# Patient Record
Sex: MALE | Race: BLACK OR AFRICAN AMERICAN | NOT HISPANIC OR LATINO | Employment: OTHER | URBAN - METROPOLITAN AREA
[De-identification: names, ages, dates, MRNs, and addresses within clinical notes are randomized per-mention and may not be internally consistent; named-entity substitution may affect disease eponyms.]

---

## 2018-12-09 PROBLEM — G12.21 ALS (AMYOTROPHIC LATERAL SCLEROSIS): Status: ACTIVE | Noted: 2018-12-09

## 2018-12-09 PROBLEM — I10 ESSENTIAL HYPERTENSION: Status: ACTIVE | Noted: 2018-12-09

## 2020-10-13 PROBLEM — R60.9 EDEMA: Status: ACTIVE | Noted: 2018-12-06

## 2020-10-13 PROBLEM — G82.50 QUADRIPARESIS: Status: ACTIVE | Noted: 2018-02-06

## 2020-10-13 PROBLEM — Z93.1 S/P PERCUTANEOUS ENDOSCOPIC GASTROSTOMY (PEG) TUBE PLACEMENT: Status: ACTIVE | Noted: 2020-10-13

## 2021-01-22 ENCOUNTER — LAB VISIT (OUTPATIENT)
Dept: LAB | Facility: OTHER | Age: 66
End: 2021-01-22
Payer: MEDICARE

## 2021-01-22 DIAGNOSIS — Z20.822 ENCOUNTER FOR LABORATORY TESTING FOR COVID-19 VIRUS: ICD-10-CM

## 2021-01-22 PROCEDURE — U0003 INFECTIOUS AGENT DETECTION BY NUCLEIC ACID (DNA OR RNA); SEVERE ACUTE RESPIRATORY SYNDROME CORONAVIRUS 2 (SARS-COV-2) (CORONAVIRUS DISEASE [COVID-19]), AMPLIFIED PROBE TECHNIQUE, MAKING USE OF HIGH THROUGHPUT TECHNOLOGIES AS DESCRIBED BY CMS-2020-01-R: HCPCS

## 2021-01-23 LAB — SARS-COV-2 RNA RESP QL NAA+PROBE: NOT DETECTED

## 2021-01-29 ENCOUNTER — LAB VISIT (OUTPATIENT)
Dept: LAB | Facility: OTHER | Age: 66
End: 2021-01-29
Payer: MEDICARE

## 2021-01-29 DIAGNOSIS — Z20.822 ENCOUNTER FOR LABORATORY TESTING FOR COVID-19 VIRUS: ICD-10-CM

## 2021-01-29 PROCEDURE — U0003 INFECTIOUS AGENT DETECTION BY NUCLEIC ACID (DNA OR RNA); SEVERE ACUTE RESPIRATORY SYNDROME CORONAVIRUS 2 (SARS-COV-2) (CORONAVIRUS DISEASE [COVID-19]), AMPLIFIED PROBE TECHNIQUE, MAKING USE OF HIGH THROUGHPUT TECHNOLOGIES AS DESCRIBED BY CMS-2020-01-R: HCPCS

## 2021-01-31 LAB — SARS-COV-2 RNA RESP QL NAA+PROBE: NOT DETECTED

## 2021-02-05 ENCOUNTER — LAB VISIT (OUTPATIENT)
Dept: LAB | Facility: OTHER | Age: 66
End: 2021-02-05
Payer: MEDICARE

## 2021-02-05 DIAGNOSIS — Z20.822 ENCOUNTER FOR LABORATORY TESTING FOR COVID-19 VIRUS: ICD-10-CM

## 2021-02-05 PROCEDURE — U0003 INFECTIOUS AGENT DETECTION BY NUCLEIC ACID (DNA OR RNA); SEVERE ACUTE RESPIRATORY SYNDROME CORONAVIRUS 2 (SARS-COV-2) (CORONAVIRUS DISEASE [COVID-19]), AMPLIFIED PROBE TECHNIQUE, MAKING USE OF HIGH THROUGHPUT TECHNOLOGIES AS DESCRIBED BY CMS-2020-01-R: HCPCS

## 2021-02-06 LAB — SARS-COV-2 RNA RESP QL NAA+PROBE: NOT DETECTED

## 2021-02-12 ENCOUNTER — LAB VISIT (OUTPATIENT)
Dept: LAB | Facility: OTHER | Age: 66
End: 2021-02-12
Payer: MEDICARE

## 2021-02-12 DIAGNOSIS — Z20.822 ENCOUNTER FOR LABORATORY TESTING FOR COVID-19 VIRUS: ICD-10-CM

## 2021-02-12 PROCEDURE — U0003 INFECTIOUS AGENT DETECTION BY NUCLEIC ACID (DNA OR RNA); SEVERE ACUTE RESPIRATORY SYNDROME CORONAVIRUS 2 (SARS-COV-2) (CORONAVIRUS DISEASE [COVID-19]), AMPLIFIED PROBE TECHNIQUE, MAKING USE OF HIGH THROUGHPUT TECHNOLOGIES AS DESCRIBED BY CMS-2020-01-R: HCPCS

## 2021-02-13 LAB — SARS-COV-2 RNA RESP QL NAA+PROBE: NOT DETECTED

## 2021-02-19 ENCOUNTER — LAB VISIT (OUTPATIENT)
Dept: LAB | Facility: OTHER | Age: 66
End: 2021-02-19
Payer: MEDICARE

## 2021-02-19 DIAGNOSIS — Z20.822 ENCOUNTER FOR LABORATORY TESTING FOR COVID-19 VIRUS: ICD-10-CM

## 2021-02-19 PROCEDURE — U0003 INFECTIOUS AGENT DETECTION BY NUCLEIC ACID (DNA OR RNA); SEVERE ACUTE RESPIRATORY SYNDROME CORONAVIRUS 2 (SARS-COV-2) (CORONAVIRUS DISEASE [COVID-19]), AMPLIFIED PROBE TECHNIQUE, MAKING USE OF HIGH THROUGHPUT TECHNOLOGIES AS DESCRIBED BY CMS-2020-01-R: HCPCS

## 2021-02-22 LAB — SARS-COV-2 RNA RESP QL NAA+PROBE: NOT DETECTED

## 2021-02-26 ENCOUNTER — LAB VISIT (OUTPATIENT)
Dept: LAB | Facility: OTHER | Age: 66
End: 2021-02-26
Payer: MEDICARE

## 2021-02-26 DIAGNOSIS — Z20.822 ENCOUNTER FOR LABORATORY TESTING FOR COVID-19 VIRUS: ICD-10-CM

## 2021-02-26 PROCEDURE — U0003 INFECTIOUS AGENT DETECTION BY NUCLEIC ACID (DNA OR RNA); SEVERE ACUTE RESPIRATORY SYNDROME CORONAVIRUS 2 (SARS-COV-2) (CORONAVIRUS DISEASE [COVID-19]), AMPLIFIED PROBE TECHNIQUE, MAKING USE OF HIGH THROUGHPUT TECHNOLOGIES AS DESCRIBED BY CMS-2020-01-R: HCPCS

## 2021-02-27 LAB — SARS-COV-2 RNA RESP QL NAA+PROBE: NOT DETECTED

## 2021-03-05 ENCOUNTER — LAB VISIT (OUTPATIENT)
Dept: LAB | Facility: OTHER | Age: 66
End: 2021-03-05
Payer: MEDICARE

## 2021-03-05 DIAGNOSIS — Z20.822 ENCOUNTER FOR LABORATORY TESTING FOR COVID-19 VIRUS: ICD-10-CM

## 2021-03-05 PROCEDURE — U0003 INFECTIOUS AGENT DETECTION BY NUCLEIC ACID (DNA OR RNA); SEVERE ACUTE RESPIRATORY SYNDROME CORONAVIRUS 2 (SARS-COV-2) (CORONAVIRUS DISEASE [COVID-19]), AMPLIFIED PROBE TECHNIQUE, MAKING USE OF HIGH THROUGHPUT TECHNOLOGIES AS DESCRIBED BY CMS-2020-01-R: HCPCS | Performed by: NURSE PRACTITIONER

## 2021-03-06 LAB — SARS-COV-2 RNA RESP QL NAA+PROBE: NOT DETECTED

## 2021-03-12 ENCOUNTER — LAB VISIT (OUTPATIENT)
Dept: LAB | Facility: OTHER | Age: 66
End: 2021-03-12
Payer: MEDICARE

## 2021-03-12 DIAGNOSIS — Z20.822 ENCOUNTER FOR LABORATORY TESTING FOR COVID-19 VIRUS: ICD-10-CM

## 2021-03-12 PROCEDURE — U0003 INFECTIOUS AGENT DETECTION BY NUCLEIC ACID (DNA OR RNA); SEVERE ACUTE RESPIRATORY SYNDROME CORONAVIRUS 2 (SARS-COV-2) (CORONAVIRUS DISEASE [COVID-19]), AMPLIFIED PROBE TECHNIQUE, MAKING USE OF HIGH THROUGHPUT TECHNOLOGIES AS DESCRIBED BY CMS-2020-01-R: HCPCS | Performed by: NURSE PRACTITIONER

## 2021-03-13 LAB — SARS-COV-2 RNA RESP QL NAA+PROBE: NOT DETECTED

## 2021-03-19 ENCOUNTER — LAB VISIT (OUTPATIENT)
Dept: LAB | Facility: OTHER | Age: 66
End: 2021-03-19
Payer: MEDICARE

## 2021-03-19 DIAGNOSIS — Z20.822 ENCOUNTER FOR LABORATORY TESTING FOR COVID-19 VIRUS: ICD-10-CM

## 2021-03-19 PROCEDURE — U0003 INFECTIOUS AGENT DETECTION BY NUCLEIC ACID (DNA OR RNA); SEVERE ACUTE RESPIRATORY SYNDROME CORONAVIRUS 2 (SARS-COV-2) (CORONAVIRUS DISEASE [COVID-19]), AMPLIFIED PROBE TECHNIQUE, MAKING USE OF HIGH THROUGHPUT TECHNOLOGIES AS DESCRIBED BY CMS-2020-01-R: HCPCS | Performed by: NURSE PRACTITIONER

## 2021-03-21 LAB — SARS-COV-2 RNA RESP QL NAA+PROBE: NOT DETECTED

## 2021-03-26 ENCOUNTER — LAB VISIT (OUTPATIENT)
Dept: LAB | Facility: OTHER | Age: 66
End: 2021-03-26
Payer: MEDICARE

## 2021-03-26 DIAGNOSIS — Z20.822 ENCOUNTER FOR LABORATORY TESTING FOR COVID-19 VIRUS: ICD-10-CM

## 2021-03-26 PROCEDURE — U0003 INFECTIOUS AGENT DETECTION BY NUCLEIC ACID (DNA OR RNA); SEVERE ACUTE RESPIRATORY SYNDROME CORONAVIRUS 2 (SARS-COV-2) (CORONAVIRUS DISEASE [COVID-19]), AMPLIFIED PROBE TECHNIQUE, MAKING USE OF HIGH THROUGHPUT TECHNOLOGIES AS DESCRIBED BY CMS-2020-01-R: HCPCS | Performed by: NURSE PRACTITIONER

## 2021-03-27 LAB — SARS-COV-2 RNA RESP QL NAA+PROBE: NOT DETECTED

## 2021-03-30 ENCOUNTER — LAB VISIT (OUTPATIENT)
Dept: LAB | Facility: OTHER | Age: 66
End: 2021-03-30
Payer: MEDICARE

## 2021-03-30 DIAGNOSIS — Z20.822 ENCOUNTER FOR LABORATORY TESTING FOR COVID-19 VIRUS: ICD-10-CM

## 2021-03-30 PROCEDURE — U0003 INFECTIOUS AGENT DETECTION BY NUCLEIC ACID (DNA OR RNA); SEVERE ACUTE RESPIRATORY SYNDROME CORONAVIRUS 2 (SARS-COV-2) (CORONAVIRUS DISEASE [COVID-19]), AMPLIFIED PROBE TECHNIQUE, MAKING USE OF HIGH THROUGHPUT TECHNOLOGIES AS DESCRIBED BY CMS-2020-01-R: HCPCS | Performed by: NURSE PRACTITIONER

## 2021-03-31 LAB — SARS-COV-2 RNA RESP QL NAA+PROBE: NOT DETECTED

## 2021-04-09 ENCOUNTER — LAB VISIT (OUTPATIENT)
Dept: LAB | Facility: OTHER | Age: 66
End: 2021-04-09
Payer: MEDICARE

## 2021-04-09 DIAGNOSIS — Z20.822 ENCOUNTER FOR LABORATORY TESTING FOR COVID-19 VIRUS: ICD-10-CM

## 2021-04-09 PROCEDURE — U0003 INFECTIOUS AGENT DETECTION BY NUCLEIC ACID (DNA OR RNA); SEVERE ACUTE RESPIRATORY SYNDROME CORONAVIRUS 2 (SARS-COV-2) (CORONAVIRUS DISEASE [COVID-19]), AMPLIFIED PROBE TECHNIQUE, MAKING USE OF HIGH THROUGHPUT TECHNOLOGIES AS DESCRIBED BY CMS-2020-01-R: HCPCS | Performed by: NURSE PRACTITIONER

## 2021-04-10 LAB — SARS-COV-2 RNA RESP QL NAA+PROBE: NOT DETECTED

## 2021-04-16 ENCOUNTER — LAB VISIT (OUTPATIENT)
Dept: LAB | Facility: OTHER | Age: 66
End: 2021-04-16
Payer: MEDICARE

## 2021-04-16 DIAGNOSIS — Z20.822 ENCOUNTER FOR LABORATORY TESTING FOR COVID-19 VIRUS: ICD-10-CM

## 2021-04-16 PROCEDURE — U0003 INFECTIOUS AGENT DETECTION BY NUCLEIC ACID (DNA OR RNA); SEVERE ACUTE RESPIRATORY SYNDROME CORONAVIRUS 2 (SARS-COV-2) (CORONAVIRUS DISEASE [COVID-19]), AMPLIFIED PROBE TECHNIQUE, MAKING USE OF HIGH THROUGHPUT TECHNOLOGIES AS DESCRIBED BY CMS-2020-01-R: HCPCS | Performed by: NURSE PRACTITIONER

## 2021-04-17 LAB — SARS-COV-2 RNA RESP QL NAA+PROBE: NOT DETECTED

## 2021-04-23 ENCOUNTER — LAB VISIT (OUTPATIENT)
Dept: LAB | Facility: OTHER | Age: 66
End: 2021-04-23
Payer: MEDICARE

## 2021-04-23 DIAGNOSIS — Z20.822 ENCOUNTER FOR LABORATORY TESTING FOR COVID-19 VIRUS: ICD-10-CM

## 2021-04-23 PROCEDURE — U0003 INFECTIOUS AGENT DETECTION BY NUCLEIC ACID (DNA OR RNA); SEVERE ACUTE RESPIRATORY SYNDROME CORONAVIRUS 2 (SARS-COV-2) (CORONAVIRUS DISEASE [COVID-19]), AMPLIFIED PROBE TECHNIQUE, MAKING USE OF HIGH THROUGHPUT TECHNOLOGIES AS DESCRIBED BY CMS-2020-01-R: HCPCS | Performed by: NURSE PRACTITIONER

## 2021-04-24 LAB — SARS-COV-2 RNA RESP QL NAA+PROBE: NOT DETECTED

## 2021-05-21 ENCOUNTER — LAB VISIT (OUTPATIENT)
Dept: LAB | Facility: OTHER | Age: 66
End: 2021-05-21
Payer: MEDICARE

## 2021-05-21 DIAGNOSIS — Z20.822 ENCOUNTER FOR LABORATORY TESTING FOR COVID-19 VIRUS: ICD-10-CM

## 2021-05-21 PROCEDURE — U0003 INFECTIOUS AGENT DETECTION BY NUCLEIC ACID (DNA OR RNA); SEVERE ACUTE RESPIRATORY SYNDROME CORONAVIRUS 2 (SARS-COV-2) (CORONAVIRUS DISEASE [COVID-19]), AMPLIFIED PROBE TECHNIQUE, MAKING USE OF HIGH THROUGHPUT TECHNOLOGIES AS DESCRIBED BY CMS-2020-01-R: HCPCS | Performed by: NURSE PRACTITIONER

## 2021-05-22 LAB — SARS-COV-2 RNA RESP QL NAA+PROBE: NOT DETECTED

## 2021-05-28 ENCOUNTER — LAB VISIT (OUTPATIENT)
Dept: LAB | Facility: OTHER | Age: 66
End: 2021-05-28
Payer: MEDICARE

## 2021-05-28 DIAGNOSIS — Z20.822 ENCOUNTER FOR LABORATORY TESTING FOR COVID-19 VIRUS: ICD-10-CM

## 2021-05-28 PROCEDURE — U0003 INFECTIOUS AGENT DETECTION BY NUCLEIC ACID (DNA OR RNA); SEVERE ACUTE RESPIRATORY SYNDROME CORONAVIRUS 2 (SARS-COV-2) (CORONAVIRUS DISEASE [COVID-19]), AMPLIFIED PROBE TECHNIQUE, MAKING USE OF HIGH THROUGHPUT TECHNOLOGIES AS DESCRIBED BY CMS-2020-01-R: HCPCS | Performed by: NURSE PRACTITIONER

## 2021-05-29 LAB — SARS-COV-2 RNA RESP QL NAA+PROBE: NOT DETECTED

## 2021-06-04 ENCOUNTER — LAB VISIT (OUTPATIENT)
Dept: LAB | Facility: OTHER | Age: 66
End: 2021-06-04
Payer: MEDICARE

## 2021-06-04 DIAGNOSIS — Z20.822 ENCOUNTER FOR LABORATORY TESTING FOR COVID-19 VIRUS: ICD-10-CM

## 2021-06-04 PROCEDURE — U0003 INFECTIOUS AGENT DETECTION BY NUCLEIC ACID (DNA OR RNA); SEVERE ACUTE RESPIRATORY SYNDROME CORONAVIRUS 2 (SARS-COV-2) (CORONAVIRUS DISEASE [COVID-19]), AMPLIFIED PROBE TECHNIQUE, MAKING USE OF HIGH THROUGHPUT TECHNOLOGIES AS DESCRIBED BY CMS-2020-01-R: HCPCS | Performed by: NURSE PRACTITIONER

## 2021-06-05 LAB — SARS-COV-2 RNA RESP QL NAA+PROBE: NOT DETECTED

## 2021-06-18 ENCOUNTER — LAB VISIT (OUTPATIENT)
Dept: LAB | Facility: OTHER | Age: 66
End: 2021-06-18
Payer: MEDICARE

## 2021-06-18 DIAGNOSIS — Z20.822 ENCOUNTER FOR LABORATORY TESTING FOR COVID-19 VIRUS: ICD-10-CM

## 2021-06-18 PROCEDURE — U0003 INFECTIOUS AGENT DETECTION BY NUCLEIC ACID (DNA OR RNA); SEVERE ACUTE RESPIRATORY SYNDROME CORONAVIRUS 2 (SARS-COV-2) (CORONAVIRUS DISEASE [COVID-19]), AMPLIFIED PROBE TECHNIQUE, MAKING USE OF HIGH THROUGHPUT TECHNOLOGIES AS DESCRIBED BY CMS-2020-01-R: HCPCS | Performed by: NURSE PRACTITIONER

## 2021-06-20 LAB — SARS-COV-2 RNA RESP QL NAA+PROBE: NOT DETECTED

## 2021-07-16 ENCOUNTER — LAB VISIT (OUTPATIENT)
Dept: LAB | Facility: OTHER | Age: 66
End: 2021-07-16
Payer: MEDICARE

## 2021-07-16 DIAGNOSIS — Z20.822 ENCOUNTER FOR LABORATORY TESTING FOR COVID-19 VIRUS: ICD-10-CM

## 2021-07-16 PROCEDURE — U0003 INFECTIOUS AGENT DETECTION BY NUCLEIC ACID (DNA OR RNA); SEVERE ACUTE RESPIRATORY SYNDROME CORONAVIRUS 2 (SARS-COV-2) (CORONAVIRUS DISEASE [COVID-19]), AMPLIFIED PROBE TECHNIQUE, MAKING USE OF HIGH THROUGHPUT TECHNOLOGIES AS DESCRIBED BY CMS-2020-01-R: HCPCS | Performed by: NURSE PRACTITIONER

## 2021-07-17 LAB
SARS-COV-2 RNA RESP QL NAA+PROBE: NOT DETECTED
SARS-COV-2- CYCLE NUMBER: -1

## 2021-07-23 ENCOUNTER — LAB VISIT (OUTPATIENT)
Dept: LAB | Facility: OTHER | Age: 66
End: 2021-07-23
Payer: MEDICARE

## 2021-07-23 DIAGNOSIS — Z20.822 ENCOUNTER FOR LABORATORY TESTING FOR COVID-19 VIRUS: ICD-10-CM

## 2021-07-23 PROCEDURE — U0003 INFECTIOUS AGENT DETECTION BY NUCLEIC ACID (DNA OR RNA); SEVERE ACUTE RESPIRATORY SYNDROME CORONAVIRUS 2 (SARS-COV-2) (CORONAVIRUS DISEASE [COVID-19]), AMPLIFIED PROBE TECHNIQUE, MAKING USE OF HIGH THROUGHPUT TECHNOLOGIES AS DESCRIBED BY CMS-2020-01-R: HCPCS | Performed by: NURSE PRACTITIONER

## 2021-07-26 LAB
SARS-COV-2 RNA RESP QL NAA+PROBE: NOT DETECTED
SARS-COV-2- CYCLE NUMBER: -1

## 2021-07-30 ENCOUNTER — LAB VISIT (OUTPATIENT)
Dept: LAB | Facility: OTHER | Age: 66
End: 2021-07-30
Payer: MEDICARE

## 2021-07-30 DIAGNOSIS — Z20.822 ENCOUNTER FOR LABORATORY TESTING FOR COVID-19 VIRUS: ICD-10-CM

## 2021-07-30 PROCEDURE — U0003 INFECTIOUS AGENT DETECTION BY NUCLEIC ACID (DNA OR RNA); SEVERE ACUTE RESPIRATORY SYNDROME CORONAVIRUS 2 (SARS-COV-2) (CORONAVIRUS DISEASE [COVID-19]), AMPLIFIED PROBE TECHNIQUE, MAKING USE OF HIGH THROUGHPUT TECHNOLOGIES AS DESCRIBED BY CMS-2020-01-R: HCPCS | Performed by: NURSE PRACTITIONER

## 2021-07-31 LAB
SARS-COV-2 RNA RESP QL NAA+PROBE: NOT DETECTED
SARS-COV-2- CYCLE NUMBER: -1

## 2021-08-06 ENCOUNTER — LAB VISIT (OUTPATIENT)
Dept: LAB | Facility: OTHER | Age: 66
End: 2021-08-06
Payer: MEDICARE

## 2021-08-06 DIAGNOSIS — Z20.822 ENCOUNTER FOR LABORATORY TESTING FOR COVID-19 VIRUS: ICD-10-CM

## 2021-08-06 PROCEDURE — U0003 INFECTIOUS AGENT DETECTION BY NUCLEIC ACID (DNA OR RNA); SEVERE ACUTE RESPIRATORY SYNDROME CORONAVIRUS 2 (SARS-COV-2) (CORONAVIRUS DISEASE [COVID-19]), AMPLIFIED PROBE TECHNIQUE, MAKING USE OF HIGH THROUGHPUT TECHNOLOGIES AS DESCRIBED BY CMS-2020-01-R: HCPCS | Performed by: NURSE PRACTITIONER

## 2021-08-07 LAB
SARS-COV-2 RNA RESP QL NAA+PROBE: NOT DETECTED
SARS-COV-2- CYCLE NUMBER: -1

## 2021-08-13 ENCOUNTER — LAB VISIT (OUTPATIENT)
Dept: LAB | Facility: OTHER | Age: 66
End: 2021-08-13
Payer: MEDICARE

## 2021-08-13 DIAGNOSIS — Z20.822 ENCOUNTER FOR LABORATORY TESTING FOR COVID-19 VIRUS: ICD-10-CM

## 2021-08-13 PROCEDURE — U0003 INFECTIOUS AGENT DETECTION BY NUCLEIC ACID (DNA OR RNA); SEVERE ACUTE RESPIRATORY SYNDROME CORONAVIRUS 2 (SARS-COV-2) (CORONAVIRUS DISEASE [COVID-19]), AMPLIFIED PROBE TECHNIQUE, MAKING USE OF HIGH THROUGHPUT TECHNOLOGIES AS DESCRIBED BY CMS-2020-01-R: HCPCS | Mod: ST72 | Performed by: NURSE PRACTITIONER

## 2021-08-17 LAB
SARS-COV-2 RNA RESP QL NAA+PROBE: NORMAL
TEST PERFORMANCE INFO SPEC: NORMAL

## 2021-08-20 ENCOUNTER — LAB VISIT (OUTPATIENT)
Dept: LAB | Facility: OTHER | Age: 66
End: 2021-08-20
Payer: MEDICARE

## 2021-08-20 DIAGNOSIS — Z20.822 ENCOUNTER FOR LABORATORY TESTING FOR COVID-19 VIRUS: ICD-10-CM

## 2021-08-20 PROCEDURE — U0003 INFECTIOUS AGENT DETECTION BY NUCLEIC ACID (DNA OR RNA); SEVERE ACUTE RESPIRATORY SYNDROME CORONAVIRUS 2 (SARS-COV-2) (CORONAVIRUS DISEASE [COVID-19]), AMPLIFIED PROBE TECHNIQUE, MAKING USE OF HIGH THROUGHPUT TECHNOLOGIES AS DESCRIBED BY CMS-2020-01-R: HCPCS | Performed by: NURSE PRACTITIONER

## 2021-08-22 LAB
SARS-COV-2 RNA RESP QL NAA+PROBE: NOT DETECTED
SARS-COV-2- CYCLE NUMBER: -1

## 2021-08-27 ENCOUNTER — LAB VISIT (OUTPATIENT)
Dept: LAB | Facility: OTHER | Age: 66
End: 2021-08-27
Payer: MEDICARE

## 2021-08-27 DIAGNOSIS — Z20.822 ENCOUNTER FOR LABORATORY TESTING FOR COVID-19 VIRUS: ICD-10-CM

## 2021-08-27 PROCEDURE — U0003 INFECTIOUS AGENT DETECTION BY NUCLEIC ACID (DNA OR RNA); SEVERE ACUTE RESPIRATORY SYNDROME CORONAVIRUS 2 (SARS-COV-2) (CORONAVIRUS DISEASE [COVID-19]), AMPLIFIED PROBE TECHNIQUE, MAKING USE OF HIGH THROUGHPUT TECHNOLOGIES AS DESCRIBED BY CMS-2020-01-R: HCPCS | Performed by: NURSE PRACTITIONER

## 2021-08-30 LAB
SARS-COV-2 RNA RESP QL NAA+PROBE: NOT DETECTED
SARS-COV-2- CYCLE NUMBER: NORMAL

## 2021-09-10 ENCOUNTER — LAB VISIT (OUTPATIENT)
Dept: LAB | Facility: OTHER | Age: 66
End: 2021-09-10
Payer: MEDICARE

## 2021-09-10 DIAGNOSIS — Z20.822 ENCOUNTER FOR LABORATORY TESTING FOR COVID-19 VIRUS: ICD-10-CM

## 2021-09-10 PROCEDURE — U0003 INFECTIOUS AGENT DETECTION BY NUCLEIC ACID (DNA OR RNA); SEVERE ACUTE RESPIRATORY SYNDROME CORONAVIRUS 2 (SARS-COV-2) (CORONAVIRUS DISEASE [COVID-19]), AMPLIFIED PROBE TECHNIQUE, MAKING USE OF HIGH THROUGHPUT TECHNOLOGIES AS DESCRIBED BY CMS-2020-01-R: HCPCS | Performed by: NURSE PRACTITIONER

## 2021-09-11 LAB
SARS-COV-2 RNA RESP QL NAA+PROBE: NOT DETECTED
SARS-COV-2- CYCLE NUMBER: NORMAL

## 2021-09-17 ENCOUNTER — LAB VISIT (OUTPATIENT)
Dept: LAB | Facility: OTHER | Age: 66
End: 2021-09-17
Payer: MEDICARE

## 2021-09-17 DIAGNOSIS — Z20.822 ENCOUNTER FOR LABORATORY TESTING FOR COVID-19 VIRUS: ICD-10-CM

## 2021-09-17 PROCEDURE — U0003 INFECTIOUS AGENT DETECTION BY NUCLEIC ACID (DNA OR RNA); SEVERE ACUTE RESPIRATORY SYNDROME CORONAVIRUS 2 (SARS-COV-2) (CORONAVIRUS DISEASE [COVID-19]), AMPLIFIED PROBE TECHNIQUE, MAKING USE OF HIGH THROUGHPUT TECHNOLOGIES AS DESCRIBED BY CMS-2020-01-R: HCPCS | Performed by: NURSE PRACTITIONER

## 2021-09-19 LAB
SARS-COV-2 RNA RESP QL NAA+PROBE: NOT DETECTED
SARS-COV-2- CYCLE NUMBER: NORMAL

## 2022-01-07 ENCOUNTER — LAB VISIT (OUTPATIENT)
Dept: LAB | Facility: OTHER | Age: 67
End: 2022-01-07
Payer: MEDICARE

## 2022-01-07 DIAGNOSIS — Z20.822 ENCOUNTER FOR LABORATORY TESTING FOR COVID-19 VIRUS: ICD-10-CM

## 2022-01-07 PROCEDURE — U0003 INFECTIOUS AGENT DETECTION BY NUCLEIC ACID (DNA OR RNA); SEVERE ACUTE RESPIRATORY SYNDROME CORONAVIRUS 2 (SARS-COV-2) (CORONAVIRUS DISEASE [COVID-19]), AMPLIFIED PROBE TECHNIQUE, MAKING USE OF HIGH THROUGHPUT TECHNOLOGIES AS DESCRIBED BY CMS-2020-01-R: HCPCS | Mod: ST72 | Performed by: NURSE PRACTITIONER

## 2022-01-11 LAB
SARS-COV-2 RNA RESP QL NAA+PROBE: NORMAL
TEST PERFORMANCE INFO SPEC: NORMAL

## 2022-01-14 ENCOUNTER — LAB VISIT (OUTPATIENT)
Dept: LAB | Facility: OTHER | Age: 67
End: 2022-01-14
Payer: MEDICARE

## 2022-01-14 DIAGNOSIS — Z20.822 ENCOUNTER FOR LABORATORY TESTING FOR COVID-19 VIRUS: ICD-10-CM

## 2022-01-14 PROCEDURE — U0003 INFECTIOUS AGENT DETECTION BY NUCLEIC ACID (DNA OR RNA); SEVERE ACUTE RESPIRATORY SYNDROME CORONAVIRUS 2 (SARS-COV-2) (CORONAVIRUS DISEASE [COVID-19]), AMPLIFIED PROBE TECHNIQUE, MAKING USE OF HIGH THROUGHPUT TECHNOLOGIES AS DESCRIBED BY CMS-2020-01-R: HCPCS | Performed by: NURSE PRACTITIONER

## 2022-01-15 LAB
SARS-COV-2 RNA RESP QL NAA+PROBE: NOT DETECTED
SARS-COV-2- CYCLE NUMBER: NORMAL

## 2022-02-21 ENCOUNTER — HOSPITAL ENCOUNTER (INPATIENT)
Facility: HOSPITAL | Age: 67
LOS: 2 days | Discharge: NURSING, REHAB OR LONG-TERM CARE | DRG: 393 | End: 2022-02-23
Attending: EMERGENCY MEDICINE | Admitting: INTERNAL MEDICINE
Payer: MEDICARE

## 2022-02-21 DIAGNOSIS — D64.9 SEVERE ANEMIA: Primary | ICD-10-CM

## 2022-02-21 DIAGNOSIS — G12.21 ALS (AMYOTROPHIC LATERAL SCLEROSIS): ICD-10-CM

## 2022-02-21 DIAGNOSIS — Z99.11 VENTILATOR DEPENDENCE: ICD-10-CM

## 2022-02-21 DIAGNOSIS — R94.31 QT PROLONGATION: ICD-10-CM

## 2022-02-21 DIAGNOSIS — T85.528A GASTROJEJUNOSTOMY TUBE DISLODGEMENT: ICD-10-CM

## 2022-02-21 DIAGNOSIS — R10.9 ABDOMINAL PAIN, UNSPECIFIED ABDOMINAL LOCATION: ICD-10-CM

## 2022-02-21 DIAGNOSIS — E86.0 DEHYDRATION: ICD-10-CM

## 2022-02-21 PROBLEM — M79.676 TOE PAIN: Status: ACTIVE | Noted: 2022-02-21

## 2022-02-21 PROBLEM — E87.5 HYPERKALEMIA: Status: ACTIVE | Noted: 2022-02-21

## 2022-02-21 PROBLEM — J18.1 RIGHT LOWER LOBE CONSOLIDATION: Status: ACTIVE | Noted: 2022-02-21

## 2022-02-21 PROBLEM — D62 ACUTE BLOOD LOSS ANEMIA: Status: ACTIVE | Noted: 2022-02-21

## 2022-02-21 LAB
ABO + RH BLD: NORMAL
ALBUMIN SERPL BCP-MCNC: 2.3 G/DL (ref 3.5–5.2)
ALP SERPL-CCNC: 54 U/L (ref 55–135)
ALT SERPL W/O P-5'-P-CCNC: 25 U/L (ref 10–44)
ANION GAP SERPL CALC-SCNC: 11 MMOL/L (ref 8–16)
ANION GAP SERPL CALC-SCNC: 11 MMOL/L (ref 8–16)
AST SERPL-CCNC: 39 U/L (ref 10–40)
BASOPHILS # BLD AUTO: 0.04 K/UL (ref 0–0.2)
BASOPHILS # BLD AUTO: 0.06 K/UL (ref 0–0.2)
BASOPHILS NFR BLD: 0.3 % (ref 0–1.9)
BASOPHILS NFR BLD: 0.4 % (ref 0–1.9)
BILIRUB SERPL-MCNC: 0.3 MG/DL (ref 0.1–1)
BLD GP AB SCN CELLS X3 SERPL QL: NORMAL
BLD PROD TYP BPU: NORMAL
BLOOD UNIT EXPIRATION DATE: NORMAL
BLOOD UNIT TYPE CODE: 5100
BLOOD UNIT TYPE: NORMAL
BUN SERPL-MCNC: 32 MG/DL (ref 8–23)
BUN SERPL-MCNC: 41 MG/DL (ref 8–23)
BUN SERPL-MCNC: 51 MG/DL (ref 6–30)
CALCIUM SERPL-MCNC: 9.4 MG/DL (ref 8.7–10.5)
CALCIUM SERPL-MCNC: 9.9 MG/DL (ref 8.7–10.5)
CHLORIDE SERPL-SCNC: 102 MMOL/L (ref 95–110)
CHLORIDE SERPL-SCNC: 104 MMOL/L (ref 95–110)
CHLORIDE SERPL-SCNC: 104 MMOL/L (ref 95–110)
CO2 SERPL-SCNC: 20 MMOL/L (ref 23–29)
CO2 SERPL-SCNC: 21 MMOL/L (ref 23–29)
CODING SYSTEM: NORMAL
CREAT SERPL-MCNC: 0.5 MG/DL (ref 0.5–1.4)
CREAT SERPL-MCNC: 0.6 MG/DL (ref 0.5–1.4)
CREAT SERPL-MCNC: 0.7 MG/DL (ref 0.5–1.4)
CTP QC/QA: YES
DIFFERENTIAL METHOD: ABNORMAL
DIFFERENTIAL METHOD: ABNORMAL
DISPENSE STATUS: NORMAL
EOSINOPHIL # BLD AUTO: 0.1 K/UL (ref 0–0.5)
EOSINOPHIL # BLD AUTO: 0.1 K/UL (ref 0–0.5)
EOSINOPHIL NFR BLD: 0.9 % (ref 0–8)
EOSINOPHIL NFR BLD: 1 % (ref 0–8)
ERYTHROCYTE [DISTWIDTH] IN BLOOD BY AUTOMATED COUNT: 18.2 % (ref 11.5–14.5)
ERYTHROCYTE [DISTWIDTH] IN BLOOD BY AUTOMATED COUNT: 18.2 % (ref 11.5–14.5)
EST. GFR  (AFRICAN AMERICAN): >60 ML/MIN/1.73 M^2
EST. GFR  (AFRICAN AMERICAN): >60 ML/MIN/1.73 M^2
EST. GFR  (NON AFRICAN AMERICAN): >60 ML/MIN/1.73 M^2
EST. GFR  (NON AFRICAN AMERICAN): >60 ML/MIN/1.73 M^2
FERRITIN SERPL-MCNC: 43 NG/ML (ref 20–300)
GLUCOSE SERPL-MCNC: 101 MG/DL (ref 70–110)
GLUCOSE SERPL-MCNC: 104 MG/DL (ref 70–110)
GLUCOSE SERPL-MCNC: 83 MG/DL (ref 70–110)
HCT VFR BLD AUTO: 24 % (ref 40–54)
HCT VFR BLD AUTO: 26.9 % (ref 40–54)
HCT VFR BLD CALC: 21 %PCV (ref 36–54)
HGB BLD-MCNC: 6.9 G/DL (ref 14–18)
HGB BLD-MCNC: 8.2 G/DL (ref 14–18)
IMM GRANULOCYTES # BLD AUTO: 0.06 K/UL (ref 0–0.04)
IMM GRANULOCYTES # BLD AUTO: 0.07 K/UL (ref 0–0.04)
IMM GRANULOCYTES NFR BLD AUTO: 0.5 % (ref 0–0.5)
IMM GRANULOCYTES NFR BLD AUTO: 0.5 % (ref 0–0.5)
IRON SERPL-MCNC: 34 UG/DL (ref 45–160)
LYMPHOCYTES # BLD AUTO: 1.5 K/UL (ref 1–4.8)
LYMPHOCYTES # BLD AUTO: 1.6 K/UL (ref 1–4.8)
LYMPHOCYTES NFR BLD: 11.6 % (ref 18–48)
LYMPHOCYTES NFR BLD: 11.9 % (ref 18–48)
MCH RBC QN AUTO: 25.1 PG (ref 27–31)
MCH RBC QN AUTO: 25.9 PG (ref 27–31)
MCHC RBC AUTO-ENTMCNC: 28.8 G/DL (ref 32–36)
MCHC RBC AUTO-ENTMCNC: 30.5 G/DL (ref 32–36)
MCV RBC AUTO: 85 FL (ref 82–98)
MCV RBC AUTO: 87 FL (ref 82–98)
MONOCYTES # BLD AUTO: 0.8 K/UL (ref 0.3–1)
MONOCYTES # BLD AUTO: 1 K/UL (ref 0.3–1)
MONOCYTES NFR BLD: 5.9 % (ref 4–15)
MONOCYTES NFR BLD: 7.5 % (ref 4–15)
NEUTROPHILS # BLD AUTO: 10.2 K/UL (ref 1.8–7.7)
NEUTROPHILS # BLD AUTO: 10.8 K/UL (ref 1.8–7.7)
NEUTROPHILS NFR BLD: 78.9 % (ref 38–73)
NEUTROPHILS NFR BLD: 80.6 % (ref 38–73)
NRBC BLD-RTO: 0 /100 WBC
NRBC BLD-RTO: 0 /100 WBC
PLATELET # BLD AUTO: 336 K/UL (ref 150–450)
PLATELET # BLD AUTO: 339 K/UL (ref 150–450)
PMV BLD AUTO: 10.1 FL (ref 9.2–12.9)
PMV BLD AUTO: 10.5 FL (ref 9.2–12.9)
POC IONIZED CALCIUM: 1.17 MMOL/L (ref 1.06–1.42)
POC TCO2 (MEASURED): 26 MMOL/L (ref 23–29)
POTASSIUM BLD-SCNC: 5.1 MMOL/L (ref 3.5–5.1)
POTASSIUM SERPL-SCNC: 3.7 MMOL/L (ref 3.5–5.1)
POTASSIUM SERPL-SCNC: 5.4 MMOL/L (ref 3.5–5.1)
PROT SERPL-MCNC: 8.4 G/DL (ref 6–8.4)
RBC # BLD AUTO: 2.75 M/UL (ref 4.6–6.2)
RBC # BLD AUTO: 3.16 M/UL (ref 4.6–6.2)
SAMPLE: ABNORMAL
SARS-COV-2 RDRP RESP QL NAA+PROBE: NEGATIVE
SATURATED IRON: 11 % (ref 20–50)
SODIUM BLD-SCNC: 136 MMOL/L (ref 136–145)
SODIUM SERPL-SCNC: 134 MMOL/L (ref 136–145)
SODIUM SERPL-SCNC: 135 MMOL/L (ref 136–145)
TOTAL IRON BINDING CAPACITY: 309 UG/DL (ref 250–450)
TRANS ERYTHROCYTES VOL PATIENT: NORMAL ML
TRANSFERRIN SERPL-MCNC: 209 MG/DL (ref 200–375)
WBC # BLD AUTO: 12.92 K/UL (ref 3.9–12.7)
WBC # BLD AUTO: 13.4 K/UL (ref 3.9–12.7)

## 2022-02-21 PROCEDURE — 99291 CRITICAL CARE FIRST HOUR: CPT | Mod: 25,CS,, | Performed by: EMERGENCY MEDICINE

## 2022-02-21 PROCEDURE — 99223 PR INITIAL HOSPITAL CARE,LEVL III: ICD-10-PCS | Mod: AI,GC,, | Performed by: INTERNAL MEDICINE

## 2022-02-21 PROCEDURE — 25500020 PHARM REV CODE 255: Performed by: EMERGENCY MEDICINE

## 2022-02-21 PROCEDURE — 99223 1ST HOSP IP/OBS HIGH 75: CPT | Mod: AI,GC,, | Performed by: INTERNAL MEDICINE

## 2022-02-21 PROCEDURE — 94761 N-INVAS EAR/PLS OXIMETRY MLT: CPT

## 2022-02-21 PROCEDURE — 25000003 PHARM REV CODE 250: Performed by: STUDENT IN AN ORGANIZED HEALTH CARE EDUCATION/TRAINING PROGRAM

## 2022-02-21 PROCEDURE — 94002 VENT MGMT INPAT INIT DAY: CPT

## 2022-02-21 PROCEDURE — 27000221 HC OXYGEN, UP TO 24 HOURS

## 2022-02-21 PROCEDURE — 25500020 PHARM REV CODE 255: Performed by: STUDENT IN AN ORGANIZED HEALTH CARE EDUCATION/TRAINING PROGRAM

## 2022-02-21 PROCEDURE — 36415 COLL VENOUS BLD VENIPUNCTURE: CPT

## 2022-02-21 PROCEDURE — U0002 COVID-19 LAB TEST NON-CDC: HCPCS | Performed by: EMERGENCY MEDICINE

## 2022-02-21 PROCEDURE — 85025 COMPLETE CBC W/AUTO DIFF WBC: CPT | Mod: 91

## 2022-02-21 PROCEDURE — 27200966 HC CLOSED SUCTION SYSTEM

## 2022-02-21 PROCEDURE — 80048 BASIC METABOLIC PNL TOTAL CA: CPT

## 2022-02-21 PROCEDURE — 80053 COMPREHEN METABOLIC PANEL: CPT | Performed by: EMERGENCY MEDICINE

## 2022-02-21 PROCEDURE — 94003 VENT MGMT INPAT SUBQ DAY: CPT

## 2022-02-21 PROCEDURE — 63600175 PHARM REV CODE 636 W HCPCS: Performed by: EMERGENCY MEDICINE

## 2022-02-21 PROCEDURE — 80047 BASIC METABLC PNL IONIZED CA: CPT

## 2022-02-21 PROCEDURE — P9021 RED BLOOD CELLS UNIT: HCPCS | Performed by: EMERGENCY MEDICINE

## 2022-02-21 PROCEDURE — 43762 RPLC GTUBE NO REVJ TRC: CPT

## 2022-02-21 PROCEDURE — 20000000 HC ICU ROOM

## 2022-02-21 PROCEDURE — 63600175 PHARM REV CODE 636 W HCPCS: Performed by: STUDENT IN AN ORGANIZED HEALTH CARE EDUCATION/TRAINING PROGRAM

## 2022-02-21 PROCEDURE — 63600175 PHARM REV CODE 636 W HCPCS: Performed by: INTERNAL MEDICINE

## 2022-02-21 PROCEDURE — 99900035 HC TECH TIME PER 15 MIN (STAT)

## 2022-02-21 PROCEDURE — 99900026 HC AIRWAY MAINTENANCE (STAT)

## 2022-02-21 PROCEDURE — 82728 ASSAY OF FERRITIN: CPT | Performed by: EMERGENCY MEDICINE

## 2022-02-21 PROCEDURE — 86901 BLOOD TYPING SEROLOGIC RH(D): CPT | Performed by: EMERGENCY MEDICINE

## 2022-02-21 PROCEDURE — 36430 TRANSFUSION BLD/BLD COMPNT: CPT

## 2022-02-21 PROCEDURE — 96374 THER/PROPH/DIAG INJ IV PUSH: CPT | Mod: 59

## 2022-02-21 PROCEDURE — 27201112

## 2022-02-21 PROCEDURE — 86920 COMPATIBILITY TEST SPIN: CPT | Performed by: EMERGENCY MEDICINE

## 2022-02-21 PROCEDURE — 84466 ASSAY OF TRANSFERRIN: CPT | Performed by: EMERGENCY MEDICINE

## 2022-02-21 PROCEDURE — 43762 RPLC GTUBE NO REVJ TRC: CPT | Mod: ,,, | Performed by: EMERGENCY MEDICINE

## 2022-02-21 PROCEDURE — 43762 PR REPLACE, GASTRO TUBE, PERCUTANEOUS, W/O REV OF GASTRO TRACT: ICD-10-PCS | Mod: ,,, | Performed by: EMERGENCY MEDICINE

## 2022-02-21 PROCEDURE — 99291 PR CRITICAL CARE, E/M 30-74 MINUTES: ICD-10-PCS | Mod: 25,CS,, | Performed by: EMERGENCY MEDICINE

## 2022-02-21 PROCEDURE — 85025 COMPLETE CBC W/AUTO DIFF WBC: CPT | Performed by: EMERGENCY MEDICINE

## 2022-02-21 PROCEDURE — 99291 CRITICAL CARE FIRST HOUR: CPT | Mod: 25

## 2022-02-21 RX ORDER — HYDROCODONE BITARTRATE AND ACETAMINOPHEN 500; 5 MG/1; MG/1
TABLET ORAL
Status: DISCONTINUED | OUTPATIENT
Start: 2022-02-21 | End: 2022-02-23 | Stop reason: HOSPADM

## 2022-02-21 RX ORDER — MORPHINE SULFATE 4 MG/ML
4 INJECTION, SOLUTION INTRAMUSCULAR; INTRAVENOUS EVERY 6 HOURS PRN
Status: DISCONTINUED | OUTPATIENT
Start: 2022-02-21 | End: 2022-02-21

## 2022-02-21 RX ORDER — SODIUM CHLORIDE 0.9 % (FLUSH) 0.9 %
10 SYRINGE (ML) INJECTION
Status: DISCONTINUED | OUTPATIENT
Start: 2022-02-21 | End: 2022-02-23 | Stop reason: HOSPADM

## 2022-02-21 RX ORDER — MORPHINE SULFATE 4 MG/ML
4 INJECTION, SOLUTION INTRAMUSCULAR; INTRAVENOUS
Status: COMPLETED | OUTPATIENT
Start: 2022-02-21 | End: 2022-02-21

## 2022-02-21 RX ORDER — GLYCOPYRROLATE 1 MG/1
1 TABLET ORAL 2 TIMES DAILY
Status: DISCONTINUED | OUTPATIENT
Start: 2022-02-21 | End: 2022-02-23 | Stop reason: HOSPADM

## 2022-02-21 RX ORDER — MORPHINE SULFATE 2 MG/ML
4 INJECTION, SOLUTION INTRAMUSCULAR; INTRAVENOUS EVERY 6 HOURS PRN
Status: DISCONTINUED | OUTPATIENT
Start: 2022-02-21 | End: 2022-02-23 | Stop reason: HOSPADM

## 2022-02-21 RX ADMIN — IOHEXOL 100 ML: 350 INJECTION, SOLUTION INTRAVENOUS at 05:02

## 2022-02-21 RX ADMIN — SODIUM CHLORIDE, SODIUM LACTATE, POTASSIUM CHLORIDE, AND CALCIUM CHLORIDE 1000 ML: .6; .31; .03; .02 INJECTION, SOLUTION INTRAVENOUS at 06:02

## 2022-02-21 RX ADMIN — AMPICILLIN SODIUM AND SULBACTAM SODIUM 3 G: 2; 1 INJECTION, POWDER, FOR SOLUTION INTRAMUSCULAR; INTRAVENOUS at 09:02

## 2022-02-21 RX ADMIN — GLYCOPYRROLATE 1 MG: 1 TABLET ORAL at 10:02

## 2022-02-21 RX ADMIN — MORPHINE SULFATE 4 MG: 2 INJECTION, SOLUTION INTRAMUSCULAR; INTRAVENOUS at 08:02

## 2022-02-21 RX ADMIN — MORPHINE SULFATE 4 MG: 4 INJECTION INTRAVENOUS at 05:02

## 2022-02-21 RX ADMIN — IOHEXOL 30 ML: 350 INJECTION, SOLUTION INTRAVENOUS at 09:02

## 2022-02-21 NOTE — ED NOTES
Wei Dominique, an 66 y.o. male presents to the ED from [facility] for peg tube dislodgment. Patient is scheduled to have peg replaced soon. PEG tube is protruding. Patient is ventilated via trach, able to answer yes/no questions with blinks - this is baseline.      Review of patient's allergies indicates:  No Known Allergies  Chief Complaint   Patient presents with    Peg tube issue     Pt arrives from [facility] for peg tube protrudement. Pt scheduled to have peg tube replaced soon.      Past Medical History:   Diagnosis Date    ALS (amyotrophic lateral sclerosis)     ALS (amyotrophic lateral sclerosis)     Erectile dysfunction     Gait disturbance     Hyperlipidemia     Hypertension     Iron deficiency anemia     Motor neuron disease     Tremor

## 2022-02-21 NOTE — ED NOTES
Blood arrived from blood bank at 0803, no patient label was on blood bag to verify and double check pt's information. Blood bank called by this RN to report missing patient label. Blood tubed back to blood at 0803, attn to Lyric Espinoza, Charge RN made aware.

## 2022-02-21 NOTE — ED PROVIDER NOTES
ED Resident HAND-OFF NOTE:  6:50 AM 2/21/2022  Wei Dominique is a 66 y.o. male who presented to the ED on 2/21/2022 and has been managed by Dr. Cast, who reports patient C/O PEG tube displacement. I assumed care of patient from off-going ED physician team at 6:50 AM pending CT and MICU admission.    On my evaluation, Wei Dominique appears well, hemodynamically stable and in NAD. Thus far, Wei Dominique has received:  Medications   0.9%  NaCl infusion (for blood administration) (has no administration in time range)   lactated ringers bolus 1,000 mL (1,000 mLs Intravenous New Bag 2/21/22 0639)   sodium chloride 0.9% flush 10 mL (has no administration in time range)   morphine injection 4 mg (4 mg Intravenous Given 2/21/22 0528)   iohexoL (OMNIPAQUE 350) injection 100 mL (100 mLs Intravenous Given 2/21/22 0517)       On my exam, I appreciate:  /74   Pulse 96   Temp 98.4 °F (36.9 °C) (Axillary)   Resp 18   SpO2 100%       Disposition: I anticipate patient will be admitted to MICU.   I have discussed and counseled Wei Dominique regarding exam, results, diagnosis, treatment, and plan.  ______________________  Brendon Solares MD   Emergency Medicine Resident  6:50 AM 2/21/2022      UPDATE:   Patient has been admitted to the MICU.  X-ray confirms proper placement of G-tube.  He remained hemodynamically stable throughout his ER stay.      :  Severe anemia (Primary)  Dehydration  Abdominal pain, unspecified abdominal location  Gastrojejunostomy tube dislodgement  Ventilator dependence  ALS (amyotrophic lateral sclerosis)       Brendon Solares MD  Resident  02/21/22 0407

## 2022-02-21 NOTE — SUBJECTIVE & OBJECTIVE
Past Medical History:   Diagnosis Date    ALS (amyotrophic lateral sclerosis)     ALS (amyotrophic lateral sclerosis)     Erectile dysfunction     Gait disturbance     Hyperlipidemia     Hypertension     Iron deficiency anemia     Motor neuron disease     Tremor        Past Surgical History:   Procedure Laterality Date    PORTACATH PLACEMENT         Review of patient's allergies indicates:  No Known Allergies    Family History    None       Tobacco Use    Smoking status: Not on file    Smokeless tobacco: Not on file   Substance and Sexual Activity    Alcohol use: Not on file    Drug use: Not on file    Sexual activity: Not on file      Review of Systems   Unable to perform ROS: Intubated   Objective:     Vital Signs (Most Recent):  Temp: 98.4 °F (36.9 °C) (02/21/22 0306)  Pulse: 96 (02/21/22 0604)  Resp: 18 (02/21/22 0604)  BP: 122/74 (02/21/22 0604)  SpO2: 100 % (02/21/22 0604) Vital Signs (24h Range):  Temp:  [98.4 °F (36.9 °C)] 98.4 °F (36.9 °C)  Pulse:  [84-96] 96  Resp:  [18-22] 18  SpO2:  [100 %] 100 %  BP: (103-131)/(62-74) 122/74      There is no height or weight on file to calculate BMI.    No intake or output data in the 24 hours ending 02/21/22 0650    Physical Exam  Constitutional:       General: He is not in acute distress.     Appearance: He is not ill-appearing.   HENT:      Head: Normocephalic and atraumatic.      Nose: Nose normal.      Mouth/Throat:      Mouth: Mucous membranes are moist.   Eyes:      Extraocular Movements: Extraocular movements intact.   Neck:      Comments: Trach in place  Cardiovascular:      Rate and Rhythm: Normal rate and regular rhythm.      Pulses: Normal pulses.      Heart sounds: Normal heart sounds. No murmur heard.  Pulmonary:      Effort: Pulmonary effort is normal. No respiratory distress.      Breath sounds: Normal breath sounds. No wheezing.   Abdominal:      General: Abdomen is flat. Bowel sounds are normal. There is distension.      Tenderness: There is abdominal  tenderness (Mild diffuse tenderness).      Comments: Hutchinson tube noted in stoma. Stoma without bleeding, erythema or fluctuance.   Musculoskeletal:         General: No swelling. Normal range of motion.      Cervical back: Normal range of motion.   Skin:     General: Skin is warm.      Coloration: Skin is not jaundiced or pale.      Comments: Right great toe oncycholysis with minimal bleeding noted on the media aspect of the nail bed.    Neurological:      Mental Status: He is alert and oriented to person, place, and time. Mental status is at baseline.   Psychiatric:      Comments: Calm and cooperative       Vents:  Vent Mode: A/C (02/21/22 0320)  Ventilator Initiated: Yes (02/21/22 0320)  Set Rate: 18 BPM (02/21/22 0320)  Vt Set: 500 mL (02/21/22 0320)  PEEP/CPAP: 5 cmH20 (02/21/22 0320)  Oxygen Concentration (%): 30 (02/21/22 0320)  Peak Airway Pressure: 26 cmH2O (02/21/22 0320)  Total Ve: 10.1 mL (02/21/22 0320)  Lines/Drains/Airways       Central Venous Catheter Line  Duration                  Port A Cath Single Lumen left subclavian -- days              Peripheral Intravenous Line  Duration                  Peripheral IV - Single Lumen 02/21/22 0401 20 G Right Antecubital <1 day                  Significant Labs:    CBC/Anemia Profile:  Recent Labs   Lab 02/21/22 0401 02/21/22 0408   WBC 12.92*  --    HGB 6.9*  --    HCT 24.0* 21*     --    MCV 87  --    RDW 18.2*  --         Chemistries:  Recent Labs   Lab 02/21/22 0401   *   K 5.4*      CO2 21*   BUN 41*   CREATININE 0.7   CALCIUM 9.4   ALBUMIN 2.3*   PROT 8.4   BILITOT 0.3   ALKPHOS 54*   ALT 25   AST 39       BMP:   Recent Labs   Lab 02/21/22 0401      *   K 5.4*      CO2 21*   BUN 41*   CREATININE 0.7   CALCIUM 9.4     CMP:   Recent Labs   Lab 02/21/22 0401   *   K 5.4*      CO2 21*      BUN 41*   CREATININE 0.7   CALCIUM 9.4   PROT 8.4   ALBUMIN 2.3*   BILITOT 0.3   ALKPHOS 54*   AST 39   ALT 25    ANIONGAP 11   EGFRNONAA >60.0     Lactic Acid: No results for input(s): LACTATE in the last 48 hours.    Significant Imaging: I have reviewed all pertinent imaging results/findings within the past 24 hours.

## 2022-02-21 NOTE — ASSESSMENT & PLAN NOTE
History of anemia  Baseline 8, presents with 6.9  Pt denies hematemesis, hemachezia, dark stools, blood in stool, hematuria,   S/p blood transfusion in the ED   - Trend CBC  - Low suspicion for bleed  - CTM

## 2022-02-21 NOTE — RESPIRATORY THERAPY
Received patient from ED with ambu bag.  Patient placed on 980 vent at documented settings.  Supplies visible at bedside. WCTM.

## 2022-02-21 NOTE — SUBJECTIVE & OBJECTIVE
No current facility-administered medications on file prior to encounter.     Current Outpatient Medications on File Prior to Encounter   Medication Sig    ALPRAZolam (XANAX) 0.5 MG tablet Take 1 tablet (0.5 mg total) by mouth 2 (two) times daily as needed for Anxiety.    baclofen (LIORESAL) 10 MG tablet TAKE 1 TABLET THREE TIMES PER DAY    carvedilol (COREG) 6.25 MG tablet Take 1 tablet by mouth 2 (two) times daily.    cyanocobalamin 1,000 mcg/mL injection 1 ML EVERY FOUR WEEKS INJECTION 90 DAYS    cyanocobalamin, vitamin B-12, 1,000 mcg/mL Kit Inject 1 mL into the muscle every 28 days. Administered by Home health    edaravone (RADICAVA) 30 mg/100 mL PgBk infusion Inject 60 mg into the vein.    folic acid (FOLVITE) 1 MG tablet Take 1 tablet by mouth once daily.    folic acid (FOLVITE) 1 MG tablet 2 TABLETS ONCE A DAY ORALLY 90 DAYS    glycopyrrolate (ROBINUL) 1 mg Tab START BY TAKING 1 TABLET DAILY. IF TOLERATING WELL, YOU CAN INCREASE IT UP TO 2 OR EVEN 3 TIMES DAILY.    losartan (COZAAR) 100 MG tablet     losartan-hydrochlorothiazide 100-12.5 mg (HYZAAR) 100-12.5 mg Tab Take 1 tablet by mouth once daily.    riluzole 50 mg Tab tablet TAKE 1 TABLET ON AN EMPTY STOMACH EVERY 12 HRS    tiZANidine (ZANAFLEX) 2 MG tablet Take 1 tablet by mouth 3 (three) times daily as needed.       Review of patient's allergies indicates:  No Known Allergies    Past Medical History:   Diagnosis Date    ALS (amyotrophic lateral sclerosis)     ALS (amyotrophic lateral sclerosis)     Erectile dysfunction     Gait disturbance     Hyperlipidemia     Hypertension     Iron deficiency anemia     Motor neuron disease     Tremor      Past Surgical History:   Procedure Laterality Date    PORTACATH PLACEMENT       Family History    None       Tobacco Use    Smoking status: Not on file    Smokeless tobacco: Not on file   Substance and Sexual Activity    Alcohol use: Not on file    Drug use: Not on file    Sexual activity: Not on file     Review of  Systems   Unable to perform ROS: Patient nonverbal   Objective:     Vital Signs (Most Recent):  Temp: 98.5 °F (36.9 °C) (02/21/22 0807)  Pulse: 96 (02/21/22 0807)  Resp: (!) 22 (02/21/22 0807)  BP: (!) 88/69 (02/21/22 0807)  SpO2: 100 % (02/21/22 0807)   Vital Signs (24h Range):  Temp:  [98.4 °F (36.9 °C)-98.5 °F (36.9 °C)] 98.5 °F (36.9 °C)  Pulse:  [84-96] 96  Resp:  [18-22] 22  SpO2:  [100 %] 100 %  BP: ()/(62-74) 88/69        Body mass index is 27.06 kg/m².    Physical Exam  Vitals reviewed.   Constitutional:       General: He is not in acute distress.     Appearance: He is well-developed.   HENT:      Head: Normocephalic and atraumatic.   Eyes:      General:         Right eye: No discharge.         Left eye: No discharge.      Conjunctiva/sclera: Conjunctivae normal.   Neck:      Comments: Trach  Cardiovascular:      Rate and Rhythm: Normal rate and regular rhythm.   Pulmonary:      Effort: Pulmonary effort is normal. No respiratory distress.   Abdominal:      General: There is no distension.      Palpations: Abdomen is soft.      Comments: PEG in LUQ, gastric contents easily aspirated   Musculoskeletal:         General: No deformity.      Cervical back: Normal range of motion and neck supple.   Skin:     General: Skin is warm and dry.   Neurological:      Mental Status: He is alert. Mental status is at baseline.       Significant Labs:  I have reviewed all pertinent lab results within the past 24 hours.  CBC:   Recent Labs   Lab 02/21/22  0401 02/21/22  0408   WBC 12.92*  --    RBC 2.75*  --    HGB 6.9*  --    HCT 24.0* 21*     --    MCV 87  --    MCH 25.1*  --    MCHC 28.8*  --      CMP:   Recent Labs   Lab 02/21/22  0401      CALCIUM 9.4   ALBUMIN 2.3*   PROT 8.4   *   K 5.4*   CO2 21*      BUN 41*   CREATININE 0.7   ALKPHOS 54*   ALT 25   AST 39   BILITOT 0.3       Significant Diagnostics:  I have reviewed all pertinent imaging results/findings within the past 24  hours.    CT:   1. Dislodged gastrostomy tube with tip within the abdominal wall.  2. Posterior skin defect with irregularity of the coccyx/sacrum which could relate to possible osteomyelitis and sacral decubitus ulcer.  Correlation with physical exam and further evaluation as warranted.  3. Mild rectal wall thickening associated with pre sacral fat stranding, could be related to colitis or reactive due to findings discussed above.  4. Right lower lobe consolidation.  5. Innumerable hepatic and renal hypodensities many of which are too small to definitively characterize the larger of which are suggestive of cysts suggestive of polycystic kidney/liver disease.    XR: There is contrast seen within the stomach with no evidence of obstruction or leak.  No complication seen.

## 2022-02-21 NOTE — ASSESSMENT & PLAN NOTE
Hx of ALS, from [facility]   Trach and PEG, presents for PEG dislodgement  Trach on VC+ 30% at NH  - Baclofen for muscle spasms  - Continue Riluzole

## 2022-02-21 NOTE — HPI
Wei Dominique is a 66 y.o. male with PMHx of ALS, trach/peg with vent dependence, HTN, PE (on Eliquis) who presents to ED after PEG tube dislodged. Sent from nursing home after staff noticed that his PEG was protruding. This was placed in Feb 2020. CT A/P in ED demonstrates bumper of PEG in the subcut tissue. The PEG was already replaced in the ED with a arvizu catheter. This demonstrates return of gastric contents.

## 2022-02-21 NOTE — ASSESSMENT & PLAN NOTE
65 yo male with PMHx of ALS, trach/peg with vent dependence, HTN, PE (on Eliquis) who presented with PEG dislodgement, replaced in ED    - Tube study demonstrates contrast within stomach. Okay to use PEG.   - Remainder of care per MICU  - We will sign off. Please call with questions or concerns.

## 2022-02-21 NOTE — H&P
"[facility] - Emergency Dept  Critical Care Medicine  History & Physical    Patient Name: Wei Dominique  MRN: [id number]  Admission Date: 2/21/2022  Hospital Length of Stay: 0 days  Code Status: Full Code  Attending Physician: Scott Craven*   Primary Care Provider: Mack Suero MD   Principal Problem: Acute blood loss anemia    Subjective:     HPI:  Patient is a 66 y.o. male with significant past medical history of ALS, s/p trach & peg, vent dependent, HTN, PE (on eliquis) presented to hospital from [facility] complaining of dislodged peg tube. Nursing home staff was concerned that the patient's peg tube was "protruding," although reportedly still functional. Per chart, patient was supposed to be having his peg replaced soon, though not clear where this was to occur.  Patient is able to communicate by blinking of his eyes. He communcated left great toe pain that started a few days ago. He denies any chest pain, dyspnea, bloody stools, dark or bloody urine. Of note, his G tube was placed laparoscopically by Gen surgery in 2020. He was admitted in March 2021 for PEG tube dislodgement.     Upon arrival to the ED, he was hemodynamically stable. His v      Hospital/ICU Course:  No notes on file     Past Medical History:   Diagnosis Date    ALS (amyotrophic lateral sclerosis)     ALS (amyotrophic lateral sclerosis)     Erectile dysfunction     Gait disturbance     Hyperlipidemia     Hypertension     Iron deficiency anemia     Motor neuron disease     Tremor        Past Surgical History:   Procedure Laterality Date    PORTACATH PLACEMENT         Review of patient's allergies indicates:  No Known Allergies    Family History    None       Tobacco Use    Smoking status: Not on file    Smokeless tobacco: Not on file   Substance and Sexual Activity    Alcohol use: Not on file    Drug use: Not on file    Sexual activity: Not on file      Review of Systems   Unable to perform ROS: " Intubated   Objective:     Vital Signs (Most Recent):  Temp: 98.4 °F (36.9 °C) (02/21/22 0306)  Pulse: 96 (02/21/22 0604)  Resp: 18 (02/21/22 0604)  BP: 122/74 (02/21/22 0604)  SpO2: 100 % (02/21/22 0604) Vital Signs (24h Range):  Temp:  [98.4 °F (36.9 °C)] 98.4 °F (36.9 °C)  Pulse:  [84-96] 96  Resp:  [18-22] 18  SpO2:  [100 %] 100 %  BP: (103-131)/(62-74) 122/74      There is no height or weight on file to calculate BMI.    No intake or output data in the 24 hours ending 02/21/22 0650    Physical Exam  Constitutional:       General: He is not in acute distress.     Appearance: He is not ill-appearing.   HENT:      Head: Normocephalic and atraumatic.      Nose: Nose normal.      Mouth/Throat:      Mouth: Mucous membranes are moist.   Eyes:      Extraocular Movements: Extraocular movements intact.   Neck:      Comments: Trach in place  Cardiovascular:      Rate and Rhythm: Normal rate and regular rhythm.      Pulses: Normal pulses.      Heart sounds: Normal heart sounds. No murmur heard.  Pulmonary:      Effort: Pulmonary effort is normal. No respiratory distress.      Breath sounds: Normal breath sounds. No wheezing.   Abdominal:      General: Abdomen is flat. Bowel sounds are normal. There is distension.      Tenderness: There is abdominal tenderness (Mild diffuse tenderness).      Comments: Hutchinson tube noted in stoma. Stoma without bleeding, erythema or fluctuance.   Musculoskeletal:         General: No swelling. Normal range of motion.      Cervical back: Normal range of motion.   Skin:     General: Skin is warm.      Coloration: Skin is not jaundiced or pale.      Comments: Right great toe oncycholysis with minimal bleeding noted on the media aspect of the nail bed.    Neurological:      Mental Status: He is alert and oriented to person, place, and time. Mental status is at baseline.   Psychiatric:      Comments: Calm and cooperative       Vents:  Vent Mode: A/C (02/21/22 0320)  Ventilator Initiated: Yes  (02/21/22 0320)  Set Rate: 18 BPM (02/21/22 0320)  Vt Set: 500 mL (02/21/22 0320)  PEEP/CPAP: 5 cmH20 (02/21/22 0320)  Oxygen Concentration (%): 30 (02/21/22 0320)  Peak Airway Pressure: 26 cmH2O (02/21/22 0320)  Total Ve: 10.1 mL (02/21/22 0320)  Lines/Drains/Airways       Central Venous Catheter Line  Duration                  Port A Cath Single Lumen left subclavian -- days              Peripheral Intravenous Line  Duration                  Peripheral IV - Single Lumen 02/21/22 0401 20 G Right Antecubital <1 day                  Significant Labs:    CBC/Anemia Profile:  Recent Labs   Lab 02/21/22 0401 02/21/22 0408   WBC 12.92*  --    HGB 6.9*  --    HCT 24.0* 21*     --    MCV 87  --    RDW 18.2*  --         Chemistries:  Recent Labs   Lab 02/21/22 0401   *   K 5.4*      CO2 21*   BUN 41*   CREATININE 0.7   CALCIUM 9.4   ALBUMIN 2.3*   PROT 8.4   BILITOT 0.3   ALKPHOS 54*   ALT 25   AST 39       BMP:   Recent Labs   Lab 02/21/22 0401      *   K 5.4*      CO2 21*   BUN 41*   CREATININE 0.7   CALCIUM 9.4     CMP:   Recent Labs   Lab 02/21/22 0401   *   K 5.4*      CO2 21*      BUN 41*   CREATININE 0.7   CALCIUM 9.4   PROT 8.4   ALBUMIN 2.3*   BILITOT 0.3   ALKPHOS 54*   AST 39   ALT 25   ANIONGAP 11   EGFRNONAA >60.0     Lactic Acid: No results for input(s): LACTATE in the last 48 hours.    Significant Imaging: I have reviewed all pertinent imaging results/findings within the past 24 hours.    Assessment/Plan:     Neuro  Quadriparesis  Immerse bed  Turn patient q2h  Monitor for wounds    ALS (amyotrophic lateral sclerosis)  Hx of ALS, from [facility]   Trach and PEG, presents for PEG dislodgement  Trach on VC+ 30% at NH  - Baclofen for muscle spasms  - Continue Riluzole    Cardiac/Vascular  Essential hypertension  On losartan, Coreg at home  Holding at this time  Resume as appropriate    Renal/  Hyperkalemia  K 5.4 on BMP, iSTAT 5.1  Not shifted  -  Need to recheck  - Held losartan in setting of hyperkalemia    Oncology  * Acute blood loss anemia  History of anemia  Baseline 8, presents with 6.9  Pt denies hematemesis, hemachezia, dark stools, blood in stool, hematuria,   S/p blood transfusion in the ED   - Trend CBC  - Low suspicion for bleed  - CTM       GI  S/P percutaneous endoscopic gastrostomy (PEG) tube placement  Admitted for PEG dislodgment  Originally placed at OSH in 2020  CT Abd revealed dislodgement, replaced in ED  - Discuss with gen surgery for further assessment  - Xray pending for placement  - Do not use PEG until cleared by general surgery (likely after tube check)    Orthopedic  Toe pain  Presents with left great toe pain / nail lifting from bed  Some bleeding presents, erythema  - Pain control prn  - Wound care consult    Other  Right lower lobe consolidation  Patient with trach  CT abd with RLL consolidations  WBC 12  No fever  - Unasyn added; de-escalate as appropriate to Augmentin on PEG cleared for use  - F/u CXR  - Consider respiratory cultures; likely colonized but would obtain if worsens        Critical Care Daily Checklist:    A: Awake: RASS Goal/Actual Goal:    Actual:     B: Spontaneous Breathing Trial Performed?     C: SAT & SBT Coordinated?  n/a                      D: Delirium: CAM-ICU     E: Early Mobility Performed? No   F: Feeding Goal:    Status:     Current Diet Order   Procedures    Diet NPO      AS: Analgesia/Sedation n/a   T: Thromboembolic Prophylaxis SCD   H: HOB > 300 Yes   U: Stress Ulcer Prophylaxis (if needed) eating   G: Glucose Control Not diabetic   B: Bowel Function     I: Indwelling Catheter (Lines & Hutchinson) Necessity PEG   D: De-escalation of Antimicrobials/Pharmacotherapies n/a    Plan for the day/ETD PEG, surgery recs    Code Status:  Family/Goals of Care: Full Code  ongoing         Critical secondary to Patient has a condition that poses threat to life and bodily function: acute respiratory failure      Critical care was time spent personally by me on the following activities: development of treatment plan with patient or surrogate and bedside caregivers, discussions with consultants, evaluation of patient's response to treatment, examination of patient, ordering and performing treatments and interventions, ordering and review of laboratory studies, ordering and review of radiographic studies, pulse oximetry, re-evaluation of patient's condition. This critical care time did not overlap with that of any other provider or involve time for any procedures.     Alicia Childress MD  Critical Care Medicine  [facility] - Emergency Dept

## 2022-02-21 NOTE — ED NOTES
I-STAT Chem-8+ Results:   Value Reference Range   Sodium 136 136-145 mmol/L   Potassium  5.1 3.5-5.1 mmol/L   Chloride 104  mmol/L   Ionized Calcium 1.17 1.06-1.42 mmol/L   CO2 (measured) 26 23-29 mmol/L   Glucose 104  mg/dL   BUN 51 6-30 mg/dL   Creatinine 0.5 0.5-1.4 mg/dL   Hematocrit 21 36-54%

## 2022-02-21 NOTE — HPI
"Patient is a 66 y.o. male with significant past medical history of ALS, s/p trach & peg, vent dependent, HTN, PE (on eliquis) presented to hospital from [facility] complaining of dislodged peg tube. Nursing home staff was concerned that the patient's peg tube was "protruding," although reportedly still functional. Per chart, patient was supposed to be having his peg replaced soon, though not clear where this was to occur.  Patient is able to communicate by blinking of his eyes. He communcated left great toe pain that started a few days ago. He denies any chest pain, dyspnea, bloody stools, dark or bloody urine. Of note, his G tube was placed laparoscopically by Gen surgery in 2020. He was admitted in March 2021 for PEG tube dislodgement. Vital signs stable in ED without concern for sepsis: /74   Pulse 96   Temp 98.4 °F (36.9 °C) (Axillary)   Resp 18   SpO2 100%     "

## 2022-02-21 NOTE — ASSESSMENT & PLAN NOTE
Patient with trach  CT abd with RLL consolidations  WBC 12  No fever  - Unasyn added; de-escalate as appropriate to Augmentin on PEG cleared for use  - F/u CXR  - Consider respiratory cultures; likely colonized but would obtain if worsens

## 2022-02-21 NOTE — ASSESSMENT & PLAN NOTE
K 5.4 on BMP, iSTAT 5.1  Not shifted  - Need to recheck  - Held losartan in setting of hyperkalemia

## 2022-02-21 NOTE — ASSESSMENT & PLAN NOTE
Presents with left great toe pain / nail lifting from bed  Some bleeding presents, erythema  - Pain control prn  - Wound care consult

## 2022-02-21 NOTE — ASSESSMENT & PLAN NOTE
Admitted for PEG dislodgment  Originally placed at OSH in 2020  CT Abd revealed dislodgement, replaced in ED  - Discuss with gen surgery for further assessment  - Xray pending for placement  - Do not use PEG until cleared by general surgery (likely after tube check)

## 2022-02-21 NOTE — CONSULTS
[facility] - Emergency Dept  General Surgery  Consult Note    Patient Name: Wei Dominique  MRN: [id number]  Code Status: Full Code  Admission Date: 2/21/2022  Hospital Length of Stay: 0 days  Attending Physician: Scott Craven*  Primary Care Provider: Mack Suero MD    Patient information was obtained from past medical records and ER records.     Inpatient consult to General Surgery  Consult performed by: Del Elizalde MD  Consult ordered by: Alannah Padilla MD        Subjective:     Principal Problem: Acute blood loss anemia    History of Present Illness: Wei Dominique is a 66 y.o. male with PMHx of ALS, trach/peg with vent dependence, HTN, PE (on Eliquis) who presents to ED after PEG tube dislodged. Sent from nursing home after staff noticed that his PEG was protruding. This was placed in Feb 2020. CT A/P in ED demonstrates bumper of PEG in the subcut tissue. The PEG was already replaced in the ED with a arvizu catheter. This demonstrates return of gastric contents.       No current facility-administered medications on file prior to encounter.     Current Outpatient Medications on File Prior to Encounter   Medication Sig    ALPRAZolam (XANAX) 0.5 MG tablet Take 1 tablet (0.5 mg total) by mouth 2 (two) times daily as needed for Anxiety.    baclofen (LIORESAL) 10 MG tablet TAKE 1 TABLET THREE TIMES PER DAY    carvedilol (COREG) 6.25 MG tablet Take 1 tablet by mouth 2 (two) times daily.    cyanocobalamin 1,000 mcg/mL injection 1 ML EVERY FOUR WEEKS INJECTION 90 DAYS    cyanocobalamin, vitamin B-12, 1,000 mcg/mL Kit Inject 1 mL into the muscle every 28 days. Administered by Home health    edaravone (RADICAVA) 30 mg/100 mL PgBk infusion Inject 60 mg into the vein.    folic acid (FOLVITE) 1 MG tablet Take 1 tablet by mouth once daily.    folic acid (FOLVITE) 1 MG tablet 2 TABLETS ONCE A DAY ORALLY 90 DAYS    glycopyrrolate (ROBINUL) 1 mg Tab START BY TAKING 1 TABLET DAILY. IF TOLERATING WELL,  YOU CAN INCREASE IT UP TO 2 OR EVEN 3 TIMES DAILY.    losartan (COZAAR) 100 MG tablet     losartan-hydrochlorothiazide 100-12.5 mg (HYZAAR) 100-12.5 mg Tab Take 1 tablet by mouth once daily.    riluzole 50 mg Tab tablet TAKE 1 TABLET ON AN EMPTY STOMACH EVERY 12 HRS    tiZANidine (ZANAFLEX) 2 MG tablet Take 1 tablet by mouth 3 (three) times daily as needed.       Review of patient's allergies indicates:  No Known Allergies    Past Medical History:   Diagnosis Date    ALS (amyotrophic lateral sclerosis)     ALS (amyotrophic lateral sclerosis)     Erectile dysfunction     Gait disturbance     Hyperlipidemia     Hypertension     Iron deficiency anemia     Motor neuron disease     Tremor      Past Surgical History:   Procedure Laterality Date    PORTACATH PLACEMENT       Family History    None       Tobacco Use    Smoking status: Not on file    Smokeless tobacco: Not on file   Substance and Sexual Activity    Alcohol use: Not on file    Drug use: Not on file    Sexual activity: Not on file     Review of Systems   Unable to perform ROS: Patient nonverbal   Objective:     Vital Signs (Most Recent):  Temp: 98.5 °F (36.9 °C) (02/21/22 0807)  Pulse: 96 (02/21/22 0807)  Resp: (!) 22 (02/21/22 0807)  BP: (!) 88/69 (02/21/22 0807)  SpO2: 100 % (02/21/22 0807)   Vital Signs (24h Range):  Temp:  [98.4 °F (36.9 °C)-98.5 °F (36.9 °C)] 98.5 °F (36.9 °C)  Pulse:  [84-96] 96  Resp:  [18-22] 22  SpO2:  [100 %] 100 %  BP: ()/(62-74) 88/69        Body mass index is 27.06 kg/m².    Physical Exam  Vitals reviewed.   Constitutional:       General: He is not in acute distress.     Appearance: He is well-developed.   HENT:      Head: Normocephalic and atraumatic.   Eyes:      General:         Right eye: No discharge.         Left eye: No discharge.      Conjunctiva/sclera: Conjunctivae normal.   Neck:      Comments: Trach  Cardiovascular:      Rate and Rhythm: Normal rate and regular rhythm.   Pulmonary:      Effort:  Pulmonary effort is normal. No respiratory distress.   Abdominal:      General: There is no distension.      Palpations: Abdomen is soft.      Comments: PEG in LUQ, gastric contents easily aspirated   Musculoskeletal:         General: No deformity.      Cervical back: Normal range of motion and neck supple.   Skin:     General: Skin is warm and dry.   Neurological:      Mental Status: He is alert. Mental status is at baseline.       Significant Labs:  I have reviewed all pertinent lab results within the past 24 hours.  CBC:   Recent Labs   Lab 02/21/22  0401 02/21/22  0408   WBC 12.92*  --    RBC 2.75*  --    HGB 6.9*  --    HCT 24.0* 21*     --    MCV 87  --    MCH 25.1*  --    MCHC 28.8*  --      CMP:   Recent Labs   Lab 02/21/22 0401      CALCIUM 9.4   ALBUMIN 2.3*   PROT 8.4   *   K 5.4*   CO2 21*      BUN 41*   CREATININE 0.7   ALKPHOS 54*   ALT 25   AST 39   BILITOT 0.3       Significant Diagnostics:  I have reviewed all pertinent imaging results/findings within the past 24 hours.    CT:   1. Dislodged gastrostomy tube with tip within the abdominal wall.  2. Posterior skin defect with irregularity of the coccyx/sacrum which could relate to possible osteomyelitis and sacral decubitus ulcer.  Correlation with physical exam and further evaluation as warranted.  3. Mild rectal wall thickening associated with pre sacral fat stranding, could be related to colitis or reactive due to findings discussed above.  4. Right lower lobe consolidation.  5. Innumerable hepatic and renal hypodensities many of which are too small to definitively characterize the larger of which are suggestive of cysts suggestive of polycystic kidney/liver disease.    XR: There is contrast seen within the stomach with no evidence of obstruction or leak.  No complication seen.        Assessment/Plan:     S/P percutaneous endoscopic gastrostomy (PEG) tube placement  67 yo male with PMHx of ALS, trach/peg with vent  dependence, HTN, PE (on Eliquis) who presented with PEG dislodgement, replaced in ED    - Tube study demonstrates contrast within stomach. Okay to use PEG.   - Remainder of care per MICU  - We will sign off. Please call with questions or concerns.       VTE Risk Mitigation (From admission, onward)         Ordered     IP VTE LOW RISK PATIENT  Once         02/21/22 0644     Place sequential compression device  Until discontinued         02/21/22 0644                  Del Elizalde MD  General Surgery  [facility]

## 2022-02-21 NOTE — PLAN OF CARE
[facility] - Cardiac Intensive Care  Initial Discharge Assessment       Primary Care Provider: Mack Suero MD    Admission Diagnosis: Dehydration [E86.0]  ALS (amyotrophic lateral sclerosis) [G12.21]  Ventilator dependence [Z99.11]  Gastrojejunostomy tube dislodgement [T85.528A]  Severe anemia [D64.9]  Abdominal pain, unspecified abdominal location [R10.9]    Admission Date: 2/21/2022  Expected Discharge Date:     Discharge Barriers Identified: (P) None    Payor: MEDICARE / Plan: MEDICARE PART A & B / Product Type: Government /     Extended Emergency Contact Information  Primary Emergency Contact: Eze Dominique  Mobile Phone: [phone]  Relation: Spouse  Preferred language: English    Discharge Plan A: (P) Return to nursing home ([facility])  Discharge Plan B: (P) Return to Nursing Home      [id number] - [place], [place] - [address]  [address] [place] [address]  Phone: [phone] Fax: [phone]      Initial Assessment (most recent)       Adult Discharge Assessment - 02/21/22 1612          Discharge Assessment    Assessment Type Discharge Planning Assessment (P)      Confirmed/corrected address, phone number and insurance Yes (P)      Confirmed Demographics Correct on Facesheet (P)      Source of Information family (P)    Spouse    If unable to respond/provide information was family/caregiver contacted? Yes (P)      Contact Name/Number Spouse; Eze Dominique; [phone] (P)      Does patient/caregiver understand observation status Yes (P)      Communicated VENESSA with patient/caregiver Yes (P)      Reason For Admission Dehydration (P)      Lives With facility resident (P)      Facility Arrived From: [facility] (P)      Do you expect to return to your current living situation? Yes (P)      Do you have help at home or someone to help you manage your care at home? Yes (P)    Facility Nurse    Prior to hospitilization cognitive status: Alert/Oriented (P)      Current cognitive  status: Alert/Oriented (P)      Walking or Climbing Stairs Difficulty ambulation difficulty, requires equipment (P)      Dressing/Bathing Difficulty bathing difficulty, requires equipment (P)      Do you have any problems with: Needs other help (P)      Home Accessibility wheelchair accessible (P)      Home Layout Able to live on 1st floor (P)      Readmission within 30 days? No (P)      Patient currently being followed by outpatient case management? No (P)      Do you currently have service(s) that help you manage your care at home? No (P)      Do you take prescription medications? Yes (P)      Do you have prescription coverage? Yes (P)      Do you have any problems affording any of your prescribed medications? No (P)      Is the patient taking medications as prescribed? yes (P)      Who is going to help you get home at discharge? Facility nurse (P)      How do you get to doctors appointments? -- (P)    Medical transport back to facility    Are you on dialysis? No (P)      Do you take coumadin? No (P)      Discharge Plan A Return to nursing home (P)    [facility]    Discharge Plan B Return to Nursing Home (P)      DME Needed Upon Discharge  none (P)      Discharge Plan discussed with: Spouse/sig other (P)      Discharge Barriers Identified None (P)                       Rhona Rooney LMSW  Case Management Social Worker   [facility] [address]

## 2022-02-21 NOTE — ED PROVIDER NOTES
"History:  Wei Dominique is a 66 y.o. male who presents to the ED with Peg tube issue (Pt arrives from [facility] for peg tube protrudement. Pt scheduled to have peg tube replaced soon. )    Described as 66-year-old male with a history of ALS, trach dependent, G-tube dependent presenting to the emergency department with concern for G-tube "protrudement".  Per his nursing facility, he was episode the G-tube changed and they are concerned that the skin around his G-tube was starting to protrude".  It reportedly has still been working.    History limited as patient nonverbal at baseline.     Review of Systems: Unable to obtain as patient nonverbal.     Medications:   Previous Medications    ALPRAZOLAM (XANAX) 0.5 MG TABLET    Take 1 tablet (0.5 mg total) by mouth 2 (two) times daily as needed for Anxiety.    BACLOFEN (LIORESAL) 10 MG TABLET    TAKE 1 TABLET THREE TIMES PER DAY    CARVEDILOL (COREG) 6.25 MG TABLET    Take 1 tablet by mouth 2 (two) times daily.    CYANOCOBALAMIN 1,000 MCG/ML INJECTION    1 ML EVERY FOUR WEEKS INJECTION 90 DAYS    CYANOCOBALAMIN, VITAMIN B-12, 1,000 MCG/ML KIT    Inject 1 mL into the muscle every 28 days. Administered by Home health    EDARAVONE (RADICAVA) 30 MG/100 ML PGBK INFUSION    Inject 60 mg into the vein.    FOLIC ACID (FOLVITE) 1 MG TABLET    Take 1 tablet by mouth once daily.    FOLIC ACID (FOLVITE) 1 MG TABLET    2 TABLETS ONCE A DAY ORALLY 90 DAYS    GLYCOPYRROLATE (ROBINUL) 1 MG TAB    START BY TAKING 1 TABLET DAILY. IF TOLERATING WELL, YOU CAN INCREASE IT UP TO 2 OR EVEN 3 TIMES DAILY.    LOSARTAN (COZAAR) 100 MG TABLET        LOSARTAN-HYDROCHLOROTHIAZIDE 100-12.5 MG (HYZAAR) 100-12.5 MG TAB    Take 1 tablet by mouth once daily.    RILUZOLE 50 MG TAB TABLET    TAKE 1 TABLET ON AN EMPTY STOMACH EVERY 12 HRS    TIZANIDINE (ZANAFLEX) 2 MG TABLET    Take 1 tablet by mouth 3 (three) times daily as needed.       PMH:   Past Medical History:   Diagnosis Date    ALS (amyotrophic " lateral sclerosis)     ALS (amyotrophic lateral sclerosis)     Erectile dysfunction     Gait disturbance     Hyperlipidemia     Hypertension     Iron deficiency anemia     Motor neuron disease     Tremor      PSH:   Past Surgical History:   Procedure Laterality Date    PORTACATH PLACEMENT       Allergies: He has No Known Allergies.  Social History: Marital Status: . He  has no history on file for tobacco use.. He  has no history on file for alcohol use..       Exam:  VITAL SIGNS:   Vitals:    02/21/22 0402 02/21/22 0518 02/21/22 0528 02/21/22 0604   BP: 103/71 131/62  122/74   Pulse: 91 96  96   Resp: 18 18 20 18   Temp:       TempSrc:       SpO2: 100% 100%  100%     Const: Awake and alert, chronically ill appearing  Head: Atraumatic  Eyes: Normal Conjunctiva  ENT: Normal External Ears, Nose and Mouth.  Neck: Full range of motion. No meningismus. Trach/vent in place  Resp: Normal respiratory effort, No distress  Cardio: Equal and intact distal pulses  Abd: Soft, mildly distended, diffuse TTP in upper abdomen, GT in place though balloon seen just beneath the skin  Skin: Granulomatous tissue around GT  Ext: + edema in all extremities  Neur: Awake and alert, blinks yes/no appropriately to questions, does not move extremities, nonverbal    Data:  Results for orders placed or performed during the hospital encounter of 02/21/22   CBC auto differential   Result Value Ref Range    WBC 12.92 (H) 3.90 - 12.70 K/uL    RBC 2.75 (L) 4.60 - 6.20 M/uL    Hemoglobin 6.9 (L) 14.0 - 18.0 g/dL    Hematocrit 24.0 (L) 40.0 - 54.0 %    MCV 87 82 - 98 fL    MCH 25.1 (L) 27.0 - 31.0 pg    MCHC 28.8 (L) 32.0 - 36.0 g/dL    RDW 18.2 (H) 11.5 - 14.5 %    Platelets 336 150 - 450 K/uL    MPV 10.1 9.2 - 12.9 fL    Immature Granulocytes 0.5 0.0 - 0.5 %    Gran # (ANC) 10.2 (H) 1.8 - 7.7 K/uL    Immature Grans (Abs) 0.06 (H) 0.00 - 0.04 K/uL    Lymph # 1.5 1.0 - 4.8 K/uL    Mono # 1.0 0.3 - 1.0 K/uL    Eos # 0.1 0.0 - 0.5 K/uL     Baso # 0.04 0.00 - 0.20 K/uL    nRBC 0 0 /100 WBC    Gran % 78.9 (H) 38.0 - 73.0 %    Lymph % 11.9 (L) 18.0 - 48.0 %    Mono % 7.5 4.0 - 15.0 %    Eosinophil % 0.9 0.0 - 8.0 %    Basophil % 0.3 0.0 - 1.9 %    Differential Method Automated    Comprehensive metabolic panel   Result Value Ref Range    Sodium 134 (L) 136 - 145 mmol/L    Potassium 5.4 (H) 3.5 - 5.1 mmol/L    Chloride 102 95 - 110 mmol/L    CO2 21 (L) 23 - 29 mmol/L    Glucose 101 70 - 110 mg/dL    BUN 41 (H) 8 - 23 mg/dL    Creatinine 0.7 0.5 - 1.4 mg/dL    Calcium 9.4 8.7 - 10.5 mg/dL    Total Protein 8.4 6.0 - 8.4 g/dL    Albumin 2.3 (L) 3.5 - 5.2 g/dL    Total Bilirubin 0.3 0.1 - 1.0 mg/dL    Alkaline Phosphatase 54 (L) 55 - 135 U/L    AST 39 10 - 40 U/L    ALT 25 10 - 44 U/L    Anion Gap 11 8 - 16 mmol/L    eGFR if African American >60.0 >60 mL/min/1.73 m^2    eGFR if non African American >60.0 >60 mL/min/1.73 m^2   ISTAT PROCEDURE   Result Value Ref Range    POC Glucose 104 70 - 110 mg/dL    POC BUN 51 (H) 6 - 30 mg/dL    POC Creatinine 0.5 0.5 - 1.4 mg/dL    POC Sodium 136 136 - 145 mmol/L    POC Potassium 5.1 3.5 - 5.1 mmol/L    POC Chloride 104 95 - 110 mmol/L    POC TCO2 (MEASURED) 26 23 - 29 mmol/L    POC Ionized Calcium 1.17 1.06 - 1.42 mmol/L    POC Hematocrit 21 (L) 36 - 54 %PCV    Sample BARBARA      Imaging Results          CT Abdomen Pelvis With Contrast (In process)  Result time 02/21/22 06:11:39              Labs & Imaging studies were reviewed independently by me.     Feeding Tube    Date/Time: 2/21/2022 6:25 AM  Location procedure was performed: [facility] EMERGENCY DEPARTMENT  Performed by: Usha Cast MD  Authorized by: Scott Craven MD   Indications: tube dislodged    Sedation:  Patient sedated: no    Local anesthesia used: no    Anesthesia:  Local anesthesia used: no  Tube type: gastrostomy  Patient position: supine  Procedure type: replacement  Tube size: 20 Fr.  Endoscope used: no  Bulb inflation volume: 20 (ml)  Bulb  inflation fluid: normal saline  Tube placement difficulty: none  Complications: No  Estimated blood loss (mL): 0  Patient tolerance: patient tolerated the procedure well with no immediate complications         Medical Decision Makin-year-old presenting to the emergency department with concerns for a dislodged G-tube.  He was tender in his diffuse upper abdomen on examination with mild distension and CT imaging was obtained.  Pending, though on my evaluation the G-tube balloon is just beneath his skin and dislodged.  Therefore, his G-tube was replaced.  Tube check x-ray is pending.  Basic laboratory studies show severe anemia, hemoglobin 6.9.  Consent was obtained from wife via telephone and blood transfusion was ordered.  Wife does report that he has required blood transfusions in the past.  No history of active bleeding from his nursing facility.  BUN is slightly elevated likely due to dehydration, likely because of his G-tube dislodgement.  Potassium on CMP is hemolyzed at 5.4, i-STAT 5.1, doubt severe hyperkalemia.  [facility] was consulted for admission due to trach vent dependence and need for blood transfusion in the setting of severe anemia.    Critical Care Time: 32 minutes  Treatments/Evaluations: Close monitoring and treatment of unstable vital signs, cardiorespiratory, and neurologic status, while maintaining tight balance of fluid, respiratory, and cardiac interventions. This time includes discussing the case with the patient and the patients family. This time does not include all procedures stated elsewhere in this record. This time also includes reviewing old records, labs and radiological studies. This time includes examining and re-examining the patient. Additionally, this time also includes arranging care with admitting and consulting physicians.     Clinical Impression:  1. Severe anemia    2. Dehydration    3. Abdominal pain, unspecified abdominal location    4. Gastrojejunostomy tube  dislodgement    5. Ventilator dependence    6. ALS (amyotrophic lateral sclerosis)             Usha Cast MD  02/21/22 0699

## 2022-02-22 LAB
ALBUMIN SERPL BCP-MCNC: 2.5 G/DL (ref 3.5–5.2)
ALP SERPL-CCNC: 59 U/L (ref 55–135)
ALT SERPL W/O P-5'-P-CCNC: 17 U/L (ref 10–44)
ANION GAP SERPL CALC-SCNC: 12 MMOL/L (ref 8–16)
AST SERPL-CCNC: 19 U/L (ref 10–40)
BACTERIA #/AREA URNS AUTO: ABNORMAL /HPF
BASOPHILS # BLD AUTO: 0.06 K/UL (ref 0–0.2)
BASOPHILS NFR BLD: 0.5 % (ref 0–1.9)
BILIRUB SERPL-MCNC: 0.8 MG/DL (ref 0.1–1)
BILIRUB UR QL STRIP: NEGATIVE
BUN SERPL-MCNC: 31 MG/DL (ref 8–23)
CALCIUM SERPL-MCNC: 9.8 MG/DL (ref 8.7–10.5)
CHLORIDE SERPL-SCNC: 107 MMOL/L (ref 95–110)
CLARITY UR REFRACT.AUTO: ABNORMAL
CO2 SERPL-SCNC: 16 MMOL/L (ref 23–29)
COLOR UR AUTO: YELLOW
CREAT SERPL-MCNC: 0.6 MG/DL (ref 0.5–1.4)
DIFFERENTIAL METHOD: ABNORMAL
EOSINOPHIL # BLD AUTO: 0.2 K/UL (ref 0–0.5)
EOSINOPHIL NFR BLD: 1.4 % (ref 0–8)
ERYTHROCYTE [DISTWIDTH] IN BLOOD BY AUTOMATED COUNT: 18.5 % (ref 11.5–14.5)
EST. GFR  (AFRICAN AMERICAN): >60 ML/MIN/1.73 M^2
EST. GFR  (NON AFRICAN AMERICAN): >60 ML/MIN/1.73 M^2
GLUCOSE SERPL-MCNC: 63 MG/DL (ref 70–110)
GLUCOSE UR QL STRIP: NEGATIVE
HCT VFR BLD AUTO: 27.6 % (ref 40–54)
HGB BLD-MCNC: 8.3 G/DL (ref 14–18)
HGB UR QL STRIP: NEGATIVE
HYALINE CASTS UR QL AUTO: 0 /LPF
IMM GRANULOCYTES # BLD AUTO: 0.06 K/UL (ref 0–0.04)
IMM GRANULOCYTES NFR BLD AUTO: 0.5 % (ref 0–0.5)
KETONES UR QL STRIP: ABNORMAL
LEUKOCYTE ESTERASE UR QL STRIP: ABNORMAL
LYMPHOCYTES # BLD AUTO: 1.7 K/UL (ref 1–4.8)
LYMPHOCYTES NFR BLD: 13.2 % (ref 18–48)
MAGNESIUM SERPL-MCNC: 2.1 MG/DL (ref 1.6–2.6)
MCH RBC QN AUTO: 25.6 PG (ref 27–31)
MCHC RBC AUTO-ENTMCNC: 30.1 G/DL (ref 32–36)
MCV RBC AUTO: 85 FL (ref 82–98)
MICROSCOPIC COMMENT: ABNORMAL
MONOCYTES # BLD AUTO: 0.8 K/UL (ref 0.3–1)
MONOCYTES NFR BLD: 6 % (ref 4–15)
NEUTROPHILS # BLD AUTO: 10 K/UL (ref 1.8–7.7)
NEUTROPHILS NFR BLD: 78.4 % (ref 38–73)
NITRITE UR QL STRIP: NEGATIVE
NON-SQ EPI CELLS #/AREA URNS AUTO: 2 /HPF
NRBC BLD-RTO: 0 /100 WBC
PH UR STRIP: 7 [PH] (ref 5–8)
PHOSPHATE SERPL-MCNC: 3.2 MG/DL (ref 2.7–4.5)
PLATELET # BLD AUTO: 367 K/UL (ref 150–450)
PMV BLD AUTO: 10.3 FL (ref 9.2–12.9)
POCT GLUCOSE: 108 MG/DL (ref 70–110)
POCT GLUCOSE: 68 MG/DL (ref 70–110)
POCT GLUCOSE: 83 MG/DL (ref 70–110)
POCT GLUCOSE: 93 MG/DL (ref 70–110)
POTASSIUM SERPL-SCNC: 3.7 MMOL/L (ref 3.5–5.1)
PROT SERPL-MCNC: 8.7 G/DL (ref 6–8.4)
PROT UR QL STRIP: ABNORMAL
RBC # BLD AUTO: 3.24 M/UL (ref 4.6–6.2)
RBC #/AREA URNS AUTO: 4 /HPF (ref 0–4)
SARS-COV-2 RNA RESP QL NAA+PROBE: NOT DETECTED
SODIUM SERPL-SCNC: 135 MMOL/L (ref 136–145)
SP GR UR STRIP: 1.03 (ref 1–1.03)
SQUAMOUS #/AREA URNS AUTO: 1 /HPF
URN SPEC COLLECT METH UR: ABNORMAL
WBC # BLD AUTO: 12.77 K/UL (ref 3.9–12.7)
WBC #/AREA URNS AUTO: >100 /HPF (ref 0–5)
WBC CLUMPS UR QL AUTO: ABNORMAL

## 2022-02-22 PROCEDURE — 20000000 HC ICU ROOM

## 2022-02-22 PROCEDURE — 63600175 PHARM REV CODE 636 W HCPCS: Performed by: STUDENT IN AN ORGANIZED HEALTH CARE EDUCATION/TRAINING PROGRAM

## 2022-02-22 PROCEDURE — 83735 ASSAY OF MAGNESIUM: CPT | Performed by: STUDENT IN AN ORGANIZED HEALTH CARE EDUCATION/TRAINING PROGRAM

## 2022-02-22 PROCEDURE — 94761 N-INVAS EAR/PLS OXIMETRY MLT: CPT

## 2022-02-22 PROCEDURE — 81001 URINALYSIS AUTO W/SCOPE: CPT | Performed by: INTERNAL MEDICINE

## 2022-02-22 PROCEDURE — 94003 VENT MGMT INPAT SUBQ DAY: CPT

## 2022-02-22 PROCEDURE — 25000003 PHARM REV CODE 250: Performed by: STUDENT IN AN ORGANIZED HEALTH CARE EDUCATION/TRAINING PROGRAM

## 2022-02-22 PROCEDURE — 80053 COMPREHEN METABOLIC PANEL: CPT | Performed by: STUDENT IN AN ORGANIZED HEALTH CARE EDUCATION/TRAINING PROGRAM

## 2022-02-22 PROCEDURE — U0003 INFECTIOUS AGENT DETECTION BY NUCLEIC ACID (DNA OR RNA); SEVERE ACUTE RESPIRATORY SYNDROME CORONAVIRUS 2 (SARS-COV-2) (CORONAVIRUS DISEASE [COVID-19]), AMPLIFIED PROBE TECHNIQUE, MAKING USE OF HIGH THROUGHPUT TECHNOLOGIES AS DESCRIBED BY CMS-2020-01-R: HCPCS | Performed by: STUDENT IN AN ORGANIZED HEALTH CARE EDUCATION/TRAINING PROGRAM

## 2022-02-22 PROCEDURE — 87086 URINE CULTURE/COLONY COUNT: CPT | Performed by: INTERNAL MEDICINE

## 2022-02-22 PROCEDURE — 82803 BLOOD GASES ANY COMBINATION: CPT

## 2022-02-22 PROCEDURE — 99233 SBSQ HOSP IP/OBS HIGH 50: CPT | Mod: GC,,, | Performed by: INTERNAL MEDICINE

## 2022-02-22 PROCEDURE — 25000003 PHARM REV CODE 250: Performed by: INTERNAL MEDICINE

## 2022-02-22 PROCEDURE — 99233 PR SUBSEQUENT HOSPITAL CARE,LEVL III: ICD-10-PCS | Mod: GC,,, | Performed by: INTERNAL MEDICINE

## 2022-02-22 PROCEDURE — 99900035 HC TECH TIME PER 15 MIN (STAT)

## 2022-02-22 PROCEDURE — 36600 WITHDRAWAL OF ARTERIAL BLOOD: CPT

## 2022-02-22 PROCEDURE — 27200966 HC CLOSED SUCTION SYSTEM

## 2022-02-22 PROCEDURE — 87088 URINE BACTERIA CULTURE: CPT | Performed by: INTERNAL MEDICINE

## 2022-02-22 PROCEDURE — 85025 COMPLETE CBC W/AUTO DIFF WBC: CPT | Performed by: STUDENT IN AN ORGANIZED HEALTH CARE EDUCATION/TRAINING PROGRAM

## 2022-02-22 PROCEDURE — 87077 CULTURE AEROBIC IDENTIFY: CPT | Performed by: INTERNAL MEDICINE

## 2022-02-22 PROCEDURE — 27000221 HC OXYGEN, UP TO 24 HOURS

## 2022-02-22 PROCEDURE — 99900026 HC AIRWAY MAINTENANCE (STAT)

## 2022-02-22 PROCEDURE — U0005 INFEC AGEN DETEC AMPLI PROBE: HCPCS | Performed by: STUDENT IN AN ORGANIZED HEALTH CARE EDUCATION/TRAINING PROGRAM

## 2022-02-22 PROCEDURE — 63600175 PHARM REV CODE 636 W HCPCS: Performed by: INTERNAL MEDICINE

## 2022-02-22 PROCEDURE — 84100 ASSAY OF PHOSPHORUS: CPT | Performed by: STUDENT IN AN ORGANIZED HEALTH CARE EDUCATION/TRAINING PROGRAM

## 2022-02-22 PROCEDURE — C9113 INJ PANTOPRAZOLE SODIUM, VIA: HCPCS | Performed by: STUDENT IN AN ORGANIZED HEALTH CARE EDUCATION/TRAINING PROGRAM

## 2022-02-22 PROCEDURE — 87186 SC STD MICRODIL/AGAR DIL: CPT | Performed by: INTERNAL MEDICINE

## 2022-02-22 RX ORDER — CITALOPRAM 10 MG/1
10 TABLET ORAL DAILY
Status: DISCONTINUED | OUTPATIENT
Start: 2022-02-22 | End: 2022-02-23 | Stop reason: HOSPADM

## 2022-02-22 RX ORDER — BUSPIRONE HYDROCHLORIDE 5 MG/1
5 TABLET ORAL 2 TIMES DAILY
Status: DISCONTINUED | OUTPATIENT
Start: 2022-02-22 | End: 2022-02-23 | Stop reason: HOSPADM

## 2022-02-22 RX ORDER — BACLOFEN 10 MG/1
10 TABLET ORAL 3 TIMES DAILY
Status: DISCONTINUED | OUTPATIENT
Start: 2022-02-22 | End: 2022-02-23 | Stop reason: HOSPADM

## 2022-02-22 RX ORDER — PANTOPRAZOLE SODIUM 40 MG/10ML
40 INJECTION, POWDER, LYOPHILIZED, FOR SOLUTION INTRAVENOUS DAILY
Status: DISCONTINUED | OUTPATIENT
Start: 2022-02-22 | End: 2022-02-23 | Stop reason: HOSPADM

## 2022-02-22 RX ORDER — LEVOFLOXACIN 750 MG/1
750 TABLET ORAL DAILY
Status: DISCONTINUED | OUTPATIENT
Start: 2022-02-23 | End: 2022-02-23

## 2022-02-22 RX ORDER — GLUCAGON 1 MG
1 KIT INJECTION
Status: DISCONTINUED | OUTPATIENT
Start: 2022-02-22 | End: 2022-02-23 | Stop reason: HOSPADM

## 2022-02-22 RX ORDER — DEXTROSE, SODIUM CHLORIDE, SODIUM LACTATE, POTASSIUM CHLORIDE, AND CALCIUM CHLORIDE 5; .6; .31; .03; .02 G/100ML; G/100ML; G/100ML; G/100ML; G/100ML
INJECTION, SOLUTION INTRAVENOUS CONTINUOUS
Status: ACTIVE | OUTPATIENT
Start: 2022-02-22 | End: 2022-02-23

## 2022-02-22 RX ORDER — IBUPROFEN 200 MG
24 TABLET ORAL
Status: DISCONTINUED | OUTPATIENT
Start: 2022-02-22 | End: 2022-02-23 | Stop reason: HOSPADM

## 2022-02-22 RX ORDER — IBUPROFEN 200 MG
16 TABLET ORAL
Status: DISCONTINUED | OUTPATIENT
Start: 2022-02-22 | End: 2022-02-23 | Stop reason: HOSPADM

## 2022-02-22 RX ADMIN — PANTOPRAZOLE SODIUM 40 MG: 40 INJECTION, POWDER, FOR SOLUTION INTRAVENOUS at 11:02

## 2022-02-22 RX ADMIN — MORPHINE SULFATE 4 MG: 2 INJECTION, SOLUTION INTRAMUSCULAR; INTRAVENOUS at 03:02

## 2022-02-22 RX ADMIN — GLYCOPYRROLATE 1 MG: 1 TABLET ORAL at 09:02

## 2022-02-22 RX ADMIN — DEXTROSE 125 ML: 10 SOLUTION INTRAVENOUS at 05:02

## 2022-02-22 RX ADMIN — CITALOPRAM HYDROBROMIDE 10 MG: 10 TABLET ORAL at 11:02

## 2022-02-22 RX ADMIN — APIXABAN 5 MG: 5 TABLET, FILM COATED ORAL at 11:02

## 2022-02-22 RX ADMIN — MORPHINE SULFATE 4 MG: 2 INJECTION, SOLUTION INTRAMUSCULAR; INTRAVENOUS at 11:02

## 2022-02-22 RX ADMIN — BACLOFEN 10 MG: 10 TABLET ORAL at 02:02

## 2022-02-22 RX ADMIN — MORPHINE SULFATE 4 MG: 2 INJECTION, SOLUTION INTRAMUSCULAR; INTRAVENOUS at 06:02

## 2022-02-22 RX ADMIN — BACLOFEN 10 MG: 10 TABLET ORAL at 08:02

## 2022-02-22 RX ADMIN — GLYCOPYRROLATE 1 MG: 1 TABLET ORAL at 08:02

## 2022-02-22 RX ADMIN — BUSPIRONE HYDROCHLORIDE 5 MG: 5 TABLET ORAL at 11:02

## 2022-02-22 RX ADMIN — BUSPIRONE HYDROCHLORIDE 5 MG: 5 TABLET ORAL at 08:02

## 2022-02-22 RX ADMIN — APIXABAN 5 MG: 5 TABLET, FILM COATED ORAL at 08:02

## 2022-02-22 RX ADMIN — DEXTROSE, SODIUM CHLORIDE, SODIUM LACTATE, POTASSIUM CHLORIDE, AND CALCIUM CHLORIDE: 5; .6; .31; .03; .02 INJECTION, SOLUTION INTRAVENOUS at 06:02

## 2022-02-22 NOTE — PLAN OF CARE
TATE spoke with Lilliam, admission coordinator for [facility]/[phone], to notify that pt is schedule to return tomorrow.  TATE has placed an order for CoVid screening for [facility].      Alie Fox, TATE  [facility]  [phone]

## 2022-02-22 NOTE — SUBJECTIVE & OBJECTIVE
Interval History/Significant Events: No acute events, hemoglobin stable. Will get in touch with nursing home for return.     Review of Systems   Unable to perform ROS: Intubated   Objective:     Vital Signs (Most Recent):  Temp: 98 °F (36.7 °C) (02/22/22 1400)  Pulse: 84 (02/22/22 1400)  Resp: 18 (02/22/22 1400)  BP: 91/66 (02/22/22 1400)  SpO2: 100 % (02/22/22 1400) Vital Signs (24h Range):  Temp:  [97.5 °F (36.4 °C)-98.8 °F (37.1 °C)] 98 °F (36.7 °C)  Pulse:  [] 84  Resp:  [18-29] 18  SpO2:  [96 %-100 %] 100 %  BP: ()/(55-75) 91/66   Weight: 77.4 kg (170 lb 10.2 oz)  Body mass index is 25.95 kg/m².      Intake/Output Summary (Last 24 hours) at 2/22/2022 1515  Last data filed at 2/22/2022 1200  Gross per 24 hour   Intake 132.14 ml   Output 950 ml   Net -817.86 ml       Physical Exam  Constitutional:       General: He is not in acute distress.     Appearance: He is not ill-appearing.   HENT:      Head: Normocephalic and atraumatic.      Nose: Nose normal.      Mouth/Throat:      Mouth: Mucous membranes are moist.   Eyes:      Extraocular Movements: Extraocular movements intact.   Neck:      Comments: Trach in place  Cardiovascular:      Rate and Rhythm: Normal rate and regular rhythm.      Pulses: Normal pulses.      Heart sounds: Normal heart sounds. No murmur heard.  Pulmonary:      Effort: Pulmonary effort is normal. No respiratory distress.      Breath sounds: Normal breath sounds. No wheezing.   Abdominal:      General: Abdomen is flat. Bowel sounds are normal.      Comments: Hutchinson tube noted in stoma. Stoma without bleeding, erythema or fluctuance.   Musculoskeletal:         General: No swelling. Normal range of motion.      Cervical back: Normal range of motion.   Skin:     General: Skin is warm.      Coloration: Skin is not jaundiced or pale.      Comments: Right great toe oncycholysis with minimal bleeding noted on the media aspect of the nail bed.    Neurological:      Mental Status: He is  alert and oriented to person, place, and time. Mental status is at baseline.   Psychiatric:      Comments: Calm and cooperative       Vents:  Vent Mode: A/C (02/22/22 1156)  Ventilator Initiated: Yes (02/21/22 1145)  Set Rate: 18 BPM (02/22/22 1156)  Vt Set: 500 mL (02/22/22 1156)  PEEP/CPAP: 5 cmH20 (02/22/22 1156)  Oxygen Concentration (%): 30 (02/22/22 1400)  Peak Airway Pressure: 14 cmH2O (02/22/22 1156)  Plateau Pressure: 0 cmH20 (02/22/22 1156)  Total Ve: 13.5 mL (02/22/22 1156)  Negative Inspiratory Force (cm H2O): 0 (02/22/22 1156)  F/VT Ratio<105 (RSBI): (!) 53.19 (02/22/22 0846)  Lines/Drains/Airways       Central Venous Catheter Line  Duration                  Port A Cath Single Lumen left subclavian -- days              Drain  Duration                  Gastrostomy/Enterostomy LUQ feeding -- days         Urethral Catheter 02/22/22 1030 Straight-tip 16 Fr. <1 day              Airway  Duration                  Surgical Airway 02/21/22 1145 Cuffed 1 day              Peripheral Intravenous Line  Duration                  Peripheral IV - Single Lumen 02/20/22 1330 20 G Anterior;Right Forearm 2 days         Peripheral IV - Single Lumen 02/21/22 0401 20 G Right Antecubital 1 day                  Significant Labs:    CBC/Anemia Profile:  Recent Labs   Lab 02/21/22  0401 02/21/22  0408 02/21/22  1608 02/22/22  0413   WBC 12.92*  --  13.40* 12.77*   HGB 6.9*  --  8.2* 8.3*   HCT 24.0* 21* 26.9* 27.6*     --  339 367   MCV 87  --  85 85   RDW 18.2*  --  18.2* 18.5*   IRON 34*  --   --   --    FERRITIN 43  --   --   --         Chemistries:  Recent Labs   Lab 02/21/22  0401 02/21/22  1611 02/22/22  0413   * 135* 135*   K 5.4* 3.7 3.7    104 107   CO2 21* 20* 16*   BUN 41* 32* 31*   CREATININE 0.7 0.6 0.6   CALCIUM 9.4 9.9 9.8   ALBUMIN 2.3*  --  2.5*   PROT 8.4  --  8.7*   BILITOT 0.3  --  0.8   ALKPHOS 54*  --  59   ALT 25  --  17   AST 39  --  19   MG  --   --  2.1   PHOS  --   --  3.2       All  pertinent labs within the past 24 hours have been reviewed.    Significant Imaging:  I have reviewed all pertinent imaging results/findings within the past 24 hours.

## 2022-02-22 NOTE — ASSESSMENT & PLAN NOTE
History of anemia  Baseline 8, presents with 6.9  Pt denies hematemesis, hemachezia, dark stools, blood in stool, hematuria,   S/p 1 unit pRBC transfusion in the ED   - CBC stable since

## 2022-02-22 NOTE — PROGRESS NOTES
"[facility]  Critical Care Medicine  Progress Note    Patient Name: Wei Dominique  MRN: [id number]  Admission Date: 2/21/2022  Hospital Length of Stay: 1 days  Code Status: Full Code  Attending Provider: Scott Craven*  Primary Care Provider: Mack Suero MD   Principal Problem: Acute blood loss anemia    Subjective:     HPI:  Patient is a 66 y.o. male with significant past medical history of ALS, s/p trach & peg, vent dependent, HTN, PE (on eliquis) presented to hospital from [facility] complaining of dislodged peg tube. Nursing home staff was concerned that the patient's peg tube was "protruding," although reportedly still functional. Per chart, patient was supposed to be having his peg replaced soon, though not clear where this was to occur.  Patient is able to communicate by blinking of his eyes. He communcated left great toe pain that started a few days ago. He denies any chest pain, dyspnea, bloody stools, dark or bloody urine. Of note, his G tube was placed laparoscopically by Gen surgery in 2020. He was admitted in March 2021 for PEG tube dislodgement.     Upon arrival to the ED, he was hemodynamically stable. His v      Hospital/ICU Course:  2/22: H/H stable s/p 1 unit RBC yesterday. Blood pressures soft. Hutchinson from nursing home removed and replaced. Of note patient's records from nursing home state that patient was diagnosed with UTI cx's growing pseudomonas and started on levaquin at OSH. UA and reflex pending. Will contact nursing home.       Interval History/Significant Events: No acute events, hemoglobin stable. Will get in touch with nursing home for return.     Review of Systems   Unable to perform ROS: Intubated   Objective:     Vital Signs (Most Recent):  Temp: 98 °F (36.7 °C) (02/22/22 1400)  Pulse: 84 (02/22/22 1400)  Resp: 18 (02/22/22 1400)  BP: 91/66 (02/22/22 1400)  SpO2: 100 % (02/22/22 1400) Vital Signs (24h Range):  Temp:  [97.5 °F (36.4 " °C)-98.8 °F (37.1 °C)] 98 °F (36.7 °C)  Pulse:  [] 84  Resp:  [18-29] 18  SpO2:  [96 %-100 %] 100 %  BP: ()/(55-75) 91/66   Weight: 77.4 kg (170 lb 10.2 oz)  Body mass index is 25.95 kg/m².      Intake/Output Summary (Last 24 hours) at 2/22/2022 1515  Last data filed at 2/22/2022 1200  Gross per 24 hour   Intake 132.14 ml   Output 950 ml   Net -817.86 ml       Physical Exam  Constitutional:       General: He is not in acute distress.     Appearance: He is not ill-appearing.   HENT:      Head: Normocephalic and atraumatic.      Nose: Nose normal.      Mouth/Throat:      Mouth: Mucous membranes are moist.   Eyes:      Extraocular Movements: Extraocular movements intact.   Neck:      Comments: Trach in place  Cardiovascular:      Rate and Rhythm: Normal rate and regular rhythm.      Pulses: Normal pulses.      Heart sounds: Normal heart sounds. No murmur heard.  Pulmonary:      Effort: Pulmonary effort is normal. No respiratory distress.      Breath sounds: Normal breath sounds. No wheezing.   Abdominal:      General: Abdomen is flat. Bowel sounds are normal.      Comments: Hutchinson tube noted in stoma. Stoma without bleeding, erythema or fluctuance.   Musculoskeletal:         General: No swelling. Normal range of motion.      Cervical back: Normal range of motion.   Skin:     General: Skin is warm.      Coloration: Skin is not jaundiced or pale.      Comments: Right great toe oncycholysis with minimal bleeding noted on the media aspect of the nail bed.    Neurological:      Mental Status: He is alert and oriented to person, place, and time. Mental status is at baseline.   Psychiatric:      Comments: Calm and cooperative       Vents:  Vent Mode: A/C (02/22/22 1156)  Ventilator Initiated: Yes (02/21/22 1145)  Set Rate: 18 BPM (02/22/22 1156)  Vt Set: 500 mL (02/22/22 1156)  PEEP/CPAP: 5 cmH20 (02/22/22 1156)  Oxygen Concentration (%): 30 (02/22/22 1400)  Peak Airway Pressure: 14 cmH2O (02/22/22 1156)  Plateau  Pressure: 0 cmH20 (02/22/22 1156)  Total Ve: 13.5 mL (02/22/22 1156)  Negative Inspiratory Force (cm H2O): 0 (02/22/22 1156)  F/VT Ratio<105 (RSBI): (!) 53.19 (02/22/22 0846)  Lines/Drains/Airways       Central Venous Catheter Line  Duration                  Port A Cath Single Lumen left subclavian -- days              Drain  Duration                  Gastrostomy/Enterostomy LUQ feeding -- days         Urethral Catheter 02/22/22 1030 Straight-tip 16 Fr. <1 day              Airway  Duration                  Surgical Airway 02/21/22 1145 Cuffed 1 day              Peripheral Intravenous Line  Duration                  Peripheral IV - Single Lumen 02/20/22 1330 20 G Anterior;Right Forearm 2 days         Peripheral IV - Single Lumen 02/21/22 0401 20 G Right Antecubital 1 day                  Significant Labs:    CBC/Anemia Profile:  Recent Labs   Lab 02/21/22  0401 02/21/22  0408 02/21/22  1608 02/22/22  0413   WBC 12.92*  --  13.40* 12.77*   HGB 6.9*  --  8.2* 8.3*   HCT 24.0* 21* 26.9* 27.6*     --  339 367   MCV 87  --  85 85   RDW 18.2*  --  18.2* 18.5*   IRON 34*  --   --   --    FERRITIN 43  --   --   --         Chemistries:  Recent Labs   Lab 02/21/22  0401 02/21/22  1611 02/22/22  0413   * 135* 135*   K 5.4* 3.7 3.7    104 107   CO2 21* 20* 16*   BUN 41* 32* 31*   CREATININE 0.7 0.6 0.6   CALCIUM 9.4 9.9 9.8   ALBUMIN 2.3*  --  2.5*   PROT 8.4  --  8.7*   BILITOT 0.3  --  0.8   ALKPHOS 54*  --  59   ALT 25  --  17   AST 39  --  19   MG  --   --  2.1   PHOS  --   --  3.2       All pertinent labs within the past 24 hours have been reviewed.    Significant Imaging:  I have reviewed all pertinent imaging results/findings within the past 24 hours.      ABG  No results for input(s): PH, PO2, PCO2, HCO3, BE in the last 168 hours.  Assessment/Plan:     Neuro  Quadriparesis  Immerse bed  Turn patient q2h  Monitor for wounds    ALS (amyotrophic lateral sclerosis)  Hx of ALS, from [facility]   Samina and  PEG, presents for PEG dislodgement  Trach on VC+ 30% at NH  - Baclofen for muscle spasms  - Continue Riluzole    Cardiac/Vascular  Essential hypertension  On losartan, Coreg at home  Holding at this time  Resume as appropriate    Renal/  Hyperkalemia  K 5.4 on BMP, iSTAT 5.1  Not shifted  - Need to recheck  - Held losartan in setting of hyperkalemia    Oncology  * Acute blood loss anemia  History of anemia  Baseline 8, presents with 6.9  Pt denies hematemesis, hemachezia, dark stools, blood in stool, hematuria,   S/p 1 unit pRBC transfusion in the ED   - CBC stable since      GI  S/P percutaneous endoscopic gastrostomy (PEG) tube placement  Admitted for PEG dislodgment  Originally placed at OSH in 2020  CT Abd revealed dislodgement, replaced in ED  - Per surgery, ok to use now      Orthopedic  Toe pain  Presents with left great toe pain / nail lifting from bed  Some bleeding presents, erythema  - Pain control prn  - Wound care consult    Other  Right lower lobe consolidation  Patient with trach  CT abd with RLL consolidations  WBC 12  No fever  - Unasyn added; de-escalate as appropriate to Augmentin on PEG cleared for use  - F/u CXR  - Consider respiratory cultures; likely colonized but would obtain if worsens      Critical secondary to Patient has a condition that poses threat to life and bodily function: Anemia      Critical care was time spent personally by me on the following activities: development of treatment plan with patient or surrogate and bedside caregivers, discussions with consultants, evaluation of patient's response to treatment, examination of patient, ordering and performing treatments and interventions, ordering and review of laboratory studies, ordering and review of radiographic studies, pulse oximetry, re-evaluation of patient's condition. This critical care time did not overlap with that of any other provider or involve time for any procedures.     Monae Cox MD  Critical Care  Medicine  [facility]

## 2022-02-22 NOTE — CONSULTS
Patient has been admitted to the MICU, see H&P for full details.    Karishma Bloom MD  Pulmonary and Critical Care Fellow  02/21/2022  9:27 PM

## 2022-02-22 NOTE — HOSPITAL COURSE
Admitted to MICU services. Hgb 6.9 on admission without obvious signs of bleeding. H/H improved and remained stable after 1 unit RBC on admission.  Hutchinson from nursing home removed and replaced; UA with Providencia stuartii low growth, resistant to Levaquin. Of note patient's records from nursing home state that patient was diagnosed with UTI cx's growing pseudomonas and started on levaquin at OSH. Patient remained hemodynamically stable and labs reassuring. General surgery saw patient and felt there was appropriate granulation tissue at the PEG tube site. They recommended wound care for AgNO3 application to the granulation tissue with expectation this would help with the drainage and recommended seating the external bumper against the skin. General surgery did not appreciate evidence of abscess and determined PEG tube to be functional. SW consulted, state patient has bed for return to his nursing home. Will discharge on course of levaquin as patient has grown pseudomonas in his urine at nursing facility and was currently being treated. As patient appeared to be HDS without evidence of sepsis or worsening UTI, did not treat Providencia. Patient discharged to NH on 2/23/22; wife notified.

## 2022-02-22 NOTE — ASSESSMENT & PLAN NOTE
Admitted for PEG dislodgment  Originally placed at OSH in 2020  CT Abd revealed dislodgement, replaced in ED  - Per surgery, ok to use now

## 2022-02-22 NOTE — PLAN OF CARE
CICU DAILY GOALS       A: Awake    RASS: Goal - RASS Goal: 0-->alert and calm  Actual - RASS (Brooks Agitation-Sedation Scale): 0-->alert and calm   Restraint necessity:    B: Breathe   SBT: Not attempted   C: Coordinate A & B, analgesics/sedatives   Pain: managed    SAT: Pass  D: Delirium   CAM-ICU: Overall CAM-ICU: Negative  E: Early(intubated/ Progressive (non-intubated) Mobility   MOVE Screen:  pt is vent dependent and a QUAD   Activity: Activity Management: Patient unable to perform activities  FAS: Feeding/Nutrition   Diet order:  ,    T: Thrombus   DVT prophylaxis: VTE Required Core Measure: Pharmacological prophylaxis initiated/maintained  H: HOB Elevation   Head of Bed (HOB) Positioning: HOB at 30-45 degrees  U: Ulcer Prophylaxis   GI: yes  G: Glucose control   managed    S: Skin   Bathing/Skin Care: back care, bath, complete, incontinence care, linen changed  Device Skin Pressure Protection: absorbent pad utilized/changed, adhesive use limited  Pressure Reduction Devices: pressure-redistributing mattress utilized, foam padding utilized  Pressure Reduction Techniques: frequent weight shift encouraged, heels elevated off bed, weight shift assistance provided  Skin Protection: adhesive use limited, incontinence pads utilized, tubing/devices free from skin contact  B: Bowel Function   no issues   I: Indwelling Catheters   Hutchinsno necessity:     CVC necessity: Yes  D: De-escalation Antibiotics   Yes    Family/Goals of care/Code Status   Code Status: Full Code    24H Vital Sign Range  Temp:  [97.5 °F (36.4 °C)-98.8 °F (37.1 °C)]   Pulse:  []   Resp:  [18-29]   BP: ()/(55-75)   SpO2:  [96 %-100 %]      Shift Events   No acute events throughout shift    VS and assessment per flow sheet, patient progressing towards goals as tolerated, plan of care reviewed with Mr. Dominique and his wife over the phone, all concerns addressed, will continue to monitor.    Kristin Karimi   Problem: Adult Inpatient Plan of  Care  Goal: Plan of Care Review  Outcome: Ongoing, Progressing  Goal: Patient-Specific Goal (Individualized)  Outcome: Ongoing, Progressing  Goal: Absence of Hospital-Acquired Illness or Injury  Outcome: Ongoing, Progressing  Goal: Optimal Comfort and Wellbeing  Outcome: Ongoing, Progressing  Goal: Readiness for Transition of Care  Outcome: Ongoing, Progressing     Problem: Infection  Goal: Absence of Infection Signs and Symptoms  Outcome: Ongoing, Progressing     Problem: Skin Injury Risk Increased  Goal: Skin Health and Integrity  Outcome: Ongoing, Progressing     Problem: Device-Related Complication Risk (Mechanical Ventilation, Invasive)  Goal: Optimal Device Function  Outcome: Ongoing, Progressing     Problem: Impaired Wound Healing  Goal: Optimal Wound Healing  Outcome: Ongoing, Progressing     Problem: Fall Injury Risk  Goal: Absence of Fall and Fall-Related Injury  Outcome: Ongoing, Progressing

## 2022-02-22 NOTE — NURSING
Pt with a arvizu catheter when admitted to ED yesterday. Rec'vd new orders to replace the arvizu and send urine for flex c/s after reinsertion.

## 2022-02-23 VITALS
BODY MASS INDEX: 25.86 KG/M2 | RESPIRATION RATE: 25 BRPM | TEMPERATURE: 98 F | HEIGHT: 68 IN | WEIGHT: 170.63 LBS | OXYGEN SATURATION: 100 % | HEART RATE: 97 BPM | DIASTOLIC BLOOD PRESSURE: 75 MMHG | SYSTOLIC BLOOD PRESSURE: 101 MMHG

## 2022-02-23 LAB
ALBUMIN SERPL BCP-MCNC: 2.4 G/DL (ref 3.5–5.2)
ALP SERPL-CCNC: 51 U/L (ref 55–135)
ALT SERPL W/O P-5'-P-CCNC: 17 U/L (ref 10–44)
ANION GAP SERPL CALC-SCNC: 15 MMOL/L (ref 8–16)
AST SERPL-CCNC: 16 U/L (ref 10–40)
BASOPHILS # BLD AUTO: 0.06 K/UL (ref 0–0.2)
BASOPHILS NFR BLD: 0.8 % (ref 0–1.9)
BILIRUB SERPL-MCNC: 0.5 MG/DL (ref 0.1–1)
BUN SERPL-MCNC: 24 MG/DL (ref 8–23)
CALCIUM SERPL-MCNC: 9.6 MG/DL (ref 8.7–10.5)
CHLORIDE SERPL-SCNC: 108 MMOL/L (ref 95–110)
CO2 SERPL-SCNC: 17 MMOL/L (ref 23–29)
CREAT SERPL-MCNC: 0.6 MG/DL (ref 0.5–1.4)
DIFFERENTIAL METHOD: ABNORMAL
EOSINOPHIL # BLD AUTO: 0.3 K/UL (ref 0–0.5)
EOSINOPHIL NFR BLD: 3.6 % (ref 0–8)
ERYTHROCYTE [DISTWIDTH] IN BLOOD BY AUTOMATED COUNT: 18.8 % (ref 11.5–14.5)
EST. GFR  (AFRICAN AMERICAN): >60 ML/MIN/1.73 M^2
EST. GFR  (NON AFRICAN AMERICAN): >60 ML/MIN/1.73 M^2
GLUCOSE SERPL-MCNC: 79 MG/DL (ref 70–110)
HCT VFR BLD AUTO: 29.2 % (ref 40–54)
HGB BLD-MCNC: 8.4 G/DL (ref 14–18)
IMM GRANULOCYTES # BLD AUTO: 0.03 K/UL (ref 0–0.04)
IMM GRANULOCYTES NFR BLD AUTO: 0.4 % (ref 0–0.5)
LYMPHOCYTES # BLD AUTO: 1.5 K/UL (ref 1–4.8)
LYMPHOCYTES NFR BLD: 18.5 % (ref 18–48)
MAGNESIUM SERPL-MCNC: 2 MG/DL (ref 1.6–2.6)
MCH RBC QN AUTO: 25.7 PG (ref 27–31)
MCHC RBC AUTO-ENTMCNC: 28.8 G/DL (ref 32–36)
MCV RBC AUTO: 89 FL (ref 82–98)
MONOCYTES # BLD AUTO: 0.6 K/UL (ref 0.3–1)
MONOCYTES NFR BLD: 7 % (ref 4–15)
NEUTROPHILS # BLD AUTO: 5.5 K/UL (ref 1.8–7.7)
NEUTROPHILS NFR BLD: 69.7 % (ref 38–73)
NRBC BLD-RTO: 0 /100 WBC
PHOSPHATE SERPL-MCNC: 3.4 MG/DL (ref 2.7–4.5)
PLATELET # BLD AUTO: 359 K/UL (ref 150–450)
PMV BLD AUTO: 9.9 FL (ref 9.2–12.9)
POTASSIUM SERPL-SCNC: 3.5 MMOL/L (ref 3.5–5.1)
PROT SERPL-MCNC: 8.5 G/DL (ref 6–8.4)
RBC # BLD AUTO: 3.27 M/UL (ref 4.6–6.2)
SODIUM SERPL-SCNC: 140 MMOL/L (ref 136–145)
WBC # BLD AUTO: 7.84 K/UL (ref 3.9–12.7)

## 2022-02-23 PROCEDURE — 85025 COMPLETE CBC W/AUTO DIFF WBC: CPT | Performed by: STUDENT IN AN ORGANIZED HEALTH CARE EDUCATION/TRAINING PROGRAM

## 2022-02-23 PROCEDURE — 99900026 HC AIRWAY MAINTENANCE (STAT)

## 2022-02-23 PROCEDURE — 94003 VENT MGMT INPAT SUBQ DAY: CPT

## 2022-02-23 PROCEDURE — 80053 COMPREHEN METABOLIC PANEL: CPT | Performed by: STUDENT IN AN ORGANIZED HEALTH CARE EDUCATION/TRAINING PROGRAM

## 2022-02-23 PROCEDURE — C9113 INJ PANTOPRAZOLE SODIUM, VIA: HCPCS | Performed by: STUDENT IN AN ORGANIZED HEALTH CARE EDUCATION/TRAINING PROGRAM

## 2022-02-23 PROCEDURE — 94761 N-INVAS EAR/PLS OXIMETRY MLT: CPT

## 2022-02-23 PROCEDURE — 63600175 PHARM REV CODE 636 W HCPCS: Performed by: INTERNAL MEDICINE

## 2022-02-23 PROCEDURE — 83735 ASSAY OF MAGNESIUM: CPT | Performed by: STUDENT IN AN ORGANIZED HEALTH CARE EDUCATION/TRAINING PROGRAM

## 2022-02-23 PROCEDURE — 99900035 HC TECH TIME PER 15 MIN (STAT)

## 2022-02-23 PROCEDURE — 25000003 PHARM REV CODE 250: Performed by: STUDENT IN AN ORGANIZED HEALTH CARE EDUCATION/TRAINING PROGRAM

## 2022-02-23 PROCEDURE — 27000221 HC OXYGEN, UP TO 24 HOURS

## 2022-02-23 PROCEDURE — 99233 SBSQ HOSP IP/OBS HIGH 50: CPT | Mod: GC,,, | Performed by: INTERNAL MEDICINE

## 2022-02-23 PROCEDURE — 63600175 PHARM REV CODE 636 W HCPCS: Performed by: STUDENT IN AN ORGANIZED HEALTH CARE EDUCATION/TRAINING PROGRAM

## 2022-02-23 PROCEDURE — 99233 PR SUBSEQUENT HOSPITAL CARE,LEVL III: ICD-10-PCS | Mod: GC,,, | Performed by: INTERNAL MEDICINE

## 2022-02-23 PROCEDURE — 84100 ASSAY OF PHOSPHORUS: CPT | Performed by: STUDENT IN AN ORGANIZED HEALTH CARE EDUCATION/TRAINING PROGRAM

## 2022-02-23 RX ORDER — LEVOFLOXACIN 750 MG/1
750 TABLET ORAL DAILY
Qty: 7 TABLET | Refills: 0 | Status: SHIPPED | OUTPATIENT
Start: 2022-02-23 | End: 2022-03-02

## 2022-02-23 RX ORDER — BUSPIRONE HYDROCHLORIDE 5 MG/1
5 TABLET ORAL 2 TIMES DAILY
Qty: 60 TABLET | Refills: 11 | Status: SHIPPED | OUTPATIENT
Start: 2022-02-23 | End: 2023-02-23

## 2022-02-23 RX ORDER — CITALOPRAM 10 MG/1
10 TABLET ORAL DAILY
Qty: 30 TABLET | Refills: 11 | Status: SHIPPED | OUTPATIENT
Start: 2022-02-23 | End: 2022-05-17

## 2022-02-23 RX ORDER — LEVOFLOXACIN 750 MG/1
750 TABLET ORAL DAILY
Status: DISCONTINUED | OUTPATIENT
Start: 2022-02-24 | End: 2022-02-23 | Stop reason: HOSPADM

## 2022-02-23 RX ADMIN — BUSPIRONE HYDROCHLORIDE 5 MG: 5 TABLET ORAL at 09:02

## 2022-02-23 RX ADMIN — PANTOPRAZOLE SODIUM 40 MG: 40 INJECTION, POWDER, FOR SOLUTION INTRAVENOUS at 09:02

## 2022-02-23 RX ADMIN — APIXABAN 5 MG: 5 TABLET, FILM COATED ORAL at 09:02

## 2022-02-23 RX ADMIN — BACLOFEN 10 MG: 10 TABLET ORAL at 09:02

## 2022-02-23 RX ADMIN — CITALOPRAM HYDROBROMIDE 10 MG: 10 TABLET ORAL at 09:02

## 2022-02-23 RX ADMIN — MORPHINE SULFATE 4 MG: 2 INJECTION, SOLUTION INTRAMUSCULAR; INTRAVENOUS at 05:02

## 2022-02-23 RX ADMIN — BACLOFEN 10 MG: 10 TABLET ORAL at 03:02

## 2022-02-23 RX ADMIN — LEVOFLOXACIN 750 MG: 750 TABLET, FILM COATED ORAL at 09:02

## 2022-02-23 RX ADMIN — GLYCOPYRROLATE 1 MG: 1 TABLET ORAL at 09:02

## 2022-02-23 NOTE — PLAN OF CARE
NURSING HOME ORDERS    02/23/2022  [facility]  [facility]  [address]  [facility] [address]  Dept: [phone]  Loc: [phone]     Admit to Nursing Home:      Diagnoses:  Active Hospital Problems    Diagnosis  POA    *Acute blood loss anemia [D62]  Unknown    Hyperkalemia [E87.5]  Unknown    Toe pain [M79.676]  Unknown    Right lower lobe consolidation [J18.1]  Unknown    S/P percutaneous endoscopic gastrostomy (PEG) tube placement [Z93.1]  Not Applicable    ALS (amyotrophic lateral sclerosis) [G12.21]  Yes    Essential hypertension [I10]  Yes    Quadriparesis [G82.50]  Yes      Resolved Hospital Problems   No resolved problems to display.       Code Status: FULL    Patient is homebound due to:  Acute blood loss anemia    Allergies:Review of patient's allergies indicates:  No Known Allergies    Vitals:  Routine    Diet: tube feeds via g tube    Activities:   Activity as tolerated    Labs:  Per Nursing Home Protocol    Nursing Precautions:  Aspiration  and Pressure ulcer prevention    Consults:   PT to evaluate and treat- 3 times a week, OT to evaluate and treat- 3 times a week, Wound Care and Nutrition to evaluate and recommend diet     Miscellaneous Care: PEG Care:  Clean site every 24 hours  Hutchinson Care: Empty Hutchinson bag every shift.  Change Hutchinson every month  Routine Skin for Bedridden Patients:  Apply moisture barrier cream to all  Wound Care: yes:  Pressure Ulcer(s) Stage II :   Location: sacral  Apply Miconzazole:  Barrier ointment                       Frequency:  Twice Daily                             If incontinent of stool or urine, apply thin layer Barrier cream                   twice daily and PRN to wound         Pressure relief measure:  for pressure redistribution           Foot Ulcer:  Location: Right toe    Dry or minimal exudate wound: Apply wound gel daily (supplied by [facility]), cover with telfa  dressing                     Diabetes Care:  Fingerstick blood sugar a.m. and p.m.      Medications: Discontinue all previous medication orders, if any. See new list below.     Medication List      START taking these medications    apixaban 5 mg Tab  Commonly known as: ELIQUIS  1 tablet (5 mg total) by Per G Tube route 2 (two) times daily.     busPIRone 5 MG Tab  Commonly known as: BUSPAR  1 tablet (5 mg total) by Per G Tube route 2 (two) times daily.     citalopram 10 MG tablet  Commonly known as: CeleXA  1 tablet (10 mg total) by Per G Tube route once daily.     levoFLOXacin 750 MG tablet  Commonly known as: LEVAQUIN  1 tablet (750 mg total) by Per G Tube route once daily. for 7 days        CONTINUE taking these medications    ALPRAZolam 0.5 MG tablet  Commonly known as: XANAX  Take 1 tablet (0.5 mg total) by mouth 2 (two) times daily as needed for Anxiety.     baclofen 10 MG tablet  Commonly known as: LIORESAL  TAKE 1 TABLET THREE TIMES PER DAY     carvediloL 6.25 MG tablet  Commonly known as: COREG  Take 1 tablet by mouth 2 (two) times daily.     COZAAR 100 MG tablet  Generic drug: losartan     * cyanocobalamin (vitamin B-12) 1,000 mcg/mL Kit  Inject 1 mL into the muscle every 28 days. Administered by Home health     * cyanocobalamin 1,000 mcg/mL injection  1 ML EVERY FOUR WEEKS INJECTION 90 DAYS     * folic acid 1 MG tablet  Commonly known as: FOLVITE  Take 1 tablet by mouth once daily.     * folic acid 1 MG tablet  Commonly known as: FOLVITE  2 TABLETS ONCE A DAY ORALLY 90 DAYS     glycopyrrolate 1 mg Tab  Commonly known as: ROBINUL  START BY TAKING 1 TABLET DAILY. IF TOLERATING WELL, YOU CAN INCREASE IT UP TO 2 OR EVEN 3 TIMES DAILY.     losartan-hydrochlorothiazide 100-12.5 mg 100-12.5 mg Tab  Commonly known as: HYZAAR  Take 1 tablet by mouth once daily.     riluzole 50 mg Tab tablet  TAKE 1 TABLET ON AN EMPTY STOMACH EVERY 12 HRS     tiZANidine 2 MG tablet  Commonly known as: ZANAFLEX  Take 1 tablet by  mouth 3 (three) times daily as needed.         * This list has 4 medication(s) that are the same as other medications prescribed for you. Read the directions carefully, and ask your doctor or other care provider to review them with you.            STOP taking these medications    RADICAVA 30 mg/100 mL Soln infusion  Generic drug: edaravone              Immunizations Administered as of 2/23/2022     No immunizations on file.          This patient has had both covid vaccinations    Some patients may experience side effects after vaccination.  These may include fever, headache, muscle or joint aches.  Most symptoms resolve with 24-48 hours and do not require urgent medical evaluation unless they persist for more than 72 hours or symptoms are concerning for an unrelated medical condition.          _________________________________  Monae Cox MD  02/23/2022

## 2022-02-23 NOTE — PLAN OF CARE
[facility] has received n/h orders for pt to return back to [facility].  [facility] has faxed orders, face sheet, CoVid Extended Care Screening test, and H&P via [employer] and manually faxed information.  [facility] has left a sg for Lilliam to notify that pt is ready to return.  [facility] also notified Lilliam on yesterday (Tuesday, 2/22/2022) that pt would be returning today.      Alie Fox, AMANDA  [facility]  [phone]

## 2022-02-23 NOTE — PLAN OF CARE
CMICU DAILY GOALS       A: Awake    RASS: Goal - RASS Goal: 0-->alert and calm  Actual - RASS (Brooks Agitation-Sedation Scale): 0-->alert and calm   Restraint necessity:    B: Breathe   SBT: Not attempted   C: Coordinate A & B, analgesics/sedatives   Pain: managed    SAT: Not attempted  D: Delirium   CAM-ICU: Overall CAM-ICU: Negative  E: Early(intubated/ Progressive (non-intubated) Mobility   MOVE Screen: Fail   Activity: Activity Management: Patient unable to perform activities  FAS: Feeding/Nutrition   Diet order:  ,    T: Thrombus   DVT prophylaxis: VTE Required Core Measure: Pharmacological prophylaxis initiated/maintained  H: HOB Elevation   Head of Bed (HOB) Positioning: HOB at 30 degrees  U: Ulcer Prophylaxis   GI: yes  G: Glucose control   managed    S: Skin   Bathing/Skin Care: back care, bath, complete, incontinence care, linen changed  Device Skin Pressure Protection: absorbent pad utilized/changed, adhesive use limited, pressure points protected  Pressure Reduction Devices: specialty bed utilized  Pressure Reduction Techniques: weight shift assistance provided  Skin Protection: adhesive use limited, incontinence pads utilized, tubing/devices free from skin contact  B: Bowel Function   no issues   I: Indwelling Catheters   Hutchinson necessity:     CVC necessity: No  D: De-escalation Antibiotics   Yes    Family/Goals of care/Code Status   Code Status: Full Code    24H Vital Sign Range  Temp:  [97.3 °F (36.3 °C)-98.5 °F (36.9 °C)]   Pulse:  []   Resp:  [18-29]   BP: ()/(57-72)   SpO2:  [97 %-100 %]      Shift Events   No acute events throughout shift    VS and assessment per flow sheet, patient progressing towards goals as tolerated, plan of care reviewed with family, all concerns addressed, will continue to monitor.    Theodora [facility]

## 2022-02-23 NOTE — SUBJECTIVE & OBJECTIVE
Interval History/Significant Events: NAEON    Review of Systems   Unable to perform ROS: Intubated   Objective:     Vital Signs (Most Recent):  Temp: 97.7 °F (36.5 °C) (02/23/22 1115)  Pulse: (!) 111 (02/23/22 1450)  Resp: (!) 32 (02/23/22 1450)  BP: 100/66 (02/23/22 1200)  SpO2: 100 % (02/23/22 1450)   Vital Signs (24h Range):  Temp:  [97.3 °F (36.3 °C)-98.5 °F (36.9 °C)] 97.7 °F (36.5 °C)  Pulse:  [] 111  Resp:  [18-32] 32  SpO2:  [97 %-100 %] 100 %  BP: ()/(57-76) 100/66   Weight: 77.4 kg (170 lb 10.2 oz)  Body mass index is 25.95 kg/m².      Intake/Output Summary (Last 24 hours) at 2/23/2022 1518  Last data filed at 2/23/2022 1200  Gross per 24 hour   Intake 1217.43 ml   Output 920 ml   Net 297.43 ml       Physical Exam  Constitutional:       General: He is not in acute distress.     Appearance: He is not ill-appearing.   HENT:      Head: Normocephalic and atraumatic.      Nose: Nose normal.      Mouth/Throat:      Mouth: Mucous membranes are moist.   Eyes:      Extraocular Movements: Extraocular movements intact.   Neck:      Comments: Trach in place  Cardiovascular:      Rate and Rhythm: Normal rate and regular rhythm.      Pulses: Normal pulses.      Heart sounds: Normal heart sounds. No murmur heard.  Pulmonary:      Effort: Pulmonary effort is normal. No respiratory distress.      Breath sounds: Normal breath sounds. No wheezing.   Abdominal:      General: Abdomen is flat. Bowel sounds are normal.      Comments: Tube noted in stoma. Stoma without bleeding, erythema or fluctuance. Mild irritation noted, mild tenderness    Musculoskeletal:         General: No swelling. Normal range of motion.      Cervical back: Normal range of motion.   Skin:     General: Skin is warm.      Coloration: Skin is not jaundiced or pale.      Comments: Right great toe oncycholysis with minimal bleeding noted on the media aspect of the nail bed.    Neurological:      Mental Status: He is alert and oriented to person,  place, and time. Mental status is at baseline.   Psychiatric:      Comments: Calm and cooperative       Vents:  Vent Mode: A/C (02/23/22 1450)  Ventilator Initiated: Yes (02/21/22 1145)  Set Rate: 18 BPM (02/23/22 1450)  Vt Set: 500 mL (02/23/22 1450)  PEEP/CPAP: 5 cmH20 (02/23/22 1450)  Oxygen Concentration (%): 30 (02/23/22 1450)  Peak Airway Pressure: 26 cmH2O (02/23/22 1450)  Plateau Pressure: 20 cmH20 (02/23/22 1450)  Total Ve: 14.1 mL (02/23/22 1450)  Negative Inspiratory Force (cm H2O): 0 (02/23/22 1450)  F/VT Ratio<105 (RSBI): (!) 60.95 (02/23/22 1450)  Lines/Drains/Airways       Central Venous Catheter Line  Duration                  Port A Cath Single Lumen left subclavian -- days              Drain  Duration                  Gastrostomy/Enterostomy LUQ feeding -- days         Urethral Catheter 02/22/22 1030 Straight-tip 16 Fr. 1 day              Airway  Duration                  Surgical Airway 02/21/22 1145 Cuffed 2 days              Peripheral Intravenous Line  Duration                  Peripheral IV - Single Lumen 02/20/22 1330 20 G Anterior;Right Forearm 3 days         Peripheral IV - Single Lumen 02/21/22 0401 20 G Right Antecubital 2 days                  Significant Labs:    CBC/Anemia Profile:  Recent Labs   Lab 02/21/22  1608 02/22/22  0413 02/23/22  0513   WBC 13.40* 12.77* 7.84   HGB 8.2* 8.3* 8.4*   HCT 26.9* 27.6* 29.2*    367 359   MCV 85 85 89   RDW 18.2* 18.5* 18.8*        Chemistries:  Recent Labs   Lab 02/21/22  1611 02/22/22  0413 02/23/22  0513   * 135* 140   K 3.7 3.7 3.5    107 108   CO2 20* 16* 17*   BUN 32* 31* 24*   CREATININE 0.6 0.6 0.6   CALCIUM 9.9 9.8 9.6   ALBUMIN  --  2.5* 2.4*   PROT  --  8.7* 8.5*   BILITOT  --  0.8 0.5   ALKPHOS  --  59 51*   ALT  --  17 17   AST  --  19 16   MG  --  2.1 2.0   PHOS  --  3.2 3.4       All pertinent labs within the past 24 hours have been reviewed.    Significant Imaging:  I have reviewed all pertinent imaging  results/findings within the past 24 hours.

## 2022-02-23 NOTE — DISCHARGE SUMMARY
Pt discharged to nursing home. PIV removed, no belonging here to send, paperwork sent with EMS. Wife notified of pt leaving. Pt left via stretcher strapped in with 2 members of [facility] staff transporting him.     Ladonna Pratt RN

## 2022-02-23 NOTE — CARE UPDATE
Called to bedside by primary team for drainage around G tube   Tube examined, there is drainage and granulation tissue at the site.   Tube is functioning, no evidence of abscess on exam      - Recommend local wound care, can consult wound care for AgNO3 application to the granulation tissue, this should help with the drainage and seating the external bumper against the skin  - Will sign off    Romaine Miguel MD   Pager: [phone]  General Surgery PGY-V  [facility]

## 2022-02-23 NOTE — PROGRESS NOTES
[facility] - Cardiac Intensive Care  Wound Care    Patient Name:  Wei Dominique   MRN:  [id number]  Date: 2/23/2022  Diagnosis: Acute blood loss anemia    History:     Past Medical History:   Diagnosis Date    ALS (amyotrophic lateral sclerosis)     ALS (amyotrophic lateral sclerosis)     Erectile dysfunction     Gait disturbance     Hyperlipidemia     Hypertension     Iron deficiency anemia     Motor neuron disease     Tremor        Social History     Socioeconomic History    Marital status:        Precautions:     Allergies as of 02/21/2022    (No Known Allergies)       [facility] Assessment Details/Treatment   Wound care consulted for left toenail by MD.    The left toenail is intact, no redness, no drainage  The right toenail cuticle has dried tan drainage removed with cleansing, red/dry partial thickness skin loss, no drainage when gently pressed.     Plan  Right toenail.-  paint with betadine daily, may cover with mepore dressing.     Nursing to continue care ,pressure prevention meaures  Wound care to follow prn as needed  Recommendations made to primary team  per secure chat for above plan . Orders placed.      02/23/22 1030        Wound 02/21/22 Ulceration Right anterior Toe, first   Date First Assessed: 02/21/22   Pre-existing: Yes  Primary Wound Type: Ulceration  Side: Right  Orientation: anterior  Location: Toe, first   Wound Image    Wound WDL ex   Dressing Appearance Open to air   Drainage Amount None   Appearance Red;Dry   Tissue loss description Partial thickness   Periwound Area Intact;Swelling;Dry   Wound Edges Undefined   Care Cleansed with:;Sterile normal saline   Dressing Applied;Island/border       02/23/2022

## 2022-02-23 NOTE — PROGRESS NOTES
"[facility] - Cardiac Intensive Care  Critical Care Medicine  Progress Note    Patient Name: Wei Dominique  MRN: [id number]  Admission Date: 2/21/2022  Hospital Length of Stay: 2 days  Code Status: Full Code  Attending Provider: Scott Craven*  Primary Care Provider: Mack Suero MD   Principal Problem: Acute blood loss anemia    Subjective:     HPI:  Patient is a 66 y.o. male with significant past medical history of ALS, s/p trach & peg, vent dependent, HTN, PE (on eliquis) presented to hospital from [facility] complaining of dislodged peg tube. Nursing home staff was concerned that the patient's peg tube was "protruding," although reportedly still functional. Per chart, patient was supposed to be having his peg replaced soon, though not clear where this was to occur.  Patient is able to communicate by blinking of his eyes. He communcated left great toe pain that started a few days ago. He denies any chest pain, dyspnea, bloody stools, dark or bloody urine. Of note, his G tube was placed laparoscopically by Gen surgery in 2020. He was admitted in March 2021 for PEG tube dislodgement.         Hospital/ICU Course:  2/22: H/H stable s/p 1 unit RBC yesterday. Blood pressures soft. Arvizu from nursing home removed and replaced. Of note patient's records from nursing home state that patient was diagnosed with UTI cx's growing pseudomonas and started on levaquin at OSH. UA and reflex pending. Will replace arvizu today.     2/23: Patient remains hemodynamically stable and labs reassuring. H/H stable s/p 1 unit on 2/21. SW consulted, state patient has bed for return to his nursing home. Touched base with surgery for final re-assessment of PEG site prior to transfer as they had signed off on 2/21. Will discharge on course of levaquin as patient has grown pseudomonas in his urine at nursing facility and was currently being treated.        Interval History/Significant Events: LARRY    Review of Systems "   Unable to perform ROS: Intubated   Objective:     Vital Signs (Most Recent):  Temp: 97.7 °F (36.5 °C) (02/23/22 1115)  Pulse: (!) 111 (02/23/22 1450)  Resp: (!) 32 (02/23/22 1450)  BP: 100/66 (02/23/22 1200)  SpO2: 100 % (02/23/22 1450)   Vital Signs (24h Range):  Temp:  [97.3 °F (36.3 °C)-98.5 °F (36.9 °C)] 97.7 °F (36.5 °C)  Pulse:  [] 111  Resp:  [18-32] 32  SpO2:  [97 %-100 %] 100 %  BP: ()/(57-76) 100/66   Weight: 77.4 kg (170 lb 10.2 oz)  Body mass index is 25.95 kg/m².      Intake/Output Summary (Last 24 hours) at 2/23/2022 1518  Last data filed at 2/23/2022 1200  Gross per 24 hour   Intake 1217.43 ml   Output 920 ml   Net 297.43 ml       Physical Exam  Constitutional:       General: He is not in acute distress.     Appearance: He is not ill-appearing.   HENT:      Head: Normocephalic and atraumatic.      Nose: Nose normal.      Mouth/Throat:      Mouth: Mucous membranes are moist.   Eyes:      Extraocular Movements: Extraocular movements intact.   Neck:      Comments: Trach in place  Cardiovascular:      Rate and Rhythm: Normal rate and regular rhythm.      Pulses: Normal pulses.      Heart sounds: Normal heart sounds. No murmur heard.  Pulmonary:      Effort: Pulmonary effort is normal. No respiratory distress.      Breath sounds: Normal breath sounds. No wheezing.   Abdominal:      General: Abdomen is flat. Bowel sounds are normal.      Comments: Tube noted in stoma. Stoma without bleeding, erythema or fluctuance. Mild irritation noted, mild tenderness    Musculoskeletal:         General: No swelling. Normal range of motion.      Cervical back: Normal range of motion.   Skin:     General: Skin is warm.      Coloration: Skin is not jaundiced or pale.      Comments: Right great toe oncycholysis with minimal bleeding noted on the media aspect of the nail bed.    Neurological:      Mental Status: He is alert and oriented to person, place, and time. Mental status is at baseline.   Psychiatric:       Comments: Calm and cooperative       Vents:  Vent Mode: A/C (02/23/22 1450)  Ventilator Initiated: Yes (02/21/22 1145)  Set Rate: 18 BPM (02/23/22 1450)  Vt Set: 500 mL (02/23/22 1450)  PEEP/CPAP: 5 cmH20 (02/23/22 1450)  Oxygen Concentration (%): 30 (02/23/22 1450)  Peak Airway Pressure: 26 cmH2O (02/23/22 1450)  Plateau Pressure: 20 cmH20 (02/23/22 1450)  Total Ve: 14.1 mL (02/23/22 1450)  Negative Inspiratory Force (cm H2O): 0 (02/23/22 1450)  F/VT Ratio<105 (RSBI): (!) 60.95 (02/23/22 1450)  Lines/Drains/Airways       Central Venous Catheter Line  Duration                  Port A Cath Single Lumen left subclavian -- days              Drain  Duration                  Gastrostomy/Enterostomy LUQ feeding -- days         Urethral Catheter 02/22/22 1030 Straight-tip 16 Fr. 1 day              Airway  Duration                  Surgical Airway 02/21/22 1145 Cuffed 2 days              Peripheral Intravenous Line  Duration                  Peripheral IV - Single Lumen 02/20/22 1330 20 G Anterior;Right Forearm 3 days         Peripheral IV - Single Lumen 02/21/22 0401 20 G Right Antecubital 2 days                  Significant Labs:    CBC/Anemia Profile:  Recent Labs   Lab 02/21/22  1608 02/22/22  0413 02/23/22  0513   WBC 13.40* 12.77* 7.84   HGB 8.2* 8.3* 8.4*   HCT 26.9* 27.6* 29.2*    367 359   MCV 85 85 89   RDW 18.2* 18.5* 18.8*        Chemistries:  Recent Labs   Lab 02/21/22  1611 02/22/22  0413 02/23/22  0513   * 135* 140   K 3.7 3.7 3.5    107 108   CO2 20* 16* 17*   BUN 32* 31* 24*   CREATININE 0.6 0.6 0.6   CALCIUM 9.9 9.8 9.6   ALBUMIN  --  2.5* 2.4*   PROT  --  8.7* 8.5*   BILITOT  --  0.8 0.5   ALKPHOS  --  59 51*   ALT  --  17 17   AST  --  19 16   MG  --  2.1 2.0   PHOS  --  3.2 3.4       All pertinent labs within the past 24 hours have been reviewed.    Significant Imaging:  I have reviewed all pertinent imaging results/findings within the past 24 hours.      ABG  No results for  input(s): PH, PO2, PCO2, HCO3, BE in the last 168 hours.  Assessment/Plan:     Neuro  Quadriparesis  Immerse bed  Turn patient q2h  Monitor for wounds    ALS (amyotrophic lateral sclerosis)  Hx of ALS, from [facility]   Trach and PEG, presents for PEG dislodgement  Trach on VC+ 30% at NH  - Baclofen for muscle spasms  - Continue Riluzole    Cardiac/Vascular  Essential hypertension  On losartan, Coreg at home  Holding at this time  Resume as appropriate    Renal/  Hyperkalemia  K 5.4 on BMP, iSTAT 5.1  Not shifted  - Need to recheck  - Held losartan in setting of hyperkalemia    Oncology  * Acute blood loss anemia  History of anemia  Baseline 8, presents with 6.9  Pt denies hematemesis, hemachezia, dark stools, blood in stool, hematuria,   S/p 1 unit pRBC transfusion in the ED   - CBC stable since      GI  S/P percutaneous endoscopic gastrostomy (PEG) tube placement  Admitted for PEG dislodgment  Originally placed at OSH in 2020  CT Abd revealed dislodgement, replaced in ED  - Per surgery, ok to use now      Orthopedic  Toe pain  Presents with left great toe pain / nail lifting from bed  Some bleeding presents, erythema  - Pain control prn  - Wound care consult    Other  Right lower lobe consolidation  Patient with trach  CT abd with RLL consolidations  No fever, no change in respiratory status  - Likely colonized but would obtain if worsens      Critical secondary to Patient has a condition that poses threat to life and bodily function: Trach with vent dependence      Critical care was time spent personally by me on the following activities: development of treatment plan with patient or surrogate and bedside caregivers, discussions with consultants, evaluation of patient's response to treatment, examination of patient, ordering and performing treatments and interventions, ordering and review of laboratory studies, ordering and review of radiographic studies, pulse oximetry, re-evaluation of patient's condition.  This critical care time did not overlap with that of any other provider or involve time for any procedures.     Monae Cox MD  Critical Care Medicine  [facility] - [facility]

## 2022-02-23 NOTE — PLAN OF CARE
CMICU DAILY GOALS       A: Awake    RASS: Goal - RASS Goal: 0-->alert and calm  Actual - RASS (Brooks Agitation-Sedation Scale): 0-->alert and calm   Restraint necessity:    B: Breathe   SBT: Not intubated   C: Coordinate A & B, analgesics/sedatives   Pain: managed    SAT: Not intubated  D: Delirium   CAM-ICU: Overall CAM-ICU: Negative  E: Early(intubated/ Progressive (non-intubated) Mobility   MOVE Screen: Fail   Activity: Activity Management: Patient unable to perform activities  FAS: Feeding/Nutrition   Diet order:  ,    T: Thrombus   DVT prophylaxis: VTE Required Core Measure: Pharmacological prophylaxis initiated/maintained  H: HOB Elevation   Head of Bed (HOB) Positioning: HOB at 30 degrees  U: Ulcer Prophylaxis   GI: yes  G: Glucose control   managed    S: Skin   Bathing/Skin Care: back care, bath, complete, incontinence care, linen changed  Device Skin Pressure Protection: absorbent pad utilized/changed, adhesive use limited, pressure points protected  Pressure Reduction Devices: specialty bed utilized  Pressure Reduction Techniques: weight shift assistance provided  Skin Protection: adhesive use limited, incontinence pads utilized, tubing/devices free from skin contact  B: Bowel Function   no issues   I: Indwelling Catheters   Hutchinson necessity:     CVC necessity: No  D: De-escalation Antibiotics   No    Family/Goals of care/Code Status   Code Status: Full Code    24H Vital Sign Range  Temp:  [97.3 °F (36.3 °C)-98.5 °F (36.9 °C)]   Pulse:  []   Resp:  [18-29]   BP: ()/(57-72)   SpO2:  [97 %-100 %]      Shift Events   Patient had tonic clonic seizure activity for 2 min administered ativan prn as ordered. Pt place on EEG monitoring overnight. Pt responsive to painful stimuli, does not follow commands.     VS and assessment per flow sheet, patient progressing towards goals as tolerated, plan of care reviewed with family, all concerns addressed, will continue to monitor.    Theodora [facility]

## 2022-02-23 NOTE — ASSESSMENT & PLAN NOTE
Patient with trach  CT abd with RLL consolidations  No fever, no change in respiratory status  - Likely colonized but would obtain if worsens

## 2022-02-23 NOTE — PLAN OF CARE
Transportation has been arranged with [facility] [id number] with [facility] for stretcher transport as pt is dx with ALS.  Requested time of transport is 3:00pm.  Bedside nurse to call report to [phone]; pt will go to [facility] room 26A.  Eze Trip, spouse/cp# [phone], is agreeable to n/h return.      Alie Fox, TATE  [facility]  [phone]

## 2022-02-24 LAB — BACTERIA UR CULT: ABNORMAL

## 2022-02-24 NOTE — PLAN OF CARE
[facility] - Card[facility] Intensive [facility]  Discharge Final Note    Primary Care Provider: Mack Suero MD    Expected Discharge Date: 2/23/2022    Final Discharge Note (most recent)     Final Note - 02/24/22 1041        Final Note    Assessment Type Final Discharge Note     Anticipated Discharge Disposition [facility] Nursing [facility]     What phone number can be called within the next 1-3 days to see how you are doing after discharge? [phone]     Hospital Resources/Appts/Education Provided Appointments scheduled and added to AVS        Post-Acute Status    Post-Acute Authorization Placement     Post-Acute Placement Status Set-up Complete/Auth obtained     Discharge Delays None known at this time             Patient discharged back to [facility] for [facility] care.   Spouse has been notified of discharge.  Information given to Nursing home via [employer] system.  Per medical staff for f/u and symptoms to notify provider.        Alie Fox LMSW  [facility]  [phone]              Important Message from Medicare  Important Message from Medicare regarding Discharge Appeal Rights: Other (comments) (Received verbal from wife, Jud Dominique 02/23/22 at 12:45 pm cbo)     Date IMM was signed: 02/23/22  Time IMM was signed: [id number]

## 2022-03-14 NOTE — SUBJECTIVE & OBJECTIVE
Past Medical History:   Diagnosis Date    ALS (amyotrophic lateral sclerosis)     ALS (amyotrophic lateral sclerosis)     Erectile dysfunction     Gait disturbance     Hyperlipidemia     Hypertension     Iron deficiency anemia     Motor neuron disease     Tremor        Past Surgical History:   Procedure Laterality Date    PORTACATH PLACEMENT         Review of patient's allergies indicates:  No Known Allergies    Family History    None       Tobacco Use    Smoking status: Not on file    Smokeless tobacco: Not on file   Substance and Sexual Activity    Alcohol use: Not on file    Drug use: Not on file    Sexual activity: Not on file      Review of Systems  Objective:     Vital Signs (Most Recent):  Temp: 97.7 °F (36.5 °C) (02/23/22 1115)  Pulse: 97 (02/23/22 1700)  Resp: (!) 25 (02/23/22 1700)  BP: 101/75 (02/23/22 1700)  SpO2: 100 % (02/23/22 1700)   Vital Signs (24h Range):      Weight: 77.4 kg (170 lb 10.2 oz)  Body mass index is 25.95 kg/m².    No intake or output data in the 24 hours ending 03/14/22 1020    Physical Exam    Vents:  Vent Mode: A/C (02/23/22 1450)  Ventilator Initiated: Yes (02/21/22 1145)  Set Rate: 18 BPM (02/23/22 1450)  Vt Set: 500 mL (02/23/22 1450)  PEEP/CPAP: 5 cmH20 (02/23/22 1450)  Oxygen Concentration (%): 30 (02/23/22 1700)  Peak Airway Pressure: 26 cmH2O (02/23/22 1450)  Plateau Pressure: 20 cmH20 (02/23/22 1450)  Total Ve: 14.1 mL (02/23/22 1450)  Negative Inspiratory Force (cm H2O): 0 (02/23/22 1450)  F/VT Ratio<105 (RSBI): (!) 60.95 (02/23/22 1450)  Lines/Drains/Airways       Central Venous Catheter Line  Duration                  Port A Cath Single Lumen left subclavian -- days              Drain  Duration                  Gastrostomy/Enterostomy LUQ feeding -- days         Urethral Catheter 02/22/22 1030 Straight-tip 16 Fr. 19 days              Airway  Duration                  Surgical Airway 02/21/22 1145 Cuffed 20 days              Peripheral Intravenous Line  Duration                   Peripheral IV - Single Lumen 02/20/22 1330 20 G Anterior;Right Forearm 21 days         Peripheral IV - Single Lumen 02/21/22 0401 20 G Right Antecubital 21 days                  Significant Labs:    CBC/Anemia Profile:  No results for input(s): WBC, HGB, HCT, PLT, MCV, RDW, IRON, FERRITIN, RETIC, FOLATE, [id number], OCCULTBLOOD in the last 48 hours.     Chemistries:  No results for input(s): NA, K, CL, CO2, BUN, CREATININE, CALCIUM, ALBUMIN, PROT, BILITOT, ALKPHOS, ALT, AST, GLUCOSE, MG, PHOS in the last 48 hours.    All pertinent labs within the past 24 hours have been reviewed.    Significant Imaging:

## 2022-03-14 NOTE — ASSESSMENT & PLAN NOTE
Admitted for PEG dislodgment  Originally placed at OSH in 2020  CT Abd revealed dislodgement, replaced in ED  - Per surgery, PEG functional, ok to use now; no abscess appreciated  - wound care rec with AgNO3

## 2022-03-14 NOTE — ASSESSMENT & PLAN NOTE
Hx of ALS, from [facility]   Trach and PEG, presents for PEG dislodgement  Trach on VC+ 30% at NH  - Baclofen for muscle spasms  - Continue Riluzole

## 2022-03-14 NOTE — DISCHARGE SUMMARY
"[facility]  Critical Care Medicine  Discharge Summary      Patient Name: Wei Dominique  MRN: [id number]  Admission Date: 2/21/2022  Hospital Length of Stay: 2 days  Discharge Date and Time: 2/23/2022  5:31 PM  Attending Physician: No att. providers found   Discharging Provider: Alicia Childress MD  Primary Care Provider: Mack Suero MD  Reason for Admission: dislodged PEG tube    HPI:   Patient is a 66 y.o. male with significant past medical history of ALS, s/p trach & peg, vent dependent, HTN, PE (on eliquis) presented to hospital from [facility] complaining of dislodged peg tube. Nursing home staff was concerned that the patient's peg tube was "protruding," although reportedly still functional. Per chart, patient was supposed to be having his peg replaced soon, though not clear where this was to occur.  Patient is able to communicate by blinking of his eyes. He communcated left great toe pain that started a few days ago. He denies any chest pain, dyspnea, bloody stools, dark or bloody urine. Of note, his G tube was placed laparoscopically by Gen surgery in 2020. He was admitted in March 2021 for PEG tube dislodgement. Vital signs stable in ED without concern for sepsis: /74   Pulse 96   Temp 98.4 °F (36.9 °C) (Axillary)   Resp 18   SpO2 100%         * No surgery found *    Indwelling Lines/Drains at Time of Discharge:   Lines/Drains/Airways     Central Venous Catheter Line  Duration                Port A Cath Single Lumen left subclavian -- days          Drain  Duration                Gastrostomy/Enterostomy LUQ feeding -- days         Urethral Catheter 02/22/22 1030 Straight-tip 16 Fr. 19 days          Airway  Duration                Surgical Airway 02/21/22 1145 Cuffed 20 days              Hospital Course:   Admitted to MICU services. Hgb 6.9 on admission without obvious signs of bleeding. H/H improved and remained stable after 1 unit RBC on admission.  Evangelina " from nursing home removed and replaced; UA with Providencia stuartii low growth, resistant to Levaquin. Of note patient's records from nursing home state that patient was diagnosed with UTI cx's growing pseudomonas and started on levaquin at OSH. Patient remained hemodynamically stable and labs reassuring. General surgery saw patient and felt there was appropriate granulation tissue at the PEG tube site. They recommended wound care for AgNO3 application to the granulation tissue with expectation this would help with the drainage and recommended seating the external bumper against the skin. General surgery did not appreciate evidence of abscess and determined PEG tube to be functional. SW consulted, state patient has bed for return to his nursing home. Will discharge on course of levaquin as patient has grown pseudomonas in his urine at nursing facility and was currently being treated. As patient appeared to be HDS without evidence of sepsis or worsening UTI, did not treat Providencia. Patient discharged to NH on 2/23/22; wife notified.       Consults (From admission, onward)        Status Ordering Provider     Inpatient consult to General Surgery  Once        Provider:  (Not yet assigned)    Completed ANNA MARIE GUIDRY     Inpatient consult to Critical Care Medicine  Once        Provider:  (Not yet assigned)    Completed DENISHA KEITA        Significant Labs:  All pertinent labs within the past 24 hours have been reviewed.    Significant Imaging:  I have reviewed all pertinent imaging results/findings within the past 24 hours.    Pending Diagnostic Studies:     Procedure Component Value Units Date/Time    EKG 12-lead [[id number]]     Order Status: Sent Lab Status: No result         Final Active Diagnoses:    Diagnosis Date Noted POA    PRINCIPAL PROBLEM:  Acute blood loss anemia [D62] 02/21/2022 Unknown    Hyperkalemia [E87.5] 02/21/2022 Unknown    Toe pain [M79.676] 02/21/2022 Unknown    Right lower lobe  consolidation [J18.1] 02/21/2022 Yes    S/P percutaneous endoscopic gastrostomy (PEG) tube placement [Z93.1] 10/13/2020 Not Applicable    ALS (amyotrophic lateral sclerosis) [G12.21] 12/09/2018 Yes    Essential hypertension [I10] 12/09/2018 Yes    Quadriparesis [G82.50] 02/06/2018 Yes      Problems Resolved During this Admission:     Neuro  Quadriparesis  Immerse bed  Turn patient q2h  Monitor for wounds    ALS (amyotrophic lateral sclerosis)  Hx of ALS, from [facility]   Trach and PEG, presents for PEG dislodgement  Trach on VC+ 30% at NH  - Baclofen for muscle spasms  - Continue Riluzole    Cardiac/Vascular  Essential hypertension  On losartan, Coreg at home  Holding at this time  Resume as appropriate    Renal/  Hyperkalemia  K 5.4 on BMP, iSTAT 5.1  Not shifted  - Need to recheck  - Held losartan in setting of hyperkalemia    Oncology  * Acute blood loss anemia  History of anemia  Baseline 8, presents with 6.9  Pt denies hematemesis, hemachezia, dark stools, blood in stool, hematuria,   S/p 1 unit pRBC transfusion in the ED   - CBC stable since      GI  S/P percutaneous endoscopic gastrostomy (PEG) tube placement  Admitted for PEG dislodgment  Originally placed at OSH in 2020  CT Abd revealed dislodgement, replaced in ED  - Per surgery, PEG functional, ok to use now; no abscess appreciated  - wound care rec with AgNO3      Orthopedic  Toe pain  Presents with left great toe pain / nail lifting from bed  Some bleeding presents, erythema  - Pain control prn  - Wound care consult    Other  Right lower lobe consolidation  Patient with trach  CT abd with RLL consolidations  No fever, no change in respiratory status  - Likely colonized but would obtain if worsens      Discharged Condition: fair    Disposition: Long Term Care      Patient Instructions:   No discharge procedures on file.  Medications:  Reconciled Home Medications:      Medication List      START taking these medications    apixaban 5 mg  Tab  Commonly known as: ELIQUIS  1 tablet (5 mg total) by Per G Tube route 2 (two) times daily.     busPIRone 5 MG Tab  Commonly known as: BUSPAR  1 tablet (5 mg total) by Per G Tube route 2 (two) times daily.     citalopram 10 MG tablet  Commonly known as: CeleXA  1 tablet (10 mg total) by Per G Tube route once daily.        CONTINUE taking these medications    ALPRAZolam 0.5 MG tablet  Commonly known as: XANAX  Take 1 tablet (0.5 mg total) by mouth 2 (two) times daily as needed for Anxiety.     baclofen 10 MG tablet  Commonly known as: LIORESAL  TAKE 1 TABLET THREE TIMES PER DAY     carvediloL 6.25 MG tablet  Commonly known as: COREG  Take 1 tablet by mouth 2 (two) times daily.     COZAAR 100 MG tablet  Generic drug: losartan     * cyanocobalamin (vitamin B-12) 1,000 mcg/mL Kit  Inject 1 mL into the muscle every 28 days. Administered by Home health     * cyanocobalamin 1,000 mcg/mL injection  1 ML EVERY FOUR WEEKS INJECTION 90 DAYS     * folic acid 1 MG tablet  Commonly known as: FOLVITE  Take 1 tablet by mouth once daily.     * folic acid 1 MG tablet  Commonly known as: FOLVITE  2 TABLETS ONCE A DAY ORALLY 90 DAYS     glycopyrrolate 1 mg Tab  Commonly known as: ROBINUL  START BY TAKING 1 TABLET DAILY. IF TOLERATING WELL, YOU CAN INCREASE IT UP TO 2 OR EVEN 3 TIMES DAILY.     losartan-hydrochlorothiazide 100-12.5 mg 100-12.5 mg Tab  Commonly known as: HYZAAR  Take 1 tablet by mouth once daily.     riluzole 50 mg Tab tablet  TAKE 1 TABLET ON AN EMPTY STOMACH EVERY 12 HRS     tiZANidine 2 MG tablet  Commonly known as: ZANAFLEX  Take 1 tablet by mouth 3 (three) times daily as needed.         * This list has 4 medication(s) that are the same as other medications prescribed for you. Read the directions carefully, and ask your doctor or other care provider to review them with you.            STOP taking these medications    RADICAVA 30 mg/100 mL Soln infusion  Generic drug: edaravone        ASK your doctor about these  medications    levoFLOXacin 750 MG tablet  Commonly known as: LEVAQUIN  1 tablet (750 mg total) by Per G Tube route once daily. for 7 days  Ask about: Should I take this medication?             Alicia Childress MD  Critical Care Medicine  [facility]

## 2022-05-17 ENCOUNTER — HOSPITAL ENCOUNTER (EMERGENCY)
Facility: HOSPITAL | Age: 67
Discharge: HOME | End: 2022-05-18
Attending: EMERGENCY MEDICINE
Payer: MEDICARE

## 2022-05-17 VITALS
DIASTOLIC BLOOD PRESSURE: 88 MMHG | BODY MASS INDEX: 26.06 KG/M2 | HEIGHT: 68 IN | SYSTOLIC BLOOD PRESSURE: 122 MMHG | OXYGEN SATURATION: 97 % | WEIGHT: 171.94 LBS | RESPIRATION RATE: 20 BRPM | TEMPERATURE: 98 F | HEART RATE: 117 BPM

## 2022-05-17 DIAGNOSIS — R60.9 EDEMA, UNSPECIFIED TYPE: Primary | ICD-10-CM

## 2022-05-17 DIAGNOSIS — N30.80 EDEMA BULLOSUM VESICAE: ICD-10-CM

## 2022-05-17 LAB
ALBUMIN SERPL BCP-MCNC: 2.9 G/DL (ref 3.5–5.2)
ALP SERPL-CCNC: 73 U/L (ref 55–135)
ALT SERPL W/O P-5'-P-CCNC: 17 U/L (ref 10–44)
AMORPH CRY URNS QL MICRO: ABNORMAL
ANION GAP SERPL CALC-SCNC: 11 MMOL/L (ref 8–16)
AST SERPL-CCNC: 23 U/L (ref 10–40)
BACTERIA #/AREA URNS HPF: ABNORMAL /HPF
BASOPHILS # BLD AUTO: 0.08 K/UL (ref 0–0.2)
BASOPHILS NFR BLD: 0.7 % (ref 0–1.9)
BILIRUB SERPL-MCNC: 0.7 MG/DL (ref 0.1–1)
BILIRUB UR QL STRIP: NEGATIVE
BNP SERPL-MCNC: 23 PG/ML (ref 0–99)
BUN SERPL-MCNC: 23 MG/DL (ref 8–23)
CALCIUM SERPL-MCNC: 9.8 MG/DL (ref 8.7–10.5)
CHLORIDE SERPL-SCNC: 103 MMOL/L (ref 95–110)
CLARITY UR: CLEAR
CO2 SERPL-SCNC: 21 MMOL/L (ref 23–29)
COLOR UR: YELLOW
CREAT SERPL-MCNC: 0.6 MG/DL (ref 0.5–1.4)
DIFFERENTIAL METHOD: ABNORMAL
EOSINOPHIL # BLD AUTO: 0.3 K/UL (ref 0–0.5)
EOSINOPHIL NFR BLD: 2.8 % (ref 0–8)
ERYTHROCYTE [DISTWIDTH] IN BLOOD BY AUTOMATED COUNT: 18.1 % (ref 11.5–14.5)
EST. GFR  (AFRICAN AMERICAN): >60 ML/MIN/1.73 M^2
EST. GFR  (NON AFRICAN AMERICAN): >60 ML/MIN/1.73 M^2
GLUCOSE SERPL-MCNC: 94 MG/DL (ref 70–110)
GLUCOSE UR QL STRIP: NEGATIVE
HCT VFR BLD AUTO: 27.9 % (ref 40–54)
HGB BLD-MCNC: 8.6 G/DL (ref 14–18)
HGB UR QL STRIP: ABNORMAL
HYALINE CASTS #/AREA URNS LPF: 0 /LPF
IMM GRANULOCYTES # BLD AUTO: 0.06 K/UL (ref 0–0.04)
IMM GRANULOCYTES NFR BLD AUTO: 0.5 % (ref 0–0.5)
KETONES UR QL STRIP: NEGATIVE
LEUKOCYTE ESTERASE UR QL STRIP: ABNORMAL
LYMPHOCYTES # BLD AUTO: 1.5 K/UL (ref 1–4.8)
LYMPHOCYTES NFR BLD: 12.5 % (ref 18–48)
MCH RBC QN AUTO: 25.7 PG (ref 27–31)
MCHC RBC AUTO-ENTMCNC: 30.8 G/DL (ref 32–36)
MCV RBC AUTO: 83 FL (ref 82–98)
MICROSCOPIC COMMENT: ABNORMAL
MONOCYTES # BLD AUTO: 0.8 K/UL (ref 0.3–1)
MONOCYTES NFR BLD: 7.1 % (ref 4–15)
NEUTROPHILS # BLD AUTO: 9 K/UL (ref 1.8–7.7)
NEUTROPHILS NFR BLD: 76.4 % (ref 38–73)
NITRITE UR QL STRIP: POSITIVE
NRBC BLD-RTO: 0 /100 WBC
PH UR STRIP: 7 [PH] (ref 5–8)
PLATELET # BLD AUTO: 409 K/UL (ref 150–450)
PMV BLD AUTO: 10.7 FL (ref 9.2–12.9)
POTASSIUM SERPL-SCNC: 4.1 MMOL/L (ref 3.5–5.1)
PROT SERPL-MCNC: 8 G/DL (ref 6–8.4)
PROT UR QL STRIP: ABNORMAL
RBC # BLD AUTO: 3.35 M/UL (ref 4.6–6.2)
RBC #/AREA URNS HPF: 3 /HPF (ref 0–4)
SARS-COV-2 RDRP RESP QL NAA+PROBE: NEGATIVE
SODIUM SERPL-SCNC: 135 MMOL/L (ref 136–145)
SP GR UR STRIP: 1.01 (ref 1–1.03)
SQUAMOUS #/AREA URNS HPF: 2 /HPF
TROPONIN I SERPL DL<=0.01 NG/ML-MCNC: 0.01 NG/ML (ref 0–0.03)
URN SPEC COLLECT METH UR: ABNORMAL
UROBILINOGEN UR STRIP-ACNC: 1 EU/DL
WBC # BLD AUTO: 11.73 K/UL (ref 3.9–12.7)
WBC #/AREA URNS HPF: 15 /HPF (ref 0–5)

## 2022-05-17 PROCEDURE — 36415 COLL VENOUS BLD VENIPUNCTURE: CPT | Performed by: EMERGENCY MEDICINE

## 2022-05-17 PROCEDURE — 93005 ELECTROCARDIOGRAM TRACING: CPT

## 2022-05-17 PROCEDURE — 99900026 HC AIRWAY MAINTENANCE (STAT)

## 2022-05-17 PROCEDURE — 93010 EKG 12-LEAD: ICD-10-PCS | Mod: ,,, | Performed by: SPECIALIST

## 2022-05-17 PROCEDURE — 83880 ASSAY OF NATRIURETIC PEPTIDE: CPT | Performed by: EMERGENCY MEDICINE

## 2022-05-17 PROCEDURE — 27000221 HC OXYGEN, UP TO 24 HOURS

## 2022-05-17 PROCEDURE — 93010 ELECTROCARDIOGRAM REPORT: CPT | Mod: ,,, | Performed by: SPECIALIST

## 2022-05-17 PROCEDURE — 87077 CULTURE AEROBIC IDENTIFY: CPT | Performed by: EMERGENCY MEDICINE

## 2022-05-17 PROCEDURE — 99900035 HC TECH TIME PER 15 MIN (STAT)

## 2022-05-17 PROCEDURE — 99285 EMERGENCY DEPT VISIT HI MDM: CPT | Mod: 25

## 2022-05-17 PROCEDURE — 80053 COMPREHEN METABOLIC PANEL: CPT | Performed by: EMERGENCY MEDICINE

## 2022-05-17 PROCEDURE — 87186 SC STD MICRODIL/AGAR DIL: CPT | Performed by: EMERGENCY MEDICINE

## 2022-05-17 PROCEDURE — 87086 URINE CULTURE/COLONY COUNT: CPT | Performed by: EMERGENCY MEDICINE

## 2022-05-17 PROCEDURE — 84484 ASSAY OF TROPONIN QUANT: CPT | Performed by: EMERGENCY MEDICINE

## 2022-05-17 PROCEDURE — 85025 COMPLETE CBC W/AUTO DIFF WBC: CPT | Performed by: EMERGENCY MEDICINE

## 2022-05-17 PROCEDURE — 87088 URINE BACTERIA CULTURE: CPT | Performed by: EMERGENCY MEDICINE

## 2022-05-17 PROCEDURE — 96374 THER/PROPH/DIAG INJ IV PUSH: CPT

## 2022-05-17 PROCEDURE — 81000 URINALYSIS NONAUTO W/SCOPE: CPT | Performed by: EMERGENCY MEDICINE

## 2022-05-17 PROCEDURE — 94760 N-INVAS EAR/PLS OXIMETRY 1: CPT

## 2022-05-17 PROCEDURE — U0002 COVID-19 LAB TEST NON-CDC: HCPCS | Performed by: EMERGENCY MEDICINE

## 2022-05-17 PROCEDURE — 63600175 PHARM REV CODE 636 W HCPCS: Performed by: EMERGENCY MEDICINE

## 2022-05-17 RX ORDER — TRAZODONE HYDROCHLORIDE 50 MG/1
50 TABLET ORAL NIGHTLY
COMMUNITY

## 2022-05-17 RX ORDER — IBUPROFEN/PSEUDOEPHEDRINE HCL 200MG-30MG
3 TABLET ORAL NIGHTLY
Status: ON HOLD | COMMUNITY
End: 2022-08-30 | Stop reason: HOSPADM

## 2022-05-17 RX ORDER — PHENAZOPYRIDINE HYDROCHLORIDE 200 MG/1
200 TABLET, FILM COATED ORAL 3 TIMES DAILY
Status: ON HOLD | COMMUNITY
End: 2022-06-14 | Stop reason: HOSPADM

## 2022-05-17 RX ORDER — CHLORHEXIDINE GLUCONATE ORAL RINSE 1.2 MG/ML
15 SOLUTION DENTAL 2 TIMES DAILY
Status: ON HOLD | COMMUNITY
Start: 2022-04-11 | End: 2022-08-30 | Stop reason: HOSPADM

## 2022-05-17 RX ORDER — LANSOPRAZOLE 30 MG/1
30 CAPSULE, DELAYED RELEASE ORAL DAILY
COMMUNITY

## 2022-05-17 RX ORDER — POLYETHYLENE GLYCOL 3350 17 G/17G
17 POWDER, FOR SOLUTION ORAL DAILY
Status: ON HOLD | COMMUNITY
End: 2022-05-28 | Stop reason: SDUPTHER

## 2022-05-17 RX ORDER — FUROSEMIDE 10 MG/ML
40 INJECTION INTRAMUSCULAR; INTRAVENOUS
Status: COMPLETED | OUTPATIENT
Start: 2022-05-17 | End: 2022-05-17

## 2022-05-17 RX ORDER — ONDANSETRON 4 MG/1
4 TABLET, FILM COATED ORAL EVERY 6 HOURS PRN
COMMUNITY

## 2022-05-17 RX ORDER — IPRATROPIUM BROMIDE AND ALBUTEROL SULFATE 2.5; .5 MG/3ML; MG/3ML
3 SOLUTION RESPIRATORY (INHALATION) EVERY 4 HOURS
COMMUNITY

## 2022-05-17 RX ORDER — FUROSEMIDE 40 MG/1
40 TABLET ORAL DAILY
Qty: 30 TABLET | Refills: 0 | Status: ON HOLD | OUTPATIENT
Start: 2022-05-17 | End: 2022-08-30 | Stop reason: HOSPADM

## 2022-05-17 RX ADMIN — FUROSEMIDE 40 MG: 10 INJECTION, SOLUTION INTRAMUSCULAR; INTRAVENOUS at 09:05

## 2022-05-18 NOTE — CARE UPDATE
05/17/22 1902   Patient Assessment/Suction   Level of Consciousness (AVPU) alert   Respiratory Effort Unlabored   Expansion/Accessory Muscles/Retractions no retractions   All Lung Fields Breath Sounds coarse   Cough Frequency with stimulation   Suction Method oral;tracheal   Suction Pressure (mmHg) 120 mmHg   $ Suction Charges Inline Suction Procedure Stat Charge   Secretions Amount large   Secretions Color white;grey   Secretions Characteristics thick   Sent to the lab? No   PRE-TX-O2   O2 Device (Oxygen Therapy) ventilator   $ Is the patient on Low Flow Oxygen? Yes   Oxygen Concentration (%) 28   SpO2 99 %   Pulse Oximetry Type Continuous   $ Pulse Oximetry - Single Charge Pulse Oximetry - Single   Probe Placed On (Pulse Ox) finger   Oximetry Probe Site Assessed   Pulse 108   Resp 20   Skin Integrity   $ Wound Care Tech Time 15 min   Area Observed Neck under tracheostomy;Neck   Skin Appearance   (soiled, dirty ties and trach flange.)   Barrier used?   (drain gauze)   Airway Safety   Ambu bag with the patient? Yes, Adult Ambu   Is mask with the patient? Yes, Adult Mask   Suction set is at the bedside? Yes   Extra trach at bedside? No   Extra trach sizes at bedside? 8   Vent Select   Charged w/in last 24h NO   Preset Conventional Ventilator Settings   Vent Type NKV-550   Ventilation Type VC   Vent Mode A/CMV-VC   Humidity HME   Set Rate 20 BPM   Vt Set 500 mL   PEEP/CPAP 6.3 cmH20   I-Trigger Type  Flow   Trigger Sensitivity Flow/I-Trigger 1.5 L/min   Patient Ventilator Parameters   Resp Rate Total 18 br/min   All Fields Breath Sounds Anterior:;Lateral:;coarse   Peak Airway Pressure 31 cmH2O   Mean Airway Pressure 10.6 cmH20   Plateau Pressure 19.6 cmH20   Exhaled Vt 495 mL   Total Ve 4.46 mL   I:E Ratio Measured 1:1.2   Total PEEP 4.6 cmH20   Tube Type Trach   Inspired Tidal Volume (VTI) 519 mL   Conventional Ventilator Alarms   Alarms On Y   Ve High Alarm 15 L/min   Ve Low Alarm 1.5 L/min   Resp Rate High Alarm  45 br/min   Resp Rate Low Alarm 21   Apnea Rate 15   Apnea Volume (mL) 300 mL   T Apnea 30 sec(s)   Ready to Wean/Extubation Screen   FIO2<=50 (chart decimal) 0.28   MV<16L (chart vol.) 4.46   PEEP <=8 (chart #) 6.3   Ready to Wean Parameters   F/VT Ratio<105 (RSBI) (!) 40.4   New set up. Settings given via ems.  Vent set per transfer info.

## 2022-05-18 NOTE — PHARMACY MED REC
"Admission Medication History     The home medication history was taken by Sheela Cross CPhT.    Medication history obtained from [place]     You may go to "Admission" then "Reconcile Home Medications" tabs to review and/or act upon these items.      The home medication list has been updated by the Pharmacy department.    Please read ALL comments highlighted in yellow.    Please address this information as you see fit.     Feel free to contact us if you have any questions or require assistance.      The medications listed below were removed from the home medication list.  Please reorder if appropriate:  Patient reports no longer taking the following medication(s):   Alprazolam 0.5mg   Eliquis 5mg   Carvedilol 6.25mg   Citalopram 10mg   Folic Acid   Losartan 100mg   Tizanidine 2mg       Sheela Cross CPhT.  [phone]          .          "

## 2022-05-18 NOTE — ED PROVIDER NOTES
"Encounter Date: 5/17/2022       History     Chief Complaint   Patient presents with    Leg Swelling     HPI   66 y.o.   BI triage note reads "leg swelling," but triage RN noted upper extremity swelling, pt denies sob   has ALS, nonverbal but alert and communicates with blinks  Unknown duration  unk change in medicines    Review of patient's allergies indicates:  No Known Allergies  Past Medical History:   Diagnosis Date    ALS (amyotrophic lateral sclerosis)     ALS (amyotrophic lateral sclerosis)     Erectile dysfunction     Gait disturbance     Hyperlipidemia     Hypertension     Iron deficiency anemia     Motor neuron disease     Tremor      Past Surgical History:   Procedure Laterality Date    PORTACATH PLACEMENT       No family history on file.     Review of Systems  All systems were reviewed/examined and were negative except as noted in the HPI.    Attempted but limited due to pt's ability to only answer yes/no with blinks    Physical Exam     Initial Vitals [05/17/22 1857]   BP Pulse Resp Temp SpO2   122/88 88 20 98 °F (36.7 °C) 99 %      MAP       --         Physical Exam    General: the patient is awake, alert, and in no apparent distress.  Head: normocephalic and atraumatic, sclera are clear  Neck: supple without meningismus  Chest: clear to auscultation bilaterally, no respiratory distress  Heart: regular rate and rhythm  ABD soft, nontender, nondistended, no peritoneal signs  Back nt in the midline  Extremities: warm and well perfused     No calf tenderness, no LE edema    +upper extremity edema symmetric, pitting  Skin: warm and dry  Psych conversant  Neuro: awake, alert, moving all extremities      ED Course   Procedures  Labs Reviewed   CBC W/ AUTO DIFFERENTIAL - Abnormal; Notable for the following components:       Result Value    RBC 3.35 (*)     Hemoglobin 8.6 (*)     Hematocrit 27.9 (*)     MCH 25.7 (*)     MCHC 30.8 (*)     RDW 18.1 (*)     Gran # (ANC) 9.0 (*)     Immature Grans (Abs) " 0.06 (*)     Gran % 76.4 (*)     Lymph % 12.5 (*)     All other components within normal limits   COMPREHENSIVE METABOLIC PANEL - Abnormal; Notable for the following components:    Sodium 135 (*)     CO2 21 (*)     Albumin 2.9 (*)     All other components within normal limits   URINALYSIS, REFLEX TO URINE CULTURE - Abnormal; Notable for the following components:    Protein, UA 1+ (*)     Occult Blood UA Trace (*)     Nitrite, UA Positive (*)     Leukocytes, UA 2+ (*)     All other components within normal limits    Narrative:     Specimen Source->Urine   URINALYSIS MICROSCOPIC - Abnormal; Notable for the following components:    WBC, UA 15 (*)     Bacteria Few (*)     All other components within normal limits    Narrative:     Specimen Source->Urine   CULTURE, URINE   TROPONIN I   B-TYPE NATRIURETIC PEPTIDE   SARS-COV-2 RNA AMPLIFICATION, QUAL          Imaging Results          X-Ray Chest AP Portable (Final result)  Result time 05/17/22 19:37:11    Final result by Conor Floyd DO (05/17/22 19:37:11)                 Impression:      Small right pleural effusion with probable right basilar atelectasis, similar appearance to prior.      Electronically signed by: Conor Floyd  Date:    05/17/2022  Time:    19:37             Narrative:    EXAMINATION:  XR CHEST AP PORTABLE    CLINICAL HISTORY:  EDEMA;    TECHNIQUE:  Single frontal view of the chest was performed.    COMPARISON:  02/21/2022.    FINDINGS:  There is a tracheostomy tube unchanged in position.  There is a left chest port.    The lungs are hypoexpanded.  There is a small right pleural effusion with adjacent probable atelectasis, similar to prior.    The cardiac silhouette is unremarkable.  There are atherosclerotic calcifications of the aortic arch.    The visualized osseous structures demonstrate degenerative changes.                                 Medications   furosemide injection 40 mg (40 mg Intravenous Given 5/17/22 2115)          Medical Decision  "Making:    This is an emergent evaluation of a patient presenting to the ED.  Nursing notes were reviewed.  I personally reviewed, read, and interpreted the ECG and any monitoring strips.  ECG: normal sinus rhythm, no critical findings with intervals, normal rate, and no ischemia.  Compared with prior if available.  Read and interpreted by me independently.      I reviewed radiology images personally along with interpretations.  Imaging reviewed by me, personally and independently, and prelims if available.  No acute/emergent findings noted on radiologic studies ordered.    I personally reviewed and interpreted the laboratory results.  I decided to obtain and review old medical records, which showed: h/o ALS    Evaluation for Emergency Medical Condition  The patient received a medical screening exam and within a reasonable degree of clinical confidence an emergency medical condition has not been identified.  The patient is instructed on proper follow up and return precautions to the ED.    ami Julian MD, BEBETO                   Clinical Impression:   Final diagnoses:  [N30.80] Edema bullosum vesicae  [R60.9] Edema, unspecified type (Primary)       "bullosum" is error; edema unspecified is correct     ED Disposition Condition    Discharge Stable        ED Prescriptions     Medication Sig Dispense Start Date End Date Auth. Provider    furosemide (LASIX) 40 MG tablet 1 tablet (40 mg total) by Per G Tube route once daily. 30 tablet 5/17/2022 6/16/2022 Mack Julian MD        Follow-up Information     Follow up With Specialties Details Why Contact Info    Mack Suero MD Family Medicine Schedule an appointment as soon as possible for a visit   [address] [address]  [phone]          Discharged to nursing home in stable condition, return to ED warnings given, follow up and patient care instructions given.      Caleb Julian MD, BEBETO, [facility]  Department of Emergency Medicine     "   Mack Julian MD  05/18/22 0407

## 2022-05-21 LAB — BACTERIA UR CULT: ABNORMAL

## 2022-05-22 ENCOUNTER — HOSPITAL ENCOUNTER (INPATIENT)
Facility: HOSPITAL | Age: 67
LOS: 6 days | Discharge: HOME | DRG: 207 | End: 2022-05-28
Attending: EMERGENCY MEDICINE | Admitting: HOSPITALIST
Payer: MEDICARE

## 2022-05-22 DIAGNOSIS — R14.0 ABDOMINAL DISTENTION: ICD-10-CM

## 2022-05-22 DIAGNOSIS — Z43.0 TRACHEOSTOMY CARE: ICD-10-CM

## 2022-05-22 DIAGNOSIS — D64.9 ANEMIA: ICD-10-CM

## 2022-05-22 DIAGNOSIS — K56.41 FECAL IMPACTION IN RECTUM: Primary | ICD-10-CM

## 2022-05-22 PROBLEM — Z99.11 DEPENDENT ON VENTILATOR: Status: ACTIVE | Noted: 2022-03-21

## 2022-05-22 LAB
ABO + RH BLD: NORMAL
ANION GAP SERPL CALC-SCNC: 8 MMOL/L (ref 8–16)
ANISOCYTOSIS BLD QL SMEAR: ABNORMAL
BASOPHILS # BLD AUTO: 0 K/UL (ref 0–0.2)
BASOPHILS # BLD AUTO: 0.02 K/UL (ref 0–0.2)
BASOPHILS # BLD AUTO: 0.08 K/UL (ref 0–0.2)
BASOPHILS NFR BLD: 0 % (ref 0–1.9)
BASOPHILS NFR BLD: 0.2 % (ref 0–1.9)
BASOPHILS NFR BLD: 0.5 % (ref 0–1.9)
BLD GP AB SCN CELLS X3 SERPL QL: NORMAL
BLD PROD TYP BPU: NORMAL
BLD PROD TYP BPU: NORMAL
BLOOD UNIT EXPIRATION DATE: NORMAL
BLOOD UNIT EXPIRATION DATE: NORMAL
BLOOD UNIT TYPE CODE: 9500
BLOOD UNIT TYPE CODE: 9500
BLOOD UNIT TYPE: NORMAL
BLOOD UNIT TYPE: NORMAL
BUN SERPL-MCNC: 25 MG/DL (ref 8–23)
CA-I BLDV-SCNC: 1.12 MMOL/L (ref 1.06–1.42)
CALCIUM SERPL-MCNC: 8.3 MG/DL (ref 8.7–10.5)
CHLORIDE SERPL-SCNC: 101 MMOL/L (ref 95–110)
CO2 SERPL-SCNC: 24 MMOL/L (ref 23–29)
CODING SYSTEM: NORMAL
CODING SYSTEM: NORMAL
CREAT SERPL-MCNC: 0.5 MG/DL (ref 0.5–1.4)
DIFFERENTIAL METHOD: ABNORMAL
DISPENSE STATUS: NORMAL
DISPENSE STATUS: NORMAL
EOSINOPHIL # BLD AUTO: 0 K/UL (ref 0–0.5)
EOSINOPHIL # BLD AUTO: 0 K/UL (ref 0–0.5)
EOSINOPHIL # BLD AUTO: 0.1 K/UL (ref 0–0.5)
EOSINOPHIL NFR BLD: 0.3 % (ref 0–8)
EOSINOPHIL NFR BLD: 0.5 % (ref 0–8)
EOSINOPHIL NFR BLD: 0.9 % (ref 0–8)
ERYTHROCYTE [DISTWIDTH] IN BLOOD BY AUTOMATED COUNT: 16.2 % (ref 11.5–14.5)
ERYTHROCYTE [DISTWIDTH] IN BLOOD BY AUTOMATED COUNT: 17.5 % (ref 11.5–14.5)
ERYTHROCYTE [DISTWIDTH] IN BLOOD BY AUTOMATED COUNT: 17.8 % (ref 11.5–14.5)
EST. GFR  (AFRICAN AMERICAN): >60 ML/MIN/1.73 M^2
EST. GFR  (NON AFRICAN AMERICAN): >60 ML/MIN/1.73 M^2
GLUCOSE SERPL-MCNC: 146 MG/DL (ref 70–110)
HCT VFR BLD AUTO: 10.9 % (ref 40–54)
HCT VFR BLD AUTO: 21.2 % (ref 40–54)
HCT VFR BLD AUTO: 29.6 % (ref 40–54)
HGB BLD-MCNC: 3.3 G/DL (ref 14–18)
HGB BLD-MCNC: 6.3 G/DL (ref 14–18)
HGB BLD-MCNC: 9.2 G/DL (ref 14–18)
IMM GRANULOCYTES # BLD AUTO: 0.03 K/UL (ref 0–0.04)
IMM GRANULOCYTES # BLD AUTO: 0.04 K/UL (ref 0–0.04)
IMM GRANULOCYTES # BLD AUTO: 0.13 K/UL (ref 0–0.04)
IMM GRANULOCYTES NFR BLD AUTO: 0.3 % (ref 0–0.5)
IMM GRANULOCYTES NFR BLD AUTO: 0.6 % (ref 0–0.5)
IMM GRANULOCYTES NFR BLD AUTO: 0.8 % (ref 0–0.5)
INR PPP: 1.9 (ref 0.8–1.2)
LACTATE SERPL-SCNC: 0.6 MMOL/L (ref 0.5–2.2)
LYMPHOCYTES # BLD AUTO: 0.5 K/UL (ref 1–4.8)
LYMPHOCYTES # BLD AUTO: 0.9 K/UL (ref 1–4.8)
LYMPHOCYTES # BLD AUTO: 1.4 K/UL (ref 1–4.8)
LYMPHOCYTES NFR BLD: 10.8 % (ref 18–48)
LYMPHOCYTES NFR BLD: 7.6 % (ref 18–48)
LYMPHOCYTES NFR BLD: 8.5 % (ref 18–48)
MCH RBC QN AUTO: 24.8 PG (ref 27–31)
MCH RBC QN AUTO: 25.6 PG (ref 27–31)
MCH RBC QN AUTO: 25.7 PG (ref 27–31)
MCHC RBC AUTO-ENTMCNC: 29.7 G/DL (ref 32–36)
MCHC RBC AUTO-ENTMCNC: 30.3 G/DL (ref 32–36)
MCHC RBC AUTO-ENTMCNC: 31.1 G/DL (ref 32–36)
MCV RBC AUTO: 82 FL (ref 82–98)
MCV RBC AUTO: 83 FL (ref 82–98)
MCV RBC AUTO: 86 FL (ref 82–98)
MONOCYTES # BLD AUTO: 0.4 K/UL (ref 0.3–1)
MONOCYTES # BLD AUTO: 0.5 K/UL (ref 0.3–1)
MONOCYTES # BLD AUTO: 1.3 K/UL (ref 0.3–1)
MONOCYTES NFR BLD: 5.6 % (ref 4–15)
MONOCYTES NFR BLD: 6.1 % (ref 4–15)
MONOCYTES NFR BLD: 8.3 % (ref 4–15)
NEUTROPHILS # BLD AUTO: 12.9 K/UL (ref 1.8–7.7)
NEUTROPHILS # BLD AUTO: 5.7 K/UL (ref 1.8–7.7)
NEUTROPHILS # BLD AUTO: 7.1 K/UL (ref 1.8–7.7)
NEUTROPHILS NFR BLD: 81 % (ref 38–73)
NEUTROPHILS NFR BLD: 82.1 % (ref 38–73)
NEUTROPHILS NFR BLD: 85.9 % (ref 38–73)
NRBC BLD-RTO: 0 /100 WBC
NUM UNITS TRANS PACKED RBC: NORMAL
NUM UNITS TRANS PACKED RBC: NORMAL
PLATELET # BLD AUTO: 185 K/UL (ref 150–450)
PLATELET # BLD AUTO: 273 K/UL (ref 150–450)
PLATELET # BLD AUTO: 386 K/UL (ref 150–450)
PLATELET BLD QL SMEAR: ABNORMAL
PLATELET BLD QL SMEAR: ABNORMAL
PMV BLD AUTO: 10.4 FL (ref 9.2–12.9)
PMV BLD AUTO: 11.2 FL (ref 9.2–12.9)
PMV BLD AUTO: 9.7 FL (ref 9.2–12.9)
POLYCHROMASIA BLD QL SMEAR: ABNORMAL
POTASSIUM SERPL-SCNC: 4.3 MMOL/L (ref 3.5–5.1)
PROTHROMBIN TIME: 19.1 SEC (ref 9–12.5)
RBC # BLD AUTO: 1.33 M/UL (ref 4.6–6.2)
RBC # BLD AUTO: 2.46 M/UL (ref 4.6–6.2)
RBC # BLD AUTO: 3.58 M/UL (ref 4.6–6.2)
SARS-COV-2 RDRP RESP QL NAA+PROBE: NEGATIVE
SODIUM SERPL-SCNC: 133 MMOL/L (ref 136–145)
WBC # BLD AUTO: 15.95 K/UL (ref 3.9–12.7)
WBC # BLD AUTO: 6.61 K/UL (ref 3.9–12.7)
WBC # BLD AUTO: 8.63 K/UL (ref 3.9–12.7)

## 2022-05-22 PROCEDURE — 86803 HEPATITIS C AB TEST: CPT | Performed by: EMERGENCY MEDICINE

## 2022-05-22 PROCEDURE — 36415 COLL VENOUS BLD VENIPUNCTURE: CPT | Performed by: EMERGENCY MEDICINE

## 2022-05-22 PROCEDURE — 82330 ASSAY OF CALCIUM: CPT | Performed by: NURSE PRACTITIONER

## 2022-05-22 PROCEDURE — 85025 COMPLETE CBC W/AUTO DIFF WBC: CPT | Mod: 91 | Performed by: NURSE PRACTITIONER

## 2022-05-22 PROCEDURE — 87186 SC STD MICRODIL/AGAR DIL: CPT | Performed by: EMERGENCY MEDICINE

## 2022-05-22 PROCEDURE — 36430 TRANSFUSION BLD/BLD COMPNT: CPT

## 2022-05-22 PROCEDURE — 87389 HIV-1 AG W/HIV-1&-2 AB AG IA: CPT | Performed by: EMERGENCY MEDICINE

## 2022-05-22 PROCEDURE — 94002 VENT MGMT INPAT INIT DAY: CPT

## 2022-05-22 PROCEDURE — 12000002 HC ACUTE/MED SURGE SEMI-PRIVATE ROOM

## 2022-05-22 PROCEDURE — 94761 N-INVAS EAR/PLS OXIMETRY MLT: CPT

## 2022-05-22 PROCEDURE — 86900 BLOOD TYPING SEROLOGIC ABO: CPT | Performed by: EMERGENCY MEDICINE

## 2022-05-22 PROCEDURE — 99900035 HC TECH TIME PER 15 MIN (STAT)

## 2022-05-22 PROCEDURE — 99292 CRITICAL CARE ADDL 30 MIN: CPT | Mod: 25

## 2022-05-22 PROCEDURE — 86920 COMPATIBILITY TEST SPIN: CPT | Performed by: EMERGENCY MEDICINE

## 2022-05-22 PROCEDURE — 87077 CULTURE AEROBIC IDENTIFY: CPT | Mod: 59 | Performed by: EMERGENCY MEDICINE

## 2022-05-22 PROCEDURE — 99291 CRITICAL CARE FIRST HOUR: CPT | Mod: 25

## 2022-05-22 PROCEDURE — 80048 BASIC METABOLIC PNL TOTAL CA: CPT | Performed by: EMERGENCY MEDICINE

## 2022-05-22 PROCEDURE — 27000221 HC OXYGEN, UP TO 24 HOURS

## 2022-05-22 PROCEDURE — P9016 RBC LEUKOCYTES REDUCED: HCPCS | Performed by: EMERGENCY MEDICINE

## 2022-05-22 PROCEDURE — 85025 COMPLETE CBC W/AUTO DIFF WBC: CPT | Mod: 91 | Performed by: EMERGENCY MEDICINE

## 2022-05-22 PROCEDURE — 87070 CULTURE OTHR SPECIMN AEROBIC: CPT | Performed by: EMERGENCY MEDICINE

## 2022-05-22 PROCEDURE — 83605 ASSAY OF LACTIC ACID: CPT | Performed by: NURSE PRACTITIONER

## 2022-05-22 PROCEDURE — 85610 PROTHROMBIN TIME: CPT | Performed by: EMERGENCY MEDICINE

## 2022-05-22 PROCEDURE — U0002 COVID-19 LAB TEST NON-CDC: HCPCS | Performed by: EMERGENCY MEDICINE

## 2022-05-22 PROCEDURE — 99900026 HC AIRWAY MAINTENANCE (STAT)

## 2022-05-22 PROCEDURE — 87205 SMEAR GRAM STAIN: CPT | Performed by: EMERGENCY MEDICINE

## 2022-05-22 RX ORDER — ALPRAZOLAM 0.5 MG/1
0.5 TABLET ORAL 3 TIMES DAILY PRN
Status: ON HOLD | COMMUNITY
End: 2022-06-14 | Stop reason: SDUPTHER

## 2022-05-22 RX ORDER — PANTOPRAZOLE SODIUM 40 MG/10ML
40 INJECTION, POWDER, LYOPHILIZED, FOR SOLUTION INTRAVENOUS DAILY
Status: DISCONTINUED | OUTPATIENT
Start: 2022-05-23 | End: 2022-05-28 | Stop reason: HOSPADM

## 2022-05-22 RX ORDER — HYDROCODONE BITARTRATE AND ACETAMINOPHEN 500; 5 MG/1; MG/1
TABLET ORAL
Status: DISCONTINUED | OUTPATIENT
Start: 2022-05-22 | End: 2022-05-28 | Stop reason: HOSPADM

## 2022-05-22 RX ORDER — POLYETHYLENE GLYCOL 3350 17 G/17G
17 POWDER, FOR SOLUTION ORAL DAILY
Status: DISCONTINUED | OUTPATIENT
Start: 2022-05-23 | End: 2022-05-26

## 2022-05-22 RX ORDER — GLYCOPYRROLATE 1 MG/1
1 TABLET ORAL 3 TIMES DAILY
Status: DISCONTINUED | OUTPATIENT
Start: 2022-05-23 | End: 2022-05-23

## 2022-05-22 RX ORDER — TALC
9 POWDER (GRAM) TOPICAL NIGHTLY
Status: DISCONTINUED | OUTPATIENT
Start: 2022-05-22 | End: 2022-05-28 | Stop reason: HOSPADM

## 2022-05-22 RX ORDER — ONDANSETRON 2 MG/ML
8 INJECTION INTRAMUSCULAR; INTRAVENOUS EVERY 6 HOURS PRN
Status: DISCONTINUED | OUTPATIENT
Start: 2022-05-22 | End: 2022-05-28 | Stop reason: HOSPADM

## 2022-05-22 RX ORDER — FUROSEMIDE 40 MG/1
40 TABLET ORAL DAILY
Status: DISCONTINUED | OUTPATIENT
Start: 2022-05-23 | End: 2022-05-23

## 2022-05-22 RX ORDER — ALPRAZOLAM 0.25 MG/1
0.5 TABLET ORAL 3 TIMES DAILY PRN
Status: DISCONTINUED | OUTPATIENT
Start: 2022-05-22 | End: 2022-05-28 | Stop reason: HOSPADM

## 2022-05-22 RX ORDER — METOPROLOL TARTRATE 50 MG/1
50 TABLET ORAL DAILY
Status: DISCONTINUED | OUTPATIENT
Start: 2022-05-23 | End: 2022-05-28 | Stop reason: HOSPADM

## 2022-05-22 RX ORDER — PANTOPRAZOLE SODIUM 40 MG/1
40 FOR SUSPENSION ORAL 2 TIMES DAILY
COMMUNITY

## 2022-05-22 RX ORDER — SODIUM CHLORIDE 0.9 % (FLUSH) 0.9 %
10 SYRINGE (ML) INJECTION
Status: DISCONTINUED | OUTPATIENT
Start: 2022-05-22 | End: 2022-05-28 | Stop reason: HOSPADM

## 2022-05-22 RX ORDER — METOPROLOL TARTRATE 50 MG/1
50 TABLET ORAL DAILY
Status: ON HOLD | COMMUNITY
Start: 2022-01-19 | End: 2022-09-12 | Stop reason: HOSPADM

## 2022-05-22 RX ORDER — CHLORHEXIDINE GLUCONATE ORAL RINSE 1.2 MG/ML
15 SOLUTION DENTAL 2 TIMES DAILY
Status: DISCONTINUED | OUTPATIENT
Start: 2022-05-22 | End: 2022-05-28 | Stop reason: HOSPADM

## 2022-05-22 RX ORDER — BACLOFEN 10 MG/1
10 TABLET ORAL 3 TIMES DAILY
Status: DISCONTINUED | OUTPATIENT
Start: 2022-05-23 | End: 2022-05-23

## 2022-05-22 RX ORDER — BUSPIRONE HYDROCHLORIDE 5 MG/1
5 TABLET ORAL 2 TIMES DAILY
Status: DISCONTINUED | OUTPATIENT
Start: 2022-05-22 | End: 2022-05-28 | Stop reason: HOSPADM

## 2022-05-22 NOTE — ED PROVIDER NOTES
Encounter Date: 5/22/2022       History     Chief Complaint   Patient presents with    Tracheostomy Tube Change     From [place] for trach tube change, trach obstruction; currently on blood thinners for DVT to right arm      66-year-old male history of ALS cardiac arrest presents from [facility] for up possible trach obstruction.  I spoke with EMS and also with [facility] who states that respiratory tech at [facility] has had trouble passing suction catheter.  They have changed his cannula several times.  He has had some bleeding from the trach but only after they manipulated the cannula and suctioned multiple times.  At this time there is no bleeding.  History from the patient is not obtainable.  [facility] reports their pulmonologist requested the patient be sent here for possible tracheostomy change.    The history is provided by the nursing home and the EMS personnel. The history is limited by the condition of the patient.     Review of patient's allergies indicates:  No Known Allergies  Past Medical History:   Diagnosis Date    ALS (amyotrophic lateral sclerosis)     ALS (amyotrophic lateral sclerosis)     Erectile dysfunction     Gait disturbance     Hyperlipidemia     Hypertension     Iron deficiency anemia     Motor neuron disease     Tremor      Past Surgical History:   Procedure Laterality Date    PORTACATH PLACEMENT       No family history on file.     Review of Systems   Reason unable to perform ROS: ALS.       Physical Exam     Initial Vitals [05/22/22 1537]   BP Pulse Resp Temp SpO2   -- -- -- -- 98 %      MAP       --         Physical Exam    Nursing note and vitals reviewed.  Constitutional: He appears well-developed and well-nourished. He is not diaphoretic.  Non-toxic appearance. He has a sickly appearance. He appears ill. No distress.   Chronically ill-appearing   HENT:   Head: Normocephalic and atraumatic.   Eyes: EOM are normal.   Neck: Neck supple. A tracheostomy is present - with a  tracheostomy tube.   Normal range of motion.  Cardiovascular: Normal rate, regular rhythm and normal heart sounds. Exam reveals no gallop and no friction rub.    No murmur heard.  Pulmonary/Chest: Breath sounds normal. No respiratory distress. He has no wheezes. He has no rhonchi. He has no rales.   Abdominal: He exhibits distension.   PEG   Musculoskeletal:         General: Normal range of motion.      Cervical back: Normal range of motion and neck supple. No rigidity. Normal range of motion.     Neurological: He is alert. He displays atrophy.   Quadriplegia  Muscle atrophy   Skin: Skin is warm and dry. No rash noted.   Anasarca   Psychiatric: He has a normal mood and affect. His behavior is normal. Judgment and thought content normal.         ED Course   Critical Care    Date/Time: 5/22/2022 5:34 PM  Performed by: Harman Olivas MD  Authorized by: Harman Olivas MD   Direct patient critical care time: 120 minutes  Additional history critical care time: 8 minutes  Ordering / reviewing critical care time: 10 minutes  Documentation critical care time: 10 minutes  Consulting other physicians critical care time: 6 minutes  Total critical care time (exclusive of procedural time) : 154 minutes  Critical care was necessary to treat or prevent imminent or life-threatening deterioration of the following conditions: hgb 3.3 initially.  Critical care was time spent personally by me on the following activities: discussions with consultants, evaluation of patient's response to treatment, examination of patient, ordering and performing treatments and interventions, obtaining history from patient or surrogate, ordering and review of laboratory studies, ordering and review of radiographic studies, pulse oximetry, review of old charts and re-evaluation of patient's condition.        Labs Reviewed   PROTIME-INR - Abnormal; Notable for the following components:       Result Value    Prothrombin Time 19.1 (*)     INR 1.9 (*)      All other components within normal limits    Narrative:     Release to patient->Immediate   CBC W/ AUTO DIFFERENTIAL - Abnormal; Notable for the following components:    RBC 1.33 (*)     Hemoglobin 3.3 (*)     Hematocrit 10.9 (*)     MCH 24.8 (*)     MCHC 30.3 (*)     RDW 17.5 (*)     Immature Granulocytes 0.6 (*)     Lymph # 0.5 (*)     Gran % 85.9 (*)     Lymph % 7.6 (*)     All other components within normal limits    Narrative:     Release to patient->Immediate  H/H critical result(s) called and verbal readback obtained from   Radha Vega by ROLDAN 05/22/2022 15:36   BASIC METABOLIC PANEL - Abnormal; Notable for the following components:    Sodium 133 (*)     Glucose 146 (*)     BUN 25 (*)     Calcium 8.3 (*)     All other components within normal limits   CBC W/ AUTO DIFFERENTIAL - Abnormal; Notable for the following components:    RBC 2.46 (*)     Hemoglobin 6.3 (*)     Hematocrit 21.2 (*)     MCH 25.6 (*)     MCHC 29.7 (*)     RDW 17.8 (*)     Lymph # 0.9 (*)     Gran % 82.1 (*)     Lymph % 10.8 (*)     All other components within normal limits    Narrative:     2 units of blood running at time of draw.  by ROLDAN 05/22/2022 17:19   CULTURE, RESPIRATORY   SARS-COV-2 RNA AMPLIFICATION, QUAL   HIV 1 / 2 ANTIBODY   HEPATITIS C ANTIBODY   TYPE & SCREEN   PREPARE RBC SOFT          Imaging Results          X-Ray Neck Soft Tissue (Final result)  Result time 05/22/22 15:35:44    Final result by Meliton Tompkins MD (05/22/22 15:35:44)                 Impression:      Tracheostomy tube in place.      Electronically signed by: Meliton Tompkins  Date:    05/22/2022  Time:    15:35             Narrative:    EXAMINATION:  XR NECK SOFT TISSUE    CLINICAL HISTORY:  Encounter for attention to tracheostomy    TECHNIQUE:  AP and lateral soft tissue views the neck were performed.    COMPARISON:  05/17/2022.    FINDINGS:  Limited AP and lateral soft tissue views of the neck demonstrate a tracheostomy tube in place.  This appears  appropriately positioned when compared to previous examination.    Left subclavian Port-A-Cath in place.                               X-Ray Chest AP Portable (Final result)  Result time 05/22/22 15:34:21    Final result by Meliton Tompkins MD (05/22/22 15:34:21)                 Impression:      1. Pulmonary hypoinflation with suspected atelectasis at the lung bases.  2. Satisfactory indwelling life-support devices.  As deemed clinically necessary, further evaluation may be obtained with dedicated PA and lateral views of the chest.      Electronically signed by: Meliton Tompkins  Date:    05/22/2022  Time:    15:34             Narrative:    EXAMINATION:  XR CHEST AP PORTABLE    CLINICAL HISTORY:  Encounter for attention to tracheostomy    TECHNIQUE:  Single frontal view of the chest was performed.    COMPARISON:  05/17/2022.    FINDINGS:  There is pulmonary hypoinflation with crowding of the bronchopulmonary vessels.  There is atelectasis at the lung bases.  No significant pleural effusion.    Heart size normal.  Calcified aortic plaque.  Trachea midline.    Bony thorax intact.  Tracheostomy tube in place.  Left subclavian Port-A-Cath.                                 Medications   0.9%  NaCl infusion (for blood administration) (has no administration in time range)                 ED Course as of 05/22/22 1735   Sun May 22, 2022   1537 Hemoglobin(!!): 3.3 [EF]   1537 INR(!): 1.9 [EF]   1542 X-Ray Chest AP Portable [EF]   1543 X-Ray Neck Soft Tissue [EF]   1626 Case discussed with Hospital Medicine for admission.  Hemoglobin will be rechecked. [EF]   1720 Hemoglobin(!): 6.3 [EF]      ED Course User Index  [EF] Harman Olivas MD             Clinical Impression:   Final diagnoses:  [Z43.0] Tracheostomy care  [Z43.0] Tracheostomy care          ED Disposition Condition    Admit                  66-year-old male with ALS, trach and vent dependent presents with tracheostomy problems from [facility].  I did speak at  length with staff there.  They report patient has had significant secretions from the trach and they have had difficulty suctioning this because the catheter will not pass.  In the ER we have had no difficulty suctioning the patient and does not seem to be any obstruction to the tracheostomy.  [facility] staff reports seeing some bleeding from the trach but only after significant manipulation and removing cannula and replacing cannula several times and multiple attempts at suctioning.  I do not suspect tracheoinnominate fistula.  Patient is on Coumadin.  Initial hemoglobin 3.3.  Patient was given emergency release of the neck.  We have accessed his left chest Port-A-Cath.  According to the documents he is a full code.  Repeat hemoglobin 6.3.  Possibly some delusional component to the initial hemoglobin but hemoglobin of 6 is still below his baseline.  [facility] staff does not report any bloody stools.  He has not had any rectal bleeding here.  He will be admitted to Hospital Medicine for further care.     Harman Olivas MD  05/22/22 0513     No

## 2022-05-22 NOTE — CARE UPDATE
05/22/22 1537   Patient Assessment/Suction   Level of Consciousness (AVPU) alert   Suction Method tracheal   $ Suction Charges Inline Suction Procedure Stat Charge   Secretions Amount small   Secretions Color tan;red-streaked   Secretions Characteristics thick   Sputum Collection sample obtained per suctioning   $ Swab or suction? Suction   Sent to the lab? Yes   Is this patient in SNF or Inpatient Rehab? Yes   PRE-TX-O2   O2 Device (Oxygen Therapy) ventilator   $ Is the patient on Low Flow Oxygen? Yes   Oxygen Concentration (%) 40   SpO2 98 %   Pulse Oximetry Type Continuous   $ Pulse Oximetry - Multiple Charge Pulse Oximetry - Multiple   Skin Integrity   $ Wound Care Tech Time 15 min   Area Observed Neck under tracheostomy   Skin Appearance other (see comments)  (unable to determine @ the time of admission to ER due to the condition of trach site)   Barrier used? Other (see comments)  (drain sponge)        Surgical Airway 02/21/22 1145 Cuffed   Placement Date/Time: 02/21/22 1145   Present Prior to Hospital Arrival?: Yes  Type: Tracheostomy  Airway Device Size: 8.0  Style: Cuffed   Status Secured   Site Assessment Crusty;Drainage;Oozing secretions   Site Care Cleansed;Dried;Dressing applied   Inner Cannula Care Changed/new   Ties Assessment Clean;Dry;Intact   Airway Safety   Ambu bag with the patient? Yes, Adult Ambu   Is mask with the patient? Yes, Adult Mask   Vent Select   Conventional Vent Y   Ventilator Initiated Yes   $ Ventilator Initial 1   Charged w/in last 24h YES   Preset Conventional Ventilator Settings   Vent Type    Ventilation Type VC   Vent Mode A/C   Humidity HME   Set Rate 16 BPM   Vt Set 500 mL   PEEP/CPAP 5 cmH20   Patient Ventilator Parameters   Resp Rate Total 21 br/min   Conventional Ventilator Alarms   Alarms On Y   Ready to Wean/Extubation Screen   FIO2<=50 (chart decimal) 0.4   PEEP <=8 (chart #) 5     Pt arrived to the ER with EMS. Pt placed on vent @ this time. Pts trach was  protruding from his neck. Trach care was done immediately upon arrival due to the condition of the trach site. The drain sponge was adhered to pts chest with large amount of old crusty secretions. Pt has copious amount of thick yellow secretions from his trach trailing down to his sternum where his gown was also adhered to his skin. His ears were encrusted  with old secretions. Under the flange was barely visible due to a large amount thick black substance that appeared moldy. RT had to bathe pt from face to his chest to remove old dry secretions. Oral care was performed @ this time as well

## 2022-05-23 PROBLEM — M62.838 MUSCLE SPASM: Status: ACTIVE | Noted: 2022-05-23

## 2022-05-23 PROBLEM — J96.12 CHRONIC RESPIRATORY FAILURE WITH HYPOXIA AND HYPERCAPNIA: Status: ACTIVE | Noted: 2022-05-23

## 2022-05-23 PROBLEM — Z93.0 TRACHEOSTOMY IN PLACE: Status: ACTIVE | Noted: 2022-05-23

## 2022-05-23 PROBLEM — J98.11 ATELECTASIS: Status: ACTIVE | Noted: 2022-05-23

## 2022-05-23 PROBLEM — J96.11 CHRONIC RESPIRATORY FAILURE WITH HYPOXIA AND HYPERCAPNIA: Status: ACTIVE | Noted: 2022-05-23

## 2022-05-23 PROBLEM — Z86.711 HISTORY OF PULMONARY EMBOLISM: Status: ACTIVE | Noted: 2022-05-23

## 2022-05-23 PROBLEM — T17.900A ASPIRATION SYNDROME: Status: ACTIVE | Noted: 2022-05-23

## 2022-05-23 LAB
ALBUMIN SERPL BCP-MCNC: 2.7 G/DL (ref 3.5–5.2)
ALLENS TEST: ABNORMAL
ALP SERPL-CCNC: 63 U/L (ref 55–135)
ALT SERPL W/O P-5'-P-CCNC: 20 U/L (ref 10–44)
ANION GAP SERPL CALC-SCNC: 12 MMOL/L (ref 8–16)
AST SERPL-CCNC: 18 U/L (ref 10–40)
BACTERIA #/AREA URNS HPF: ABNORMAL /HPF
BASOPHILS # BLD AUTO: 0.05 K/UL (ref 0–0.2)
BASOPHILS # BLD AUTO: 0.07 K/UL (ref 0–0.2)
BASOPHILS # BLD AUTO: 0.08 K/UL (ref 0–0.2)
BASOPHILS NFR BLD: 0.4 % (ref 0–1.9)
BASOPHILS NFR BLD: 0.5 % (ref 0–1.9)
BASOPHILS NFR BLD: 0.6 % (ref 0–1.9)
BILIRUB SERPL-MCNC: 1.1 MG/DL (ref 0.1–1)
BILIRUB UR QL STRIP: NEGATIVE
BUN SERPL-MCNC: 23 MG/DL (ref 8–23)
CALCIUM SERPL-MCNC: 9.2 MG/DL (ref 8.7–10.5)
CHLORIDE SERPL-SCNC: 104 MMOL/L (ref 95–110)
CLARITY UR: CLEAR
CO2 SERPL-SCNC: 22 MMOL/L (ref 23–29)
COLOR UR: YELLOW
CREAT SERPL-MCNC: 0.6 MG/DL (ref 0.5–1.4)
DELSYS: ABNORMAL
DIFFERENTIAL METHOD: ABNORMAL
EOSINOPHIL # BLD AUTO: 0.1 K/UL (ref 0–0.5)
EOSINOPHIL # BLD AUTO: 0.2 K/UL (ref 0–0.5)
EOSINOPHIL # BLD AUTO: 0.3 K/UL (ref 0–0.5)
EOSINOPHIL NFR BLD: 0.9 % (ref 0–8)
EOSINOPHIL NFR BLD: 1.2 % (ref 0–8)
EOSINOPHIL NFR BLD: 1.6 % (ref 0–8)
ERYTHROCYTE [DISTWIDTH] IN BLOOD BY AUTOMATED COUNT: 16.5 % (ref 11.5–14.5)
ERYTHROCYTE [DISTWIDTH] IN BLOOD BY AUTOMATED COUNT: 16.5 % (ref 11.5–14.5)
ERYTHROCYTE [DISTWIDTH] IN BLOOD BY AUTOMATED COUNT: 16.9 % (ref 11.5–14.5)
ERYTHROCYTE [SEDIMENTATION RATE] IN BLOOD BY WESTERGREN METHOD: 16 MM/H
EST. GFR  (AFRICAN AMERICAN): >60 ML/MIN/1.73 M^2
EST. GFR  (NON AFRICAN AMERICAN): >60 ML/MIN/1.73 M^2
ETCO2: 30
FIO2: 40
GLUCOSE SERPL-MCNC: 91 MG/DL (ref 70–110)
GLUCOSE UR QL STRIP: NEGATIVE
HCO3 UR-SCNC: 23.2 MMOL/L (ref 24–28)
HCT VFR BLD AUTO: 30.2 % (ref 40–54)
HCT VFR BLD AUTO: 30.4 % (ref 40–54)
HCT VFR BLD AUTO: 30.9 % (ref 40–54)
HCV AB SERPL QL IA: NEGATIVE
HGB BLD-MCNC: 9.3 G/DL (ref 14–18)
HGB BLD-MCNC: 9.8 G/DL (ref 14–18)
HGB BLD-MCNC: 9.8 G/DL (ref 14–18)
HGB UR QL STRIP: ABNORMAL
HIV 1+2 AB+HIV1 P24 AG SERPL QL IA: NEGATIVE
IMM GRANULOCYTES # BLD AUTO: 0.1 K/UL (ref 0–0.04)
IMM GRANULOCYTES # BLD AUTO: 0.11 K/UL (ref 0–0.04)
IMM GRANULOCYTES # BLD AUTO: 0.13 K/UL (ref 0–0.04)
IMM GRANULOCYTES NFR BLD AUTO: 0.7 % (ref 0–0.5)
IMM GRANULOCYTES NFR BLD AUTO: 0.8 % (ref 0–0.5)
IMM GRANULOCYTES NFR BLD AUTO: 0.9 % (ref 0–0.5)
KETONES UR QL STRIP: NEGATIVE
LEUKOCYTE ESTERASE UR QL STRIP: NEGATIVE
LYMPHOCYTES # BLD AUTO: 1.1 K/UL (ref 1–4.8)
LYMPHOCYTES # BLD AUTO: 1.3 K/UL (ref 1–4.8)
LYMPHOCYTES # BLD AUTO: 1.4 K/UL (ref 1–4.8)
LYMPHOCYTES NFR BLD: 10.9 % (ref 18–48)
LYMPHOCYTES NFR BLD: 7.5 % (ref 18–48)
LYMPHOCYTES NFR BLD: 9.3 % (ref 18–48)
MCH RBC QN AUTO: 25.6 PG (ref 27–31)
MCH RBC QN AUTO: 25.8 PG (ref 27–31)
MCH RBC QN AUTO: 26.2 PG (ref 27–31)
MCHC RBC AUTO-ENTMCNC: 30.8 G/DL (ref 32–36)
MCHC RBC AUTO-ENTMCNC: 31.7 G/DL (ref 32–36)
MCHC RBC AUTO-ENTMCNC: 32.2 G/DL (ref 32–36)
MCV RBC AUTO: 81 FL (ref 82–98)
MCV RBC AUTO: 81 FL (ref 82–98)
MCV RBC AUTO: 83 FL (ref 82–98)
MICROSCOPIC COMMENT: ABNORMAL
MIN VOL: 10.5
MODE: ABNORMAL
MONOCYTES # BLD AUTO: 1 K/UL (ref 0.3–1)
MONOCYTES # BLD AUTO: 1.1 K/UL (ref 0.3–1)
MONOCYTES # BLD AUTO: 1.2 K/UL (ref 0.3–1)
MONOCYTES NFR BLD: 7.5 % (ref 4–15)
MONOCYTES NFR BLD: 7.6 % (ref 4–15)
MONOCYTES NFR BLD: 7.9 % (ref 4–15)
NEUTROPHILS # BLD AUTO: 10.4 K/UL (ref 1.8–7.7)
NEUTROPHILS # BLD AUTO: 11.1 K/UL (ref 1.8–7.7)
NEUTROPHILS # BLD AUTO: 12.6 K/UL (ref 1.8–7.7)
NEUTROPHILS NFR BLD: 79.1 % (ref 38–73)
NEUTROPHILS NFR BLD: 80.4 % (ref 38–73)
NEUTROPHILS NFR BLD: 82.2 % (ref 38–73)
NITRITE UR QL STRIP: POSITIVE
NRBC BLD-RTO: 0 /100 WBC
PCO2 BLDA: 32.3 MMHG (ref 35–45)
PEEP: 5
PH SMN: 7.46 [PH] (ref 7.35–7.45)
PH UR STRIP: 7 [PH] (ref 5–8)
PIP: 23
PLATELET # BLD AUTO: 354 K/UL (ref 150–450)
PLATELET # BLD AUTO: 382 K/UL (ref 150–450)
PLATELET # BLD AUTO: 391 K/UL (ref 150–450)
PLATELET BLD QL SMEAR: ABNORMAL
PMV BLD AUTO: 10 FL (ref 9.2–12.9)
PMV BLD AUTO: 10.1 FL (ref 9.2–12.9)
PMV BLD AUTO: 10.2 FL (ref 9.2–12.9)
PO2 BLDA: 124 MMHG (ref 80–100)
POC BE: -1 MMOL/L
POC SATURATED O2: 99 % (ref 95–100)
POC TCO2: 24 MMOL/L (ref 23–27)
POCT GLUCOSE: 84 MG/DL (ref 70–110)
POTASSIUM SERPL-SCNC: 3.1 MMOL/L (ref 3.5–5.1)
PROCALCITONIN SERPL IA-MCNC: 0.96 NG/ML
PROT SERPL-MCNC: 7.4 G/DL (ref 6–8.4)
PROT UR QL STRIP: NEGATIVE
RBC # BLD AUTO: 3.63 M/UL (ref 4.6–6.2)
RBC # BLD AUTO: 3.74 M/UL (ref 4.6–6.2)
RBC # BLD AUTO: 3.8 M/UL (ref 4.6–6.2)
RBC #/AREA URNS HPF: 4 /HPF (ref 0–4)
SAMPLE: ABNORMAL
SITE: ABNORMAL
SODIUM SERPL-SCNC: 138 MMOL/L (ref 136–145)
SP GR UR STRIP: 1.01 (ref 1–1.03)
SP02: 100
URN SPEC COLLECT METH UR: ABNORMAL
UROBILINOGEN UR STRIP-ACNC: NEGATIVE EU/DL
VT: 500
WBC # BLD AUTO: 13.1 K/UL (ref 3.9–12.7)
WBC # BLD AUTO: 13.82 K/UL (ref 3.9–12.7)
WBC # BLD AUTO: 15.28 K/UL (ref 3.9–12.7)
WBC #/AREA URNS HPF: 1 /HPF (ref 0–5)

## 2022-05-23 PROCEDURE — 84145 PROCALCITONIN (PCT): CPT | Performed by: INTERNAL MEDICINE

## 2022-05-23 PROCEDURE — 81000 URINALYSIS NONAUTO W/SCOPE: CPT | Performed by: HOSPITALIST

## 2022-05-23 PROCEDURE — 25000003 PHARM REV CODE 250: Performed by: NURSE PRACTITIONER

## 2022-05-23 PROCEDURE — 80053 COMPREHEN METABOLIC PANEL: CPT | Performed by: NURSE PRACTITIONER

## 2022-05-23 PROCEDURE — 82803 BLOOD GASES ANY COMBINATION: CPT

## 2022-05-23 PROCEDURE — 25000003 PHARM REV CODE 250: Performed by: INTERNAL MEDICINE

## 2022-05-23 PROCEDURE — 27000221 HC OXYGEN, UP TO 24 HOURS

## 2022-05-23 PROCEDURE — 63600175 PHARM REV CODE 636 W HCPCS: Performed by: INTERNAL MEDICINE

## 2022-05-23 PROCEDURE — 63600175 PHARM REV CODE 636 W HCPCS: Performed by: NURSE PRACTITIONER

## 2022-05-23 PROCEDURE — 27201112

## 2022-05-23 PROCEDURE — 99223 1ST HOSP IP/OBS HIGH 75: CPT | Mod: ,,, | Performed by: INTERNAL MEDICINE

## 2022-05-23 PROCEDURE — C9113 INJ PANTOPRAZOLE SODIUM, VIA: HCPCS | Performed by: NURSE PRACTITIONER

## 2022-05-23 PROCEDURE — 31502 CHANGE OF WINDPIPE AIRWAY: CPT

## 2022-05-23 PROCEDURE — 20000000 HC ICU ROOM

## 2022-05-23 PROCEDURE — 99900035 HC TECH TIME PER 15 MIN (STAT)

## 2022-05-23 PROCEDURE — 27000207 HC ISOLATION

## 2022-05-23 PROCEDURE — 36415 COLL VENOUS BLD VENIPUNCTURE: CPT | Performed by: NURSE PRACTITIONER

## 2022-05-23 PROCEDURE — 99900026 HC AIRWAY MAINTENANCE (STAT)

## 2022-05-23 PROCEDURE — 85025 COMPLETE CBC W/AUTO DIFF WBC: CPT | Mod: 91 | Performed by: NURSE PRACTITIONER

## 2022-05-23 PROCEDURE — 36600 WITHDRAWAL OF ARTERIAL BLOOD: CPT

## 2022-05-23 PROCEDURE — 63600175 PHARM REV CODE 636 W HCPCS: Performed by: HOSPITALIST

## 2022-05-23 PROCEDURE — 99223 PR INITIAL HOSPITAL CARE,LEVL III: ICD-10-PCS | Mod: ,,, | Performed by: INTERNAL MEDICINE

## 2022-05-23 PROCEDURE — 87205 SMEAR GRAM STAIN: CPT | Performed by: NURSE PRACTITIONER

## 2022-05-23 PROCEDURE — 94003 VENT MGMT INPAT SUBQ DAY: CPT

## 2022-05-23 PROCEDURE — 63700000 PHARM REV CODE 250 ALT 637 W/O HCPCS: Performed by: NURSE PRACTITIONER

## 2022-05-23 PROCEDURE — 87077 CULTURE AEROBIC IDENTIFY: CPT | Mod: 59 | Performed by: NURSE PRACTITIONER

## 2022-05-23 PROCEDURE — 94761 N-INVAS EAR/PLS OXIMETRY MLT: CPT

## 2022-05-23 PROCEDURE — 87070 CULTURE OTHR SPECIMN AEROBIC: CPT | Performed by: NURSE PRACTITIONER

## 2022-05-23 PROCEDURE — 25000003 PHARM REV CODE 250: Performed by: HOSPITALIST

## 2022-05-23 PROCEDURE — 87186 SC STD MICRODIL/AGAR DIL: CPT | Performed by: NURSE PRACTITIONER

## 2022-05-23 RX ORDER — ENOXAPARIN SODIUM 100 MG/ML
1 INJECTION SUBCUTANEOUS
Status: DISCONTINUED | OUTPATIENT
Start: 2022-05-23 | End: 2022-05-28 | Stop reason: HOSPADM

## 2022-05-23 RX ORDER — GLYCOPYRROLATE 1 MG/1
2 TABLET ORAL 3 TIMES DAILY
Status: DISCONTINUED | OUTPATIENT
Start: 2022-05-23 | End: 2022-05-28 | Stop reason: HOSPADM

## 2022-05-23 RX ORDER — POTASSIUM CHLORIDE 7.45 MG/ML
80 INJECTION INTRAVENOUS
Status: DISCONTINUED | OUTPATIENT
Start: 2022-05-23 | End: 2022-05-28 | Stop reason: HOSPADM

## 2022-05-23 RX ORDER — RILUZOLE 50 MG/1
50 TABLET, FILM COATED ORAL EVERY 12 HOURS
Status: DISCONTINUED | OUTPATIENT
Start: 2022-05-23 | End: 2022-05-28 | Stop reason: HOSPADM

## 2022-05-23 RX ORDER — FUROSEMIDE 10 MG/ML
40 INJECTION INTRAMUSCULAR; INTRAVENOUS 2 TIMES DAILY
Status: DISCONTINUED | OUTPATIENT
Start: 2022-05-23 | End: 2022-05-28 | Stop reason: HOSPADM

## 2022-05-23 RX ORDER — CALCIUM GLUCONATE 20 MG/ML
3 INJECTION, SOLUTION INTRAVENOUS
Status: DISCONTINUED | OUTPATIENT
Start: 2022-05-23 | End: 2022-05-28 | Stop reason: HOSPADM

## 2022-05-23 RX ORDER — BACLOFEN 10 MG/1
10 TABLET ORAL EVERY 8 HOURS
Status: DISCONTINUED | OUTPATIENT
Start: 2022-05-23 | End: 2022-05-23

## 2022-05-23 RX ORDER — BACLOFEN 10 MG/1
20 TABLET ORAL EVERY 8 HOURS
Status: DISCONTINUED | OUTPATIENT
Start: 2022-05-23 | End: 2022-05-28 | Stop reason: HOSPADM

## 2022-05-23 RX ORDER — POTASSIUM CHLORIDE 7.45 MG/ML
60 INJECTION INTRAVENOUS
Status: DISCONTINUED | OUTPATIENT
Start: 2022-05-23 | End: 2022-05-28 | Stop reason: HOSPADM

## 2022-05-23 RX ORDER — CALCIUM GLUCONATE 20 MG/ML
1 INJECTION, SOLUTION INTRAVENOUS
Status: DISCONTINUED | OUTPATIENT
Start: 2022-05-23 | End: 2022-05-28 | Stop reason: HOSPADM

## 2022-05-23 RX ORDER — MAGNESIUM SULFATE HEPTAHYDRATE 40 MG/ML
2 INJECTION, SOLUTION INTRAVENOUS
Status: DISCONTINUED | OUTPATIENT
Start: 2022-05-23 | End: 2022-05-28 | Stop reason: HOSPADM

## 2022-05-23 RX ORDER — CALCIUM GLUCONATE 20 MG/ML
2 INJECTION, SOLUTION INTRAVENOUS
Status: DISCONTINUED | OUTPATIENT
Start: 2022-05-23 | End: 2022-05-28 | Stop reason: HOSPADM

## 2022-05-23 RX ORDER — MUPIROCIN 20 MG/G
OINTMENT TOPICAL 2 TIMES DAILY
Status: COMPLETED | OUTPATIENT
Start: 2022-05-23 | End: 2022-05-27

## 2022-05-23 RX ORDER — POTASSIUM CHLORIDE 7.45 MG/ML
40 INJECTION INTRAVENOUS
Status: DISCONTINUED | OUTPATIENT
Start: 2022-05-23 | End: 2022-05-28 | Stop reason: HOSPADM

## 2022-05-23 RX ORDER — GLYCERIN 1 G/1
1 SUPPOSITORY RECTAL DAILY PRN
Status: DISCONTINUED | OUTPATIENT
Start: 2022-05-23 | End: 2022-05-26

## 2022-05-23 RX ORDER — MAGNESIUM SULFATE HEPTAHYDRATE 40 MG/ML
4 INJECTION, SOLUTION INTRAVENOUS
Status: DISCONTINUED | OUTPATIENT
Start: 2022-05-23 | End: 2022-05-28 | Stop reason: HOSPADM

## 2022-05-23 RX ORDER — SCOLOPAMINE TRANSDERMAL SYSTEM 1 MG/1
1 PATCH, EXTENDED RELEASE TRANSDERMAL
Status: DISCONTINUED | OUTPATIENT
Start: 2022-05-23 | End: 2022-05-28 | Stop reason: HOSPADM

## 2022-05-23 RX ADMIN — ALPRAZOLAM 0.5 MG: 0.25 TABLET ORAL at 05:05

## 2022-05-23 RX ADMIN — RILUZOLE 50 MG: 50 TABLET ORAL at 01:05

## 2022-05-23 RX ADMIN — BUSPIRONE HYDROCHLORIDE 5 MG: 5 TABLET ORAL at 03:05

## 2022-05-23 RX ADMIN — BUSPIRONE HYDROCHLORIDE 5 MG: 5 TABLET ORAL at 08:05

## 2022-05-23 RX ADMIN — GLYCOPYRROLATE 2 MG: 1 TABLET ORAL at 08:05

## 2022-05-23 RX ADMIN — CHLORHEXIDINE GLUCONATE 0.12% ORAL RINSE 15 ML: 1.2 LIQUID ORAL at 08:05

## 2022-05-23 RX ADMIN — POLYETHYLENE GLYCOL (3350) 17 G: 17 POWDER, FOR SOLUTION ORAL at 08:05

## 2022-05-23 RX ADMIN — POTASSIUM CHLORIDE 60 MEQ: 7.46 INJECTION, SOLUTION INTRAVENOUS at 02:05

## 2022-05-23 RX ADMIN — Medication 9 MG: at 08:05

## 2022-05-23 RX ADMIN — BACLOFEN 20 MG: 10 TABLET ORAL at 01:05

## 2022-05-23 RX ADMIN — Medication: at 06:05

## 2022-05-23 RX ADMIN — RILUZOLE 50 MG: 50 TABLET ORAL at 08:05

## 2022-05-23 RX ADMIN — GLYCOPYRROLATE 2 MG: 1 TABLET ORAL at 02:05

## 2022-05-23 RX ADMIN — MUPIROCIN: 20 OINTMENT TOPICAL at 08:05

## 2022-05-23 RX ADMIN — CHLORHEXIDINE GLUCONATE 0.12% ORAL RINSE 15 ML: 1.2 LIQUID ORAL at 03:05

## 2022-05-23 RX ADMIN — BACLOFEN 10 MG: 10 TABLET ORAL at 08:05

## 2022-05-23 RX ADMIN — SCOPALAMINE 1 PATCH: 1 PATCH, EXTENDED RELEASE TRANSDERMAL at 07:05

## 2022-05-23 RX ADMIN — PANTOPRAZOLE SODIUM 40 MG: 40 INJECTION, POWDER, LYOPHILIZED, FOR SOLUTION INTRAVENOUS at 08:05

## 2022-05-23 RX ADMIN — ENOXAPARIN SODIUM 80 MG: 80 INJECTION SUBCUTANEOUS at 08:05

## 2022-05-23 RX ADMIN — METOPROLOL TARTRATE 50 MG: 50 TABLET, FILM COATED ORAL at 08:05

## 2022-05-23 RX ADMIN — FUROSEMIDE 40 MG: 10 INJECTION, SOLUTION INTRAVENOUS at 03:05

## 2022-05-23 RX ADMIN — FUROSEMIDE 40 MG: 10 INJECTION, SOLUTION INTRAVENOUS at 08:05

## 2022-05-23 RX ADMIN — GLYCERIN 1 SUPPOSITORY: 2 SUPPOSITORY RECTAL at 01:05

## 2022-05-23 RX ADMIN — GLYCOPYRROLATE 1 MG: 1 TABLET ORAL at 08:05

## 2022-05-23 RX ADMIN — Medication: at 09:05

## 2022-05-23 RX ADMIN — BACLOFEN 20 MG: 10 TABLET ORAL at 09:05

## 2022-05-23 RX ADMIN — MUPIROCIN: 20 OINTMENT TOPICAL at 12:05

## 2022-05-23 NOTE — PLAN OF CARE
CICU DAILY GOALS       A: Awake    RASS: Goal -  0  Actual -  0   Restraint necessity:    B: Breath   SBT: n/a  C: Coordinate A & B, analgesics/sedatives   Pain: no analgesics ordered  D: Delirium   CAM-ICU:    E: Early(intubated/ Progressive (non-intubated) Mobility   MOVE Screen:    Activity: Patient unable to perform activities  FAS: Feeding/Nutrition   NPO    T: Thrombus   DVT prophylaxis: not ordered  H: HOB Elevation    Head of Bed (HOB) Positioning: HOB elevated  U: Ulcer Prophylaxis   GI: Yes; Protonix 40mg IV  G: Glucose control     Managed  S: Skin   CHG Bath performed; electrodes changed, foot care, incontinence care, Mepilex to sacral and heels    B: Bowel Function   incontinence  I: Indwelling Catheters   [place] necessity:  Yes   CVC necessity: Yes   IPAD offered: No  D: De-escalation Antibx   Not ordered  Plan for the day   Wound Care Consult  Family/Goals of care/Code Status   Code Status: Full Code     No acute events throughout day, VS and assessment per flow sheet, patient progressing towards goals as tolerated, plan of care reviewed with Wei Dominique and family, all concerns addressed, will continue to monitor.

## 2022-05-23 NOTE — CARE UPDATE
05/23/22 0728   Patient Assessment/Suction   Level of Consciousness (AVPU) alert   Respiratory Effort Unlabored   Expansion/Accessory Muscles/Retractions no use of accessory muscles   Rhythm/Pattern, Respiratory assisted mechanically   Suction Method oral;tracheal   $ Suction Charges Inline Suction Procedure Stat Charge   Secretions Amount moderate   Secretions Color pale;yellow   Secretions Characteristics thick   PRE-TX-O2   O2 Device (Oxygen Therapy) ventilator   Pulse Oximetry Type Continuous   $ Pulse Oximetry - Multiple Charge Pulse Oximetry - Multiple   ETCO2   $ ETCO2 Usage Currently wearing   ETCO2 Device Type Bedside Monitor        Surgical Airway 02/21/22 1145 Cuffed   Placement Date/Time: 02/21/22 1145   Present Prior to Hospital Arrival?: Yes  Type: Tracheostomy  Airway Device Size: 8.0  Style: Cuffed   Cuff Pressure 28 cm H2O   Cuff Inflation? Inflated   Speaking Valve Usage Not wearing   Status Secured   Ties Assessment Clean;Dry;Secure   Airway Safety   Ambu bag with the patient? Yes, Adult Ambu   Is mask with the patient? Yes, Adult Mask   Vent Select   Conventional Vent Y   Ventilator Initiated Yes   $ Ventilator Subsequent 1   Charged w/in last 24h YES   Preset Conventional Ventilator Settings   Vent Type    Humidity HME   Conventional Ventilator Alarms   Alarms On Y   IHI Ventilator Associated Pneumonia Bundle (Required)   Oral Care Mouth suctioned

## 2022-05-23 NOTE — CONSULTS
"  [facility]  Adult Nutrition  Consult Note    SUMMARY   Intervention: enteral nutrition therapy     Recommendations  Recommendation/Intervention: 1.) Consider Isosource 1.5 @ 30 mls/hr advancing 10 mls q4 hrs or as tolerated to goal rate 50 mls/hr continuous providing 1800 kcals/day, 82 g protein/day,  211 g CHO/day, and 917 mls water/day. FWF: 150 mls q4 hrs or per MD.     Goals: 1.) enteral nutrition will advance within 48 hrs  Nutrition Goal Status: new  Communication of RD Recs: other (comment) (second sign)     1. Tracheostomy care    2. Anemia      Past Medical History:   Diagnosis Date    ALS (amyotrophic lateral sclerosis)     ALS (amyotrophic lateral sclerosis)     Erectile dysfunction     Gait disturbance     Hyperlipidemia     Hypertension     Iron deficiency anemia     Motor neuron disease     Tremor        Assessment and Plan  Nutrition Problem  Inadequate energy intake    Related to (etiology):   Chronic illness    Signs and Symptoms (as evidenced by):   NPO, s/p trach/peg    Interventions/Recommendations (treatment strategy):  initiate nutrition support     Nutrition Diagnosis Status:   New           Malnutrition Assessment  Malnutrition Type: chronic illness  Cesar score = 9  Edematous      Reason for Assessment  Reason For Assessment: consult  General Information Comments: admits from [place] with anemia, bed bound s/p trach/peg, nonverbal. RD consulted for TF reccs.    Nutrition Risk Screen  Nutrition Risk Screen: dysphagia or difficulty swallowing    Nutrition/Diet History    Food Allergies: NKFA  Factors Affecting Nutritional Intake: on mechanical ventilation, NPO    Anthropometrics    Temp: 98.6 °F (37 °C)  Height Method: Estimated  Height: 5' 8"  Height (inches): 68 in  Weight Method: Bed Scale  Weight: 76.4 kg (168 lb 6.9 oz)  Weight (lb): 168.43 lb  Ideal Body Weight (IBW), Male: 154 lb  % Ideal Body Weight, Male (lb): 109.37 %  BMI (Calculated): 25.6  BMI " Grade: 25 - 29.9 - overweight  Usual Body Weight (UBW), k.27 kg (2022)  % Usual Body Weight: 99.08  % Weight Change From Usual Weight: -1.13 %       Lab/Procedures/Meds  Pertinent Labs Reviewed: reviewed  BMP  Lab Results   Component Value Date     2022    K 3.1 (L) 2022     2022    CO2 22 (L) 2022    BUN 23 2022    CREATININE 0.6 2022    CALCIUM 9.2 2022    ANIONGAP 12 2022    ESTGFRAFRICA >60 2022    EGFRNONAA >60 2022     Lab Results   Component Value Date    ALBUMIN 2.7 (L) 2022     Lab Results   Component Value Date    CALCIUM 9.2 2022    PHOS 3.4 2022     No results for input(s): POCTGLUCOSE in the last 24 hours.    Pertinent Medications Reviewed: reviewed  Scheduled Meds:   baclofen  20 mg Per G Tube Q8H    busPIRone  5 mg Per G Tube BID    chlorhexidine  15 mL Mouth/Throat BID    furosemide (LASIX) injection  40 mg Intravenous BID    glycopyrrolate  2 mg Per G Tube TID    melatonin  9 mg Per G Tube Nightly    metoprolol tartrate  50 mg Oral Daily    mupirocin   Nasal BID    pantoprazole  40 mg Intravenous Daily    polyethylene glycol  17 g Per G Tube Daily    riluzole  50 mg Oral Q12H    scopolamine  1 patch Transdermal Q3 Days         Estimated/Assessed Needs  Weight Used For Calorie Calculations: 76.4 kg (168 lb 6.9 oz)  Energy Calorie Requirements (kcal): 1712  Energy Need Method: [facility]  Protein Requirements: 91g  Weight Used For Protein Calculations: 76.4 kg (168 lb 6.9 oz)  Estimated Fluid Requirement Method: RDA Method  RDA Method (mL): 1712      Nutrition Prescription Ordered  Current Diet Order: NPO    Evaluation of Received Nutrient/Fluid Intake  Tolerance:  (NPO)  % Intake of Estimated Energy Needs: 0 - 25 %  % Meal Intake: NPO    Nutrition Risk  Level of Risk/Frequency of Follow-up:  (x2 weekly)       Monitor and Evaluation  Food and Nutrient Intake: energy intake, enteral nutrition  intake  Food and Nutrient Adminstration: diet order, enteral and parenteral nutrition administration  Anthropometric Measurements: weight, weight change, body mass index  Biochemical Data, Medical Tests and Procedures: electrolyte and renal panel, glucose/endocrine profile, lipid profile  Nutrition-Focused Physical Findings: overall appearance       Nutrition Follow-Up  RD Follow-up?: Yes

## 2022-05-23 NOTE — HPI
Wei Dominique is a 66 year old male with past medical history of ALS, s/p trach & peg, vent dependent, HTN, PE (on eliquis) presented to hospital from [facility] for an evaluation of his tracheostomy. HPI limited due to patient's non verbal status, and information obtained from ED and nursing home notes. Per ED notes, the respiratory tech at [facility] was having difficulty passing a suction catheter through the trach, leading to concerns for a blockage. The cannula was replaced several times, and he had minimal bleeding from the site. He did not have a large amount of bleeding at the time, and there was no bleeding upon arrival to the ED. He was sent for possible tracheostomy change.    Upon arrival to the ED, the patient was suctioned with very little difficulty and no evidence of obstruction was noted. Baseline labs were obtained, where the patient was found to be severely anemic, with a hemoglobin of 3.3 and a hematocrit of 10.9. His baseline H/H on review of the medical record appears to be around 8. He was given emergency release blood in the ED, where his blood pressure maintained and his H/H improved with subsequent lab draws. There was no obvious source of bleeding, as his urine did not show hematuria, there was no active bleeding from the trach site and there were no reports of hematemesis, hematochezia or dark stools. His abdomen is significantly distended with hypoactive bowel sounds.     A CT of the chest, abdomen and pelvis was obtained by [facility] medicine, which showed:   1. Ground-glass opacities in the lingula and aerated portion of the right lower lobe.  An infectious process cannot be entirely excluded. Complete right and moderate left bibasilar atelectasis.  2. 2 mm right upper lobe pulmonary nodule.  For a solid nodule <6 mm, Fleischner Society 2017 guidelines recommend no routine follow up for a low risk patient, or follow-up with non-contrast chest CT at 12 months in a high  risk patient.  3. Innumerable bilateral renal cysts.  Findings may suggest history of adult polycystic kidney disease. Hepatic cysts.  4. Cholelithiasis.  5. Gaseous distension of the colon.  Patulous rectum distended with fecal material.  Presacral infiltration or edema.  Findings may suggest fecal impaction and or stercoral colitis.  6. Edematous appearance of the arms.    Hospital Medicine consulted for admission and further management.

## 2022-05-23 NOTE — ASSESSMENT & PLAN NOTE
Noted  Trach and Peg dependent  Assist with ADLs, turn Q2H and monitor skin  Continue baclofen as ordered  Riluzole ordered, not on formulary, deferred to pharmacy

## 2022-05-23 NOTE — ASSESSMENT & PLAN NOTE
Significant distention noted, CT of abdomen shows: In the pelvis, there is gaseous distension of the colon.  The sigmoid colon is redundant.  The patulous rectum is distended with hyperdense fecal material.  There is presacral edema or infiltration.  There are no pelvic masses or adenopathy.  There is no free pelvic fluid.  GI consulted

## 2022-05-23 NOTE — EICU
Eicu brief admission review note.  Pt was examined on video, notes, images, labs  reviewed.   Pt is a 66 year old male with h/o ALS, s/p trach & peg, vent dependent, HTN, PE on eliquis presented to hospital from [facility] for an evaluation of  Tracheostomy, obstruction noted in the NH, cannular was changed and attempt to suction multiple times with bleeding reported around the trach afterworlds.  In ER found to have Hgb 3.3, nl plt, INR 1.9 episode of hypotesnion improved in IVF,  with no evidence of active bleeding. CT chest /abdmomen pelvis done- b/l atelectasis, fecal impaction, possible colitis  Anemia- transfused with 3 Units with improvement of H&H, ?bledding from trach?  Reported anasarca- on lasix, increase to 40 mg iv bid,, stict Ins/outs   S/p tracheostomy unable to find what type of trach he has, #8 cuffed,- pulm to evaluate, per notes does not appear to be any issues, will d/w RT  B/l low lobe atelectasis, mild leukocytosis- afebrile,  holding of ab, check procal, follow up cxs, consider bronch  Chronic respiratory failure-  Vent dependent, on home vent settings, check abg, copious secreations per RN- add scopolamine patch  Chronic arvizu was changed- check UA  H/o PE holding eliquis- consider LE Dopplers  Constipation intensify bowel regimen  DVT proph- SCDs for now  D/w RN

## 2022-05-23 NOTE — ASSESSMENT & PLAN NOTE
Appears to be chronic when reviewing previous radiology studies  Follow respiratory culture, concern he colonizes  Afebrile, will hold abx until cultures result

## 2022-05-23 NOTE — H&P
[facility] Medicine  History & Physical    Patient Name: Wei Dominique  MRN: [id number]  Patient Class: IP- Inpatient  Admission Date: 5/22/2022  Attending Physician: Aquiles Melendez MD  Primary Care Provider: Mack Suero MD         Patient information was obtained from past medical records and ER records.     Subjective:     Principal Problem:Anemia    Chief Complaint:   Chief Complaint   Patient presents with    Tracheostomy Tube Change     From [place] for trach tube change, trach obstruction; currently on blood thinners for DVT to right arm         HPI: Wei Dominique is a 66 year old male with past medical history of ALS, s/p trach & peg, vent dependent, HTN, PE (on eliquis) presented to hospital from [facility] for an evaluation of his tracheostomy. HPI limited due to patient's non verbal status, and information obtained from ED and nursing home notes. Per ED notes, the respiratory tech at [place] was having difficulty passing a suction catheter through the trach, leading to concerns for a blockage. The cannula was replaced several times, and he had minimal bleeding from the site. He did not have a large amount of bleeding at the time, and there was no bleeding upon arrival to the ED. He was sent for possible tracheostomy change.    Upon arrival to the ED, the patient was suctioned with very little difficulty and no evidence of obstruction was noted. Baseline labs were obtained, where the patient was found to be severely anemic, with a hemoglobin of 3.3 and a hematocrit of 10.9. His baseline H/H on review of the medical record appears to be around 8. He was given emergency release blood in the ED, where his blood pressure maintained and his H/H improved with subsequent lab draws. There was no obvious source of bleeding, as his urine did not show hematuria, there was no active bleeding from the trach site and there were no reports of hematemesis,  hematochezia or dark stools. His abdomen is significantly distended with hypoactive bowel sounds.     A CT of the chest, abdomen and pelvis was obtained by hospital medicine, which showed:   1. Ground-glass opacities in the lingula and aerated portion of the right lower lobe.  An infectious process cannot be entirely excluded. Complete right and moderate left bibasilar atelectasis.  2. 2 mm right upper lobe pulmonary nodule.  For a solid nodule <6 mm, Fleischner Society 2017 guidelines recommend no routine follow up for a low risk patient, or follow-up with non-contrast chest CT at 12 months in a high risk patient.  3. Innumerable bilateral renal cysts.  Findings may suggest history of adult polycystic kidney disease. Hepatic cysts.  4. Cholelithiasis.  5. Gaseous distension of the colon.  Patulous rectum distended with fecal material.  Presacral infiltration or edema.  Findings may suggest fecal impaction and or stercoral colitis.  6. Edematous appearance of the arms.    Hospital Medicine consulted for admission and further management.        Past Medical History:   Diagnosis Date    ALS (amyotrophic lateral sclerosis)     ALS (amyotrophic lateral sclerosis)     Erectile dysfunction     Gait disturbance     Hyperlipidemia     Hypertension     Iron deficiency anemia     Motor neuron disease     Tremor        Past Surgical History:   Procedure Laterality Date    PORTACATH PLACEMENT         Review of patient's allergies indicates:  No Known Allergies    No current facility-administered medications on file prior to encounter.     Current Outpatient Medications on File Prior to Encounter   Medication Sig    albuterol-ipratropium (DUO-NEB) 2.5 mg-0.5 mg/3 mL nebulizer solution Take 3 mLs by nebulization every 4 (four) hours. Rescue    ALPRAZolam (XANAX) 0.5 MG tablet Take 0.5 mg by mouth 3 (three) times daily as needed.    baclofen (LIORESAL) 10 MG tablet TAKE 1 TABLET THREE TIMES PER DAY (Patient taking  differently: 10 mg by Per G Tube route 3 (three) times daily.)    busPIRone (BUSPAR) 5 MG Tab 1 tablet (5 mg total) by Per G Tube route 2 (two) times daily.    chlorhexidine (PERIDEX) 0.12 % solution Use as directed 15 mLs in the mouth or throat 2 (two) times daily.    furosemide (LASIX) 40 MG tablet 1 tablet (40 mg total) by Per G Tube route once daily.    glycopyrrolate (ROBINUL) 1 mg Tab START BY TAKING 1 TABLET DAILY. IF TOLERATING WELL, YOU CAN INCREASE IT UP TO 2 OR EVEN 3 TIMES DAILY. (Patient taking differently: 1 mg by Per G Tube route 3 (three) times daily.)    lansoprazole (PREVACID) 30 MG capsule Take 30 mg by mouth once daily.    melatonin 3 mg TbDL 3 mg by Per G Tube route every evening.    metoprolol tartrate (LOPRESSOR) 50 MG tablet Take 50 mg by mouth once daily.    ondansetron (ZOFRAN) 4 MG tablet 4 mg by Per G Tube route every 6 (six) hours as needed for Nausea.    pantoprazole (PROTONIX) 40 mg suspension 40 mg by FEEDING TUBE route 2 (two) times a day.    phenazopyridine (PYRIDIUM) 200 MG tablet 200 mg by Per G Tube route 3 (three) times daily.    polyethylene glycol (GLYCOLAX) 17 gram PwPk 17 g by Per G Tube route once daily.    riluzole 50 mg Tab tablet TAKE 1 TABLET ON AN EMPTY STOMACH EVERY 12 HRS    traZODone (DESYREL) 50 MG tablet 50 mg by Per G Tube route every evening.     Family History    None       Tobacco Use    Smoking status: Not on file    Smokeless tobacco: Not on file   Substance and Sexual Activity    Alcohol use: Not on file    Drug use: Not on file    Sexual activity: Not on file     Review of Systems   Unable to perform ROS: Patient nonverbal   Objective:     Vital Signs (Most Recent):  Temp: 98 °F (36.7 °C) (05/22/22 1701)  Pulse: 104 (05/22/22 2357)  Resp: 20 (05/23/22 0000)  BP: 112/71 (05/22/22 2357)  SpO2: 100 % (05/22/22 2357) Vital Signs (24h Range):  Temp:  [97.8 °F (36.6 °C)-98.1 °F (36.7 °C)] 98 °F (36.7 °C)  Pulse:  [] 104  Resp:  [16-20]  20  SpO2:  [97 %-100 %] 100 %  BP: ()/() 112/71        There is no height or weight on file to calculate BMI.    Physical Exam  Vitals and nursing note reviewed.   Constitutional:       General: He is awake. He is not in acute distress.     Appearance: He is normal weight. He is ill-appearing (chronically ill appearing, sickly).   HENT:      Mouth/Throat:      Lips: Pink.      Mouth: Mucous membranes are moist.      Comments: Drooling from mouth  Neck:      Trachea: Tracheostomy present.      Comments: Trach site appears intact  Cardiovascular:      Rate and Rhythm: Tachycardia present.      Heart sounds: Normal heart sounds, S1 normal and S2 normal.      Comments: +anasarca  Pulmonary:      Effort: Pulmonary effort is normal.      Breath sounds: Transmitted upper airway sounds present. Examination of the right-lower field reveals decreased breath sounds. Examination of the left-lower field reveals decreased breath sounds. Decreased breath sounds present.   Chest:       Abdominal:      General: Abdomen is protuberant. Bowel sounds are decreased. There is distension (marked distention).      Tenderness: There is no abdominal tenderness.   Genitourinary:     Comments: Orange concentrated urine in arvizu bag  Musculoskeletal:      Cervical back: Full passive range of motion without pain and neck supple.      Right lower leg: Edema present.      Left lower leg: Edema present.   Skin:     General: Skin is warm and dry.      Capillary Refill: Capillary refill takes 2 to 3 seconds.   Neurological:      Mental Status: He is alert. Mental status is at baseline.      Comments: Quadraparesis  Non verbal but does communicate well via eye blinks, one blink yes, two blinks no             Significant Labs: All pertinent labs within the past 24 hours have been reviewed.  CBC:   Recent Labs   Lab 05/22/22  1659 05/22/22  1949 05/22/22  2345   WBC 8.63 15.95* 13.10*   HGB 6.3* 9.2* 9.8*   HCT 21.2* 29.6* 30.9*     386 354     CMP:   Recent Labs   Lab 05/22/22  1506   *   K 4.3      CO2 24   *   BUN 25*   CREATININE 0.5   CALCIUM 8.3*   ANIONGAP 8   EGFRNONAA >60     Coagulation:   Recent Labs   Lab 05/22/22  1506   INR 1.9*     Lactic Acid:   Recent Labs   Lab 05/22/22  1949   LACTATE 0.6         Significant Imaging: I have reviewed all pertinent imaging results/findings within the past 24 hours.  Imaging Results               CT Chest Abdomen Pelvis Without Contrast (XPD) (Final result)  Result time 05/22/22 21:26:59      Final result by Elen Thompson MD (05/22/22 21:26:59)                   Impression:      Ground-glass opacities in the lingula and aerated portion of the right lower lobe.  An infectious process cannot be entirely excluded. Complete right and moderate left bibasilar atelectasis.    2 mm right upper lobe pulmonary nodule.  For a solid nodule <6 mm, Fleischner Society 2017 guidelines recommend no routine follow up for a low risk patient, or follow-up with non-contrast chest CT at 12 months in a high risk patient.    Innumerable bilateral renal cysts.  Findings may suggest history of adult polycystic kidney disease. Hepatic cysts.    Cholelithiasis.    Gaseous distension of the colon.  Patulous rectum distended with fecal material.  Presacral infiltration or edema.  Findings may suggest fecal impaction and or stercoral colitis.    Edematous appearance of the arms.    This report was flagged in Epic as abnormal.      Electronically signed by: Elen Thompson  Date:    05/22/2022  Time:    21:26               Narrative:    EXAMINATION:  CT CHEST ABDOMEN PELVIS WITHOUT    CLINICAL HISTORY:  Severe anemia.    TECHNIQUE:  5 mm unenhanced axial images from the lung bases through the greater trochanters were performed.  Coronal and sagittal reformatted images were provided.    COMPARISON:  02/21/2022    FINDINGS:  In the chest, examination is limited by respiratory motion.  Endotracheal tube  and left chest port are present.  There are tiny emphysematous blebs at the lung apices right greater than left.  A 2 mm right upper lobe pulmonary nodule is present (series 4 axial image 110).  Calcified granulomas are seen in the lingula and left lower lobe. Ground-glass opacities are seen in the aerated portion of the ribs air lobe and lingula there is mucous seen in the right lower lobe bronchus.  There is complete atelectasis of the right lower lobe with air bronchograms.  There is moderate atelectasis of the left lower lobe with air bronchograms.  There is linear atelectasis or scarring in the right lower lobe.  The thyroid gland is heterogeneous.  The right thyroid gland is larger than the left.  There is moderate asymmetric elevation the right hemidiaphragm.    In the abdomen and within the limits of a noncontrast examination, there is a PEG tube.  The stomach is decompressed.  Innumerable bilateral renal cysts are present.  There is no hydronephrosis or obstructive uropathy.  There are multiple low-attenuation lesions in both lobes of the liver.  Some are too small to characterize.  Others have imaging characteristics that of simple cysts.  The spleen is mildly enlarged.  Pancreas and adrenal glands are unremarkable.  The gallbladder contains a tiny calcified gallstones. There is no gross abdominal adenopathy or ascites.  There is some flattening of the IVC.  Recommend assessment of hydration status.  There is a small fat containing umbilical hernia.  Extensive infiltration is seen in the visualized portions of the subcutaneous fat of the arms.    In the pelvis, there is gaseous distension of the colon.  The sigmoid colon is redundant.  The patulous rectum is distended with hyperdense fecal material.  There is presacral edema or infiltration.  There are no pelvic masses or adenopathy.  There is no free pelvic fluid.                                       X-Ray Neck Soft Tissue (Final result)  Result time  05/22/22 15:35:44      Final result by Meliton Tompkins MD (05/22/22 15:35:44)                   Impression:      Tracheostomy tube in place.      Electronically signed by: Meliton Tompkins  Date:    05/22/2022  Time:    15:35               Narrative:    EXAMINATION:  XR NECK SOFT TISSUE    CLINICAL HISTORY:  Encounter for attention to tracheostomy    TECHNIQUE:  AP and lateral soft tissue views the neck were performed.    COMPARISON:  05/17/2022.    FINDINGS:  Limited AP and lateral soft tissue views of the neck demonstrate a tracheostomy tube in place.  This appears appropriately positioned when compared to previous examination.    Left subclavian Port-A-Cath in place.                                       X-Ray Chest AP Portable (Final result)  Result time 05/22/22 15:34:21      Final result by Meliton Tompkins MD (05/22/22 15:34:21)                   Impression:      1. Pulmonary hypoinflation with suspected atelectasis at the lung bases.  2. Satisfactory indwelling life-support devices.  As deemed clinically necessary, further evaluation may be obtained with dedicated PA and lateral views of the chest.      Electronically signed by: Meliton Tompkins  Date:    05/22/2022  Time:    15:34               Narrative:    EXAMINATION:  XR CHEST AP PORTABLE    CLINICAL HISTORY:  Encounter for attention to tracheostomy    TECHNIQUE:  Single frontal view of the chest was performed.    COMPARISON:  05/17/2022.    FINDINGS:  There is pulmonary hypoinflation with crowding of the bronchopulmonary vessels.  There is atelectasis at the lung bases.  No significant pleural effusion.    Heart size normal.  Calcified aortic plaque.  Trachea midline.    Bony thorax intact.  Tracheostomy tube in place.  Left subclavian Port-A-Cath.                                        Assessment/Plan:     * Anemia  Patient's anemia is currently uncontrolled. Has recieved 2 units of PRBCs on 5/22/22. Etiology likely d/t unknown source  Current CBC  reviewed-   Lab Results   Component Value Date    HGB 9.8 (L) 05/22/2022    HCT 30.9 (L) 05/22/2022     Monitor serial CBC and transfuse if patient becomes hemodynamically unstable, symptomatic or H/H drops below 7/21.     Baseline around 8, Hemoglobin on admit 3.3. no obvious bleeding sites    Hold Eliquis for now and resume if and when appropriate      Abdominal distention  Significant distention noted, CT of abdomen shows: In the pelvis, there is gaseous distension of the colon.  The sigmoid colon is redundant.  The patulous rectum is distended with hyperdense fecal material.  There is presacral edema or infiltration.  There are no pelvic masses or adenopathy.  There is no free pelvic fluid.  GI consulted      Dependent on ventilator  Noted, pulmonary consult      Right lower lobe consolidation  Appears to be chronic when reviewing previous radiology studies  Follow respiratory culture, concern he colonizes  Afebrile, will hold abx until cultures result      S/P percutaneous endoscopic gastrostomy (PEG) tube placement  Noted, trach care      Edema    Anasarca noted all over body  Continue lasix    Quadriparesis  Noted  Assist with ADLs, turn Q2H and monitor skin  PT/OT consulted      ALS (amyotrophic lateral sclerosis)    Noted  Trach and Peg dependent  Assist with ADLs, turn Q2H and monitor skin  Continue baclofen as ordered  Riluzole ordered, not on formulary, deferred to pharmacy    Essential hypertension  Chronic, controlled.  Latest blood pressure and vitals reviewed-   Temp:  [97.8 °F (36.6 °C)-98.1 °F (36.7 °C)]   Pulse:  []   Resp:  [16-20]   BP: ()/()   SpO2:  [97 %-100 %] .   Home meds for hypertension were reviewed and noted below.   Hypertension Medications             furosemide (LASIX) 40 MG tablet 1 tablet (40 mg total) by Per G Tube route once daily.    metoprolol tartrate (LOPRESSOR) 50 MG tablet Take 50 mg by mouth once daily.          While in the hospital, will manage blood  pressure as follows; Continue home antihypertensive regimen    Will utilize p.r.n. blood pressure medication only if patient's blood pressure greater than  180/110 and he develops symptoms such as worsening chest pain or shortness of breath.          VTE Risk Mitigation (From admission, onward)         Ordered     Place sequential compression device  Until discontinued         05/22/22 2222     Reason for No Pharmacological VTE Prophylaxis  Once        Question:  Reasons:  Answer:  Risk of Bleeding    05/22/22 2222     IP VTE LOW RISK PATIENT  Once         05/22/22 2222               Critical care time spent on the evaluation and treatment of severe organ dysfunction, review of pertinent labs and imaging studies, discussions with consulting providers and discussions with patient/family 60 minutes        TIM Howard  Department of Hospital Medicine   [facility] - Emergency Dept

## 2022-05-23 NOTE — ASSESSMENT & PLAN NOTE
Chronic, controlled.  Latest blood pressure and vitals reviewed-   Temp:  [97.8 °F (36.6 °C)-98.1 °F (36.7 °C)]   Pulse:  []   Resp:  [16-20]   BP: ()/()   SpO2:  [97 %-100 %] .   Home meds for hypertension were reviewed and noted below.   Hypertension Medications             furosemide (LASIX) 40 MG tablet 1 tablet (40 mg total) by Per G Tube route once daily.    metoprolol tartrate (LOPRESSOR) 50 MG tablet Take 50 mg by mouth once daily.          While in the hospital, will manage blood pressure as follows; Continue home antihypertensive regimen    Will utilize p.r.n. blood pressure medication only if patient's blood pressure greater than  180/110 and he develops symptoms such as worsening chest pain or shortness of breath.       96

## 2022-05-23 NOTE — PLAN OF CARE
Problem: Adult Inpatient Plan of Care  Goal: Plan of Care Review  Outcome: Ongoing, Progressing     Problem: Fall Injury Risk  Goal: Absence of Fall and Fall-Related Injury  Outcome: Ongoing, Progressing     Problem: Communication Impairment (Mechanical Ventilation, Invasive)  Goal: Effective Communication  Outcome: Ongoing, Progressing     Problem: Device-Related Complication Risk (Mechanical Ventilation, Invasive)  Goal: Optimal Device Function  Outcome: Ongoing, Progressing

## 2022-05-23 NOTE — PLAN OF CARE
[facility]  Initial Discharge Assessment       Primary Care Provider: Mack Suero MD    Admission Diagnosis: Anemia [D64.9]  Tracheostomy care [Z43.0]    Admission Date: 5/22/2022  Expected Discharge Date:     Discharge Barriers Identified: None    Payor: MEDICARE / Plan: MEDICARE PART A & B / Product Type: Government /     Extended Emergency Contact Information  Primary Emergency Contact: TripEze  Mobile Phone: [phone]  Relation: Spouse  Preferred language: English  Secondary Emergency Contact: Teetee Hutchins  Mobile Phone: [phone]  Relation: Sister    Discharge Plan A: Return to nursing home  Discharge Plan B: Return to Nursing Home      [id number] - [place], [place] - [address]  [address] [place] [address]  Phone: [phone] Fax: [phone]    SW spoke to patient's spouse Eze Hutchins [phone] to completed discharge planning assessment.  Per Eze, patient is a resident at [facility] and will return upon discharge.    Initial Assessment (most recent)     Adult Discharge Assessment - 05/23/22 1258        Discharge Assessment    Assessment Type Discharge Planning Assessment     Confirmed/corrected address, phone number and insurance Yes     Confirmed Demographics Correct on Facesheet     Source of Information family     Communicated VENESSA with patient/caregiver Date not available/Unable to determine     Lives With facility resident     Facility Arrived From: [facility]     Do you have help at home or someone to help you manage your care at home? Yes     Who are your caregiver(s) and their phone number(s)? staff     How do you get to doctors appointments? agency;health plan transportation     Discharge Plan A Return to nursing home     Discharge Plan B Return to Nursing Home     DME Needed Upon Discharge  none     Discharge Plan discussed with: Spouse/sig other     Discharge Barriers Identified None

## 2022-05-23 NOTE — ASSESSMENT & PLAN NOTE
Patient's anemia is currently uncontrolled. Has recieved 2 units of PRBCs on 5/22/22. Etiology likely d/t unknown source  Current CBC reviewed-   Lab Results   Component Value Date    HGB 9.8 (L) 05/22/2022    HCT 30.9 (L) 05/22/2022     Monitor serial CBC and transfuse if patient becomes hemodynamically unstable, symptomatic or H/H drops below 7/21.     Baseline around 8, Hemoglobin on admit 3.3. no obvious bleeding sites

## 2022-05-23 NOTE — CONSULTS
05/23/2022      Admit Date: 5/22/2022  Wei Dominique  New Patient Consult    Chief Complaint   Patient presents with    Tracheostomy Tube Change     From [place] for trach tube change, trach obstruction; currently on blood thinners for DVT to right arm        History of Present Illness:   Discussed with wife.  Pt had pe past and has been off and on anticoagg-- wife not aware of sign bleeding issues. She notes some excess secretions . No issues known to wife with trach.  Pt indicates has not had trach changed for over a month.   Pt admits to excess secretions and muscle spasm pains.         Pt had been in [place] recent past, ended up at [facility] for chr vent support. Pt presented in April after cardiac arrest and cpr-- was in septic shock, blood + esbl e coli.     Pt could open eyes but was non verBAL when left [facility] fo r ltac 4/20/20222.  Pt was in [place] Oct 2020-- had been living at home with no trach,  with sitters at that time.  H/o saddle pe in March 2020.  Ended with trach November 2020.      Pt was off anticoagg in April [facility] admit ???  Pt was on eliquis 2/21/2022 admit and was to continue at [place].      From hpi TUSHAR Cao:  HPI: Wei Dominique is a 66 year old male with past medical history of ALS, s/p trach & peg, vent dependent, HTN, PE (on eliquis) presented to hospital from [facility] for an evaluation of his tracheostomy. HPI limited due to patient's non verbal status, and information obtained from ED and nursing home notes. Per ED notes, the respiratory tech at [facility] was having difficulty passing a suction catheter through the trach, leading to concerns for a blockage. The cannula was replaced several times, and he had minimal bleeding from the site. He did not have a large amount of bleeding at the time, and there was no bleeding upon arrival to the ED. He was sent for possible tracheostomy change.     Upon arrival to the ED, the patient was suctioned with very little  difficulty and no evidence of obstruction was noted. Baseline labs were obtained, where the patient was found to be severely anemic, with a hemoglobin of 3.3 and a hematocrit of 10.9. His baseline H/H on review of the medical record appears to be around 8. He was given emergency release blood in the ED, where his blood pressure maintained and his H/H improved with subsequent lab draws. There was no obvious source of bleeding, as his urine did not show hematuria, there was no active bleeding from the trach site and there were no reports of hematemesis, hematochezia or dark stools. His abdomen is significantly distended with hypoactive bowel sounds.      A CT of the chest, abdomen and pelvis was obtained by [facility] medicine, which showed:   1. Ground-glass opacities in the lingula and aerated portion of the right lower lobe.  An infectious process cannot be entirely excluded. Complete right and moderate left bibasilar atelectasis.  2. 2 mm right upper lobe pulmonary nodule.  For a solid nodule <6 mm, Fleischner Society 2017 guidelines recommend no routine follow up for a low risk patient, or follow-up with non-contrast chest CT at 12 months in a high risk patient.  3. Innumerable bilateral renal cysts.  Findings may suggest history of adult polycystic kidney disease. Hepatic cysts.  4. Cholelithiasis.  5. Gaseous distension of the colon.  Patulous rectum distended with fecal material.  Presacral infiltration or edema.  Findings may suggest fecal impaction and or stercoral colitis.  6. Edematous appearance of the arms.     Hospital Medicine consulted for admission and further management.        PFSH:  Past Medical History:   Diagnosis Date    ALS (amyotrophic lateral sclerosis)     ALS (amyotrophic lateral sclerosis)     Erectile dysfunction     Gait disturbance     Hyperlipidemia     Hypertension     Iron deficiency anemia     Motor neuron disease     Tremor      Past Surgical History:   Procedure Laterality  "Date    PORTACATH PLACEMENT          No family history on file.  Review of patient's allergies indicates:  No Known Allergies    Performance Status:Performance Status:The patient's activity level is bedbound.    Review of Systems:  due to neurologic status/impairments a Review of Systems could not be obtained       Exam:Comprehensive exam done. BP (!) 149/66   Pulse 88   Temp 98.6 °F (37 °C) (Oral)   Resp 17   Ht 5' 8" (1.727 m)   Wt 76.4 kg (168 lb 6.9 oz)   SpO2 100%   BMI 25.61 kg/m²   Exam included Vitals as listed, and patient's appearance and affect and alertness and mood, oral exam for yeast and hygiene and pharynx lesions and Mallapatti (M) score, neck with inspection for jvd and masses and thyroid abnormalities and lymph nodes (supraclavicular and infraclavicular nodes also examined and noted if abn), chest exam included symmetry and effort and fremitus and percussion and auscultation, cardiac exam included rhythm and gallops and murmur and rubs and jvd and edema, abdominal exam for mass and hepatosplenomegaly and tenderness and hernias and bowel sounds, Musculoskeletal exam with muscle tone and posture and mobility/gait and  strenght, and skin for rashes and cyanosis and pallor and turgor, extremity for clubbing.  Findings were normal except as listed below:  Trach,  Blinks eyes once for yes and twice for no  Upper and lower ext brawny and upper >>lower swollen, chest is symmetric, no distress, normal percussion, normal fremitus and good normal breath sounds  No edema nor clubbing,. Rest good.    Radiographs reviewed: view by direct vision    Results for orders placed during the hospital encounter of 02/21/22    X-Ray Chest 1 View    Narrative  EXAMINATION:  XR CHEST 1 VIEW    CLINICAL HISTORY:  confirm trach position;    TECHNIQUE:  Single frontal view of the chest was performed.    COMPARISON:  None    FINDINGS:  The tip of a left chest wall port overlies the superior vena cava.  There is a " tracheostomy.  The lungs are somewhat expiratory, which may be exaggerating the pulmonary vascularity.  There is a small right pleural effusion.  The heart size appears normal.  There is mild calcification of the aortic knob consistent with atherosclerotic stigmata.    Impression  Lines and tubes as above.  Small right pleural effusion.      Electronically signed by: Olive Juarez  Date:    02/21/2022  Time:    09:08  ]    Labs     Recent Labs   Lab 05/23/22 0317   WBC 13.82*   HGB 9.3*   HCT 30.2*        Recent Labs   Lab 05/22/22  1506 05/22/22  1949 05/22/22  2345 05/23/22 0317   *  --   --  138   K 4.3  --   --  3.1*     --   --  104   CO2 24  --   --  22*   BUN 25*  --   --  23   CREATININE 0.5  --   --  0.6   *  --   --  91   CALCIUM 8.3*  --   --  9.2   CAION  --   --  1.12  --    AST  --   --   --  18   ALT  --   --   --  20   ALKPHOS  --   --   --  63   BILITOT  --   --   --  1.1*   PROT  --   --   --  7.4   ALBUMIN  --   --   --  2.7*   PROCAL  --   --   --  0.96*   INR 1.9*  --   --   --    LACTATE  --  0.6  --   --      Recent Labs   Lab 05/23/22  0415   PH 7.465*   PCO2 32.3*   PO2 124*   HCO3 23.2*     Microbiology Results (last 7 days)     Procedure Component Value Units Date/Time    Culture, Respiratory with Gram Stain [[id number]] Collected: 05/22/22 1519    Order Status: Completed Specimen: Respiratory from Tracheal Aspirate Updated: 05/23/22 0252     Gram Stain (Respiratory) <10 epithelial cells per low power field.     Gram Stain (Respiratory) Many WBC's     Gram Stain (Respiratory) Rare Gram positive cocci     Gram Stain (Respiratory) Rare Gram negative rods    Culture, Respiratory with Gram Stain [[id number]] Collected: 05/23/22 0111    Order Status: Sent Specimen: Respiratory from Sputum Updated: 05/23/22 0136          Impression:  Active Hospital Problems    Diagnosis  POA    *Anemia [D64.9]  Yes    History of pulmonary embolism [Z86.711]  Yes    Tracheostomy in  place [Z93.0]  Not Applicable    Chronic respiratory failure with hypoxia and hypercapnia [J96.11, J96.12]  Yes    Atelectasis [J98.11]  Yes    Muscle spasm [M62.838]  Yes    Aspiration syndrome [T17.900A]  Yes    Abdominal distention [R14.0]  Yes    Dependent on ventilator [Z99.11]  Not Applicable    Right lower lobe consolidation [J18.1]  Yes    S/P percutaneous endoscopic gastrostomy (PEG) tube placement [Z93.1]  Not Applicable    Essential hypertension [I10]  Yes    ALS (amyotrophic lateral sclerosis) [G12.21]  Yes    Edema [R60.9]  Yes    Quadriparesis [G82.50]  Yes      Resolved Hospital Problems   No resolved problems to display.               Plan:   5/23 p to 131 - 117,   40% ox,   No fever, vss,     Ct chest 5/22 with chr looking rll collpase, trach position seems good  procal 0.96,       No anticoagg h/o saddle pe with als.          Will order cough assist as pt feels secretion issues.  Will change trach, check arms/legs, and resume anticoagg.     Monitor and consider return to [place].      Will titrate up baclofen and increase robinul.        bp was low at admit,     Monitor.    Discussed with wife.      Trach changed - scant bleeding.  Would start lovenox therapeutic at 10 pm.  inr 1.9 may not be therapeutic.  Wife not aware of prior saddle pe nor he being off anticoagg nor sign bleed.  Work to get back to [place].

## 2022-05-23 NOTE — PT/OT/SLP PROGRESS
Physical Therapy      Patient Name:  Wei Dominique   MRN:  [id number]    Patient not seen today secondary to patient not appropriate for skilled therapy. He has a history of ALS with quadriplegia and is trach dependent. Patient alert and able to answer yes/no questions by blinking. He confirmed he resides at [facility]. He does not get up to a WC at [facility] and answers no when asked if staff performs ROM. Per RN she has been performing PROM with patient. PT orders discontinued.

## 2022-05-23 NOTE — CONSULTS
CC: abdominal distention    HPI 66 y.o. male with past medical history of ALS, s/p trach & peg, vent dependent, HTN, PE (on eliquis) presented to hospital from [facility] for an evaluation of his tracheostomy. HPI limited due to patient's non verbal status, and information obtained from ED and nursing home notes. Per ED notes, the respiratory tech at [place] was having difficulty passing a suction catheter through the trach, leading to concerns for a blockage. The cannula was replaced several times, and he had minimal bleeding from the site. He did not have a large amount of bleeding at the time, and there was no bleeding upon arrival to the ED. He was sent for possible tracheostomy change.     Upon arrival to the ED, the patient was suctioned with very little difficulty and no evidence of obstruction was noted. Baseline labs were obtained, where the patient was found to be severely anemic, with a hemoglobin of 3.3 and a hematocrit of 10.9. His baseline H/H on review of the medical record appears to be around 8. He was given emergency release blood in the ED, where his blood pressure maintained and his H/H improved with subsequent lab draws. There was no obvious source of bleeding, as his urine did not show hematuria, there was no active bleeding from the trach site and there were no reports of hematemesis, hematochezia or dark stools. His abdomen is significantly distended with hypoactive bowel sounds.     GI was consulted for evaluation of abnormal imaging, fecal impaction. Received 2 units for low H/H and today's returned 9.8/30.4.    Medical records reviewed. Additional history supplemented by nursing.     Past Medical History:   Diagnosis Date    ALS (amyotrophic lateral sclerosis)     ALS (amyotrophic lateral sclerosis)     Erectile dysfunction     Gait disturbance     Hyperlipidemia     Hypertension     Iron deficiency anemia     Motor neuron disease     Tremor      Past  "Surgical History:   Procedure Laterality Date    PORTACATH PLACEMENT       Social History  No smoking, alcohol, drug use    FH  No family history of colon cancer    Review of Systems  Unable to obtain due to trach    Physical Examination  BP (!) 142/77   Pulse 100   Temp 99.1 °F (37.3 °C) (Oral)   Resp 19   Ht 5' 8" (1.727 m)   Wt 76.4 kg (168 lb 6.9 oz)   SpO2 100%   BMI 25.61 kg/m²   General appearance: alert, no distress, lying in bed  HENT: trach in place, Normocephalic, atraumatic, no nasal discharge   Eyes: conjunctivae/corneas clear, PERRL, EOM's intact  Lungs: vented breath sounds symmetric chest wall expansion bilaterally  Heart: regular rate and rhythm without rub; no displacement of the PMI   Abdomen: distention noted, mild tenderness to palpation; bowel sounds present; no organomegaly  Extremities: extremities without clubbing, cyanosis, or edema  Integument: Skin color, texture, turgor normal; no rashes; hair distrubution normal  Neurologic: non-verbal, blinks eyes to answer questions    Labs:  Lab Results   Component Value Date    WBC 15.28 (H) 05/23/2022    HGB 9.8 (L) 05/23/2022    HCT 30.4 (L) 05/23/2022    MCV 81 (L) 05/23/2022     05/23/2022     CMP  Sodium   Date Value Ref Range Status   05/23/2022 138 136 - 145 mmol/L Final     Potassium   Date Value Ref Range Status   05/23/2022 3.1 (L) 3.5 - 5.1 mmol/L Final     Chloride   Date Value Ref Range Status   05/23/2022 104 95 - 110 mmol/L Final     CO2   Date Value Ref Range Status   05/23/2022 22 (L) 23 - 29 mmol/L Final     Glucose   Date Value Ref Range Status   05/23/2022 91 70 - 110 mg/dL Final     BUN   Date Value Ref Range Status   05/23/2022 23 8 - 23 mg/dL Final     Creatinine   Date Value Ref Range Status   05/23/2022 0.6 0.5 - 1.4 mg/dL Final     Calcium   Date Value Ref Range Status   05/23/2022 9.2 8.7 - 10.5 mg/dL Final     Total Protein   Date Value Ref Range Status   05/23/2022 7.4 6.0 - 8.4 g/dL Final     Albumin "   Date Value Ref Range Status   05/23/2022 2.7 (L) 3.5 - 5.2 g/dL Final     Total Bilirubin   Date Value Ref Range Status   05/23/2022 1.1 (H) 0.1 - 1.0 mg/dL Final     Comment:     For infants and newborns, interpretation of results should be based  on gestational age, weight and in agreement with clinical  observations.    Premature Infant recommended reference ranges:  Up to 24 hours.............<8.0 mg/dL  Up to 48 hours............<12.0 mg/dL  3-5 days..................<15.0 mg/dL  6-29 days.................<15.0 mg/dL       Alkaline Phosphatase   Date Value Ref Range Status   05/23/2022 63 55 - 135 U/L Final     AST   Date Value Ref Range Status   05/23/2022 18 10 - 40 U/L Final     ALT   Date Value Ref Range Status   05/23/2022 20 10 - 44 U/L Final     Anion Gap   Date Value Ref Range Status   05/23/2022 12 8 - 16 mmol/L Final     eGFR if    Date Value Ref Range Status   05/23/2022 >60 >60 mL/min/1.73 m^2 Final     eGFR if non    Date Value Ref Range Status   05/23/2022 >60 >60 mL/min/1.73 m^2 Final     Comment:     Calculation used to obtain the estimated glomerular filtration  rate (eGFR) is the CKD-EPI equation.        Imaging:  CT Chest/Abd/Pelvis:    Ground-glass opacities in the lingula and aerated portion of the right lower lobe.  An infectious process cannot be entirely excluded. Complete right and moderate left bibasilar atelectasis.     2 mm right upper lobe pulmonary nodule.  For a solid nodule <6 mm, Fleischner Society 2017 guidelines recommend no routine follow up for a low risk patient, or follow-up with non-contrast chest CT at 12 months in a high risk patient.     Innumerable bilateral renal cysts.  Findings may suggest history of adult polycystic kidney disease. Hepatic cysts.     Cholelithiasis.     Gaseous distension of the colon.  Patulous rectum distended with fecal material.  Presacral infiltration or edema.  Findings may suggest fecal impaction and or  stercoral colitis.     Edematous appearance of the arms.     Independently reviewed    Assessment:   1. Abdominal pain  2. Abnormal imaging of GI tract  3. Fecal impaction    Plan:   -Brown bomb enemas alternating with fleets enemas every 4 hours  -Recommend disimpaction and once completed then start Golytely  -Miralax 17 g per PEG daily  -Avoid tube feeds for now until impaction resolved  -This is most likely stercoral colitis in the setting of fecal impaction      Prasanth Grimaldo MD  [facility]  [address] [facility] [place] [address]  Office: [phone]  Fax: [phone]

## 2022-05-23 NOTE — PLAN OF CARE
Intervention: enteral nutrition therapy      Recommendations  Recommendation/Intervention: 1.) Consider Isosource 1.5 @ 30 mls/hr advancing 10 mls q4 hrs or as tolerated to goal rate 50 mls/hr continuous providing 1800 kcals/day, 82 g protein/day,  211 g CHO/day, and 917 mls water/day. FWF: 150 mls q4 hrs or per MD.      Goals: 1.) enteral nutrition will advance within 48 hrs  Nutrition Goal Status: new  Communication of RD Recs: other (comment) (second sign)

## 2022-05-23 NOTE — PT/OT/SLP PROGRESS
Occupational Therapy      Patient Name:  Wei Dominique   MRN:  [id number]    Patient not seen today secondary to patient is unable to participate in skilled therapy. Nurse notified.     5/23/2022

## 2022-05-23 NOTE — SUBJECTIVE & OBJECTIVE
Past Medical History:   Diagnosis Date    ALS (amyotrophic lateral sclerosis)     ALS (amyotrophic lateral sclerosis)     Erectile dysfunction     Gait disturbance     Hyperlipidemia     Hypertension     Iron deficiency anemia     Motor neuron disease     Tremor        Past Surgical History:   Procedure Laterality Date    PORTACATH PLACEMENT         Review of patient's allergies indicates:  No Known Allergies    No current facility-administered medications on file prior to encounter.     Current Outpatient Medications on File Prior to Encounter   Medication Sig    albuterol-ipratropium (DUO-NEB) 2.5 mg-0.5 mg/3 mL nebulizer solution Take 3 mLs by nebulization every 4 (four) hours. Rescue    ALPRAZolam (XANAX) 0.5 MG tablet Take 0.5 mg by mouth 3 (three) times daily as needed.    baclofen (LIORESAL) 10 MG tablet TAKE 1 TABLET THREE TIMES PER DAY (Patient taking differently: 10 mg by Per G Tube route 3 (three) times daily.)    busPIRone (BUSPAR) 5 MG Tab 1 tablet (5 mg total) by Per G Tube route 2 (two) times daily.    chlorhexidine (PERIDEX) 0.12 % solution Use as directed 15 mLs in the mouth or throat 2 (two) times daily.    furosemide (LASIX) 40 MG tablet 1 tablet (40 mg total) by Per G Tube route once daily.    glycopyrrolate (ROBINUL) 1 mg Tab START BY TAKING 1 TABLET DAILY. IF TOLERATING WELL, YOU CAN INCREASE IT UP TO 2 OR EVEN 3 TIMES DAILY. (Patient taking differently: 1 mg by Per G Tube route 3 (three) times daily.)    lansoprazole (PREVACID) 30 MG capsule Take 30 mg by mouth once daily.    melatonin 3 mg TbDL 3 mg by Per G Tube route every evening.    metoprolol tartrate (LOPRESSOR) 50 MG tablet Take 50 mg by mouth once daily.    ondansetron (ZOFRAN) 4 MG tablet 4 mg by Per G Tube route every 6 (six) hours as needed for Nausea.    pantoprazole (PROTONIX) 40 mg suspension 40 mg by FEEDING TUBE route 2 (two) times a day.    phenazopyridine (PYRIDIUM) 200 MG tablet 200 mg by Per G Tube  route 3 (three) times daily.    polyethylene glycol (GLYCOLAX) 17 gram PwPk 17 g by Per G Tube route once daily.    riluzole 50 mg Tab tablet TAKE 1 TABLET ON AN EMPTY STOMACH EVERY 12 HRS    traZODone (DESYREL) 50 MG tablet 50 mg by Per G Tube route every evening.     Family History    None       Tobacco Use    Smoking status: Not on file    Smokeless tobacco: Not on file   Substance and Sexual Activity    Alcohol use: Not on file    Drug use: Not on file    Sexual activity: Not on file     Review of Systems   Unable to perform ROS: Patient nonverbal   Objective:     Vital Signs (Most Recent):  Temp: 98 °F (36.7 °C) (05/22/22 1701)  Pulse: 104 (05/22/22 2357)  Resp: 20 (05/23/22 0000)  BP: 112/71 (05/22/22 2357)  SpO2: 100 % (05/22/22 2357) Vital Signs (24h Range):  Temp:  [97.8 °F (36.6 °C)-98.1 °F (36.7 °C)] 98 °F (36.7 °C)  Pulse:  [] 104  Resp:  [16-20] 20  SpO2:  [97 %-100 %] 100 %  BP: ()/() 112/71        There is no height or weight on file to calculate BMI.    Physical Exam  Vitals and nursing note reviewed.   Constitutional:       General: He is awake. He is not in acute distress.     Appearance: He is normal weight. He is ill-appearing (chronically ill appearing, sickly).   HENT:      Mouth/Throat:      Lips: Pink.      Mouth: Mucous membranes are moist.      Comments: Drooling from mouth  Neck:      Trachea: Tracheostomy present.      Comments: Trach site appears intact  Cardiovascular:      Rate and Rhythm: Tachycardia present.      Heart sounds: Normal heart sounds, S1 normal and S2 normal.      Comments: +anasarca  Pulmonary:      Effort: Pulmonary effort is normal.      Breath sounds: Transmitted upper airway sounds present. Examination of the right-lower field reveals decreased breath sounds. Examination of the left-lower field reveals decreased breath sounds. Decreased breath sounds present.   Chest:       Abdominal:      General: Abdomen is protuberant. Bowel sounds are  decreased. There is distension (marked distention).      Tenderness: There is no abdominal tenderness.   Genitourinary:     Comments: Orange concentrated urine in arvizu bag  Musculoskeletal:      Cervical back: Full passive range of motion without pain and neck supple.      Right lower leg: Edema present.      Left lower leg: Edema present.   Skin:     General: Skin is warm and dry.      Capillary Refill: Capillary refill takes 2 to 3 seconds.   Neurological:      Mental Status: He is alert. Mental status is at baseline.      Comments: Quadraparesis  Non verbal but does communicate well via eye blinks, one blink yes, two blinks no             Significant Labs: All pertinent labs within the past 24 hours have been reviewed.  CBC:   Recent Labs   Lab 05/22/22  1659 05/22/22  1949 05/22/22  2345   WBC 8.63 15.95* 13.10*   HGB 6.3* 9.2* 9.8*   HCT 21.2* 29.6* 30.9*    386 354     CMP:   Recent Labs   Lab 05/22/22  1506   *   K 4.3      CO2 24   *   BUN 25*   CREATININE 0.5   CALCIUM 8.3*   ANIONGAP 8   EGFRNONAA >60     Coagulation:   Recent Labs   Lab 05/22/22  1506   INR 1.9*     Lactic Acid:   Recent Labs   Lab 05/22/22 1949   LACTATE 0.6         Significant Imaging: I have reviewed all pertinent imaging results/findings within the past 24 hours.  Imaging Results               CT Chest Abdomen Pelvis Without Contrast (XPD) (Final result)  Result time 05/22/22 21:26:59      Final result by Elen Thompson MD (05/22/22 21:26:59)                   Impression:      Ground-glass opacities in the lingula and aerated portion of the right lower lobe.  An infectious process cannot be entirely excluded. Complete right and moderate left bibasilar atelectasis.    2 mm right upper lobe pulmonary nodule.  For a solid nodule <6 mm, Fleischner Society 2017 guidelines recommend no routine follow up for a low risk patient, or follow-up with non-contrast chest CT at 12 months in a high risk  patient.    Innumerable bilateral renal cysts.  Findings may suggest history of adult polycystic kidney disease. Hepatic cysts.    Cholelithiasis.    Gaseous distension of the colon.  Patulous rectum distended with fecal material.  Presacral infiltration or edema.  Findings may suggest fecal impaction and or stercoral colitis.    Edematous appearance of the arms.    This report was flagged in Epic as abnormal.      Electronically signed by: Elen Thompson  Date:    05/22/2022  Time:    21:26               Narrative:    EXAMINATION:  CT CHEST ABDOMEN PELVIS WITHOUT    CLINICAL HISTORY:  Severe anemia.    TECHNIQUE:  5 mm unenhanced axial images from the lung bases through the greater trochanters were performed.  Coronal and sagittal reformatted images were provided.    COMPARISON:  02/21/2022    FINDINGS:  In the chest, examination is limited by respiratory motion.  Endotracheal tube and left chest port are present.  There are tiny emphysematous blebs at the lung apices right greater than left.  A 2 mm right upper lobe pulmonary nodule is present (series 4 axial image 110).  Calcified granulomas are seen in the lingula and left lower lobe. Ground-glass opacities are seen in the aerated portion of the ribs air lobe and lingula there is mucous seen in the right lower lobe bronchus.  There is complete atelectasis of the right lower lobe with air bronchograms.  There is moderate atelectasis of the left lower lobe with air bronchograms.  There is linear atelectasis or scarring in the right lower lobe.  The thyroid gland is heterogeneous.  The right thyroid gland is larger than the left.  There is moderate asymmetric elevation the right hemidiaphragm.    In the abdomen and within the limits of a noncontrast examination, there is a PEG tube.  The stomach is decompressed.  Innumerable bilateral renal cysts are present.  There is no hydronephrosis or obstructive uropathy.  There are multiple low-attenuation lesions in both  lobes of the liver.  Some are too small to characterize.  Others have imaging characteristics that of simple cysts.  The spleen is mildly enlarged.  Pancreas and adrenal glands are unremarkable.  The gallbladder contains a tiny calcified gallstones. There is no gross abdominal adenopathy or ascites.  There is some flattening of the IVC.  Recommend assessment of hydration status.  There is a small fat containing umbilical hernia.  Extensive infiltration is seen in the visualized portions of the subcutaneous fat of the arms.    In the pelvis, there is gaseous distension of the colon.  The sigmoid colon is redundant.  The patulous rectum is distended with hyperdense fecal material.  There is presacral edema or infiltration.  There are no pelvic masses or adenopathy.  There is no free pelvic fluid.                                       X-Ray Neck Soft Tissue (Final result)  Result time 05/22/22 15:35:44      Final result by Meliton Tompkins MD (05/22/22 15:35:44)                   Impression:      Tracheostomy tube in place.      Electronically signed by: Meliton Tompkins  Date:    05/22/2022  Time:    15:35               Narrative:    EXAMINATION:  XR NECK SOFT TISSUE    CLINICAL HISTORY:  Encounter for attention to tracheostomy    TECHNIQUE:  AP and lateral soft tissue views the neck were performed.    COMPARISON:  05/17/2022.    FINDINGS:  Limited AP and lateral soft tissue views of the neck demonstrate a tracheostomy tube in place.  This appears appropriately positioned when compared to previous examination.    Left subclavian Port-A-Cath in place.                                       X-Ray Chest AP Portable (Final result)  Result time 05/22/22 15:34:21      Final result by Meliton Tompkins MD (05/22/22 15:34:21)                   Impression:      1. Pulmonary hypoinflation with suspected atelectasis at the lung bases.  2. Satisfactory indwelling life-support devices.  As deemed clinically necessary, further  evaluation may be obtained with dedicated PA and lateral views of the chest.      Electronically signed by: Meliton Tompkins  Date:    05/22/2022  Time:    15:34               Narrative:    EXAMINATION:  XR CHEST AP PORTABLE    CLINICAL HISTORY:  Encounter for attention to tracheostomy    TECHNIQUE:  Single frontal view of the chest was performed.    COMPARISON:  05/17/2022.    FINDINGS:  There is pulmonary hypoinflation with crowding of the bronchopulmonary vessels.  There is atelectasis at the lung bases.  No significant pleural effusion.    Heart size normal.  Calcified aortic plaque.  Trachea midline.    Bony thorax intact.  Tracheostomy tube in place.  Left subclavian Port-A-Cath.

## 2022-05-23 NOTE — NURSING
Shift Summary:   0015: Patient received from ER. Patient noted to be awake and alert; patient blinks 1: yes; 2 times: no. No apparent signs of distress or discomfort noted.    No overt signs of bleeding noted.     Copious amounts of clear, thick oral secretions; new orders received per eICU for scopolamine patch.    Skin noted to be dry, scaly, and flaky    No blood return noted from left chestport; site remains saline locked.     Bulb of arvizu catheter noted to be inflated in penis shaft; catheter removed and replaced. No Hematuria noted.     Several wounds noted; pictures taken and imported to chart.     0845: Patient's spouse notified of admission; will call for an update at 10a.

## 2022-05-24 LAB
ALBUMIN SERPL BCP-MCNC: 2.7 G/DL (ref 3.5–5.2)
ALP SERPL-CCNC: 63 U/L (ref 55–135)
ALT SERPL W/O P-5'-P-CCNC: 20 U/L (ref 10–44)
ANION GAP SERPL CALC-SCNC: 11 MMOL/L (ref 8–16)
AST SERPL-CCNC: 18 U/L (ref 10–40)
BASOPHILS # BLD AUTO: 0.09 K/UL (ref 0–0.2)
BASOPHILS # BLD AUTO: 0.11 K/UL (ref 0–0.2)
BASOPHILS # BLD AUTO: 0.11 K/UL (ref 0–0.2)
BASOPHILS NFR BLD: 0.7 % (ref 0–1.9)
BASOPHILS NFR BLD: 0.8 % (ref 0–1.9)
BASOPHILS NFR BLD: 0.9 % (ref 0–1.9)
BILIRUB SERPL-MCNC: 0.6 MG/DL (ref 0.1–1)
BUN SERPL-MCNC: 27 MG/DL (ref 8–23)
CALCIUM SERPL-MCNC: 9.6 MG/DL (ref 8.7–10.5)
CHLORIDE SERPL-SCNC: 105 MMOL/L (ref 95–110)
CO2 SERPL-SCNC: 20 MMOL/L (ref 23–29)
CREAT SERPL-MCNC: 0.6 MG/DL (ref 0.5–1.4)
DIFFERENTIAL METHOD: ABNORMAL
EOSINOPHIL # BLD AUTO: 0.4 K/UL (ref 0–0.5)
EOSINOPHIL # BLD AUTO: 0.4 K/UL (ref 0–0.5)
EOSINOPHIL # BLD AUTO: 0.5 K/UL (ref 0–0.5)
EOSINOPHIL NFR BLD: 3 % (ref 0–8)
EOSINOPHIL NFR BLD: 3.4 % (ref 0–8)
EOSINOPHIL NFR BLD: 3.9 % (ref 0–8)
ERYTHROCYTE [DISTWIDTH] IN BLOOD BY AUTOMATED COUNT: 17.4 % (ref 11.5–14.5)
ERYTHROCYTE [DISTWIDTH] IN BLOOD BY AUTOMATED COUNT: 17.5 % (ref 11.5–14.5)
ERYTHROCYTE [DISTWIDTH] IN BLOOD BY AUTOMATED COUNT: 17.6 % (ref 11.5–14.5)
EST. GFR  (AFRICAN AMERICAN): >60 ML/MIN/1.73 M^2
EST. GFR  (NON AFRICAN AMERICAN): >60 ML/MIN/1.73 M^2
GLUCOSE SERPL-MCNC: 88 MG/DL (ref 70–110)
HCT VFR BLD AUTO: 31.9 % (ref 40–54)
HCT VFR BLD AUTO: 32.4 % (ref 40–54)
HCT VFR BLD AUTO: 33.5 % (ref 40–54)
HGB BLD-MCNC: 10.2 G/DL (ref 14–18)
HGB BLD-MCNC: 10.3 G/DL (ref 14–18)
HGB BLD-MCNC: 9.8 G/DL (ref 14–18)
IMM GRANULOCYTES # BLD AUTO: 0.16 K/UL (ref 0–0.04)
IMM GRANULOCYTES NFR BLD AUTO: 1.2 % (ref 0–0.5)
LYMPHOCYTES # BLD AUTO: 1.2 K/UL (ref 1–4.8)
LYMPHOCYTES # BLD AUTO: 1.2 K/UL (ref 1–4.8)
LYMPHOCYTES # BLD AUTO: 1.4 K/UL (ref 1–4.8)
LYMPHOCYTES NFR BLD: 10.9 % (ref 18–48)
LYMPHOCYTES NFR BLD: 8.6 % (ref 18–48)
LYMPHOCYTES NFR BLD: 9.4 % (ref 18–48)
MCH RBC QN AUTO: 25.5 PG (ref 27–31)
MCH RBC QN AUTO: 25.8 PG (ref 27–31)
MCH RBC QN AUTO: 26.4 PG (ref 27–31)
MCHC RBC AUTO-ENTMCNC: 30.2 G/DL (ref 32–36)
MCHC RBC AUTO-ENTMCNC: 30.7 G/DL (ref 32–36)
MCHC RBC AUTO-ENTMCNC: 32 G/DL (ref 32–36)
MCV RBC AUTO: 83 FL (ref 82–98)
MCV RBC AUTO: 84 FL (ref 82–98)
MCV RBC AUTO: 84 FL (ref 82–98)
MONOCYTES # BLD AUTO: 1.1 K/UL (ref 0.3–1)
MONOCYTES # BLD AUTO: 1.1 K/UL (ref 0.3–1)
MONOCYTES # BLD AUTO: 1.2 K/UL (ref 0.3–1)
MONOCYTES NFR BLD: 8.1 % (ref 4–15)
MONOCYTES NFR BLD: 8.3 % (ref 4–15)
MONOCYTES NFR BLD: 9 % (ref 4–15)
NEUTROPHILS # BLD AUTO: 10.8 K/UL (ref 1.8–7.7)
NEUTROPHILS # BLD AUTO: 9.6 K/UL (ref 1.8–7.7)
NEUTROPHILS # BLD AUTO: 9.9 K/UL (ref 1.8–7.7)
NEUTROPHILS NFR BLD: 74.6 % (ref 38–73)
NEUTROPHILS NFR BLD: 76.4 % (ref 38–73)
NEUTROPHILS NFR BLD: 78.4 % (ref 38–73)
NRBC BLD-RTO: 0 /100 WBC
PLATELET # BLD AUTO: 269 K/UL (ref 150–450)
PLATELET # BLD AUTO: 391 K/UL (ref 150–450)
PLATELET # BLD AUTO: 392 K/UL (ref 150–450)
PLATELET BLD QL SMEAR: ABNORMAL
PLATELET BLD QL SMEAR: ABNORMAL
PMV BLD AUTO: 10 FL (ref 9.2–12.9)
PMV BLD AUTO: 10.9 FL (ref 9.2–12.9)
PMV BLD AUTO: 9.4 FL (ref 9.2–12.9)
POTASSIUM SERPL-SCNC: 4 MMOL/L (ref 3.5–5.1)
PROT SERPL-MCNC: 7.7 G/DL (ref 6–8.4)
RBC # BLD AUTO: 3.85 M/UL (ref 4.6–6.2)
RBC # BLD AUTO: 3.86 M/UL (ref 4.6–6.2)
RBC # BLD AUTO: 4 M/UL (ref 4.6–6.2)
SODIUM SERPL-SCNC: 136 MMOL/L (ref 136–145)
WBC # BLD AUTO: 12.92 K/UL (ref 3.9–12.7)
WBC # BLD AUTO: 12.97 K/UL (ref 3.9–12.7)
WBC # BLD AUTO: 13.77 K/UL (ref 3.9–12.7)

## 2022-05-24 PROCEDURE — 99900022

## 2022-05-24 PROCEDURE — 25000003 PHARM REV CODE 250: Performed by: INTERNAL MEDICINE

## 2022-05-24 PROCEDURE — 99900026 HC AIRWAY MAINTENANCE (STAT)

## 2022-05-24 PROCEDURE — 25000003 PHARM REV CODE 250: Performed by: HOSPITALIST

## 2022-05-24 PROCEDURE — 27000207 HC ISOLATION

## 2022-05-24 PROCEDURE — 63700000 PHARM REV CODE 250 ALT 637 W/O HCPCS: Performed by: NURSE PRACTITIONER

## 2022-05-24 PROCEDURE — 63600175 PHARM REV CODE 636 W HCPCS: Performed by: INTERNAL MEDICINE

## 2022-05-24 PROCEDURE — 85025 COMPLETE CBC W/AUTO DIFF WBC: CPT | Performed by: NURSE PRACTITIONER

## 2022-05-24 PROCEDURE — 94761 N-INVAS EAR/PLS OXIMETRY MLT: CPT

## 2022-05-24 PROCEDURE — 99232 SBSQ HOSP IP/OBS MODERATE 35: CPT | Mod: ,,, | Performed by: INTERNAL MEDICINE

## 2022-05-24 PROCEDURE — 20000000 HC ICU ROOM

## 2022-05-24 PROCEDURE — C9113 INJ PANTOPRAZOLE SODIUM, VIA: HCPCS | Performed by: NURSE PRACTITIONER

## 2022-05-24 PROCEDURE — 25000003 PHARM REV CODE 250: Performed by: NURSE PRACTITIONER

## 2022-05-24 PROCEDURE — 99900035 HC TECH TIME PER 15 MIN (STAT)

## 2022-05-24 PROCEDURE — 27000221 HC OXYGEN, UP TO 24 HOURS

## 2022-05-24 PROCEDURE — 99233 PR SUBSEQUENT HOSPITAL CARE,LEVL III: ICD-10-PCS | Mod: ,,, | Performed by: INTERNAL MEDICINE

## 2022-05-24 PROCEDURE — 36415 COLL VENOUS BLD VENIPUNCTURE: CPT | Performed by: NURSE PRACTITIONER

## 2022-05-24 PROCEDURE — 63600175 PHARM REV CODE 636 W HCPCS: Performed by: NURSE PRACTITIONER

## 2022-05-24 PROCEDURE — 94003 VENT MGMT INPAT SUBQ DAY: CPT

## 2022-05-24 PROCEDURE — 80053 COMPREHEN METABOLIC PANEL: CPT | Performed by: NURSE PRACTITIONER

## 2022-05-24 PROCEDURE — 99233 SBSQ HOSP IP/OBS HIGH 50: CPT | Mod: ,,, | Performed by: INTERNAL MEDICINE

## 2022-05-24 PROCEDURE — 63600175 PHARM REV CODE 636 W HCPCS

## 2022-05-24 PROCEDURE — 36620 INSERTION CATHETER ARTERY: CPT

## 2022-05-24 PROCEDURE — 25000003 PHARM REV CODE 250: Performed by: SURGERY

## 2022-05-24 PROCEDURE — 99232 PR SUBSEQUENT HOSPITAL CARE,LEVL II: ICD-10-PCS | Mod: ,,, | Performed by: INTERNAL MEDICINE

## 2022-05-24 PROCEDURE — 36600 WITHDRAWAL OF ARTERIAL BLOOD: CPT

## 2022-05-24 RX ORDER — SODIUM CHLORIDE 9 MG/ML
INJECTION, SOLUTION INTRAVENOUS CONTINUOUS
Status: DISCONTINUED | OUTPATIENT
Start: 2022-05-24 | End: 2022-05-28 | Stop reason: HOSPADM

## 2022-05-24 RX ORDER — HEPARIN 100 UNIT/ML
3 SYRINGE INTRAVENOUS ONCE
Status: COMPLETED | OUTPATIENT
Start: 2022-05-24 | End: 2022-05-24

## 2022-05-24 RX ORDER — MORPHINE SULFATE 2 MG/ML
2 INJECTION, SOLUTION INTRAMUSCULAR; INTRAVENOUS
Status: DISCONTINUED | OUTPATIENT
Start: 2022-05-24 | End: 2022-05-28 | Stop reason: HOSPADM

## 2022-05-24 RX ORDER — KETOROLAC TROMETHAMINE 30 MG/ML
15 INJECTION, SOLUTION INTRAMUSCULAR; INTRAVENOUS ONCE
Status: COMPLETED | OUTPATIENT
Start: 2022-05-24 | End: 2022-05-24

## 2022-05-24 RX ADMIN — CHLORHEXIDINE GLUCONATE 0.12% ORAL RINSE 15 ML: 1.2 LIQUID ORAL at 08:05

## 2022-05-24 RX ADMIN — GLYCOPYRROLATE 2 MG: 1 TABLET ORAL at 03:05

## 2022-05-24 RX ADMIN — SODIUM CHLORIDE 500 ML: 0.9 INJECTION, SOLUTION INTRAVENOUS at 10:05

## 2022-05-24 RX ADMIN — BUSPIRONE HYDROCHLORIDE 5 MG: 5 TABLET ORAL at 08:05

## 2022-05-24 RX ADMIN — PANTOPRAZOLE SODIUM 40 MG: 40 INJECTION, POWDER, LYOPHILIZED, FOR SOLUTION INTRAVENOUS at 08:05

## 2022-05-24 RX ADMIN — RILUZOLE 50 MG: 50 TABLET ORAL at 08:05

## 2022-05-24 RX ADMIN — Medication 9 MG: at 09:05

## 2022-05-24 RX ADMIN — ENOXAPARIN SODIUM 80 MG: 80 INJECTION SUBCUTANEOUS at 09:05

## 2022-05-24 RX ADMIN — BACLOFEN 20 MG: 10 TABLET ORAL at 03:05

## 2022-05-24 RX ADMIN — GLYCOPYRROLATE 2 MG: 1 TABLET ORAL at 08:05

## 2022-05-24 RX ADMIN — METOPROLOL TARTRATE 50 MG: 50 TABLET, FILM COATED ORAL at 08:05

## 2022-05-24 RX ADMIN — BUSPIRONE HYDROCHLORIDE 5 MG: 5 TABLET ORAL at 09:05

## 2022-05-24 RX ADMIN — BACLOFEN 20 MG: 10 TABLET ORAL at 07:05

## 2022-05-24 RX ADMIN — RILUZOLE 50 MG: 50 TABLET ORAL at 09:05

## 2022-05-24 RX ADMIN — CHLORHEXIDINE GLUCONATE 0.12% ORAL RINSE 15 ML: 1.2 LIQUID ORAL at 09:05

## 2022-05-24 RX ADMIN — MUPIROCIN: 20 OINTMENT TOPICAL at 09:05

## 2022-05-24 RX ADMIN — Medication 1 ENEMA: at 03:05

## 2022-05-24 RX ADMIN — GLYCOPYRROLATE 2 MG: 1 TABLET ORAL at 09:05

## 2022-05-24 RX ADMIN — SODIUM CHLORIDE: 9 INJECTION, SOLUTION INTRAVENOUS at 07:05

## 2022-05-24 RX ADMIN — ENOXAPARIN SODIUM 80 MG: 80 INJECTION SUBCUTANEOUS at 08:05

## 2022-05-24 RX ADMIN — Medication: at 06:05

## 2022-05-24 RX ADMIN — FUROSEMIDE 40 MG: 10 INJECTION, SOLUTION INTRAVENOUS at 08:05

## 2022-05-24 RX ADMIN — MUPIROCIN: 20 OINTMENT TOPICAL at 08:05

## 2022-05-24 RX ADMIN — Medication 1 ENEMA: at 09:05

## 2022-05-24 RX ADMIN — HEPARIN SODIUM (PORCINE) LOCK FLUSH IV SOLN 100 UNIT/ML 300 UNITS: 100 SOLUTION at 06:05

## 2022-05-24 RX ADMIN — FUROSEMIDE 40 MG: 10 INJECTION, SOLUTION INTRAVENOUS at 09:05

## 2022-05-24 RX ADMIN — Medication: at 10:05

## 2022-05-24 RX ADMIN — POLYETHYLENE GLYCOL (3350) 17 G: 17 POWDER, FOR SOLUTION ORAL at 08:05

## 2022-05-24 RX ADMIN — KETOROLAC TROMETHAMINE 15 MG: 30 INJECTION, SOLUTION INTRAMUSCULAR at 09:05

## 2022-05-24 RX ADMIN — ALTEPLASE 2 MG: 2.2 INJECTION, POWDER, LYOPHILIZED, FOR SOLUTION INTRAVENOUS at 02:05

## 2022-05-24 RX ADMIN — BACLOFEN 20 MG: 10 TABLET ORAL at 09:05

## 2022-05-24 NOTE — PROGRESS NOTES
[facility] Hospital Medicine  Progress Note    Patient Name: Wei Dominique  MRN: [id number]  Patient Class: IP- Inpatient   Admission Date: 5/22/2022  Length of Stay: 2 days  Attending Physician: Lauren Rodney MD  Primary Care Provider: Mack Suero MD        Subjective:     Principal Problem:Anemia        HPI:  Wei Dominique is a 66 year old male with past medical history of ALS, s/p trach & peg, vent dependent, HTN, PE (on eliquis) presented to hospital from [facility] for an evaluation of his tracheostomy. HPI limited due to patient's non verbal status, and information obtained from ED and nursing home notes. Per ED notes, the respiratory tech at [place] was having difficulty passing a suction catheter through the trach, leading to concerns for a blockage. The cannula was replaced several times, and he had minimal bleeding from the site. He did not have a large amount of bleeding at the time, and there was no bleeding upon arrival to the ED. He was sent for possible tracheostomy change.    Upon arrival to the ED, the patient was suctioned with very little difficulty and no evidence of obstruction was noted. Baseline labs were obtained, where the patient was found to be severely anemic, with a hemoglobin of 3.3 and a hematocrit of 10.9. His baseline H/H on review of the medical record appears to be around 8. He was given emergency release blood in the ED, where his blood pressure maintained and his H/H improved with subsequent lab draws. There was no obvious source of bleeding, as his urine did not show hematuria, there was no active bleeding from the trach site and there were no reports of hematemesis, hematochezia or dark stools. His abdomen is significantly distended with hypoactive bowel sounds.     A CT of the chest, abdomen and pelvis was obtained by [facility] medicine, which showed:   1. Ground-glass opacities in the lingula and aerated portion of the right  lower lobe.  An infectious process cannot be entirely excluded. Complete right and moderate left bibasilar atelectasis.  2. 2 mm right upper lobe pulmonary nodule.  For a solid nodule <6 mm, Fleischner Society 2017 guidelines recommend no routine follow up for a low risk patient, or follow-up with non-contrast chest CT at 12 months in a high risk patient.  3. Innumerable bilateral renal cysts.  Findings may suggest history of adult polycystic kidney disease. Hepatic cysts.  4. Cholelithiasis.  5. Gaseous distension of the colon.  Patulous rectum distended with fecal material.  Presacral infiltration or edema.  Findings may suggest fecal impaction and or stercoral colitis.  6. Edematous appearance of the arms.    Hospital Medicine consulted for admission and further management.        Overview/Hospital Course:  No notes on file    Interval History: Patient seen and examined. Cleared for dishcarge from pulm perspective per Dr. Ashley. Currently getting enemas q 4 per GI to help with impaction. Thought to have stercoral colitis.     Review of Systems   Unable to perform ROS: Patient nonverbal   Objective:     Vital Signs (Most Recent):  Temp: 98.7 °F (37.1 °C) (05/24/22 0830)  Pulse: 78 (05/24/22 0930)  Resp: 16 (05/24/22 0930)  BP: 123/73 (05/24/22 0900)  SpO2: 99 % (05/24/22 0930) Vital Signs (24h Range):  Temp:  [98.2 °F (36.8 °C)-99.1 °F (37.3 °C)] 98.7 °F (37.1 °C)  Pulse:  [] 78  Resp:  [16-28] 16  SpO2:  [98 %-100 %] 99 %  BP: ()/(49-92) 123/73     Weight: 73.3 kg (161 lb 9.6 oz)  Body mass index is 24.57 kg/m².    Intake/Output Summary (Last 24 hours) at 5/24/2022 1128  Last data filed at 5/24/2022 0800  Gross per 24 hour   Intake 1498.07 ml   Output 1630 ml   Net -131.93 ml      Physical Exam  Vitals and nursing note reviewed.   Constitutional:       General: He is awake. He is not in acute distress.     Appearance: He is normal weight. He is ill-appearing (chronically ill appearing, sickly).   HENT:       Mouth/Throat:      Lips: Pink.      Mouth: Mucous membranes are moist.      Comments: Drooling from mouth  Neck:      Trachea: Tracheostomy present.      Comments: Trach site appears intact  Cardiovascular:      Rate and Rhythm: Normal rate.      Heart sounds: Normal heart sounds, S1 normal and S2 normal.      Comments: +anasarca  Pulmonary:      Effort: Pulmonary effort is normal.      Breath sounds: Transmitted upper airway sounds present. Examination of the right-lower field reveals decreased breath sounds. Examination of the left-lower field reveals decreased breath sounds. Decreased breath sounds present.   Chest:       Abdominal:      General: Abdomen is protuberant. Bowel sounds are decreased. There is distension.      Tenderness: There is no abdominal tenderness.   Genitourinary:     Comments: [place]  Musculoskeletal:      Cervical back: Full passive range of motion without pain and neck supple.      Right lower leg: Edema present.      Left lower leg: Edema present.   Skin:     General: Skin is warm and dry.      Capillary Refill: Capillary refill takes 2 to 3 seconds.   Neurological:      Mental Status: He is alert. Mental status is at baseline.      Comments: Quadraparesis  Non verbal but does communicate well via eye blinks, one blink yes, two blinks no         Significant Labs: All pertinent labs within the past 24 hours have been reviewed.    Significant Imaging: I have reviewed all pertinent imaging results/findings within the past 24 hours.        Assessment/Plan:      * Anemia  Patient's anemia is currently uncontrolled. Has recieved 2 units of PRBCs on 5/22/22. Etiology likely d/t unknown source  Current CBC reviewed-   Lab Results   Component Value Date    HGB 10.2 (L) 05/24/2022    HCT 31.9 (L) 05/24/2022     Monitor serial CBC and transfuse if patient becomes hemodynamically unstable, symptomatic or H/H drops below 7/21.     Baseline around 8, Hemoglobin on admit 3.3. no obvious bleeding  sites          Aspiration syndrome        Abdominal distention  Significant distention noted, CT of abdomen shows: In the pelvis, there is gaseous distension of the colon.  The sigmoid colon is redundant.  The patulous rectum is distended with hyperdense fecal material.  There is presacral edema or infiltration.  There are no pelvic masses or adenopathy.  There is no free pelvic fluid.  GI consulted - thought to have stercoral colitis from fecal impaction  Enemas ordered      Dependent on ventilator  Noted, pulmonary consult      Right lower lobe consolidation  Appears to be chronic when reviewing previous radiology studies  Follow respiratory culture, concern he colonizes  Afebrile, will hold abx until cultures result      S/P percutaneous endoscopic gastrostomy (PEG) tube placement  Noted, trach care      Edema    Anasarca noted all over body  Continue lasix    Quadriparesis  Noted  Assist with ADLs, turn Q2H and monitor skin  PT/OT consulted      ALS (amyotrophic lateral sclerosis)    Noted  Trach and Peg dependent  Assist with ADLs, turn Q2H and monitor skin  Continue baclofen as ordered  Riluzole ordered    Essential hypertension  Chronic, controlled.  Latest blood pressure and vitals reviewed-   Temp:  [97.8 °F (36.6 °C)-98.1 °F (36.7 °C)]   Pulse:  []   Resp:  [16-20]   BP: ()/()   SpO2:  [97 %-100 %] .   Home meds for hypertension were reviewed and noted below.   Hypertension Medications             furosemide (LASIX) 40 MG tablet 1 tablet (40 mg total) by Per G Tube route once daily.    metoprolol tartrate (LOPRESSOR) 50 MG tablet Take 50 mg by mouth once daily.          While in the hospital, will manage blood pressure as follows; Continue home antihypertensive regimen    Will utilize p.r.n. blood pressure medication only if patient's blood pressure greater than  180/110 and he develops symptoms such as worsening chest pain or shortness of breath.          VTE Risk Mitigation (From  admission, onward)         Ordered     enoxaparin injection 80 mg  Every 12 hours (non-standard times)         05/23/22 1700     Place sequential compression device  Until discontinued         05/22/22 2222     Reason for No Pharmacological VTE Prophylaxis  Once        Question:  Reasons:  Answer:  Risk of Bleeding    05/22/22 2222     IP VTE LOW RISK PATIENT  Once         05/22/22 2222                Discharge Planning   VENESSA: 5/28/2022     Code Status: Full Code   Is the patient medically ready for discharge?:     Reason for patient still in hospital (select all that apply): Patient trending condition and Treatment  Discharge Plan A: Return to nursing home            Critical care time spent on the evaluation and treatment of severe organ dysfunction, review of pertinent labs and imaging studies, discussions with consulting providers and discussions with patient/family: 45 minutes.      Lauren Rodney MD  Department of Hospital Medicine   [facility]

## 2022-05-24 NOTE — CARE UPDATE
05/24/22 0711   Patient Assessment/Suction   Level of Consciousness (AVPU) alert   Respiratory Effort Normal   Expansion/Accessory Muscles/Retractions expansion symmetric   All Lung Fields Breath Sounds Anterior:   SIM Breath Sounds rhonchi   RUL Breath Sounds rhonchi   Rhythm/Pattern, Respiratory assisted mechanically   Cough Frequency with stimulation   Cough Type assisted   Suction Method tracheal   Suction Pressure (mmHg) 100 mmHg   $ Suction Charges Inline Suction Procedure Stat Charge   Secretions Amount moderate   Secretions Color cloudy;white   Secretions Characteristics thick   PRE-TX-O2   O2 Device (Oxygen Therapy) ventilator   $ Is the patient on Low Flow Oxygen? Yes   Oxygen Concentration (%) 40   SpO2 100 %   Pulse Oximetry Type Continuous   $ Pulse Oximetry - Multiple Charge Pulse Oximetry - Multiple   Pulse 107   Resp 18   Positioning HOB elevated 30 degrees   Cough Assist   $ Cough Assist Administration Done   Daily Review of Necessity (Cough Assist) completed   Route (Cough Assist) trach tube   Control (Cough Assist) automatic   Inhale Time (Sec) 2   Exhale Time (Sec) 2   Pause (Sec) 2   Inhale Positive Pressure (cm H2O) 25   Exhale Pressure (cm H2O) -25   Cough Cycles 4   Patient Position (Cough Assist) HOB elevated   Post Treatment Assessment (Cough Assist) increased aeration   Signs of Intolerance (Cough Assist) none   Skin Integrity   $ Wound Care Tech Time 15 min   Area Observed Neck under tracheostomy   Skin Appearance redness blanchable        Surgical Airway 02/21/22 1145 Cuffed   Placement Date/Time: 02/21/22 1145   Present Prior to Hospital Arrival?: Yes  Type: Tracheostomy  Airway Device Size: 8.0  Style: Cuffed   Cuff Pressure 30 cm H2O   Cuff Inflation? Inflated   Speaking Valve Usage Not wearing   Status Secured   Site Assessment Oozing secretions   Site Care Protective barrier to skin   Ties Assessment Intact;Secure   General Safety Checklist   Safety Promotion/Fall Prevention side  rails raised   Airway Safety   Ambu bag with the patient? Yes, Adult Ambu   Is mask with the patient? Yes, Adult Mask   ETT at Bedside? Yes   ETT Size 8   Vent Select   Conventional Vent Y   $ Ventilator Subsequent 1   Charged w/in last 24h YES   Preset Conventional Ventilator Settings   Vent Type    Ventilation Type VC   Vent Mode A/C   Humidity HME   Set Rate 16 BPM   Vt Set 500 mL   PEEP/CPAP 5 cmH20   Pressure Support 0 cmH20   Waveform RAMP   Peak Flow 50 L/min   Set Inspiratory Pressure 0 cmH20   Insp Time 0 Sec(s)   Plateau Set/Insp. Hold (sec) 0   Insp Rise Time  0 %   I-Trigger Type  Flow   Trigger Sensitivity Flow/I-Trigger 3 L/min   P High 0 cm H2O   P Low 0 cm H2O   T High 0 sec   T Low 0 sec   Patient Ventilator Parameters   Resp Rate Total 17 br/min   Peak Airway Pressure 19 cmH2O   Mean Airway Pressure 10 cmH20   Plateau Pressure 0 cmH20   Exhaled Vt 513 mL   Total Ve 8.4 mL   Spont Ve 0 L   I:E Ratio Measured 1:1.90   Conventional Ventilator Alarms   Alarms On Y   Ve High Alarm 20 L/min   Resp Rate High Alarm 45 br/min   Press High Alarm 45 cmH2O   Apnea Rate 20   Apnea Volume (mL) 500 mL   Apnea Oxygen Concentration  100   Apnea Flow Rate (L/min) 60   T Apnea 20 sec(s)   IHI Ventilator Associated Pneumonia Bundle (Required)   Oral Care Suction toothette toothbrush;Suction toothette   Ready to Wean/Extubation Screen   FIO2<=50 (chart decimal) 0.4   MV<16L (chart vol.) 8.4   PEEP <=8 (chart #) 5   Ready to Wean Parameters   F/VT Ratio<105 (RSBI) (!) 35.09   POx, TC BID, Cough assist BID.  All alarms set and functioning.

## 2022-05-24 NOTE — PROGRESS NOTES
05/24/2022      Admit Date: 5/22/2022  Wei Dominique  New Patient Consult    Chief Complaint   Patient presents with    Tracheostomy Tube Change     From [facility] for trach tube change, trach obstruction; currently on blood thinners for DVT to right arm        History of Present Illness:   Discussed with wife.  Pt had pe past and has been off and on anticoagg-- wife not aware of sign bleeding issues. She notes some excess secretions . No issues known to wife with trach.  Pt indicates has not had trach changed for over a month.   Pt admits to excess secretions and muscle spasm pains.         Pt had been in [place] recent past, ended up at [facility] for chr vent support. Pt presented in April after cardiac arrest and cpr-- was in septic shock, blood + esbl e coli.     Pt could open eyes but was non verBAL when [place] [facility] [place].  Pt was in [place] Oct 2020-- had been living at home with no trach,  with sitters at that time.  H/o saddle pe in March 2020.  Ended with trach November 2020.      Pt was off [place] [facility] [place]  Pt was on eliquis 2/21/2022 admit and was to continue at [facility].      From hpi TUSHAR Cao:  HPI: Wei Dominique is a 66 year old male with past medical history of ALS, s/p trach & peg, vent dependent, HTN, PE (on eliquis) presented to hospital from [facility] for an evaluation of his tracheostomy. HPI limited due to patient's non verbal status, and information obtained from ED and nursing home notes. Per ED notes, the respiratory tech at [facility] was having difficulty passing a suction catheter through the trach, leading to concerns for a blockage. The cannula was replaced several times, and he had minimal bleeding from the site. He did not have a large amount of bleeding at the time, and there was no bleeding upon arrival to the ED. He was sent for possible tracheostomy change.     Upon arrival to the ED, the patient was suctioned with very little  difficulty and no evidence of obstruction was noted. Baseline labs were obtained, where the patient was found to be severely anemic, with a hemoglobin of 3.3 and a hematocrit of 10.9. His baseline H/H on review of the medical record appears to be around 8. He was given emergency release blood in the ED, where his blood pressure maintained and his H/H improved with subsequent lab draws. There was no obvious source of bleeding, as his urine did not show hematuria, there was no active bleeding from the trach site and there were no reports of hematemesis, hematochezia or dark stools. His abdomen is significantly distended with hypoactive bowel sounds.      A CT of the chest, abdomen and pelvis was obtained by hospital medicine, which showed:   1. Ground-glass opacities in the lingula and aerated portion of the right lower lobe.  An infectious process cannot be entirely excluded. Complete right and moderate left bibasilar atelectasis.  2. 2 mm right upper lobe pulmonary nodule.  For a solid nodule <6 mm, Fleischner Society 2017 guidelines recommend no routine follow up for a low risk patient, or follow-up with non-contrast chest CT at 12 months in a high risk patient.  3. Innumerable bilateral renal cysts.  Findings may suggest history of adult polycystic kidney disease. Hepatic cysts.  4. Cholelithiasis.  5. Gaseous distension of the colon.  Patulous rectum distended with fecal material.  Presacral infiltration or edema.  Findings may suggest fecal impaction and or stercoral colitis.  6. Edematous appearance of the arms.     Hospital Medicine consulted for admission and further management.      Progress Note  PULMONARY    Admit Date: 5/22/2022 05/24/2022      SUBJECTIVE:     5/24 no c/o, less spasm, oral secretions.         PFSH and Allergies reviewed.    OBJECTIVE:     Vitals (Most recent):  Vitals:    05/24/22 0730   BP:    Pulse: 109   Resp: (!) 22   Temp:        Vitals (24 hour range):  Temp:  [98.2 °F (36.8  °C)-99.1 °F (37.3 °C)]   Pulse:  []   Resp:  [16-28]   BP: ()/(49-92)   SpO2:  [98 %-100 %]       Intake/Output Summary (Last 24 hours) at 5/24/2022 0752  Last data filed at 5/24/2022 0400  Gross per 24 hour   Intake 1088.07 ml   Output 725 ml   Net 363.07 ml          Physical Exam:  The patient's neuro status (alertness,orientation,cognitive function,motor skills,), pharyngeal exam (oral lesions, hygiene, abn dentition,), Neck (jvd,mass,thyroid,nodes in neck and above/below clavicle),RESPIRATORY(symmetry,effort,fremitus,percussion,auscultation),  Cor(rhythm,heart tones including gallops,perfusion,edema)ABD(distention,hepatic&splenomegaly,tenderness,masses), Skin(rash,cyanosis),Psyc(affect,judgement,).  Exam negative except for these pertinent findings:    Alert, interacts with eye blinks - 1 yea, 2 no, chest is symmetric, no distress, normal percussion, normal fremitus and good normal breath sounds      Radiographs reviewed: view by direct vision    Results for orders placed during the hospital encounter of 02/21/22    X-Ray Chest 1 View    Narrative  EXAMINATION:  XR CHEST 1 VIEW    CLINICAL HISTORY:  confirm trach position;    TECHNIQUE:  Single frontal view of the chest was performed.    COMPARISON:  None    FINDINGS:  The tip of a left chest wall port overlies the superior vena cava.  There is a tracheostomy.  The lungs are somewhat expiratory, which may be exaggerating the pulmonary vascularity.  There is a small right pleural effusion.  The heart size appears normal.  There is mild calcification of the aortic knob consistent with atherosclerotic stigmata.    Impression  Lines and tubes as above.  Small right pleural effusion.      Electronically signed by: Olive Juarez  Date:    02/21/2022  Time:    09:08  ]    Labs     Recent Labs   Lab 05/24/22  0608   WBC 12.92*   HGB 9.8*   HCT 32.4*        Recent Labs   Lab 05/24/22  0608      K 4.0      CO2 20*   BUN 27*   CREATININE 0.6    GLU 88   CALCIUM 9.6   AST 18   ALT 20   ALKPHOS 63   BILITOT 0.6   PROT 7.7   ALBUMIN 2.7*   No results for input(s): PH, PCO2, PO2, HCO3 in the last 24 hours.  Microbiology Results (last 7 days)     Procedure Component Value Units Date/Time    Culture, Respiratory with Gram Stain [[id number]] Collected: 05/23/22 0111    Order Status: Completed Specimen: Respiratory from Sputum Updated: 05/23/22 2032     Gram Stain (Respiratory) <10 epithelial cells per low power field.     Gram Stain (Respiratory) Rare WBC's     Gram Stain (Respiratory) Few Gram positive cocci     Gram Stain (Respiratory) Few Gram positive rods      Gram Stain (Respiratory) Rare Gram negative rods    Culture, Respiratory with Gram Stain [[id number]] Collected: 05/22/22 1519    Order Status: Completed Specimen: Respiratory from Tracheal Aspirate Updated: 05/23/22 0252     Gram Stain (Respiratory) <10 epithelial cells per low power field.     Gram Stain (Respiratory) Many WBC's     Gram Stain (Respiratory) Rare Gram positive cocci     Gram Stain (Respiratory) Rare Gram negative rods          Impression:  Active Hospital Problems    Diagnosis  POA    *Anemia [D64.9]  Yes    History of pulmonary embolism [Z86.711]  Yes    Tracheostomy in place [Z93.0]  Not Applicable    Chronic respiratory failure with hypoxia and hypercapnia [J96.11, J96.12]  Yes    Atelectasis [J98.11]  Yes    Muscle spasm [M62.838]  Yes    Aspiration syndrome [T17.900A]  Yes    Fecal impaction in rectum [K56.41]  Yes    Abdominal distention [R14.0]  Yes    Dependent on ventilator [Z99.11]  Not Applicable    Right lower lobe consolidation [J18.1]  Yes    S/P percutaneous endoscopic gastrostomy (PEG) tube placement [Z93.1]  Not Applicable    Essential hypertension [I10]  Yes    ALS (amyotrophic lateral sclerosis) [G12.21]  Yes    Edema [R60.9]  Yes    Quadriparesis [G82.50]  Yes      Resolved Hospital Problems   No resolved problems to display.            .    Plan:    5/23 p to 131 - 117,   40% ox,   No fever, vss,     Ct chest 5/22 with chr looking rll collpase, trach position seems good  procal 0.96,       No anticoagg h/o saddle pe with als.          Will order cough assist as pt feels secretion issues.  Will change trach, check arms/legs, and resume anticoagg.     Monitor and consider return to [place].      Will titrate up baclofen and increase robinul.        bp was low at admit,     Monitor.    Discussed with wife.      Trach changed - scant bleeding.  Would start lovenox therapeutic at 10 pm.  inr 1.9 may not be therapeutic.  Wife not aware of prior saddle pe nor he being off anticoagg nor sign bleed.  Work to get back to [place].        5/24 no fever, bp low ppt art line - blood draws difficult also.    Sl tachy. No dvt.      Would do best with cough assist.    With h/o saddle embolism-- would continue anticoagg.  Might consider low dose eliquis???    Would keep robinul and baclofen doses up as pt relates less copious oral secretions and muscle spasm pain is better.     Recommend return to [place].  Changes seen on ct are not specific and require no treatment.

## 2022-05-24 NOTE — ASSESSMENT & PLAN NOTE
Significant distention noted, CT of abdomen shows: In the pelvis, there is gaseous distension of the colon.  The sigmoid colon is redundant.  The patulous rectum is distended with hyperdense fecal material.  There is presacral edema or infiltration.  There are no pelvic masses or adenopathy.  There is no free pelvic fluid.  GI consulted - thought to have stercoral colitis from fecal impaction  Enemas ordered

## 2022-05-24 NOTE — ASSESSMENT & PLAN NOTE
Patient's anemia is currently uncontrolled. Has recieved 2 units of PRBCs on 5/22/22. Etiology likely d/t unknown source  Current CBC reviewed-   Lab Results   Component Value Date    HGB 10.2 (L) 05/24/2022    HCT 31.9 (L) 05/24/2022     Monitor serial CBC and transfuse if patient becomes hemodynamically unstable, symptomatic or H/H drops below 7/21.     Baseline around 8, Hemoglobin on admit 3.3. no obvious bleeding sites

## 2022-05-24 NOTE — PLAN OF CARE
05/23/22 1959   Patient Assessment/Suction   Level of Consciousness (AVPU) alert   Respiratory Effort Normal;Unlabored   Expansion/Accessory Muscles/Retractions no retractions;no use of accessory muscles   All Lung Fields Breath Sounds coarse;Anterior:;Lateral:   Rhythm/Pattern, Respiratory assisted mechanically   Cough Frequency unable to cough   Cough Type assisted   $ Suction Charges Inline Suction Procedure Stat Charge   Secretions Amount moderate   Secretions Color red   Secretions Characteristics thick   PRE-TX-O2   O2 Device (Oxygen Therapy) ventilator   $ Is the patient on Low Flow Oxygen? Yes   Oxygen Concentration (%) 100   SpO2 100 %   Pulse Oximetry Type Continuous   $ Pulse Oximetry - Multiple Charge Pulse Oximetry - Multiple   Pulse 107   Resp (!) 28   ETCO2   $ ETCO2 Usage Currently wearing   ETCO2 (mmHg) 26 mmHg   ETCO2 Device Type Portable Bedside Monitor   Cough Assist   $ Cough Assist Administration Done   Daily Review of Necessity (Cough Assist) completed   Route (Cough Assist) trach tube   Control (Cough Assist) automatic   Inhale Time (Sec) 2   Exhale Time (Sec) 2   Pause (Sec) 2   Inhale Positive Pressure (cm H2O) 25   Exhale Pressure (cm H2O) 25   Cough Cycles 3   Patient Position (Cough Assist) HOB elevated   Post Treatment Assessment (Cough Assist) breath sounds unchanged   Signs of Intolerance (Cough Assist) none   Skin Integrity   $ Wound Care Tech Time 15 min   Area Observed Neck under tracheostomy   Skin Appearance redness blanchable   Barrier used? Other (see comments)  (split gauze)        Surgical Airway 02/21/22 1145 Cuffed   Placement Date/Time: 02/21/22 1145   Present Prior to Hospital Arrival?: Yes  Type: Tracheostomy  Airway Device Size: 8.0  Style: Cuffed   Cuff Pressure 30 cm H2O   Cuff Inflation? Inflated   Speaking Valve Usage Not wearing   Status Secured   Site Assessment Oozing secretions   Ties Assessment Clean;Dry;Intact   Airway Safety   Ambu bag with the patient?  Yes, Adult Ambu   Is mask with the patient? Yes, Adult Mask   Vent Select   Conventional Vent Y   Charged w/in last 24h YES   Preset Conventional Ventilator Settings   Vent Type    Ventilation Type VC   Vent Mode A/C   Humidity HME   Set Rate 16 BPM   Vt Set 500 mL   PEEP/CPAP 5 cmH20   Pressure Support 0 cmH20   Waveform RAMP   Peak Flow 50 L/min   Set Inspiratory Pressure 0 cmH20   Insp Time 0 Sec(s)   Plateau Set/Insp. Hold (sec) 0   Insp Rise Time  0 %   Trigger Sensitivity Flow/I-Trigger 3 L/min   P High 0 cm H2O   P Low 0 cm H2O   T High 0 sec   T Low 0 sec   Patient Ventilator Parameters   Resp Rate Total 26 br/min   Peak Airway Pressure 25 cmH2O   Mean Airway Pressure 14 cmH20   Plateau Pressure 0 cmH20   Exhaled Vt 548 mL   Total Ve 13.3 mL   Spont Ve 0 L   I:E Ratio Measured 1.10:1   Conventional Ventilator Alarms   Alarms On Y   Ve High Alarm 20 L/min   Resp Rate High Alarm 45 br/min   Press High Alarm 45 cmH2O   Apnea Rate 20   Apnea Volume (mL) 500 mL   Apnea Oxygen Concentration  100   Apnea Flow Rate (L/min) 60   T Apnea 20 sec(s)   Ready to Wean/Extubation Screen   FIO2<=50 (chart decimal) (!) 1   MV<16L (chart vol.) 13.3   PEEP <=8 (chart #) 5   Ready to Wean Parameters   F/VT Ratio<105 (RSBI) (!) 51.09

## 2022-05-24 NOTE — PROGRESS NOTES
"  CC: abdominal distention    HPI 66 y.o. male with past medical history of ALS, s/p trach & peg, vent dependent, HTN, PE (on eliquis) presented to hospital from [facility] for an evaluation of his tracheostomy. Upon arrival to the ED, the patient was suctioned with very little difficulty and no evidence of obstruction was noted. Baseline labs were obtained, where the patient was found to be severely anemic, with a hemoglobin of 3.3 and a hematocrit of 10.9. His baseline H/H on review of the medical record appears to be around 8. No obvious source of bleeding and were no reports of hematemesis, hematochezia or dark stools. His abdomen is significantly distended with hypoactive bowel sounds.     GI was consulted for evaluation of abnormal imaging, fecal impaction. Received 2 units for low H/H and today's returned 9.8/30.4.    Interval hx:  Written for brown bomb enema x 2. Reported to have liquid brown stool but otherwise no solid stools. Continuing with enemas [id number].    Past Medical History:   Diagnosis Date    ALS (amyotrophic lateral sclerosis)     ALS (amyotrophic lateral sclerosis)     Erectile dysfunction     Gait disturbance     Hyperlipidemia     Hypertension     Iron deficiency anemia     Motor neuron disease     Tremor      Review of Systems  Unable to obtain due to trach    Physical Examination  /75   Pulse 98   Temp 98.4 °F (36.9 °C) (Axillary)   Resp 17   Ht 5' 8" (1.727 m)   Wt 73.3 kg (161 lb 9.6 oz)   SpO2 99%   BMI 24.57 kg/m²   General appearance: alert, no distress, lying in bed  HENT: trach in place, Normocephalic, atraumatic, no nasal discharge   Eyes: conjunctivae/corneas clear, PERRL, EOM's intact  Lungs: vented breath sounds, symmetric chest wall expansion bilaterally  Heart: regular rate and rhythm without rub; no displacement of the PMI   Abdomen: PEG in place, distention noted, no tenderness to palpation; bowel sounds present; no " organomegaly  Extremities: extremities without clubbing, cyanosis, or edema  Integument: Skin color, texture, turgor normal; no rashes; hair distrubution normal  Neurologic: non-verbal, blinks eyes to answer questions    Labs:  Lab Results   Component Value Date    WBC 12.97 (H) 05/24/2022    HGB 10.2 (L) 05/24/2022    HCT 31.9 (L) 05/24/2022    MCV 83 05/24/2022     05/24/2022     CMP  Sodium   Date Value Ref Range Status   05/24/2022 136 136 - 145 mmol/L Final     Potassium   Date Value Ref Range Status   05/24/2022 4.0 3.5 - 5.1 mmol/L Final     Chloride   Date Value Ref Range Status   05/24/2022 105 95 - 110 mmol/L Final     CO2   Date Value Ref Range Status   05/24/2022 20 (L) 23 - 29 mmol/L Final     Glucose   Date Value Ref Range Status   05/24/2022 88 70 - 110 mg/dL Final     BUN   Date Value Ref Range Status   05/24/2022 27 (H) 8 - 23 mg/dL Final     Creatinine   Date Value Ref Range Status   05/24/2022 0.6 0.5 - 1.4 mg/dL Final     Calcium   Date Value Ref Range Status   05/24/2022 9.6 8.7 - 10.5 mg/dL Final     Total Protein   Date Value Ref Range Status   05/24/2022 7.7 6.0 - 8.4 g/dL Final     Albumin   Date Value Ref Range Status   05/24/2022 2.7 (L) 3.5 - 5.2 g/dL Final     Total Bilirubin   Date Value Ref Range Status   05/24/2022 0.6 0.1 - 1.0 mg/dL Final     Comment:     For infants and newborns, interpretation of results should be based  on gestational age, weight and in agreement with clinical  observations.    Premature Infant recommended reference ranges:  Up to 24 hours.............<8.0 mg/dL  Up to 48 hours............<12.0 mg/dL  3-5 days..................<15.0 mg/dL  6-29 days.................<15.0 mg/dL       Alkaline Phosphatase   Date Value Ref Range Status   05/24/2022 63 55 - 135 U/L Final     AST   Date Value Ref Range Status   05/24/2022 18 10 - 40 U/L Final     ALT   Date Value Ref Range Status   05/24/2022 20 10 - 44 U/L Final     Anion Gap   Date Value Ref Range Status    05/24/2022 11 8 - 16 mmol/L Final     eGFR if    Date Value Ref Range Status   05/24/2022 >60 >60 mL/min/1.73 m^2 Final     eGFR if non    Date Value Ref Range Status   05/24/2022 >60 >60 mL/min/1.73 m^2 Final     Comment:     Calculation used to obtain the estimated glomerular filtration  rate (eGFR) is the CKD-EPI equation.        Imaging:  CT Chest/Abd/Pelvis:  Ground-glass opacities in the lingula and aerated portion of the right lower lobe.  An infectious process cannot be entirely excluded. Complete right and moderate left bibasilar atelectasis.     2 mm right upper lobe pulmonary nodule.  For a solid nodule <6 mm, Fleischner Society 2017 guidelines recommend no routine follow up for a low risk patient, or follow-up with non-contrast chest CT at 12 months in a high risk patient.     Innumerable bilateral renal cysts.  Findings may suggest history of adult polycystic kidney disease. Hepatic cysts.     Cholelithiasis.     Gaseous distension of the colon.  Patulous rectum distended with fecal material.  Presacral infiltration or edema.  Findings may suggest fecal impaction and or stercoral colitis.     Edematous appearance of the arms.     Independently reviewed    Assessment:   1. Abdominal pain  2. Abnormal imaging of GI tract  3. Fecal impaction    Plan:   -Brown bomb enemas alternating with fleets enemas every 4 hours  -Miralax 17 g per PEG daily  -Avoid tube feeds for now until impaction resolved  -This is most likely stercoral colitis in the setting of fecal impaction  -If has BM, plan KUB this evening    Prasanth Grimaldo MD  [facility]  [address] [place] [address]  Office: [phone]  Fax: [phone]

## 2022-05-24 NOTE — SUBJECTIVE & OBJECTIVE
Interval History: Patient seen and examined. Cleared for dishcarge from pulm perspective per Dr. Ashley. Currently getting enemas q 4 per GI to help with impaction. Thought to have stercoral colitis.     Review of Systems   Unable to perform ROS: Patient nonverbal   Objective:     Vital Signs (Most Recent):  Temp: 98.7 °F (37.1 °C) (05/24/22 0830)  Pulse: 78 (05/24/22 0930)  Resp: 16 (05/24/22 0930)  BP: 123/73 (05/24/22 0900)  SpO2: 99 % (05/24/22 0930) Vital Signs (24h Range):  Temp:  [98.2 °F (36.8 °C)-99.1 °F (37.3 °C)] 98.7 °F (37.1 °C)  Pulse:  [] 78  Resp:  [16-28] 16  SpO2:  [98 %-100 %] 99 %  BP: ()/(49-92) 123/73     Weight: 73.3 kg (161 lb 9.6 oz)  Body mass index is 24.57 kg/m².    Intake/Output Summary (Last 24 hours) at 5/24/2022 1128  Last data filed at 5/24/2022 0800  Gross per 24 hour   Intake 1498.07 ml   Output 1630 ml   Net -131.93 ml      Physical Exam  Vitals and nursing note reviewed.   Constitutional:       General: He is awake. He is not in acute distress.     Appearance: He is normal weight. He is ill-appearing (chronically ill appearing, sickly).   HENT:      Mouth/Throat:      Lips: Pink.      Mouth: Mucous membranes are moist.      Comments: Drooling from mouth  Neck:      Trachea: Tracheostomy present.      Comments: Trach site appears intact  Cardiovascular:      Rate and Rhythm: Normal rate.      Heart sounds: Normal heart sounds, S1 normal and S2 normal.      Comments: +anasarca  Pulmonary:      Effort: Pulmonary effort is normal.      Breath sounds: Transmitted upper airway sounds present. Examination of the right-lower field reveals decreased breath sounds. Examination of the left-lower field reveals decreased breath sounds. Decreased breath sounds present.   Chest:       Abdominal:      General: Abdomen is protuberant. Bowel sounds are decreased. There is distension.      Tenderness: There is no abdominal tenderness.   Genitourinary:     Comments: [place]  Musculoskeletal:       Cervical back: Full passive range of motion without pain and neck supple.      Right lower leg: Edema present.      Left lower leg: Edema present.   Skin:     General: Skin is warm and dry.      Capillary Refill: Capillary refill takes 2 to 3 seconds.   Neurological:      Mental Status: He is alert. Mental status is at baseline.      Comments: Quadraparesis  Non verbal but does communicate well via eye blinks, one blink yes, two blinks no         Significant Labs: All pertinent labs within the past 24 hours have been reviewed.    Significant Imaging: I have reviewed all pertinent imaging results/findings within the past 24 hours.

## 2022-05-24 NOTE — PLAN OF CARE
Plan of Care:    Aspiration precautions: patient in upright position during oral intake    Trach: inner cannula replaced yesterday; monitor vital signs, respiratory status, and signs of bleeding    Bleeding precautions: patient receiving Lovenox; monitor for signs of bleeding and excessive bruising.     Cardiac Monitoring: monitor and document vital signs per unit protocol; notify physician of significant changes in vital signs    Fall precautions: Bed alarm activated    Safety precautions: Call light and personal belongings within reach, wheels locked and bed in lowest position    Moisture Management: incontinence pad changed as needed; skin to remain clean, dry, and intact    Sacral Protection: mepilex dressing to sacral noted to be clean, dry, and intact    Shift Summary:   Bedside report received from off-going shift. Brown Bomb enema given by previous shift. Bed bath, perineal care, and catheter care performed. Patient tolerated fair; patient noted to be hypotensive. 2227: eICU notified for an arterial line. Respiratory therapist unsuccessful with line placement. 0029: Hospitalist NP notified about the daly; new orders received to access left chest chemport. Chemport accessed via sterile procedure; no blood return noted. 0145: Hospitalist NP notified about no blood return from chestport; new orders received for Cathflo. 0415: Cathflo instilled for 2 hours; no blood return noted. 0607:  Hospitalist NP notified about the chemport; new orders received for heparin to deaess port.

## 2022-05-24 NOTE — ASSESSMENT & PLAN NOTE
Noted  Trach and Peg dependent  Assist with ADLs, turn Q2H and monitor skin  Continue baclofen as ordered  Riluzole ordered

## 2022-05-25 LAB
ALBUMIN SERPL BCP-MCNC: 2.5 G/DL (ref 3.5–5.2)
ALP SERPL-CCNC: 56 U/L (ref 55–135)
ALT SERPL W/O P-5'-P-CCNC: 17 U/L (ref 10–44)
ANION GAP SERPL CALC-SCNC: 17 MMOL/L (ref 8–16)
AST SERPL-CCNC: 15 U/L (ref 10–40)
BASOPHILS # BLD AUTO: 0.09 K/UL (ref 0–0.2)
BASOPHILS NFR BLD: 0.6 % (ref 0–1.9)
BILIRUB SERPL-MCNC: 0.6 MG/DL (ref 0.1–1)
BUN SERPL-MCNC: 24 MG/DL (ref 8–23)
CALCIUM SERPL-MCNC: 8.9 MG/DL (ref 8.7–10.5)
CHLORIDE SERPL-SCNC: 110 MMOL/L (ref 95–110)
CO2 SERPL-SCNC: 17 MMOL/L (ref 23–29)
CREAT SERPL-MCNC: 0.6 MG/DL (ref 0.5–1.4)
DIFFERENTIAL METHOD: ABNORMAL
EOSINOPHIL # BLD AUTO: 0.3 K/UL (ref 0–0.5)
EOSINOPHIL NFR BLD: 1.5 % (ref 0–8)
ERYTHROCYTE [DISTWIDTH] IN BLOOD BY AUTOMATED COUNT: 17.9 % (ref 11.5–14.5)
EST. GFR  (AFRICAN AMERICAN): >60 ML/MIN/1.73 M^2
EST. GFR  (NON AFRICAN AMERICAN): >60 ML/MIN/1.73 M^2
GLUCOSE SERPL-MCNC: 102 MG/DL (ref 70–110)
HCT VFR BLD AUTO: 32 % (ref 40–54)
HGB BLD-MCNC: 9.9 G/DL (ref 14–18)
IMM GRANULOCYTES # BLD AUTO: 0.17 K/UL (ref 0–0.04)
IMM GRANULOCYTES NFR BLD AUTO: 1 % (ref 0–0.5)
LYMPHOCYTES # BLD AUTO: 1 K/UL (ref 1–4.8)
LYMPHOCYTES NFR BLD: 6.3 % (ref 18–48)
MCH RBC QN AUTO: 25.9 PG (ref 27–31)
MCHC RBC AUTO-ENTMCNC: 30.9 G/DL (ref 32–36)
MCV RBC AUTO: 84 FL (ref 82–98)
MONOCYTES # BLD AUTO: 1.1 K/UL (ref 0.3–1)
MONOCYTES NFR BLD: 6.7 % (ref 4–15)
NEUTROPHILS # BLD AUTO: 13.7 K/UL (ref 1.8–7.7)
NEUTROPHILS NFR BLD: 83.9 % (ref 38–73)
NRBC BLD-RTO: 0 /100 WBC
PLATELET # BLD AUTO: 363 K/UL (ref 150–450)
PMV BLD AUTO: 10.4 FL (ref 9.2–12.9)
POTASSIUM SERPL-SCNC: 3 MMOL/L (ref 3.5–5.1)
POTASSIUM SERPL-SCNC: 4.1 MMOL/L (ref 3.5–5.1)
PROT SERPL-MCNC: 7.4 G/DL (ref 6–8.4)
RBC # BLD AUTO: 3.82 M/UL (ref 4.6–6.2)
SODIUM SERPL-SCNC: 144 MMOL/L (ref 136–145)
WBC # BLD AUTO: 16.31 K/UL (ref 3.9–12.7)

## 2022-05-25 PROCEDURE — 84132 ASSAY OF SERUM POTASSIUM: CPT | Performed by: HOSPITALIST

## 2022-05-25 PROCEDURE — 63600175 PHARM REV CODE 636 W HCPCS: Performed by: INTERNAL MEDICINE

## 2022-05-25 PROCEDURE — 94003 VENT MGMT INPAT SUBQ DAY: CPT

## 2022-05-25 PROCEDURE — 27000207 HC ISOLATION

## 2022-05-25 PROCEDURE — 25000003 PHARM REV CODE 250: Performed by: INTERNAL MEDICINE

## 2022-05-25 PROCEDURE — 99900022

## 2022-05-25 PROCEDURE — C9113 INJ PANTOPRAZOLE SODIUM, VIA: HCPCS | Performed by: NURSE PRACTITIONER

## 2022-05-25 PROCEDURE — 63600175 PHARM REV CODE 636 W HCPCS: Performed by: HOSPITALIST

## 2022-05-25 PROCEDURE — 99231 SBSQ HOSP IP/OBS SF/LOW 25: CPT | Mod: ,,, | Performed by: INTERNAL MEDICINE

## 2022-05-25 PROCEDURE — 99900035 HC TECH TIME PER 15 MIN (STAT)

## 2022-05-25 PROCEDURE — 20000000 HC ICU ROOM

## 2022-05-25 PROCEDURE — 99900026 HC AIRWAY MAINTENANCE (STAT)

## 2022-05-25 PROCEDURE — 63700000 PHARM REV CODE 250 ALT 637 W/O HCPCS: Performed by: NURSE PRACTITIONER

## 2022-05-25 PROCEDURE — 99232 PR SUBSEQUENT HOSPITAL CARE,LEVL II: ICD-10-PCS | Mod: ,,, | Performed by: INTERNAL MEDICINE

## 2022-05-25 PROCEDURE — 25000003 PHARM REV CODE 250: Performed by: SURGERY

## 2022-05-25 PROCEDURE — 63600175 PHARM REV CODE 636 W HCPCS: Performed by: SURGERY

## 2022-05-25 PROCEDURE — 80053 COMPREHEN METABOLIC PANEL: CPT | Performed by: NURSE PRACTITIONER

## 2022-05-25 PROCEDURE — 63600175 PHARM REV CODE 636 W HCPCS: Performed by: NURSE PRACTITIONER

## 2022-05-25 PROCEDURE — 25000003 PHARM REV CODE 250: Performed by: NURSE PRACTITIONER

## 2022-05-25 PROCEDURE — 99232 SBSQ HOSP IP/OBS MODERATE 35: CPT | Mod: ,,, | Performed by: INTERNAL MEDICINE

## 2022-05-25 PROCEDURE — 94761 N-INVAS EAR/PLS OXIMETRY MLT: CPT

## 2022-05-25 PROCEDURE — C1751 CATH, INF, PER/CENT/MIDLINE: HCPCS

## 2022-05-25 PROCEDURE — 85025 COMPLETE CBC W/AUTO DIFF WBC: CPT | Performed by: HOSPITALIST

## 2022-05-25 PROCEDURE — 27000221 HC OXYGEN, UP TO 24 HOURS

## 2022-05-25 PROCEDURE — 25000003 PHARM REV CODE 250: Performed by: HOSPITALIST

## 2022-05-25 PROCEDURE — 76937 US GUIDE VASCULAR ACCESS: CPT

## 2022-05-25 PROCEDURE — 99231 PR SUBSEQUENT HOSPITAL CARE,LEVL I: ICD-10-PCS | Mod: ,,, | Performed by: INTERNAL MEDICINE

## 2022-05-25 PROCEDURE — 36410 VNPNXR 3YR/> PHY/QHP DX/THER: CPT

## 2022-05-25 RX ORDER — POLYETHYLENE GLYCOL 3350, SODIUM CHLORIDE, SODIUM BICARBONATE, POTASSIUM CHLORIDE 420; 11.2; 5.72; 1.48 G/4L; G/4L; G/4L; G/4L
4000 POWDER, FOR SOLUTION ORAL ONCE
Status: COMPLETED | OUTPATIENT
Start: 2022-05-25 | End: 2022-05-25

## 2022-05-25 RX ORDER — DEXTROSE MONOHYDRATE, SODIUM CHLORIDE, AND POTASSIUM CHLORIDE 50; 1.49; 9 G/1000ML; G/1000ML; G/1000ML
INJECTION, SOLUTION INTRAVENOUS CONTINUOUS
Status: DISCONTINUED | OUTPATIENT
Start: 2022-05-25 | End: 2022-05-28 | Stop reason: HOSPADM

## 2022-05-25 RX ADMIN — MUPIROCIN: 20 OINTMENT TOPICAL at 10:05

## 2022-05-25 RX ADMIN — ENOXAPARIN SODIUM 80 MG: 80 INJECTION SUBCUTANEOUS at 08:05

## 2022-05-25 RX ADMIN — BACLOFEN 20 MG: 10 TABLET ORAL at 05:05

## 2022-05-25 RX ADMIN — BACLOFEN 20 MG: 10 TABLET ORAL at 10:05

## 2022-05-25 RX ADMIN — POLYETHYLENE GLYCOL (3350) 17 G: 17 POWDER, FOR SOLUTION ORAL at 09:05

## 2022-05-25 RX ADMIN — BUSPIRONE HYDROCHLORIDE 5 MG: 5 TABLET ORAL at 10:05

## 2022-05-25 RX ADMIN — CHLORHEXIDINE GLUCONATE 0.12% ORAL RINSE 15 ML: 1.2 LIQUID ORAL at 09:05

## 2022-05-25 RX ADMIN — ENOXAPARIN SODIUM 80 MG: 80 INJECTION SUBCUTANEOUS at 09:05

## 2022-05-25 RX ADMIN — SODIUM CHLORIDE 500 ML: 0.9 INJECTION, SOLUTION INTRAVENOUS at 01:05

## 2022-05-25 RX ADMIN — GLYCOPYRROLATE 2 MG: 1 TABLET ORAL at 03:05

## 2022-05-25 RX ADMIN — MORPHINE SULFATE 2 MG: 2 INJECTION, SOLUTION INTRAMUSCULAR; INTRAVENOUS at 05:05

## 2022-05-25 RX ADMIN — DEXTROSE MONOHYDRATE, SODIUM CHLORIDE, AND POTASSIUM CHLORIDE: 50; 9; 1.49 INJECTION, SOLUTION INTRAVENOUS at 03:05

## 2022-05-25 RX ADMIN — MUPIROCIN: 20 OINTMENT TOPICAL at 09:05

## 2022-05-25 RX ADMIN — FUROSEMIDE 40 MG: 10 INJECTION, SOLUTION INTRAVENOUS at 09:05

## 2022-05-25 RX ADMIN — GLYCOPYRROLATE 2 MG: 1 TABLET ORAL at 09:05

## 2022-05-25 RX ADMIN — GLYCOPYRROLATE 2 MG: 1 TABLET ORAL at 10:05

## 2022-05-25 RX ADMIN — POTASSIUM CHLORIDE 60 MEQ: 7.46 INJECTION, SOLUTION INTRAVENOUS at 05:05

## 2022-05-25 RX ADMIN — CHLORHEXIDINE GLUCONATE 0.12% ORAL RINSE 15 ML: 1.2 LIQUID ORAL at 10:05

## 2022-05-25 RX ADMIN — Medication 9 MG: at 10:05

## 2022-05-25 RX ADMIN — Medication: at 02:05

## 2022-05-25 RX ADMIN — RILUZOLE 50 MG: 50 TABLET ORAL at 10:05

## 2022-05-25 RX ADMIN — SODIUM CHLORIDE: 9 INJECTION, SOLUTION INTRAVENOUS at 12:05

## 2022-05-25 RX ADMIN — Medication 1 ENEMA: at 05:05

## 2022-05-25 RX ADMIN — DEXTROSE MONOHYDRATE, SODIUM CHLORIDE, AND POTASSIUM CHLORIDE: 50; 9; 1.49 INJECTION, SOLUTION INTRAVENOUS at 01:05

## 2022-05-25 RX ADMIN — METOPROLOL TARTRATE 50 MG: 50 TABLET, FILM COATED ORAL at 09:05

## 2022-05-25 RX ADMIN — RILUZOLE 50 MG: 50 TABLET ORAL at 09:05

## 2022-05-25 RX ADMIN — POLYETHYLENE GLYCOL 3350, SODIUM CHLORIDE, SODIUM BICARBONATE, POTASSIUM CHLORIDE 4000 ML: 420; 11.2; 5.72; 1.48 POWDER, FOR SOLUTION ORAL at 03:05

## 2022-05-25 RX ADMIN — PANTOPRAZOLE SODIUM 40 MG: 40 INJECTION, POWDER, LYOPHILIZED, FOR SOLUTION INTRAVENOUS at 09:05

## 2022-05-25 RX ADMIN — BUSPIRONE HYDROCHLORIDE 5 MG: 5 TABLET ORAL at 09:05

## 2022-05-25 RX ADMIN — FUROSEMIDE 40 MG: 10 INJECTION, SOLUTION INTRAVENOUS at 10:05

## 2022-05-25 NOTE — PROGRESS NOTES
[facility]  Adult Nutrition  Consult Note    SUMMARY   Intervention: nutrition support    Recommendations  Recommendation/Intervention:   1.) When appropriate per MD, continue with Isosource 1.5 @ 10 mls/hr advancing as tolerated to goal rate 50 mls/hr continuous providing 1800 kcals/day, 82 g protein/day,  211 g CHO/day, and 917 mls water/day. FWF: 150 mls q4 hrs or per MD.     2.) Parenteral Nutrition: 5%AA/D15 @ goal rate 75 mls/hr continuous + 20% lipids (250mls) daily providing 1778 kcals/day, 90 g protein/day, and 1800 mls fluid/day. GIR:2.5 mg/kg/min.     Goals: 1.) enteral nutrition will advance within 48 hrs  Nutrition Goal Status: 1.) progressing toward goal   Communication of RD Recs: other (comment) (second sign)     1. Tracheostomy care    2. Anemia    3. Abdominal distention    4. Fecal impaction in rectum      Past Medical History:   Diagnosis Date    ALS (amyotrophic lateral sclerosis)     ALS (amyotrophic lateral sclerosis)     Erectile dysfunction     Gait disturbance     Hyperlipidemia     Hypertension     Iron deficiency anemia     Motor neuron disease     Tremor        Assessment and Plan  Nutrition Problem  Inadequate energy intake    Related to (etiology):   Chronic illness    Signs and Symptoms (as evidenced by):   NPO, s/p trach/peg    Interventions/Recommendations (treatment strategy):  initiate nutrition support     Nutrition Diagnosis Status:   Continues           Malnutrition Assessment  Malnutrition Type: chronic illness  Cesar score = 9  Edematous      Reason for Assessment  Reason For Assessment: RD follow up   General Information Comments: admits from [facility] with anemia, bed bound s/p trach/peg, nonverbal. RD consulted for TF reccs.  5/25: Pt remains w/ trach/vent dependent. TF on hold due to fecal impaction, on enemas + mucoid BMs. Spoke with RN. Will add TPN reccs if unable to restart enteral nutrition.     Nutrition Risk Screen  Nutrition Risk  "Screen: tube feeding or parenteral nutrition    Nutrition/Diet History    Food Allergies: NKFA  Factors Affecting Nutritional Intake: on mechanical ventilation, NPO    Anthropometrics    Temp: 98.1 °F (36.7 °C)  Height Method: Estimated  Height: 5' 8"  Height (inches): 68 in  Weight Method: Bed Scale  Weight: 72.8 kg (160 lb 7.9 oz)  Weight (lb): 160.5 lb  Ideal Body Weight (IBW), Male: 154 lb  % Ideal Body Weight, Male (lb): 109.37 %  BMI (Calculated): 24.4  BMI Grade: 25 - 29.9 - overweight  Usual Body Weight (UBW), k.27 kg (2022)  % Usual Body Weight: 99.08  % Weight Change From Usual Weight: -1.13 %       Lab/Procedures/Meds  Pertinent Labs Reviewed: reviewed  BMP  Lab Results   Component Value Date     2022    K 3.0 (L) 2022     2022    CO2 17 (L) 2022    BUN 24 (H) 2022    CREATININE 0.6 2022    CALCIUM 8.9 2022    ANIONGAP 17 (H) 2022    ESTGFRAFRICA >60 2022    EGFRNONAA >60 2022     Lab Results   Component Value Date    ALBUMIN 2.5 (L) 2022     Lab Results   Component Value Date    CALCIUM 8.9 2022    PHOS 3.4 2022     No results for input(s): POCTGLUCOSE in the last 24 hours.    Pertinent Medications Reviewed: reviewed  Scheduled Meds:   baclofen  20 mg Per G Tube Q8H    Brown Bomb (magnesium citrate 300 mL, glycerin 100 mL,  mL) 900 mL enema   Rectal Q8H    busPIRone  5 mg Per G Tube BID    chlorhexidine  15 mL Mouth/Throat BID    enoxaparin  1 mg/kg Subcutaneous Q12H    furosemide (LASIX) injection  40 mg Intravenous BID    glycopyrrolate  2 mg Per G Tube TID    melatonin  9 mg Per G Tube Nightly    metoprolol tartrate  50 mg Oral Daily    mupirocin   Nasal BID    pantoprazole  40 mg Intravenous Daily    peg-electrolyte soln  4,000 mL Oral Once    polyethylene glycol  17 g Per G Tube Daily    riluzole  50 mg Oral Q12H    scopolamine  1 patch Transdermal Q3 Days    sodium phosphates  1 " enema Rectal Q8H         Estimated/Assessed Needs  Weight Used For Calorie Calculations: 76.4 kg (168 lb 6.9 oz)  Energy Calorie Requirements (kcal): 1712  Energy Need Method: [facility]  Protein Requirements: 91g  Weight Used For Protein Calculations: 76.4 kg (168 lb 6.9 oz)  Estimated Fluid Requirement Method: RDA Method  RDA Method (mL): 1712      Nutrition Prescription Ordered  Current Diet Order: NPO   Enteral orders: Isosource 1.5 -- TF on hold   Enteral rate: 50 mls  Enteral frequency: continuous     Evaluation of Received Nutrient/Fluid Intake  Enteral Calories: 1800 kcals  Enteral Protein: 82g  Enteral Fluid: 917 mls  Tolerance: not tolerating  % Intake of Estimated Energy Needs: 0 - 25 % (TF on hold)   % Meal Intake: NPO    Nutrition Risk  Level of Risk/Frequency of Follow-up:  (x2 weekly)       Monitor and Evaluation  Food and Nutrient Intake: energy intake, enteral nutrition intake  Food and Nutrient Adminstration: diet order, enteral and parenteral nutrition administration  Anthropometric Measurements: weight, weight change, body mass index  Biochemical Data, Medical Tests and Procedures: electrolyte and renal panel, glucose/endocrine profile, lipid profile  Nutrition-Focused Physical Findings: overall appearance       Nutrition Follow-Up  RD Follow-up?: Yes

## 2022-05-25 NOTE — EICU
Intervention Initiated From:  Bedside    Rbo Communicated with Bedside Nurse regarding:  HR, Medication and Pain    Doctor Notified:  Yes  Comments: Dr. Brock Brantley notified bedside called eICU via eLert reporting Pt's HR in the 140's, grimacing and appears to be in pain. Bedside RN is requesting med for pain. Pt is on vent support.

## 2022-05-25 NOTE — PROGRESS NOTES
"CC: abdominal distention    HPI 66 y.o. male with past medical history of ALS, s/p trach & peg, vent dependent, HTN, PE (on eliquis) presented to hospital from [facility] for an evaluation of his tracheostomy. Upon arrival to the ED, the patient was suctioned with very little difficulty and no evidence of obstruction was noted. Baseline labs were obtained, where the patient was found to be severely anemic, with a hemoglobin of 3.3 and a hematocrit of 10.9. His baseline H/H on review of the medical record appears to be around 8. No obvious source of bleeding and were no reports of hematemesis, hematochezia or dark stools. His abdomen is significantly distended with hypoactive bowel sounds.     GI was consulted for evaluation of abnormal imaging, fecal impaction. Received 2 units for low H/H and today's returned 9.8/30.4. Written for brown bomb enema x 2. Reported to have liquid brown stool but otherwise no solid stools. Received enemas q4hrs x 24 hours.       Interval hx:  Had liquid bowel movements. Mild distention of abdomen noted today. KUB from yesterday showed persistent distention of small bowel loops.    Past Medical History:   Diagnosis Date    ALS (amyotrophic lateral sclerosis)     ALS (amyotrophic lateral sclerosis)     Erectile dysfunction     Gait disturbance     Hyperlipidemia     Hypertension     Iron deficiency anemia     Motor neuron disease     Tremor      Review of Systems  Unable to obtain due to trach    Physical Examination  /70   Pulse 80   Temp 98.1 °F (36.7 °C) (Oral)   Resp 16   Ht 5' 8" (1.727 m)   Wt 72.8 kg (160 lb 7.9 oz)   SpO2 100%   BMI 24.40 kg/m²   General appearance: alert, no distress, lying in bed  HENT: trach in place, Normocephalic, atraumatic, no nasal discharge   Eyes: conjunctivae/corneas clear, PERRL, EOM's intact  Lungs: vented breath sounds, symmetric chest wall expansion bilaterally  Heart: regular rate and rhythm without rub; " no displacement of the PMI   Abdomen: PEG in place, distention noted, no tenderness to palpation; bowel sounds present; no organomegaly  Extremities: extremities without clubbing, cyanosis, or edema  Integument: Skin color, texture, turgor normal; no rashes; hair distrubution normal  Neurologic: non-verbal, blinks eyes to answer questions    Labs:  Lab Results   Component Value Date    WBC 16.31 (H) 05/25/2022    HGB 9.9 (L) 05/25/2022    HCT 32.0 (L) 05/25/2022    MCV 84 05/25/2022     05/25/2022     CMP  Sodium   Date Value Ref Range Status   05/25/2022 144 136 - 145 mmol/L Final     Potassium   Date Value Ref Range Status   05/25/2022 4.1 3.5 - 5.1 mmol/L Final     Chloride   Date Value Ref Range Status   05/25/2022 110 95 - 110 mmol/L Final     CO2   Date Value Ref Range Status   05/25/2022 17 (L) 23 - 29 mmol/L Final     Glucose   Date Value Ref Range Status   05/25/2022 102 70 - 110 mg/dL Final     BUN   Date Value Ref Range Status   05/25/2022 24 (H) 8 - 23 mg/dL Final     Creatinine   Date Value Ref Range Status   05/25/2022 0.6 0.5 - 1.4 mg/dL Final     Calcium   Date Value Ref Range Status   05/25/2022 8.9 8.7 - 10.5 mg/dL Final     Total Protein   Date Value Ref Range Status   05/25/2022 7.4 6.0 - 8.4 g/dL Final     Albumin   Date Value Ref Range Status   05/25/2022 2.5 (L) 3.5 - 5.2 g/dL Final     Total Bilirubin   Date Value Ref Range Status   05/25/2022 0.6 0.1 - 1.0 mg/dL Final     Comment:     For infants and newborns, interpretation of results should be based  on gestational age, weight and in agreement with clinical  observations.    Premature Infant recommended reference ranges:  Up to 24 hours.............<8.0 mg/dL  Up to 48 hours............<12.0 mg/dL  3-5 days..................<15.0 mg/dL  6-29 days.................<15.0 mg/dL       Alkaline Phosphatase   Date Value Ref Range Status   05/25/2022 56 55 - 135 U/L Final     AST   Date Value Ref Range Status   05/25/2022 15 10 - 40 U/L  Final     ALT   Date Value Ref Range Status   05/25/2022 17 10 - 44 U/L Final     Anion Gap   Date Value Ref Range Status   05/25/2022 17 (H) 8 - 16 mmol/L Final     eGFR if    Date Value Ref Range Status   05/25/2022 >60 >60 mL/min/1.73 m^2 Final     eGFR if non    Date Value Ref Range Status   05/25/2022 >60 >60 mL/min/1.73 m^2 Final     Comment:     Calculation used to obtain the estimated glomerular filtration  rate (eGFR) is the CKD-EPI equation.        Imaging:  CT Chest/Abd/Pelvis:  Ground-glass opacities in the lingula and aerated portion of the right lower lobe.  An infectious process cannot be entirely excluded. Complete right and moderate left bibasilar atelectasis.     2 mm right upper lobe pulmonary nodule.  For a solid nodule <6 mm, Fleischner Society 2017 guidelines recommend no routine follow up for a low risk patient, or follow-up with non-contrast chest CT at 12 months in a high risk patient.     Innumerable bilateral renal cysts.  Findings may suggest history of adult polycystic kidney disease. Hepatic cysts.     Cholelithiasis.     Gaseous distension of the colon.  Patulous rectum distended with fecal material.  Presacral infiltration or edema.  Findings may suggest fecal impaction and or stercoral colitis.     Edematous appearance of the arms.    KUB:     COMPARISON:  CT chest abdomen and pelvis 05/22/2022, radiographs 02/21/2022     FINDINGS:  Percutaneous gastrostomy tube.  Mild-to-moderate gaseous distension of small enlarged bowel loops in this patient with previously demonstrated findings suspicious for fecal impaction or stercoral colitis.  No definite free intraperitoneal air..     No acute osseous abnormality.     Independently reviewed    Assessment:   1. Abdominal pain  2. Abnormal imaging of GI tract  3. Fecal impaction  4. Suspected stercoral colitis in the setting of fecal impaction    Plan:   -Start golytely for bowel regimen per PEG  -Avoid tube  feeds for now   -Suspect stercoral colitis in the setting of fecal impaction  -KUB daily    Prasanth Grimaldo MD  [facility]  [address]  [place] [address]  Office: [phone]  Fax: [phone]

## 2022-05-25 NOTE — CONSULTS
Wound care following this patient due to present on admit wounds. Wounds noted to left lower posterior leg, right 4th finger, left back, sacrum, right buttock, right foot. Areas to be cleaned with soap and water or mild bath wipes and dried then apply Triad to all areas and leave open to air. Patient is at a very high risk for further skin injury to his perineal, perianal and buttocks areas due to enemas and laxative therapy. Wound care continues to follow this patient.

## 2022-05-25 NOTE — PROGRESS NOTES
05/25/2022      Admit Date: 5/22/2022  Wei Dominique  New Patient Consult    Chief Complaint   Patient presents with    Tracheostomy Tube Change     From [facility] for trach tube change, trach obstruction; currently on blood thinners for DVT to right arm        History of Present Illness:   Discussed with wife.  Pt had pe past and has been off and on anticoagg-- wife not aware of sign bleeding issues. She notes some excess secretions . No issues known to wife with trach.  Pt indicates has not had trach changed for over a month.   Pt admits to excess secretions and muscle spasm pains.         Pt had been in [place] recent past, ended up at [facility] for chr vent support. Pt presented in April after cardiac arrest and cpr-- was in septic shock, blood + esbl e coli.     Pt could open eyes but was non verBAL when left [facility] fo r ltac 4/20/20222.  Pt was in [place] Oct 2020-- had been living at home with no trach,  with sitters at that time.  H/o saddle pe in March 2020.  Ended with trach November 2020.      Pt was off anticoagg in April [facility] admit ???  Pt was on eliquis 2/21/2022 admit and was to continue at [place].      From hpi TUSHAR Cao:  HPI: Wei Dominique is a 66 year old male with past medical history of ALS, s/p trach & peg, vent dependent, HTN, PE (on eliquis) presented to hospital from [facility] for an evaluation of his tracheostomy. HPI limited due to patient's non verbal status, and information obtained from ED and nursing home notes. Per ED notes, the respiratory tech at [facility] was having difficulty passing a suction catheter through the trach, leading to concerns for a blockage. The cannula was replaced several times, and he had minimal bleeding from the site. He did not have a large amount of bleeding at the time, and there was no bleeding upon arrival to the ED. He was sent for possible tracheostomy change.     Upon arrival to the ED, the patient was suctioned with very little  difficulty and no evidence of obstruction was noted. Baseline labs were obtained, where the patient was found to be severely anemic, with a hemoglobin of 3.3 and a hematocrit of 10.9. His baseline H/H on review of the medical record appears to be around 8. He was given emergency release blood in the ED, where his blood pressure maintained and his H/H improved with subsequent lab draws. There was no obvious source of bleeding, as his urine did not show hematuria, there was no active bleeding from the trach site and there were no reports of hematemesis, hematochezia or dark stools. His abdomen is significantly distended with hypoactive bowel sounds.      A CT of the chest, abdomen and pelvis was obtained by [facility] medicine, which showed:   1. Ground-glass opacities in the lingula and aerated portion of the right lower lobe.  An infectious process cannot be entirely excluded. Complete right and moderate left bibasilar atelectasis.  2. 2 mm right upper lobe pulmonary nodule.  For a solid nodule <6 mm, Fleischner Society 2017 guidelines recommend no routine follow up for a low risk patient, or follow-up with non-contrast chest CT at 12 months in a high risk patient.  3. Innumerable bilateral renal cysts.  Findings may suggest history of adult polycystic kidney disease. Hepatic cysts.  4. Cholelithiasis.  5. Gaseous distension of the colon.  Patulous rectum distended with fecal material.  Presacral infiltration or edema.  Findings may suggest fecal impaction and or stercoral colitis.  6. Edematous appearance of the arms.     Hospital Medicine consulted for admission and further management.      Progress Note  PULMONARY    Admit Date: 5/22/2022 05/25/2022      SUBJECTIVE:     5/24 no c/o, less spasm, oral secretions.      May 25th, no new complaints.    PFSH and Allergies reviewed.    OBJECTIVE:     Vitals (Most recent):  Vitals:    05/25/22 1525   BP: (!) 138/91   Pulse: 107   Resp: (!) 28   Temp:        Vitals (24  hour range):  Temp:  [98.1 °F (36.7 °C)-98.8 °F (37.1 °C)]   Pulse:  []   Resp:  [16-29]   BP: ()/(38-91)   SpO2:  [81 %-100 %]   Arterial Line BP: ()/(49-85)       Intake/Output Summary (Last 24 hours) at 5/25/2022 1654  Last data filed at 5/25/2022 0722  Gross per 24 hour   Intake 2109.78 ml   Output 464 ml   Net 1645.78 ml          Physical Exam:  The patient's neuro status (alertness,orientation,cognitive function,motor skills,), pharyngeal exam (oral lesions, hygiene, abn dentition,), Neck (jvd,mass,thyroid,nodes in neck and above/below clavicle),RESPIRATORY(symmetry,effort,fremitus,percussion,auscultation),  Cor(rhythm,heart tones including gallops,perfusion,edema)ABD(distention,hepatic&splenomegaly,tenderness,masses), Skin(rash,cyanosis),Psyc(affect,judgement,).  Exam negative except for these pertinent findings:    Alert, interacts with eye blinks - 1 yea, 2 no, chest is symmetric, no distress, normal percussion, normal fremitus and good normal breath sounds      Radiographs reviewed: view by direct vision    Results for orders placed during the hospital encounter of 02/21/22    X-Ray Chest 1 View    Narrative  EXAMINATION:  XR CHEST 1 VIEW    CLINICAL HISTORY:  confirm trach position;    TECHNIQUE:  Single frontal view of the chest was performed.    COMPARISON:  None    FINDINGS:  The tip of a left chest wall port overlies the superior vena cava.  There is a tracheostomy.  The lungs are somewhat expiratory, which may be exaggerating the pulmonary vascularity.  There is a small right pleural effusion.  The heart size appears normal.  There is mild calcification of the aortic knob consistent with atherosclerotic stigmata.    Impression  Lines and tubes as above.  Small right pleural effusion.      Electronically signed by: Olive Juarez  Date:    02/21/2022  Time:    09:08  ]    Labs     Recent Labs   Lab 05/25/22  0320   WBC 16.31*   HGB 9.9*   HCT 32.0*        Recent Labs   Lab  05/25/22  0320 05/25/22  1331     --    K 3.0* 4.1     --    CO2 17*  --    BUN 24*  --    CREATININE 0.6  --      --    CALCIUM 8.9  --    AST 15  --    ALT 17  --    ALKPHOS 56  --    BILITOT 0.6  --    PROT 7.4  --    ALBUMIN 2.5*  --    No results for input(s): PH, PCO2, PO2, HCO3 in the last 24 hours.  Microbiology Results (last 7 days)     Procedure Component Value Units Date/Time    Culture, Respiratory with Gram Stain [[id number]]  (Abnormal)  (Susceptibility) Collected: 05/22/22 1519    Order Status: Completed Specimen: Respiratory from Tracheal Aspirate Updated: 05/25/22 0953     Respiratory Culture No S aureus isolated.      PROTEUS MIRABILIS  Moderate        PSEUDOMONAS AERUGINOSA   Few  Susceptibility pending       Gram Stain (Respiratory) <10 epithelial cells per low power field.     Gram Stain (Respiratory) Many WBC's     Gram Stain (Respiratory) Rare Gram positive cocci     Gram Stain (Respiratory) Rare Gram negative rods    Culture, Respiratory with Gram Stain [[id number]]  (Abnormal) Collected: 05/23/22 0111    Order Status: Completed Specimen: Respiratory from Sputum Updated: 05/25/22 0907     Respiratory Culture PRESUMPTIVE PSEUDOMONAS SPECIES  Many  Identification and susceptibility pending        GRAM NEGATIVE AUTUMN  Few  Identification and susceptibility pending  Normal respiratory marco also present       Gram Stain (Respiratory) <10 epithelial cells per low power field.     Gram Stain (Respiratory) Rare WBC's     Gram Stain (Respiratory) Few Gram positive cocci     Gram Stain (Respiratory) Few Gram positive rods      Gram Stain (Respiratory) Rare Gram negative rods          Impression:  Active Hospital Problems    Diagnosis  POA    *Anemia [D64.9]  Yes    History of pulmonary embolism [Z86.711]  Yes    Tracheostomy in place [Z93.0]  Not Applicable    Chronic respiratory failure with hypoxia and hypercapnia [J96.11, J96.12]  Yes    Atelectasis [J98.11]  Yes    Muscle  spasm [M62.838]  Yes    Aspiration syndrome [T17.900A]  Yes    Fecal impaction in rectum [K56.41]  Yes    Abdominal distention [R14.0]  Yes    Dependent on ventilator [Z99.11]  Not Applicable    Right lower lobe consolidation [J18.1]  Yes    S/P percutaneous endoscopic gastrostomy (PEG) tube placement [Z93.1]  Not Applicable    Essential hypertension [I10]  Yes    ALS (amyotrophic lateral sclerosis) [G12.21]  Yes    Edema [R60.9]  Yes    Quadriparesis [G82.50]  Yes      Resolved Hospital Problems   No resolved problems to display.            .    Plan:   5/23 p to 131 - 117,   40% ox,   No fever, vss,     Ct chest 5/22 with chr looking rll collpase, trach position seems good  procal 0.96,       No anticoagg h/o saddle pe with als.          Will order cough assist as pt feels secretion issues.  Will change trach, check arms/legs, and resume anticoagg.     Monitor and consider return to [facility].      Will titrate up baclofen and increase robinul.        bp was low at admit,     Monitor.    Discussed with wife.      Trach changed - scant bleeding.  Would start lovenox therapeutic at 10 pm.  inr 1.9 may not be therapeutic.  Wife not aware of prior saddle pe nor he being off anticoagg nor sign bleed.  Work to get back to [facility].        5/24 no fever, bp low ppt art line - blood draws difficult also.    Sl tachy. No dvt.      Would do best with cough assist.    With h/o saddle embolism-- would continue anticoagg.  Might consider low dose eliquis???    Would keep robinul and baclofen doses up as pt relates less copious oral secretions and muscle spasm pain is better.     Recommend return to [facility].  Changes seen on ct are not specific and require no treatment.         May 25th- no fever, vital signs are stable.  Somewhat tachycardic.      No antibiotics.  Patient needs have a bowel movement to transfer back to [facility].

## 2022-05-25 NOTE — CONSULTS
18 Gx 10cm PowerGlide Midline placed to pts left brachial vein with the use of ultrasound guidance x 2 attempts.    Ultrasound guidance: yes  Vessel Caliber: large and patent, compressibility normal  Needle advanced into vessel with real time Ultrasound guidance.  Guidewire confirmed in vessel.  Image recorded and saved.  Sterile sheath used.  Sterile dressing applied  Arm circumference- 36.5cm  Dressing dated   Education provided to patient's nurse re: proper maintenance of line- pt's nurse verbalized understanding  Limb alert applied.

## 2022-05-25 NOTE — SUBJECTIVE & OBJECTIVE
Interval History: Mucoid BMs with enemas. Discussed with GI, needs disimpaction then start golytely    Review of Systems   Unable to perform ROS: Patient nonverbal   Objective:     Vital Signs (Most Recent):  Temp: 98.1 °F (36.7 °C) (05/25/22 0400)  Pulse: 92 (05/25/22 1120)  Resp: 16 (05/25/22 1120)  BP: (!) 166/68 (05/25/22 0600)  SpO2: 100 % (05/25/22 1120) Vital Signs (24h Range):  Temp:  [97.2 °F (36.2 °C)-98.8 °F (37.1 °C)] 98.1 °F (36.7 °C)  Pulse:  [] 92  Resp:  [16-31] 16  SpO2:  [94 %-100 %] 100 %  BP: ()/(38-90) 166/68  Arterial Line BP: ()/() 169/85     Weight: 72.8 kg (160 lb 7.9 oz)  Body mass index is 24.4 kg/m².    Intake/Output Summary (Last 24 hours) at 5/25/2022 1203  Last data filed at 5/25/2022 0722  Gross per 24 hour   Intake 2109.78 ml   Output 1039 ml   Net 1070.78 ml        Physical Exam  Vitals and nursing note reviewed.   Constitutional:       General: He is awake. He is not in acute distress.     Appearance: He is normal weight. He is ill-appearing (chronically ill appearing, sickly).   HENT:      Mouth/Throat:      Lips: Pink.      Mouth: Mucous membranes are moist.      Comments: Drooling from mouth  Neck:      Trachea: Tracheostomy present.      Comments: Trach site appears intact  Cardiovascular:      Rate and Rhythm: Normal rate.      Heart sounds: Normal heart sounds, S1 normal and S2 normal.      Comments: +anasarca  Pulmonary:      Effort: Pulmonary effort is normal.      Breath sounds: Transmitted upper airway sounds present. Examination of the right-lower field reveals decreased breath sounds. Examination of the left-lower field reveals decreased breath sounds. Decreased breath sounds present.   Chest:       Abdominal:      General: Abdomen is protuberant. Bowel sounds are decreased. There is distension.      Tenderness: There is no abdominal tenderness.   Genitourinary:     Comments: Hutchinson  Musculoskeletal:      Cervical back: Full passive range of  motion without pain and neck supple.      Right lower leg: Edema present.      Left lower leg: Edema present.   Skin:     General: Skin is warm and dry.      Capillary Refill: Capillary refill takes 2 to 3 seconds.   Neurological:      Mental Status: He is alert. Mental status is at baseline.      Comments: Quadraparesis  Non verbal but does communicate well via eye blinks, one blink yes, two blinks no         Significant Labs: All pertinent labs within the past 24 hours have been reviewed.    Significant Imaging: I have reviewed all pertinent imaging results/findings within the past 24 hours.

## 2022-05-25 NOTE — PLAN OF CARE
Intervention: nutrition support     Recommendations  Recommendation/Intervention:   1.) When appropriate per MD, continue with Isosource 1.5 @ 10 mls/hr advancing as tolerated to goal rate 50 mls/hr continuous providing 1800 kcals/day, 82 g protein/day,  211 g CHO/day, and 917 mls water/day. FWF: 150 mls q4 hrs or per MD.      2.) Parenteral Nutrition: 5%AA/D15 @ goal rate 75 mls/hr continuous + 20% lipids (250mls) daily providing 1778 kcals/day, 90 g protein/day, and 1800 mls fluid/day. GIR:2.5 mg/kg/min.      Goals: 1.) enteral nutrition will advance within 48 hrs  Nutrition Goal Status: 1.) progressing toward goal   Communication of RD Recs: other (comment) (second sign)

## 2022-05-25 NOTE — PROGRESS NOTES
[facility] Medicine  Progress Note    Patient Name: Wei Dominique  MRN: [id number]  Patient Class: IP- Inpatient   Admission Date: 5/22/2022  Length of Stay: 3 days  Attending Physician: Lauren Rodney MD  Primary Care Provider: Mack Suero MD        Subjective:     Principal Problem:Anemia        HPI:  Wei Dominique is a 66 year old male with past medical history of ALS, s/p trach & peg, vent dependent, HTN, PE (on eliquis) presented to hospital from [facility] for an evaluation of his tracheostomy. HPI limited due to patient's non verbal status, and information obtained from ED and nursing home notes. Per ED notes, the respiratory tech at [place] was having difficulty passing a suction catheter through the trach, leading to concerns for a blockage. The cannula was replaced several times, and he had minimal bleeding from the site. He did not have a large amount of bleeding at the time, and there was no bleeding upon arrival to the ED. He was sent for possible tracheostomy change.    Upon arrival to the ED, the patient was suctioned with very little difficulty and no evidence of obstruction was noted. Baseline labs were obtained, where the patient was found to be severely anemic, with a hemoglobin of 3.3 and a hematocrit of 10.9. His baseline H/H on review of the medical record appears to be around 8. He was given emergency release blood in the ED, where his blood pressure maintained and his H/H improved with subsequent lab draws. There was no obvious source of bleeding, as his urine did not show hematuria, there was no active bleeding from the trach site and there were no reports of hematemesis, hematochezia or dark stools. His abdomen is significantly distended with hypoactive bowel sounds.     A CT of the chest, abdomen and pelvis was obtained by [facility] medicine, which showed:   1. Ground-glass opacities in the lingula and aerated portion of the right  lower lobe.  An infectious process cannot be entirely excluded. Complete right and moderate left bibasilar atelectasis.  2. 2 mm right upper lobe pulmonary nodule.  For a solid nodule <6 mm, Fleischner Society 2017 guidelines recommend no routine follow up for a low risk patient, or follow-up with non-contrast chest CT at 12 months in a high risk patient.  3. Innumerable bilateral renal cysts.  Findings may suggest history of adult polycystic kidney disease. Hepatic cysts.  4. Cholelithiasis.  5. Gaseous distension of the colon.  Patulous rectum distended with fecal material.  Presacral infiltration or edema.  Findings may suggest fecal impaction and or stercoral colitis.  6. Edematous appearance of the arms.    Hospital Medicine consulted for admission and further management.        Overview/Hospital Course:  No notes on file    Interval History: Mucoid BMs with enemas. Discussed with GI, needs disimpaction then start golytely    Review of Systems   Unable to perform ROS: Patient nonverbal   Objective:     Vital Signs (Most Recent):  Temp: 98.1 °F (36.7 °C) (05/25/22 0400)  Pulse: 92 (05/25/22 1120)  Resp: 16 (05/25/22 1120)  BP: (!) 166/68 (05/25/22 0600)  SpO2: 100 % (05/25/22 1120) Vital Signs (24h Range):  Temp:  [97.2 °F (36.2 °C)-98.8 °F (37.1 °C)] 98.1 °F (36.7 °C)  Pulse:  [] 92  Resp:  [16-31] 16  SpO2:  [94 %-100 %] 100 %  BP: ()/(38-90) 166/68  Arterial Line BP: ()/() 169/85     Weight: 72.8 kg (160 lb 7.9 oz)  Body mass index is 24.4 kg/m².    Intake/Output Summary (Last 24 hours) at 5/25/2022 1203  Last data filed at 5/25/2022 0722  Gross per 24 hour   Intake 2109.78 ml   Output 1039 ml   Net 1070.78 ml        Physical Exam  Vitals and nursing note reviewed.   Constitutional:       General: He is awake. He is not in acute distress.     Appearance: He is normal weight. He is ill-appearing (chronically ill appearing, sickly).   HENT:      Mouth/Throat:      Lips: Pink.      Mouth:  Mucous membranes are moist.      Comments: Drooling from mouth  Neck:      Trachea: Tracheostomy present.      Comments: Trach site appears intact  Cardiovascular:      Rate and Rhythm: Normal rate.      Heart sounds: Normal heart sounds, S1 normal and S2 normal.      Comments: +anasarca  Pulmonary:      Effort: Pulmonary effort is normal.      Breath sounds: Transmitted upper airway sounds present. Examination of the right-lower field reveals decreased breath sounds. Examination of the left-lower field reveals decreased breath sounds. Decreased breath sounds present.   Chest:       Abdominal:      General: Abdomen is protuberant. Bowel sounds are decreased. There is distension.      Tenderness: There is no abdominal tenderness.   Genitourinary:     Comments: Evangelina  Musculoskeletal:      Cervical back: Full passive range of motion without pain and neck supple.      Right lower leg: Edema present.      Left lower leg: Edema present.   Skin:     General: Skin is warm and dry.      Capillary Refill: Capillary refill takes 2 to 3 seconds.   Neurological:      Mental Status: He is alert. Mental status is at baseline.      Comments: Quadraparesis  Non verbal but does communicate well via eye blinks, one blink yes, two blinks no         Significant Labs: All pertinent labs within the past 24 hours have been reviewed.    Significant Imaging: I have reviewed all pertinent imaging results/findings within the past 24 hours.        Assessment/Plan:      * Anemia  Patient's anemia is currently uncontrolled. Has recieved 2 units of PRBCs on 5/22/22. Etiology likely d/t unknown source  Current CBC reviewed-   Lab Results   Component Value Date    HGB 10.2 (L) 05/24/2022    HCT 31.9 (L) 05/24/2022     Monitor serial CBC and transfuse if patient becomes hemodynamically unstable, symptomatic or H/H drops below 7/21.     Baseline around 8, Hemoglobin on admit 3.3. no obvious bleeding sites          Aspiration syndrome        Abdominal  distention  Significant distention noted, CT of abdomen shows: In the pelvis, there is gaseous distension of the colon.  The sigmoid colon is redundant.  The patulous rectum is distended with hyperdense fecal material.  There is presacral edema or infiltration.  There are no pelvic masses or adenopathy.  There is no free pelvic fluid.  GI consulted - thought to have stercoral colitis from fecal impaction  Enemas ordered  Disimpaction then golytely    Dependent on ventilator  Noted, pulmonary consult      Right lower lobe consolidation  Appears to be chronic when reviewing previous radiology studies  Follow respiratory culture, concern he colonizes  Afebrile, will hold abx until cultures result      S/P percutaneous endoscopic gastrostomy (PEG) tube placement  Noted, trach care      Edema    Anasarca noted all over body  Continue lasix    Quadriparesis  Noted  Assist with ADLs, turn Q2H and monitor skin  PT/OT consulted      ALS (amyotrophic lateral sclerosis)    Noted  Trach and Peg dependent  Assist with ADLs, turn Q2H and monitor skin  Continue baclofen as ordered  Riluzole ordered    Essential hypertension  Chronic, controlled.  Latest blood pressure and vitals reviewed-   Temp:  [97.8 °F (36.6 °C)-98.1 °F (36.7 °C)]   Pulse:  []   Resp:  [16-20]   BP: ()/()   SpO2:  [97 %-100 %] .   Home meds for hypertension were reviewed and noted below.   Hypertension Medications             furosemide (LASIX) 40 MG tablet 1 tablet (40 mg total) by Per G Tube route once daily.    metoprolol tartrate (LOPRESSOR) 50 MG tablet Take 50 mg by mouth once daily.          While in the hospital, will manage blood pressure as follows; Continue home antihypertensive regimen    Will utilize p.r.n. blood pressure medication only if patient's blood pressure greater than  180/110 and he develops symptoms such as worsening chest pain or shortness of breath.          VTE Risk Mitigation (From admission, onward)          Ordered     enoxaparin injection 80 mg  Every 12 hours (non-standard times)         05/23/22 1700     Place sequential compression device  Until discontinued         05/22/22 2222     Reason for No Pharmacological VTE Prophylaxis  Once        Question:  Reasons:  Answer:  Risk of Bleeding    05/22/22 2222     IP VTE LOW RISK PATIENT  Once         05/22/22 2222                Discharge Planning   VENESSA: 5/28/2022     Code Status: Full Code   Is the patient medically ready for discharge?:     Reason for patient still in hospital (select all that apply): Patient trending condition and Treatment  Discharge Plan A: Return to nursing home            Critical care time spent on the evaluation and treatment of severe organ dysfunction, review of pertinent labs and imaging studies, discussions with consulting providers and discussions with patient/family: 35 minutes.      Lauren Rodney MD  Department of Hospital Medicine   [facility]

## 2022-05-25 NOTE — PLAN OF CARE
05/24/22 1937   Patient Assessment/Suction   Level of Consciousness (AVPU) alert   Respiratory Effort Normal;Unlabored   Expansion/Accessory Muscles/Retractions no retractions;no use of accessory muscles   All Lung Fields Breath Sounds coarse;Anterior:;Lateral:   Rhythm/Pattern, Respiratory assisted mechanically   Cough Frequency with stimulation   Cough Type assisted   $ Suction Charges Inline Suction Procedure Stat Charge   Secretions Amount copious   Secretions Color cloudy;white   Secretions Characteristics thick   PRE-TX-O2   O2 Device (Oxygen Therapy) ventilator   $ Is the patient on Low Flow Oxygen? Yes   Oxygen Concentration (%) 40   SpO2 100 %   Pulse Oximetry Type Continuous   $ Pulse Oximetry - Multiple Charge Pulse Oximetry - Multiple   Pulse (!) 129   Resp (!) 29   ETCO2   $ ETCO2 Usage Currently wearing   ETCO2 (mmHg) 21 mmHg   ETCO2 Device Type Bedside Monitor   Skin Integrity   $ Wound Care Tech Time 15 min   Area Observed Neck under tracheostomy   Skin Appearance redness blanchable   Barrier used? Other (see comments)  (drain sponge)        Surgical Airway 02/21/22 1145 Cuffed   Placement Date/Time: 02/21/22 1145   Present Prior to Hospital Arrival?: Yes  Type: Tracheostomy  Airway Device Size: 8.0  Style: Cuffed   Cuff Pressure 32 cm H2O   Cuff Inflation? Inflated   Speaking Valve Usage Not wearing   Status Changed   Site Assessment Drainage   Ties Assessment Clean;Dry;Intact;Not needed   Airway Safety   Ambu bag with the patient? Yes, Adult Ambu   Is mask with the patient? Yes, Adult Mask   Vent Select   Conventional Vent Y   Charged w/in last 24h YES   Preset Conventional Ventilator Settings   Vent Type    Ventilation Type VC   Vent Mode A/C   Humidity HME   Set Rate 16 BPM   Vt Set 500 mL   PEEP/CPAP 5 cmH20   Pressure Support 0 cmH20   Waveform RAMP   Peak Flow 50 L/min   Set Inspiratory Pressure 0 cmH20   Insp Time 0 Sec(s)   Plateau Set/Insp. Hold (sec) 0   Insp Rise Time  0 %    Trigger Sensitivity Flow/I-Trigger 3 L/min   P High 0 cm H2O   P Low 0 cm H2O   T High 0 sec   T Low 0 sec   Patient Ventilator Parameters   Resp Rate Total 28 br/min   Peak Airway Pressure 30 cmH2O   Mean Airway Pressure 17 cmH20   Plateau Pressure 0 cmH20   Exhaled Vt 497 mL   Total Ve 13.1 mL   Spont Ve 0 L   I:E Ratio Measured 1.20:1   Conventional Ventilator Alarms   Alarms On Y   Ve High Alarm 20 L/min   Resp Rate High Alarm 45 br/min   Press High Alarm 45 cmH2O   Apnea Rate 20   Apnea Volume (mL) 500 mL   Apnea Oxygen Concentration  100   Apnea Flow Rate (L/min) 60   T Apnea 20 sec(s)   Ready to Wean/Extubation Screen   FIO2<=50 (chart decimal) 0.4   MV<16L (chart vol.) 13.1   PEEP <=8 (chart #) 5   Ready to Wean Parameters   F/VT Ratio<105 (RSBI) (!) 58.35

## 2022-05-25 NOTE — NURSING
2030: DEMETRA Fregoso PA-C notified of pt's pain and tachycardia. Pt given Toradol.     2200: eICU called d/t tachycardia and pt being in pain. Spoke with nurse who stated she would be messaging the doctor.     2300: 500 mL fluid bolus given. BP increasing; HR decreased to 120's.     0030: Spoke with eICU nurse and Dr. Brantley. Second 500 mL fluid bolus given. Dr. Brantley to put in order for maintenance fluids.     0130: Spoke with eICU nurse who was messaging Dr. Brantley regarding decrease in BP and need for maintenance fluids order. Third 500 mL fluid bolus given and D5 NaCl w/ KCl 20 mEq gtt started at 75mL/hr.

## 2022-05-25 NOTE — CARE UPDATE
05/25/22 0743   Patient Assessment/Suction   Level of Consciousness (AVPU) alert   Respiratory Effort Normal   Expansion/Accessory Muscles/Retractions expansion symmetric   All Lung Fields Breath Sounds Anterior:   SIM Breath Sounds coarse   RUL Breath Sounds coarse   Rhythm/Pattern, Respiratory assisted mechanically   Cough Frequency with stimulation   Cough Type assisted   Suction Method tracheal   Suction Pressure (mmHg) 100 mmHg   $ Suction Charges Inline Suction Procedure Stat Charge   Secretions Amount large   Secretions Color tan;creamy   Secretions Characteristics thin   PRE-TX-O2   O2 Device (Oxygen Therapy) ventilator   $ Is the patient on Low Flow Oxygen? Yes   Oxygen Concentration (%) 40   SpO2 100 %   Pulse Oximetry Type Continuous   $ Pulse Oximetry - Multiple Charge Pulse Oximetry - Multiple   Pulse 103   Resp 16   Positioning HOB elevated 30 degrees   Cough Assist   $ Cough Assist Administration Done   Daily Review of Necessity (Cough Assist) completed   Route (Cough Assist) trach tube   Control (Cough Assist) automatic   Inhale Time (Sec) 2   Exhale Time (Sec) 2   Pause (Sec) 2   Inhale Positive Pressure (cm H2O) 25   Exhale Pressure (cm H2O) 25   Cough Cycles 3   Patient Position (Cough Assist) HOB elevated   Signs of Intolerance (Cough Assist) none   Skin Integrity   $ Wound Care Tech Time 15 min   Area Observed Neck under tracheostomy   Skin Appearance redness blanchable   Barrier used?   (drain sponge)        Surgical Airway 02/21/22 1145 Cuffed   Placement Date/Time: 02/21/22 1145   Present Prior to Hospital Arrival?: Yes  Type: Tracheostomy  Airway Device Size: 8.0  Style: Cuffed   Cuff Pressure 30 cm H2O   Cuff Inflation? Inflated   Status Secured   Site Assessment Drainage   Site Care Protective barrier to skin   Ties Assessment Intact;Secure   General Safety Checklist   Safety Promotion/Fall Prevention side rails raised   Airway Safety   Ambu bag with the patient? Yes, Adult Ambu   Is  mask with the patient? Yes, Adult Mask   ETT at Bedside? Yes   ETT Size 8   Vent Select   Conventional Vent Y   $ Ventilator Subsequent 1   Charged w/in last 24h YES   Preset Conventional Ventilator Settings   Vent Type    Ventilation Type VC   Vent Mode A/C   Humidity HME   Set Rate 16 BPM   Vt Set 500 mL   PEEP/CPAP 5 cmH20   Pressure Support 0 cmH20   Waveform RAMP   Peak Flow 50 L/min   Set Inspiratory Pressure 0 cmH20   Insp Time 0 Sec(s)   Plateau Set/Insp. Hold (sec) 0   Insp Rise Time  0 %   I-Trigger Type  Flow   Trigger Sensitivity Flow/I-Trigger 3 L/min   P High 0 cm H2O   P Low 0 cm H2O   T High 0 sec   T Low 0 sec   Patient Ventilator Parameters   Resp Rate Total 17 br/min   Peak Airway Pressure 21 cmH2O   Mean Airway Pressure 11 cmH20   Plateau Pressure 0 cmH20   Exhaled Vt 511 mL   Total Ve 8.25 mL   Spont Ve 0 L   I:E Ratio Measured 1:2.40   Conventional Ventilator Alarms   Alarms On Y   Ve High Alarm 20 L/min   Resp Rate High Alarm 45 br/min   Press High Alarm 45 cmH2O   Apnea Rate 20   Apnea Volume (mL) 500 mL   Apnea Oxygen Concentration  100   Apnea Flow Rate (L/min) 60   T Apnea 20 sec(s)   Ready to Wean/Extubation Screen   FIO2<=50 (chart decimal) 0.4   MV<16L (chart vol.) 8.25   PEEP <=8 (chart #) 5   Ready to Wean Parameters   F/VT Ratio<105 (RSBI) (!) 31.31   POx, cough assist BID, TC BID.  All alarms set and functioning.

## 2022-05-25 NOTE — PLAN OF CARE
Trach midline. Frequent oral suctioning. Unable to move exts. Able to nod at times. Facial grimacing. Blinks to communicate.  Abdomen slightly firm. Hypo bowel sounds. PEG intact. Golytely started via peg. No BM. KUB done. Hutchinson intact with adequate urine output. Edematous. Left upper arm midline placed per picc team.   Safety maintained. Rotation bed on.

## 2022-05-25 NOTE — EICU
eICU Physician Virtual/Remote Brief Evaluation Note      Telephone call RN  Tachycardia slightly improved with 500 mL bolus  Requesting maintenance IV fluid  Chart reviewed, discussed with RN  BP 93/64 P 116, RR 17, O2 sat 100  Will repeat 500 mL bolus and start maintenance IV fluid      PANCHITO Brantley MD  eICU Attending  [phone]    This report has been created through the use of M-Modal dictation software. Typographical and content errors may occur with this process. While efforts are made to detect and correct such errors, in some cases errors will persist. For this reason, wording in this document should be considered in the proper context and not strictly verbatim

## 2022-05-25 NOTE — EICU
eICU Physician Virtual/Remote Brief Evaluation Note      Message from RN  BP dropping  Chart reviewed  BP by A-line 70/49 (60), by cuff 92/61 (71)  Will give 3rd 500 mL saline bolus      PANCHITO Brantley MD  [facility] Attending  [phone]    This report has been created through the use of M-Infomous dictation software. Typographical and content errors may occur with this process. While efforts are made to detect and correct such errors, in some cases errors will persist. For this reason, wording in this document should be considered in the proper context and not strictly verbatim

## 2022-05-25 NOTE — PLAN OF CARE
Plan of care reviewed with patient. Pt remains in ICU with trach to vent. Able to nod and blinks eyes once for yes and twice for no. Tube feeds held per order until fecal impaction is resolved. Interchanging brown bombs and fleet enemas q4hrs with small to moderate mucoid bowel movements. BP began to drop and pt became tachycardic in the 140's-150's; eICU and PA-C notified. Refer to previous note. D5 NaCL w/ KCl 20 mEq gtt started at 75 mL/hr. Potassium replacement began. Safety maintained.     Problem: Adult Inpatient Plan of Care  Goal: Plan of Care Review  Outcome: Ongoing, Progressing  Goal: Patient-Specific Goal (Individualized)  Outcome: Ongoing, Progressing  Goal: Optimal Comfort and Wellbeing  Outcome: Ongoing, Progressing

## 2022-05-25 NOTE — PLAN OF CARE
Problem: Adult Inpatient Plan of Care  Goal: Plan of Care Review  Outcome: Ongoing, Progressing  Goal: Absence of Hospital-Acquired Illness or Injury  Outcome: Ongoing, Progressing  Goal: Readiness for Transition of Care  Outcome: Ongoing, Progressing

## 2022-05-25 NOTE — ASSESSMENT & PLAN NOTE
Significant distention noted, CT of abdomen shows: In the pelvis, there is gaseous distension of the colon.  The sigmoid colon is redundant.  The patulous rectum is distended with hyperdense fecal material.  There is presacral edema or infiltration.  There are no pelvic masses or adenopathy.  There is no free pelvic fluid.  GI consulted - thought to have stercoral colitis from fecal impaction  Enemas ordered  Elana then golytely

## 2022-05-26 LAB
ALBUMIN SERPL BCP-MCNC: 2.7 G/DL (ref 3.5–5.2)
ALLENS TEST: ABNORMAL
ALP SERPL-CCNC: 57 U/L (ref 55–135)
ALT SERPL W/O P-5'-P-CCNC: 19 U/L (ref 10–44)
ANION GAP SERPL CALC-SCNC: 12 MMOL/L (ref 8–16)
AST SERPL-CCNC: 15 U/L (ref 10–40)
BACTERIA SPEC AEROBE CULT: ABNORMAL
BASOPHILS # BLD AUTO: 0.04 K/UL (ref 0–0.2)
BASOPHILS NFR BLD: 0.5 % (ref 0–1.9)
BILIRUB SERPL-MCNC: 0.5 MG/DL (ref 0.1–1)
BUN SERPL-MCNC: 18 MG/DL (ref 8–23)
CALCIUM SERPL-MCNC: 9.2 MG/DL (ref 8.7–10.5)
CHLORIDE SERPL-SCNC: 113 MMOL/L (ref 95–110)
CO2 SERPL-SCNC: 20 MMOL/L (ref 23–29)
CREAT SERPL-MCNC: 0.6 MG/DL (ref 0.5–1.4)
DELSYS: ABNORMAL
DIFFERENTIAL METHOD: ABNORMAL
EOSINOPHIL # BLD AUTO: 0.2 K/UL (ref 0–0.5)
EOSINOPHIL NFR BLD: 2.1 % (ref 0–8)
ERYTHROCYTE [DISTWIDTH] IN BLOOD BY AUTOMATED COUNT: 18.1 % (ref 11.5–14.5)
ERYTHROCYTE [SEDIMENTATION RATE] IN BLOOD BY WESTERGREN METHOD: 16 MM/H
EST. GFR  (AFRICAN AMERICAN): >60 ML/MIN/1.73 M^2
EST. GFR  (NON AFRICAN AMERICAN): >60 ML/MIN/1.73 M^2
FIO2: 30
GLUCOSE SERPL-MCNC: 128 MG/DL (ref 70–110)
GRAM STN SPEC: ABNORMAL
HCO3 UR-SCNC: 27.3 MMOL/L (ref 24–28)
HCT VFR BLD AUTO: 32.3 % (ref 40–54)
HGB BLD-MCNC: 9.6 G/DL (ref 14–18)
IMM GRANULOCYTES # BLD AUTO: 0.06 K/UL (ref 0–0.04)
IMM GRANULOCYTES NFR BLD AUTO: 0.7 % (ref 0–0.5)
LYMPHOCYTES # BLD AUTO: 1.2 K/UL (ref 1–4.8)
LYMPHOCYTES NFR BLD: 13.9 % (ref 18–48)
MCH RBC QN AUTO: 25.7 PG (ref 27–31)
MCHC RBC AUTO-ENTMCNC: 29.7 G/DL (ref 32–36)
MCV RBC AUTO: 86 FL (ref 82–98)
MIN VOL: 6
MODE: ABNORMAL
MONOCYTES # BLD AUTO: 0.8 K/UL (ref 0.3–1)
MONOCYTES NFR BLD: 9.3 % (ref 4–15)
NEUTROPHILS # BLD AUTO: 6.4 K/UL (ref 1.8–7.7)
NEUTROPHILS NFR BLD: 73.5 % (ref 38–73)
NRBC BLD-RTO: 0 /100 WBC
PCO2 BLDA: 67.8 MMHG (ref 35–45)
PEEP: 5
PH SMN: 7.21 [PH] (ref 7.35–7.45)
PIP: 45
PLATELET # BLD AUTO: 327 K/UL (ref 150–450)
PMV BLD AUTO: 10.2 FL (ref 9.2–12.9)
PO2 BLDA: 95 MMHG (ref 80–100)
POC BE: -1 MMOL/L
POC SATURATED O2: 95 % (ref 95–100)
POC TCO2: 29 MMOL/L (ref 23–27)
POTASSIUM SERPL-SCNC: 3.4 MMOL/L (ref 3.5–5.1)
POTASSIUM SERPL-SCNC: 4.7 MMOL/L (ref 3.5–5.1)
PROT SERPL-MCNC: 7.8 G/DL (ref 6–8.4)
RBC # BLD AUTO: 3.74 M/UL (ref 4.6–6.2)
SAMPLE: ABNORMAL
SITE: ABNORMAL
SODIUM SERPL-SCNC: 145 MMOL/L (ref 136–145)
SP02: 97
VT: 500
WBC # BLD AUTO: 8.75 K/UL (ref 3.9–12.7)

## 2022-05-26 PROCEDURE — 84132 ASSAY OF SERUM POTASSIUM: CPT | Performed by: HOSPITALIST

## 2022-05-26 PROCEDURE — 27000221 HC OXYGEN, UP TO 24 HOURS

## 2022-05-26 PROCEDURE — 80053 COMPREHEN METABOLIC PANEL: CPT | Performed by: NURSE PRACTITIONER

## 2022-05-26 PROCEDURE — 94003 VENT MGMT INPAT SUBQ DAY: CPT

## 2022-05-26 PROCEDURE — 63600175 PHARM REV CODE 636 W HCPCS

## 2022-05-26 PROCEDURE — C9113 INJ PANTOPRAZOLE SODIUM, VIA: HCPCS | Performed by: NURSE PRACTITIONER

## 2022-05-26 PROCEDURE — 63600175 PHARM REV CODE 636 W HCPCS: Performed by: NURSE PRACTITIONER

## 2022-05-26 PROCEDURE — 99231 SBSQ HOSP IP/OBS SF/LOW 25: CPT | Mod: ,,, | Performed by: INTERNAL MEDICINE

## 2022-05-26 PROCEDURE — 63600175 PHARM REV CODE 636 W HCPCS: Performed by: INTERNAL MEDICINE

## 2022-05-26 PROCEDURE — 99231 PR SUBSEQUENT HOSPITAL CARE,LEVL I: ICD-10-PCS | Mod: ,,, | Performed by: INTERNAL MEDICINE

## 2022-05-26 PROCEDURE — 99900035 HC TECH TIME PER 15 MIN (STAT)

## 2022-05-26 PROCEDURE — 99232 PR SUBSEQUENT HOSPITAL CARE,LEVL II: ICD-10-PCS | Mod: ,,, | Performed by: INTERNAL MEDICINE

## 2022-05-26 PROCEDURE — 25000003 PHARM REV CODE 250: Performed by: INTERNAL MEDICINE

## 2022-05-26 PROCEDURE — 37799 UNLISTED PX VASCULAR SURGERY: CPT

## 2022-05-26 PROCEDURE — 99900026 HC AIRWAY MAINTENANCE (STAT)

## 2022-05-26 PROCEDURE — 63700000 PHARM REV CODE 250 ALT 637 W/O HCPCS: Performed by: NURSE PRACTITIONER

## 2022-05-26 PROCEDURE — 85025 COMPLETE CBC W/AUTO DIFF WBC: CPT | Performed by: HOSPITALIST

## 2022-05-26 PROCEDURE — 25000003 PHARM REV CODE 250: Performed by: SURGERY

## 2022-05-26 PROCEDURE — 27200966 HC CLOSED SUCTION SYSTEM

## 2022-05-26 PROCEDURE — 63600175 PHARM REV CODE 636 W HCPCS: Performed by: HOSPITALIST

## 2022-05-26 PROCEDURE — 94761 N-INVAS EAR/PLS OXIMETRY MLT: CPT

## 2022-05-26 PROCEDURE — 99232 SBSQ HOSP IP/OBS MODERATE 35: CPT | Mod: ,,, | Performed by: INTERNAL MEDICINE

## 2022-05-26 PROCEDURE — 25000242 PHARM REV CODE 250 ALT 637 W/ HCPCS: Performed by: INTERNAL MEDICINE

## 2022-05-26 PROCEDURE — 20000000 HC ICU ROOM

## 2022-05-26 PROCEDURE — 82803 BLOOD GASES ANY COMBINATION: CPT

## 2022-05-26 PROCEDURE — 25000003 PHARM REV CODE 250: Performed by: NURSE PRACTITIONER

## 2022-05-26 PROCEDURE — 94640 AIRWAY INHALATION TREATMENT: CPT

## 2022-05-26 PROCEDURE — 63600175 PHARM REV CODE 636 W HCPCS: Performed by: SURGERY

## 2022-05-26 PROCEDURE — 27000207 HC ISOLATION

## 2022-05-26 RX ORDER — POLYETHYLENE GLYCOL 3350 17 G/17G
17 POWDER, FOR SOLUTION ORAL 2 TIMES DAILY
Status: DISCONTINUED | OUTPATIENT
Start: 2022-05-26 | End: 2022-05-28 | Stop reason: HOSPADM

## 2022-05-26 RX ORDER — IPRATROPIUM BROMIDE AND ALBUTEROL SULFATE 2.5; .5 MG/3ML; MG/3ML
3 SOLUTION RESPIRATORY (INHALATION) EVERY 4 HOURS
Status: DISCONTINUED | OUTPATIENT
Start: 2022-05-26 | End: 2022-05-28 | Stop reason: HOSPADM

## 2022-05-26 RX ORDER — HYDRALAZINE HYDROCHLORIDE 20 MG/ML
INJECTION INTRAMUSCULAR; INTRAVENOUS
Status: COMPLETED
Start: 2022-05-26 | End: 2022-05-26

## 2022-05-26 RX ORDER — HYDRALAZINE HYDROCHLORIDE 20 MG/ML
10 INJECTION INTRAMUSCULAR; INTRAVENOUS EVERY 6 HOURS PRN
Status: DISCONTINUED | OUTPATIENT
Start: 2022-05-26 | End: 2022-05-28 | Stop reason: HOSPADM

## 2022-05-26 RX ORDER — GLYCERIN 1 G/1
1 SUPPOSITORY RECTAL DAILY
Status: DISCONTINUED | OUTPATIENT
Start: 2022-05-27 | End: 2022-05-28 | Stop reason: HOSPADM

## 2022-05-26 RX ADMIN — GLYCOPYRROLATE 2 MG: 1 TABLET ORAL at 09:05

## 2022-05-26 RX ADMIN — BUSPIRONE HYDROCHLORIDE 5 MG: 5 TABLET ORAL at 09:05

## 2022-05-26 RX ADMIN — PANTOPRAZOLE SODIUM 40 MG: 40 INJECTION, POWDER, LYOPHILIZED, FOR SOLUTION INTRAVENOUS at 09:05

## 2022-05-26 RX ADMIN — CHLORHEXIDINE GLUCONATE 0.12% ORAL RINSE 15 ML: 1.2 LIQUID ORAL at 09:05

## 2022-05-26 RX ADMIN — Medication 9 MG: at 09:05

## 2022-05-26 RX ADMIN — MUPIROCIN: 20 OINTMENT TOPICAL at 09:05

## 2022-05-26 RX ADMIN — POLYETHYLENE GLYCOL (3350) 17 G: 17 POWDER, FOR SOLUTION ORAL at 09:05

## 2022-05-26 RX ADMIN — ALPRAZOLAM 0.5 MG: 0.25 TABLET ORAL at 05:05

## 2022-05-26 RX ADMIN — DEXTROSE MONOHYDRATE, SODIUM CHLORIDE, AND POTASSIUM CHLORIDE: 50; 9; 1.49 INJECTION, SOLUTION INTRAVENOUS at 05:05

## 2022-05-26 RX ADMIN — GLYCOPYRROLATE 2 MG: 1 TABLET ORAL at 02:05

## 2022-05-26 RX ADMIN — MORPHINE SULFATE 2 MG: 2 INJECTION, SOLUTION INTRAMUSCULAR; INTRAVENOUS at 10:05

## 2022-05-26 RX ADMIN — MORPHINE SULFATE 2 MG: 2 INJECTION, SOLUTION INTRAMUSCULAR; INTRAVENOUS at 02:05

## 2022-05-26 RX ADMIN — FUROSEMIDE 40 MG: 10 INJECTION, SOLUTION INTRAVENOUS at 09:05

## 2022-05-26 RX ADMIN — BACLOFEN 20 MG: 10 TABLET ORAL at 05:05

## 2022-05-26 RX ADMIN — METHYLPREDNISOLONE SODIUM SUCCINATE 40 MG: 40 INJECTION, POWDER, FOR SOLUTION INTRAMUSCULAR; INTRAVENOUS at 05:05

## 2022-05-26 RX ADMIN — HYDRALAZINE HYDROCHLORIDE 20 MG: 20 INJECTION INTRAMUSCULAR; INTRAVENOUS at 06:05

## 2022-05-26 RX ADMIN — SCOPALAMINE 1 PATCH: 1 PATCH, EXTENDED RELEASE TRANSDERMAL at 03:05

## 2022-05-26 RX ADMIN — METOPROLOL TARTRATE 50 MG: 50 TABLET, FILM COATED ORAL at 09:05

## 2022-05-26 RX ADMIN — POTASSIUM CHLORIDE 60 MEQ: 7.46 INJECTION, SOLUTION INTRAVENOUS at 05:05

## 2022-05-26 RX ADMIN — BACLOFEN 20 MG: 10 TABLET ORAL at 02:05

## 2022-05-26 RX ADMIN — BACLOFEN 20 MG: 10 TABLET ORAL at 09:05

## 2022-05-26 RX ADMIN — ONDANSETRON 8 MG: 2 INJECTION INTRAMUSCULAR; INTRAVENOUS at 01:05

## 2022-05-26 RX ADMIN — RILUZOLE 50 MG: 50 TABLET ORAL at 09:05

## 2022-05-26 RX ADMIN — ENOXAPARIN SODIUM 80 MG: 80 INJECTION SUBCUTANEOUS at 09:05

## 2022-05-26 RX ADMIN — ALPRAZOLAM 0.5 MG: 0.25 TABLET ORAL at 10:05

## 2022-05-26 RX ADMIN — IPRATROPIUM BROMIDE AND ALBUTEROL SULFATE 3 ML: 2.5; .5 SOLUTION RESPIRATORY (INHALATION) at 07:05

## 2022-05-26 RX ADMIN — RILUZOLE 50 MG: 50 TABLET ORAL at 10:05

## 2022-05-26 RX ADMIN — MORPHINE SULFATE 2 MG: 2 INJECTION, SOLUTION INTRAMUSCULAR; INTRAVENOUS at 01:05

## 2022-05-26 RX ADMIN — MORPHINE SULFATE 2 MG: 2 INJECTION, SOLUTION INTRAMUSCULAR; INTRAVENOUS at 06:05

## 2022-05-26 RX ADMIN — IPRATROPIUM BROMIDE AND ALBUTEROL SULFATE 3 ML: 2.5; .5 SOLUTION RESPIRATORY (INHALATION) at 05:05

## 2022-05-26 NOTE — CARE UPDATE
05/26/22 0732   Patient Assessment/Suction   Level of Consciousness (AVPU) alert   All Lung Fields Breath Sounds coarse;diminished   Suction Method oral;tracheal   $ Suction Charges Inline Suction Procedure Stat Charge   Secretions Amount moderate   Secretions Color red-streaked;tan   Secretions Characteristics thick   PRE-TX-O2   O2 Device (Oxygen Therapy) ventilator  (decreased FIO2 to 30%)   $ Is the patient on Low Flow Oxygen? Yes   Oxygen Concentration (%) 40   SpO2 100 %   Pulse Oximetry Type Continuous   $ Pulse Oximetry - Multiple Charge Pulse Oximetry - Multiple   Pulse 99   Resp 16   ETCO2   $ ETCO2 Usage Currently wearing   ETCO2 (mmHg) 32 mmHg   Cough Assist   $ Cough Assist Administration Done   Route (Cough Assist) trach tube   Control (Cough Assist) automatic   Inhale Time (Sec) 2   Exhale Time (Sec) 2   Inhale Positive Pressure (cm H2O) 25   Exhale Pressure (cm H2O) 25   Cough Cycles 3   Signs of Intolerance (Cough Assist) none   Skin Integrity   $ Wound Care Tech Time 15 min   Area Observed Neck under tracheostomy   Skin Appearance redness blanchable   Barrier used? Other (see comments)  (drain sponge)        Surgical Airway 02/21/22 1145 Cuffed   Placement Date/Time: 02/21/22 1145   Present Prior to Hospital Arrival?: Yes  Type: Tracheostomy  Airway Device Size: 8.0  Style: Cuffed   Status Secured   Site Assessment Dry;Clean   Ties Assessment Clean;Dry;Intact   Airway Safety   Ambu bag with the patient? Yes, Adult Ambu   Is mask with the patient? Yes, Adult Mask   Extra trach at bedside? Yes   Vent Select   Conventional Vent Y   Ventilator Initiated No   $ Ventilator Subsequent 1   Charged w/in last 24h YES   Preset Conventional Ventilator Settings   Vent Type    Ventilation Type VC   Vent Mode A/C   Humidity HME   Set Rate 16 BPM   Vt Set 500 mL   PEEP/CPAP 5 cmH20   Pressure Support 0 cmH20   Waveform RAMP   Peak Flow 50 L/min   Set Inspiratory Pressure 0 cmH20   Insp Time 0 Sec(s)    Plateau Set/Insp. Hold (sec) 0   Insp Rise Time  0 %   Trigger Sensitivity Flow/I-Trigger 3 L/min   P High 0 cm H2O   P Low 0 cm H2O   T High 0 sec   T Low 0 sec   Patient Ventilator Parameters   Resp Rate Total 17 br/min   Peak Airway Pressure 31 cmH2O   Mean Airway Pressure 11 cmH20   Plateau Pressure 0 cmH20   Exhaled Vt 486 mL   Total Ve 8.73 mL   Spont Ve 0 L   I:E Ratio Measured 1:2.40   Conventional Ventilator Alarms   Alarms On Y   Ve High Alarm 20 L/min   Resp Rate High Alarm 45 br/min   Press High Alarm 45 cmH2O   Apnea Rate 20   Apnea Volume (mL) 500 mL   Apnea Oxygen Concentration  100   Apnea Flow Rate (L/min) 60   T Apnea 20 sec(s)   Ready to Wean/Extubation Screen   FIO2<=50 (chart decimal) 0.4   MV<16L (chart vol.) 8.73   PEEP <=8 (chart #) 5   Ready to Wean Parameters   F/VT Ratio<105 (RSBI) (!) 32.92       Decreased FIO2 to 30%

## 2022-05-26 NOTE — SUBJECTIVE & OBJECTIVE
Interval History: Checking daily KUBs, had golytely through PEG yesterday, tube feeds still on hold    Review of Systems   Unable to perform ROS: Patient nonverbal   Objective:     Vital Signs (Most Recent):  Temp: 98.1 °F (36.7 °C) (05/26/22 0400)  Pulse: 99 (05/26/22 0732)  Resp: 16 (05/26/22 1013)  BP: (!) 84/51 (05/26/22 0700)  SpO2: 100 % (05/26/22 0732) Vital Signs (24h Range):  Temp:  [98.1 °F (36.7 °C)-99 °F (37.2 °C)] 98.1 °F (36.7 °C)  Pulse:  [] 99  Resp:  [16-28] 16  SpO2:  [84 %-100 %] 100 %  BP: ()/(41-92) 84/51  Arterial Line BP: (104-168)/(60-91) 127/69     Weight: 73.7 kg (162 lb 7.7 oz)  Body mass index is 24.7 kg/m².    Intake/Output Summary (Last 24 hours) at 5/26/2022 1058  Last data filed at 5/26/2022 0700  Gross per 24 hour   Intake 2020 ml   Output 1700 ml   Net 320 ml        Physical Exam  Vitals and nursing note reviewed.   Constitutional:       General: He is awake. He is not in acute distress.     Appearance: He is normal weight. He is ill-appearing (chronically ill appearing, sickly).   HENT:      Mouth/Throat:      Lips: Pink.      Mouth: Mucous membranes are moist.      Comments: Drooling from mouth  Neck:      Trachea: Tracheostomy present.      Comments: Trach site appears intact  Cardiovascular:      Rate and Rhythm: Normal rate.      Heart sounds: Normal heart sounds, S1 normal and S2 normal.      Comments: +anasarca  Pulmonary:      Effort: Pulmonary effort is normal.      Breath sounds: Transmitted upper airway sounds present. Examination of the right-lower field reveals decreased breath sounds. Examination of the left-lower field reveals decreased breath sounds. Decreased breath sounds present.   Chest:       Abdominal:      General: Abdomen is protuberant. Bowel sounds are decreased. There is distension.      Tenderness: There is no abdominal tenderness.   Genitourinary:     Comments: [place]  Musculoskeletal:      Cervical back: Full passive range of motion without  pain and neck supple.      Right lower leg: Edema present.      Left lower leg: Edema present.   Skin:     General: Skin is warm and dry.      Capillary Refill: Capillary refill takes 2 to 3 seconds.   Neurological:      Mental Status: He is alert. Mental status is at baseline.      Comments: Quadraparesis  Non verbal but does communicate well via eye blinks, one blink yes, two blinks no         Significant Labs: All pertinent labs within the past 24 hours have been reviewed.    Significant Imaging: I have reviewed all pertinent imaging results/findings within the past 24 hours.

## 2022-05-26 NOTE — PROGRESS NOTES
[facility] Medicine  Progress Note    Patient Name: Wei Dominique  MRN: [id number]  Patient Class: IP- Inpatient   Admission Date: 5/22/2022  Length of Stay: 4 days  Attending Physician: Lauren Rodney MD  Primary Care Provider: Mack Suero MD        Subjective:     Principal Problem:Anemia        HPI:  Wei Dominique is a 66 year old male with past medical history of ALS, s/p trach & peg, vent dependent, HTN, PE (on eliquis) presented to hospital from [facility] for an evaluation of his tracheostomy. HPI limited due to patient's non verbal status, and information obtained from ED and nursing home notes. Per ED notes, the respiratory tech at [place] was having difficulty passing a suction catheter through the trach, leading to concerns for a blockage. The cannula was replaced several times, and he had minimal bleeding from the site. He did not have a large amount of bleeding at the time, and there was no bleeding upon arrival to the ED. He was sent for possible tracheostomy change.    Upon arrival to the ED, the patient was suctioned with very little difficulty and no evidence of obstruction was noted. Baseline labs were obtained, where the patient was found to be severely anemic, with a hemoglobin of 3.3 and a hematocrit of 10.9. His baseline H/H on review of the medical record appears to be around 8. He was given emergency release blood in the ED, where his blood pressure maintained and his H/H improved with subsequent lab draws. There was no obvious source of bleeding, as his urine did not show hematuria, there was no active bleeding from the trach site and there were no reports of hematemesis, hematochezia or dark stools. His abdomen is significantly distended with hypoactive bowel sounds.     A CT of the chest, abdomen and pelvis was obtained by [facility] medicine, which showed:   1. Ground-glass opacities in the lingula and aerated portion of the right  lower lobe.  An infectious process cannot be entirely excluded. Complete right and moderate left bibasilar atelectasis.  2. 2 mm right upper lobe pulmonary nodule.  For a solid nodule <6 mm, Fleischner Society 2017 guidelines recommend no routine follow up for a low risk patient, or follow-up with non-contrast chest CT at 12 months in a high risk patient.  3. Innumerable bilateral renal cysts.  Findings may suggest history of adult polycystic kidney disease. Hepatic cysts.  4. Cholelithiasis.  5. Gaseous distension of the colon.  Patulous rectum distended with fecal material.  Presacral infiltration or edema.  Findings may suggest fecal impaction and or stercoral colitis.  6. Edematous appearance of the arms.    Hospital Medicine consulted for admission and further management.        Overview/Hospital Course:  No notes on file    Interval History: Checking daily KUBs, had golytely through PEG yesterday, tube feeds still on hold    Review of Systems   Unable to perform ROS: Patient nonverbal   Objective:     Vital Signs (Most Recent):  Temp: 98.1 °F (36.7 °C) (05/26/22 0400)  Pulse: 99 (05/26/22 0732)  Resp: 16 (05/26/22 1013)  BP: (!) 84/51 (05/26/22 0700)  SpO2: 100 % (05/26/22 0732) Vital Signs (24h Range):  Temp:  [98.1 °F (36.7 °C)-99 °F (37.2 °C)] 98.1 °F (36.7 °C)  Pulse:  [] 99  Resp:  [16-28] 16  SpO2:  [84 %-100 %] 100 %  BP: ()/(41-92) 84/51  Arterial Line BP: (104-168)/(60-91) 127/69     Weight: 73.7 kg (162 lb 7.7 oz)  Body mass index is 24.7 kg/m².    Intake/Output Summary (Last 24 hours) at 5/26/2022 1058  Last data filed at 5/26/2022 0700  Gross per 24 hour   Intake 2020 ml   Output 1700 ml   Net 320 ml        Physical Exam  Vitals and nursing note reviewed.   Constitutional:       General: He is awake. He is not in acute distress.     Appearance: He is normal weight. He is ill-appearing (chronically ill appearing, sickly).   HENT:      Mouth/Throat:      Lips: Pink.      Mouth: Mucous  membranes are moist.      Comments: Drooling from mouth  Neck:      Trachea: Tracheostomy present.      Comments: Trach site appears intact  Cardiovascular:      Rate and Rhythm: Normal rate.      Heart sounds: Normal heart sounds, S1 normal and S2 normal.      Comments: +anasarca  Pulmonary:      Effort: Pulmonary effort is normal.      Breath sounds: Transmitted upper airway sounds present. Examination of the right-lower field reveals decreased breath sounds. Examination of the left-lower field reveals decreased breath sounds. Decreased breath sounds present.   Chest:       Abdominal:      General: Abdomen is protuberant. Bowel sounds are decreased. There is distension.      Tenderness: There is no abdominal tenderness.   Genitourinary:     Comments: [place]  Musculoskeletal:      Cervical back: Full passive range of motion without pain and neck supple.      Right lower leg: Edema present.      Left lower leg: Edema present.   Skin:     General: Skin is warm and dry.      Capillary Refill: Capillary refill takes 2 to 3 seconds.   Neurological:      Mental Status: He is alert. Mental status is at baseline.      Comments: Quadraparesis  Non verbal but does communicate well via eye blinks, one blink yes, two blinks no         Significant Labs: All pertinent labs within the past 24 hours have been reviewed.    Significant Imaging: I have reviewed all pertinent imaging results/findings within the past 24 hours.        Assessment/Plan:      * Anemia  Patient's anemia is currently uncontrolled. Has recieved 2 units of PRBCs on 5/22/22. Etiology likely d/t unknown source  Current CBC reviewed-   Lab Results   Component Value Date    HGB 10.2 (L) 05/24/2022    HCT 31.9 (L) 05/24/2022     Monitor serial CBC and transfuse if patient becomes hemodynamically unstable, symptomatic or H/H drops below 7/21.     Baseline around 8, Hemoglobin on admit 3.3. no obvious bleeding sites          Aspiration syndrome        Abdominal  distention  Significant distention noted, CT of abdomen shows: In the pelvis, there is gaseous distension of the colon.  The sigmoid colon is redundant.  The patulous rectum is distended with hyperdense fecal material.  There is presacral edema or infiltration.  There are no pelvic masses or adenopathy.  There is no free pelvic fluid.  GI consulted - thought to have stercoral colitis from fecal impaction  5/24 Enemas ordered  5/25 Disimpaction then golytely  Improving  Follos daily KUB    Dependent on ventilator  Noted, pulmonary consult      Right lower lobe consolidation  Appears to be chronic when reviewing previous radiology studies  Follow respiratory culture, concern he colonizes  Afebrile, will hold abx until cultures result      S/P percutaneous endoscopic gastrostomy (PEG) tube placement  Noted, trach care      Edema    Anasarca noted all over body  Continue lasix    Quadriparesis  Noted  Assist with ADLs, turn Q2H and monitor skin  PT/OT consulted      ALS (amyotrophic lateral sclerosis)    Noted  Trach and Peg dependent  Assist with ADLs, turn Q2H and monitor skin  Continue baclofen as ordered  Riluzole ordered    Essential hypertension  Chronic, controlled.  Latest blood pressure and vitals reviewed-   Temp:  [97.8 °F (36.6 °C)-98.1 °F (36.7 °C)]   Pulse:  []   Resp:  [16-20]   BP: ()/()   SpO2:  [97 %-100 %] .   Home meds for hypertension were reviewed and noted below.   Hypertension Medications             furosemide (LASIX) 40 MG tablet 1 tablet (40 mg total) by Per G Tube route once daily.    metoprolol tartrate (LOPRESSOR) 50 MG tablet Take 50 mg by mouth once daily.          While in the hospital, will manage blood pressure as follows; Continue home antihypertensive regimen    Will utilize p.r.n. blood pressure medication only if patient's blood pressure greater than  180/110 and he develops symptoms such as worsening chest pain or shortness of breath.          VTE Risk Mitigation  (From admission, onward)         Ordered     enoxaparin injection 80 mg  Every 12 hours (non-standard times)         05/23/22 1700     Place sequential compression device  Until discontinued         05/22/22 2222     Reason for No Pharmacological VTE Prophylaxis  Once        Question:  Reasons:  Answer:  Risk of Bleeding    05/22/22 2222     IP VTE LOW RISK PATIENT  Once         05/22/22 2222                Discharge Planning   VENESSA: 5/26/2022     Code Status: Full Code   Is the patient medically ready for discharge?:     Reason for patient still in hospital (select all that apply): Patient trending condition, Treatment and Imaging  Discharge Plan A: Return to nursing home            Critical care time spent on the evaluation and treatment of severe organ dysfunction, review of pertinent labs and imaging studies, discussions with consulting providers and discussions with patient/family: 35 minutes.      Lauren Rodney MD  Department of Hospital Medicine   [facility]

## 2022-05-26 NOTE — PLAN OF CARE
Plan of Care:     Aspiration precautions: patient in upright position during oral intake     Bleeding precautions: patient receiving Lovenox; monitor for signs of bleeding and excessive bruising.      Cardiac Monitoring: monitor and document vital signs per unit protocol; notify physician of significant changes in vital signs     Fall precautions: Bed alarm activated     Safety precautions: Call light and personal belongings within reach, wheels locked and bed in lowest position     Moisture Management: incontinence pad changed as needed; skin to remain clean, dry, and intact     Sacral Protection: mepilex dressing to sacral noted to be clean, dry, and intact

## 2022-05-26 NOTE — PROGRESS NOTES
"CC: abdominal distention    HPI 66 y.o. male with past medical history of ALS, s/p trach & peg, vent dependent, HTN, PE (on eliquis) presented to hospital from [facility] for an evaluation of his tracheostomy. Upon arrival to the ED, the patient was suctioned with very little difficulty and no evidence of obstruction was noted. Baseline labs were obtained, where the patient was found to be severely anemic, with a hemoglobin of 3.3 and a hematocrit of 10.9. His baseline H/H on review of the medical record appears to be around 8. No obvious source of bleeding and were no reports of hematemesis, hematochezia or dark stools. His abdomen is significantly distended with hypoactive bowel sounds.     GI was consulted for evaluation of abnormal imaging, fecal impaction. Received 2 units for low H/H and today's returned 9.8/30.4. Written for brown bomb enema x 2. Reported to have liquid brown stool but otherwise no solid stools. Received enemas q4hrs x 24 hours.       Interval hx:  Patient with bowel movements yesterday, none today. Xray improved.     Past Medical History:   Diagnosis Date    ALS (amyotrophic lateral sclerosis)     ALS (amyotrophic lateral sclerosis)     Erectile dysfunction     Gait disturbance     Hyperlipidemia     Hypertension     Iron deficiency anemia     Motor neuron disease     Tremor      Review of Systems  Unable to obtain due to trach    Physical Examination  BP (!) 137/99   Pulse 92   Temp 97.9 °F (36.6 °C) (Axillary)   Resp 16   Ht 5' 8" (1.727 m)   Wt 73.7 kg (162 lb 7.7 oz)   SpO2 100%   BMI 24.70 kg/m²   General appearance: alert, no distress, lying in bed  HENT: trach in place, Normocephalic, atraumatic, no nasal discharge, difficulty with secretions and decreased oral tone  Lungs: vented breath sounds, symmetric chest wall expansion bilaterally  Heart: regular rate and rhythm without rub; no displacement of the PMI   Abdomen: PEG in place, soft, no " tenderness to palpation; bowel sounds present; no organomegaly  Extremities: extremities without clubbing, cyanosis; + edema right hand  Neurologic: non-verbal, blinks eyes to answer questions    Labs:  Lab Results   Component Value Date    WBC 8.75 05/26/2022    HGB 9.6 (L) 05/26/2022    HCT 32.3 (L) 05/26/2022    MCV 86 05/26/2022     05/26/2022     CMP  Sodium   Date Value Ref Range Status   05/26/2022 145 136 - 145 mmol/L Final     Potassium   Date Value Ref Range Status   05/26/2022 4.7 3.5 - 5.1 mmol/L Final     Chloride   Date Value Ref Range Status   05/26/2022 113 (H) 95 - 110 mmol/L Final     CO2   Date Value Ref Range Status   05/26/2022 20 (L) 23 - 29 mmol/L Final     Glucose   Date Value Ref Range Status   05/26/2022 128 (H) 70 - 110 mg/dL Final     BUN   Date Value Ref Range Status   05/26/2022 18 8 - 23 mg/dL Final     Creatinine   Date Value Ref Range Status   05/26/2022 0.6 0.5 - 1.4 mg/dL Final     Calcium   Date Value Ref Range Status   05/26/2022 9.2 8.7 - 10.5 mg/dL Final     Total Protein   Date Value Ref Range Status   05/26/2022 7.8 6.0 - 8.4 g/dL Final     Albumin   Date Value Ref Range Status   05/26/2022 2.7 (L) 3.5 - 5.2 g/dL Final     Total Bilirubin   Date Value Ref Range Status   05/26/2022 0.5 0.1 - 1.0 mg/dL Final     Comment:     For infants and newborns, interpretation of results should be based  on gestational age, weight and in agreement with clinical  observations.    Premature Infant recommended reference ranges:  Up to 24 hours.............<8.0 mg/dL  Up to 48 hours............<12.0 mg/dL  3-5 days..................<15.0 mg/dL  6-29 days.................<15.0 mg/dL       Alkaline Phosphatase   Date Value Ref Range Status   05/26/2022 57 55 - 135 U/L Final     AST   Date Value Ref Range Status   05/26/2022 15 10 - 40 U/L Final     ALT   Date Value Ref Range Status   05/26/2022 19 10 - 44 U/L Final     Anion Gap   Date Value Ref Range Status   05/26/2022 12 8 - 16 mmol/L  Final     eGFR if    Date Value Ref Range Status   05/26/2022 >60 >60 mL/min/1.73 m^2 Final     eGFR if non    Date Value Ref Range Status   05/26/2022 >60 >60 mL/min/1.73 m^2 Final     Comment:     Calculation used to obtain the estimated glomerular filtration  rate (eGFR) is the CKD-EPI equation.        Imaging:  KUB (independently viewed)= Nonobstructed bowel gas pattern.  Mild degree of stool in the left colon and rectum.  Peg tube.  No pathologic abdominal calcification.  No acute osseous abnormality.    Assessment:   1. Abdominal pain  2. Abnormal imaging of GI tract  3. Fecal impaction  4. Suspected stercoral colitis in the setting of fecal impaction    Plan:   -Ok to resume tube feeds  -Ok to stop daily KUB  -Will change Miralax to 17 gm BID  -Will change glycerin suppositories to daily - patient will likely need periodic disimpaction by a caretaker     Thelma Sevilla MD  [facility] [facility] [facility] [place], [address]  [place] [address]  Office: [phone]  Fax: [phone]

## 2022-05-26 NOTE — PROGRESS NOTES
05/26/2022      Admit Date: 5/22/2022  Wei Dominique  New Patient Consult    Chief Complaint   Patient presents with    Tracheostomy Tube Change     From [place] for trach tube change, trach obstruction; currently on blood thinners for DVT to right arm        History of Present Illness:   Discussed with wife.  Pt had pe past and has been off and on anticoagg-- wife not aware of sign bleeding issues. She notes some excess secretions . No issues known to wife with trach.  Pt indicates has not had trach changed for over a month.   Pt admits to excess secretions and muscle spasm pains.         Pt had been in [place] recent past, ended up at [place] for chr vent support. Pt presented in April after cardiac arrest and cpr-- was in septic shock, blood + esbl e coli.     Pt could open eyes but was non verBAL when left [facility] fo r ltac 4/20/20222.  Pt was in [place] Oct 2020-- had been living at home with no trach,  with sitters at that time.  H/o saddle pe in March 2020.  Ended with trach November 2020.      Pt was off anticoagg in April [facility] admit ???  Pt was on eliquis 2/21/2022 admit and was to continue at [place].      From hpi TUSHAR Cao:  HPI: Wei Dominique is a 66 year old male with past medical history of ALS, s/p trach & peg, vent dependent, HTN, PE (on eliquis) presented to hospital from [facility] for an evaluation of his tracheostomy. HPI limited due to patient's non verbal status, and information obtained from ED and nursing home notes. Per ED notes, the respiratory tech at [facility] was having difficulty passing a suction catheter through the trach, leading to concerns for a blockage. The cannula was replaced several times, and he had minimal bleeding from the site. He did not have a large amount of bleeding at the time, and there was no bleeding upon arrival to the ED. He was sent for possible tracheostomy change.     Upon arrival to the ED, the patient was suctioned with very little  difficulty and no evidence of obstruction was noted. Baseline labs were obtained, where the patient was found to be severely anemic, with a hemoglobin of 3.3 and a hematocrit of 10.9. His baseline H/H on review of the medical record appears to be around 8. He was given emergency release blood in the ED, where his blood pressure maintained and his H/H improved with subsequent lab draws. There was no obvious source of bleeding, as his urine did not show hematuria, there was no active bleeding from the trach site and there were no reports of hematemesis, hematochezia or dark stools. His abdomen is significantly distended with hypoactive bowel sounds.      A CT of the chest, abdomen and pelvis was obtained by hospital medicine, which showed:   1. Ground-glass opacities in the lingula and aerated portion of the right lower lobe.  An infectious process cannot be entirely excluded. Complete right and moderate left bibasilar atelectasis.  2. 2 mm right upper lobe pulmonary nodule.  For a solid nodule <6 mm, Fleischner Society 2017 guidelines recommend no routine follow up for a low risk patient, or follow-up with non-contrast chest CT at 12 months in a high risk patient.  3. Innumerable bilateral renal cysts.  Findings may suggest history of adult polycystic kidney disease. Hepatic cysts.  4. Cholelithiasis.  5. Gaseous distension of the colon.  Patulous rectum distended with fecal material.  Presacral infiltration or edema.  Findings may suggest fecal impaction and or stercoral colitis.  6. Edematous appearance of the arms.     Hospital Medicine consulted for admission and further management.      Progress Note  PULMONARY    Admit Date: 5/22/2022 05/26/2022      SUBJECTIVE:     5/24 no c/o, less spasm, oral secretions.      May 25th, no new complaints.   May 26th-no new complaints.          PFSH and Allergies reviewed.    OBJECTIVE:     Vitals (Most recent):  Vitals:    05/26/22 1517   BP:    Pulse: 92   Resp: 16   Temp:         Vitals (24 hour range):  Temp:  [97.9 °F (36.6 °C)-99 °F (37.2 °C)]   Pulse:  []   Resp:  [15-26]   BP: ()/(41-99)   SpO2:  [100 %]   Arterial Line BP: (104-168)/(60-91)       Intake/Output Summary (Last 24 hours) at 5/26/2022 1546  Last data filed at 5/26/2022 1207  Gross per 24 hour   Intake 2574.94 ml   Output 2245 ml   Net 329.94 ml          Physical Exam:  The patient's neuro status (alertness,orientation,cognitive function,motor skills,), pharyngeal exam (oral lesions, hygiene, abn dentition,), Neck (jvd,mass,thyroid,nodes in neck and above/below clavicle),RESPIRATORY(symmetry,effort,fremitus,percussion,auscultation),  Cor(rhythm,heart tones including gallops,perfusion,edema)ABD(distention,hepatic&splenomegaly,tenderness,masses), Skin(rash,cyanosis),Psyc(affect,judgement,).  Exam negative except for these pertinent findings:    Alert, interacts with eye blinks - 1 yea, 2 no, chest is symmetric, no distress, normal percussion, normal fremitus and good normal breath sounds      Radiographs reviewed: view by direct vision    Results for orders placed during the hospital encounter of 02/21/22    X-Ray Chest 1 View    Narrative  EXAMINATION:  XR CHEST 1 VIEW    CLINICAL HISTORY:  confirm trach position;    TECHNIQUE:  Single frontal view of the chest was performed.    COMPARISON:  None    FINDINGS:  The tip of a left chest wall port overlies the superior vena cava.  There is a tracheostomy.  The lungs are somewhat expiratory, which may be exaggerating the pulmonary vascularity.  There is a small right pleural effusion.  The heart size appears normal.  There is mild calcification of the aortic knob consistent with atherosclerotic stigmata.    Impression  Lines and tubes as above.  Small right pleural effusion.      Electronically signed by: Olive Juarez  Date:    02/21/2022  Time:    09:08  ]    Labs     Recent Labs   Lab 05/26/22  0310   WBC 8.75   HGB 9.6*   HCT 32.3*        Recent Labs    Lab 05/26/22  0310 05/26/22  1425     --    K 3.4* 4.7   *  --    CO2 20*  --    BUN 18  --    CREATININE 0.6  --    *  --    CALCIUM 9.2  --    AST 15  --    ALT 19  --    ALKPHOS 57  --    BILITOT 0.5  --    PROT 7.8  --    ALBUMIN 2.7*  --    No results for input(s): PH, PCO2, PO2, HCO3 in the last 24 hours.  Microbiology Results (last 7 days)     Procedure Component Value Units Date/Time    Culture, Respiratory with Gram Stain [[id number]]  (Abnormal)  (Susceptibility) Collected: 05/23/22 0111    Order Status: Completed Specimen: Respiratory from Sputum Updated: 05/26/22 1109     Respiratory Culture No S aureus isolated.      PSEUDOMONAS AERUGINOSA   Many        PROTEUS MIRABILIS ESBL  Few  Susceptibility pending  Normal respiratory marco also present       Gram Stain (Respiratory) <10 epithelial cells per low power field.     Gram Stain (Respiratory) Rare WBC's     Gram Stain (Respiratory) Few Gram positive cocci     Gram Stain (Respiratory) Few Gram positive rods      Gram Stain (Respiratory) Rare Gram negative rods    Culture, Respiratory with Gram Stain [[id number]]  (Abnormal)  (Susceptibility) Collected: 05/22/22 1519    Order Status: Completed Specimen: Respiratory from Tracheal Aspirate Updated: 05/26/22 1050     Respiratory Culture No S aureus isolated.      PROTEUS MIRABILIS  Moderate        PSEUDOMONAS AERUGINOSA   Few       Gram Stain (Respiratory) <10 epithelial cells per low power field.     Gram Stain (Respiratory) Many WBC's     Gram Stain (Respiratory) Rare Gram positive cocci     Gram Stain (Respiratory) Rare Gram negative rods          Impression:  Active Hospital Problems    Diagnosis  POA    *Anemia [D64.9]  Yes    History of pulmonary embolism [Z86.711]  Yes    Tracheostomy in place [Z93.0]  Not Applicable    Chronic respiratory failure with hypoxia and hypercapnia [J96.11, J96.12]  Yes    Atelectasis [J98.11]  Yes    Muscle spasm [M62.838]  Yes    Aspiration  syndrome [T17.900A]  Yes    Fecal impaction in rectum [K56.41]  Yes    Abdominal distention [R14.0]  Yes    Dependent on ventilator [Z99.11]  Not Applicable    Right lower lobe consolidation [J18.1]  Yes    S/P percutaneous endoscopic gastrostomy (PEG) tube placement [Z93.1]  Not Applicable    Essential hypertension [I10]  Yes    ALS (amyotrophic lateral sclerosis) [G12.21]  Yes    Edema [R60.9]  Yes    Quadriparesis [G82.50]  Yes      Resolved Hospital Problems   No resolved problems to display.            .    Plan:   5/23 p to 131 - 117,   40% ox,   No fever, vss,     Ct chest 5/22 with chr looking rll collpase, trach position seems good  procal 0.96,       No anticoagg h/o saddle pe with als.          Will order cough assist as pt feels secretion issues.  Will change trach, check arms/legs, and resume anticoagg.     Monitor and consider return to [place].      Will titrate up baclofen and increase robinul.        bp was low at admit,     Monitor.    Discussed with wife.      Trach changed - scant bleeding.  Would start lovenox therapeutic at 10 pm.  inr 1.9 may not be therapeutic.  Wife not aware of prior saddle pe nor he being off anticoagg nor sign bleed.  Work to get back to [place].        5/24 no fever, bp low ppt art line - blood draws difficult also.    Sl tachy. No dvt.      Would do best with cough assist.    With h/o saddle embolism-- would continue anticoagg.  Might consider low dose eliquis???    Would keep robinul and baclofen doses up as pt relates less copious oral secretions and muscle spasm pain is better.     Recommend return to [place].  Changes seen on ct are not specific and require no treatment.         May 25th- no fever, vital signs are stable.  Somewhat tachycardic.      No antibiotics.  Patient needs have a bowel movement to transfer back to [place].        May 26th- no fever, vital signs stable.  White count down to 8.8.    resp stable, no new recs  Addendum-- pt having high  peak pressures at 50 with very prolonged exp,  Suction cath passes easy.  Will check cxr and order nebs q 4 and solumedrol x1.

## 2022-05-26 NOTE — PLAN OF CARE
Problem: Adult Inpatient Plan of Care  Goal: Plan of Care Review  Outcome: Ongoing, Progressing     Problem: Adult Inpatient Plan of Care  Goal: Patient-Specific Goal (Individualized)  Outcome: Ongoing, Progressing     Problem: Adult Inpatient Plan of Care  Goal: Optimal Comfort and Wellbeing  Outcome: Ongoing, Progressing     Problem: Communication Impairment (Mechanical Ventilation, Invasive)  Goal: Effective Communication  Outcome: Ongoing, Progressing     Problem: Nutrition Impairment (Mechanical Ventilation, Invasive)  Goal: Optimal Nutrition Delivery  Outcome: Ongoing, Progressing     Plan of care reviewed at bedside with patient. Patient has been able to communicate with blinking his eyelids. 1 blink = yes / 2 blinks = no. Patient has had several episodes of teeth clinching and face grimacing. Managed patient's pain ( abdominal cramping) using positioning changes, IV pain meds, bowel rest etc.  Pt kt was 3.4 this am replaced per PRN order 60 meq with a rechecked value of 4.7. Patient's wife called for update had no questions or concerns at this time

## 2022-05-26 NOTE — ASSESSMENT & PLAN NOTE
Significant distention noted, CT of abdomen shows: In the pelvis, there is gaseous distension of the colon.  The sigmoid colon is redundant.  The patulous rectum is distended with hyperdense fecal material.  There is presacral edema or infiltration.  There are no pelvic masses or adenopathy.  There is no free pelvic fluid.  GI consulted - thought to have stercoral colitis from fecal impaction  5/24 Enemas ordered  5/25 Disimpaction then golytely  Improving  Follos daily KUB

## 2022-05-27 LAB
ALBUMIN SERPL BCP-MCNC: 1.6 G/DL (ref 3.5–5.2)
ALBUMIN SERPL BCP-MCNC: 2.7 G/DL (ref 3.5–5.2)
ALLENS TEST: ABNORMAL
ALP SERPL-CCNC: 36 U/L (ref 55–135)
ALP SERPL-CCNC: 58 U/L (ref 55–135)
ALT SERPL W/O P-5'-P-CCNC: 13 U/L (ref 10–44)
ALT SERPL W/O P-5'-P-CCNC: 21 U/L (ref 10–44)
ANION GAP SERPL CALC-SCNC: 11 MMOL/L (ref 8–16)
ANION GAP SERPL CALC-SCNC: 8 MMOL/L (ref 8–16)
AST SERPL-CCNC: 11 U/L (ref 10–40)
AST SERPL-CCNC: 22 U/L (ref 10–40)
BACTERIA SPEC AEROBE CULT: ABNORMAL
BASOPHILS # BLD AUTO: 0.02 K/UL (ref 0–0.2)
BASOPHILS NFR BLD: 0.1 % (ref 0–1.9)
BILIRUB SERPL-MCNC: 0.2 MG/DL (ref 0.1–1)
BILIRUB SERPL-MCNC: 0.3 MG/DL (ref 0.1–1)
BUN SERPL-MCNC: 11 MG/DL (ref 8–23)
BUN SERPL-MCNC: 16 MG/DL (ref 8–23)
CALCIUM SERPL-MCNC: 5.8 MG/DL (ref 8.7–10.5)
CALCIUM SERPL-MCNC: 9.4 MG/DL (ref 8.7–10.5)
CHLORIDE SERPL-SCNC: 114 MMOL/L (ref 95–110)
CHLORIDE SERPL-SCNC: 128 MMOL/L (ref 95–110)
CO2 SERPL-SCNC: 13 MMOL/L (ref 23–29)
CO2 SERPL-SCNC: 20 MMOL/L (ref 23–29)
CREAT SERPL-MCNC: 0.4 MG/DL (ref 0.5–1.4)
CREAT SERPL-MCNC: 0.6 MG/DL (ref 0.5–1.4)
DELSYS: ABNORMAL
DIFFERENTIAL METHOD: ABNORMAL
EOSINOPHIL # BLD AUTO: 0 K/UL (ref 0–0.5)
EOSINOPHIL NFR BLD: 0 % (ref 0–8)
ERYTHROCYTE [DISTWIDTH] IN BLOOD BY AUTOMATED COUNT: 18.4 % (ref 11.5–14.5)
ERYTHROCYTE [SEDIMENTATION RATE] IN BLOOD BY WESTERGREN METHOD: 16 MM/H
EST. GFR  (AFRICAN AMERICAN): >60 ML/MIN/1.73 M^2
EST. GFR  (AFRICAN AMERICAN): >60 ML/MIN/1.73 M^2
EST. GFR  (NON AFRICAN AMERICAN): >60 ML/MIN/1.73 M^2
EST. GFR  (NON AFRICAN AMERICAN): >60 ML/MIN/1.73 M^2
FIO2: 30
GLUCOSE SERPL-MCNC: 139 MG/DL (ref 70–110)
GLUCOSE SERPL-MCNC: 192 MG/DL (ref 70–110)
GRAM STN SPEC: ABNORMAL
HCO3 UR-SCNC: 23.9 MMOL/L (ref 24–28)
HCT VFR BLD AUTO: 32.3 % (ref 40–54)
HGB BLD-MCNC: 9.5 G/DL (ref 14–18)
IMM GRANULOCYTES # BLD AUTO: 0.08 K/UL (ref 0–0.04)
IMM GRANULOCYTES NFR BLD AUTO: 0.6 % (ref 0–0.5)
LYMPHOCYTES # BLD AUTO: 0.8 K/UL (ref 1–4.8)
LYMPHOCYTES NFR BLD: 5.4 % (ref 18–48)
MCH RBC QN AUTO: 25.7 PG (ref 27–31)
MCHC RBC AUTO-ENTMCNC: 29.4 G/DL (ref 32–36)
MCV RBC AUTO: 87 FL (ref 82–98)
MIN VOL: 10.3
MODE: ABNORMAL
MONOCYTES # BLD AUTO: 0.3 K/UL (ref 0.3–1)
MONOCYTES NFR BLD: 2.4 % (ref 4–15)
NEUTROPHILS # BLD AUTO: 13.2 K/UL (ref 1.8–7.7)
NEUTROPHILS NFR BLD: 91.5 % (ref 38–73)
NRBC BLD-RTO: 0 /100 WBC
PCO2 BLDA: 36.8 MMHG (ref 35–45)
PEEP: 5
PH SMN: 7.42 [PH] (ref 7.35–7.45)
PIP: 23
PLATELET # BLD AUTO: 333 K/UL (ref 150–450)
PMV BLD AUTO: 9.7 FL (ref 9.2–12.9)
PO2 BLDA: 128 MMHG (ref 80–100)
POC BE: -1 MMOL/L
POC SATURATED O2: 99 % (ref 95–100)
POC TCO2: 25 MMOL/L (ref 23–27)
POTASSIUM SERPL-SCNC: 2.9 MMOL/L (ref 3.5–5.1)
POTASSIUM SERPL-SCNC: 4.8 MMOL/L (ref 3.5–5.1)
PROT SERPL-MCNC: 4.5 G/DL (ref 6–8.4)
PROT SERPL-MCNC: 7.9 G/DL (ref 6–8.4)
RBC # BLD AUTO: 3.7 M/UL (ref 4.6–6.2)
SAMPLE: ABNORMAL
SITE: ABNORMAL
SODIUM SERPL-SCNC: 145 MMOL/L (ref 136–145)
SODIUM SERPL-SCNC: 149 MMOL/L (ref 136–145)
SP02: 100
VT: 500
WBC # BLD AUTO: 14.38 K/UL (ref 3.9–12.7)

## 2022-05-27 PROCEDURE — 94761 N-INVAS EAR/PLS OXIMETRY MLT: CPT

## 2022-05-27 PROCEDURE — 63700000 PHARM REV CODE 250 ALT 637 W/O HCPCS: Performed by: NURSE PRACTITIONER

## 2022-05-27 PROCEDURE — 27000207 HC ISOLATION

## 2022-05-27 PROCEDURE — C9113 INJ PANTOPRAZOLE SODIUM, VIA: HCPCS | Performed by: NURSE PRACTITIONER

## 2022-05-27 PROCEDURE — 82803 BLOOD GASES ANY COMBINATION: CPT

## 2022-05-27 PROCEDURE — 99232 SBSQ HOSP IP/OBS MODERATE 35: CPT | Mod: ,,, | Performed by: INTERNAL MEDICINE

## 2022-05-27 PROCEDURE — 99900026 HC AIRWAY MAINTENANCE (STAT)

## 2022-05-27 PROCEDURE — 99232 PR SUBSEQUENT HOSPITAL CARE,LEVL II: ICD-10-PCS | Mod: ,,, | Performed by: INTERNAL MEDICINE

## 2022-05-27 PROCEDURE — 25000003 PHARM REV CODE 250: Performed by: NURSE PRACTITIONER

## 2022-05-27 PROCEDURE — 63600175 PHARM REV CODE 636 W HCPCS: Performed by: HOSPITALIST

## 2022-05-27 PROCEDURE — 85025 COMPLETE CBC W/AUTO DIFF WBC: CPT | Performed by: HOSPITALIST

## 2022-05-27 PROCEDURE — 20000000 HC ICU ROOM

## 2022-05-27 PROCEDURE — 80053 COMPREHEN METABOLIC PANEL: CPT | Performed by: NURSE PRACTITIONER

## 2022-05-27 PROCEDURE — 25000242 PHARM REV CODE 250 ALT 637 W/ HCPCS: Performed by: INTERNAL MEDICINE

## 2022-05-27 PROCEDURE — 63600175 PHARM REV CODE 636 W HCPCS: Performed by: INTERNAL MEDICINE

## 2022-05-27 PROCEDURE — 63600175 PHARM REV CODE 636 W HCPCS: Performed by: NURSE PRACTITIONER

## 2022-05-27 PROCEDURE — 25000003 PHARM REV CODE 250: Performed by: SURGERY

## 2022-05-27 PROCEDURE — 94640 AIRWAY INHALATION TREATMENT: CPT

## 2022-05-27 PROCEDURE — 94003 VENT MGMT INPAT SUBQ DAY: CPT

## 2022-05-27 PROCEDURE — 37799 UNLISTED PX VASCULAR SURGERY: CPT

## 2022-05-27 PROCEDURE — 25000003 PHARM REV CODE 250: Performed by: INTERNAL MEDICINE

## 2022-05-27 PROCEDURE — 99900035 HC TECH TIME PER 15 MIN (STAT)

## 2022-05-27 PROCEDURE — 99900022

## 2022-05-27 PROCEDURE — 63600175 PHARM REV CODE 636 W HCPCS: Performed by: SURGERY

## 2022-05-27 PROCEDURE — 80053 COMPREHEN METABOLIC PANEL: CPT | Mod: 91 | Performed by: HOSPITALIST

## 2022-05-27 PROCEDURE — 27000221 HC OXYGEN, UP TO 24 HOURS

## 2022-05-27 RX ORDER — BUSPIRONE HYDROCHLORIDE 10 MG/1
30 TABLET ORAL ONCE
Status: CANCELLED | OUTPATIENT
Start: 2022-05-27 | End: 2022-05-27

## 2022-05-27 RX ORDER — MAGNESIUM SULFATE HEPTAHYDRATE 40 MG/ML
2 INJECTION, SOLUTION INTRAVENOUS
Status: CANCELLED | OUTPATIENT
Start: 2022-05-27

## 2022-05-27 RX ORDER — ACETAMINOPHEN 650 MG/20.3ML
650 LIQUID ORAL EVERY 6 HOURS
Status: CANCELLED | OUTPATIENT
Start: 2022-05-27

## 2022-05-27 RX ORDER — HYDROMORPHONE HYDROCHLORIDE 1 MG/ML
1 INJECTION, SOLUTION INTRAMUSCULAR; INTRAVENOUS; SUBCUTANEOUS ONCE
Status: COMPLETED | OUTPATIENT
Start: 2022-05-27 | End: 2022-05-27

## 2022-05-27 RX ORDER — MORPHINE SULFATE 2 MG/ML
1 INJECTION, SOLUTION INTRAMUSCULAR; INTRAVENOUS ONCE
Status: DISCONTINUED | OUTPATIENT
Start: 2022-05-27 | End: 2022-05-27

## 2022-05-27 RX ADMIN — CHLORHEXIDINE GLUCONATE 0.12% ORAL RINSE 15 ML: 1.2 LIQUID ORAL at 09:05

## 2022-05-27 RX ADMIN — FUROSEMIDE 40 MG: 10 INJECTION, SOLUTION INTRAVENOUS at 08:05

## 2022-05-27 RX ADMIN — GLYCERIN 1 SUPPOSITORY: 2 SUPPOSITORY RECTAL at 08:05

## 2022-05-27 RX ADMIN — CHLORHEXIDINE GLUCONATE 0.12% ORAL RINSE 15 ML: 1.2 LIQUID ORAL at 08:05

## 2022-05-27 RX ADMIN — MORPHINE SULFATE 2 MG: 2 INJECTION, SOLUTION INTRAMUSCULAR; INTRAVENOUS at 08:05

## 2022-05-27 RX ADMIN — ALPRAZOLAM 0.5 MG: 0.25 TABLET ORAL at 09:05

## 2022-05-27 RX ADMIN — POLYETHYLENE GLYCOL (3350) 17 G: 17 POWDER, FOR SOLUTION ORAL at 08:05

## 2022-05-27 RX ADMIN — BUSPIRONE HYDROCHLORIDE 5 MG: 5 TABLET ORAL at 09:05

## 2022-05-27 RX ADMIN — IPRATROPIUM BROMIDE AND ALBUTEROL SULFATE 3 ML: 2.5; .5 SOLUTION RESPIRATORY (INHALATION) at 12:05

## 2022-05-27 RX ADMIN — DEXTROSE MONOHYDRATE, SODIUM CHLORIDE, AND POTASSIUM CHLORIDE: 50; 9; 1.49 INJECTION, SOLUTION INTRAVENOUS at 07:05

## 2022-05-27 RX ADMIN — MORPHINE SULFATE 2 MG: 2 INJECTION, SOLUTION INTRAMUSCULAR; INTRAVENOUS at 09:05

## 2022-05-27 RX ADMIN — BACLOFEN 20 MG: 10 TABLET ORAL at 03:05

## 2022-05-27 RX ADMIN — MUPIROCIN: 20 OINTMENT TOPICAL at 08:05

## 2022-05-27 RX ADMIN — IPRATROPIUM BROMIDE AND ALBUTEROL SULFATE 3 ML: 2.5; .5 SOLUTION RESPIRATORY (INHALATION) at 04:05

## 2022-05-27 RX ADMIN — BUSPIRONE HYDROCHLORIDE 5 MG: 5 TABLET ORAL at 08:05

## 2022-05-27 RX ADMIN — ENOXAPARIN SODIUM 80 MG: 80 INJECTION SUBCUTANEOUS at 08:05

## 2022-05-27 RX ADMIN — FUROSEMIDE 40 MG: 10 INJECTION, SOLUTION INTRAVENOUS at 09:05

## 2022-05-27 RX ADMIN — GLYCOPYRROLATE 2 MG: 1 TABLET ORAL at 09:05

## 2022-05-27 RX ADMIN — GLYCOPYRROLATE 2 MG: 1 TABLET ORAL at 08:05

## 2022-05-27 RX ADMIN — RILUZOLE 50 MG: 50 TABLET ORAL at 09:05

## 2022-05-27 RX ADMIN — MORPHINE SULFATE 2 MG: 2 INJECTION, SOLUTION INTRAMUSCULAR; INTRAVENOUS at 03:05

## 2022-05-27 RX ADMIN — BACLOFEN 20 MG: 10 TABLET ORAL at 09:05

## 2022-05-27 RX ADMIN — PANTOPRAZOLE SODIUM 40 MG: 40 INJECTION, POWDER, LYOPHILIZED, FOR SOLUTION INTRAVENOUS at 08:05

## 2022-05-27 RX ADMIN — IPRATROPIUM BROMIDE AND ALBUTEROL SULFATE 3 ML: 2.5; .5 SOLUTION RESPIRATORY (INHALATION) at 08:05

## 2022-05-27 RX ADMIN — METOPROLOL TARTRATE 50 MG: 50 TABLET, FILM COATED ORAL at 08:05

## 2022-05-27 RX ADMIN — IPRATROPIUM BROMIDE AND ALBUTEROL SULFATE 3 ML: 2.5; .5 SOLUTION RESPIRATORY (INHALATION) at 03:05

## 2022-05-27 RX ADMIN — BACLOFEN 20 MG: 10 TABLET ORAL at 05:05

## 2022-05-27 RX ADMIN — ALPRAZOLAM 0.5 MG: 0.25 TABLET ORAL at 08:05

## 2022-05-27 RX ADMIN — Medication 9 MG: at 09:05

## 2022-05-27 RX ADMIN — DEXTROSE MONOHYDRATE, SODIUM CHLORIDE, AND POTASSIUM CHLORIDE: 50; 9; 1.49 INJECTION, SOLUTION INTRAVENOUS at 06:05

## 2022-05-27 RX ADMIN — GLYCOPYRROLATE 2 MG: 1 TABLET ORAL at 03:05

## 2022-05-27 RX ADMIN — HYDROMORPHONE HYDROCHLORIDE 1 MG: 1 INJECTION, SOLUTION INTRAMUSCULAR; INTRAVENOUS; SUBCUTANEOUS at 05:05

## 2022-05-27 RX ADMIN — RILUZOLE 50 MG: 50 TABLET ORAL at 08:05

## 2022-05-27 RX ADMIN — IPRATROPIUM BROMIDE AND ALBUTEROL SULFATE 3 ML: 2.5; .5 SOLUTION RESPIRATORY (INHALATION) at 07:05

## 2022-05-27 RX ADMIN — POLYETHYLENE GLYCOL (3350) 17 G: 17 POWDER, FOR SOLUTION ORAL at 09:05

## 2022-05-27 RX ADMIN — ENOXAPARIN SODIUM 80 MG: 80 INJECTION SUBCUTANEOUS at 09:05

## 2022-05-27 RX ADMIN — MUPIROCIN: 20 OINTMENT TOPICAL at 09:05

## 2022-05-27 NOTE — PROGRESS NOTES
05/27/2022      Admit Date: 5/22/2022  Wei Dominique  New Patient Consult    Chief Complaint   Patient presents with    Tracheostomy Tube Change     From [place] for trach tube change, trach obstruction; currently on blood thinners for DVT to right arm        History of Present Illness:   Discussed with wife.  Pt had pe past and has been off and on anticoagg-- wife not aware of sign bleeding issues. She notes some excess secretions . No issues known to wife with trach.  Pt indicates has not had trach changed for over a month.   Pt admits to excess secretions and muscle spasm pains.         Pt had been in [place] recent past, ended up at [place] for chr vent support. Pt presented in April after cardiac arrest and cpr-- was in septic shock, blood + esbl e coli.     Pt could open eyes but was non verBAL when left [facility] fo r ltac 4/20/20222.  Pt was in [place] Oct 2020-- had been living at home with no trach,  with sitters at that time.  H/o saddle pe in March 2020.  Ended with trach November 2020.      Pt was off anticoagg in April [facility] admit ???  Pt was on eliquis 2/21/2022 admit and was to continue at [place].      From hpi TUSHAR Cao:  HPI: Wei Dominique is a 66 year old male with past medical history of ALS, s/p trach & peg, vent dependent, HTN, PE (on eliquis) presented to hospital from [facility] for an evaluation of his tracheostomy. HPI limited due to patient's non verbal status, and information obtained from ED and nursing home notes. Per ED notes, the respiratory tech at [place] was having difficulty passing a suction catheter through the trach, leading to concerns for a blockage. The cannula was replaced several times, and he had minimal bleeding from the site. He did not have a large amount of bleeding at the time, and there was no bleeding upon arrival to the ED. He was sent for possible tracheostomy change.     Upon arrival to the ED, the patient was suctioned with very little  difficulty and no evidence of obstruction was noted. Baseline labs were obtained, where the patient was found to be severely anemic, with a hemoglobin of 3.3 and a hematocrit of 10.9. His baseline H/H on review of the medical record appears to be around 8. He was given emergency release blood in the ED, where his blood pressure maintained and his H/H improved with subsequent lab draws. There was no obvious source of bleeding, as his urine did not show hematuria, there was no active bleeding from the trach site and there were no reports of hematemesis, hematochezia or dark stools. His abdomen is significantly distended with hypoactive bowel sounds.      A CT of the chest, abdomen and pelvis was obtained by [facility] medicine, which showed:   1. Ground-glass opacities in the lingula and aerated portion of the right lower lobe.  An infectious process cannot be entirely excluded. Complete right and moderate left bibasilar atelectasis.  2. 2 mm right upper lobe pulmonary nodule.  For a solid nodule <6 mm, Fleischner Society 2017 guidelines recommend no routine follow up for a low risk patient, or follow-up with non-contrast chest CT at 12 months in a high risk patient.  3. Innumerable bilateral renal cysts.  Findings may suggest history of adult polycystic kidney disease. Hepatic cysts.  4. Cholelithiasis.  5. Gaseous distension of the colon.  Patulous rectum distended with fecal material.  Presacral infiltration or edema.  Findings may suggest fecal impaction and or stercoral colitis.  6. Edematous appearance of the arms.     Hospital Medicine consulted for admission and further management.      Progress Note  PULMONARY    Admit Date: 5/22/2022 05/27/2022      SUBJECTIVE:     5/24 no c/o, less spasm, oral secretions.      May 25th, no new complaints.   May 26th-no new complaints.  5/27 no c/o, had high peak pressures on vent yesterday.        PFSH and Allergies reviewed.    OBJECTIVE:     Vitals (Most recent):  Vitals:     05/27/22 0915   BP:    Pulse:    Resp:    Temp: 98.2 °F (36.8 °C)       Vitals (24 hour range):  Temp:  [97.4 °F (36.3 °C)-98.2 °F (36.8 °C)]   Pulse:  []   Resp:  [15-29]   BP: ()/()   SpO2:  [92 %-100 %]   Arterial Line BP: ()/()       Intake/Output Summary (Last 24 hours) at 5/27/2022 0937  Last data filed at 5/27/2022 0615  Gross per 24 hour   Intake 2132.49 ml   Output 1410 ml   Net 722.49 ml          Physical Exam:  The patient's neuro status (alertness,orientation,cognitive function,motor skills,), pharyngeal exam (oral lesions, hygiene, abn dentition,), Neck (jvd,mass,thyroid,nodes in neck and above/below clavicle),RESPIRATORY(symmetry,effort,fremitus,percussion,auscultation),  Cor(rhythm,heart tones including gallops,perfusion,edema)ABD(distention,hepatic&splenomegaly,tenderness,masses), Skin(rash,cyanosis),Psyc(affect,judgement,).  Exam negative except for these pertinent findings:    Alert, interacts with eye blinks - 1 yea, 2 no, chest is symmetric, no distress, normal percussion, normal fremitus and good normal breath sounds      Radiographs reviewed: view by direct vision    Results for orders placed during the hospital encounter of 02/21/22    X-Ray Chest 1 View    Narrative  EXAMINATION:  XR CHEST 1 VIEW    CLINICAL HISTORY:  confirm trach position;    TECHNIQUE:  Single frontal view of the chest was performed.    COMPARISON:  None    FINDINGS:  The tip of a left chest wall port overlies the superior vena cava.  There is a tracheostomy.  The lungs are somewhat expiratory, which may be exaggerating the pulmonary vascularity.  There is a small right pleural effusion.  The heart size appears normal.  There is mild calcification of the aortic knob consistent with atherosclerotic stigmata.    Impression  Lines and tubes as above.  Small right pleural effusion.      Electronically signed by: Olive Juarez  Date:    02/21/2022  Time:    09:08  ]    Labs     Recent Labs   Lab  05/27/22  0448   WBC 14.38*   HGB 9.5*   HCT 32.3*        Recent Labs   Lab 05/27/22  0448      K 4.8   *   CO2 20*   BUN 16   CREATININE 0.6   *   CALCIUM 9.4   AST 22   ALT 21   ALKPHOS 58   BILITOT 0.3   PROT 7.9   ALBUMIN 2.7*     Recent Labs   Lab 05/26/22  1805   PH 7.213*   PCO2 67.8*   PO2 95   HCO3 27.3     Microbiology Results (last 7 days)     Procedure Component Value Units Date/Time    Culture, Respiratory with Gram Stain [[id number]]  (Abnormal)  (Susceptibility) Collected: 05/23/22 0111    Order Status: Completed Specimen: Respiratory from Sputum Updated: 05/26/22 1109     Respiratory Culture No S aureus isolated.      PSEUDOMONAS AERUGINOSA   Many        PROTEUS MIRABILIS ESBL  Few  Susceptibility pending  Normal respiratory marco also present       Gram Stain (Respiratory) <10 epithelial cells per low power field.     Gram Stain (Respiratory) Rare WBC's     Gram Stain (Respiratory) Few Gram positive cocci     Gram Stain (Respiratory) Few Gram positive rods      Gram Stain (Respiratory) Rare Gram negative rods    Culture, Respiratory with Gram Stain [[id number]]  (Abnormal)  (Susceptibility) Collected: 05/22/22 1519    Order Status: Completed Specimen: Respiratory from Tracheal Aspirate Updated: 05/26/22 1050     Respiratory Culture No S aureus isolated.      PROTEUS MIRABILIS  Moderate        PSEUDOMONAS AERUGINOSA   Few       Gram Stain (Respiratory) <10 epithelial cells per low power field.     Gram Stain (Respiratory) Many WBC's     Gram Stain (Respiratory) Rare Gram positive cocci     Gram Stain (Respiratory) Rare Gram negative rods          Impression:  Active Hospital Problems    Diagnosis  POA    *Anemia [D64.9]  Yes    History of pulmonary embolism [Z86.711]  Yes    Tracheostomy in place [Z93.0]  Not Applicable    Chronic respiratory failure with hypoxia and hypercapnia [J96.11, J96.12]  Yes    Atelectasis [J98.11]  Yes    Muscle spasm [M62.838]  Yes     Aspiration syndrome [T17.900A]  Yes    Fecal impaction in rectum [K56.41]  Yes    Abdominal distention [R14.0]  Yes    Dependent on ventilator [Z99.11]  Not Applicable    Right lower lobe consolidation [J18.1]  Yes    S/P percutaneous endoscopic gastrostomy (PEG) tube placement [Z93.1]  Not Applicable    Essential hypertension [I10]  Yes    ALS (amyotrophic lateral sclerosis) [G12.21]  Yes    Edema [R60.9]  Yes    Quadriparesis [G82.50]  Yes      Resolved Hospital Problems   No resolved problems to display.            .    Plan:   5/23 p to 131 - 117,   40% ox,   No fever, vss,     Ct chest 5/22 with chr looking rll collpase, trach position seems good  procal 0.96,       No anticoagg h/o saddle pe with als.          Will order cough assist as pt feels secretion issues.  Will change trach, check arms/legs, and resume anticoagg.     Monitor and consider return to [place].      Will titrate up baclofen and increase robinul.        bp was low at admit,     Monitor.    Discussed with wife.      Trach changed - scant bleeding.  Would start lovenox therapeutic at 10 pm.  inr 1.9 may not be therapeutic.  Wife not aware of prior saddle pe nor he being off anticoagg nor sign bleed.  Work to get back to [place].        5/24 no fever, bp low ppt art line - blood draws difficult also.    Sl tachy. No dvt.      Would do best with cough assist.    With h/o saddle embolism-- would continue anticoagg.  Might consider low dose eliquis???    Would keep robinul and baclofen doses up as pt relates less copious oral secretions and muscle spasm pain is better.     Recommend return to [place].  Changes seen on ct are not specific and require no treatment.         May 25th- no fever, vital signs are stable.  Somewhat tachycardic.      No antibiotics.  Patient needs have a bowel movement to transfer back to [place].        May 26th- no fever, vital signs stable.  White count down to 8.8.    resp stable, no new recs  Addendum-- pt  having high peak pressures at 50 with very prolonged exp,  Suction cath passes easy.  Will check cxr and order nebs q 4 and solumedrol x1.      5/27 no fever, vss, p 100,   abg on vent last pm with ph 7.23,co2 68,    Peak pressure now 19 (was 50 yesterday pm), no obstruction seen on vol time curve (was very obstructed yesterday pm).  Pt may have had mucous plug (has been on cough assist) or bronchospasm.  Will f/u abg.  Pt may return to [place] from pulmonary.  Would continue neb rx at [place].  Cough assist would be desirable but usually not available at [facility].    Call pulm on call if needed.

## 2022-05-27 NOTE — ASSESSMENT & PLAN NOTE
Significant distention noted, CT of abdomen shows: In the pelvis, there is gaseous distension of the colon.  The sigmoid colon is redundant.  The patulous rectum is distended with hyperdense fecal material.  There is presacral edema or infiltration.  There are no pelvic masses or adenopathy.  There is no free pelvic fluid.  GI consulted - thought to have stercoral colitis from fecal impaction  5/24 Enemas ordered  5/25 Disimpaction then golytely  Improving  5/27 resume tube feeds, miralax BID and daily glycerin suppository

## 2022-05-27 NOTE — PLAN OF CARE
Updated clinicals sent to [facility] via [employer].         05/27/22 1600   Post-Acute Status   Post-Acute Authorization Placement   Post-Acute Placement Status Referrals Sent

## 2022-05-27 NOTE — PLAN OF CARE
Updated Perico with [facility] of likely DC tomorrow. Per Perico, will be no issue for weekend DC. Just will need updated covid test on the day of DC

## 2022-05-27 NOTE — CARE UPDATE
05/27/22 0748   Patient Assessment/Suction   Level of Consciousness (AVPU) alert   Respiratory Effort Normal   Expansion/Accessory Muscles/Retractions expansion symmetric   All Lung Fields Breath Sounds Anterior:   SIM Breath Sounds coarse   RUL Breath Sounds coarse   Rhythm/Pattern, Respiratory assisted mechanically   Cough Frequency with stimulation   Cough Type fair   Suction Method tracheal   Suction Pressure (mmHg) 100 mmHg   $ Suction Charges Inline Suction Procedure Stat Charge   Secretions Amount large   Secretions Color tan;creamy   Secretions Characteristics tenacious   PRE-TX-O2   O2 Device (Oxygen Therapy) ventilator   $ Is the patient on Low Flow Oxygen? Yes   Oxygen Concentration (%) 30   SpO2 100 %   Pulse 95   Resp 20   Positioning HOB elevated 30 degrees   Aerosol Therapy   $ Aerosol Therapy Charges Aerosol Treatment   Daily Review of Necessity (SVN) completed   Respiratory Treatment Status (SVN) given   Treatment Route (SVN) in-line;tracheostomy   Patient Position (SVN) HOB elevated   Signs of Intolerance (SVN) none   Breath Sounds Post-Respiratory Treatment   Throughout All Fields Post-Treatment All Fields   Throughout All Fields Post-Treatment aeration increased   Cough Assist   $ Cough Assist Administration Done   Daily Review of Necessity (Cough Assist) completed   Route (Cough Assist) trach tube   Control (Cough Assist) automatic   Inhale Time (Sec) 2   Exhale Time (Sec) 2   Pause (Sec) 2   Inhale Positive Pressure (cm H2O) 25   Exhale Pressure (cm H2O) -25   Cough Cycles 4   Patient Position (Cough Assist) HOB elevated   Post Treatment Assessment (Cough Assist) congestion decreased   Signs of Intolerance (Cough Assist) none   Skin Integrity   $ Wound Care Tech Time 15 min   Area Observed Neck under tracheostomy;Neck   Skin Appearance without discoloration   Barrier used? Other (see comments)  (drain sponge)        Surgical Airway 02/21/22 1142 Cuffed   Placement Date/Time: 02/21/22 1148    Present Prior to Hospital Arrival?: Yes  Type: Tracheostomy  Airway Device Size: 8.0  Style: Cuffed   Cuff Pressure 30 cm H2O   Cuff Inflation? Inflated   Status Secured   Site Assessment Clean   Site Care Protective barrier to skin   Ties Assessment Intact;Secure   General Safety Checklist   Safety Promotion/Fall Prevention side rails raised   Airway Safety   Ambu bag with the patient? Yes, Adult Ambu   Is mask with the patient? Yes, Adult Mask   ETT at Bedside? Yes   ETT Size 8   Vent Select   Conventional Vent Y   $ Ventilator Subsequent 1   Charged w/in last 24h YES   Preset Conventional Ventilator Settings   Ventilation Type VC   Vent Mode A/C   Humidity HME   Set Rate 16 BPM   Vt Set 500 mL   PEEP/CPAP 5 cmH20   Pressure Support 0 cmH20   Waveform RAMP   Peak Flow 45 L/min   Set Inspiratory Pressure 0 cmH20   Insp Time 0 Sec(s)   Plateau Set/Insp. Hold (sec) 0   Insp Rise Time  0 %   I-Trigger Type  Flow   Trigger Sensitivity Flow/I-Trigger 3 L/min   P High 0 cm H2O   P Low 0 cm H2O   T High 0 sec   T Low 0 sec   Patient Ventilator Parameters   Resp Rate Total 22 br/min   Peak Airway Pressure 22 cmH2O   Mean Airway Pressure 12 cmH20   Plateau Pressure 0 cmH20   Exhaled Vt 430 mL   Total Ve 11.2 mL   Spont Ve 0 L   I:E Ratio Measured 1:2.10   Conventional Ventilator Alarms   Alarms On Y   Ve High Alarm 20 L/min   Resp Rate High Alarm 45 br/min   Press High Alarm 55 cmH2O   Apnea Rate 20   Apnea Volume (mL) 500 mL   Apnea Oxygen Concentration  100   Apnea Flow Rate (L/min) 60   T Apnea 20 sec(s)   IHI Ventilator Associated Pneumonia Bundle (Required)   Oral Care Suction toothette;Suction toothette toothbrush;Oral suctioning prior to turn   Ready to Wean/Extubation Screen   FIO2<=50 (chart decimal) 0.3   MV<16L (chart vol.) 11.2   PEEP <=8 (chart #) 5   Ready to Wean Parameters   F/VT Ratio<105 (RSBI) (!) 46.51   POx, nebs Q4, cough assist BID, TC BID.

## 2022-05-27 NOTE — SUBJECTIVE & OBJECTIVE
Interval History: KUB better. Resuming tube feeds and plan for back to [place] tomorrow if tolerating tube feeds    Review of Systems   Unable to perform ROS: Patient nonverbal   Objective:     Vital Signs (Most Recent):  Temp: 98.2 °F (36.8 °C) (05/27/22 0915)  Pulse: 78 (05/27/22 1100)  Resp: 16 (05/27/22 1100)  BP: 131/62 (05/27/22 1030)  SpO2: 100 % (05/27/22 1100) Vital Signs (24h Range):  Temp:  [97.4 °F (36.3 °C)-98.2 °F (36.8 °C)] 98.2 °F (36.8 °C)  Pulse:  [] 78  Resp:  [15-29] 16  SpO2:  [92 %-100 %] 100 %  BP: ()/() 131/62  Arterial Line BP: ()/() 112/61     Weight: 72.2 kg (159 lb 2.8 oz)  Body mass index is 24.2 kg/m².    Intake/Output Summary (Last 24 hours) at 5/27/2022 1135  Last data filed at 5/27/2022 1055  Gross per 24 hour   Intake 2132.49 ml   Output 1635 ml   Net 497.49 ml        Physical Exam  Vitals and nursing note reviewed.   Constitutional:       General: He is awake. He is not in acute distress.     Appearance: He is normal weight. He is ill-appearing (chronically ill appearing, sickly).   HENT:      Mouth/Throat:      Lips: Pink.      Mouth: Mucous membranes are moist.      Comments: Drooling from mouth  Neck:      Trachea: Tracheostomy present.      Comments: Trach site appears intact  Cardiovascular:      Rate and Rhythm: Normal rate.      Heart sounds: Normal heart sounds, S1 normal and S2 normal.      Comments: +anasarca  Pulmonary:      Effort: Pulmonary effort is normal.      Breath sounds: Transmitted upper airway sounds present. Examination of the right-lower field reveals decreased breath sounds. Examination of the left-lower field reveals decreased breath sounds. Decreased breath sounds present.   Chest:       Abdominal:      General: Abdomen is protuberant. Bowel sounds are decreased. There is distension (improving).      Tenderness: There is no abdominal tenderness.   Genitourinary:     Comments: [place]  Musculoskeletal:      Cervical back: Full  passive range of motion without pain and neck supple.      Right lower leg: Edema present.      Left lower leg: Edema present.   Skin:     General: Skin is warm and dry.      Capillary Refill: Capillary refill takes 2 to 3 seconds.   Neurological:      Mental Status: He is alert. Mental status is at baseline.      Comments: Quadraparesis  Non verbal but does communicate well via eye blinks, one blink yes, two blinks no         Significant Labs: All pertinent labs within the past 24 hours have been reviewed.    Significant Imaging: I have reviewed all pertinent imaging results/findings within the past 24 hours.

## 2022-05-27 NOTE — NURSING
1710 - 1830 Patient facial grimacing, clenching teeth, elevated HR and BP. Dr. Ashley at bedside, new orders received. PCXR done, RT tx done. Suctioned moderate thick tan secretions from trach. Ambu bagged per RT without resistance. Pt repositioned. Medicated with Morphine, Xanax and Hydralizine for pain and BP. Improvement seen in BP, HR remains 's. Appears more comfortable.

## 2022-05-27 NOTE — PROGRESS NOTES
[facility]  Adult Nutrition  Progress Note    SUMMARY     Intervention: nutrition support     Recommendations  1.) Continue TF Isosource 1.5 @ 10 mls/hr advancing as tolerated to goal rate 50 mls/hr continuous providing 1800 kcals/day, 82 g protein/day,  211 g CHO/day, and 917 mls water/day. FWF: 150 mls q4 hrs or per MD.      2.) If needed-Parenteral Nutrition: 5%AA/D15 @ goal rate 75 mls/hr continuous + 20% lipids (250mls) daily providing 1778 kcals/day, 90 g protein/day, and 1800 mls fluid/day. GIR:2.5 mg/kg/min.      Goals: 1.) enteral nutrition will advance within 48 hrs 2.) TF meeting 50% of needs at f/u  Nutrition Goal Status: 1.) met/ new  Communication of RD Recs: POC, sticky note, reviewed with RN     1. Tracheostomy care    2. Anemia    3. Abdominal distention    4. Fecal impaction in rectum            Past Medical History:   Diagnosis Date    ALS (amyotrophic lateral sclerosis)      ALS (amyotrophic lateral sclerosis)      Erectile dysfunction      Gait disturbance      Hyperlipidemia      Hypertension      Iron deficiency anemia      Motor neuron disease      Tremor           Assessment and Plan  Nutrition Problem  Inadequate energy intake     Related to (etiology):   Chronic illness     Signs and Symptoms (as evidenced by):   NPO, s/p trach/peg     Interventions:   above  Nutrition Diagnosis Status:   Continues              Malnutrition Assessment  Malnutrition Type: chronic illness  Cesar score = 11 + wounds  Edematous  1-3+  NFPE WDL 5/27/22     Reason for Assessment  Reason For Assessment: RD follow up   Rounds: not attended    General Information Comments: admits from [place] with anemia, bed bound s/p trach/peg, nonverbal. RD consulted for TF reccs.  5/25: Pt remains w/ trach/vent dependent. TF on hold due to fecal impaction, on enemas + mucoid BMs. Spoke with RN. Will add TPN reccs if unable to restart enteral nutrition.   5/27/22 TF just started today @ 10 ml/hr.  "Tolerating per RN. Plan to return to [place] tomorrow if tolerating TF advancement.     Nutrition Risk Screen  Nutrition Risk Screen: tube feeding or parenteral nutrition     Nutrition/Diet History     Food Allergies: NKFA  Factors Affecting Nutritional Intake: on mechanical ventilation, NPO  Spiritual/cultural/Confucianism factors affecting PO intakes: none     Anthropometrics  Height Method: Estimated  Height: 5' 8"  Height (inches): 68 in  Weight Method: Bed Scale  Weight: 72.8 kg (admit)  Weight (lb): 160.5 lb  Ideal Body Weight (IBW), Male: 154 lb  BMI (Calculated): 24.4  BMI Grade: 25 - 29.9 - overweight  Usual Body Weight (UBW), k.27 kg (2022)     Lab/Procedures/Meds  Pertinent Labs Reviewed: reviewed  BMP  Lab Results   Component Value Date     2022    K 4.8 2022     (H) 2022    CO2 20 (L) 2022    BUN 16 2022    CREATININE 0.6 2022    CALCIUM 9.4 2022    ANIONGAP 11 2022    ESTGFRAFRICA >60 2022    EGFRNONAA >60 2022     No results found for: POCTGLUCOSE    Lab Results   Component Value Date    ALBUMIN 2.7 (L) 2022          Pertinent Medications Reviewed: reviewed  Lasix, mg sulfate, KCl, insulin, polyethylene glycol     Estimated/Assessed Needs  Weight Used For Calorie Calculations: 76.4 kg (168 lb 6.9 oz)  Energy Calorie Requirements (kcal): 1712  Energy Need Method: [facility]  Protein Requirements: 91g  Weight Used For Protein Calculations: 76.4 kg (168 lb 6.9 oz)  Estimated Fluid Requirement Method: RDA Method  RDA Method (mL): 1712        Nutrition Prescription Ordered  NPO + TF above     Evaluation of Received Nutrient/Fluid Intake  Energy/protein/fluid needs: not meeting  Tolerance: tolerating  % Intake of Estimated Energy Needs: 0 - 25 %  % Meal Intake: NPO + TF above @ 10 ml/hr     Nutrition Risk  Level of Risk/Frequency of Follow-up: moderate  (x2 weekly)         Monitor and Evaluation  Food and Nutrient Intake: " energy intake, enteral nutrition intake  Food and Nutrient Adminstration: diet order, enteral and parenteral nutrition administration  Anthropometric Measurements: weight, weight change, body mass index  Biochemical Data, Medical Tests and Procedures: electrolyte and renal panel, glucose/endocrine profile  Nutrition-Focused Physical Findings: overall appearance , skin        Nutrition Follow-Up  RD Follow-up?: Yes

## 2022-05-27 NOTE — CARE UPDATE
05/26/22 1950   Patient Assessment/Suction   Level of Consciousness (AVPU) responds to pain   Respiratory Effort Normal;Unlabored   Expansion/Accessory Muscles/Retractions expansion symmetric;no retractions;no use of accessory muscles   All Lung Fields Breath Sounds diminished   Suction Method oral;tracheal   $ Suction Charges Inline Suction Procedure Stat Charge   Secretions Amount moderate   Secretions Color tan   Secretions Characteristics thick   PRE-TX-O2   O2 Device (Oxygen Therapy) ventilator   Oxygen Concentration (%) 30   SpO2 100 %   Pulse Oximetry Type Continuous   Pulse (!) 111   Resp 20   BP (!) 110/44   ETCO2   ETCO2 (mmHg) 30 mmHg   Aerosol Therapy   $ Aerosol Therapy Charges Aerosol Treatment   Respiratory Treatment Status (SVN) given   Treatment Route (SVN) in-line   Patient Position (SVN) HOB elevated   Signs of Intolerance (SVN) none   Breath Sounds Post-Respiratory Treatment   Throughout All Fields Post-Treatment All Fields   Throughout All Fields Post-Treatment no change   Post-treatment Heart Rate (beats/min) 113   Post-treatment Resp Rate (breaths/min) 20   Vent Select   Conventional Vent Y   Charged w/in last 24h YES   Preset Conventional Ventilator Settings   Vent Type    Ventilation Type VC   Vent Mode A/C   Humidity HME   Set Rate 16 BPM   Vt Set 500 mL   PEEP/CPAP 5 cmH20   Pressure Support 0 cmH20   Waveform RAMP   Peak Flow 45 L/min   Set Inspiratory Pressure 0 cmH20   Insp Time 0 Sec(s)   Plateau Set/Insp. Hold (sec) 0   Insp Rise Time  0 %   Trigger Sensitivity Flow/I-Trigger 3 L/min   P High 0 cm H2O   P Low 0 cm H2O   T High 0 sec   T Low 0 sec   Patient Ventilator Parameters   Resp Rate Total 17 br/min   Peak Airway Pressure 19 cmH2O   Mean Airway Pressure 11 cmH20   Plateau Pressure 0 cmH20   Exhaled Vt 592 mL   Total Ve 8.44 mL   Spont Ve 0 L   I:E Ratio Measured 1:2.10   Conventional Ventilator Alarms   Ve High Alarm 20 L/min   Resp Rate High Alarm 45 br/min   Press  High Alarm 55 cmH2O   Apnea Rate 20   Apnea Volume (mL) 500 mL   Apnea Oxygen Concentration  100   Apnea Flow Rate (L/min) 60   T Apnea 20 sec(s)   Ready to Wean/Extubation Screen   FIO2<=50 (chart decimal) 0.3   MV<16L (chart vol.) 8.44   PEEP <=8 (chart #) 5   Ready to Wean Parameters   F/VT Ratio<105 (RSBI) (!) 33.78

## 2022-05-27 NOTE — PLAN OF CARE
Problem: Adult Inpatient Plan of Care  Goal: Plan of Care Review  Outcome: Ongoing, Progressing     Problem: Adult Inpatient Plan of Care  Goal: Patient-Specific Goal (Individualized)  Outcome: Ongoing, Progressing     Problem: Adult Inpatient Plan of Care  Goal: Optimal Comfort and Wellbeing  Outcome: Ongoing, Progressing     Problem: Device-Related Complication Risk (Mechanical Ventilation, Invasive)  Goal: Optimal Device Function  Outcome: Ongoing, Progressing     Problem: Skin and Tissue Injury (Artificial Airway)  Goal: Absence of Device-Related Skin or Tissue Injury  Outcome: Ongoing, Progressing     Plan of care reviewed at bedside with patient. Patient has been able to communicate with blinking his eyelids. 1 blink = yes / 2 blinks = no. Patient has had several episodes of teeth clinching and face grimacing but not as frequent as yesterday.Managed patient's pain ( abdominal cramping) using positioning changes, IV pain meds, and slowly resuming PEG tube feeds. Tolerating feeding well, no residual. HR was elevated this evening, 1 x dose of IV Diluadid given per Dr. Rodney. Patient's wife called for update had no questions or concerns at this time, plans to visit tomorrow.

## 2022-05-27 NOTE — PLAN OF CARE
Pt resting comfortably in bed. Tracheostomy intact, on vent- AC 30% fio2 and peep 5. D5NS with 20meq of k+ infusing at 75ml/hr per midline.  Wife updated on plan of care and status.  Wound care completed.       Problem: Adult Inpatient Plan of Care  Goal: Plan of Care Review  Outcome: Ongoing, Progressing  Goal: Patient-Specific Goal (Individualized)  Outcome: Ongoing, Progressing  Goal: Absence of Hospital-Acquired Illness or Injury  Outcome: Ongoing, Progressing  Goal: Optimal Comfort and Wellbeing  Outcome: Ongoing, Progressing  Goal: Readiness for Transition of Care  Outcome: Ongoing, Progressing     Problem: Infection  Goal: Absence of Infection Signs and Symptoms  Outcome: Ongoing, Progressing     Problem: Impaired Wound Healing  Goal: Optimal Wound Healing  Outcome: Ongoing, Progressing     Problem: Fall Injury Risk  Goal: Absence of Fall and Fall-Related Injury  Outcome: Ongoing, Progressing     Problem: Skin Injury Risk Increased  Goal: Skin Health and Integrity  Outcome: Ongoing, Progressing     Problem: Communication Impairment (Mechanical Ventilation, Invasive)  Goal: Effective Communication  Outcome: Ongoing, Progressing     Problem: Device-Related Complication Risk (Mechanical Ventilation, Invasive)  Goal: Optimal Device Function  Outcome: Ongoing, Progressing     Problem: Inability to Wean (Mechanical Ventilation, Invasive)  Goal: Mechanical Ventilation Liberation  Outcome: Ongoing, Progressing     Problem: Nutrition Impairment (Mechanical Ventilation, Invasive)  Goal: Optimal Nutrition Delivery  Outcome: Ongoing, Progressing     Problem: Skin and Tissue Injury (Mechanical Ventilation, Invasive)  Goal: Absence of Device-Related Skin and Tissue Injury  Outcome: Ongoing, Progressing     Problem: Ventilator-Induced Lung Injury (Mechanical Ventilation, Invasive)  Goal: Absence of Ventilator-Induced Lung Injury  Outcome: Ongoing, Progressing     Problem: Communication Impairment (Artificial Airway)  Goal:  Effective Communication  Outcome: Ongoing, Progressing     Problem: Device-Related Complication Risk (Artificial Airway)  Goal: Optimal Device Function  Outcome: Ongoing, Progressing     Problem: Skin and Tissue Injury (Artificial Airway)  Goal: Absence of Device-Related Skin or Tissue Injury  Outcome: Ongoing, Progressing

## 2022-05-28 VITALS
SYSTOLIC BLOOD PRESSURE: 138 MMHG | BODY MASS INDEX: 24.7 KG/M2 | OXYGEN SATURATION: 99 % | TEMPERATURE: 98 F | HEART RATE: 109 BPM | RESPIRATION RATE: 18 BRPM | HEIGHT: 68 IN | WEIGHT: 162.94 LBS | DIASTOLIC BLOOD PRESSURE: 110 MMHG

## 2022-05-28 LAB
ALBUMIN SERPL BCP-MCNC: 2.7 G/DL (ref 3.5–5.2)
ALP SERPL-CCNC: 58 U/L (ref 55–135)
ALT SERPL W/O P-5'-P-CCNC: 17 U/L (ref 10–44)
ANION GAP SERPL CALC-SCNC: 9 MMOL/L (ref 8–16)
AST SERPL-CCNC: 17 U/L (ref 10–40)
BASOPHILS # BLD AUTO: 0.03 K/UL (ref 0–0.2)
BASOPHILS NFR BLD: 0.2 % (ref 0–1.9)
BILIRUB SERPL-MCNC: 0.2 MG/DL (ref 0.1–1)
BUN SERPL-MCNC: 15 MG/DL (ref 8–23)
CALCIUM SERPL-MCNC: 9.5 MG/DL (ref 8.7–10.5)
CHLORIDE SERPL-SCNC: 114 MMOL/L (ref 95–110)
CO2 SERPL-SCNC: 23 MMOL/L (ref 23–29)
CREAT SERPL-MCNC: 0.6 MG/DL (ref 0.5–1.4)
DIFFERENTIAL METHOD: ABNORMAL
EOSINOPHIL # BLD AUTO: 0.2 K/UL (ref 0–0.5)
EOSINOPHIL NFR BLD: 1.1 % (ref 0–8)
ERYTHROCYTE [DISTWIDTH] IN BLOOD BY AUTOMATED COUNT: 18.5 % (ref 11.5–14.5)
EST. GFR  (AFRICAN AMERICAN): >60 ML/MIN/1.73 M^2
EST. GFR  (NON AFRICAN AMERICAN): >60 ML/MIN/1.73 M^2
GLUCOSE SERPL-MCNC: 183 MG/DL (ref 70–110)
HCT VFR BLD AUTO: 30.9 % (ref 40–54)
HGB BLD-MCNC: 9.3 G/DL (ref 14–18)
IMM GRANULOCYTES # BLD AUTO: 0.06 K/UL (ref 0–0.04)
IMM GRANULOCYTES NFR BLD AUTO: 0.5 % (ref 0–0.5)
LYMPHOCYTES # BLD AUTO: 1.6 K/UL (ref 1–4.8)
LYMPHOCYTES NFR BLD: 12.1 % (ref 18–48)
MCH RBC QN AUTO: 26.3 PG (ref 27–31)
MCHC RBC AUTO-ENTMCNC: 30.1 G/DL (ref 32–36)
MCV RBC AUTO: 88 FL (ref 82–98)
MONOCYTES # BLD AUTO: 1 K/UL (ref 0.3–1)
MONOCYTES NFR BLD: 7.9 % (ref 4–15)
NEUTROPHILS # BLD AUTO: 10.3 K/UL (ref 1.8–7.7)
NEUTROPHILS NFR BLD: 78.2 % (ref 38–73)
NRBC BLD-RTO: 0 /100 WBC
PLATELET # BLD AUTO: 324 K/UL (ref 150–450)
PMV BLD AUTO: 10.5 FL (ref 9.2–12.9)
POTASSIUM SERPL-SCNC: 3.9 MMOL/L (ref 3.5–5.1)
PROT SERPL-MCNC: 7.7 G/DL (ref 6–8.4)
RBC # BLD AUTO: 3.53 M/UL (ref 4.6–6.2)
SARS-COV-2 RDRP RESP QL NAA+PROBE: NEGATIVE
SODIUM SERPL-SCNC: 146 MMOL/L (ref 136–145)
WBC # BLD AUTO: 13.11 K/UL (ref 3.9–12.7)

## 2022-05-28 PROCEDURE — 85025 COMPLETE CBC W/AUTO DIFF WBC: CPT | Performed by: HOSPITALIST

## 2022-05-28 PROCEDURE — 99900026 HC AIRWAY MAINTENANCE (STAT)

## 2022-05-28 PROCEDURE — 63600175 PHARM REV CODE 636 W HCPCS: Performed by: INTERNAL MEDICINE

## 2022-05-28 PROCEDURE — 63700000 PHARM REV CODE 250 ALT 637 W/O HCPCS: Performed by: NURSE PRACTITIONER

## 2022-05-28 PROCEDURE — 99900022

## 2022-05-28 PROCEDURE — 63600175 PHARM REV CODE 636 W HCPCS: Performed by: NURSE PRACTITIONER

## 2022-05-28 PROCEDURE — 25000003 PHARM REV CODE 250: Performed by: NURSE PRACTITIONER

## 2022-05-28 PROCEDURE — 94640 AIRWAY INHALATION TREATMENT: CPT

## 2022-05-28 PROCEDURE — 99900035 HC TECH TIME PER 15 MIN (STAT)

## 2022-05-28 PROCEDURE — 63600175 PHARM REV CODE 636 W HCPCS: Performed by: SURGERY

## 2022-05-28 PROCEDURE — 94003 VENT MGMT INPAT SUBQ DAY: CPT

## 2022-05-28 PROCEDURE — 63600175 PHARM REV CODE 636 W HCPCS: Performed by: HOSPITALIST

## 2022-05-28 PROCEDURE — 25000242 PHARM REV CODE 250 ALT 637 W/ HCPCS: Performed by: INTERNAL MEDICINE

## 2022-05-28 PROCEDURE — 27000221 HC OXYGEN, UP TO 24 HOURS

## 2022-05-28 PROCEDURE — C9113 INJ PANTOPRAZOLE SODIUM, VIA: HCPCS | Performed by: NURSE PRACTITIONER

## 2022-05-28 PROCEDURE — 94761 N-INVAS EAR/PLS OXIMETRY MLT: CPT

## 2022-05-28 PROCEDURE — 80053 COMPREHEN METABOLIC PANEL: CPT | Performed by: NURSE PRACTITIONER

## 2022-05-28 PROCEDURE — 25000003 PHARM REV CODE 250: Performed by: SURGERY

## 2022-05-28 PROCEDURE — 25000003 PHARM REV CODE 250: Performed by: INTERNAL MEDICINE

## 2022-05-28 PROCEDURE — U0002 COVID-19 LAB TEST NON-CDC: HCPCS | Performed by: HOSPITALIST

## 2022-05-28 RX ORDER — GLYCERIN 1 G/1
1 SUPPOSITORY RECTAL DAILY
Qty: 30 SUPPOSITORY | Refills: 11 | Status: ON HOLD | OUTPATIENT
Start: 2022-05-28 | End: 2022-08-30 | Stop reason: HOSPADM

## 2022-05-28 RX ORDER — POLYETHYLENE GLYCOL 3350 17 G/17G
17 POWDER, FOR SOLUTION ORAL 2 TIMES DAILY
Qty: 60 EACH | Refills: 11 | Status: SHIPPED | OUTPATIENT
Start: 2022-05-28 | End: 2023-05-28

## 2022-05-28 RX ADMIN — POTASSIUM CHLORIDE 10 MEQ: 7.46 INJECTION, SOLUTION INTRAVENOUS at 05:05

## 2022-05-28 RX ADMIN — MORPHINE SULFATE 2 MG: 2 INJECTION, SOLUTION INTRAMUSCULAR; INTRAVENOUS at 03:05

## 2022-05-28 RX ADMIN — METOPROLOL TARTRATE 50 MG: 50 TABLET, FILM COATED ORAL at 08:05

## 2022-05-28 RX ADMIN — MORPHINE SULFATE 2 MG: 2 INJECTION, SOLUTION INTRAMUSCULAR; INTRAVENOUS at 12:05

## 2022-05-28 RX ADMIN — FUROSEMIDE 40 MG: 10 INJECTION, SOLUTION INTRAVENOUS at 08:05

## 2022-05-28 RX ADMIN — BACLOFEN 20 MG: 10 TABLET ORAL at 05:05

## 2022-05-28 RX ADMIN — PANTOPRAZOLE SODIUM 40 MG: 40 INJECTION, POWDER, LYOPHILIZED, FOR SOLUTION INTRAVENOUS at 08:05

## 2022-05-28 RX ADMIN — GLYCOPYRROLATE 2 MG: 1 TABLET ORAL at 08:05

## 2022-05-28 RX ADMIN — IPRATROPIUM BROMIDE AND ALBUTEROL SULFATE 3 ML: 2.5; .5 SOLUTION RESPIRATORY (INHALATION) at 07:05

## 2022-05-28 RX ADMIN — POLYETHYLENE GLYCOL (3350) 17 G: 17 POWDER, FOR SOLUTION ORAL at 08:05

## 2022-05-28 RX ADMIN — RILUZOLE 50 MG: 50 TABLET ORAL at 08:05

## 2022-05-28 RX ADMIN — BUSPIRONE HYDROCHLORIDE 5 MG: 5 TABLET ORAL at 08:05

## 2022-05-28 RX ADMIN — POTASSIUM CHLORIDE 10 MEQ: 7.46 INJECTION, SOLUTION INTRAVENOUS at 04:05

## 2022-05-28 RX ADMIN — ENOXAPARIN SODIUM 80 MG: 80 INJECTION SUBCUTANEOUS at 08:05

## 2022-05-28 RX ADMIN — POTASSIUM CHLORIDE 10 MEQ: 7.46 INJECTION, SOLUTION INTRAVENOUS at 07:05

## 2022-05-28 RX ADMIN — DEXTROSE MONOHYDRATE, SODIUM CHLORIDE, AND POTASSIUM CHLORIDE: 50; 9; 1.49 INJECTION, SOLUTION INTRAVENOUS at 09:05

## 2022-05-28 RX ADMIN — CHLORHEXIDINE GLUCONATE 0.12% ORAL RINSE 15 ML: 1.2 LIQUID ORAL at 08:05

## 2022-05-28 RX ADMIN — GLYCERIN 1 SUPPOSITORY: 2 SUPPOSITORY RECTAL at 08:05

## 2022-05-28 RX ADMIN — IPRATROPIUM BROMIDE AND ALBUTEROL SULFATE 3 ML: 2.5; .5 SOLUTION RESPIRATORY (INHALATION) at 04:05

## 2022-05-28 RX ADMIN — IPRATROPIUM BROMIDE AND ALBUTEROL SULFATE 3 ML: 2.5; .5 SOLUTION RESPIRATORY (INHALATION) at 11:05

## 2022-05-28 RX ADMIN — IPRATROPIUM BROMIDE AND ALBUTEROL SULFATE 3 ML: 2.5; .5 SOLUTION RESPIRATORY (INHALATION) at 12:05

## 2022-05-28 RX ADMIN — POTASSIUM CHLORIDE 10 MEQ: 7.46 INJECTION, SOLUTION INTRAVENOUS at 06:05

## 2022-05-28 NOTE — PLAN OF CARE
I called [facility] and spoke to Sofy- she stated to fax orders to [phone] and to efax them to [facility] at [phone]. Fax sent. CM following.    05/28/22 8753   Post-Acute Status   Post-Acute Authorization Placement   Post-Acute Placement Status Pending post-acute provider review/more information requested

## 2022-05-28 NOTE — PLAN OF CARE
Tawanda at [facility] called to obtain nurses name and phone number and stated that they will be calling for report in about ten minutes. [facility].    05/28/22 1055   Post-Acute Status   Post-Acute Authorization Placement   Post-Acute Placement Status Set-up Complete/Auth obtained

## 2022-05-28 NOTE — CARE UPDATE
05/28/22 0715   Patient Assessment/Suction   Level of Consciousness (AVPU) alert   Expansion/Accessory Muscles/Retractions expansion symmetric   All Lung Fields Breath Sounds coarse   LLL Breath Sounds diminished   RML Breath Sounds diminished   RLL Breath Sounds diminished   Rhythm/Pattern, Respiratory assisted mechanically   Cough Frequency with stimulation   Cough Type assisted   Suction Method tracheal;nasal;oral   $ Suction Charges Inline Suction Procedure Stat Charge   Secretions Amount moderate   Secretions Color yellow   Secretions Characteristics thick   PRE-TX-O2   O2 Device (Oxygen Therapy) ventilator   $ Is the patient on Low Flow Oxygen? Yes   Oxygen Concentration (%) 30   SpO2 100 %   $ Pulse Oximetry - Multiple Charge Pulse Oximetry - Multiple   Pulse (!) 125   Resp 19   ETCO2   $ ETCO2 Usage Currently wearing   ETCO2 (mmHg) 34 mmHg   ETCO2 Device Type Bedside Monitor   Aerosol Therapy   $ Aerosol Therapy Charges Aerosol Treatment   Daily Review of Necessity (SVN) completed   Respiratory Treatment Status (SVN) given   Treatment Route (SVN) in-line   Patient Position (SVN) semi-Brooke's   Signs of Intolerance (SVN) none   Breath Sounds Post-Respiratory Treatment   Throughout All Fields Post-Treatment aeration increased   Post-treatment Heart Rate (beats/min) 111   Post-treatment Resp Rate (breaths/min) 19   Cough Assist   $ Cough Assist Administration Done   Daily Review of Necessity (Cough Assist) completed   Route (Cough Assist) trach tube   Control (Cough Assist) automatic   Inhale Time (Sec) 2   Exhale Time (Sec) 2   Pause (Sec) 2   Inhale Positive Pressure (cm H2O) 25   Exhale Pressure (cm H2O) -25   Cough Cycles 5   Patient Position (Cough Assist) HOB elevated   Post Treatment Assessment (Cough Assist) congestion decreased   Signs of Intolerance (Cough Assist) none   Skin Integrity   $ Wound Care Tech Time 15 min   Area Observed Neck under tracheostomy   Skin Appearance without discoloration  "  Barrier used? Other (see comments)  (Drain Sponge)        Surgical Airway 02/21/22 1145 Cuffed   Placement Date/Time: 02/21/22 1145   Present Prior to Hospital Arrival?: Yes  Type: Tracheostomy  Airway Device Size: 8.0  Style: Cuffed   Cuff Pressure 40 cm H2O   Cuff Inflation? Inflated   Status Secured   Airway Safety   Ambu bag with the patient? Yes, Adult Ambu   Is mask with the patient? Yes, Adult Mask   Suction set is at the bedside? Yes   Extra trach at bedside? Yes   Extra trach sizes at bedside? 8   Vent Select   Conventional Vent Y   $ Ventilator Subsequent 1   Charged w/in last 24h YES   Preset Conventional Ventilator Settings   Vent Type    Ventilation Type VC   Vent Mode A/C   Humidity HME   Set Rate 16 BPM   Vt Set 500 mL   PEEP/CPAP 5 cmH20   Pressure Support 0 cmH20   Waveform RAMP   Peak Flow 45 L/min   Set Inspiratory Pressure 0 cmH20   Insp Time 0 Sec(s)   Plateau Set/Insp. Hold (sec) 0   Insp Rise Time  0 %   I-Trigger Type  Flow   Trigger Sensitivity Flow/I-Trigger 3 L/min   P High 0 cm H2O   P Low 0 cm H2O   T High 0 sec   T Low 0 sec   Patient Ventilator Parameters   Resp Rate Total 19 br/min   Peak Airway Pressure 25 cmH2O   Mean Airway Pressure 15 cmH20   Plateau Pressure 0 cmH20   Exhaled Vt 508 mL   Total Ve 9.59 mL   Spont Ve 0 L   I:E Ratio Measured 1:2.00   Conventional Ventilator Alarms   Alarms On Y   Ve High Alarm 20 L/min   Resp Rate High Alarm 45 br/min   Press High Alarm 55 cmH2O   Apnea Rate 20   Apnea Volume (mL) 500 mL   Apnea Oxygen Concentration  100   Apnea Flow Rate (L/min) 60   T Apnea 20 sec(s)   Ready to Wean/Extubation Screen   FIO2<=50 (chart decimal) 0.3   MV<16L (chart vol.) 9.59   PEEP <=8 (chart #) 5   Ready to Wean Parameters   F/VT Ratio<105 (RSBI) (!) 37.4   Patient remains on  ventilator on settings posted. Chest film viewed and trach in good position "midline".   "

## 2022-05-28 NOTE — PLAN OF CARE
Discharge orders,negative covid test and AVS sent to [place] to review for patients return. CM following.     Discharge orders for pt to return today. Please let me know when our nurse can call report. Thanks      [phone]   05/28/22 0842   Post-Acute Status   Post-Acute Authorization Placement   Post-Acute Placement Status Pending post-acute provider review/more information requested

## 2022-05-28 NOTE — NURSING
Report called to Krystian at [place]. All questions answered. Awaiting transport at this time.

## 2022-05-28 NOTE — PLAN OF CARE
The pt is cleared for discharge back to [facility] from case management.    05/28/22 1058   Final Note   Assessment Type Final Discharge Note   Anticipated Discharge Disposition [facility]

## 2022-05-28 NOTE — PLAN OF CARE
Pt stable throughout night, safety maintained.  Hr will increase with stimulation, but recovers to baseline quickly. Pt communicated via blinking- one blink for yes; two for no.  Copious secretions maintained throughout the night. K+ 3.9- 40meq of kcl iv initiated.  BM x 2- small liquid. Linen changed, bath given.      Problem: Adult Inpatient Plan of Care  Goal: Plan of Care Review  Outcome: Ongoing, Progressing  Goal: Patient-Specific Goal (Individualized)  Outcome: Ongoing, Progressing  Goal: Absence of Hospital-Acquired Illness or Injury  Outcome: Ongoing, Progressing  Goal: Optimal Comfort and Wellbeing  Outcome: Ongoing, Progressing  Goal: Readiness for Transition of Care  Outcome: Ongoing, Progressing     Problem: Infection  Goal: Absence of Infection Signs and Symptoms  Outcome: Ongoing, Progressing     Problem: Impaired Wound Healing  Goal: Optimal Wound Healing  Outcome: Ongoing, Progressing     Problem: Fall Injury Risk  Goal: Absence of Fall and Fall-Related Injury  Outcome: Ongoing, Progressing     Problem: Skin Injury Risk Increased  Goal: Skin Health and Integrity  Outcome: Ongoing, Progressing     Problem: Communication Impairment (Mechanical Ventilation, Invasive)  Goal: Effective Communication  Outcome: Ongoing, Progressing     Problem: Device-Related Complication Risk (Mechanical Ventilation, Invasive)  Goal: Optimal Device Function  Outcome: Ongoing, Progressing     Problem: Inability to Wean (Mechanical Ventilation, Invasive)  Goal: Mechanical Ventilation Liberation  Outcome: Ongoing, Progressing     Problem: Nutrition Impairment (Mechanical Ventilation, Invasive)  Goal: Optimal Nutrition Delivery  Outcome: Ongoing, Progressing     Problem: Skin and Tissue Injury (Mechanical Ventilation, Invasive)  Goal: Absence of Device-Related Skin and Tissue Injury  Outcome: Ongoing, Progressing     Problem: Ventilator-Induced Lung Injury (Mechanical Ventilation, Invasive)  Goal: Absence of  Ventilator-Induced Lung Injury  Outcome: Ongoing, Progressing     Problem: Communication Impairment (Artificial Airway)  Goal: Effective Communication  Outcome: Ongoing, Progressing     Problem: Device-Related Complication Risk (Artificial Airway)  Goal: Optimal Device Function  Outcome: Ongoing, Progressing     Problem: Skin and Tissue Injury (Artificial Airway)  Goal: Absence of Device-Related Skin or Tissue Injury  Outcome: Ongoing, Progressing     Problem: Aspiration (Enteral Nutrition)  Goal: Absence of Aspiration Signs and Symptoms  Outcome: Ongoing, Progressing     Problem: Device-Related Complication Risk (Enteral Nutrition)  Goal: Safe, Effective Therapy Delivery  Outcome: Ongoing, Progressing     Problem: Feeding Intolerance (Enteral Nutrition)  Goal: Feeding Tolerance  Outcome: Ongoing, Progressing

## 2022-05-28 NOTE — DISCHARGE SUMMARY
[facility] Medicine  Discharge Summary      Patient Name: Wei Dominique  MRN: [id number]  Patient Class: IP- Inpatient  Admission Date: 5/22/2022  Hospital Length of Stay: 6 days  Discharge Date and Time: 5/28/2022  1:10 PM  Attending Physician: No att. providers found   Discharging Provider: Lauren Rodney MD  Primary Care Provider: Mack Suero MD      HPI:   Wei Dominique is a 66 year old male with past medical history of ALS, s/p trach & peg, vent dependent, HTN, PE (on eliquis) presented to hospital from [facility] for an evaluation of his tracheostomy. HPI limited due to patient's non verbal status, and information obtained from ED and nursing home notes. Per ED notes, the respiratory tech at [place] was having difficulty passing a suction catheter through the trach, leading to concerns for a blockage. The cannula was replaced several times, and he had minimal bleeding from the site. He did not have a large amount of bleeding at the time, and there was no bleeding upon arrival to the ED. He was sent for possible tracheostomy change.    Upon arrival to the ED, the patient was suctioned with very little difficulty and no evidence of obstruction was noted. Baseline labs were obtained, where the patient was found to be severely anemic, with a hemoglobin of 3.3 and a hematocrit of 10.9. His baseline H/H on review of the medical record appears to be around 8. He was given emergency release blood in the ED, where his blood pressure maintained and his H/H improved with subsequent lab draws. There was no obvious source of bleeding, as his urine did not show hematuria, there was no active bleeding from the trach site and there were no reports of hematemesis, hematochezia or dark stools. His abdomen is significantly distended with hypoactive bowel sounds.     A CT of the chest, abdomen and pelvis was obtained by [facility] medicine, which showed:   1. Ground-glass  opacities in the lingula and aerated portion of the right lower lobe.  An infectious process cannot be entirely excluded. Complete right and moderate left bibasilar atelectasis.  2. 2 mm right upper lobe pulmonary nodule.  For a solid nodule <6 mm, Fleischner Society 2017 guidelines recommend no routine follow up for a low risk patient, or follow-up with non-contrast chest CT at 12 months in a high risk patient.  3. Innumerable bilateral renal cysts.  Findings may suggest history of adult polycystic kidney disease. Hepatic cysts.  4. Cholelithiasis.  5. Gaseous distension of the colon.  Patulous rectum distended with fecal material.  Presacral infiltration or edema.  Findings may suggest fecal impaction and or stercoral colitis.  6. Edematous appearance of the arms.    Hospital Medicine consulted for admission and further management.        * No surgery found *      Hospital Course:   Patient was admitted the hospital medicine service for issues with tracheostomy and anemia.  He was transfused PRBCs on admission.  Pulmonology and GI were consulted.  Pulmonology exchanged trach and did not recommend IV antibiotics for sputum culture.  Did recommend low-dose Eliquis for history of PE.  GI was consulted.  Did not recommend endoscopy.  Reported the patient likely had stercoral colitis the setting of fecal impaction.  Patient was given around the clock enemas and then was given GoLYTELY through the PEG tube.  He had improvement in his abdominal distension and is KUB improved.  He was changed to MiraLax b.i.d. and glycerin suppositories daily and he was cleared for discharge by GI.  Patient was discharged back to nursing home is tolerating his tube feeds at goal rate       Goals of Care Treatment Preferences:  Code Status: Full Code       LaPOST: Yes           Consults:   Consults (From admission, onward)        Status Ordering Provider     Inpatient consult to Midline team  Once        Provider:  (Not yet assigned)     Completed [facility]     Gastroenterology  Once        Provider:  Prasanth Grimaldo MD    Completed ED [facility]     Pulmonology  Once        Provider:  Zach Ashley MD    Completed ED [facility]     Inpatient consult to Registered Dietitian/Nutritionist  Once        Provider:  (Not yet assigned)    ED [facility]          No new Assessment & Plan notes have been filed under this hospital service since the last note was generated.  Service: Hospital Medicine    Final Active Diagnoses:    Diagnosis Date Noted POA    PRINCIPAL PROBLEM:  Anemia [D64.9] 05/22/2022 Yes    History of pulmonary embolism [Z86.711] 05/23/2022 Yes    Tracheostomy in place [Z93.0] 05/23/2022 Not Applicable    Chronic respiratory failure with hypoxia and hypercapnia [J96.11, J96.12] 05/23/2022 Yes    Atelectasis [J98.11] 05/23/2022 Yes    Muscle spasm [M62.838] 05/23/2022 Yes    Aspiration syndrome [T17.900A] 05/23/2022 Yes    Fecal impaction in rectum [K56.41]  Yes    Abdominal distention [R14.0] 05/22/2022 Yes    Dependent on ventilator [Z99.11] 03/21/2022 Not Applicable    Right lower lobe consolidation [J18.1] 02/21/2022 Yes    S/P percutaneous endoscopic gastrostomy (PEG) tube placement [Z93.1] 10/13/2020 Not Applicable    Essential hypertension [I10] 12/09/2018 Yes    ALS (amyotrophic lateral sclerosis) [G12.21] 12/09/2018 Yes    Edema [R60.9] 12/06/2018 Yes    Quadriparesis [G82.50] 02/06/2018 Yes      Problems Resolved During this Admission:       Discharged Condition: good    Disposition: care home Nursing Home    Follow Up:   Follow-up Information     Mack Suero MD Follow up in 1 week(s).    Specialty: Family Medicine  Contact information:  [address]  [facility] [address]  [phone]             [facility] Follow up.    Why: care home, Nursing Home  Contact information:  [address] [facility] [address] [phone]                       Patient Instructions:       Notify your health care provider if you experience any of the following:  temperature >100.4     Notify your health care provider if you experience any of the following:  severe uncontrolled pain     Notify your health care provider if you experience any of the following:  difficulty breathing or increased cough     Notify your health care provider if you experience any of the following:  persistent dizziness, light-headedness, or visual disturbances     Activity as tolerated       Significant Diagnostic Studies: Labs:   BMP:   Recent Labs   Lab 05/27/22  0257 05/27/22 0448 05/28/22 0318   * 192* 183*   * 145 146*   K 2.9* 4.8 3.9   * 114* 114*   CO2 13* 20* 23   BUN 11 16 15   CREATININE 0.4* 0.6 0.6   CALCIUM 5.8* 9.4 9.5    and CBC   Recent Labs   Lab 05/27/22 0448 05/28/22 0318   WBC 14.38* 13.11*   HGB 9.5* 9.3*   HCT 32.3* 30.9*    324     Radiology Results (last 7 days)    Procedure Component Value Units Date/Time   RADIOLOGY REPORT [[id number]] Resulted: 05/22/22 0000   Order Status: Completed Updated: 05/27/22 1142   X-Ray Abdomen AP 1 View [[id number]] Resulted: 05/27/22 0823   Order Status: Completed Updated: 05/27/22 0826   Narrative:     EXAMINATION:   XR ABDOMEN AP 1 VIEW     CLINICAL HISTORY:   eval stool burden;     TECHNIQUE:   AP View(s) of the abdomen was performed.     COMPARISON:   Abdomen x-ray of May 26, 2022     FINDINGS:   A gastrostomy tube is noted in place.  There is air throughout the colon which is rather redundant.  Significant small bowel air is not seen.  No free air is noted.  No masses or calcifications are noted.    Impression:       Gastrostomy tube in place.  Air throughout a redundant colon.       Electronically signed by: Romaine Trejo MD   Date: 05/27/2022   Time: 08:23   X-Ray Chest 1 View [[id number]] Resulted: 05/26/22 1834   Order Status: Completed Updated: 05/26/22 1836   Narrative:     EXAMINATION:   XR CHEST 1 VIEW     CLINICAL HISTORY:    resp failure;     TECHNIQUE:   Single frontal view of the chest was performed.     COMPARISON:   Chest of May 22, 2022.     FINDINGS:   A tracheostomy tube is noted in place.  A venous Port-A-Cath is in place on the left chest with central line ending superior vena cava.  The cardiac size and contour is within normal limits.  There is mild atelectasis at the right lung base a decrease from the prior study.  The left lung is clear.  No pneumothorax is seen.    Impression:       Decreasing basilar atelectasis with some remaining on the right.  Tracheostomy tube and venous Port-A-Cath in position.       Electronically signed by: Romaine Trejo MD   Date: 05/26/2022   Time: 18:34   X-Ray Abdomen AP 1 View [[id number]] Resulted: 05/26/22 1137   Order Status: Completed Updated: 05/26/22 1140   Narrative:     EXAMINATION:   XR ABDOMEN AP 1 VIEW     CLINICAL HISTORY:   eval stool burden;     TECHNIQUE:   AP View(s) of the abdomen was performed.     COMPARISON:   05/25/2022     FINDINGS:   Nonobstructed bowel gas pattern.  Mild degree of stool in the left colon and rectum.  Peg tube.  No pathologic abdominal calcification.  No acute osseous abnormality.       Electronically signed by: Sy Farley   Date: 05/26/2022   Time: 11:37   X-Ray Abdomen AP 1 View [[id number]] Resulted: 05/25/22 1606   Order Status: Completed Updated: 05/25/22 1609   Narrative:     EXAMINATION:   XR ABDOMEN AP 1 VIEW     CLINICAL HISTORY:   eval stool burden;     TECHNIQUE:   AP View(s) of the abdomen was performed.     COMPARISON:   None     FINDINGS:   A gastrostomy tube is noted in the left upper quadrant.  There is gas throughout the colon without a large amount of stool seen.  Small bowel distention or air-fluid levels are not seen.  No free air is noted.  No masses or calcifications are noted.    Impression:       Gastrostomy tube in place.  Gas seen throughout the colon without a large amount of stool noted       Electronically signed by:  Romaine Trejo MD   Date: 05/25/2022   Time: 16:06   X-Ray Abdomen AP 1 View [[id number]] Resulted: 05/24/22 1906   Order Status: Completed Updated: 05/24/22 1908   Narrative:     EXAMINATION:   XR ABDOMEN AP 1 VIEW     CLINICAL HISTORY:   eval fecal impaction/stool burden;     TECHNIQUE:   Single AP View of the abdomen was performed.     COMPARISON:   CT chest abdomen and pelvis 05/22/2022, radiographs 02/21/2022     FINDINGS:   Percutaneous gastrostomy tube.  Mild-to-moderate gaseous distension of small enlarged bowel loops in this patient with previously demonstrated findings suspicious for fecal impaction or stercoral colitis.  No definite free intraperitoneal air..     No acute osseous abnormality.    Impression:       As above.       Electronically signed by: Bill Rick   Date: 05/24/2022   Time: 19:06   US Upper Extremity Veins Bilateral [[id number]] Resulted: 05/23/22 1126   Order Status: Completed Updated: 05/23/22 1128   Narrative:     EXAMINATION:   US UPPER EXTREMITY VEINS BILATERAL     CLINICAL HISTORY:   h/o dvt;     TECHNIQUE:   Duplex and color flow Doppler evaluation and dynamic compression was performed of the right and left upper extremity veins.     COMPARISON:   None     FINDINGS:   Right central veins: The internal jugular, subclavian, and axillary veins are patent and free of thrombus.     Right arm veins: The brachial, basilic, and cephalic veins are patent and compressible.     Left central veins: The internal jugular, subclavian, and axillary veins are patent and free of thrombus.     Left arm veins: The brachial, basilic, and cephalic veins are patent and compressible.     Miscellaneous: N/A    Impression:       No thrombus in central veins of the right or left upper extremity.       Electronically signed by: Romaine Trejo MD   Date: 05/23/2022   Time: 11:26   US Lower Extremity Veins Bilateral [[id number]] Resulted: 05/23/22 1116   Order Status: Completed Updated: 05/23/22 1119    Narrative:     EXAMINATION:   US LOWER EXTREMITY VEINS BILATERAL     CLINICAL HISTORY:   h/o dvt;     TECHNIQUE:   Duplex and color flow Doppler and dynamic compression was performed of the bilateral lower extremity veins was performed.     COMPARISON:   None     FINDINGS:   Right thigh veins: The common femoral, femoral, popliteal, upper greater saphenous, and deep femoral veins are patent and free of thrombus. The veins are normally compressible and have normal phasic flow and augmentation response.     Right calf veins: The visualized calf veins are patent.     Left thigh veins: The common femoral, femoral, popliteal, upper greater saphenous, and deep femoral veins are patent and free of thrombus. The veins are normally compressible and have normal phasic flow and augmentation response.     Left calf veins: The visualized calf veins are patent.     Miscellaneous: None    Impression:       No evidence of deep venous thrombosis in either lower extremity.       Electronically signed by: Romaine Trejo MD   Date: 05/23/2022   Time: 11:16   CT Chest Abdomen Pelvis Without Contrast (XPD) [[id number]] (Abnormal) Resulted: 05/22/22 2126   Order Status: Completed Updated: 05/22/22 2129   Narrative:     EXAMINATION:   CT CHEST ABDOMEN PELVIS WITHOUT     CLINICAL HISTORY:   Severe anemia.     TECHNIQUE:   5 mm unenhanced axial images from the lung bases through the greater trochanters were performed.  Coronal and sagittal reformatted images were provided.     COMPARISON:   02/21/2022     FINDINGS:   In the chest, examination is limited by respiratory motion.  Endotracheal tube and left chest port are present.  There are tiny emphysematous blebs at the lung apices right greater than left.  A 2 mm right upper lobe pulmonary nodule is present (series 4 axial image 110).  Calcified granulomas are seen in the lingula and left lower lobe. Ground-glass opacities are seen in the aerated portion of the ribs air lobe and lingula  there is mucous seen in the right lower lobe bronchus.  There is complete atelectasis of the right lower lobe with air bronchograms.  There is moderate atelectasis of the left lower lobe with air bronchograms.  There is linear atelectasis or scarring in the right lower lobe.  The thyroid gland is heterogeneous.  The right thyroid gland is larger than the left.  There is moderate asymmetric elevation the right hemidiaphragm.     In the abdomen and within the limits of a noncontrast examination, there is a PEG tube.  The stomach is decompressed.  Innumerable bilateral renal cysts are present.  There is no hydronephrosis or obstructive uropathy.  There are multiple low-attenuation lesions in both lobes of the liver.  Some are too small to characterize.  Others have imaging characteristics that of simple cysts.  The spleen is mildly enlarged.  Pancreas and adrenal glands are unremarkable.  The gallbladder contains a tiny calcified gallstones. There is no gross abdominal adenopathy or ascites.  There is some flattening of the IVC.  Recommend assessment of hydration status.  There is a small fat containing umbilical hernia.  Extensive infiltration is seen in the visualized portions of the subcutaneous fat of the arms.     In the pelvis, there is gaseous distension of the colon.  The sigmoid colon is redundant.  The patulous rectum is distended with hyperdense fecal material.  There is presacral edema or infiltration.  There are no pelvic masses or adenopathy.  There is no free pelvic fluid.    Impression:       Ground-glass opacities in the lingula and aerated portion of the right lower lobe.  An infectious process cannot be entirely excluded. Complete right and moderate left bibasilar atelectasis.     2 mm right upper lobe pulmonary nodule.  For a solid nodule <6 mm, Fleischner Society 2017 guidelines recommend no routine follow up for a low risk patient, or follow-up with non-contrast chest CT at 12 months in a high  risk patient.     Innumerable bilateral renal cysts.  Findings may suggest history of adult polycystic kidney disease. Hepatic cysts.     Cholelithiasis.     Gaseous distension of the colon.  Patulous rectum distended with fecal material.  Presacral infiltration or edema.  Findings may suggest fecal impaction and or stercoral colitis.     Edematous appearance of the arms.     This report was flagged in Epic as abnormal.       Electronically signed by: Elen Thompson   Date: 05/22/2022   Time: 21:26   X-Ray Neck Soft Tissue [[id number]] Resulted: 05/22/22 1535   Order Status: Completed Updated: 05/22/22 1538   Narrative:     EXAMINATION:   XR NECK SOFT TISSUE     CLINICAL HISTORY:   Encounter for attention to tracheostomy     TECHNIQUE:   AP and lateral soft tissue views the neck were performed.     COMPARISON:   05/17/2022.     FINDINGS:   Limited AP and lateral soft tissue views of the neck demonstrate a tracheostomy tube in place.  This appears appropriately positioned when compared to previous examination.     Left subclavian Port-A-Cath in place.    Impression:       Tracheostomy tube in place.       Electronically signed by: Meliton Tompkins   Date: 05/22/2022   Time: 15:35   X-Ray Chest AP Portable [[id number]] Resulted: 05/22/22 1534   Order Status: Completed Updated: 05/22/22 1536   Narrative:     EXAMINATION:   XR CHEST AP PORTABLE     CLINICAL HISTORY:   Encounter for attention to tracheostomy     TECHNIQUE:   Single frontal view of the chest was performed.     COMPARISON:   05/17/2022.     FINDINGS:   There is pulmonary hypoinflation with crowding of the bronchopulmonary vessels.  There is atelectasis at the lung bases.  No significant pleural effusion.     Heart size normal.  Calcified aortic plaque.  Trachea midline.     Bony thorax intact.  Tracheostomy tube in place.  Left subclavian Port-A-Cath.    Impression:       1. Pulmonary hypoinflation with suspected atelectasis at the lung bases.   2.  Satisfactory indwelling life-support devices.   As deemed clinically necessary, further evaluation may be obtained with dedicated PA and lateral views of the chest.       Electronically signed by: Meliton Tompkins   Date: 05/22/2022   Time: 15:34     Microbiology Results (Last 90 Days)    Procedure Component Value Units Date/Time   Culture, Respiratory with Gram Stain [[id number]] (Abnormal)  Collected: 05/23/22 0111   Order Status: Completed Specimen: Respiratory from Sputum Updated: 05/27/22 1103    Respiratory Culture No S aureus isolated.     PSEUDOMONAS AERUGINOSA    Many    Abnormal      PROTEUS MIRABILIS ESBL   Few   Normal respiratory marco also present    Abnormal     Gram Stain (Respiratory) <10 epithelial cells per low power field.    Gram Stain (Respiratory) Rare WBC's    Gram Stain (Respiratory) Few Gram positive cocci    Gram Stain (Respiratory) Few Gram positive rods     Gram Stain (Respiratory) Rare Gram negative rods     Susceptibility     PSEUDOMONAS AERUGINOSA  Proteus mirabilis ESBL     CULTURE, RESPIRATORY CULTURE, RESPIRATORY     Amikacin <=16 mcg/mL Sensitive       Amox/K Clav'ate   <=8/4 mcg/mL Resistant     Amp/Sulbactam   <=8/4 mcg/mL Resistant     Ampicillin   <=8 mcg/mL Resistant     Cefazolin   4 mcg/mL Resistant     Cefepime 4 mcg/mL Sensitive <=2 mcg/mL Resistant     Ceftazidime/Avibactam 4 mcg/mL Sensitive       Ceftolozane/Tazobactam 1.0 mcg/mL Sensitive       Ceftriaxone   <=1 mcg/mL Resistant     Ciprofloxacin >2 mcg/mL Resistant >2 mcg/mL Resistant     Colistin 6 mcg/mL Resistant 1       Ertapenem   <=0.5 mcg/mL Sensitive     Gentamicin <=4 mcg/mL Sensitive <=4 mcg/mL Sensitive     Levofloxacin >4 mcg/mL Resistant >4 mcg/mL Resistant     Meropenem >8 mcg/mL Resistant <=1 mcg/mL Sensitive     Piperacillin/Tazo <=16 mcg/mL Sensitive <=16 mcg/mL Resistant     Tetracycline   >8 mcg/mL Resistant     Tobramycin <=4 mcg/mL Sensitive <=4 mcg/mL Sensitive     Trimeth/Sulfa   <=2/38  mcg/mL Sensitive                1 For research use only       Linear View         Culture, Respiratory with Gram Stain [[id number]] (Abnormal)  Collected: 05/22/22 1519   Order Status: Completed Specimen: Respiratory from Tracheal Aspirate Updated: 05/26/22 1050    Respiratory Culture No S aureus isolated.     PROTEUS MIRABILIS   Moderate    Abnormal      PSEUDOMONAS AERUGINOSA    Few    Abnormal     Gram Stain (Respiratory) <10 epithelial cells per low power field.    Gram Stain (Respiratory) Many WBC's    Gram Stain (Respiratory) Rare Gram positive cocci    Gram Stain (Respiratory) Rare Gram negative rods     Susceptibility     Proteus mirabilis PSEUDOMONAS AERUGINOSA      CULTURE, RESPIRATORY CULTURE, RESPIRATORY     Amikacin   <=16 mcg/mL Sensitive     Amox/K Clav'ate <=8/4 mcg/mL Sensitive       Amp/Sulbactam <=8/4 mcg/mL Sensitive       Ampicillin <=8 mcg/mL Sensitive       Cefazolin 4 mcg/mL Sensitive       Cefepime <=2 mcg/mL Sensitive >16 mcg/mL Resistant     Ceftolozane/Tazobactam   3 mcg/mL Sensitive     Ceftriaxone <=1 mcg/mL Sensitive       Ciprofloxacin >2 mcg/mL Resistant >2 mcg/mL Resistant     Colistin   1.5 mcg/mL Sensitive 1     Ertapenem <=0.5 mcg/mL Sensitive       Gentamicin <=4 mcg/mL Sensitive 8 mcg/mL Intermediate     Levofloxacin >4 mcg/mL Resistant >4 mcg/mL Resistant     Meropenem <=1 mcg/mL Sensitive >8 mcg/mL Resistant     Piperacillin/Tazo <=16 mcg/mL Sensitive <=16 mcg/mL Sensitive     Tobramycin <=4 mcg/mL Sensitive <=4 mcg/mL Sensitive     Trimeth/Sulfa <=2/38 mcg/mL Sensitive                  1 For research use only             Pending Diagnostic Studies:     None         Medications:  Reconciled Home Medications:      Medication List      START taking these medications    apixaban 2.5 mg Tab  Commonly known as: ELIQUIS  Take 1 tablet (2.5 mg total) by mouth 2 (two) times daily.     glycerin adult suppository  Place 1 suppository rectally once daily.        CHANGE how you take  these medications    baclofen 10 MG tablet  Commonly known as: LIORESAL  TAKE 1 TABLET THREE TIMES PER DAY  What changed: See the new instructions.     glycopyrrolate 1 mg Tab  Commonly known as: ROBINUL  START BY TAKING 1 TABLET DAILY. IF TOLERATING WELL, YOU CAN INCREASE IT UP TO 2 OR EVEN 3 TIMES DAILY.  What changed: See the new instructions.     polyethylene glycol 17 gram Pwpk  Commonly known as: GLYCOLAX  17 g by Per G Tube route 2 (two) times daily.  What changed: when to take this        CONTINUE taking these medications    albuterol-ipratropium 2.5 mg-0.5 mg/3 mL nebulizer solution  Commonly known as: DUO-NEB  Take 3 mLs by nebulization every 4 (four) hours. Rescue     ALPRAZolam 0.5 MG tablet  Commonly known as: XANAX  Take 0.5 mg by mouth 3 (three) times daily as needed.     busPIRone 5 MG Tab  Commonly known as: BUSPAR  1 tablet (5 mg total) by Per G Tube route 2 (two) times daily.     chlorhexidine 0.12 % solution  Commonly known as: PERIDEX  Use as directed 15 mLs in the mouth or throat 2 (two) times daily.     furosemide 40 MG tablet  Commonly known as: LASIX  1 tablet (40 mg total) by Per G Tube route once daily.     lansoprazole 30 MG capsule  Commonly known as: PREVACID  Take 30 mg by mouth once daily.     melatonin 3 mg Tbdl  3 mg by Per G Tube route every evening.     metoprolol tartrate 50 MG tablet  Commonly known as: LOPRESSOR  Take 50 mg by mouth once daily.     ondansetron 4 MG tablet  Commonly known as: ZOFRAN  4 mg by Per G Tube route every 6 (six) hours as needed for Nausea.     pantoprazole 40 mg suspension  Commonly known as: PROTONIX  40 mg by FEEDING TUBE route 2 (two) times a day.     phenazopyridine 200 MG tablet  Commonly known as: PYRIDIUM  200 mg by Per G Tube route 3 (three) times daily.     riluzole 50 mg Tab tablet  TAKE 1 TABLET ON AN EMPTY STOMACH EVERY 12 HRS     traZODone 50 MG tablet  Commonly known as: DESYREL  50 mg by Per G Tube route every evening.             Indwelling Lines/Drains at time of discharge:   Lines/Drains/Airways     Drain  Duration                Gastrostomy/Enterostomy LUQ feeding -- days         Urethral Catheter 05/23/22 0100 Straight-tip 16 Fr. 5 days          Airway  Duration                Surgical Airway 02/21/22 1145 Cuffed 96 days                Time spent on the discharge of patient: 35 minutes    Critical care time spent on the evaluation and treatment of severe organ dysfunction, review of pertinent labs and imaging studies, discussions with consulting providers and discussions with patient/family: 35 minutes.     Lauren Rodney MD  Department of Hospital Medicine  [facility]

## 2022-05-28 NOTE — PLAN OF CARE
Talia has all necessary paperwork for report to be called to pts nurse at the facility. Nurse can call report to [phone]. Please update CM once report is accepted so transportation can be arranged. CM following. Pts nurse updated via secure chat.    05/28/22 1004   Post-Acute Status   Post-Acute Authorization Placement   Post-Acute Placement Status Set-up Complete/Auth obtained

## 2022-05-28 NOTE — HOSPITAL COURSE
Patient was admitted the hospital medicine service for issues with tracheostomy and anemia.  He was transfused PRBCs on admission.  Pulmonology and GI were consulted.  Pulmonology exchanged trach and did not recommend IV antibiotics for sputum culture.  Did recommend low-dose Eliquis for history of PE.  GI was consulted.  Did not recommend endoscopy.  Reported the patient likely had stercoral colitis the setting of fecal impaction.  Patient was given around the clock enemas and then was given GoLYTELY through the PEG tube.  He had improvement in his abdominal distension and is KUB improved.  He was changed to MiraLax b.i.d. and glycerin suppositories daily and he was cleared for discharge by GI.  Patient was discharged back to nursing home is tolerating his tube feeds at goal rate

## 2022-05-28 NOTE — NURSING
Attempted to call report to [facility], however [facility] stated they had not received information from management. Notified SUZANNA Freeman LCSW.

## 2022-05-28 NOTE — DISCHARGE INSTRUCTIONS
[facility] Facility Transfer Orders        Admit to: [facility]    Diagnoses:   Active Hospital Problems    Diagnosis  POA    *Anemia [D64.9]  Yes    History of pulmonary embolism [Z86.711]  Yes    Tracheostomy in place [Z93.0]  Not Applicable    Chronic respiratory failure with hypoxia and hypercapnia [J96.11, J96.12]  Yes    Atelectasis [J98.11]  Yes    Muscle spasm [M62.838]  Yes    Aspiration syndrome [T17.900A]  Yes    Fecal impaction in rectum [K56.41]  Yes    Abdominal distention [R14.0]  Yes    Dependent on ventilator [Z99.11]  Not Applicable    Right lower lobe consolidation [J18.1]  Yes    S/P percutaneous endoscopic gastrostomy (PEG) tube placement [Z93.1]  Not Applicable    Essential hypertension [I10]  Yes    ALS (amyotrophic lateral sclerosis) [G12.21]  Yes    Edema [R60.9]  Yes    Quadriparesis [G82.50]  Yes      Resolved Hospital Problems   No resolved problems to display.     Allergies: Review of patient's allergies indicates:  No Known Allergies    Code Status: Full    Vitals: Routine       Diet:  NPO. Tube feecings Isisource 1.5 Jaya @ 50 cc/hour with  cc q 4 hours    Activity: Activity as tolerated    Nursing Precautions: Aspiration , Fall, Seizure, and Pressure ulcer prevention    Bed/Surface: Low Air Loss      Oxygen: Ventilator: AC RR 16, , PEEP 5, FiO2 40    Dialysis: Patient is not on dialysis.     Labs: CBC and CMP M,F for 2 weeks  Pending Diagnostic Studies:       Procedure Component Value Units Date/Time    COVID-19 Rapid Screening [[id number]] Collected: 05/28/22 0742    Order Status: Sent Lab Status: In process Updated: 05/28/22 0743    Specimen: Nasal Swab           Imaging: none    Miscellaneous Care:   PEG Care:  Clean site every 24 hours  Hutchinson Care: Empty Hutchinson bag every shift.  Change Hutchinson every month  Routine Skin for Bedridden Patients:  Apply moisture barrier cream to all  Manual disimpaction of feces PRN    IV Access:  Peripheral     Medications: Discontinue all previous medication orders, if any. See new list below.  Current Discharge Medication List        START taking these medications    Details   apixaban (ELIQUIS) 2.5 mg Tab Take 1 tablet (2.5 mg total) by mouth 2 (two) times daily.  Qty: 60 tablet, Refills: 11      glycerin adult suppository Place 1 suppository rectally once daily.  Qty: 30 suppository, Refills: 11           CONTINUE these medications which have CHANGED    Details   polyethylene glycol (GLYCOLAX) 17 gram PwPk 17 g by Per G Tube route 2 (two) times daily.  Qty: 60 each, Refills: 11           CONTINUE these medications which have NOT CHANGED    Details   albuterol-ipratropium (DUO-NEB) 2.5 mg-0.5 mg/3 mL nebulizer solution Take 3 mLs by nebulization every 4 (four) hours. Rescue      ALPRAZolam (XANAX) 0.5 MG tablet Take 0.5 mg by mouth 3 (three) times daily as needed.      baclofen (LIORESAL) 10 MG tablet TAKE 1 TABLET THREE TIMES PER DAY  Qty: 270 tablet, Refills: 0    Associated Diagnoses: ALS (amyotrophic lateral sclerosis)      busPIRone (BUSPAR) 5 MG Tab 1 tablet (5 mg total) by Per G Tube route 2 (two) times daily.  Qty: 60 tablet, Refills: 11      chlorhexidine (PERIDEX) 0.12 % solution Use as directed 15 mLs in the mouth or throat 2 (two) times daily.      furosemide (LASIX) 40 MG tablet 1 tablet (40 mg total) by Per G Tube route once daily.  Qty: 30 tablet, Refills: 0      glycopyrrolate (ROBINUL) 1 mg Tab START BY TAKING 1 TABLET DAILY. IF TOLERATING WELL, YOU CAN INCREASE IT UP TO 2 OR EVEN 3 TIMES DAILY.  Qty: 270 tablet, Refills: 1      lansoprazole (PREVACID) 30 MG capsule Take 30 mg by mouth once daily.      melatonin 3 mg TbDL 3 mg by Per G Tube route every evening.      metoprolol tartrate (LOPRESSOR) 50 MG tablet Take 50 mg by mouth once daily.      ondansetron (ZOFRAN) 4 MG tablet 4 mg by Per G Tube route every 6 (six) hours as needed for Nausea.      pantoprazole (PROTONIX) 40 mg  suspension 40 mg by FEEDING TUBE route 2 (two) times a day.      phenazopyridine (PYRIDIUM) 200 MG tablet 200 mg by Per G Tube route 3 (three) times daily.      riluzole 50 mg Tab tablet TAKE 1 TABLET ON AN EMPTY STOMACH EVERY 12 HRS  Qty: 180 tablet, Refills: 3    Associated Diagnoses: ALS (amyotrophic lateral sclerosis)      traZODone (DESYREL) 50 MG tablet 50 mg by Per G Tube route every evening.           Follow up:    Follow-up Information       Mack Suero MD Follow up in 1 week(s).    Specialty: Family Medicine  Contact information:  [address] [address]  [phone]                               Immunizations Administered as of 5/28/2022       No immunizations on file.                Lauren Rodney MD

## 2022-05-28 NOTE — CARE UPDATE
05/27/22 2010   Patient Assessment/Suction   Level of Consciousness (AVPU) alert   Respiratory Effort Normal;Unlabored   Expansion/Accessory Muscles/Retractions expansion symmetric;no use of accessory muscles   All Lung Fields Breath Sounds Anterior:;coarse;diminished   SIM Breath Sounds coarse   LLL Breath Sounds diminished   RUL Breath Sounds coarse   RML Breath Sounds coarse;diminished   RLL Breath Sounds diminished   Rhythm/Pattern, Respiratory assisted mechanically   Cough Frequency with stimulation   Cough Type assisted   Suction Method oral;tracheal   $ Suction Charges Inline Suction Procedure Stat Charge   Secretions Amount moderate   Secretions Color tan   Secretions Characteristics thick   PRE-TX-O2   O2 Device (Oxygen Therapy) ventilator   $ Is the patient on Low Flow Oxygen? Yes   Oxygen Concentration (%) 30   SpO2 100 %   Pulse Oximetry Type Continuous   $ Pulse Oximetry - Multiple Charge Pulse Oximetry - Multiple   Pulse (!) 129   Resp (!) 22   ETCO2   $ ETCO2 Usage Currently wearing   ETCO2 (mmHg) 33 mmHg   ETCO2 Device Type Bedside Monitor;Ventilator;Artificial Airway   Aerosol Therapy   $ Aerosol Therapy Charges Aerosol Treatment   Respiratory Treatment Status (SVN) given   Treatment Route (SVN) in-line;tracheostomy   Patient Position (SVN) HOB elevated   Post Treatment Assessment (SVN) breath sounds unchanged   Signs of Intolerance (SVN) none   Breath Sounds Post-Respiratory Treatment   Throughout All Fields Post-Treatment All Fields   Throughout All Fields Post-Treatment no change   Post-treatment Heart Rate (beats/min) 115   Post-treatment Resp Rate (breaths/min) 22   Cough Assist   $ Cough Assist Administration Done   Route (Cough Assist) trach tube   Control (Cough Assist) automatic   Inhale Time (Sec) 2   Exhale Time (Sec) 2   Pause (Sec) 2   Inhale Positive Pressure (cm H2O) 25   Exhale Pressure (cm H2O) -25   Cough Cycles 5   Patient Position (Cough Assist) HOB elevated   Post Treatment  Assessment (Cough Assist) congestion decreased   Signs of Intolerance (Cough Assist) none   Skin Integrity   $ Wound Care Tech Time 15 min   Area Observed Neck under tracheostomy   Skin Appearance without discoloration   Barrier used? Other (see comments)  (drain sponge)        Surgical Airway 02/21/22 1145 Cuffed   Placement Date/Time: 02/21/22 1145   Present Prior to Hospital Arrival?: Yes  Type: Tracheostomy  Airway Device Size: 8.0  Style: Cuffed   Cuff Pressure 36 cm H2O   Cuff Inflation? Deflated   Speaking Valve Usage Not wearing   Status Secured   Site Assessment Clean;Dry   Airway Safety   Ambu bag with the patient? Yes, Adult Ambu   Is mask with the patient? Yes, Adult Mask   Extra trach at bedside? Yes   Extra trach sizes at bedside? 8   Vent Select   Conventional Vent Y   Charged w/in last 24h YES   Preset Conventional Ventilator Settings   Vent Type    Ventilation Type VC   Vent Mode A/C   Humidity HME   Set Rate 16 BPM   Vt Set 500 mL   PEEP/CPAP 5 cmH20   Pressure Support 0 cmH20   Waveform RAMP   Peak Flow 45 L/min   Set Inspiratory Pressure 0 cmH20   Insp Time 0 Sec(s)   Plateau Set/Insp. Hold (sec) 0   Insp Rise Time  0 %   Trigger Sensitivity Flow/I-Trigger 3 L/min   P High 0 cm H2O   P Low 0 cm H2O   T High 0 sec   T Low 0 sec   Patient Ventilator Parameters   Resp Rate Total 22 br/min   Peak Airway Pressure 28 cmH2O   Mean Airway Pressure 15 cmH20   Plateau Pressure 0 cmH20   Exhaled Vt 474 mL   Total Ve 11.3 mL   Spont Ve 0 L   I:E Ratio Measured 1.30:1   Total PEEP 5.6 cmH20   Tubing ID (mm) 8 mm   Tube Type Trach   Conventional Ventilator Alarms   Alarms On Y   Ve High Alarm 20 L/min   Resp Rate High Alarm 45 br/min   Press High Alarm 55 cmH2O   Apnea Rate 20   Apnea Volume (mL) 500 mL   Apnea Oxygen Concentration  100   Apnea Flow Rate (L/min) 60   T Apnea 20 sec(s)   Ready to Wean/Extubation Screen   FIO2<=50 (chart decimal) 0.3   MV<16L (chart vol.) 11.3   PEEP <=8 (chart #) 5    Ready to Wean Parameters   F/VT Ratio<105 (RSBI) (!) 46.41

## 2022-05-30 ENCOUNTER — TELEPHONE (OUTPATIENT)
Dept: MEDSURG UNIT | Facility: HOSPITAL | Age: 67
End: 2022-05-30
Payer: MEDICARE

## 2022-06-09 ENCOUNTER — HOSPITAL ENCOUNTER (INPATIENT)
Facility: HOSPITAL | Age: 67
LOS: 5 days | DRG: 698 | End: 2022-06-14
Attending: EMERGENCY MEDICINE | Admitting: HOSPITALIST
Payer: MEDICARE

## 2022-06-09 DIAGNOSIS — B95.7 BACTEREMIA DUE TO COAGULASE-NEGATIVE STAPHYLOCOCCUS: ICD-10-CM

## 2022-06-09 DIAGNOSIS — A41.9 SEPTIC SHOCK: ICD-10-CM

## 2022-06-09 DIAGNOSIS — R00.0 TACHYCARDIA: ICD-10-CM

## 2022-06-09 DIAGNOSIS — R78.81 BACTEREMIA DUE TO COAGULASE-NEGATIVE STAPHYLOCOCCUS: ICD-10-CM

## 2022-06-09 DIAGNOSIS — A41.9 SEPSIS: ICD-10-CM

## 2022-06-09 DIAGNOSIS — R65.21 SEPTIC SHOCK: ICD-10-CM

## 2022-06-09 PROBLEM — J98.11 ATELECTASIS: Status: RESOLVED | Noted: 2022-05-23 | Resolved: 2022-06-09

## 2022-06-09 PROBLEM — J18.1 RIGHT LOWER LOBE CONSOLIDATION: Status: RESOLVED | Noted: 2022-02-21 | Resolved: 2022-06-09

## 2022-06-09 PROBLEM — D62 ACUTE BLOOD LOSS ANEMIA: Status: RESOLVED | Noted: 2022-02-21 | Resolved: 2022-06-09

## 2022-06-09 PROBLEM — T83.511A CATHETER-ASSOCIATED URINARY TRACT INFECTION: Status: ACTIVE | Noted: 2022-06-09

## 2022-06-09 PROBLEM — N39.0 CATHETER-ASSOCIATED URINARY TRACT INFECTION: Status: ACTIVE | Noted: 2022-06-09

## 2022-06-09 PROBLEM — E87.5 HYPERKALEMIA: Status: RESOLVED | Noted: 2022-02-21 | Resolved: 2022-06-09

## 2022-06-09 PROBLEM — T17.900A ASPIRATION SYNDROME: Status: RESOLVED | Noted: 2022-05-23 | Resolved: 2022-06-09

## 2022-06-09 PROBLEM — R73.9 HYPERGLYCEMIA: Status: ACTIVE | Noted: 2022-06-09

## 2022-06-09 PROBLEM — J15.69 GRAM-NEGATIVE PNEUMONIA: Status: ACTIVE | Noted: 2022-06-09

## 2022-06-09 LAB
ALBUMIN SERPL BCP-MCNC: 3.2 G/DL (ref 3.5–5.2)
ALP SERPL-CCNC: 83 U/L (ref 55–135)
ALT SERPL W/O P-5'-P-CCNC: 41 U/L (ref 10–44)
ANION GAP SERPL CALC-SCNC: 14 MMOL/L (ref 8–16)
AST SERPL-CCNC: 31 U/L (ref 10–40)
BACTERIA #/AREA URNS HPF: ABNORMAL /HPF
BASOPHILS # BLD AUTO: 0.1 K/UL (ref 0–0.2)
BASOPHILS NFR BLD: 0.7 % (ref 0–1.9)
BILIRUB SERPL-MCNC: 0.6 MG/DL (ref 0.1–1)
BILIRUB UR QL STRIP: NEGATIVE
BUN SERPL-MCNC: 51 MG/DL (ref 8–23)
CALCIUM SERPL-MCNC: 10.6 MG/DL (ref 8.7–10.5)
CHLORIDE SERPL-SCNC: 103 MMOL/L (ref 95–110)
CLARITY UR: ABNORMAL
CO2 SERPL-SCNC: 23 MMOL/L (ref 23–29)
COLOR UR: YELLOW
CREAT SERPL-MCNC: 0.7 MG/DL (ref 0.5–1.4)
DIFFERENTIAL METHOD: ABNORMAL
EOSINOPHIL # BLD AUTO: 0.2 K/UL (ref 0–0.5)
EOSINOPHIL NFR BLD: 1.6 % (ref 0–8)
ERYTHROCYTE [DISTWIDTH] IN BLOOD BY AUTOMATED COUNT: 20.3 % (ref 11.5–14.5)
EST. GFR  (AFRICAN AMERICAN): >60 ML/MIN/1.73 M^2
EST. GFR  (NON AFRICAN AMERICAN): >60 ML/MIN/1.73 M^2
GLUCOSE SERPL-MCNC: 243 MG/DL (ref 70–110)
GLUCOSE UR QL STRIP: NEGATIVE
HCT VFR BLD AUTO: 37.7 % (ref 40–54)
HGB BLD-MCNC: 10.6 G/DL (ref 14–18)
HGB UR QL STRIP: ABNORMAL
HYALINE CASTS #/AREA URNS LPF: 0 /LPF
IMM GRANULOCYTES # BLD AUTO: 0.13 K/UL (ref 0–0.04)
IMM GRANULOCYTES NFR BLD AUTO: 0.9 % (ref 0–0.5)
KETONES UR QL STRIP: NEGATIVE
LACTATE SERPL-SCNC: 2.5 MMOL/L (ref 0.5–2.2)
LEUKOCYTE ESTERASE UR QL STRIP: ABNORMAL
LYMPHOCYTES # BLD AUTO: 0.7 K/UL (ref 1–4.8)
LYMPHOCYTES NFR BLD: 4.6 % (ref 18–48)
MCH RBC QN AUTO: 25.3 PG (ref 27–31)
MCHC RBC AUTO-ENTMCNC: 28.1 G/DL (ref 32–36)
MCV RBC AUTO: 90 FL (ref 82–98)
MICROSCOPIC COMMENT: ABNORMAL
MONOCYTES # BLD AUTO: 0.7 K/UL (ref 0.3–1)
MONOCYTES NFR BLD: 4.4 % (ref 4–15)
NEUTROPHILS # BLD AUTO: 13.4 K/UL (ref 1.8–7.7)
NEUTROPHILS NFR BLD: 87.8 % (ref 38–73)
NITRITE UR QL STRIP: POSITIVE
NRBC BLD-RTO: 0 /100 WBC
PH UR STRIP: 6 [PH] (ref 5–8)
PLATELET # BLD AUTO: 323 K/UL (ref 150–450)
PLATELET BLD QL SMEAR: ABNORMAL
PMV BLD AUTO: 11.4 FL (ref 9.2–12.9)
POCT GLUCOSE: 106 MG/DL (ref 70–110)
POCT GLUCOSE: 303 MG/DL (ref 70–110)
POTASSIUM SERPL-SCNC: 5 MMOL/L (ref 3.5–5.1)
PROT SERPL-MCNC: 9 G/DL (ref 6–8.4)
PROT UR QL STRIP: ABNORMAL
RBC # BLD AUTO: 4.19 M/UL (ref 4.6–6.2)
RBC #/AREA URNS HPF: 30 /HPF (ref 0–4)
SARS-COV-2 RDRP RESP QL NAA+PROBE: NEGATIVE
SODIUM SERPL-SCNC: 140 MMOL/L (ref 136–145)
SP GR UR STRIP: 1.01 (ref 1–1.03)
URN SPEC COLLECT METH UR: ABNORMAL
UROBILINOGEN UR STRIP-ACNC: 1 EU/DL
WBC # BLD AUTO: 15.27 K/UL (ref 3.9–12.7)
WBC #/AREA URNS HPF: >100 /HPF (ref 0–5)
WBC CLUMPS URNS QL MICRO: ABNORMAL

## 2022-06-09 PROCEDURE — 96361 HYDRATE IV INFUSION ADD-ON: CPT

## 2022-06-09 PROCEDURE — 96365 THER/PROPH/DIAG IV INF INIT: CPT

## 2022-06-09 PROCEDURE — U0002 COVID-19 LAB TEST NON-CDC: HCPCS | Performed by: EMERGENCY MEDICINE

## 2022-06-09 PROCEDURE — 93010 EKG 12-LEAD: ICD-10-PCS | Mod: ,,, | Performed by: INTERNAL MEDICINE

## 2022-06-09 PROCEDURE — 94002 VENT MGMT INPAT INIT DAY: CPT

## 2022-06-09 PROCEDURE — 87040 BLOOD CULTURE FOR BACTERIA: CPT | Performed by: EMERGENCY MEDICINE

## 2022-06-09 PROCEDURE — 87086 URINE CULTURE/COLONY COUNT: CPT | Performed by: EMERGENCY MEDICINE

## 2022-06-09 PROCEDURE — 93010 ELECTROCARDIOGRAM REPORT: CPT | Mod: ,,, | Performed by: INTERNAL MEDICINE

## 2022-06-09 PROCEDURE — 99900022

## 2022-06-09 PROCEDURE — 27000221 HC OXYGEN, UP TO 24 HOURS

## 2022-06-09 PROCEDURE — 25000003 PHARM REV CODE 250: Performed by: HOSPITALIST

## 2022-06-09 PROCEDURE — 36569 INSJ PICC 5 YR+ W/O IMAGING: CPT

## 2022-06-09 PROCEDURE — 82962 GLUCOSE BLOOD TEST: CPT

## 2022-06-09 PROCEDURE — 87186 SC STD MICRODIL/AGAR DIL: CPT | Mod: 59 | Performed by: HOSPITALIST

## 2022-06-09 PROCEDURE — 20000000 HC ICU ROOM

## 2022-06-09 PROCEDURE — 87186 SC STD MICRODIL/AGAR DIL: CPT | Mod: 59 | Performed by: EMERGENCY MEDICINE

## 2022-06-09 PROCEDURE — 94761 N-INVAS EAR/PLS OXIMETRY MLT: CPT

## 2022-06-09 PROCEDURE — 96368 THER/DIAG CONCURRENT INF: CPT

## 2022-06-09 PROCEDURE — 81000 URINALYSIS NONAUTO W/SCOPE: CPT | Performed by: EMERGENCY MEDICINE

## 2022-06-09 PROCEDURE — 87070 CULTURE OTHR SPECIMN AEROBIC: CPT | Performed by: HOSPITALIST

## 2022-06-09 PROCEDURE — 87205 SMEAR GRAM STAIN: CPT | Performed by: HOSPITALIST

## 2022-06-09 PROCEDURE — 83605 ASSAY OF LACTIC ACID: CPT | Mod: 91 | Performed by: HOSPITALIST

## 2022-06-09 PROCEDURE — 99900026 HC AIRWAY MAINTENANCE (STAT)

## 2022-06-09 PROCEDURE — 51702 INSERT TEMP BLADDER CATH: CPT

## 2022-06-09 PROCEDURE — 63600175 PHARM REV CODE 636 W HCPCS: Performed by: EMERGENCY MEDICINE

## 2022-06-09 PROCEDURE — C1751 CATH, INF, PER/CENT/MIDLINE: HCPCS

## 2022-06-09 PROCEDURE — 87088 URINE BACTERIA CULTURE: CPT | Performed by: EMERGENCY MEDICINE

## 2022-06-09 PROCEDURE — 99900035 HC TECH TIME PER 15 MIN (STAT)

## 2022-06-09 PROCEDURE — 63600175 PHARM REV CODE 636 W HCPCS: Performed by: HOSPITALIST

## 2022-06-09 PROCEDURE — 27000207 HC ISOLATION

## 2022-06-09 PROCEDURE — 36415 COLL VENOUS BLD VENIPUNCTURE: CPT | Performed by: EMERGENCY MEDICINE

## 2022-06-09 PROCEDURE — 87077 CULTURE AEROBIC IDENTIFY: CPT | Mod: 59 | Performed by: EMERGENCY MEDICINE

## 2022-06-09 PROCEDURE — 85025 COMPLETE CBC W/AUTO DIFF WBC: CPT | Performed by: EMERGENCY MEDICINE

## 2022-06-09 PROCEDURE — 99291 CRITICAL CARE FIRST HOUR: CPT | Mod: 25

## 2022-06-09 PROCEDURE — 83605 ASSAY OF LACTIC ACID: CPT | Performed by: EMERGENCY MEDICINE

## 2022-06-09 PROCEDURE — 87077 CULTURE AEROBIC IDENTIFY: CPT | Mod: 59 | Performed by: HOSPITALIST

## 2022-06-09 PROCEDURE — 93005 ELECTROCARDIOGRAM TRACING: CPT

## 2022-06-09 PROCEDURE — 25000003 PHARM REV CODE 250: Performed by: EMERGENCY MEDICINE

## 2022-06-09 PROCEDURE — 80053 COMPREHEN METABOLIC PANEL: CPT | Performed by: EMERGENCY MEDICINE

## 2022-06-09 RX ORDER — TRAZODONE HYDROCHLORIDE 50 MG/1
50 TABLET ORAL NIGHTLY
Status: DISCONTINUED | OUTPATIENT
Start: 2022-06-09 | End: 2022-06-14 | Stop reason: HOSPADM

## 2022-06-09 RX ORDER — SODIUM CHLORIDE 0.9 % (FLUSH) 0.9 %
10 SYRINGE (ML) INJECTION
Status: DISCONTINUED | OUTPATIENT
Start: 2022-06-09 | End: 2022-06-14 | Stop reason: HOSPADM

## 2022-06-09 RX ORDER — ONDANSETRON 2 MG/ML
8 INJECTION INTRAMUSCULAR; INTRAVENOUS EVERY 8 HOURS PRN
Status: DISCONTINUED | OUTPATIENT
Start: 2022-06-09 | End: 2022-06-14 | Stop reason: HOSPADM

## 2022-06-09 RX ORDER — LANOLIN ALCOHOL/MO/W.PET/CERES
800 CREAM (GRAM) TOPICAL
Status: DISCONTINUED | OUTPATIENT
Start: 2022-06-09 | End: 2022-06-14 | Stop reason: HOSPADM

## 2022-06-09 RX ORDER — POLYETHYLENE GLYCOL 3350 17 G/17G
17 POWDER, FOR SOLUTION ORAL 2 TIMES DAILY
Status: DISCONTINUED | OUTPATIENT
Start: 2022-06-09 | End: 2022-06-14 | Stop reason: HOSPADM

## 2022-06-09 RX ORDER — SODIUM CHLORIDE 0.9 % (FLUSH) 0.9 %
10 SYRINGE (ML) INJECTION EVERY 6 HOURS
Status: DISCONTINUED | OUTPATIENT
Start: 2022-06-10 | End: 2022-06-14 | Stop reason: HOSPADM

## 2022-06-09 RX ORDER — BACLOFEN 10 MG/1
10 TABLET ORAL 3 TIMES DAILY
Status: DISCONTINUED | OUTPATIENT
Start: 2022-06-09 | End: 2022-06-14 | Stop reason: HOSPADM

## 2022-06-09 RX ORDER — PANTOPRAZOLE SODIUM 40 MG/1
40 FOR SUSPENSION ORAL 2 TIMES DAILY
Status: DISCONTINUED | OUTPATIENT
Start: 2022-06-09 | End: 2022-06-14 | Stop reason: HOSPADM

## 2022-06-09 RX ORDER — GLYCOPYRROLATE 1 MG/1
1 TABLET ORAL 3 TIMES DAILY
Status: DISCONTINUED | OUTPATIENT
Start: 2022-06-09 | End: 2022-06-14 | Stop reason: HOSPADM

## 2022-06-09 RX ORDER — ALPRAZOLAM 0.25 MG/1
0.5 TABLET ORAL 3 TIMES DAILY PRN
Status: DISCONTINUED | OUTPATIENT
Start: 2022-06-09 | End: 2022-06-14 | Stop reason: HOSPADM

## 2022-06-09 RX ORDER — SODIUM CHLORIDE 9 MG/ML
INJECTION, SOLUTION INTRAVENOUS CONTINUOUS
Status: DISCONTINUED | OUTPATIENT
Start: 2022-06-09 | End: 2022-06-14 | Stop reason: HOSPADM

## 2022-06-09 RX ORDER — SODIUM CHLORIDE 0.9 % (FLUSH) 0.9 %
10 SYRINGE (ML) INJECTION
Status: DISCONTINUED | OUTPATIENT
Start: 2022-06-10 | End: 2022-06-14 | Stop reason: HOSPADM

## 2022-06-09 RX ORDER — METOPROLOL TARTRATE 50 MG/1
50 TABLET ORAL DAILY
Status: DISCONTINUED | OUTPATIENT
Start: 2022-06-10 | End: 2022-06-14 | Stop reason: HOSPADM

## 2022-06-09 RX ORDER — BUSPIRONE HYDROCHLORIDE 5 MG/1
5 TABLET ORAL 2 TIMES DAILY
Status: DISCONTINUED | OUTPATIENT
Start: 2022-06-09 | End: 2022-06-14 | Stop reason: HOSPADM

## 2022-06-09 RX ORDER — GLUCAGON 1 MG
1 KIT INJECTION
Status: DISCONTINUED | OUTPATIENT
Start: 2022-06-09 | End: 2022-06-14 | Stop reason: HOSPADM

## 2022-06-09 RX ORDER — MUPIROCIN 20 MG/G
OINTMENT TOPICAL 2 TIMES DAILY
Status: COMPLETED | OUTPATIENT
Start: 2022-06-09 | End: 2022-06-14

## 2022-06-09 RX ORDER — INSULIN ASPART 100 [IU]/ML
0-5 INJECTION, SOLUTION INTRAVENOUS; SUBCUTANEOUS EVERY 4 HOURS PRN
Status: DISCONTINUED | OUTPATIENT
Start: 2022-06-09 | End: 2022-06-12

## 2022-06-09 RX ORDER — CHLORHEXIDINE GLUCONATE ORAL RINSE 1.2 MG/ML
15 SOLUTION DENTAL 2 TIMES DAILY
Status: DISCONTINUED | OUTPATIENT
Start: 2022-06-09 | End: 2022-06-14 | Stop reason: HOSPADM

## 2022-06-09 RX ADMIN — BACLOFEN 10 MG: 10 TABLET ORAL at 08:06

## 2022-06-09 RX ADMIN — GLYCOPYRROLATE 1 MG: 1 TABLET ORAL at 08:06

## 2022-06-09 RX ADMIN — PIPERACILLIN SODIUM AND TAZOBACTAM SODIUM 4.5 G: 4; .5 INJECTION, POWDER, LYOPHILIZED, FOR SOLUTION INTRAVENOUS at 07:06

## 2022-06-09 RX ADMIN — MUPIROCIN: 20 OINTMENT TOPICAL at 11:06

## 2022-06-09 RX ADMIN — BACLOFEN 10 MG: 10 TABLET ORAL at 05:06

## 2022-06-09 RX ADMIN — POLYETHYLENE GLYCOL 3350 17 G: 17 POWDER, FOR SOLUTION ORAL at 08:06

## 2022-06-09 RX ADMIN — BUSPIRONE HYDROCHLORIDE 5 MG: 5 TABLET ORAL at 08:06

## 2022-06-09 RX ADMIN — ALPRAZOLAM 0.5 MG: 0.25 TABLET ORAL at 08:06

## 2022-06-09 RX ADMIN — SODIUM CHLORIDE, SODIUM LACTATE, POTASSIUM CHLORIDE, AND CALCIUM CHLORIDE 2000 ML: .6; .31; .03; .02 INJECTION, SOLUTION INTRAVENOUS at 10:06

## 2022-06-09 RX ADMIN — GENTAMICIN SULFATE 478.8 MG: 40 INJECTION, SOLUTION INTRAMUSCULAR; INTRAVENOUS at 06:06

## 2022-06-09 RX ADMIN — PANTOPRAZOLE SODIUM 40 MG: 40 GRANULE, DELAYED RELEASE ORAL at 08:06

## 2022-06-09 RX ADMIN — SODIUM CHLORIDE: 0.9 INJECTION, SOLUTION INTRAVENOUS at 06:06

## 2022-06-09 RX ADMIN — VANCOMYCIN HYDROCHLORIDE 1750 MG: 1 INJECTION, POWDER, LYOPHILIZED, FOR SOLUTION INTRAVENOUS at 12:06

## 2022-06-09 RX ADMIN — CHLORHEXIDINE GLUCONATE 0.12% ORAL RINSE 15 ML: 1.2 LIQUID ORAL at 11:06

## 2022-06-09 RX ADMIN — PIPERACILLIN SODIUM AND TAZOBACTAM SODIUM 4.5 G: 4; .5 INJECTION, POWDER, LYOPHILIZED, FOR SOLUTION INTRAVENOUS at 12:06

## 2022-06-09 RX ADMIN — TRAZODONE HYDROCHLORIDE 50 MG: 50 TABLET ORAL at 08:06

## 2022-06-09 RX ADMIN — GLYCOPYRROLATE 1 MG: 1 TABLET ORAL at 05:06

## 2022-06-09 RX ADMIN — ALTEPLASE 2 MG: 2.2 INJECTION, POWDER, LYOPHILIZED, FOR SOLUTION INTRAVENOUS at 05:06

## 2022-06-09 RX ADMIN — SODIUM CHLORIDE: 0.9 INJECTION, SOLUTION INTRAVENOUS at 07:06

## 2022-06-09 RX ADMIN — APIXABAN 2.5 MG: 2.5 TABLET, FILM COATED ORAL at 08:06

## 2022-06-09 NOTE — ED PROVIDER NOTES
"Encounter Date: 6/9/2022    SCRIBE #1 NOTE: I, Amy Molina, am scribing for, and in the presence of, Amrik Ivey MD.       History     Chief Complaint   Patient presents with    Tachycardia     Sent from [facility] for eval of tachycardia since this morning. Per EMS pt had "obstruction in trach" this morning.      Time seen by provider: 9:50 AM on 06/09/2022    Wei Dominique is a 66 y.o. male with a PMHx of ALS with trach & peg, vent dependent, HTN, and PE (on Eiquis) presenting to the ED from [facility] due to an episode of hypoxemia prior to arrival. EMS states patient's sats dropped into the 40s, he turned grey in color, and they removed his ventilator and began manually bagging the patient. Patient's color returned shortly after but his pulse has been in the 130s since the incident. Patient is nonverbal however EMS states he "blinks once for yes and twice for no". He denied any pain en route however seems more fatigued than normal for EMS.    The history is provided by the EMS personnel.     Review of patient's allergies indicates:  No Known Allergies  Past Medical History:   Diagnosis Date    ALS (amyotrophic lateral sclerosis)     ALS (amyotrophic lateral sclerosis)     Erectile dysfunction     Gait disturbance     Hyperlipidemia     Hypertension     Iron deficiency anemia     Motor neuron disease     Tremor      Past Surgical History:   Procedure Laterality Date    PORTACATH PLACEMENT       No family history on file.     Review of Systems   Unable to perform ROS: Other       Physical Exam     Initial Vitals [06/09/22 1003]   BP Pulse Resp Temp SpO2   120/84 (!) 126 (!) 26 99.2 °F (37.3 °C) 100 %      MAP       --         Physical Exam    Nursing note and vitals reviewed.  Constitutional: He appears well-developed. No distress.   HENT:   Head: Normocephalic and atraumatic.   Diffuse edema with tracheostomy on ventilator.   Eyes: EOM are normal.   Neck: Neck supple. No " tracheal deviation present. No JVD present.   Cardiovascular: Regular rhythm, normal heart sounds and intact distal pulses. Tachycardia present.  Exam reveals no gallop and no friction rub.    No murmur heard.  Pulmonary/Chest: Breath sounds normal. No respiratory distress. He has no wheezes. He has no rhonchi. He has no rales.   Abdominal: Abdomen is soft. Bowel sounds are normal. He exhibits no distension. There is no abdominal tenderness.   Left upper peg tube with no surrounding evidence of infection. There is no rebound and no guarding.   Musculoskeletal:         General: Normal range of motion.      Cervical back: Neck supple.     Neurological: He is alert.   Nonverbal but blinks for communication.   Skin: Skin is warm and dry. Capillary refill takes less than 2 seconds. No rash noted.         ED Course   Critical Care    Date/Time: 6/9/2022 11:32 AM  Performed by: Amrik Ivey MD  Authorized by: Amrik Ivey MD   Total critical care time (exclusive of procedural time) : 35 minutes  Critical care time was exclusive of separately billable procedures and treating other patients.  Critical care was necessary to treat or prevent imminent or life-threatening deterioration of the following conditions: sepsis.  Critical care was time spent personally by me on the following activities: blood draw for specimens, development of treatment plan with patient or surrogate, discussions with consultants, interpretation of cardiac output measurements, evaluation of patient's response to treatment, examination of patient, obtaining history from patient or surrogate, ordering and performing treatments and interventions, ordering and review of laboratory studies, ordering and review of radiographic studies, pulse oximetry, re-evaluation of patient's condition and review of old charts.        Labs Reviewed   CBC W/ AUTO DIFFERENTIAL - Abnormal; Notable for the following components:       Result Value    WBC 15.27 (*)     RBC  4.19 (*)     Hemoglobin 10.6 (*)     Hematocrit 37.7 (*)     MCH 25.3 (*)     MCHC 28.1 (*)     RDW 20.3 (*)     Immature Granulocytes 0.9 (*)     Gran # (ANC) 13.4 (*)     Immature Grans (Abs) 0.13 (*)     Lymph # 0.7 (*)     Gran % 87.8 (*)     Lymph % 4.6 (*)     All other components within normal limits   COMPREHENSIVE METABOLIC PANEL - Abnormal; Notable for the following components:    Glucose 243 (*)     BUN 51 (*)     Calcium 10.6 (*)     Total Protein 9.0 (*)     Albumin 3.2 (*)     All other components within normal limits   LACTIC ACID, PLASMA - Abnormal; Notable for the following components:    Lactate (Lactic Acid) 2.5 (*)     All other components within normal limits   URINALYSIS, REFLEX TO URINE CULTURE - Abnormal; Notable for the following components:    Appearance, UA Cloudy (*)     Protein, UA 2+ (*)     Occult Blood UA 3+ (*)     Nitrite, UA Positive (*)     Leukocytes, UA 3+ (*)     All other components within normal limits    Narrative:     Specimen Source->Urine   URINALYSIS MICROSCOPIC - Abnormal; Notable for the following components:    RBC, UA 30 (*)     WBC, UA >100 (*)     WBC Clumps, UA Occasional (*)     Bacteria Many (*)     All other components within normal limits    Narrative:     Specimen Source->Urine   POCT GLUCOSE - Abnormal; Notable for the following components:    POCT Glucose 303 (*)     All other components within normal limits   CULTURE, BLOOD   CULTURE, BLOOD   CULTURE, URINE   SARS-COV-2 RNA AMPLIFICATION, QUAL   POCT GLUCOSE MONITORING CONTINUOUS     EKG Readings: (Independently Interpreted)   Initial Reading: No STEMI.   Sinus tachycardia at a ventricular rate of 130 with normal intervals and non-specific T wave abnormality.     ECG Results          EKG 12-lead (In process)  Result time 06/09/22 11:36:03    In process by Interface, Lab In [facility] (06/09/22 11:36:03)                 Narrative:    Test Reason : R00.0,    Vent. Rate : 130 BPM     Atrial Rate : 130 BPM      P-R Int : 130 ms          QRS Dur : 078 ms      QT Int : 314 ms       P-R-T Axes : 084 009 091 degrees     QTc Int : 462 ms    Sinus tachycardia  Nonspecific T wave abnormality  Abnormal ECG  When compared with ECG of 17-MAY-2022 19:16,  Fusion complexes are no longer Present  Nonspecific T wave abnormality, improved in Lateral leads    Referred By:  ED MD           Confirmed By:                             Imaging Results          X-Ray Chest AP Portable (Final result)  Result time 06/09/22 10:42:00    Final result by Romaine Trejo Jr., MD (06/09/22 10:42:00)                 Impression:      Small infiltrate and atelectasis at the right lung base.  Venous Port-A-Cath and tracheostomy tubes in place.      Electronically signed by: Romaine Trejo MD  Date:    06/09/2022  Time:    10:42             Narrative:    EXAMINATION:  XR CHEST AP PORTABLE    CLINICAL HISTORY:  Sepsis;    TECHNIQUE:  Single frontal view of the chest was performed.    COMPARISON:  Chest portable of May 26, 2022    FINDINGS:  A tracheostomy tube is noted in place.  A venous Port-A-Cath is in place on the left with central line ending in the superior vena cava.  Calcification is noted in the aorta.  The cardiac size is within normal limits.  There is a small infiltrate atelectasis at the right lung base.  The left lung is clear.  There is no pneumothorax or pleural effusion.                                 Medications   ALPRAZolam tablet 0.5 mg (has no administration in time range)   apixaban tablet 2.5 mg (has no administration in time range)   baclofen tablet 10 mg (10 mg Per G Tube Given 6/9/22 1727)   busPIRone tablet 5 mg (has no administration in time range)   glycopyrrolate tablet 1 mg (1 mg Per G Tube Given 6/9/22 1727)   metoprolol tartrate (LOPRESSOR) tablet 50 mg (has no administration in time range)   pantoprazole suspension 40 mg (has no administration in time range)   polyethylene glycol packet 17 g (has no administration in  time range)   traZODone tablet 50 mg (has no administration in time range)   sodium chloride 0.9% flush 10 mL (has no administration in time range)   potassium bicarbonate disintegrating tablet 50 mEq (has no administration in time range)   potassium bicarbonate disintegrating tablet 35 mEq (has no administration in time range)   potassium bicarbonate disintegrating tablet 60 mEq (has no administration in time range)   magnesium oxide tablet 800 mg (has no administration in time range)   magnesium oxide tablet 800 mg (has no administration in time range)   vancomycin - pharmacy to dose (has no administration in time range)   gentamicin - pharmacy to dose (has no administration in time range)   ondansetron injection 8 mg (has no administration in time range)   piperacillin-tazobactam 4.5 g in dextrose 5 % 100 mL IVPB (ready to mix system) (has no administration in time range)   0.9%  NaCl infusion (has no administration in time range)   0.9%  NaCl infusion (has no administration in time range)   gentamicin (GARAMYCIN) 478.8 mg in sodium chloride 0.9% 100 mL IVPB (has no administration in time range)   vancomycin 1.25 g in dextrose 5% 250 mL IVPB (ready to mix) (1,250 mg Intravenous Trough Due As Scheduled Before Dose 6/11/22 0000)   lactated ringers bolus 2,190 mL (0 mL/kg × 73 kg Intravenous Stopped 6/9/22 1149)   vancomycin (VANCOCIN) 1,750 mg in dextrose 5 % 500 mL IVPB (0 mg Intravenous Stopped 6/9/22 1453)   alteplase injection 2 mg (2 mg Intra-Catheter Given 6/9/22 1727)     Medical Decision Making:   Clinical Tests:   Lab Tests: Reviewed and Ordered  Radiological Study: Ordered and Reviewed  Medical Tests: Reviewed and Ordered  ED Management:  66 y.o. male with a PMHx of ALS with trach & peg, vent dependent, HTN, and PE (on Eiquis) pw sepsis likely 2/2 PNA. Tx with abx and IVF and admit to ICU given vent.           Scribe Attestation:   Scribe #1: I performed the above scribed service and the documentation  accurately describes the services I performed. I attest to the accuracy of the note.        ED Course as of 06/09/22 1755   Thu Jun 09, 2022   1029 BP: 120/84 [BD]   1029 Temp: 99.2 °F (37.3 °C) [BD]   1029 Pulse(!): 126 [BD]   1029 Resp(!): 26 [BD]   1029 SpO2: 100 % [BD]   1029 X-Ray Chest AP Portable [BD]   1036 POCT Glucose(!): 303 [BD]   1108 Impression:     Small infiltrate and atelectasis at the right lung base.  Venous Port-A-Cath and tracheostomy tubes in place.        Electronically signed by: Romaine Trejo MD  Date:                                            06/09/2022  Time:                                           10:42 [BD]   1123  [BD]      ED Course User Index  [BD] Amrik Ivey MD             Attending Attestation:     Physician Attestation for Scribe:    I, Dr. Amrik Ivye, personally performed the services described in this documentation.   All medical record entries made by the scribe were at my direction and in my presence.   I have reviewed the chart and agree that the record is accurate and complete.   Amrik Ivey MD  5:56 PM 06/09/2022     DISCLAIMER: This note was prepared with [employer] voice recognition transcription software. Garbled syntax, mangled pronouns, and other bizarre constructions may be attributed to that software system.    Clinical Impression:   Final diagnoses:  [R00.0] Tachycardia  [A41.9] Sepsis          ED Disposition Condition    Admit               Amrik Ivey MD  06/09/22 0833

## 2022-06-09 NOTE — Clinical Note
Diagnosis: Sepsis [[id number]]   Future Attending Provider: AYSHA KRAUSE [[id number]]   Admitting Provider:: AYSHA KRAUSE [[id number]]

## 2022-06-09 NOTE — ED NOTES
Guidance from Oriana infection preventionist, to change out current arvizu catheter and replace for Urinalysis.

## 2022-06-09 NOTE — PROGRESS NOTES
Pharmacokinetic Initial Assessment: Gentamicin    Assessment:  Weight utilized for dose calculation: Ideal Body Weight  Dosing method utilized: extended interval dosing    Plan: Extended interval dosing regimen: Gentamicin 478.8 mg IV once, followed by a random level to be drawn on 6-10 at 0500, 8-12 hours after the first dose.    Pharmacy will continue to monitor.    Please contact pharmacy at extension [phone] with any questions regarding this assessment.    Thank you for the consult,    Navin Caruso       Patient brief summary:  Wei Dominique is a 66 y.o. male initiated on aminoglycoside therapy for treatment of suspected  pneumonia    Drug Allergies:   Review of patient's allergies indicates:  No Known Allergies    Actual Body Weight:   73 kg    Adjust Body Weight:   70.3 kg    Ideal Body Weight:  68.4 kg    Renal Function:   Estimated Creatinine Clearance: 101.8 mL/min (based on SCr of 0.7 mg/dL).,     Dialysis Method (if applicable):  N/A    CBC (last 72 hours):  Recent Labs   Lab Result Units 06/09/22  1031   WBC K/uL 15.27*   Hemoglobin g/dL 10.6*   Hematocrit % 37.7*   Platelets K/uL 323   Gran % % 87.8*   Lymph % % 4.6*   Mono % % 4.4   Eosinophil % % 1.6   Basophil % % 0.7   Differential Method  Automated       Metabolic Panel (last 72 hours):  Recent Labs   Lab Result Units 06/09/22  1031 06/09/22  1059   Sodium mmol/L 140  --    Potassium mmol/L 5.0  --    Chloride mmol/L 103  --    CO2 mmol/L 23  --    Glucose mg/dL 243*  --    Glucose, UA   --  Negative   BUN mg/dL 51*  --    Creatinine mg/dL 0.7  --    Albumin g/dL 3.2*  --    Total Bilirubin mg/dL 0.6  --    Alkaline Phosphatase U/L 83  --    AST U/L 31  --    ALT U/L 41  --        Microbiologic Results:  Microbiology Results (last 7 days)       Procedure Component Value Units Date/Time    Culture, Respiratory with Gram Stain [[id number]] Collected: 06/09/22 1632    Order Status: Sent Specimen: Respiratory from Tracheal Aspirate Updated: 06/09/22 5814     Urine culture [[id number]] Collected: 06/09/22 1059    Order Status: No result Specimen: Urine Updated: 06/09/22 1138    Blood culture x two cultures. Draw prior to antibiotics. [[id number]] Collected: 06/09/22 1031    Order Status: Sent Specimen: Blood from Antecubital, Right Hand Updated: 06/09/22 1031    Blood culture x two cultures. Draw prior to antibiotics. [[id number]] Collected: 06/09/22 1031    Order Status: Sent Specimen: Blood Updated: 06/09/22 1031

## 2022-06-09 NOTE — PROGRESS NOTES
Pt transported to ICU being manually ventilated, once in the unit placed on vent with documented settings. Pt tolerated well, no respiratory distress noted

## 2022-06-09 NOTE — CARE UPDATE
06/09/22 1017   Patient Assessment/Suction   Level of Consciousness (AVPU) alert   Respiratory Effort Normal;Unlabored   Expansion/Accessory Muscles/Retractions expansion symmetric;no retractions;no use of accessory muscles   All Lung Fields Breath Sounds diminished   Rhythm/Pattern, Respiratory assisted mechanically   Cough Frequency with stimulation   Cough Type assisted   Suction Method tracheal   $ Suction Charges Inline Suction Procedure Stat Charge   Secretions Amount other (see comments)  (none)   PRE-TX-O2   O2 Device (Oxygen Therapy) ventilator   $ Is the patient on Low Flow Oxygen? Yes   Pulse Oximetry Type Continuous   $ Pulse Oximetry - Multiple Charge Pulse Oximetry - Multiple   Skin Integrity   $ Wound Care Tech Time 15 min   Area Observed Neck;Neck under tracheostomy   Skin Appearance redness blanchable        Surgical Airway 02/21/22 1145 Shiley Cuffed   Placement Date/Time: 02/21/22 1145   Present Prior to Hospital Arrival?: Yes  Type: Tracheostomy  Brand: Shiley  Airway Device Size: 8.0  Style: Cuffed   Cuff Inflation? Inflated   Status Secured   Site Assessment Crusty   Site Care Cleansed;Dried;Dressing applied   Inner Cannula Care Changed/new   Ties Assessment Changed   Equipment Change   Vent Circuit Change Next Due 06/16/22   Closed Suction System Next Due 06/11/22   Airway Assistance   $ Trach Assist Charges Trach Assist;Tech time 15 min  (trach care done)   Vent Select   Conventional Vent Y   Ventilator Initiated Yes   $ Ventilator Initial 1   Charged w/in last 24h YES   Preset Conventional Ventilator Settings   Vent Type    Humidity HME   Conventional Ventilator Alarms   Alarms On Y   Ve Low Alarm 3 L/min   Vt High Alarm 1100 mL   Vt Low Alarm 250 mL       Rec'd pt via EMS, placed on vent with documented settings. No respiratory distress noted

## 2022-06-09 NOTE — PHARMACY MED REC
"Admission Medication History     The home medication history was taken by Sheela Cross CPhT.    Medication history obtained from [facility]     You may go to "Admission" then "Reconcile Home Medications" tabs to review and/or act upon these items.      The home medication list has been updated by the Pharmacy department.    Please read ALL comments highlighted in yellow.    Please address this information as you see fit.     Feel free to contact us if you have any questions or require assistance.        Sheela Cross CPhT.  [phone]      .        "

## 2022-06-09 NOTE — PROGRESS NOTES
Pharmacokinetic Initial Assessment: IV Vancomycin    Assessment/Plan:    Initiate intravenous vancomycin with loading dose of 1750 mg once followed by a maintenance dose of vancomycin 1250mg IV every 12 hours  Desired empiric serum trough concentration is 15 to 20 mcg/mL  Draw vancomycin trough level 60 min prior to third dose on 6-10 at approximately 0000  Pharmacy will continue to follow and monitor vancomycin.      Please contact pharmacy at extension [phone] with any questions regarding this assessment.     Thank you for the consult,   Navin Caruso       Patient brief summary:  Wei Dominique is a 66 y.o. male initiated on antimicrobial therapy with IV Vancomycin for treatment of suspected  pneumonia    Drug Allergies:   Review of patient's allergies indicates:  No Known Allergies    Actual Body Weight:   73 kg    Renal Function:   Estimated Creatinine Clearance: 101.8 mL/min (based on SCr of 0.7 mg/dL).,     Dialysis Method (if applicable):  N/A    CBC (last 72 hours):  Recent Labs   Lab Result Units 06/09/22  1031   WBC K/uL 15.27*   Hemoglobin g/dL 10.6*   Hematocrit % 37.7*   Platelets K/uL 323   Gran % % 87.8*   Lymph % % 4.6*   Mono % % 4.4   Eosinophil % % 1.6   Basophil % % 0.7   Differential Method  Automated       Metabolic Panel (last 72 hours):  Recent Labs   Lab Result Units 06/09/22  1031 06/09/22  1059   Sodium mmol/L 140  --    Potassium mmol/L 5.0  --    Chloride mmol/L 103  --    CO2 mmol/L 23  --    Glucose mg/dL 243*  --    Glucose, UA   --  Negative   BUN mg/dL 51*  --    Creatinine mg/dL 0.7  --    Albumin g/dL 3.2*  --    Total Bilirubin mg/dL 0.6  --    Alkaline Phosphatase U/L 83  --    AST U/L 31  --    ALT U/L 41  --        Drug levels (last 3 results):  No results for input(s): VANCOMYCINRA, VANCORANDOM, VANCOMYCINPE, VANCOPEAK, VANCOMYCINTR, VANCOTROUGH in the last 72 hours.    Microbiologic Results:  Microbiology Results (last 7 days)       Procedure Component Value Units Date/Time     Culture, Respiratory with Gram Stain [[id number]] Collected: 06/09/22 1632    Order Status: Sent Specimen: Respiratory from Tracheal Aspirate Updated: 06/09/22 1725    Urine culture [[id number]] Collected: 06/09/22 1059    Order Status: No result Specimen: Urine Updated: 06/09/22 1138    Blood culture x two cultures. Draw prior to antibiotics. [[id number]] Collected: 06/09/22 1031    Order Status: Sent Specimen: Blood from Antecubital, Right Hand Updated: 06/09/22 1031    Blood culture x two cultures. Draw prior to antibiotics. [[id number]] Collected: 06/09/22 1031    Order Status: Sent Specimen: Blood Updated: 06/09/22 1031

## 2022-06-10 LAB
ALBUMIN SERPL BCP-MCNC: 2.9 G/DL (ref 3.5–5.2)
ALP SERPL-CCNC: 70 U/L (ref 55–135)
ALT SERPL W/O P-5'-P-CCNC: 34 U/L (ref 10–44)
ANION GAP SERPL CALC-SCNC: 11 MMOL/L (ref 8–16)
ANION GAP SERPL CALC-SCNC: 11 MMOL/L (ref 8–16)
AST SERPL-CCNC: 27 U/L (ref 10–40)
BASOPHILS # BLD AUTO: 0.08 K/UL (ref 0–0.2)
BASOPHILS NFR BLD: 0.7 % (ref 0–1.9)
BILIRUB SERPL-MCNC: 1.1 MG/DL (ref 0.1–1)
BUN SERPL-MCNC: 42 MG/DL (ref 8–23)
BUN SERPL-MCNC: 48 MG/DL (ref 8–23)
CALCIUM SERPL-MCNC: 10 MG/DL (ref 8.7–10.5)
CALCIUM SERPL-MCNC: 9.8 MG/DL (ref 8.7–10.5)
CHLORIDE SERPL-SCNC: 102 MMOL/L (ref 95–110)
CHLORIDE SERPL-SCNC: 103 MMOL/L (ref 95–110)
CO2 SERPL-SCNC: 26 MMOL/L (ref 23–29)
CO2 SERPL-SCNC: 27 MMOL/L (ref 23–29)
CREAT SERPL-MCNC: 0.6 MG/DL (ref 0.5–1.4)
CREAT SERPL-MCNC: 0.6 MG/DL (ref 0.5–1.4)
DIFFERENTIAL METHOD: ABNORMAL
EOSINOPHIL # BLD AUTO: 0.8 K/UL (ref 0–0.5)
EOSINOPHIL NFR BLD: 7.2 % (ref 0–8)
ERYTHROCYTE [DISTWIDTH] IN BLOOD BY AUTOMATED COUNT: 20.3 % (ref 11.5–14.5)
EST. GFR  (AFRICAN AMERICAN): >60 ML/MIN/1.73 M^2
EST. GFR  (AFRICAN AMERICAN): >60 ML/MIN/1.73 M^2
EST. GFR  (NON AFRICAN AMERICAN): >60 ML/MIN/1.73 M^2
EST. GFR  (NON AFRICAN AMERICAN): >60 ML/MIN/1.73 M^2
ESTIMATED AVG GLUCOSE: 77 MG/DL (ref 68–131)
GENTAMICIN SERPL-MCNC: 9.8 UG/ML
GLUCOSE SERPL-MCNC: 118 MG/DL (ref 70–110)
GLUCOSE SERPL-MCNC: 126 MG/DL (ref 70–110)
HBA1C MFR BLD: 4.3 % (ref 4–5.6)
HCT VFR BLD AUTO: 32 % (ref 40–54)
HGB BLD-MCNC: 9.4 G/DL (ref 14–18)
IMM GRANULOCYTES # BLD AUTO: 0.04 K/UL (ref 0–0.04)
IMM GRANULOCYTES NFR BLD AUTO: 0.4 % (ref 0–0.5)
LACTATE SERPL-SCNC: 1.4 MMOL/L (ref 0.5–2.2)
LACTATE SERPL-SCNC: 2 MMOL/L (ref 0.5–2.2)
LACTATE SERPL-SCNC: 2 MMOL/L (ref 0.5–2.2)
LYMPHOCYTES # BLD AUTO: 1.3 K/UL (ref 1–4.8)
LYMPHOCYTES NFR BLD: 11.7 % (ref 18–48)
MAGNESIUM SERPL-MCNC: 2.3 MG/DL (ref 1.6–2.6)
MCH RBC QN AUTO: 25.7 PG (ref 27–31)
MCHC RBC AUTO-ENTMCNC: 29.4 G/DL (ref 32–36)
MCV RBC AUTO: 87 FL (ref 82–98)
MONOCYTES # BLD AUTO: 0.5 K/UL (ref 0.3–1)
MONOCYTES NFR BLD: 4.7 % (ref 4–15)
NEUTROPHILS # BLD AUTO: 8.5 K/UL (ref 1.8–7.7)
NEUTROPHILS NFR BLD: 75.3 % (ref 38–73)
NRBC BLD-RTO: 0 /100 WBC
PHOSPHATE SERPL-MCNC: 3.4 MG/DL (ref 2.7–4.5)
PLATELET # BLD AUTO: 322 K/UL (ref 150–450)
PMV BLD AUTO: 10.5 FL (ref 9.2–12.9)
POCT GLUCOSE: 107 MG/DL (ref 70–110)
POCT GLUCOSE: 113 MG/DL (ref 70–110)
POCT GLUCOSE: 120 MG/DL (ref 70–110)
POCT GLUCOSE: 123 MG/DL (ref 70–110)
POCT GLUCOSE: 138 MG/DL (ref 70–110)
POCT GLUCOSE: 146 MG/DL (ref 70–110)
POTASSIUM SERPL-SCNC: 3.8 MMOL/L (ref 3.5–5.1)
POTASSIUM SERPL-SCNC: 5 MMOL/L (ref 3.5–5.1)
PROT SERPL-MCNC: 7.8 G/DL (ref 6–8.4)
RBC # BLD AUTO: 3.66 M/UL (ref 4.6–6.2)
SODIUM SERPL-SCNC: 139 MMOL/L (ref 136–145)
SODIUM SERPL-SCNC: 141 MMOL/L (ref 136–145)
VANCOMYCIN TROUGH SERPL-MCNC: 30.2 UG/ML (ref 10–22)
WBC # BLD AUTO: 11.22 K/UL (ref 3.9–12.7)

## 2022-06-10 PROCEDURE — 99900022

## 2022-06-10 PROCEDURE — 20000000 HC ICU ROOM

## 2022-06-10 PROCEDURE — 80170 ASSAY OF GENTAMICIN: CPT | Performed by: HOSPITALIST

## 2022-06-10 PROCEDURE — 80048 BASIC METABOLIC PNL TOTAL CA: CPT | Mod: XB | Performed by: HOSPITALIST

## 2022-06-10 PROCEDURE — 80053 COMPREHEN METABOLIC PANEL: CPT | Performed by: HOSPITALIST

## 2022-06-10 PROCEDURE — 84100 ASSAY OF PHOSPHORUS: CPT | Performed by: HOSPITALIST

## 2022-06-10 PROCEDURE — 27000207 HC ISOLATION

## 2022-06-10 PROCEDURE — 27000221 HC OXYGEN, UP TO 24 HOURS

## 2022-06-10 PROCEDURE — 80202 ASSAY OF VANCOMYCIN: CPT | Performed by: HOSPITALIST

## 2022-06-10 PROCEDURE — 25000003 PHARM REV CODE 250: Performed by: HOSPITALIST

## 2022-06-10 PROCEDURE — 63600175 PHARM REV CODE 636 W HCPCS: Performed by: HOSPITALIST

## 2022-06-10 PROCEDURE — 99900026 HC AIRWAY MAINTENANCE (STAT)

## 2022-06-10 PROCEDURE — 25000003 PHARM REV CODE 250

## 2022-06-10 PROCEDURE — 94003 VENT MGMT INPAT SUBQ DAY: CPT

## 2022-06-10 PROCEDURE — 83036 HEMOGLOBIN GLYCOSYLATED A1C: CPT | Performed by: HOSPITALIST

## 2022-06-10 PROCEDURE — 83735 ASSAY OF MAGNESIUM: CPT | Performed by: HOSPITALIST

## 2022-06-10 PROCEDURE — 99900035 HC TECH TIME PER 15 MIN (STAT)

## 2022-06-10 PROCEDURE — 83605 ASSAY OF LACTIC ACID: CPT | Performed by: HOSPITALIST

## 2022-06-10 PROCEDURE — 94761 N-INVAS EAR/PLS OXIMETRY MLT: CPT

## 2022-06-10 PROCEDURE — 85025 COMPLETE CBC W/AUTO DIFF WBC: CPT | Performed by: HOSPITALIST

## 2022-06-10 RX ORDER — LACTULOSE 10 G/15ML
20 SOLUTION ORAL 3 TIMES DAILY
Status: COMPLETED | OUTPATIENT
Start: 2022-06-10 | End: 2022-06-11

## 2022-06-10 RX ORDER — NOREPINEPHRINE BITARTRATE/D5W 4MG/250ML
0-3 PLASTIC BAG, INJECTION (ML) INTRAVENOUS CONTINUOUS
Status: DISCONTINUED | OUTPATIENT
Start: 2022-06-10 | End: 2022-06-14 | Stop reason: HOSPADM

## 2022-06-10 RX ORDER — NOREPINEPHRINE BITARTRATE/D5W 4MG/250ML
PLASTIC BAG, INJECTION (ML) INTRAVENOUS
Status: COMPLETED
Start: 2022-06-10 | End: 2022-06-10

## 2022-06-10 RX ORDER — HYDROCODONE BITARTRATE AND ACETAMINOPHEN 7.5; 325 MG/15ML; MG/15ML
15 SOLUTION ORAL EVERY 4 HOURS PRN
Status: DISCONTINUED | OUTPATIENT
Start: 2022-06-10 | End: 2022-06-14 | Stop reason: HOSPADM

## 2022-06-10 RX ORDER — SODIUM CHLORIDE 0.9 % (FLUSH) 0.9 %
.1-1 SYRINGE (ML) INJECTION
Status: DISCONTINUED | OUTPATIENT
Start: 2022-06-10 | End: 2022-06-14 | Stop reason: HOSPADM

## 2022-06-10 RX ORDER — SODIUM CHLORIDE 0.9 % (FLUSH) 0.9 %
3 SYRINGE (ML) INJECTION
Status: DISCONTINUED | OUTPATIENT
Start: 2022-06-10 | End: 2022-06-14 | Stop reason: HOSPADM

## 2022-06-10 RX ADMIN — MUPIROCIN: 20 OINTMENT TOPICAL at 09:06

## 2022-06-10 RX ADMIN — PANTOPRAZOLE SODIUM 40 MG: 40 GRANULE, DELAYED RELEASE ORAL at 09:06

## 2022-06-10 RX ADMIN — POLYETHYLENE GLYCOL 3350 17 G: 17 POWDER, FOR SOLUTION ORAL at 09:06

## 2022-06-10 RX ADMIN — BUSPIRONE HYDROCHLORIDE 5 MG: 5 TABLET ORAL at 09:06

## 2022-06-10 RX ADMIN — GLYCOPYRROLATE 1 MG: 1 TABLET ORAL at 03:06

## 2022-06-10 RX ADMIN — SODIUM CHLORIDE 1000 ML: 0.9 INJECTION, SOLUTION INTRAVENOUS at 01:06

## 2022-06-10 RX ADMIN — LACTULOSE 20 G: 10 SOLUTION ORAL at 09:06

## 2022-06-10 RX ADMIN — SODIUM CHLORIDE: 0.9 INJECTION, SOLUTION INTRAVENOUS at 09:06

## 2022-06-10 RX ADMIN — PIPERACILLIN SODIUM AND TAZOBACTAM SODIUM 4.5 G: 4; .5 INJECTION, POWDER, LYOPHILIZED, FOR SOLUTION INTRAVENOUS at 12:06

## 2022-06-10 RX ADMIN — HYDROCODONE BITARTRATE AND ACETAMINOPHEN 15 ML: 7.5; 325 SOLUTION ORAL at 04:06

## 2022-06-10 RX ADMIN — BACLOFEN 10 MG: 10 TABLET ORAL at 09:06

## 2022-06-10 RX ADMIN — TRAZODONE HYDROCHLORIDE 50 MG: 50 TABLET ORAL at 09:06

## 2022-06-10 RX ADMIN — APIXABAN 2.5 MG: 2.5 TABLET, FILM COATED ORAL at 09:06

## 2022-06-10 RX ADMIN — PIPERACILLIN SODIUM AND TAZOBACTAM SODIUM 4.5 G: 4; .5 INJECTION, POWDER, LYOPHILIZED, FOR SOLUTION INTRAVENOUS at 04:06

## 2022-06-10 RX ADMIN — LACTULOSE 20 G: 10 SOLUTION ORAL at 03:06

## 2022-06-10 RX ADMIN — CHLORHEXIDINE GLUCONATE 0.12% ORAL RINSE 15 ML: 1.2 LIQUID ORAL at 09:06

## 2022-06-10 RX ADMIN — VANCOMYCIN HYDROCHLORIDE 1250 MG: 1.25 INJECTION, POWDER, LYOPHILIZED, FOR SOLUTION INTRAVENOUS at 12:06

## 2022-06-10 RX ADMIN — SODIUM CHLORIDE: 0.9 INJECTION, SOLUTION INTRAVENOUS at 03:06

## 2022-06-10 RX ADMIN — METOPROLOL TARTRATE 50 MG: 50 TABLET, FILM COATED ORAL at 09:06

## 2022-06-10 RX ADMIN — PIPERACILLIN SODIUM AND TAZOBACTAM SODIUM 4.5 G: 4; .5 INJECTION, POWDER, LYOPHILIZED, FOR SOLUTION INTRAVENOUS at 09:06

## 2022-06-10 RX ADMIN — Medication 0.02 MCG/KG/MIN: at 05:06

## 2022-06-10 RX ADMIN — MEROPENEM 2 G: 1 INJECTION, POWDER, FOR SOLUTION INTRAVENOUS at 04:06

## 2022-06-10 RX ADMIN — SODIUM CHLORIDE: 0.9 INJECTION, SOLUTION INTRAVENOUS at 12:06

## 2022-06-10 RX ADMIN — GLYCOPYRROLATE 1 MG: 1 TABLET ORAL at 09:06

## 2022-06-10 RX ADMIN — POTASSIUM BICARBONATE 50 MEQ: 977.5 TABLET, EFFERVESCENT ORAL at 06:06

## 2022-06-10 NOTE — PLAN OF CARE
[facility]  Initial Discharge Assessment       Primary Care Provider: Mack Suero MD    Admission Diagnosis: Tachycardia [R00.0]  Sepsis [A41.9]    Admission Date: 6/9/2022  Expected Discharge Date:      Initial assessment completed via telephone with spouse, Eze. Care giver reports patient is a [facility] resident of [facility]. Patient is bed bound and ventilator dependent. Patient will return to [facility] when medically clear.         Payor: MEDICARE / Plan: MEDICARE PART A & B / Product Type: Government /     Extended Emergency Contact Information  Primary Emergency Contact: Eze Dominique  Mobile Phone: [phone]  Relation: Spouse  Preferred language: English  Secondary Emergency Contact: Deepali Dominique  Mobile Phone: [phone]  Relation: Sister    Discharge Plan A: Return to nursing home  Discharge Plan B: Return to Nursing Home      [facility] [employer] [facility] [address] [place] [address]  [address] [address]  Phone: [phone] Fax: [phone]      Initial Assessment (most recent)     Adult Discharge Assessment - 06/10/22 1523        Discharge Assessment    Assessment Type Discharge Planning Assessment     Confirmed/corrected address, phone number and insurance Yes     Confirmed Demographics Correct on Facesheet     Source of Information patient     When was your last doctors appointment? --   Sees facility MD    Reason For Admission Sepsis     Lives With facility resident     Facility Arrived From: [facility]     Do you expect to return to your current living situation? Yes     Do you have help at home or someone to help you manage your care at home? Yes     Who are your caregiver(s) and their phone number(s)? Facility resident     Walking or Climbing Stairs Difficulty ambulation difficulty, dependent     Mobility Management Bed bound     Dressing/Bathing Difficulty bathing difficulty, dependent     Dressing/Bathing Management Dependent     Home  Accessibility wheelchair accessible     Equipment Currently Used at Home ventilator     Readmission within 30 days? No     Patient currently being followed by outpatient case management? No     Do you currently have service(s) that help you manage your care at home? Yes     Name and Contact number of agency [facility]     Do you take prescription medications? Yes     Do you have prescription coverage? Yes     Coverage Medicaid     Do you have any problems affording any of your prescribed medications? No     Is the patient taking medications as prescribed? yes     Who is going to help you get home at discharge? [facility]     How do you get to doctors appointments? other (see comments)   [facility]    Are you on dialysis? No     Do you take coumadin? No     Discharge Plan A Return to nursing home     Discharge Plan B Return to Nursing Home     DME Needed Upon Discharge  none     Discharge Plan discussed with: Spouse/sig other     Name(s) and Number(s) Jud Dominique [phone]        Relationship/Environment    Name(s) of Who Lives With Patient [facility] resident

## 2022-06-10 NOTE — NURSING
Unable to draw 2000 lactic acid specimen. Pt port does not draw back blood, lab has attempted multiple times. PICC line placed at 2330-  LA drawn at that time.

## 2022-06-10 NOTE — HPI
Patient is a 66 y.o. male with significant past medical history of ALS, s/p trach & peg, vent dependence, HTN, PE (on eliquis).  He was brought to our ED from [facility] today with hypoxia.  His sats reportedly were in the 40's.  At that time, he was taken off the ventilator and ventilated with ambu-bag.  Sats responded appropriately.  No other events reported.  Workup done in ED showed pneumonia.  Also has UTI.

## 2022-06-10 NOTE — PROGRESS NOTES
Wei Dominique [id number] is a 66 y.o. male who has been consulted for vancomycin dosing.    Vancomycin trough has been changed to 06/10/22 at 1200.      Patient will be followed by pharmacy for changes in renal function, toxicity, and efficacy.    Thank you for allowing us to participate in this patient's care.     Eugene Tan, MarcosD

## 2022-06-10 NOTE — PLAN OF CARE
There are preexisting wounds to sacrum, penis meatus, upper back. The skin is very dry and flaking off - wound care here to see patient, triad applied. Mepilexes applied. Heel boots applied. Turning q2h and padding lines / extremities.     The patient is alert and makes eye contact and will respond appropriately to questions - one blink for yes, two blinks for no.     The mediport to left chest that had been accessed in the ER does not draw back blood. Notified hospitalist and cathflo was previously instilled yesterday. I was unable to aspirate it after an hour and asked whether I should give second dose - (The ER nurse reports that it had never drawn back blood) I was told to go ahead and use it, I confirmed with pharmacy if it would have been OK to just flush the cathflo through the line instead of aspirating it out and was told it is fine. PICC line in place right arm.     He has decreased range of motion all extremities. He is unable to move any extremity. There is general edema, worse to arms bilat. SCDs applied. The bed is low and alarm on. Clinical alarms reviewed and armed. Monitor strip printed and placed in chart, is in sinus tachycardia.     The abdomen is rounded and somewhat distended but not overtly tight. The penile meatus is still split from previous admission. The arvizu had been changed in the emergency room and is draining concentrated yellow urine. He had had a small soft BM for nightshift.     He has a copious amount of secretions. Suctioning frequently. His breath sounds are coarse but seem improved from  yesterday.     Tube feedings are being given via PEG, patient tolerating so far with only minimal residuals about 20mL.    There appeared to be some fissured masses / hypergranulated areas / lesions to tongue bilat, but it was difficult for him to open his mouth more than a little for me to get a decent view at this time. I was able to get the toothette in to swab around but the jaw seemed  tight and I couldn't get a good look at the back of his mouth / tongue / throat.    He began to complain of abdominal pain. It isn't really possible for him to characterize it. He said he feels constipated. I didn't appreciate any impaction. We escalated the bowel regimen and I gave lactulose. In the meantime I stopped the tube feeding for a little bit to see if it improves any, although, the residuals were only about 5mL.    Notified stephen, re: positive blood cultures.  ...    Patient had become hypotensive and improved s/p normal saline bolus, but then became hypotensive and so had to start levophed, titrating.

## 2022-06-10 NOTE — PROGRESS NOTES
Wei Dominique [id number] is a 66 y.o. male who has been consulted for gentamicin dosing.    Pharmacy consult for gentamicin dosing in no longer required.  Gentamicin was discontinued.    Thank you for allowing us to participate in this patient's care.     Leandro Tan, PharmD  [phone]

## 2022-06-10 NOTE — PLAN OF CARE
Patient settled into bed from emergency room. There are preexisting wounds to sacrum, penis meatus, upper back. The skin is very dry and flaking off - wound care consult ordered. Mepilexes applied. Heel boots applied. Turning q2h and padding lines / extremities.    The patient is alert and makes eye contact and will respond appropriately to questions - one blink for yes, two blinks for no.    The mediport to left chest that had been accessed in the ER does not draw back blood. Notified hospitalist and cathflo instilled. I was unable to aspirate it after an hour and asked whether I should give second dose - (The ER nurse reports that it had never drawn back blood) I was told to go ahead and use it, I confirmed with pharmacy if it would have been OK to just flush the cathflo through the line instead of aspirating it out and was told it is fine. PICC team had already been called, but we had no timeline on when that would be done so to avoid delaying antibiotics, we just started the ns kvo and antibiotic piggybacks there. However, since it did not draw back, we couldn't get repeat labs - phlebotomy tried several times but edema was too severe (notified md).    He has decreased range of motion all extremities. He is unable to move any extremity. There is general edema, worse to arms bilat. SCDs applied. The bed is low and alarm on. Clinical alarms reviewed and armed. Monitor strip printed and placed in chart, is in sinus tachycardia.    The abdomen is rounded and somewhat distended but not overtly tight. The penile meatus is still split from previous admission. The arvizu had been changed in the emergency room and is draining concentrated yellow urine. No BM noted as of yet, but bowel sounds are very active.    The spouse was called and updated on his condition.    He has a copious amount of secretions. Suctioning frequently. His breath sounds are coarse. There is a preexisting scopolamine patch to right ear.     The peg  tube is clamped - awaiting tube feeding orders.    ...    There appeared to be some masses / hypergranulated areas / lesions to tongue bilat, but it was difficult for him to open his mouth more than a little for me to get a decent view at this time.

## 2022-06-10 NOTE — CONSULTS
[facility]  Adult Nutrition  Consult Note    SUMMARY     Intervention: nutrition support     Recommendations  1.) As glucose was elevated start with TF Glucerna 1.5 @ 20 ml/hr advancing to 50 ml/hr + 150 ml flush q 4 hr + Chauncey BID  ( provides 1800 kcal ( 100% EEN),99 g protein ( 90% EPN), and 910 ml free water)     2.) when MD believes glucose controlled/ if pt does not have DM- restart home TF  Isosource 1.5 @ 10 mls/hr advancing as tolerated to goal rate 50 mls/hr continuous (providing 1800 kcals/day, 82 g protein/day,  211 g CHO/day, and 917 mls water/day) FWF: 150 mls q4 hrs or per MD.      3.) If needed-Parenteral Nutrition: 5%AA/D15 @ goal rate 75 mls/hr continuous + 20% lipids (250mls) daily providing 1778 kcals/day, 90 g protein/day, and 1800 mls fluid/day. GIR:2.5 mg/kg/min.      Goals: 1.) enteral nutrition will advance within 48 hrs   Nutrition Goal Status: 1.) new  Communication of RD Recs: POC, sticky note, reviewed with RN              Past Medical History:   Diagnosis Date    ALS (amyotrophic lateral sclerosis)      ALS (amyotrophic lateral sclerosis)      Erectile dysfunction      Gait disturbance      Hyperlipidemia      Hypertension      Iron deficiency anemia      Motor neuron disease      Tremor           Assessment and Plan  Nutrition Problem  Inadequate energy intake     Related to (etiology):   Chronic illness     Signs and Symptoms (as evidenced by):   NPO, s/p trach/peg TF meeting < 50% of needs x 1 day     Interventions:   above  Nutrition Diagnosis Status:   new       Malnutrition Assessment  Malnutrition Type: chronic illness  Cesar score = 11 + back, sacral, foot, wounds  Edematous  3-4+  NFPE WDL 6/10/22     Reason for Assessment  Reason For Assessment: consult  Problem: sepsis  Rounds:  attended     General Information Comments: 67 y/o male admitted from [facility] with sepsis and hyperglycemia. PEG/Trach pt, home TF above. No history of DM2. NFPE  "done 6/10/22 no wasting seen. Inadequate TF intake/NPO x 1 day.     Nutrition Risk Screen  Nutrition Risk Screen: tube feeding or parenteral nutrition     Nutrition/Diet History     Food Allergies: NKFA  Factors Affecting Nutritional Intake: on mechanical ventilation, NPO  Spiritual/cultural/Rastafarian factors affecting PO intakes: none     Anthropometrics  Height Method: Estimated  Height: 5' 8"  Height (inches): 68 in  Weight Method: Bed Scale  Weight: 73.2 kg 6/10/22  Weight (lb): 161 lb  Ideal Body Weight (IBW), Male: 154 lb  BMI (Calculated): 24 kg/m2  BMI Grade: WDL  Usual Body Weight (UBW), k.27 kg (2022), 72.8 kg 22     Lab/Procedures/Meds  Pertinent Labs Reviewed: reviewed  BMP  Lab Results   Component Value Date     06/10/2022    K 3.8 06/10/2022     06/10/2022    CO2 26 06/10/2022    BUN 42 (H) 06/10/2022    CREATININE 0.6 06/10/2022    CALCIUM 10.0 06/10/2022    ANIONGAP 11 06/10/2022    ESTGFRAFRICA >60 06/10/2022    EGFRNONAA >60 06/10/2022     POCT Glucose   Date Value Ref Range Status   06/10/2022 107 70 - 110 mg/dL Final   06/10/2022 120 (H) 70 - 110 mg/dL Final   06/10/2022 113 (H) 70 - 110 mg/dL Final   2022 106 70 - 110 mg/dL Final   2022 303 (H) 70 - 110 mg/dL Final     Lab Results   Component Value Date    ALBUMIN 2.9 (L) 06/10/2022       Pertinent Medications Reviewed: reviewed  NS 1-35 ml/hr, insulin, zofran, kbicarb     Estimated/Assessed Needs  Weight Used For Calorie Calculations: 73.2 kg  Energy Calorie Requirements (kcal): 1725 kcal  Energy Need Method: [facility]  Protein Requirements: 110-146 g 1.5-2 g protein/kg ( wounds)  Weight Used For Protein Calculations: 73.2 kg  Estimated Fluid Requirement Method: RDA Method  RDA Method (mL): 1725 ml        Nutrition Prescription Ordered  NPO + TF above @ 10 ml/hr ( 360 kcal, 19 g protein, 182 ml free water)     Evaluation of Received Nutrient/Fluid Intake  Energy/protein/fluid needs: not " meeting  Tolerance: tolerating  % Intake of Estimated Energy Needs: 0 - 25 %  % Meal Intake: NPO + TF above @ 10 ml/hr     Nutrition Risk  Level of Risk/Frequency of Follow-up: moderate  (x2 weekly)         Monitor and Evaluation  Food and Nutrient Intake: energy intake, enteral nutrition intake  Food and Nutrient Adminstration: diet order, enteral and parenteral nutrition administration  Anthropometric Measurements: weight, weight change, body mass index  Biochemical Data, Medical Tests and Procedures: electrolyte and renal panel, glucose/endocrine profile  Nutrition-Focused Physical Findings: overall appearance , skin        Nutrition Follow-Up  RD Follow-up?: Yes

## 2022-06-10 NOTE — CARE UPDATE
06/09/22 1945   Patient Assessment/Suction   Level of Consciousness (AVPU) alert   Respiratory Effort Normal;Unlabored   Expansion/Accessory Muscles/Retractions expansion symmetric;no retractions;no use of accessory muscles   All Lung Fields Breath Sounds coarse;rhonchi   Suction Method tracheal   $ Suction Charges Inline Suction Procedure Stat Charge   Secretions Amount moderate   Secretions Color yellow   Secretions Characteristics thick   PRE-TX-O2   O2 Device (Oxygen Therapy) ventilator   Oxygen Concentration (%) 30   SpO2 97 %   Pulse Oximetry Type Continuous   Pulse (!) 116   Resp 19   /72   ETCO2   ETCO2 (mmHg) 33 mmHg   Vent Select   Conventional Vent Y   Charged w/in last 24h YES   Preset Conventional Ventilator Settings   Vent Type    Ventilation Type VC   Vent Mode A/C   Humidity HME   Set Rate 16 BPM   Vt Set 500 mL   PEEP/CPAP 5 cmH20   Pressure Support 0 cmH20   Waveform RAMP   Peak Flow 60 L/min   Set Inspiratory Pressure 0 cmH20   Insp Time 0 Sec(s)   Plateau Set/Insp. Hold (sec) 0   Insp Rise Time  0 %   Trigger Sensitivity Flow/I-Trigger 3 L/min   P High 0 cm H2O   P Low 0 cm H2O   T High 0 sec   T Low 0 sec   Patient Ventilator Parameters   Resp Rate Total 16 br/min   Peak Airway Pressure 23 cmH2O   Mean Airway Pressure 10 cmH20   Plateau Pressure 0 cmH20   Exhaled Vt 509 mL   Total Ve 8.14 mL   Spont Ve 0 L   I:E Ratio Measured 1:3.20   Conventional Ventilator Alarms   Ve High Alarm 25 L/min   Resp Rate High Alarm 40 br/min   Press High Alarm 50 cmH2O   Apnea Rate 16   Apnea Volume (mL) 500 mL   Apnea Oxygen Concentration  40   Apnea Flow Rate (L/min) 60   T Apnea 30 sec(s)   Ready to Wean/Extubation Screen   FIO2<=50 (chart decimal) 0.3   MV<16L (chart vol.) 8.14   PEEP <=8 (chart #) 5   Ready to Wean Parameters   F/VT Ratio<105 (RSBI) (!) 37.33

## 2022-06-10 NOTE — ASSESSMENT & PLAN NOTE
"This patient does have evidence of infective focus  My overall impression is sepsis. Vital signs were reviewed and noted in progress note.  Antibiotics given-   Antibiotics (From admission, onward)            Start     Stop Route Frequency Ordered    06/10/22 0100  vancomycin 1.25 g in dextrose 5% 250 mL IVPB (ready to mix)         -- IV Every 12 hours (non-standard times) 06/09/22 1736    06/09/22 2330  mupirocin 2 % ointment         06/14 2059 Nasl 2 times daily 06/09/22 2217    06/09/22 2030  piperacillin-tazobactam 4.5 g in dextrose 5 % 100 mL IVPB (ready to mix system)  ( Pneumonia - Moderate-High MDR )         06/16 2029 IV Every 8 hours (non-standard times) 06/09/22 1610    06/09/22 1600  vancomycin - pharmacy to dose  ( Pneumonia - Moderate-High MDR )        "And" Linked Group Details    -- IV pharmacy to manage frequency 06/09/22 1600    06/09/22 1559  gentamicin - pharmacy to dose  (gentamicin IVPB)        "And" Linked Group Details    -- IV pharmacy to manage frequency 06/09/22 1559        Cultures were taken-   Microbiology Results (last 7 days)     Procedure Component Value Units Date/Time    Blood culture x two cultures. Draw prior to antibiotics. [[id number]] Collected: 06/09/22 1031    Order Status: Sent Specimen: Blood from Antecubital, Right Hand Updated: 06/09/22 1804    Blood culture x two cultures. Draw prior to antibiotics. [[id number]] Collected: 06/09/22 1031    Order Status: Sent Specimen: Blood Updated: 06/09/22 1804    Culture, Respiratory with Gram Stain [[id number]] Collected: 06/09/22 1632    Order Status: Sent Specimen: Respiratory from Tracheal Aspirate Updated: 06/09/22 1725    Urine culture [[id number]] Collected: 06/09/22 1059    Order Status: No result Specimen: Urine Updated: 06/09/22 1138        Source- Pneumonia    Source control Achieved by- antibiotics    "

## 2022-06-10 NOTE — H&P
"[facility] Medicine  History & Physical    Patient Name: Wei Dominique  MRN: [id number]  Patient Class: IP- Inpatient  Admission Date: 6/9/2022  Attending Physician: Aquiles Melendez MD   Primary Care Provider: Mack Suero MD         Patient information was obtained from past medical records and ER records.     Subjective:     Principal Problem:Sepsis    Chief Complaint:   Chief Complaint   Patient presents with    Tachycardia     Sent from [place] for eval of tachycardia since this morning. Per EMS pt had "obstruction in trach" this morning.         HPI: Patient is a 66 y.o. male with significant past medical history of ALS, s/p trach & peg, vent dependence, HTN, PE (on eliquis).  He was brought to our ED from [facility] today with hypoxia.  His sats reportedly were in the 40's.  At that time, he was taken off the ventilator and ventilated with ambu-bag.  Sats responded appropriately.  No other events reported.  Workup done in ED showed pneumonia.  Also has UTI.            Past Medical History:   Diagnosis Date    ALS (amyotrophic lateral sclerosis)     ALS (amyotrophic lateral sclerosis)     Erectile dysfunction     Gait disturbance     Hyperlipidemia     Hypertension     Iron deficiency anemia     Motor neuron disease     Tremor        Past Surgical History:   Procedure Laterality Date    PORTACATH PLACEMENT         Review of patient's allergies indicates:  No Known Allergies    No current facility-administered medications on file prior to encounter.     Current Outpatient Medications on File Prior to Encounter   Medication Sig    ALPRAZolam (XANAX) 0.5 MG tablet Take 0.5 mg by mouth 3 (three) times daily as needed.    apixaban (ELIQUIS) 2.5 mg Tab Take 1 tablet (2.5 mg total) by mouth 2 (two) times daily.    baclofen (LIORESAL) 10 MG tablet TAKE 1 TABLET THREE TIMES PER DAY (Patient taking differently: 10 mg by Per G Tube route 3 (three) times daily.) "    busPIRone (BUSPAR) 5 MG Tab 1 tablet (5 mg total) by Per G Tube route 2 (two) times daily.    chlorhexidine (PERIDEX) 0.12 % solution Use as directed 15 mLs in the mouth or throat 2 (two) times daily.    furosemide (LASIX) 40 MG tablet 1 tablet (40 mg total) by Per G Tube route once daily.    glycerin adult suppository Place 1 suppository rectally once daily.    glycopyrrolate (ROBINUL) 1 mg Tab START BY TAKING 1 TABLET DAILY. IF TOLERATING WELL, YOU CAN INCREASE IT UP TO 2 OR EVEN 3 TIMES DAILY. (Patient taking differently: 1 mg by Per G Tube route 3 (three) times daily.)    lansoprazole (PREVACID) 30 MG capsule Take 30 mg by mouth once daily.    melatonin 3 mg TbDL 3 mg by Per G Tube route every evening.    metoprolol tartrate (LOPRESSOR) 50 MG tablet Take 50 mg by mouth once daily.    pantoprazole (PROTONIX) 40 mg suspension 40 mg by FEEDING TUBE route 2 (two) times a day.    phenazopyridine (PYRIDIUM) 200 MG tablet 200 mg by Per G Tube route 3 (three) times daily.    polyethylene glycol (GLYCOLAX) 17 gram PwPk 17 g by Per G Tube route 2 (two) times daily.    riluzole 50 mg Tab tablet TAKE 1 TABLET ON AN EMPTY STOMACH EVERY 12 HRS    traZODone (DESYREL) 50 MG tablet 50 mg by Per G Tube route every evening.    albuterol-ipratropium (DUO-NEB) 2.5 mg-0.5 mg/3 mL nebulizer solution Take 3 mLs by nebulization every 4 (four) hours. Rescue    ondansetron (ZOFRAN) 4 MG tablet 4 mg by Per G Tube route every 6 (six) hours as needed for Nausea.     Family History    None       Tobacco Use    Smoking status: Not on file    Smokeless tobacco: Not on file   Substance and Sexual Activity    Alcohol use: Not on file    Drug use: Never    Sexual activity: Not Currently     Review of Systems   Unable to perform ROS: Other (tracheostomy connected to ventilator)   Objective:     Vital Signs (Most Recent):  Temp: 99.1 °F (37.3 °C) (06/09/22 2000)  Pulse: 103 (06/09/22 2145)  Resp: 16 (06/09/22 2145)  BP: (!)  91/57 (06/09/22 2145)  SpO2: 99 % (06/09/22 2145)   Vital Signs (24h Range):  Temp:  [98.2 °F (36.8 °C)-99.2 °F (37.3 °C)] 99.1 °F (37.3 °C)  Pulse:  [103-142] 103  Resp:  [16-30] 16  SpO2:  [94 %-100 %] 99 %  BP: ()/(48-94) 91/57     Weight: 73 kg (161 lb)  Body mass index is 24.48 kg/m².    Physical Exam  Constitutional:       General: He is not in acute distress.  HENT:      Head: Normocephalic and atraumatic.   Eyes:      Conjunctiva/sclera:      Right eye: No exudate.     Left eye: No exudate.     Pupils: Pupils are equal, round, and reactive to light.   Neck:      Vascular: No JVD.      Trachea: Tracheostomy present.   Cardiovascular:      Rate and Rhythm: Regular rhythm. Tachycardia present.      Comments: Edema in both upper extremities  Pulmonary:      Breath sounds: Normal breath sounds.   Abdominal:      General: Abdomen is flat. Bowel sounds are normal. There is no distension.      Tenderness: There is no abdominal tenderness.      Comments: G-tube site appears normal   Musculoskeletal:      Cervical back: Neck supple.      Right lower leg: Edema present.      Left lower leg: Edema present.   Neurological:      Mental Status: He is alert.      Comments: Communicates by blinking         CRANIAL NERVES     CN III, IV, VI   Pupils are equal, round, and reactive to light.     Significant Labs: BMP:   Recent Labs   Lab 06/09/22  1031   *      K 5.0      CO2 23   BUN 51*   CREATININE 0.7   CALCIUM 10.6*     CBC:   Recent Labs   Lab 06/09/22  1031   WBC 15.27*   HGB 10.6*   HCT 37.7*        Lactic Acid:   Recent Labs   Lab 06/09/22  1031   LACTATE 2.5*     Urine Studies:   Recent Labs   Lab 06/09/22  1059   COLORU Yellow   APPEARANCEUA Cloudy*   PHUR 6.0   SPECGRAV 1.015   PROTEINUA 2+*   GLUCUA Negative   KETONESU Negative   BILIRUBINUA Negative   OCCULTUA 3+*   NITRITE Positive*   UROBILINOGEN 1.0   LEUKOCYTESUR 3+*   RBCUA 30*   WBCUA >100*   BACTERIA Many*   HYALINECASTS 0  "      Significant Imaging: I have reviewed all pertinent imaging results/findings within the past 24 hours.    Assessment/Plan:     * Sepsis  This patient does have evidence of infective focus  My overall impression is sepsis. Vital signs were reviewed and noted in progress note.  Antibiotics given-   Antibiotics (From admission, onward)            Start     Stop Route Frequency Ordered    06/10/22 0100  vancomycin 1.25 g in dextrose 5% 250 mL IVPB (ready to mix)         -- IV Every 12 hours (non-standard times) 06/09/22 1736    06/09/22 2330  mupirocin 2 % ointment         06/14 2059 Nasl 2 times daily 06/09/22 2217 06/09/22 2030  piperacillin-tazobactam 4.5 g in dextrose 5 % 100 mL IVPB (ready to mix system)  ( Pneumonia - Moderate-High MDR )         06/16 2029 IV Every 8 hours (non-standard times) 06/09/22 1610    06/09/22 1600  vancomycin - pharmacy to dose  ( Pneumonia - Moderate-High MDR )        "And" Linked Group Details    -- IV pharmacy to manage frequency 06/09/22 1600    06/09/22 1559  gentamicin - pharmacy to dose  (gentamicin IVPB)        "And" Linked Group Details    -- IV pharmacy to manage frequency 06/09/22 1559        Cultures were taken-   Microbiology Results (last 7 days)     Procedure Component Value Units Date/Time    Blood culture x two cultures. Draw prior to antibiotics. [[id number]] Collected: 06/09/22 1031    Order Status: Sent Specimen: Blood from Antecubital, Right Hand Updated: 06/09/22 1804    Blood culture x two cultures. Draw prior to antibiotics. [[id number]] Collected: 06/09/22 1031    Order Status: Sent Specimen: Blood Updated: 06/09/22 1804    Culture, Respiratory with Gram Stain [[id number]] Collected: 06/09/22 1632    Order Status: Sent Specimen: Respiratory from Tracheal Aspirate Updated: 06/09/22 1725    Urine culture [[id number]] Collected: 06/09/22 1059    Order Status: No result Specimen: Urine Updated: 06/09/22 1138        Source- Pneumonia    Source control Achieved by- " antibiotics      Hyperglycemia  No history of diabetes mellitus.  Will monitor glucose levels.      Catheter-associated urinary tract infection  Switch out [place] for a new one.  Will be on vancomycin, Zosyn, and gentamicin.      Gram-negative pneumonia  Start vancomycin, Zosyn, and gentamicin.  Monitor for improvement.  Get respiratory culture.  Recent respiratory cultures grew Pseudomonas and Proteus.      Tracheostomy in place  Have nurses provide routine trach care.      History of pulmonary embolism  Continue apixaban.      Dependent on ventilator  Continue vent settings.  Chlorhexidine orally every day.  Adjust vent if needed.      S/P percutaneous endoscopic gastrostomy (PEG) tube placement  Will have nurses provide routine PEG care.      ALS (amyotrophic lateral sclerosis)  Chronic.  No acute need to .      Essential hypertension  Will monitor blood pressure.  Use anti-hypertensives when needed.        VTE Risk Mitigation (From admission, onward)         Ordered     apixaban tablet 2.5 mg  2 times daily         06/09/22 1559     IP VTE HIGH RISK PATIENT  Once         06/09/22 1600     Place sequential compression device  Until discontinued         06/09/22 1600     Reason for No Pharmacological VTE Prophylaxis  Once        Question:  Reasons:  Answer:  Already adequately anticoagulated on oral Anticoagulants    06/09/22 1600                  Aquiles Melendez MD  Department of Hospital Medicine   [facility]

## 2022-06-10 NOTE — CONSULTS
Pharmacokinetic Assessment Follow Up: IV Vancomycin    Vancomycin serum concentration assessment(s):    The trough level was drawn correctly and can be used to guide therapy at this time. The measurement is above the desired definitive target range of 15 to 20 mcg/mL.    Vancomycin Regimen Plan:    Discontinue the scheduled vancomycin regimen and re-dose when the random level is less than 15 mcg/mL, next level to be drawn at 0600 on 6/11.    Drug levels (last 3 results):  Recent Labs   Lab Result Units 06/10/22  1218   Vancomycin-Trough ug/mL 30.2*       Pharmacy will continue to follow and monitor vancomycin.    Please contact pharmacy at extension [phone] for questions regarding this assessment.    Thank you for the consult,   - Lex Matt, PharmD       Patient brief summary:  Wei Dominique is a 66 y.o. male initiated on antimicrobial therapy with IV Vancomycin for treatment of lower respiratory infection    Drug Allergies:   Review of patient's allergies indicates:  No Known Allergies    Actual Body Weight:   73.2 kg (161 lb 6 oz)    Renal Function:   Estimated Creatinine Clearance: 118.8 mL/min (based on SCr of 0.6 mg/dL).,     Dialysis Method (if applicable):  N/A    CBC (last 72 hours):  Recent Labs   Lab Result Units 06/09/22  1031 06/10/22  0416   WBC K/uL 15.27* 11.22   Hemoglobin g/dL 10.6* 9.4*   Hemoglobin A1C %  --  4.3   Hematocrit % 37.7* 32.0*   Platelets K/uL 323 322   Gran % % 87.8* 75.3*   Lymph % % 4.6* 11.7*   Mono % % 4.4 4.7   Eosinophil % % 1.6 7.2   Basophil % % 0.7 0.7   Differential Method  Automated Automated       Metabolic Panel (last 72 hours):  Recent Labs   Lab Result Units 06/09/22  1031 06/09/22  1059 06/10/22  0416 06/10/22  1218   Sodium mmol/L 140  --  139 141   Potassium mmol/L 5.0  --  3.8 5.0   Chloride mmol/L 103  --  102 103   CO2 mmol/L 23  --  26 27   Glucose mg/dL 243*  --  118* 126*   Glucose, UA   --  Negative  --   --    BUN mg/dL 51*  --  42* 48*   Creatinine  mg/dL 0.7  --  0.6 0.6   Albumin g/dL 3.2*  --  2.9*  --    Total Bilirubin mg/dL 0.6  --  1.1*  --    Alkaline Phosphatase U/L 83  --  70  --    AST U/L 31  --  27  --    ALT U/L 41  --  34  --    Magnesium mg/dL  --   --  2.3  --    Phosphorus mg/dL  --   --  3.4  --        Vancomycin Administrations:  vancomycin given in the last 96 hours                   vancomycin 1.25 g in dextrose 5% 250 mL IVPB (ready to mix) (mg) 1,250 mg New Bag 06/10/22 0054    vancomycin (VANCOCIN) 1,750 mg in dextrose 5 % 500 mL IVPB (mg) 1,750 mg New Bag 06/09/22 1253                Microbiologic Results:  Microbiology Results (last 7 days)     Procedure Component Value Units Date/Time    Culture, Respiratory with Gram Stain [[id number]] Collected: 06/09/22 1632    Order Status: Completed Specimen: Respiratory from Tracheal Aspirate Updated: 06/10/22 0414     Gram Stain (Respiratory) Rare WBC's     Gram Stain (Respiratory) Few Gram negative rods     Gram Stain (Respiratory) Rare Gram positive rods    Blood culture x two cultures. Draw prior to antibiotics. [[id number]] Collected: 06/09/22 1031    Order Status: Completed Specimen: Blood from Antecubital, Right Hand Updated: 06/10/22 0115     Blood Culture, Routine No Growth to date    Narrative:      Aerobic and anaerobic    Blood culture x two cultures. Draw prior to antibiotics. [[id number]] Collected: 06/09/22 1031    Order Status: Completed Specimen: Blood Updated: 06/10/22 0115     Blood Culture, Routine No Growth to date    Narrative:      Aerobic and anaerobic    Urine culture [[id number]] Collected: 06/09/22 1059    Order Status: No result Specimen: Urine Updated: 06/09/22 1138

## 2022-06-10 NOTE — PLAN OF CARE
Bps soft, tachycardic. Unable to pull Lactic Acid at 2000 due to no access for labs- port does not pull blood.  Triple lumen picc placed at 2300- labs drawn at that time. Patient tolerated procedure well. Rested comfortably, safety maintained.       Problem: Adult Inpatient Plan of Care  Goal: Plan of Care Review  Outcome: Ongoing, Progressing  Goal: Patient-Specific Goal (Individualized)  Outcome: Ongoing, Progressing  Goal: Absence of Hospital-Acquired Illness or Injury  Outcome: Ongoing, Progressing  Goal: Optimal Comfort and Wellbeing  Outcome: Ongoing, Progressing  Goal: Readiness for Transition of Care  Outcome: Ongoing, Progressing     Problem: Infection  Goal: Absence of Infection Signs and Symptoms  Outcome: Ongoing, Progressing     Problem: Impaired Wound Healing  Goal: Optimal Wound Healing  Outcome: Ongoing, Progressing     Problem: Communication Impairment (Mechanical Ventilation, Invasive)  Goal: Effective Communication  Outcome: Ongoing, Progressing     Problem: Device-Related Complication Risk (Mechanical Ventilation, Invasive)  Goal: Optimal Device Function  Outcome: Ongoing, Progressing     Problem: Inability to Wean (Mechanical Ventilation, Invasive)  Goal: Mechanical Ventilation Liberation  Outcome: Ongoing, Progressing     Problem: Nutrition Impairment (Mechanical Ventilation, Invasive)  Goal: Optimal Nutrition Delivery  Outcome: Ongoing, Progressing     Problem: Skin and Tissue Injury (Mechanical Ventilation, Invasive)  Goal: Absence of Device-Related Skin and Tissue Injury  Outcome: Ongoing, Progressing     Problem: Ventilator-Induced Lung Injury (Mechanical Ventilation, Invasive)  Goal: Absence of Ventilator-Induced Lung Injury  Outcome: Ongoing, Progressing     Problem: Communication Impairment (Artificial Airway)  Goal: Effective Communication  Outcome: Ongoing, Progressing     Problem: Device-Related Complication Risk (Artificial Airway)  Goal: Optimal Device Function  Outcome: Ongoing,  Progressing     Problem: Skin and Tissue Injury (Artificial Airway)  Goal: Absence of Device-Related Skin or Tissue Injury  Outcome: Ongoing, Progressing     Problem: Noninvasive Ventilation Acute  Goal: Effective Unassisted Ventilation and Oxygenation  Outcome: Ongoing, Progressing     Problem: Skin Injury Risk Increased  Goal: Skin Health and Integrity  Outcome: Ongoing, Progressing     Problem: Fall Injury Risk  Goal: Absence of Fall and Fall-Related Injury  Outcome: Ongoing, Progressing     Problem: Adjustment to Illness (Sepsis/Septic Shock)  Goal: Optimal Coping  Outcome: Ongoing, Progressing     Problem: Bleeding (Sepsis/Septic Shock)  Goal: Absence of Bleeding  Outcome: Ongoing, Progressing     Problem: Glycemic Control Impaired (Sepsis/Septic Shock)  Goal: Blood Glucose Level Within Desired Range  Outcome: Ongoing, Progressing     Problem: Infection Progression (Sepsis/Septic Shock)  Goal: Absence of Infection Signs and Symptoms  Outcome: Ongoing, Progressing     Problem: Nutrition Impaired (Sepsis/Septic Shock)  Goal: Optimal Nutrition Intake  Outcome: Ongoing, Progressing

## 2022-06-10 NOTE — PROCEDURES
"Wei Dominique is a 66 y.o. male patient.    Temp: 99.1 °F (37.3 °C) (06/09/22 2000)  Pulse: 101 (06/09/22 2245)  Resp: 16 (06/09/22 2245)  BP: 95/65 (06/09/22 2245)  SpO2: 99 % (06/09/22 2245)  Weight: 73 kg (161 lb) (06/09/22 1003)  Height: 5' 8" (172.7 cm) (06/09/22 1003)    PICC  Date/Time: 6/9/2022 11:15 PM  Performed by: Lester Min RN  Consent Done: Yes  Time out: Immediately prior to procedure a time out was called to verify the correct patient, procedure, equipment, support staff and site/side marked as required  Indications: med administration and vascular access  Anesthesia: local infiltration  Local anesthetic: lidocaine 1% without epinephrine  Anesthetic Total (mL): 2  Preparation: skin prepped with ChloraPrep  Skin prep agent dried: skin prep agent completely dried prior to procedure  Sterile barriers: all five maximum sterile barriers used - cap, mask, sterile gown, sterile gloves, and large sterile sheet  Hand hygiene: hand hygiene performed prior to central venous catheter insertion  Location details: right basilic  Catheter type: triple lumen  Catheter size: 5 Fr  Catheter Length: 38cm    Ultrasound guidance: yes  Vessel Caliber: medium, compressibility normal  Vascular Doppler: not done  Needle advanced into vessel with real time Ultrasound guidance.  Guidewire confirmed in vessel.  Number of attempts: 1  Post-procedure: blood return through all ports, chlorhexidine patch and sterile dressing applied    Assessment: placement verified by x-ray, tip termination and successful placement          Name Lester Min RN  6/9/2022    "

## 2022-06-10 NOTE — PLAN OF CARE
Intervention: nutrition support     Recommendations  1.) As glucose was elevated start with TF Glucerna 1.5 @ 20 ml/hr advancing to 50 ml/hr + 150 ml flush q 4 hr + Chauncey BID  ( provides 1800 kcal ( 100% EEN),99 g protein ( 90% EPN), and 910 ml free water)     2.) when MD believes glucose controlled/ if pt does not have DM- restart home TF  Isosource 1.5 @ 10 mls/hr advancing as tolerated to goal rate 50 mls/hr continuous (providing 1800 kcals/day, 82 g protein/day,  211 g CHO/day, and 917 mls water/day) FWF: 150 mls q4 hrs or per MD.      3.) If needed-Parenteral Nutrition: 5%AA/D15 @ goal rate 75 mls/hr continuous + 20% lipids (250mls) daily providing 1778 kcals/day, 90 g protein/day, and 1800 mls fluid/day. GIR:2.5 mg/kg/min.      Goals: 1.) enteral nutrition will advance within 48 hrs   Nutrition Goal Status: 1.) new  Communication of RD Recs: POC, sticky note, reviewed with RN

## 2022-06-10 NOTE — ASSESSMENT & PLAN NOTE
Start vancomycin, Zosyn, and gentamicin.  Monitor for improvement.  Get respiratory culture.  Recent respiratory cultures grew Pseudomonas and Proteus.

## 2022-06-10 NOTE — PROGRESS NOTES
Pharmacokinetic Follow Up: Gentamicin    Assessment of levels:   Random concentration of 9.8 mcg/mL (9 hours post-infusion) corresponds to a dosing interval of every 48 hours per the [place] [facility]    Regimen Plan:   Continue current dose: Gentamicin 478.8 mg IV every 48 hours    Drug levels (last 3 results):  No results for input(s): AMIKACINPEAK, AMIKACINTROU, AMIKACINRAND, AMIKACIN in the last 72 hours.    No results for input(s): GENTAMICIN, GENTPEAK, GENTTROUGH, GENT10, GENT12, GENT8, GENTRANDOM in the last 72 hours.    No results for input(s): TOBRA8, TOBRA10, TOBRA12, TOBRARND, TOBRAMYCIN, TOBRAPEAK, TOBRATROUGH, TOBRAMYCINPE, TOBRAMYCINRA, TOBRAMYCINTR in the last 72 hours.    Pharmacy will continue to monitor.    Please contact pharmacy at extension [phone] with any questions regarding this assessment.    Thank you for the consult,   Eugene Tan      Patient brief summary:  Wei Dominique is a 66 y.o. male initiated on aminoglycoside therapy for treatment of  pneumonia    Drug Allergies:   Review of patient's allergies indicates:  No Known Allergies    Actual Body Weight:   73.2kg    Adjust Body Weight:   70.3kg    Ideal Body Weight:  68.4kg    Renal Function:   Estimated Creatinine Clearance: 118.8 mL/min (based on SCr of 0.6 mg/dL).,     CBC (last 72 hours):  Recent Labs   Lab Result Units 06/09/22  1031 06/10/22  0416   WBC K/uL 15.27* 11.22   Hemoglobin g/dL 10.6* 9.4*   Hematocrit % 37.7* 32.0*   Platelets K/uL 323 322   Gran % % 87.8* 75.3*   Lymph % % 4.6* 11.7*   Mono % % 4.4 4.7   Eosinophil % % 1.6 7.2   Basophil % % 0.7 0.7   Differential Method  Automated Automated       Metabolic Panel (last 72 hours):  Recent Labs   Lab Result Units 06/09/22  1031 06/09/22  1059 06/10/22  0416   Sodium mmol/L 140  --  139   Potassium mmol/L 5.0  --  3.8   Chloride mmol/L 103  --  102   CO2 mmol/L 23  --  26   Glucose mg/dL 243*  --  118*   Glucose, UA   --  Negative  --    BUN mg/dL 51*  --  42*    Creatinine mg/dL 0.7  --  0.6   Albumin g/dL 3.2*  --  2.9*   Total Bilirubin mg/dL 0.6  --  1.1*   Alkaline Phosphatase U/L 83  --  70   AST U/L 31  --  27   ALT U/L 41  --  34   Magnesium mg/dL  --   --  2.3   Phosphorus mg/dL  --   --  3.4       Aminoglycoside Administrations:  aminoglycosides given in last 96 hours                     gentamicin (GARAMYCIN) 478.8 mg in sodium chloride 0.9% 100 mL IVPB (mg) 478.8 mg New Bag 06/09/22 1849                    Microbiologic Results:  Microbiology Results (last 7 days)       Procedure Component Value Units Date/Time    Culture, Respiratory with Gram Stain [[id number]] Collected: 06/09/22 1632    Order Status: Completed Specimen: Respiratory from Tracheal Aspirate Updated: 06/10/22 0414     Gram Stain (Respiratory) Rare WBC's     Gram Stain (Respiratory) Few Gram negative rods     Gram Stain (Respiratory) Rare Gram positive rods    Blood culture x two cultures. Draw prior to antibiotics. [[id number]] Collected: 06/09/22 1031    Order Status: Completed Specimen: Blood from Antecubital, Right Hand Updated: 06/10/22 0115     Blood Culture, Routine No Growth to date    Narrative:      Aerobic and anaerobic    Blood culture x two cultures. Draw prior to antibiotics. [[id number]] Collected: 06/09/22 1031    Order Status: Completed Specimen: Blood Updated: 06/10/22 0115     Blood Culture, Routine No Growth to date    Narrative:      Aerobic and anaerobic    Urine culture [[id number]] Collected: 06/09/22 1059    Order Status: No result Specimen: Urine Updated: 06/09/22 1138

## 2022-06-10 NOTE — SUBJECTIVE & OBJECTIVE
Past Medical History:   Diagnosis Date    ALS (amyotrophic lateral sclerosis)     ALS (amyotrophic lateral sclerosis)     Erectile dysfunction     Gait disturbance     Hyperlipidemia     Hypertension     Iron deficiency anemia     Motor neuron disease     Tremor        Past Surgical History:   Procedure Laterality Date    PORTACATH PLACEMENT         Review of patient's allergies indicates:  No Known Allergies    No current facility-administered medications on file prior to encounter.     Current Outpatient Medications on File Prior to Encounter   Medication Sig    ALPRAZolam (XANAX) 0.5 MG tablet Take 0.5 mg by mouth 3 (three) times daily as needed.    apixaban (ELIQUIS) 2.5 mg Tab Take 1 tablet (2.5 mg total) by mouth 2 (two) times daily.    baclofen (LIORESAL) 10 MG tablet TAKE 1 TABLET THREE TIMES PER DAY (Patient taking differently: 10 mg by Per G Tube route 3 (three) times daily.)    busPIRone (BUSPAR) 5 MG Tab 1 tablet (5 mg total) by Per G Tube route 2 (two) times daily.    chlorhexidine (PERIDEX) 0.12 % solution Use as directed 15 mLs in the mouth or throat 2 (two) times daily.    furosemide (LASIX) 40 MG tablet 1 tablet (40 mg total) by Per G Tube route once daily.    glycerin adult suppository Place 1 suppository rectally once daily.    glycopyrrolate (ROBINUL) 1 mg Tab START BY TAKING 1 TABLET DAILY. IF TOLERATING WELL, YOU CAN INCREASE IT UP TO 2 OR EVEN 3 TIMES DAILY. (Patient taking differently: 1 mg by Per G Tube route 3 (three) times daily.)    lansoprazole (PREVACID) 30 MG capsule Take 30 mg by mouth once daily.    melatonin 3 mg TbDL 3 mg by Per G Tube route every evening.    metoprolol tartrate (LOPRESSOR) 50 MG tablet Take 50 mg by mouth once daily.    pantoprazole (PROTONIX) 40 mg suspension 40 mg by FEEDING TUBE route 2 (two) times a day.    phenazopyridine (PYRIDIUM) 200 MG tablet 200 mg by Per G Tube route 3 (three) times daily.    polyethylene glycol (GLYCOLAX) 17 gram PwPk 17 g by Per G  Tube route 2 (two) times daily.    riluzole 50 mg Tab tablet TAKE 1 TABLET ON AN EMPTY STOMACH EVERY 12 HRS    traZODone (DESYREL) 50 MG tablet 50 mg by Per G Tube route every evening.    albuterol-ipratropium (DUO-NEB) 2.5 mg-0.5 mg/3 mL nebulizer solution Take 3 mLs by nebulization every 4 (four) hours. Rescue    ondansetron (ZOFRAN) 4 MG tablet 4 mg by Per G Tube route every 6 (six) hours as needed for Nausea.     Family History    None       Tobacco Use    Smoking status: Not on file    Smokeless tobacco: Not on file   Substance and Sexual Activity    Alcohol use: Not on file    Drug use: Never    Sexual activity: Not Currently     Review of Systems   Unable to perform ROS: Other (tracheostomy connected to ventilator)   Objective:     Vital Signs (Most Recent):  Temp: 99.1 °F (37.3 °C) (06/09/22 2000)  Pulse: 103 (06/09/22 2145)  Resp: 16 (06/09/22 2145)  BP: (!) 91/57 (06/09/22 2145)  SpO2: 99 % (06/09/22 2145)   Vital Signs (24h Range):  Temp:  [98.2 °F (36.8 °C)-99.2 °F (37.3 °C)] 99.1 °F (37.3 °C)  Pulse:  [103-142] 103  Resp:  [16-30] 16  SpO2:  [94 %-100 %] 99 %  BP: ()/(48-94) 91/57     Weight: 73 kg (161 lb)  Body mass index is 24.48 kg/m².    Physical Exam  Constitutional:       General: He is not in acute distress.  HENT:      Head: Normocephalic and atraumatic.   Eyes:      Conjunctiva/sclera:      Right eye: No exudate.     Left eye: No exudate.     Pupils: Pupils are equal, round, and reactive to light.   Neck:      Vascular: No JVD.      Trachea: Tracheostomy present.   Cardiovascular:      Rate and Rhythm: Regular rhythm. Tachycardia present.      Comments: Edema in both upper extremities  Pulmonary:      Breath sounds: Normal breath sounds.   Abdominal:      General: Abdomen is flat. Bowel sounds are normal. There is no distension.      Tenderness: There is no abdominal tenderness.      Comments: G-tube site appears normal   Musculoskeletal:      Cervical back: Neck supple.      Right lower  leg: Edema present.      Left lower leg: Edema present.   Neurological:      Mental Status: He is alert.      Comments: Communicates by blinking         CRANIAL NERVES     CN III, IV, VI   Pupils are equal, round, and reactive to light.     Significant Labs: BMP:   Recent Labs   Lab 06/09/22  1031   *      K 5.0      CO2 23   BUN 51*   CREATININE 0.7   CALCIUM 10.6*     CBC:   Recent Labs   Lab 06/09/22  1031   WBC 15.27*   HGB 10.6*   HCT 37.7*        Lactic Acid:   Recent Labs   Lab 06/09/22  1031   LACTATE 2.5*     Urine Studies:   Recent Labs   Lab 06/09/22  1059   COLORU Yellow   APPEARANCEUA Cloudy*   PHUR 6.0   SPECGRAV 1.015   PROTEINUA 2+*   GLUCUA Negative   KETONESU Negative   BILIRUBINUA Negative   OCCULTUA 3+*   NITRITE Positive*   UROBILINOGEN 1.0   LEUKOCYTESUR 3+*   RBCUA 30*   WBCUA >100*   BACTERIA Many*   HYALINECASTS 0       Significant Imaging: I have reviewed all pertinent imaging results/findings within the past 24 hours.

## 2022-06-10 NOTE — CONSULTS
Wound Care Consult performed with Pt Nurse Carter in room, pt has crusted stage one wound at the base of the sacrum. Triad has been placed with a sacral foam dressing.

## 2022-06-11 ENCOUNTER — OUTSIDE PLACE OF SERVICE (OUTPATIENT)
Dept: INFECTIOUS DISEASES | Facility: CLINIC | Age: 67
End: 2022-06-11
Payer: MEDICARE

## 2022-06-11 DIAGNOSIS — G12.21 ALS (AMYOTROPHIC LATERAL SCLEROSIS): ICD-10-CM

## 2022-06-11 DIAGNOSIS — R65.21 SEPTIC SHOCK: ICD-10-CM

## 2022-06-11 DIAGNOSIS — Q61.3 POLYCYSTIC KIDNEY DISEASE: ICD-10-CM

## 2022-06-11 DIAGNOSIS — G82.50 QUADRIPARESIS: ICD-10-CM

## 2022-06-11 DIAGNOSIS — A41.9 SEPTIC SHOCK: ICD-10-CM

## 2022-06-11 DIAGNOSIS — A49.8 STAPHYLOCOCCUS EPIDERMIDIS INFECTION: ICD-10-CM

## 2022-06-11 DIAGNOSIS — N39.0 UTI (URINARY TRACT INFECTION): ICD-10-CM

## 2022-06-11 LAB
ALBUMIN SERPL BCP-MCNC: 2.6 G/DL (ref 3.5–5.2)
ALP SERPL-CCNC: 64 U/L (ref 55–135)
ALT SERPL W/O P-5'-P-CCNC: 28 U/L (ref 10–44)
ANION GAP SERPL CALC-SCNC: 9 MMOL/L (ref 8–16)
AST SERPL-CCNC: 21 U/L (ref 10–40)
BASOPHILS # BLD AUTO: 0.07 K/UL (ref 0–0.2)
BASOPHILS NFR BLD: 0.5 % (ref 0–1.9)
BILIRUB SERPL-MCNC: 1.2 MG/DL (ref 0.1–1)
BUN SERPL-MCNC: 41 MG/DL (ref 8–23)
CALCIUM SERPL-MCNC: 9.6 MG/DL (ref 8.7–10.5)
CHLORIDE SERPL-SCNC: 107 MMOL/L (ref 95–110)
CO2 SERPL-SCNC: 25 MMOL/L (ref 23–29)
COMPLEXED PSA SERPL-MCNC: 3.4 NG/ML (ref 0–4)
CREAT SERPL-MCNC: 0.7 MG/DL (ref 0.5–1.4)
DIFFERENTIAL METHOD: ABNORMAL
EOSINOPHIL # BLD AUTO: 0.7 K/UL (ref 0–0.5)
EOSINOPHIL NFR BLD: 5.5 % (ref 0–8)
ERYTHROCYTE [DISTWIDTH] IN BLOOD BY AUTOMATED COUNT: 20.5 % (ref 11.5–14.5)
EST. GFR  (AFRICAN AMERICAN): >60 ML/MIN/1.73 M^2
EST. GFR  (NON AFRICAN AMERICAN): >60 ML/MIN/1.73 M^2
GLUCOSE SERPL-MCNC: 125 MG/DL (ref 70–110)
HCT VFR BLD AUTO: 30.7 % (ref 40–54)
HGB BLD-MCNC: 9.2 G/DL (ref 14–18)
IMM GRANULOCYTES # BLD AUTO: 0.06 K/UL (ref 0–0.04)
IMM GRANULOCYTES NFR BLD AUTO: 0.5 % (ref 0–0.5)
LYMPHOCYTES # BLD AUTO: 1.1 K/UL (ref 1–4.8)
LYMPHOCYTES NFR BLD: 8.3 % (ref 18–48)
MAGNESIUM SERPL-MCNC: 2.3 MG/DL (ref 1.6–2.6)
MCH RBC QN AUTO: 25.8 PG (ref 27–31)
MCHC RBC AUTO-ENTMCNC: 30 G/DL (ref 32–36)
MCV RBC AUTO: 86 FL (ref 82–98)
MONOCYTES # BLD AUTO: 0.6 K/UL (ref 0.3–1)
MONOCYTES NFR BLD: 4.6 % (ref 4–15)
NEUTROPHILS # BLD AUTO: 10.7 K/UL (ref 1.8–7.7)
NEUTROPHILS NFR BLD: 80.6 % (ref 38–73)
NRBC BLD-RTO: 0 /100 WBC
PHOSPHATE SERPL-MCNC: 3 MG/DL (ref 2.7–4.5)
PLATELET # BLD AUTO: 340 K/UL (ref 150–450)
PMV BLD AUTO: 10.8 FL (ref 9.2–12.9)
POCT GLUCOSE: 145 MG/DL (ref 70–110)
POCT GLUCOSE: 146 MG/DL (ref 70–110)
POCT GLUCOSE: 151 MG/DL (ref 70–110)
POCT GLUCOSE: 159 MG/DL (ref 70–110)
POCT GLUCOSE: 160 MG/DL (ref 70–110)
POTASSIUM SERPL-SCNC: 3.6 MMOL/L (ref 3.5–5.1)
PROCALCITONIN SERPL IA-MCNC: 0.5 NG/ML
PROT SERPL-MCNC: 7.3 G/DL (ref 6–8.4)
RBC # BLD AUTO: 3.56 M/UL (ref 4.6–6.2)
SODIUM SERPL-SCNC: 141 MMOL/L (ref 136–145)
VANCOMYCIN SERPL-MCNC: 20.2 UG/ML
WBC # BLD AUTO: 13.3 K/UL (ref 3.9–12.7)

## 2022-06-11 PROCEDURE — 99223 1ST HOSP IP/OBS HIGH 75: CPT | Mod: S$GLB,,, | Performed by: INTERNAL MEDICINE

## 2022-06-11 PROCEDURE — 99223 PR INITIAL HOSPITAL CARE,LEVL III: ICD-10-PCS | Mod: S$GLB,,, | Performed by: INTERNAL MEDICINE

## 2022-06-11 PROCEDURE — 84100 ASSAY OF PHOSPHORUS: CPT | Performed by: HOSPITALIST

## 2022-06-11 PROCEDURE — 25000003 PHARM REV CODE 250: Performed by: HOSPITALIST

## 2022-06-11 PROCEDURE — 20000000 HC ICU ROOM

## 2022-06-11 PROCEDURE — A4216 STERILE WATER/SALINE, 10 ML: HCPCS | Performed by: HOSPITALIST

## 2022-06-11 PROCEDURE — 83735 ASSAY OF MAGNESIUM: CPT | Performed by: HOSPITALIST

## 2022-06-11 PROCEDURE — 84153 ASSAY OF PSA TOTAL: CPT | Performed by: INTERNAL MEDICINE

## 2022-06-11 PROCEDURE — 80202 ASSAY OF VANCOMYCIN: CPT | Performed by: HOSPITALIST

## 2022-06-11 PROCEDURE — 99900035 HC TECH TIME PER 15 MIN (STAT)

## 2022-06-11 PROCEDURE — 99900026 HC AIRWAY MAINTENANCE (STAT)

## 2022-06-11 PROCEDURE — 94761 N-INVAS EAR/PLS OXIMETRY MLT: CPT

## 2022-06-11 PROCEDURE — 85025 COMPLETE CBC W/AUTO DIFF WBC: CPT | Performed by: HOSPITALIST

## 2022-06-11 PROCEDURE — 99900022

## 2022-06-11 PROCEDURE — 80053 COMPREHEN METABOLIC PANEL: CPT | Performed by: HOSPITALIST

## 2022-06-11 PROCEDURE — 94003 VENT MGMT INPAT SUBQ DAY: CPT

## 2022-06-11 PROCEDURE — 87040 BLOOD CULTURE FOR BACTERIA: CPT | Performed by: INTERNAL MEDICINE

## 2022-06-11 PROCEDURE — 27000207 HC ISOLATION

## 2022-06-11 PROCEDURE — 63600175 PHARM REV CODE 636 W HCPCS: Performed by: HOSPITALIST

## 2022-06-11 PROCEDURE — 84145 PROCALCITONIN (PCT): CPT | Performed by: INTERNAL MEDICINE

## 2022-06-11 PROCEDURE — 27000221 HC OXYGEN, UP TO 24 HOURS

## 2022-06-11 RX ORDER — HEPARIN 100 UNIT/ML
5 SYRINGE INTRAVENOUS ONCE
Status: COMPLETED | OUTPATIENT
Start: 2022-06-11 | End: 2022-06-11

## 2022-06-11 RX ORDER — VANCOMYCIN HCL IN 5 % DEXTROSE 1G/250ML
15 PLASTIC BAG, INJECTION (ML) INTRAVENOUS ONCE
Status: COMPLETED | OUTPATIENT
Start: 2022-06-11 | End: 2022-06-11

## 2022-06-11 RX ORDER — SODIUM CHLORIDE 9 MG/ML
INJECTION, SOLUTION INTRAVENOUS CONTINUOUS
Status: DISCONTINUED | OUTPATIENT
Start: 2022-06-11 | End: 2022-06-14 | Stop reason: HOSPADM

## 2022-06-11 RX ADMIN — APIXABAN 2.5 MG: 2.5 TABLET, FILM COATED ORAL at 08:06

## 2022-06-11 RX ADMIN — Medication: at 09:06

## 2022-06-11 RX ADMIN — APIXABAN 2.5 MG: 2.5 TABLET, FILM COATED ORAL at 09:06

## 2022-06-11 RX ADMIN — MUPIROCIN: 20 OINTMENT TOPICAL at 08:06

## 2022-06-11 RX ADMIN — BUSPIRONE HYDROCHLORIDE 5 MG: 5 TABLET ORAL at 08:06

## 2022-06-11 RX ADMIN — PANTOPRAZOLE SODIUM 40 MG: 40 GRANULE, DELAYED RELEASE ORAL at 09:06

## 2022-06-11 RX ADMIN — MEROPENEM 2 G: 1 INJECTION, POWDER, FOR SOLUTION INTRAVENOUS at 09:06

## 2022-06-11 RX ADMIN — MEROPENEM 2 G: 1 INJECTION, POWDER, FOR SOLUTION INTRAVENOUS at 12:06

## 2022-06-11 RX ADMIN — SODIUM CHLORIDE: 0.9 INJECTION, SOLUTION INTRAVENOUS at 01:06

## 2022-06-11 RX ADMIN — HYDROCODONE BITARTRATE AND ACETAMINOPHEN 15 ML: 7.5; 325 SOLUTION ORAL at 04:06

## 2022-06-11 RX ADMIN — PANTOPRAZOLE SODIUM 40 MG: 40 GRANULE, DELAYED RELEASE ORAL at 08:06

## 2022-06-11 RX ADMIN — BUSPIRONE HYDROCHLORIDE 5 MG: 5 TABLET ORAL at 09:06

## 2022-06-11 RX ADMIN — BACLOFEN 10 MG: 10 TABLET ORAL at 04:06

## 2022-06-11 RX ADMIN — POLYETHYLENE GLYCOL 3350 17 G: 17 POWDER, FOR SOLUTION ORAL at 09:06

## 2022-06-11 RX ADMIN — MUPIROCIN: 20 OINTMENT TOPICAL at 09:06

## 2022-06-11 RX ADMIN — CHLORHEXIDINE GLUCONATE 0.12% ORAL RINSE 15 ML: 1.2 LIQUID ORAL at 08:06

## 2022-06-11 RX ADMIN — GLYCOPYRROLATE 1 MG: 1 TABLET ORAL at 09:06

## 2022-06-11 RX ADMIN — GLYCOPYRROLATE 1 MG: 1 TABLET ORAL at 04:06

## 2022-06-11 RX ADMIN — BACLOFEN 10 MG: 10 TABLET ORAL at 09:06

## 2022-06-11 RX ADMIN — HEPARIN 500 UNITS: 100 SYRINGE at 11:06

## 2022-06-11 RX ADMIN — PIPERACILLIN SODIUM AND TAZOBACTAM SODIUM 4.5 G: 4; .5 INJECTION, POWDER, LYOPHILIZED, FOR SOLUTION INTRAVENOUS at 01:06

## 2022-06-11 RX ADMIN — TRAZODONE HYDROCHLORIDE 50 MG: 50 TABLET ORAL at 08:06

## 2022-06-11 RX ADMIN — POLYETHYLENE GLYCOL 3350 17 G: 17 POWDER, FOR SOLUTION ORAL at 08:06

## 2022-06-11 RX ADMIN — GLYCOPYRROLATE 1 MG: 1 TABLET ORAL at 08:06

## 2022-06-11 RX ADMIN — BACLOFEN 10 MG: 10 TABLET ORAL at 08:06

## 2022-06-11 RX ADMIN — PIPERACILLIN SODIUM AND TAZOBACTAM SODIUM 4.5 G: 4; .5 INJECTION, POWDER, LYOPHILIZED, FOR SOLUTION INTRAVENOUS at 08:06

## 2022-06-11 RX ADMIN — SODIUM CHLORIDE, PRESERVATIVE FREE 10 ML: 5 INJECTION INTRAVENOUS at 11:06

## 2022-06-11 RX ADMIN — HYDROCODONE BITARTRATE AND ACETAMINOPHEN 15 ML: 7.5; 325 SOLUTION ORAL at 09:06

## 2022-06-11 RX ADMIN — VANCOMYCIN HYDROCHLORIDE 1000 MG: 1 INJECTION, POWDER, LYOPHILIZED, FOR SOLUTION INTRAVENOUS at 01:06

## 2022-06-11 RX ADMIN — HYDROCODONE BITARTRATE AND ACETAMINOPHEN 15 ML: 7.5; 325 SOLUTION ORAL at 05:06

## 2022-06-11 RX ADMIN — POTASSIUM BICARBONATE 50 MEQ: 977.5 TABLET, EFFERVESCENT ORAL at 09:06

## 2022-06-11 RX ADMIN — LACTULOSE 20 G: 10 SOLUTION ORAL at 09:06

## 2022-06-11 RX ADMIN — Medication 0.04 MCG/KG/MIN: at 07:06

## 2022-06-11 RX ADMIN — PIPERACILLIN SODIUM AND TAZOBACTAM SODIUM 4.5 G: 4; .5 INJECTION, POWDER, LYOPHILIZED, FOR SOLUTION INTRAVENOUS at 05:06

## 2022-06-11 RX ADMIN — CHLORHEXIDINE GLUCONATE 0.12% ORAL RINSE 15 ML: 1.2 LIQUID ORAL at 09:06

## 2022-06-11 NOTE — ASSESSMENT & PLAN NOTE
Continue vent settings.  Chlorhexidine orally every day.  Adjust vent if needed.    Vent Mode: A/C  Oxygen Concentration (%):  [30  Resp Rate Total: 16  Vt Set:  500 mL  PEEP/CPAP:  5 cmH20  Pressure Support:   0 cmH20

## 2022-06-11 NOTE — PROGRESS NOTES
Pharmacokinetic Assessment Follow Up: IV Vancomycin    Vancomycin serum concentration assessment(s):    The random level was drawn correctly and can be used to guide therapy at this time. The measurement is within the desired definitive target range of 15 to 20 mcg/mL.    Vancomycin Regimen Plan:    Pharmacy will dose by level.   Pharmacy will dose vancomycin 1000 mg x1.  A vancomycin level will be ordered on 06/12/22 at 1200.       Drug levels (last 3 results):  Recent Labs   Lab Result Units 06/10/22  1218 06/11/22  0500   Vancomycin, Random ug/mL  --  20.2   Vancomycin-Trough ug/mL 30.2*  --        Pharmacy will continue to follow and monitor vancomycin.    Please contact pharmacy at extension [phone] for questions regarding this assessment.    Thank you for the consult,   Eugene Tan       Patient brief summary:  Wei Dominique is a 66 y.o. male initiated on antimicrobial therapy with IV Vancomycin for treatment of lower respiratory infection    Drug Allergies:   Review of patient's allergies indicates:  No Known Allergies    Actual Body Weight:   73.2kg    Renal Function:   Estimated Creatinine Clearance: 101.8 mL/min (based on SCr of 0.7 mg/dL).,     CBC (last 72 hours):  Recent Labs   Lab Result Units 06/09/22  1031 06/10/22  0416 06/11/22  0529   WBC K/uL 15.27* 11.22 13.30*   Hemoglobin g/dL 10.6* 9.4* 9.2*   Hemoglobin A1C %  --  4.3  --    Hematocrit % 37.7* 32.0* 30.7*   Platelets K/uL 323 322 340   Gran % % 87.8* 75.3* 80.6*   Lymph % % 4.6* 11.7* 8.3*   Mono % % 4.4 4.7 4.6   Eosinophil % % 1.6 7.2 5.5   Basophil % % 0.7 0.7 0.5   Differential Method  Automated Automated Automated       Metabolic Panel (last 72 hours):  Recent Labs   Lab Result Units 06/09/22  1031 06/09/22  1059 06/10/22  0416 06/10/22  1218 06/11/22  0500   Sodium mmol/L 140  --  139 141 141   Potassium mmol/L 5.0  --  3.8 5.0 3.6   Chloride mmol/L 103  --  102 103 107   CO2 mmol/L 23  --  26 27 25   Glucose mg/dL 243*  --  118*  126* 125*   Glucose, UA   --  Negative  --   --   --    BUN mg/dL 51*  --  42* 48* 41*   Creatinine mg/dL 0.7  --  0.6 0.6 0.7   Albumin g/dL 3.2*  --  2.9*  --  2.6*   Total Bilirubin mg/dL 0.6  --  1.1*  --  1.2*   Alkaline Phosphatase U/L 83  --  70  --  64   AST U/L 31  --  27  --  21   ALT U/L 41  --  34  --  28   Magnesium mg/dL  --   --  2.3  --  2.3   Phosphorus mg/dL  --   --  3.4  --  3.0       Vancomycin Administrations:  vancomycin given in the last 96 hours                     vancomycin 1.25 g in dextrose 5% 250 mL IVPB (ready to mix) (mg) 1,250 mg New Bag 06/10/22 0054    vancomycin (VANCOCIN) 1,750 mg in dextrose 5 % 500 mL IVPB (mg) 1,750 mg New Bag 06/09/22 1253                    Microbiologic Results:  Microbiology Results (last 7 days)       Procedure Component Value Units Date/Time    Urine culture [[id number]]  (Abnormal) Collected: 06/09/22 1059    Order Status: Completed Specimen: Urine Updated: 06/10/22 1858     Urine Culture, Routine PRESUMPTIVE PSEUDOMONAS SPECIES  >100,000 cfu/ml  Identification and susceptibility pending      Narrative:      Specimen Source->Urine    Blood culture x two cultures. Draw prior to antibiotics. [[id number]] Collected: 06/09/22 1031    Order Status: Completed Specimen: Blood from Antecubital, Right Hand Updated: 06/10/22 1544     Blood Culture, Routine Gram stain peds bottle: Gram positive cocci in clusters resembling Staph      Results called to and read back by:Edilma Chin RN 06/10/2022  15:44    Narrative:      Aerobic and anaerobic    Blood culture x two cultures. Draw prior to antibiotics. [[id number]] Collected: 06/09/22 1031    Order Status: Completed Specimen: Blood Updated: 06/10/22 1544     Blood Culture, Routine Gram stain peds bottle: Gram positive cocci in clusters resembling Staph      Results called to and read back by:Edilma Chin RN 06/10/2022  15:43    Narrative:      Aerobic and anaerobic    Culture, Respiratory with Gram Stain [[id number]]  Collected: 06/09/22 7954    Order Status: Completed Specimen: Respiratory from Tracheal Aspirate Updated: 06/10/22 8962     Gram Stain (Respiratory) Rare WBC's     Gram Stain (Respiratory) Few Gram negative rods     Gram Stain (Respiratory) Rare Gram positive rods

## 2022-06-11 NOTE — PROGRESS NOTES
[facility] Hospital Medicine  Progress Note    Patient Name: Wei Dominique  MRN: [id number]  Patient Class: IP- Inpatient   Admission Date: 6/9/2022  Length of Stay: 1 days  Attending Physician: Aquiles Melendez MD  Primary Care Provider: Mack Suero MD        Subjective:     Principal Problem:Sepsis        HPI:  Patient is a 66 y.o. male with significant past medical history of ALS, s/p trach & peg, vent dependence, HTN, PE (on eliquis).  He was brought to our ED from [facility] today with hypoxia.  His sats reportedly were in the 40's.  At that time, he was taken off the ventilator and ventilated with ambu-bag.  Sats responded appropriately.  No other events reported.  Workup done in ED showed pneumonia.  Also has UTI.            Overview/Hospital Course:  No notes on file    Interval History:  patient complains of abdominal pain, which he attributes to constipation.  No fever.  Vitals stable.  Tolerating tube feeds.  Glucose levels are mostly normal; nowhere near 300, which was recorded in ER yesterday.    Review of Systems   Unable to perform ROS: Other (tracheostomy connected to ventilator)   Objective:     Vital Signs (Most Recent):  Temp: 98.2 °F (36.8 °C) (06/10/22 1600)  Pulse: 88 (06/10/22 1946)  Resp: 16 (06/10/22 1946)  BP: 117/60 (06/10/22 1946)  SpO2: 100 % (06/10/22 1946) Vital Signs (24h Range):  Temp:  [97.7 °F (36.5 °C)-99 °F (37.2 °C)] 98.2 °F (36.8 °C)  Pulse:  [] 88  Resp:  [16-29] 16  SpO2:  [69 %-100 %] 100 %  BP: ()/(36-85) 117/60     Weight: 73.2 kg (161 lb 6 oz)  Body mass index is 24.54 kg/m².    Intake/Output Summary (Last 24 hours) at 6/10/2022 2143  Last data filed at 6/10/2022 1946  Gross per 24 hour   Intake 4618.43 ml   Output 1365 ml   Net 3253.43 ml      Physical Exam  Constitutional:       General: He is not in acute distress.  HENT:      Head: Normocephalic and atraumatic.   Eyes:      Conjunctiva/sclera:      Right eye: No  "exudate.     Left eye: No exudate.     Pupils: Pupils are equal, round, and reactive to light.   Neck:      Vascular: No JVD.      Trachea: Tracheostomy present.   Cardiovascular:      Rate and Rhythm: Regular rhythm. Tachycardia present.      Comments: Edema in both upper extremities  Pulmonary:      Breath sounds: Normal breath sounds.   Abdominal:      General: Abdomen is flat. Bowel sounds are normal. There is no distension.      Tenderness: There is no abdominal tenderness.      Comments: G-tube site appears normal   Musculoskeletal:      Cervical back: Neck supple.      Right lower leg: Edema present.      Left lower leg: Edema present.   Neurological:      Mental Status: He is alert.      Comments: Communicates by blinking       Significant Labs: All pertinent labs within the past 24 hours have been reviewed.    Significant Imaging: I have reviewed all pertinent imaging results/findings within the past 24 hours.      Assessment/Plan:      * Sepsis  This patient does have evidence of infective focus  My overall impression is sepsis. Vital signs were reviewed and noted in progress note.  Antibiotics given-   Antibiotics (From admission, onward)            Start     Stop Route Frequency Ordered    06/10/22 1545  meropenem (MERREM) 2 g in sodium chloride 0.9% 100 mL IVPB         -- IV Every 8 hours (non-standard times) 06/10/22 1443    06/09/22 2330  mupirocin 2 % ointment         06/14 2059 Nasl 2 times daily 06/09/22 2217    06/09/22 2030  piperacillin-tazobactam 4.5 g in dextrose 5 % 100 mL IVPB (ready to mix system)  ( Pneumonia - Moderate-High MDR )         06/16 2029 IV Every 8 hours (non-standard times) 06/09/22 1610    06/09/22 1600  vancomycin - pharmacy to dose  ( Pneumonia - Moderate-High MDR )        "And" Linked Group Details    -- IV pharmacy to manage frequency 06/09/22 1600        Cultures were taken-   Microbiology Results (last 7 days)     Procedure Component Value Units Date/Time    Urine " culture [[id number]]  (Abnormal) Collected: 06/09/22 1059    Order Status: Completed Specimen: Urine Updated: 06/10/22 1858     Urine Culture, Routine PRESUMPTIVE PSEUDOMONAS SPECIES  >100,000 cfu/ml  Identification and susceptibility pending      Narrative:      Specimen Source->Urine    Blood culture x two cultures. Draw prior to antibiotics. [[id number]] Collected: 06/09/22 1031    Order Status: Completed Specimen: Blood from Antecubital, Right Hand Updated: 06/10/22 1544     Blood Culture, Routine Gram stain peds bottle: Gram positive cocci in clusters resembling Staph      Results called to and read back by:Edilma Chin RN 06/10/2022  15:44    Narrative:      Aerobic and anaerobic    Blood culture x two cultures. Draw prior to antibiotics. [[id number]] Collected: 06/09/22 1031    Order Status: Completed Specimen: Blood Updated: 06/10/22 1544     Blood Culture, Routine Gram stain peds bottle: Gram positive cocci in clusters resembling Staph      Results called to and read back by:Edilma Chin RN 06/10/2022  15:43    Narrative:      Aerobic and anaerobic    Culture, Respiratory with Gram Stain [[id number]] Collected: 06/09/22 1632    Order Status: Completed Specimen: Respiratory from Tracheal Aspirate Updated: 06/10/22 0414     Gram Stain (Respiratory) Rare WBC's     Gram Stain (Respiratory) Few Gram negative rods     Gram Stain (Respiratory) Rare Gram positive rods        Source- Pneumonia    Source control Achieved by- antibiotics      Hyperglycemia  It occurred once in ED yesterday but since that time, glucoses have been mostly normal.  Will keep monitoring.  On Glucerna tube feeds.    POCT Glucose   Date Value Ref Range Status   06/10/2022 138 (H) 70 - 110 mg/dL Final   06/10/2022 123 (H) 70 - 110 mg/dL Final   06/10/2022 146 (H) 70 - 110 mg/dL Final   06/10/2022 107 70 - 110 mg/dL Final   06/10/2022 120 (H) 70 - 110 mg/dL Final   06/10/2022 113 (H) 70 - 110 mg/dL Final   06/09/2022 106 70 - 110 mg/dL Final    06/09/2022 303 (H) 70 - 110 mg/dL Final           Catheter-associated urinary tract infection  In ED, they switched out Hutchinson for a new one.  Will be on IVAB.  Urine sent to lab for culture.      Gram-negative pneumonia  Continue vancomycin, Zosyn, and meropenem (gentamicin was discontinued due to drug shortage).  Monitor for improvement.  Get respiratory culture.  Recent respiratory cultures grew Pseudomonas and Proteus.      Tracheostomy in place  Have nurses provide routine trach care.      History of pulmonary embolism  Continue apixaban.      Dependent on ventilator  Continue vent settings.  Chlorhexidine orally every day.  Adjust vent if needed.    Vent Mode: A/C  Oxygen Concentration (%):  [30  Resp Rate Total: 16  Vt Set:  500 mL  PEEP/CPAP:  5 cmH20  Pressure Support:   0 cmH20    S/P percutaneous endoscopic gastrostomy (PEG) tube placement  Will have nurses provide routine PEG care.      ALS (amyotrophic lateral sclerosis)  Chronic.  No acute need to .      Essential hypertension  Will monitor blood pressure.  Use anti-hypertensives when needed.        VTE Risk Mitigation (From admission, onward)         Ordered     apixaban tablet 2.5 mg  2 times daily         06/09/22 1559     IP VTE HIGH RISK PATIENT  Once         06/09/22 1600     Place sequential compression device  Until discontinued         06/09/22 1600     Reason for No Pharmacological VTE Prophylaxis  Once        Question:  Reasons:  Answer:  Already adequately anticoagulated on oral Anticoagulants    06/09/22 1600                Discharge Planning   VENESSA:      Code Status: Full Code   Is the patient medically ready for discharge?:     Reason for patient still in hospital (select all that apply): Patient trending condition, Laboratory test and Treatment  Discharge Plan A: Return to nursing home          Aquiles Melendez MD  Department of Hospital Medicine   [facility]

## 2022-06-11 NOTE — ASSESSMENT & PLAN NOTE
It occurred once in ED yesterday but since that time, glucoses have been mostly normal.  Will keep monitoring.  On Glucerna tube feeds.    POCT Glucose   Date Value Ref Range Status   06/10/2022 138 (H) 70 - 110 mg/dL Final   06/10/2022 123 (H) 70 - 110 mg/dL Final   06/10/2022 146 (H) 70 - 110 mg/dL Final   06/10/2022 107 70 - 110 mg/dL Final   06/10/2022 120 (H) 70 - 110 mg/dL Final   06/10/2022 113 (H) 70 - 110 mg/dL Final   06/09/2022 106 70 - 110 mg/dL Final   06/09/2022 303 (H) 70 - 110 mg/dL Final

## 2022-06-11 NOTE — PLAN OF CARE
Pt AAOx4 with trach and on ventilator. Pt able to answer yes/ no questions. Pt given x1 dose hydrocodone for c/o pian in leg. Pt urinary output adequate during shift. See flow sheets for further assessment and vital sign data. VVS. Pt on levo but titrating down to 0.045mcg/hr. no BM during shift. Bed in lowest position with wheels locked and bed alarm on. Tube feedings remain off due to abd discomfort.

## 2022-06-11 NOTE — ASSESSMENT & PLAN NOTE
In ED, they switched out Hutchinson for a new one.  Will be on IVAB.  Urine sent to lab for culture.

## 2022-06-11 NOTE — CARE UPDATE
06/10/22 1946   Patient Assessment/Suction   Level of Consciousness (AVPU) responds to voice   Respiratory Effort Normal;Unlabored   Expansion/Accessory Muscles/Retractions expansion symmetric;no retractions;no use of accessory muscles   All Lung Fields Breath Sounds coarse   Suction Method tracheal   $ Suction Charges Inline Suction Procedure Stat Charge   Secretions Amount moderate   Secretions Color yellow;pale   Secretions Characteristics thick   PRE-TX-O2   O2 Device (Oxygen Therapy) ventilator   Oxygen Concentration (%) 30   SpO2 100 %   Pulse Oximetry Type Continuous   Pulse 88   Resp 16   /60   ETCO2   ETCO2 (mmHg) 32 mmHg   Vent Select   Conventional Vent Y   Charged w/in last 24h YES   Preset Conventional Ventilator Settings   Vent Type    Ventilation Type VC   Vent Mode A/C   Humidity HME   Set Rate 16 BPM   Vt Set 500 mL   PEEP/CPAP 5 cmH20   Pressure Support 0 cmH20   Waveform RAMP   Peak Flow 60 L/min   Set Inspiratory Pressure 0 cmH20   Insp Time 0 Sec(s)   Plateau Set/Insp. Hold (sec) 0   Insp Rise Time  0 %   Trigger Sensitivity Flow/I-Trigger 3 L/min   P High 0 cm H2O   P Low 0 cm H2O   T High 0 sec   T Low 0 sec   Patient Ventilator Parameters   Resp Rate Total 17 br/min   Peak Airway Pressure 23 cmH2O   Mean Airway Pressure 11 cmH20   Plateau Pressure 0 cmH20   Exhaled Vt 522 mL   Total Ve 8.59 mL   Spont Ve 0 L   I:E Ratio Measured 1:3.20   Conventional Ventilator Alarms   Ve High Alarm 25 L/min   Resp Rate High Alarm 40 br/min   Press High Alarm 50 cmH2O   Apnea Rate 16   Apnea Volume (mL) 500 mL   Apnea Oxygen Concentration  40   Apnea Flow Rate (L/min) 60   T Apnea 30 sec(s)   Ready to Wean/Extubation Screen   FIO2<=50 (chart decimal) 0.3   MV<16L (chart vol.) 8.59   PEEP <=8 (chart #) 5   Ready to Wean Parameters   F/VT Ratio<105 (RSBI) (!) 30.65

## 2022-06-11 NOTE — PLAN OF CARE
The patient is alert and makes eye contact and will respond appropriately to questions - one blink for yes, two blinks for no.     Mediport to left chest was heparinized and deaccessed. PICC line in place right arm.    He has decreased range of motion all extremities. He is unable to move any extremity. There is general edema, worse to arms bilat. SCDs applied. The bed is low and alarm on. Clinical alarms reviewed and armed. Monitor strip printed and placed in chart, is in sinus tachycardia. The tachycardia is more rapid today than yesterday, up to 140s, care team aware. I moved the blood pressure cuff to the left leg this afternoon since the right arm was looking irritated, and still edematous from repeated BP measurements.     The abdomen is rounded and somewhat distended but not overtly tight. The penile meatus is still split from previous admission. There is a scant amount of off-white drainage around the meatus. The arvizu had been changed in the emergency room and is draining concentrated yellow urine.     He has a copious amount of secretions, but less than yesterday. Suctioning frequently.     Tube feedings restarted via PEG, patient tolerating so far with only minimal residuals about 15mL.     There are fissures to tongue (from previous bites?) but they aren't bleeding or open from what I can see, but it was difficult for him to open his mouth more than a little for me to get a decent view at this time. I was able to get the toothette in to swab around.     He doesn't seem to have abdominal pain today. No BM yet, although I didn't appreciate any impaction yesterday. We escalated the bowel regimen and I gave lactulose+miralax+prune juice, notified md, still no result, hopefully it will work.     Titrating levophed, I had it off for a short amount of time but became hypotensive and so had to restart it.    There are preexisting wounds to sacrum, penis meatus, upper back. The skin is very dry and flaking off -  wound care here to see patient, triad applied. Mepilexes applied. Heel boots applied. Turning q2h and padding lines / extremities.    (levophed is titrated off) I changed the blood pressure cuff to the left leg because the left arm was looking red/ irritated from continuous bp measurements, with the edema that was present. It reads much higher on the leg, however (notified md).

## 2022-06-11 NOTE — ASSESSMENT & PLAN NOTE
"This patient does have evidence of infective focus  My overall impression is sepsis. Vital signs were reviewed and noted in progress note.  Antibiotics given-   Antibiotics (From admission, onward)            Start     Stop Route Frequency Ordered    06/10/22 1545  meropenem (MERREM) 2 g in sodium chloride 0.9% 100 mL IVPB         -- IV Every 8 hours (non-standard times) 06/10/22 1443    06/09/22 2330  mupirocin 2 % ointment         06/14 2059 Nasl 2 times daily 06/09/22 2217    06/09/22 2030  piperacillin-tazobactam 4.5 g in dextrose 5 % 100 mL IVPB (ready to mix system)  ( Pneumonia - Moderate-High MDR )         06/16 2029 IV Every 8 hours (non-standard times) 06/09/22 1610    06/09/22 1600  vancomycin - pharmacy to dose  ( Pneumonia - Moderate-High MDR )        "And" Linked Group Details    -- IV pharmacy to manage frequency 06/09/22 1600        Cultures were taken-   Microbiology Results (last 7 days)     Procedure Component Value Units Date/Time    Urine culture [[id number]]  (Abnormal) Collected: 06/09/22 1059    Order Status: Completed Specimen: Urine Updated: 06/10/22 1858     Urine Culture, Routine PRESUMPTIVE PSEUDOMONAS SPECIES  >100,000 cfu/ml  Identification and susceptibility pending      Narrative:      Specimen Source->Urine    Blood culture x two cultures. Draw prior to antibiotics. [[id number]] Collected: 06/09/22 1031    Order Status: Completed Specimen: Blood from Antecubital, Right Hand Updated: 06/10/22 1544     Blood Culture, Routine Gram stain peds bottle: Gram positive cocci in clusters resembling Staph      Results called to and read back by:Edilma Chin RN 06/10/2022  15:44    Narrative:      Aerobic and anaerobic    Blood culture x two cultures. Draw prior to antibiotics. [[id number]] Collected: 06/09/22 1031    Order Status: Completed Specimen: Blood Updated: 06/10/22 1544     Blood Culture, Routine Gram stain peds bottle: Gram positive cocci in clusters resembling Staph      Results called " to and read back by:Edilma Chin RN 06/10/2022  15:43    Narrative:      Aerobic and anaerobic    Culture, Respiratory with Gram Stain [[id number]] Collected: 06/09/22 1632    Order Status: Completed Specimen: Respiratory from Tracheal Aspirate Updated: 06/10/22 0414     Gram Stain (Respiratory) Rare WBC's     Gram Stain (Respiratory) Few Gram negative rods     Gram Stain (Respiratory) Rare Gram positive rods        Source- Pneumonia    Source control Achieved by- antibiotics

## 2022-06-11 NOTE — ASSESSMENT & PLAN NOTE
Continue vancomycin, Zosyn, and meropenem (gentamicin was discontinued due to drug shortage).  Monitor for improvement.  Get respiratory culture.  Recent respiratory cultures grew Pseudomonas and Proteus.

## 2022-06-11 NOTE — CONSULTS
"Consult Note  Infectious Disease    Reason for Consult:  Sepsis, multiple positive cultures    HPI: Wei Dominique is a 66 y.o. male  "with a PMHx of ALS with trach & peg, vent dependent, HTN, and PE (on Eiquis) presenting to the ED(6/9) from [facility] due to an episode of hypoxemia prior to arrival. EMS states patient's sats dropped into the 40s, he turned grey in color, and they removed his ventilator and began manually bagging the patient. Patient's color returned shortly after but his pulse has been in the 130s since the incident. Patient is nonverbal however EMS states he "blinks once for yes and twice for no". He denied any pain en route however seems more fatigued than normal for EMS." per ED MD meri castellanos. He was here 2 weeks ago for tracheostomy occlusion as well, requiring a tracheostomy change and transfusion for severe anemia and disimpaction.  In the ED he had a temp of 99.2, WBC 15K, pyuria, hyperglycemia 300, abnormal CXR(unchanged from 2/2022  CXR and CT at which time he had RLL atelectasis/consolidation on CT, same as 5/2022 as well), abdominal distention felt to be due to constipation.   He was cultured and placed on vancomycin, zosyn and gentamicin.    Admitted to ICU under hospital medicine. portacath in situ, but would not aspirate despite cathflo. picc placed 6/9 pm.  Blood cultures became positive for GPC yesterday am,( drawn from antecubital right hand and antecubital left hand), he required fluid bolus and initiation of levophed. This was identified as Staph epidermidis. Urine culture with pseudomonas and meropenem added to zosyn and gent stopped due to shortage. Sputum with pseudomonas and Proteus, secretions are white to yellow.. He has had no fever since admission. WBC are about the same. Lactic acids all normal, Vent needs are stable, levophed needs decreased, urine output is good.       Review of patient's allergies indicates:  No Known Allergies  Past Medical " History:   Diagnosis Date    ALS (amyotrophic lateral sclerosis)     ALS (amyotrophic lateral sclerosis)     Erectile dysfunction     Gait disturbance     Hyperlipidemia     Hypertension     Iron deficiency anemia     Motor neuron disease     Tremor    ESBL Ecoli bacte[place] of [place] origin[place] [facility] [place] MDRO pseudomonas  History of pulmonary emboli, 3/20/2020, with significant clot burden with bilateral main pul artery emboli extending into bilateral upp and lower lobe arteries.   Polycystic kidney disease    Past Surgical History:   Procedure Laterality Date    GASTROSTOMY      PEG    PORTACATH PLACEMENT      TRACHEOSTOMY           Social History     Socioeconomic History    Marital status:    Substance and Sexual Activity    Drug use: Never    Sexual activity: Not Currently     History reviewed. No pertinent family history.      Review of Systems: limited by his ability to only answer yes/no with blinks  Denies feeling sick prior to admission  Denies that trach occlusion is what caused his acute issue      EXAM & DIAGNOSTICS REVIEWED:   Vitals:     Temp:  [97.9 °F (36.6 °C)-98.8 °F (37.1 °C)]   Temp: 98.8 °F (37.1 °C) (06/11/22 1130)  Pulse: (!) 140 (06/11/22 1135)  Resp: (!) 21 (06/11/22 1135)  BP: (!) 83/61 (06/11/22 1135)  SpO2: 99 % (06/11/22 1135)    Intake/Output Summary (Last 24 hours) at 6/11/2022 1257  Last data filed at 6/11/2022 0852  Gross per 24 hour   Intake 2935.35 ml   Output 1685 ml   Net 1250.35 ml       General:  In NAD. Alert and attentive, cooperative to the degree he can be, appears comfortable. Beyond quadriplegic, can only blink his eyes and grind his teeth  Eyes:  Anicteric, PERRL, EOMI  ENT:  Exam is limited by extremely high tone in jaw muscles. He has fair to poor dentition with loose lower posterior molars. He grinds his teeth audibly. His tongue is fissured c/w deep bites and lacerations of the tongue. The dorsum of the tongue is coated. I cannot see the  pharynx.  Neck:  Tracheostomy to vent. Secretions moderate despite robinul and scopolamine  Lungs: Clear on left, decreased BS RLL  Heart:  RRR, no gallop/murmur/rub noted  Abd:  Soft, NT, mildly distended, hypoactive,  BS, no masses or organomegaly appreciated. PEG in place, no signs of infection  :   Hutchinson, urine clear, meatus is fissured ventrally. No discharge. No scrotal swelling  Musc:  Joints without effusion, swelling, erythema, synovitis, .   Skin:  No rashes. No palmar or plantar lesions. No subungual petechiae  Neuro:             Alert, attentive, quadriplegia, can only blink. Grinds his teeth but cannot open his mouth  Psych: Calm, cooperative  Lymphatic:     No cervical, supraclavicular,   nodes  Extrem: Generalized firm edema of disuse., with very edematous hands. no erythema, phlebitis, cellulitis, warm and well perfused  VAD:   Left upper chest port, not accessed. No redness or swelling. Right arm picc line 6/9    Isolation:  contact  Wound:                General Labs reviewed:  Recent Labs   Lab 06/09/22  1031 06/10/22  0416 06/11/22  0529   WBC 15.27* 11.22 13.30*   HGB 10.6* 9.4* 9.2*   HCT 37.7* 32.0* 30.7*    322 340       Recent Labs   Lab 06/09/22  1031 06/10/22  0416 06/10/22  1218 06/11/22  0500    139 141 141   K 5.0 3.8 5.0 3.6    102 103 107   CO2 23 26 27 25   BUN 51* 42* 48* 41*   CREATININE 0.7 0.6 0.6 0.7   CALCIUM 10.6* 10.0 9.8 9.6   PROT 9.0* 7.8  --  7.3   BILITOT 0.6 1.1*  --  1.2*   ALKPHOS 83 70  --  64   ALT 41 34  --  28   AST 31 27  --  21     No results for input(s): CRP in the last 168 hours.      Micro:  2/22/22: urine: providencia  4/17/22: urine and blood: ESBL Ecoli  5/17/22: urine with pseudomonas (sen to pip/tazo, gent,cefepime,avycaz,zerbaxa)  5/22/22:sputum with proteus ESBL and pseudomonas (same, plus sens to colistin)    Microbiology Results (last 7 days)     Procedure Component Value Units Date/Time    Blood culture x two cultures.  Draw prior to antibiotics. [[id number]]  (Abnormal) Collected: 06/09/22 1031    Order Status: Completed Specimen: Blood Updated: 06/11/22 1236     Blood Culture, Routine Gram stain peds bottle: Gram positive cocci in clusters resembling Staph      Results called to and read back by:Edilma Chin RN 06/10/2022  15:43      STAPHYLOCOCCUS EPIDERMIDIS  Susceptibility pending      Narrative:      Aerobic and anaerobic    Blood culture x two cultures. Draw prior to antibiotics. [[id number]]  (Abnormal) Collected: 06/09/22 1031    Order Status: Completed Specimen: Blood from Antecubital, Right Hand Updated: 06/11/22 1235     Blood Culture, Routine Gram stain peds bottle: Gram positive cocci in clusters resembling Staph      Results called to and read back by:Edilma Chin RN 06/10/2022  15:44      STAPHYLOCOCCUS EPIDERMIDIS  Susceptibility pending      Narrative:      Aerobic and anaerobic    Culture, Respiratory with Gram Stain [[id number]]  (Abnormal) Collected: 06/09/22 1632    Order Status: Completed Specimen: Respiratory from Tracheal Aspirate Updated: 06/11/22 0905     Respiratory Culture GRAM NEGATIVE AUTUMN  Few  Identification and susceptibility pending        PRESUMPTIVE PROTEUS SPECIES  Few  Identification and susceptibility pending       Gram Stain (Respiratory) Rare WBC's     Gram Stain (Respiratory) Few Gram negative rods     Gram Stain (Respiratory) Rare Gram positive rods    Urine culture [[id number]]  (Abnormal) Collected: 06/09/22 1059    Order Status: Completed Specimen: Urine Updated: 06/10/22 1858     Urine Culture, Routine PRESUMPTIVE PSEUDOMONAS SPECIES  >100,000 cfu/ml  Identification and susceptibility pending      Narrative:      Specimen Source->Urine          Imaging Reviewed:   CXRs through feb 2022   CT abd x 2     Cardiology:    IMPRESSION & PLAN   1. Hypoxemic event on admission, resolved    2. Chronic RLL opacification (at least 4 months), chronically colonized with GNRs    3. Positive blood  cultures, extremely difficult stick, with staph epidermidis , but no source to blame (port not clinically infected, nor are there any infected wounds). More difficult to declare contaminated when 2 sets positive, unless they have different sensitivity patterns which are pending    4. Hypotension, normal lactic acid, unclear source  5. Polycystic kidney disease with history of urosepsis, cyst hemorrhage, and colonized with MDRO pseudomonas, cannot exclude active infection. No record of PSA  6. Vent and trach, peg dependent from ALS  7. Chronic anticoagulation for pulmonary emboli        Recommendations:  Repeat blood culture(from the line because the arms are too difficult to stick)  Continue vanc and zosyn.   Would tailor gram negative antibiotics to urine culture and not to sputum  Echocardiogram  Psa, procalcitonin  Unsure how pulmonary would feel about bronch of RLL to exclude some obstruction  KUB and work on catharsis      Medical Decision Making during this encounter was  [_] Low Complexity  [_] Moderate Complexity  [ xx ] High Complexity    D/w nursing

## 2022-06-11 NOTE — SUBJECTIVE & OBJECTIVE
Interval History:  patient complains of abdominal pain, which he attributes to constipation.  No fever.  Vitals stable.  Tolerating tube feeds.  Glucose levels are mostly normal; nowhere near 300, which was recorded in ER yesterday.    Review of Systems   Unable to perform ROS: Other (tracheostomy connected to ventilator)   Objective:     Vital Signs (Most Recent):  Temp: 98.2 °F (36.8 °C) (06/10/22 1600)  Pulse: 88 (06/10/22 1946)  Resp: 16 (06/10/22 1946)  BP: 117/60 (06/10/22 1946)  SpO2: 100 % (06/10/22 1946) Vital Signs (24h Range):  Temp:  [97.7 °F (36.5 °C)-99 °F (37.2 °C)] 98.2 °F (36.8 °C)  Pulse:  [] 88  Resp:  [16-29] 16  SpO2:  [69 %-100 %] 100 %  BP: ()/(36-85) 117/60     Weight: 73.2 kg (161 lb 6 oz)  Body mass index is 24.54 kg/m².    Intake/Output Summary (Last 24 hours) at 6/10/2022 2143  Last data filed at 6/10/2022 1946  Gross per 24 hour   Intake 4618.43 ml   Output 1365 ml   Net 3253.43 ml      Physical Exam  Constitutional:       General: He is not in acute distress.  HENT:      Head: Normocephalic and atraumatic.   Eyes:      Conjunctiva/sclera:      Right eye: No exudate.     Left eye: No exudate.     Pupils: Pupils are equal, round, and reactive to light.   Neck:      Vascular: No JVD.      Trachea: Tracheostomy present.   Cardiovascular:      Rate and Rhythm: Regular rhythm. Tachycardia present.      Comments: Edema in both upper extremities  Pulmonary:      Breath sounds: Normal breath sounds.   Abdominal:      General: Abdomen is flat. Bowel sounds are normal. There is no distension.      Tenderness: There is no abdominal tenderness.      Comments: G-tube site appears normal   Musculoskeletal:      Cervical back: Neck supple.      Right lower leg: Edema present.      Left lower leg: Edema present.   Neurological:      Mental Status: He is alert.      Comments: Communicates by blinking       Significant Labs: All pertinent labs within the past 24 hours have been  reviewed.    Significant Imaging: I have reviewed all pertinent imaging results/findings within the past 24 hours.

## 2022-06-12 LAB
ALBUMIN SERPL BCP-MCNC: 2.5 G/DL (ref 3.5–5.2)
ALP SERPL-CCNC: 68 U/L (ref 55–135)
ALT SERPL W/O P-5'-P-CCNC: 27 U/L (ref 10–44)
ANION GAP SERPL CALC-SCNC: 11 MMOL/L (ref 8–16)
AST SERPL-CCNC: 19 U/L (ref 10–40)
BACTERIA BLD CULT: ABNORMAL
BASOPHILS # BLD AUTO: 0.04 K/UL (ref 0–0.2)
BASOPHILS NFR BLD: 0.3 % (ref 0–1.9)
BILIRUB SERPL-MCNC: 1.8 MG/DL (ref 0.1–1)
BUN SERPL-MCNC: 37 MG/DL (ref 8–23)
CALCIUM SERPL-MCNC: 9.9 MG/DL (ref 8.7–10.5)
CHLORIDE SERPL-SCNC: 106 MMOL/L (ref 95–110)
CO2 SERPL-SCNC: 25 MMOL/L (ref 23–29)
CREAT SERPL-MCNC: 0.6 MG/DL (ref 0.5–1.4)
DIFFERENTIAL METHOD: ABNORMAL
EOSINOPHIL # BLD AUTO: 0.5 K/UL (ref 0–0.5)
EOSINOPHIL NFR BLD: 3.3 % (ref 0–8)
ERYTHROCYTE [DISTWIDTH] IN BLOOD BY AUTOMATED COUNT: 20.6 % (ref 11.5–14.5)
EST. GFR  (AFRICAN AMERICAN): >60 ML/MIN/1.73 M^2
EST. GFR  (NON AFRICAN AMERICAN): >60 ML/MIN/1.73 M^2
GLUCOSE SERPL-MCNC: 116 MG/DL (ref 70–110)
HCT VFR BLD AUTO: 31.1 % (ref 40–54)
HGB BLD-MCNC: 9.3 G/DL (ref 14–18)
IMM GRANULOCYTES # BLD AUTO: 0.04 K/UL (ref 0–0.04)
IMM GRANULOCYTES NFR BLD AUTO: 0.3 % (ref 0–0.5)
LYMPHOCYTES # BLD AUTO: 0.7 K/UL (ref 1–4.8)
LYMPHOCYTES NFR BLD: 4.9 % (ref 18–48)
MAGNESIUM SERPL-MCNC: 2.6 MG/DL (ref 1.6–2.6)
MCH RBC QN AUTO: 26.1 PG (ref 27–31)
MCHC RBC AUTO-ENTMCNC: 29.9 G/DL (ref 32–36)
MCV RBC AUTO: 87 FL (ref 82–98)
MONOCYTES # BLD AUTO: 0.4 K/UL (ref 0.3–1)
MONOCYTES NFR BLD: 2.7 % (ref 4–15)
NEUTROPHILS # BLD AUTO: 13.2 K/UL (ref 1.8–7.7)
NEUTROPHILS NFR BLD: 88.5 % (ref 38–73)
NRBC BLD-RTO: 0 /100 WBC
PHOSPHATE SERPL-MCNC: 2.7 MG/DL (ref 2.7–4.5)
PLATELET # BLD AUTO: 305 K/UL (ref 150–450)
PMV BLD AUTO: 10.5 FL (ref 9.2–12.9)
POCT GLUCOSE: 130 MG/DL (ref 70–110)
POCT GLUCOSE: 142 MG/DL (ref 70–110)
POTASSIUM SERPL-SCNC: 3.8 MMOL/L (ref 3.5–5.1)
PROT SERPL-MCNC: 7.6 G/DL (ref 6–8.4)
RBC # BLD AUTO: 3.56 M/UL (ref 4.6–6.2)
SODIUM SERPL-SCNC: 142 MMOL/L (ref 136–145)
VANCOMYCIN SERPL-MCNC: 19.1 UG/ML
WBC # BLD AUTO: 14.92 K/UL (ref 3.9–12.7)

## 2022-06-12 PROCEDURE — 94761 N-INVAS EAR/PLS OXIMETRY MLT: CPT

## 2022-06-12 PROCEDURE — 94003 VENT MGMT INPAT SUBQ DAY: CPT

## 2022-06-12 PROCEDURE — 25000003 PHARM REV CODE 250: Performed by: INTERNAL MEDICINE

## 2022-06-12 PROCEDURE — 99233 PR SUBSEQUENT HOSPITAL CARE,LEVL III: ICD-10-PCS | Mod: S$GLB,,, | Performed by: INTERNAL MEDICINE

## 2022-06-12 PROCEDURE — 99900035 HC TECH TIME PER 15 MIN (STAT)

## 2022-06-12 PROCEDURE — 25000003 PHARM REV CODE 250: Performed by: HOSPITALIST

## 2022-06-12 PROCEDURE — 83735 ASSAY OF MAGNESIUM: CPT | Performed by: HOSPITALIST

## 2022-06-12 PROCEDURE — 85025 COMPLETE CBC W/AUTO DIFF WBC: CPT | Performed by: HOSPITALIST

## 2022-06-12 PROCEDURE — 27000207 HC ISOLATION

## 2022-06-12 PROCEDURE — 99233 SBSQ HOSP IP/OBS HIGH 50: CPT | Mod: S$GLB,,, | Performed by: INTERNAL MEDICINE

## 2022-06-12 PROCEDURE — 27000221 HC OXYGEN, UP TO 24 HOURS

## 2022-06-12 PROCEDURE — 99900026 HC AIRWAY MAINTENANCE (STAT)

## 2022-06-12 PROCEDURE — 20000000 HC ICU ROOM

## 2022-06-12 PROCEDURE — 84100 ASSAY OF PHOSPHORUS: CPT | Performed by: HOSPITALIST

## 2022-06-12 PROCEDURE — 63600175 PHARM REV CODE 636 W HCPCS: Performed by: HOSPITALIST

## 2022-06-12 PROCEDURE — 80202 ASSAY OF VANCOMYCIN: CPT | Performed by: HOSPITALIST

## 2022-06-12 PROCEDURE — A4216 STERILE WATER/SALINE, 10 ML: HCPCS | Performed by: HOSPITALIST

## 2022-06-12 PROCEDURE — 80053 COMPREHEN METABOLIC PANEL: CPT | Performed by: HOSPITALIST

## 2022-06-12 RX ORDER — LACTULOSE 10 G/15ML
40 SOLUTION ORAL ONCE
Status: COMPLETED | OUTPATIENT
Start: 2022-06-12 | End: 2022-06-12

## 2022-06-12 RX ORDER — BISACODYL 10 MG
10 SUPPOSITORY, RECTAL RECTAL DAILY PRN
Status: DISCONTINUED | OUTPATIENT
Start: 2022-06-12 | End: 2022-06-14 | Stop reason: HOSPADM

## 2022-06-12 RX ADMIN — CHLORHEXIDINE GLUCONATE 0.12% ORAL RINSE 15 ML: 1.2 LIQUID ORAL at 08:06

## 2022-06-12 RX ADMIN — PANTOPRAZOLE SODIUM 40 MG: 40 GRANULE, DELAYED RELEASE ORAL at 08:06

## 2022-06-12 RX ADMIN — MUPIROCIN: 20 OINTMENT TOPICAL at 11:06

## 2022-06-12 RX ADMIN — TRAZODONE HYDROCHLORIDE 50 MG: 50 TABLET ORAL at 08:06

## 2022-06-12 RX ADMIN — BACLOFEN 10 MG: 10 TABLET ORAL at 08:06

## 2022-06-12 RX ADMIN — PIPERACILLIN SODIUM AND TAZOBACTAM SODIUM 4.5 G: 4; .5 INJECTION, POWDER, LYOPHILIZED, FOR SOLUTION INTRAVENOUS at 08:06

## 2022-06-12 RX ADMIN — GLYCOPYRROLATE 1 MG: 1 TABLET ORAL at 08:06

## 2022-06-12 RX ADMIN — APIXABAN 2.5 MG: 2.5 TABLET, FILM COATED ORAL at 08:06

## 2022-06-12 RX ADMIN — BUSPIRONE HYDROCHLORIDE 5 MG: 5 TABLET ORAL at 08:06

## 2022-06-12 RX ADMIN — POTASSIUM BICARBONATE 50 MEQ: 977.5 TABLET, EFFERVESCENT ORAL at 05:06

## 2022-06-12 RX ADMIN — BACLOFEN 10 MG: 10 TABLET ORAL at 03:06

## 2022-06-12 RX ADMIN — HYDROCODONE BITARTRATE AND ACETAMINOPHEN 15 ML: 7.5; 325 SOLUTION ORAL at 11:06

## 2022-06-12 RX ADMIN — MUPIROCIN: 20 OINTMENT TOPICAL at 08:06

## 2022-06-12 RX ADMIN — BISACODYL 10 MG: 10 SUPPOSITORY RECTAL at 08:06

## 2022-06-12 RX ADMIN — HYDROCODONE BITARTRATE AND ACETAMINOPHEN 15 ML: 7.5; 325 SOLUTION ORAL at 05:06

## 2022-06-12 RX ADMIN — PIPERACILLIN SODIUM AND TAZOBACTAM SODIUM 4.5 G: 4; .5 INJECTION, POWDER, LYOPHILIZED, FOR SOLUTION INTRAVENOUS at 12:06

## 2022-06-12 RX ADMIN — VANCOMYCIN HYDROCHLORIDE 750 MG: 750 INJECTION, POWDER, LYOPHILIZED, FOR SOLUTION INTRAVENOUS at 03:06

## 2022-06-12 RX ADMIN — LACTULOSE 40 G: 10 SOLUTION ORAL at 01:06

## 2022-06-12 RX ADMIN — PIPERACILLIN SODIUM AND TAZOBACTAM SODIUM 4.5 G: 4; .5 INJECTION, POWDER, LYOPHILIZED, FOR SOLUTION INTRAVENOUS at 04:06

## 2022-06-12 RX ADMIN — POLYETHYLENE GLYCOL 3350 17 G: 17 POWDER, FOR SOLUTION ORAL at 08:06

## 2022-06-12 RX ADMIN — GLYCOPYRROLATE 1 MG: 1 TABLET ORAL at 03:06

## 2022-06-12 RX ADMIN — SODIUM CHLORIDE, PRESERVATIVE FREE 10 ML: 5 INJECTION INTRAVENOUS at 05:06

## 2022-06-12 NOTE — ASSESSMENT & PLAN NOTE
Continue antibiotics.  Monitor for improvement.  Get respiratory culture.  Recent respiratory cultures grew Pseudomonas and Proteus.  Blood grew coag negative Staph from supposedly two different sticks.  Consulting with infectious disease specialist.

## 2022-06-12 NOTE — ASSESSMENT & PLAN NOTE
In ED, they switched out Hutchinson for a new one.  Will be on IVAB.  Urine sent to lab for culture.  Consult with infectious diseases physician.

## 2022-06-12 NOTE — PROGRESS NOTES
[facility] Hospital Medicine  Progress Note    Patient Name: Wei Dominique  MRN: [id number]  Patient Class: IP- Inpatient   Admission Date: 6/9/2022  Length of Stay: 2 days  Attending Physician: Aquiles Melendez MD  Primary Care Provider: Mack Suero MD        Subjective:     Principal Problem:Septic shock        HPI:  Patient is a 66 y.o. male with significant past medical history of ALS, s/p trach & peg, vent dependence, HTN, PE (on eliquis).  He was brought to our ED from [facility] today with hypoxia.  His sats reportedly were in the 40's.  At that time, he was taken off the ventilator and ventilated with ambu-bag.  Sats responded appropriately.  No other events reported.  Workup done in ED showed pneumonia.  Also has UTI.            Overview/Hospital Course:  No notes on file    Interval History:  BP dropped, so now he's on Levophed.  Heart rate is high.  No bowel movements, so nurse gave a combination of lactulose, Miralax, and prune juice through the G tube.    Review of Systems   Unable to perform ROS: Other (tracheostomy connected to ventilator)   Objective:     Vital Signs (Most Recent):  Temp: 98.5 °F (36.9 °C) (06/11/22 2045)  Pulse: (!) 134 (06/11/22 2100)  Resp: 20 (06/11/22 2100)  BP: (!) 161/72 (06/11/22 2100)  SpO2: 99 % (06/11/22 2100) Vital Signs (24h Range):  Temp:  [97.9 °F (36.6 °C)-98.8 °F (37.1 °C)] 98.5 °F (36.9 °C)  Pulse:  [] 134  Resp:  [13-38] 20  SpO2:  [89 %-100 %] 99 %  BP: ()/() 161/72     Weight: 73.2 kg (161 lb 6 oz)  Body mass index is 24.54 kg/m².    Intake/Output Summary (Last 24 hours) at 6/11/2022 2158  Last data filed at 6/11/2022 2100  Gross per 24 hour   Intake 1777.49 ml   Output 1760 ml   Net 17.49 ml        Physical Exam  Constitutional:       General: He is not in acute distress.  HENT:      Head: Normocephalic and atraumatic.   Eyes:      Conjunctiva/sclera:      Right eye: No exudate.     Left eye: No exudate.      "Pupils: Pupils are equal, round, and reactive to light.   Neck:      Vascular: No JVD.      Trachea: Tracheostomy present.   Cardiovascular:      Rate and Rhythm: Regular rhythm. Tachycardia present.      Comments: Edema in both upper extremities  Pulmonary:      Breath sounds: Normal breath sounds.   Abdominal:      General: Abdomen is protuberant. Bowel sounds are normal. There is distension.      Tenderness: There is no abdominal tenderness.      Comments: G-tube site appears normal   Musculoskeletal:      Cervical back: Neck supple.      Right lower leg: Edema present.      Left lower leg: Edema present.   Neurological:      Mental Status: He is alert.      Comments: Communicates by blinking       Significant Labs: All pertinent labs within the past 24 hours have been reviewed.    Significant Imaging: I have reviewed all pertinent imaging results/findings within the past 24 hours.      Assessment/Plan:      * Septic shock  This patient does have evidence of infective focus  My overall impression is sepsis. Vital signs were reviewed and noted in progress note.  Antibiotics given-   Antibiotics (From admission, onward)            Start     Stop Route Frequency Ordered    06/09/22 2330  mupirocin 2 % ointment         06/14 2059 Nasl 2 times daily 06/09/22 2217    06/09/22 2030  piperacillin-tazobactam 4.5 g in dextrose 5 % 100 mL IVPB (ready to mix system)  ( Pneumonia - Moderate-High MDR )         06/16 2029 IV Every 8 hours (non-standard times) 06/09/22 1610    06/09/22 1600  vancomycin - pharmacy to dose  ( Pneumonia - Moderate-High MDR )        "And" Linked Group Details    -- IV pharmacy to manage frequency 06/09/22 1600        Cultures were taken-   Microbiology Results (last 7 days)     Procedure Component Value Units Date/Time    Blood culture [[id number]] Collected: 06/11/22 1601    Order Status: Sent Specimen: Blood from Line, PICC Right Brachial Updated: 06/11/22 1602    Blood culture x two cultures. Draw " prior to antibiotics. [[id number]]  (Abnormal) Collected: 06/09/22 1031    Order Status: Completed Specimen: Blood Updated: 06/11/22 1236     Blood Culture, Routine Gram stain peds bottle: Gram positive cocci in clusters resembling Staph      Results called to and read back by:Edilma Chin RN 06/10/2022  15:43      STAPHYLOCOCCUS EPIDERMIDIS  Susceptibility pending      Narrative:      Aerobic and anaerobic    Blood culture x two cultures. Draw prior to antibiotics. [[id number]]  (Abnormal) Collected: 06/09/22 1031    Order Status: Completed Specimen: Blood from Antecubital, Right Hand Updated: 06/11/22 1235     Blood Culture, Routine Gram stain peds bottle: Gram positive cocci in clusters resembling Staph      Results called to and read back by:Edilma Chin RN 06/10/2022  15:44      STAPHYLOCOCCUS EPIDERMIDIS  Susceptibility pending      Narrative:      Aerobic and anaerobic    Culture, Respiratory with Gram Stain [[id number]]  (Abnormal) Collected: 06/09/22 1632    Order Status: Completed Specimen: Respiratory from Tracheal Aspirate Updated: 06/11/22 0905     Respiratory Culture GRAM NEGATIVE AUTUMN  Few  Identification and susceptibility pending        PRESUMPTIVE PROTEUS SPECIES  Few  Identification and susceptibility pending       Gram Stain (Respiratory) Rare WBC's     Gram Stain (Respiratory) Few Gram negative rods     Gram Stain (Respiratory) Rare Gram positive rods    Urine culture [[id number]]  (Abnormal) Collected: 06/09/22 1059    Order Status: Completed Specimen: Urine Updated: 06/10/22 1858     Urine Culture, Routine PRESUMPTIVE PSEUDOMONAS SPECIES  >100,000 cfu/ml  Identification and susceptibility pending      Narrative:      Specimen Source->Urine        Source- Pneumonia    Source control Achieved by- antibiotics      Hyperglycemia  CBG's are high again.  Will keep monitoring.  On Glucerna tube feeds.  Unclear if he's a new diabetic.    POCT Glucose   Date Value Ref Range Status   06/11/2022 151 (H) 70 -  110 mg/dL Final   06/11/2022 160 (H) 70 - 110 mg/dL Final   06/11/2022 146 (H) 70 - 110 mg/dL Final   06/11/2022 145 (H) 70 - 110 mg/dL Final   06/11/2022 159 (H) 70 - 110 mg/dL Final   06/10/2022 138 (H) 70 - 110 mg/dL Final   06/10/2022 123 (H) 70 - 110 mg/dL Final   06/10/2022 146 (H) 70 - 110 mg/dL Final   06/10/2022 107 70 - 110 mg/dL Final   06/10/2022 120 (H) 70 - 110 mg/dL Final   06/10/2022 113 (H) 70 - 110 mg/dL Final   06/09/2022 106 70 - 110 mg/dL Final   06/09/2022 303 (H) 70 - 110 mg/dL Final           Catheter-associated urinary tract infection  In ED, they switched out Hutchinson for a new one.  Will be on IVAB.  Urine sent to lab for culture.  Consult with infectious diseases physician.      Gram-negative pneumonia  Continue antibiotics.  Monitor for improvement.  Get respiratory culture.  Recent respiratory cultures grew Pseudomonas and Proteus.  Blood grew coag negative Staph from supposedly two different sticks.  Consulting with infectious disease specialist.    Tracheostomy in place  Have nurses provide routine trach care.      History of pulmonary embolism  Continue apixaban.      Dependent on ventilator  Continue vent settings.  Chlorhexidine orally every day.  Adjust vent if needed.    Vent Mode: A/C  Oxygen Concentration (%):  [30  Resp Rate Total: 16  Vt Set:  500 mL  PEEP/CPAP:  5 cmH20  Pressure Support:   0 cmH20    S/P percutaneous endoscopic gastrostomy (PEG) tube placement  Will have nurses provide routine PEG care.      ALS (amyotrophic lateral sclerosis)  Chronic.  No acute need to .      Essential hypertension  Will monitor blood pressure.  Use anti-hypertensives when needed.        VTE Risk Mitigation (From admission, onward)         Ordered     apixaban tablet 2.5 mg  2 times daily         06/09/22 1559     IP VTE HIGH RISK PATIENT  Once         06/09/22 1600     Place sequential compression device  Until discontinued         06/09/22 1600     Reason for No  Pharmacological VTE Prophylaxis  Once        Question:  Reasons:  Answer:  Already adequately anticoagulated on oral Anticoagulants    06/09/22 1600                Discharge Planning   VENESSA:      Code Status: Full Code   Is the patient medically ready for discharge?:     Reason for patient still in hospital (select all that apply): Patient trending condition, Laboratory test, Treatment and Consult recommendations  Discharge Plan A: Return to nursing home          Aquiles Melendez MD  Department of Hospital Medicine   [facility]

## 2022-06-12 NOTE — ASSESSMENT & PLAN NOTE
"This patient does have evidence of infective focus  My overall impression is sepsis. Vital signs were reviewed and noted in progress note.  Antibiotics given-   Antibiotics (From admission, onward)            Start     Stop Route Frequency Ordered    06/09/22 2330  mupirocin 2 % ointment         06/14 2059 Nasl 2 times daily 06/09/22 2217 06/09/22 2030  piperacillin-tazobactam 4.5 g in dextrose 5 % 100 mL IVPB (ready to mix system)  ( Pneumonia - Moderate-High MDR )         06/16 2029 IV Every 8 hours (non-standard times) 06/09/22 1610 06/09/22 1600  vancomycin - pharmacy to dose  ( Pneumonia - Moderate-High MDR )        "And" Linked Group Details    -- IV pharmacy to manage frequency 06/09/22 1600        Cultures were taken-   Microbiology Results (last 7 days)     Procedure Component Value Units Date/Time    Blood culture [[id number]] Collected: 06/11/22 1601    Order Status: Sent Specimen: Blood from Line, PICC Right Brachial Updated: 06/11/22 1602    Blood culture x two cultures. Draw prior to antibiotics. [[id number]]  (Abnormal) Collected: 06/09/22 1031    Order Status: Completed Specimen: Blood Updated: 06/11/22 1236     Blood Culture, Routine Gram stain peds bottle: Gram positive cocci in clusters resembling Staph      Results called to and read back by:Edilma Chin RN 06/10/2022  15:43      STAPHYLOCOCCUS EPIDERMIDIS  Susceptibility pending      Narrative:      Aerobic and anaerobic    Blood culture x two cultures. Draw prior to antibiotics. [[id number]]  (Abnormal) Collected: 06/09/22 1031    Order Status: Completed Specimen: Blood from Antecubital, Right Hand Updated: 06/11/22 1235     Blood Culture, Routine Gram stain peds bottle: Gram positive cocci in clusters resembling Staph      Results called to and read back by:Edilma Chin RN 06/10/2022  15:44      STAPHYLOCOCCUS EPIDERMIDIS  Susceptibility pending      Narrative:      Aerobic and anaerobic    Culture, Respiratory with Gram Stain [[id number]]  " (Abnormal) Collected: 06/09/22 1632    Order Status: Completed Specimen: Respiratory from Tracheal Aspirate Updated: 06/11/22 0905     Respiratory Culture GRAM NEGATIVE AUTUMN  Few  Identification and susceptibility pending        PRESUMPTIVE PROTEUS SPECIES  Few  Identification and susceptibility pending       Gram Stain (Respiratory) Rare WBC's     Gram Stain (Respiratory) Few Gram negative rods     Gram Stain (Respiratory) Rare Gram positive rods    Urine culture [[id number]]  (Abnormal) Collected: 06/09/22 1059    Order Status: Completed Specimen: Urine Updated: 06/10/22 1858     Urine Culture, Routine PRESUMPTIVE PSEUDOMONAS SPECIES  >100,000 cfu/ml  Identification and susceptibility pending      Narrative:      Specimen Source->Urine        Source- Pneumonia    Source control Achieved by- antibiotics

## 2022-06-12 NOTE — CARE UPDATE
06/11/22 1930   Patient Assessment/Suction   Level of Consciousness (AVPU) alert   Respiratory Effort Normal;Unlabored   Expansion/Accessory Muscles/Retractions expansion symmetric;no retractions;no use of accessory muscles   Suction Method tracheal   $ Suction Charges Inline Suction Procedure Stat Charge   Secretions Amount large   Secretions Color pale;yellow   Secretions Characteristics thick   PRE-TX-O2   O2 Device (Oxygen Therapy) ventilator   Oxygen Concentration (%) 30   SpO2 99 %   Pulse Oximetry Type Continuous   Pulse (!) 133   Resp 18   BP (!) 173/69   ETCO2   ETCO2 (mmHg) 44 mmHg   Vent Select   Conventional Vent Y   Charged w/in last 24h YES   Preset Conventional Ventilator Settings   Vent Type    Ventilation Type VC   Vent Mode A/C   Humidity HME   Set Rate 16 BPM   Vt Set 500 mL   PEEP/CPAP 5 cmH20   Pressure Support 0 cmH20   Waveform RAMP   Peak Flow 60 L/min   Set Inspiratory Pressure 0 cmH20   Insp Time 0 Sec(s)   Plateau Set/Insp. Hold (sec) 0   Insp Rise Time  0 %   Trigger Sensitivity Flow/I-Trigger 3 L/min   P High 0 cm H2O   P Low 0 cm H2O   T High 0 sec   T Low 0 sec   Patient Ventilator Parameters   Resp Rate Total 16 br/min   Peak Airway Pressure 47 cmH2O   Mean Airway Pressure 14 cmH20   Plateau Pressure 0 cmH20   Exhaled Vt 484 mL   Total Ve 7.83 mL   Spont Ve 0 L   I:E Ratio Measured 1:3.20   Conventional Ventilator Alarms   Ve High Alarm 25 L/min   Resp Rate High Alarm 40 br/min   Press High Alarm 50 cmH2O   Apnea Rate 16   Apnea Volume (mL) 500 mL   Apnea Oxygen Concentration  40   Apnea Flow Rate (L/min) 60   T Apnea 30 sec(s)   Ready to Wean/Extubation Screen   FIO2<=50 (chart decimal) 0.3   MV<16L (chart vol.) 7.83   PEEP <=8 (chart #) 5   Ready to Wean Parameters   F/VT Ratio<105 (RSBI) (!) 37.19

## 2022-06-12 NOTE — PROGRESS NOTES
"Consult Note  Infectious Disease    Reason for Consult:  Sepsis, multiple positive cultures    HPI: Wei Dominique is a 66 y.o. male  "with a PMHx of ALS with trach & peg, vent dependent, HTN, and PE (on Eiquis) presenting to the ED(6/9) from [facility] due to an episode of hypoxemia prior to arrival. EMS states patient's sats dropped into the 40s, he turned grey in color, and they removed his ventilator and began manually bagging the patient. Patient's color returned shortly after but his pulse has been in the 130s since the incident. Patient is nonverbal however EMS states he "blinks once for yes and twice for no". He denied any pain en route however seems more fatigued than normal for EMS." per ED MD meri castellanos. He was here 2 weeks ago for tracheostomy occlusion as well, requiring a tracheostomy change and transfusion for severe anemia and disimpaction.  In the ED he had a temp of 99.2, WBC 15K, pyuria, hyperglycemia 300, abnormal CXR(unchanged from 2/2022  CXR and CT at which time he had RLL atelectasis/consolidation on CT, same as 5/2022 as well), abdominal distention felt to be due to constipation.   He was cultured and placed on vancomycin, zosyn and gentamicin.    Admitted to ICU under hospital medicine. portacath in situ, but would not aspirate despite cathflo. picc placed 6/9 pm.  Blood cultures became positive for GPC yesterday am,( drawn from antecubital right hand and antecubital left hand), he required fluid bolus and initiation of levophed. This was identified as Staph epidermidis. Urine culture with pseudomonas and meropenem added to zosyn and gent stopped due to shortage. Sputum with pseudomonas and Proteus, secretions are white to yellow.. He has had no fever since admission. WBC are about the same. Lactic acids all normal, Vent needs are stable, levophed needs decreased, urine output is good.     6/12: interim reviewed. Off levophed. tmax 99, WBC 15, urine culture with recurrent " pseudomonas , sensitive to zosyn (also cefepime), sputum culture with multiple orgs, blood cultures with identical Staph epi. Echo not yet done. Repeat blood culture negative so far. procalcitonin borderline, PSA normal. He looks comfortable but does indicate constipation related abd pain. The multiple cathartics he received yesterday have not worked. KUB noted.    EXAM & DIAGNOSTICS REVIEWED:   Vitals:     Temp:  [98.1 °F (36.7 °C)-99.1 °F (37.3 °C)]   Temp: 99.1 °F (37.3 °C) (06/12/22 1240)  Pulse: (!) 114 (06/12/22 1300)  Resp: 16 (06/12/22 1300)  BP: (!) 114/58 (06/12/22 1300)  SpO2: 98 % (06/12/22 1300)    Intake/Output Summary (Last 24 hours) at 6/12/2022 1435  Last data filed at 6/12/2022 1300  Gross per 24 hour   Intake 1773.24 ml   Output 1640 ml   Net 133.24 ml       General:  In NAD. Alert and attentive, cooperative to the degree he can be, appears comfortable. Beyond quadriplegic, can only blink his eyes and grind his teeth  Eyes:  Anicteric, PERRL, EOMI  ENT:  Exam is limited by extremely high tone in jaw muscles. He has fair to poor dentition with loose lower posterior molars. He grinds his teeth audibly. His tongue is fissured c/w deep bites and lacerations of the tongue. The dorsum of the tongue is coated. I cannot see the pharynx.  Neck:  Tracheostomy to vent. Secretions moderate despite robinul and scopolamine  Lungs: Clear on left, decreased BS RLL  Heart:  RRR, no gallop/murmur/rub noted  Abd:  Soft, NT, mildly distended, hypoactive,  BS, no masses or organomegaly appreciated. PEG in place, no signs of infection  :   Hutchinson, urine clear, meatus is fissured ventrally. No discharge. No scrotal swelling  Musc:  Joints without effusion, swelling, erythema, synovitis, .   Skin:  No rashes.   Neuro:             Alert, attentive, quadriplegia, can only blink. Grinds his teeth but cannot open his mouth  Psych: Calm, cooperative  Lymphatic:       Extrem: Generalized firm edema of disuse., with very  edematous hands. no erythema, phlebitis, cellulitis, warm and well perfused  VAD:   Left upper chest port, not accessed. No redness or swelling. Right arm picc line 6/9    Isolation:  contact  Wound:                General Labs reviewed:  Recent Labs   Lab 06/10/22  0416 06/11/22  0529 06/12/22  0408   WBC 11.22 13.30* 14.92*   HGB 9.4* 9.2* 9.3*   HCT 32.0* 30.7* 31.1*    340 305       Recent Labs   Lab 06/10/22  0416 06/10/22  1218 06/11/22  0500 06/12/22  0408    141 141 142   K 3.8 5.0 3.6 3.8    103 107 106   CO2 26 27 25 25   BUN 42* 48* 41* 37*   CREATININE 0.6 0.6 0.7 0.6   CALCIUM 10.0 9.8 9.6 9.9   PROT 7.8  --  7.3 7.6   BILITOT 1.1*  --  1.2* 1.8*   ALKPHOS 70  --  64 68   ALT 34  --  28 27   AST 27  --  21 19     No results for input(s): CRP in the last 168 hours.      Micro:  2/22/22: urine: providencia  4/17/22: urine and blood: ESBL Ecoli  5/17/22: urine with pseudomonas (sen to pip/tazo, gent,cefepime,avycaz,zerbaxa)  5/22/22:sputum with proteus ESBL and pseudomonas (same, plus sens to colistin)    Microbiology Results (last 7 days)     Procedure Component Value Units Date/Time    Culture, Respiratory with Gram Stain [[id number]]  (Abnormal)  (Susceptibility) Collected: 06/09/22 1632    Order Status: Completed Specimen: Respiratory from Tracheal Aspirate Updated: 06/12/22 1247     Respiratory Culture SERRATIA MARCESCENS  Few        PSEUDOMONAS AERUGINOSA  Few        PROTEUS MIRABILIS ESBL  Few       Gram Stain (Respiratory) Rare WBC's     Gram Stain (Respiratory) Few Gram negative rods     Gram Stain (Respiratory) Rare Gram positive rods    Urine culture [[id number]]  (Abnormal)  (Susceptibility) Collected: 06/09/22 1059    Order Status: Completed Specimen: Urine Updated: 06/12/22 1215     Urine Culture, Routine PSEUDOMONAS AERUGINOSA   >100,000 cfu/ml  Susceptibility pending      Narrative:      Specimen Source->Urine    Blood culture x two cultures. Draw prior to  antibiotics. [[id number]]  (Abnormal)  (Susceptibility) Collected: 06/09/22 1031    Order Status: Completed Specimen: Blood Updated: 06/12/22 0923     Blood Culture, Routine Gram stain peds bottle: Gram positive cocci in clusters resembling Staph      Results called to and read back by:Edilma Chin RN 06/10/2022  15:43      STAPHYLOCOCCUS EPIDERMIDIS    Narrative:      Aerobic and anaerobic    Blood culture x two cultures. Draw prior to antibiotics. [[id number]]  (Abnormal)  (Susceptibility) Collected: 06/09/22 1031    Order Status: Completed Specimen: Blood from Antecubital, Right Hand Updated: 06/12/22 0922     Blood Culture, Routine Gram stain peds bottle: Gram positive cocci in clusters resembling Staph      Results called to and read back by:Edilma Chin RN 06/10/2022  15:44      STAPHYLOCOCCUS EPIDERMIDIS    Narrative:      Aerobic and anaerobic    Blood culture [[id number]] Collected: 06/11/22 1601    Order Status: Completed Specimen: Blood from Line, PICC Right Brachial Updated: 06/12/22 0715     Blood Culture, Routine No Growth to date    Narrative:      From picc          Imaging Reviewed:   CXRs through feb 2022   CT abd x 2     Cardiology:    IMPRESSION & PLAN   1. Hypoxemic event on admission, resolved    2. Chronic RLL opacification (at least 4 months), chronically colonized with GNRs    3. Positive blood cultures, extremely difficult stick, with staph epidermidis , but no source to blame (port not clinically infected, nor are there any infected wounds). More difficult to declare contaminated when 2 sets positive, unless they have different sensitivity patterns which are pending   procalcitonin borderline    4. Hypotension, normal lactic acid, unclear etiology  5. Polycystic kidney disease with history of urosepsis, cyst hemorrhage, and persistently colonized with MDRO pseudomonas, cannot exclude active infection. Normal  PSA  6. Vent and trach, peg dependent from ALS  7. Chronic anticoagulation for  pulmonary emboli  8. Chronic constipation        Recommendations:  Await final of Repeat blood culture(from the line because the arms are too difficult to stick)  Continue vanc and zosyn.   Would tailor gram negative antibiotics to urine culture and not to sputum  Echocardiogram, pending   add suppository and enema if needed  Unsure how pulmonary would feel about bronch of RLL to exclude some obstruction         Medical Decision Making during this encounter was  [_] Low Complexity  [_] Moderate Complexity  [ xx ] High Complexity    D/w nursing

## 2022-06-12 NOTE — CONSULTS
Pharmacokinetic Assessment Follow Up: IV Vancomycin    Vancomycin serum concentration assessment(s):    The random level was drawn correctly and can be used to guide therapy at this time. The measurement is within the desired definitive target range of 15 to 20 mcg/mL.    Vancomycin Regimen Plan:    Change regimen to Vancomycin 750 mg IV every pulsed hours with next serum trough concentration measured at 0900 prior to next dose on 6/13    Drug levels (last 3 results):  Recent Labs   Lab Result Units 06/10/22  1218 06/11/22  0500 06/12/22  1242   Vancomycin, Random ug/mL  --  20.2 19.1   Vancomycin-Trough ug/mL 30.2*  --   --        Pharmacy will continue to follow and monitor vancomycin.    Please contact pharmacy at extension [phone] for questions regarding this assessment.    Thank you for the consult,   - Lex Matt, PharmD       Patient brief summary:  Wei Dominique is a 66 y.o. male initiated on antimicrobial therapy with IV Vancomycin for treatment of lower respiratory infection    The patient's current regimen is vancomycin 750 mg pulse dosed.    Drug Allergies:   Review of patient's allergies indicates:  No Known Allergies    Actual Body Weight:   78.2 kg (172 lb 6.4 oz)    Renal Function:   Estimated Creatinine Clearance: 118.8 mL/min (based on SCr of 0.6 mg/dL).,     Dialysis Method (if applicable):  N/A    CBC (last 72 hours):  Recent Labs   Lab Result Units 06/10/22  0416 06/11/22  0529 06/12/22  0408   WBC K/uL 11.22 13.30* 14.92*   Hemoglobin g/dL 9.4* 9.2* 9.3*   Hemoglobin A1C % 4.3  --   --    Hematocrit % 32.0* 30.7* 31.1*   Platelets K/uL 322 340 305   Gran % % 75.3* 80.6* 88.5*   Lymph % % 11.7* 8.3* 4.9*   Mono % % 4.7 4.6 2.7*   Eosinophil % % 7.2 5.5 3.3   Basophil % % 0.7 0.5 0.3   Differential Method  Automated Automated Automated       Metabolic Panel (last 72 hours):  Recent Labs   Lab Result Units 06/10/22  0416 06/10/22  1218 06/11/22  0500 06/12/22  0408   Sodium mmol/L 139 141 141  142   Potassium mmol/L 3.8 5.0 3.6 3.8   Chloride mmol/L 102 103 107 106   CO2 mmol/L 26 27 25 25   Glucose mg/dL 118* 126* 125* 116*   BUN mg/dL 42* 48* 41* 37*   Creatinine mg/dL 0.6 0.6 0.7 0.6   Albumin g/dL 2.9*  --  2.6* 2.5*   Total Bilirubin mg/dL 1.1*  --  1.2* 1.8*   Alkaline Phosphatase U/L 70  --  64 68   AST U/L 27  --  21 19   ALT U/L 34  --  28 27   Magnesium mg/dL 2.3  --  2.3 2.6   Phosphorus mg/dL 3.4  --  3.0 2.7       Vancomycin Administrations:  vancomycin given in the last 96 hours                   vancomycin in dextrose 5 % 1 gram/250 mL IVPB 1,000 mg ()  Restarted 06/11/22 1429     1,000 mg New Bag  1339    vancomycin 1.25 g in dextrose 5% 250 mL IVPB (ready to mix) (mg) 1,250 mg New Bag 06/10/22 0054    vancomycin (VANCOCIN) 1,750 mg in dextrose 5 % 500 mL IVPB (mg) 1,750 mg New Bag 06/09/22 1253                Microbiologic Results:  Microbiology Results (last 7 days)     Procedure Component Value Units Date/Time    Culture, Respiratory with Gram Stain [[id number]]  (Abnormal)  (Susceptibility) Collected: 06/09/22 1632    Order Status: Completed Specimen: Respiratory from Tracheal Aspirate Updated: 06/12/22 1247     Respiratory Culture SERRATIA MARCESCENS  Few        PSEUDOMONAS AERUGINOSA  Few        PROTEUS MIRABILIS ESBL  Few       Gram Stain (Respiratory) Rare WBC's     Gram Stain (Respiratory) Few Gram negative rods     Gram Stain (Respiratory) Rare Gram positive rods    Urine culture [[id number]]  (Abnormal)  (Susceptibility) Collected: 06/09/22 1059    Order Status: Completed Specimen: Urine Updated: 06/12/22 1215     Urine Culture, Routine PSEUDOMONAS AERUGINOSA   >100,000 cfu/ml  Susceptibility pending      Narrative:      Specimen Source->Urine    Blood culture x two cultures. Draw prior to antibiotics. [[id number]]  (Abnormal)  (Susceptibility) Collected: 06/09/22 1031    Order Status: Completed Specimen: Blood Updated: 06/12/22 0923     Blood Culture, Routine Gram stain peds  bottle: Gram positive cocci in clusters resembling Staph      Results called to and read back by:Edilma Chin RN 06/10/2022  15:43      STAPHYLOCOCCUS EPIDERMIDIS    Narrative:      Aerobic and anaerobic    Blood culture x two cultures. Draw prior to antibiotics. [[id number]]  (Abnormal)  (Susceptibility) Collected: 06/09/22 1031    Order Status: Completed Specimen: Blood from Antecubital, Right Hand Updated: 06/12/22 0922     Blood Culture, Routine Gram stain peds bottle: Gram positive cocci in clusters resembling Staph      Results called to and read back by:Edilma Chin RN 06/10/2022  15:44      STAPHYLOCOCCUS EPIDERMIDIS    Narrative:      Aerobic and anaerobic    Blood culture [[id number]] Collected: 06/11/22 1601    Order Status: Completed Specimen: Blood from Line, PICC Right Brachial Updated: 06/12/22 0715     Blood Culture, Routine No Growth to date    Narrative:      From picc

## 2022-06-12 NOTE — PLAN OF CARE
Pt trach vent copious oral secretions. Remains off Levophed. Tube feeds off at start shift due to increase in residual . Resumed later. Abd distended soft. Passing flatus. Good urine output. Afebrile. Bath and linen change done. Safety measures in place.

## 2022-06-12 NOTE — CARE UPDATE
06/12/22 0720   Patient Assessment/Suction   Level of Consciousness (AVPU) responds to voice   Expansion/Accessory Muscles/Retractions expansion symmetric   All Lung Fields Breath Sounds coarse   Rhythm/Pattern, Respiratory assisted mechanically   Cough Frequency with stimulation   Cough Type assisted   Suction Method tracheal;oral   $ Suction Charges Inline Suction Procedure Stat Charge   Secretions Amount small   Secretions Color pale;yellow   Secretions Characteristics thick   PRE-TX-O2   O2 Device (Oxygen Therapy) ventilator   $ Is the patient on Low Flow Oxygen? Yes   Oxygen Concentration (%) 30   SpO2 99 %   Pulse Oximetry Type Continuous   $ Pulse Oximetry - Multiple Charge Pulse Oximetry - Multiple   Pulse (!) 116   Resp 16   Positioning HOB elevated 30 degrees   ETCO2   $ ETCO2 Usage Currently wearing   ETCO2 (mmHg) 36 mmHg   ETCO2 Device Type Ventilator        Surgical Airway Shiley Cuffed   No placement date or time found.   Present Prior to Hospital Arrival?: Yes  Brand: Shiley  Airway Device Size: 8.0  Style: Cuffed   Cuff Pressure 35 cm H2O   Cuff Inflation? Inflated   Site Assessment Clean;Dry   Airway Safety   Ambu bag with the patient? Yes, Adult Ambu   Is mask with the patient? Yes, Adult Mask   Suction set is at the bedside? Yes   Vent Select   Conventional Vent Y   $ Ventilator Subsequent 1   Charged w/in last 24h YES   Preset Conventional Ventilator Settings   Vent Type    Ventilation Type VC   Vent Mode A/C   Humidity HME   Set Rate 16 BPM   Vt Set 500 mL   PEEP/CPAP 5 cmH20   Pressure Support 0 cmH20   Waveform RAMP   Peak Flow 60 L/min   Set Inspiratory Pressure 0 cmH20   Insp Time 0 Sec(s)   Plateau Set/Insp. Hold (sec) 0   Insp Rise Time  0 %   I-Trigger Type  Flow   Trigger Sensitivity Flow/I-Trigger 3 L/min   P High 0 cm H2O   P Low 0 cm H2O   T High 0 sec   T Low 0 sec   Patient Ventilator Parameters   Resp Rate Total 16 br/min   Peak Airway Pressure 24 cmH2O   Mean Airway  "Pressure 10 cmH20   Plateau Pressure 0 cmH20   Exhaled Vt 511 mL   Total Ve 8.09 mL   Spont Ve 0 L   I:E Ratio Measured 1:3.20   Conventional Ventilator Alarms   Alarms On Y   Ve High Alarm 25 L/min   Resp Rate High Alarm 40 br/min   Press High Alarm 50 cmH2O   Apnea Rate 16   Apnea Volume (mL) 500 mL   Apnea Oxygen Concentration  40   Apnea Flow Rate (L/min) 60   T Apnea 30 sec(s)   Ready to Wean/Extubation Screen   FIO2<=50 (chart decimal) 0.3   MV<16L (chart vol.) 8.09   PEEP <=8 (chart #) 5   Ready to Wean Parameters   F/VT Ratio<105 (RSBI) (!) 31.31   Patient remains on  on settings posted. Chest film viewed and trach appears in good position "midline".   "

## 2022-06-12 NOTE — SUBJECTIVE & OBJECTIVE
Interval History:  BP dropped, so now he's on Levophed.  Heart rate is high.  No bowel movements, so nurse gave a combination of lactulose, Miralax, and prune juice through the G tube.    Review of Systems   Unable to perform ROS: Other (tracheostomy connected to ventilator)   Objective:     Vital Signs (Most Recent):  Temp: 98.5 °F (36.9 °C) (06/11/22 2045)  Pulse: (!) 134 (06/11/22 2100)  Resp: 20 (06/11/22 2100)  BP: (!) 161/72 (06/11/22 2100)  SpO2: 99 % (06/11/22 2100) Vital Signs (24h Range):  Temp:  [97.9 °F (36.6 °C)-98.8 °F (37.1 °C)] 98.5 °F (36.9 °C)  Pulse:  [] 134  Resp:  [13-38] 20  SpO2:  [89 %-100 %] 99 %  BP: ()/() 161/72     Weight: 73.2 kg (161 lb 6 oz)  Body mass index is 24.54 kg/m².    Intake/Output Summary (Last 24 hours) at 6/11/2022 2158  Last data filed at 6/11/2022 2100  Gross per 24 hour   Intake 1777.49 ml   Output 1760 ml   Net 17.49 ml        Physical Exam  Constitutional:       General: He is not in acute distress.  HENT:      Head: Normocephalic and atraumatic.   Eyes:      Conjunctiva/sclera:      Right eye: No exudate.     Left eye: No exudate.     Pupils: Pupils are equal, round, and reactive to light.   Neck:      Vascular: No JVD.      Trachea: Tracheostomy present.   Cardiovascular:      Rate and Rhythm: Regular rhythm. Tachycardia present.      Comments: Edema in both upper extremities  Pulmonary:      Breath sounds: Normal breath sounds.   Abdominal:      General: Abdomen is protuberant. Bowel sounds are normal. There is distension.      Tenderness: There is no abdominal tenderness.      Comments: G-tube site appears normal   Musculoskeletal:      Cervical back: Neck supple.      Right lower leg: Edema present.      Left lower leg: Edema present.   Neurological:      Mental Status: He is alert.      Comments: Communicates by blinking       Significant Labs: All pertinent labs within the past 24 hours have been reviewed.    Significant Imaging: I have  reviewed all pertinent imaging results/findings within the past 24 hours.

## 2022-06-12 NOTE — PLAN OF CARE
The patient is alert and makes eye contact and will respond appropriately to questions - one blink for yes, two blinks for no.     PICC line in place right arm. All lumens flush / draw back.     He has decreased range of motion all extremities. He is unable to move any extremity. There is general edema, worse to arms bilat. SCDs applied. The bed is low and alarm on. Clinical alarms reviewed and armed. Monitor strip printed and placed in chart, is in sinus tachycardia. The tachycardia is less rapid than yesterday, rates of about 110s-120s.     The abdomen is rounded and distended. The tube feedings are at a slower rate until patient has some relief of constipation. I gave the lactulose mixed with coffee. The penile meatus is still split from previous admission. There is a scant amount of off-white drainage around the meatus. The arvizu had been changed in the emergency room and is draining concentrated yellow urine.     He has a large amount of secretions, but less than yesterday.    There are fissures to tongue (from previous bites?) but they aren't bleeding or open from what I can see, but it was difficult for him to open his mouth more than a little for me to get a decent view at this time. I was able to get the toothette in to swab around.    There are preexisting wounds to sacrum, penis meatus, upper back. The skin is very dry and flaking off - wound care here to see patient, triad applied to sacrum and aquaphor applied to body. He asked me to remove the heel mepilexes and the heel boots, wanted me to float with a pillow (done, checked). Turning q2h and padding lines / extremities.     (levophed is titrated off) I previously had changed the blood pressure cuff to the left leg because the left arm was looking red/ irritated from continuous bp measurements, with the edema that was present.

## 2022-06-13 ENCOUNTER — OUTSIDE PLACE OF SERVICE (OUTPATIENT)
Dept: INFECTIOUS DISEASES | Facility: CLINIC | Age: 67
End: 2022-06-13
Payer: MEDICARE

## 2022-06-13 DIAGNOSIS — A41.9 SEPTIC SHOCK: ICD-10-CM

## 2022-06-13 DIAGNOSIS — R65.21 SEPTIC SHOCK: ICD-10-CM

## 2022-06-13 LAB
ALBUMIN SERPL BCP-MCNC: 2.5 G/DL (ref 3.5–5.2)
ALP SERPL-CCNC: 66 U/L (ref 55–135)
ALT SERPL W/O P-5'-P-CCNC: 24 U/L (ref 10–44)
ANION GAP SERPL CALC-SCNC: 12 MMOL/L (ref 8–16)
AST SERPL-CCNC: 16 U/L (ref 10–40)
BACTERIA SPEC AEROBE CULT: ABNORMAL
BACTERIA UR CULT: ABNORMAL
BASOPHILS # BLD AUTO: 0.05 K/UL (ref 0–0.2)
BASOPHILS NFR BLD: 0.5 % (ref 0–1.9)
BILIRUB SERPL-MCNC: 0.8 MG/DL (ref 0.1–1)
BUN SERPL-MCNC: 43 MG/DL (ref 8–23)
CALCIUM SERPL-MCNC: 9.8 MG/DL (ref 8.7–10.5)
CHLORIDE SERPL-SCNC: 105 MMOL/L (ref 95–110)
CO2 SERPL-SCNC: 24 MMOL/L (ref 23–29)
CREAT SERPL-MCNC: 0.7 MG/DL (ref 0.5–1.4)
DIFFERENTIAL METHOD: ABNORMAL
EOSINOPHIL # BLD AUTO: 0.9 K/UL (ref 0–0.5)
EOSINOPHIL NFR BLD: 9.1 % (ref 0–8)
ERYTHROCYTE [DISTWIDTH] IN BLOOD BY AUTOMATED COUNT: 20.5 % (ref 11.5–14.5)
EST. GFR  (AFRICAN AMERICAN): >60 ML/MIN/1.73 M^2
EST. GFR  (NON AFRICAN AMERICAN): >60 ML/MIN/1.73 M^2
GLUCOSE SERPL-MCNC: 110 MG/DL (ref 70–110)
GRAM STN SPEC: ABNORMAL
HCT VFR BLD AUTO: 30.2 % (ref 40–54)
HGB BLD-MCNC: 8.7 G/DL (ref 14–18)
IMM GRANULOCYTES # BLD AUTO: 0.05 K/UL (ref 0–0.04)
IMM GRANULOCYTES NFR BLD AUTO: 0.5 % (ref 0–0.5)
LYMPHOCYTES # BLD AUTO: 1.3 K/UL (ref 1–4.8)
LYMPHOCYTES NFR BLD: 13.4 % (ref 18–48)
MAGNESIUM SERPL-MCNC: 2.7 MG/DL (ref 1.6–2.6)
MCH RBC QN AUTO: 25.8 PG (ref 27–31)
MCHC RBC AUTO-ENTMCNC: 28.8 G/DL (ref 32–36)
MCV RBC AUTO: 90 FL (ref 82–98)
MONOCYTES # BLD AUTO: 0.5 K/UL (ref 0.3–1)
MONOCYTES NFR BLD: 5.5 % (ref 4–15)
NEUTROPHILS # BLD AUTO: 7 K/UL (ref 1.8–7.7)
NEUTROPHILS NFR BLD: 71 % (ref 38–73)
NRBC BLD-RTO: 0 /100 WBC
PHOSPHATE SERPL-MCNC: 2.4 MG/DL (ref 2.7–4.5)
PHOSPHATE SERPL-MCNC: 3.8 MG/DL (ref 2.7–4.5)
PLATELET # BLD AUTO: 312 K/UL (ref 150–450)
PMV BLD AUTO: 10.9 FL (ref 9.2–12.9)
POTASSIUM SERPL-SCNC: 3.6 MMOL/L (ref 3.5–5.1)
POTASSIUM SERPL-SCNC: 4.6 MMOL/L (ref 3.5–5.1)
PROT SERPL-MCNC: 7.5 G/DL (ref 6–8.4)
RBC # BLD AUTO: 3.37 M/UL (ref 4.6–6.2)
SODIUM SERPL-SCNC: 141 MMOL/L (ref 136–145)
VANCOMYCIN SERPL-MCNC: 19.8 UG/ML
WBC # BLD AUTO: 9.85 K/UL (ref 3.9–12.7)

## 2022-06-13 PROCEDURE — 84100 ASSAY OF PHOSPHORUS: CPT | Mod: 91 | Performed by: HOSPITALIST

## 2022-06-13 PROCEDURE — 99900022

## 2022-06-13 PROCEDURE — 80053 COMPREHEN METABOLIC PANEL: CPT | Performed by: HOSPITALIST

## 2022-06-13 PROCEDURE — 94003 VENT MGMT INPAT SUBQ DAY: CPT

## 2022-06-13 PROCEDURE — A4216 STERILE WATER/SALINE, 10 ML: HCPCS | Performed by: HOSPITALIST

## 2022-06-13 PROCEDURE — 99233 PR SUBSEQUENT HOSPITAL CARE,LEVL III: ICD-10-PCS | Mod: S$GLB,,, | Performed by: STUDENT IN AN ORGANIZED HEALTH CARE EDUCATION/TRAINING PROGRAM

## 2022-06-13 PROCEDURE — 25000003 PHARM REV CODE 250: Performed by: HOSPITALIST

## 2022-06-13 PROCEDURE — 27000221 HC OXYGEN, UP TO 24 HOURS

## 2022-06-13 PROCEDURE — 99900026 HC AIRWAY MAINTENANCE (STAT)

## 2022-06-13 PROCEDURE — 63600175 PHARM REV CODE 636 W HCPCS: Performed by: HOSPITALIST

## 2022-06-13 PROCEDURE — 80202 ASSAY OF VANCOMYCIN: CPT | Performed by: HOSPITALIST

## 2022-06-13 PROCEDURE — 27000207 HC ISOLATION

## 2022-06-13 PROCEDURE — 83735 ASSAY OF MAGNESIUM: CPT | Performed by: HOSPITALIST

## 2022-06-13 PROCEDURE — 84132 ASSAY OF SERUM POTASSIUM: CPT | Performed by: HOSPITALIST

## 2022-06-13 PROCEDURE — 99233 SBSQ HOSP IP/OBS HIGH 50: CPT | Mod: S$GLB,,, | Performed by: STUDENT IN AN ORGANIZED HEALTH CARE EDUCATION/TRAINING PROGRAM

## 2022-06-13 PROCEDURE — 99900035 HC TECH TIME PER 15 MIN (STAT)

## 2022-06-13 PROCEDURE — 20000000 HC ICU ROOM

## 2022-06-13 PROCEDURE — 85025 COMPLETE CBC W/AUTO DIFF WBC: CPT | Performed by: HOSPITALIST

## 2022-06-13 PROCEDURE — 94761 N-INVAS EAR/PLS OXIMETRY MLT: CPT

## 2022-06-13 RX ORDER — SODIUM,POTASSIUM PHOSPHATES 280-250MG
2 POWDER IN PACKET (EA) ORAL
Status: DISCONTINUED | OUTPATIENT
Start: 2022-06-13 | End: 2022-06-14 | Stop reason: HOSPADM

## 2022-06-13 RX ORDER — VANCOMYCIN HYDROCHLORIDE 500 MG/100ML
500 INJECTION, SOLUTION INTRAVENOUS ONCE
Status: DISCONTINUED | OUTPATIENT
Start: 2022-06-13 | End: 2022-06-13

## 2022-06-13 RX ADMIN — PIPERACILLIN SODIUM AND TAZOBACTAM SODIUM 4.5 G: 4; .5 INJECTION, POWDER, LYOPHILIZED, FOR SOLUTION INTRAVENOUS at 04:06

## 2022-06-13 RX ADMIN — METOPROLOL TARTRATE 50 MG: 50 TABLET, FILM COATED ORAL at 09:06

## 2022-06-13 RX ADMIN — HYDROCODONE BITARTRATE AND ACETAMINOPHEN 15 ML: 7.5; 325 SOLUTION ORAL at 11:06

## 2022-06-13 RX ADMIN — GLYCOPYRROLATE 1 MG: 1 TABLET ORAL at 02:06

## 2022-06-13 RX ADMIN — CHLORHEXIDINE GLUCONATE 0.12% ORAL RINSE 15 ML: 1.2 LIQUID ORAL at 08:06

## 2022-06-13 RX ADMIN — BACLOFEN 10 MG: 10 TABLET ORAL at 08:06

## 2022-06-13 RX ADMIN — PIPERACILLIN SODIUM AND TAZOBACTAM SODIUM 4.5 G: 4; .5 INJECTION, POWDER, LYOPHILIZED, FOR SOLUTION INTRAVENOUS at 11:06

## 2022-06-13 RX ADMIN — POTASSIUM BICARBONATE 50 MEQ: 977.5 TABLET, EFFERVESCENT ORAL at 05:06

## 2022-06-13 RX ADMIN — GLYCOPYRROLATE 1 MG: 1 TABLET ORAL at 08:06

## 2022-06-13 RX ADMIN — ALPRAZOLAM 0.5 MG: 0.25 TABLET ORAL at 08:06

## 2022-06-13 RX ADMIN — VANCOMYCIN HYDROCHLORIDE 500 MG: 500 INJECTION, POWDER, LYOPHILIZED, FOR SOLUTION INTRAVENOUS at 05:06

## 2022-06-13 RX ADMIN — TRAZODONE HYDROCHLORIDE 50 MG: 50 TABLET ORAL at 08:06

## 2022-06-13 RX ADMIN — BUSPIRONE HYDROCHLORIDE 5 MG: 5 TABLET ORAL at 09:06

## 2022-06-13 RX ADMIN — MUPIROCIN: 20 OINTMENT TOPICAL at 08:06

## 2022-06-13 RX ADMIN — HYDROCODONE BITARTRATE AND ACETAMINOPHEN 15 ML: 7.5; 325 SOLUTION ORAL at 08:06

## 2022-06-13 RX ADMIN — HYDROCODONE BITARTRATE AND ACETAMINOPHEN 15 ML: 7.5; 325 SOLUTION ORAL at 12:06

## 2022-06-13 RX ADMIN — PIPERACILLIN SODIUM AND TAZOBACTAM SODIUM 4.5 G: 4; .5 INJECTION, POWDER, LYOPHILIZED, FOR SOLUTION INTRAVENOUS at 08:06

## 2022-06-13 RX ADMIN — GLYCOPYRROLATE 1 MG: 1 TABLET ORAL at 09:06

## 2022-06-13 RX ADMIN — SODIUM CHLORIDE, PRESERVATIVE FREE 10 ML: 5 INJECTION INTRAVENOUS at 12:06

## 2022-06-13 RX ADMIN — CHLORHEXIDINE GLUCONATE 0.12% ORAL RINSE 15 ML: 1.2 LIQUID ORAL at 09:06

## 2022-06-13 RX ADMIN — MUPIROCIN: 20 OINTMENT TOPICAL at 09:06

## 2022-06-13 RX ADMIN — APIXABAN 2.5 MG: 2.5 TABLET, FILM COATED ORAL at 09:06

## 2022-06-13 RX ADMIN — PANTOPRAZOLE SODIUM 40 MG: 40 GRANULE, DELAYED RELEASE ORAL at 09:06

## 2022-06-13 RX ADMIN — POTASSIUM & SODIUM PHOSPHATES POWDER PACK 280-160-250 MG 2 PACKET: 280-160-250 PACK at 01:06

## 2022-06-13 RX ADMIN — BACLOFEN 10 MG: 10 TABLET ORAL at 02:06

## 2022-06-13 RX ADMIN — POTASSIUM & SODIUM PHOSPHATES POWDER PACK 280-160-250 MG 2 PACKET: 280-160-250 PACK at 09:06

## 2022-06-13 RX ADMIN — APIXABAN 2.5 MG: 2.5 TABLET, FILM COATED ORAL at 08:06

## 2022-06-13 RX ADMIN — SODIUM CHLORIDE, PRESERVATIVE FREE 10 ML: 5 INJECTION INTRAVENOUS at 04:06

## 2022-06-13 RX ADMIN — BUSPIRONE HYDROCHLORIDE 5 MG: 5 TABLET ORAL at 08:06

## 2022-06-13 RX ADMIN — SODIUM CHLORIDE, PRESERVATIVE FREE 10 ML: 5 INJECTION INTRAVENOUS at 05:06

## 2022-06-13 RX ADMIN — BACLOFEN 10 MG: 10 TABLET ORAL at 09:06

## 2022-06-13 RX ADMIN — PANTOPRAZOLE SODIUM 40 MG: 40 GRANULE, DELAYED RELEASE ORAL at 08:06

## 2022-06-13 NOTE — CARE UPDATE
06/12/22 1936   Patient Assessment/Suction   Level of Consciousness (AVPU) alert   Respiratory Effort Normal;Unlabored   Expansion/Accessory Muscles/Retractions expansion symmetric;no retractions;no use of accessory muscles   All Lung Fields Breath Sounds coarse   Suction Method oral;tracheal   $ Suction Charges Inline Suction Procedure Stat Charge   Secretions Amount moderate   Secretions Color yellow;pale   Secretions Characteristics thick   PRE-TX-O2   O2 Device (Oxygen Therapy) ventilator   Oxygen Concentration (%) 30   SpO2 100 %   Pulse Oximetry Type Continuous   Pulse (!) 111   Resp (!) 21   BP (!) 171/79   ETCO2   ETCO2 (mmHg) 30 mmHg   Vent Select   Conventional Vent Y   Charged w/in last 24h YES   Preset Conventional Ventilator Settings   Vent Type    Ventilation Type VC   Vent Mode A/C   Humidity HME   Set Rate 16 BPM   Vt Set 500 mL   PEEP/CPAP 5 cmH20   Pressure Support 0 cmH20   Waveform RAMP   Peak Flow 60 L/min   Set Inspiratory Pressure 0 cmH20   Insp Time 0 Sec(s)   Plateau Set/Insp. Hold (sec) 0   Insp Rise Time  0 %   Trigger Sensitivity Flow/I-Trigger 3 L/min   P High 0 cm H2O   P Low 0 cm H2O   T High 0 sec   T Low 0 sec   Patient Ventilator Parameters   Resp Rate Total 23 br/min   Peak Airway Pressure 24 cmH2O   Mean Airway Pressure 13 cmH20   Plateau Pressure 0 cmH20   Exhaled Vt 519 mL   Total Ve 11.9 mL   Spont Ve 0 L   I:E Ratio Measured 1:3.20   Conventional Ventilator Alarms   Ve High Alarm 25 L/min   Resp Rate High Alarm 40 br/min   Press High Alarm 50 cmH2O   Apnea Rate 16   Apnea Volume (mL) 500 mL   Apnea Oxygen Concentration  40   Apnea Flow Rate (L/min) 60   T Apnea 30 sec(s)   Ready to Wean/Extubation Screen   FIO2<=50 (chart decimal) 0.3   MV<16L (chart vol.) 11.9   PEEP <=8 (chart #) 5   Ready to Wean Parameters   F/VT Ratio<105 (RSBI) (!) 40.46

## 2022-06-13 NOTE — PLAN OF CARE
Intervention: nutrition support      Recommendations  1.) Continue with Glucerna 1.5 @ goal rate 50 ml/hr + 150 ml flush q 4 hr + Chauncey BID  ( provides 1800 kcal ( 100% EEN),99 g protein ( 90% EPN), and 910 ml free water)      2.) when MD believes glucose controlled/ if pt does not have DM- restart home TF  Isosource 1.5 @ 10 mls/hr advancing as tolerated to goal rate 50 mls/hr continuous (providing 1800 kcals/day, 82 g protein/day,  211 g CHO/day, and 917 mls water/day) FWF: 150 mls q4 hrs or per MD.      3.) If needed-Parenteral Nutrition: 5%AA/D15 @ goal rate 75 mls/hr continuous + 20% lipids (250mls) daily providing 1778 kcals/day, 90 g protein/day, and 1800 mls fluid/day. GIR:2.5 mg/kg/min.      Goals: 1.) enteral nutrition will advance within 48 hrs 2.) pt will tolerate enteral nutrition at goal rate   Nutrition Goal Status: 1.) goal met 2.) new  Communication of RD Recs: POC, sticky note, reviewed with RN

## 2022-06-13 NOTE — ASSESSMENT & PLAN NOTE
Continue antibiotics.  Monitor for improvement.  Respiratory culture growing Serratia, Pseudomonas, and Proteus  Imaging shows chronic consolidation of RLL.  Pneumonia in RLL?  Consulting with infectious disease specialist.

## 2022-06-13 NOTE — ASSESSMENT & PLAN NOTE
In ED, they switched out Hutchinson for a new one.  He's on IVAB.  Urine culture growing  Pseudomonas.  Consulting with infectious diseases physician.

## 2022-06-13 NOTE — PLAN OF CARE
Problem: Adult Inpatient Plan of Care  Goal: Plan of Care Review  Outcome: Ongoing, Progressing   Afebrile no vent changes . Had 2 large loose BM's this shift. Tube feeds for nutrition. Medicated x 1 for pain. Safety measures in place

## 2022-06-13 NOTE — PROGRESS NOTES
[facility] Hospital Medicine  Progress Note    Patient Name: Wei Dominique  MRN: [id number]  Patient Class: IP- Inpatient   Admission Date: 6/9/2022  Length of Stay: 3 days  Attending Physician: Aquiles Melendez MD  Primary Care Provider: Mack Suero MD        Subjective:     Principal Problem:Septic shock        HPI:  Patient is a 66 y.o. male with significant past medical history of ALS, s/p trach & peg, vent dependence, HTN, PE (on eliquis).  [facility].  His sats reportedly were in the 40's.  At that time, he was taken off the ventilator and ventilated with ambu-bag.  Sats responded appropriately.  No other events reported.  Workup done in ED showed pneumonia.  Also has UTI.            Overview/Hospital Course:  No notes on file    Interval History:  no new issues.  Levophed was titrated to off.  Seen by ID consultant.    Review of Systems   Unable to perform ROS: Other (tracheostomy connected to ventilator)   Objective:     Vital Signs (Most Recent):  Temp: 98.8 °F (37.1 °C) (06/12/22 1600)  Pulse: 107 (06/12/22 2000)  Resp: 17 (06/12/22 2000)  BP: (!) 169/72 (06/12/22 2000)  SpO2: 100 % (06/12/22 2000)   Vital Signs (24h Range):  Temp:  [98.1 °F (36.7 °C)-99.1 °F (37.3 °C)] 98.8 °F (37.1 °C)  Pulse:  [105-135] 107  Resp:  [16-33] 17  SpO2:  [96 %-100 %] 100 %  BP: (103-174)/(42-91) 169/72     Weight: 78.2 kg (172 lb 6.4 oz)  Body mass index is 26.21 kg/m².    Intake/Output Summary (Last 24 hours) at 6/12/2022 2129  Last data filed at 6/12/2022 2013  Gross per 24 hour   Intake 2514.88 ml   Output 1765 ml   Net 749.88 ml      Physical Exam  Constitutional:       General: He is not in acute distress.  HENT:      Head: Normocephalic and atraumatic.      Mouth/Throat:      Comments: Tongue has deep fissures  Eyes:      Conjunctiva/sclera:      Right eye: No exudate.     Left eye: No exudate.     Pupils: Pupils are equal, round,  "and reactive to light.   Neck:      Vascular: No JVD.      Trachea: Tracheostomy present.   Cardiovascular:      Rate and Rhythm: Regular rhythm. Tachycardia present.      Comments: Edema in both upper extremities  Pulmonary:      Breath sounds: Normal breath sounds.   Abdominal:      General: Abdomen is protuberant. Bowel sounds are normal. There is distension.      Tenderness: There is no abdominal tenderness.      Comments: G-tube site appears normal   Musculoskeletal:      Cervical back: Neck supple.      Right lower leg: Edema present.      Left lower leg: Edema present.   Neurological:      Mental Status: He is alert.      Comments: Communicates by blinking       Significant Labs: All pertinent labs within the past 24 hours have been reviewed.    Significant Imaging: I have reviewed all pertinent imaging results/findings within the past 24 hours.      Assessment/Plan:      * Septic shock  This patient does have evidence of infective focus  My overall impression is sepsis. Vital signs were reviewed and noted in progress note.  Antibiotics given-   Antibiotics (From admission, onward)            Start     Stop Route Frequency Ordered    06/09/22 2330  mupirocin 2 % ointment         06/14 2059 Nasl 2 times daily 06/09/22 2217    06/09/22 2030  piperacillin-tazobactam 4.5 g in dextrose 5 % 100 mL IVPB (ready to mix system)  ( Pneumonia - Moderate-High MDR )         06/16 2029 IV Every 8 hours (non-standard times) 06/09/22 1610    06/09/22 1600  vancomycin - pharmacy to dose  ( Pneumonia - Moderate-High MDR )        "And" Linked Group Details    -- IV pharmacy to manage frequency 06/09/22 1600        Cultures were taken-   Microbiology Results (last 7 days)     Procedure Component Value Units Date/Time    Culture, Respiratory with Gram Stain [[id number]]  (Abnormal)  (Susceptibility) Collected: 06/09/22 1632    Order Status: Completed Specimen: Respiratory from Tracheal Aspirate Updated: 06/12/22 1247     " Respiratory Culture SERRATIA MARCESCENS  Few        PSEUDOMONAS AERUGINOSA  Few        PROTEUS MIRABILIS ESBL  Few       Gram Stain (Respiratory) Rare WBC's     Gram Stain (Respiratory) Few Gram negative rods     Gram Stain (Respiratory) Rare Gram positive rods    Urine culture [[id number]]  (Abnormal)  (Susceptibility) Collected: 06/09/22 1059    Order Status: Completed Specimen: Urine Updated: 06/12/22 1215     Urine Culture, Routine PSEUDOMONAS AERUGINOSA   >100,000 cfu/ml  Susceptibility pending      Narrative:      Specimen Source->Urine    Blood culture x two cultures. Draw prior to antibiotics. [[id number]]  (Abnormal)  (Susceptibility) Collected: 06/09/22 1031    Order Status: Completed Specimen: Blood Updated: 06/12/22 0923     Blood Culture, Routine Gram stain peds bottle: Gram positive cocci in clusters resembling Staph      Results called to and read back by:Edilma Chin RN 06/10/2022  15:43      STAPHYLOCOCCUS EPIDERMIDIS    Narrative:      Aerobic and anaerobic    Blood culture x two cultures. Draw prior to antibiotics. [[id number]]  (Abnormal)  (Susceptibility) Collected: 06/09/22 1031    Order Status: Completed Specimen: Blood from Antecubital, Right Hand Updated: 06/12/22 0922     Blood Culture, Routine Gram stain peds bottle: Gram positive cocci in clusters resembling Staph      Results called to and read back by:Edilma Chin RN 06/10/2022  15:44      STAPHYLOCOCCUS EPIDERMIDIS    Narrative:      Aerobic and anaerobic    Blood culture [[id number]] Collected: 06/11/22 1601    Order Status: Completed Specimen: Blood from Line, PICC Right Brachial Updated: 06/12/22 0715     Blood Culture, Routine No Growth to date    Narrative:      From picc        Source- Pneumonia, possibly urinary tract    Source control Achieved by- antibiotics      Hyperglycemia  CBG's are stable while he's on Glucerna.  No need to continue sticking his fingers.  He's not diabetic.    POCT Glucose   Date Value Ref Range Status    06/12/2022 142 (H) 70 - 110 mg/dL Final   06/12/2022 130 (H) 70 - 110 mg/dL Final   06/11/2022 151 (H) 70 - 110 mg/dL Final   06/11/2022 160 (H) 70 - 110 mg/dL Final   06/11/2022 146 (H) 70 - 110 mg/dL Final   06/11/2022 145 (H) 70 - 110 mg/dL Final   06/11/2022 159 (H) 70 - 110 mg/dL Final   06/10/2022 138 (H) 70 - 110 mg/dL Final   06/10/2022 123 (H) 70 - 110 mg/dL Final   06/10/2022 146 (H) 70 - 110 mg/dL Final   06/10/2022 107 70 - 110 mg/dL Final   06/10/2022 120 (H) 70 - 110 mg/dL Final   06/10/2022 113 (H) 70 - 110 mg/dL Final   06/09/2022 106 70 - 110 mg/dL Final           Catheter-associated urinary tract infection  In ED, they switched out Hutchinson for a new one.  He's on IVAB.  Urine culture growing  Pseudomonas.  Consulting with infectious diseases physician.      Gram-negative pneumonia  Continue antibiotics.  Monitor for improvement.  Respiratory culture growing Serratia, Pseudomonas, and Proteus  Imaging shows chronic consolidation of RLL.  Pneumonia in RLL?  Consulting with infectious disease specialist.    Tracheostomy in place  Have nurses provide routine trach care.      History of pulmonary embolism  Continue apixaban.      Dependent on ventilator  Continue vent settings.  Chlorhexidine orally every day.  Adjust vent if needed.    Vent Mode: A/C  Oxygen Concentration (%):  [30  Resp Rate Total: 16  Vt Set:  500 mL  PEEP/CPAP:  5 cmH20  Pressure Support:   0 cmH20    S/P percutaneous endoscopic gastrostomy (PEG) tube placement  Will have nurses provide routine PEG care.      ALS (amyotrophic lateral sclerosis)  Chronic.  No acute need to .      Essential hypertension  Will monitor blood pressure.  Use anti-hypertensives when needed.        VTE Risk Mitigation (From admission, onward)         Ordered     apixaban tablet 2.5 mg  2 times daily         06/09/22 1559     IP VTE HIGH RISK PATIENT  Once         06/09/22 1600     Place sequential compression device  Until discontinued          06/09/22 1600     Reason for No Pharmacological VTE Prophylaxis  Once        Question:  Reasons:  Answer:  Already adequately anticoagulated on oral Anticoagulants    06/09/22 1600                Discharge Planning   VENESSA:      Code Status: Full Code   Is the patient medically ready for discharge?:     Reason for patient still in hospital (select all that apply): Patient trending condition, Laboratory test and Treatment  Discharge Plan A: Return to nursing home          Aquiles Melendez MD  Department of Hospital Medicine   [facility]

## 2022-06-13 NOTE — PROGRESS NOTES
"Consult Note  Infectious Disease    Reason for Consult:  Sepsis, multiple positive cultures    HPI: Wei Dominique is a 66 y.o. male  "with a PMHx of ALS with trach & peg, vent dependent, HTN, and PE (on Eiquis) presenting to the ED(6/9) from [facility] due to an episode of hypoxemia prior to arrival. EMS states patient's sats dropped into the 40s, he turned grey in color, and they removed his ventilator and began manually bagging the patient. Patient's color returned shortly after but his pulse has been in the 130s since the incident. Patient is nonverbal however EMS states he "blinks once for yes and twice for no". He denied any pain en route however seems more fatigued than normal for EMS." per ED MD meri castellanos. He was here 2 weeks ago for tracheostomy occlusion as well, requiring a tracheostomy change and transfusion for severe anemia and disimpaction.  In the ED he had a temp of 99.2, WBC 15K, pyuria, hyperglycemia 300, abnormal CXR(unchanged from 2/2022  CXR and CT at which time he had RLL atelectasis/consolidation on CT, same as 5/2022 as well), abdominal distention felt to be due to constipation.   He was cultured and placed on vancomycin, zosyn and gentamicin.    Admitted to ICU under hospital medicine. portacath in situ, but would not aspirate despite cathflo. picc placed 6/9 pm.  Blood cultures became positive for GPC yesterday am,( drawn from antecubital right hand and antecubital left hand), he required fluid bolus and initiation of levophed. This was identified as Staph epidermidis. Urine culture with pseudomonas and meropenem added to zosyn and gent stopped due to shortage. Sputum with pseudomonas and Proteus, secretions are white to yellow.. He has had no fever since admission. WBC are about the same. Lactic acids all normal, Vent needs are stable, levophed needs decreased, urine output is good.     6/12: interim reviewed. Off levophed. tmax 99, WBC 15, urine culture with recurrent " pseudomonas , sensitive to zosyn (also cefepime), sputum culture with multiple orgs, blood cultures with identical Staph epi. Echo not yet done. Repeat blood culture negative so far. procalcitonin borderline, PSA normal. He looks comfortable but does indicate constipation related abd pain. The multiple cathartics he received yesterday have not worked. KUB noted.    6/13:  Interim reviewed, discussed with Dr. Jones.  Patient seen and examined at bedside, blinking once for yes twice for no.  Looks comfortable, trach to vent FiO2 21%.  Thick, tan, creamy secretions noted.  Labs reviewed, white count 9.8, no left shift, H&H 8.7/30.2, platelet count 312.  Stable kidney function, normal electrolytes.  Micro reviewed, respiratory culture polymicrobial; Serratia MRSA insists Pseudomonas aeruginosa both sensitive to cefepime.  And Proteus mirabilis ESBL sensitive to gentamicin, Bactrim, and carbapenems.    EXAM & DIAGNOSTICS REVIEWED:   Vitals:     Temp:  [97.5 °F (36.4 °C)-99.1 °F (37.3 °C)]   Temp: 97.9 °F (36.6 °C) (06/13/22 0729)  Pulse: (!) 119 (06/13/22 0733)  Resp: (!) 24 (06/13/22 0733)  BP: (!) 159/76 (06/13/22 0700)  SpO2: 100 % (06/13/22 0733)    Intake/Output Summary (Last 24 hours) at 6/13/2022 0847  Last data filed at 6/13/2022 0726  Gross per 24 hour   Intake 2054.88 ml   Output 1927 ml   Net 127.88 ml       General:  In NAD. Alert and attentive, cooperative to the degree he can be, appears comfortable. Beyond quadriplegic, can only blink his eyes and grind his teeth  Eyes:  Anicteric, PERRL, EOMI  ENT:  Exam is limited by extremely high tone in jaw muscles. He has fair to poor dentition with loose lower posterior molars. He grinds his teeth audibly.   Neck:  Tracheostomy to vent. Secretions moderate   Lungs: Clear on left, decreased BS RLL  Heart:  RRR, no gallop/murmur/rub noted  Abd:  Soft, NT, mildly distended, hypoactive,  BS, no masses or organomegaly appreciated. PEG in place, no signs of  infection  :   Hutchinson, urine clear, meatus is fissured ventrally. No discharge. No scrotal swelling  Musc:  Joints without effusion, swelling, erythema, synovitis, .   Skin:  No rashes.   Neuro:             Alert, attentive, quadriplegia, can only blink. Grinds his teeth but cannot open his mouth  Psych:  Calm, cooperative  Lymphatic:       Extrem: Generalized firm edema of disuse., with very edematous hands. no erythema, phlebitis, cellulitis, warm and well perfused  VAD:  Left upper chest port, not accessed. No redness or swelling. Right arm picc line 6/9    Isolation:  contact  Wound:                General Labs reviewed:  Recent Labs   Lab 06/11/22  0529 06/12/22  0408 06/13/22  0316   WBC 13.30* 14.92* 9.85   HGB 9.2* 9.3* 8.7*   HCT 30.7* 31.1* 30.2*    305 312       Recent Labs   Lab 06/11/22  0500 06/12/22  0408 06/13/22  0316    142 141   K 3.6 3.8 3.6    106 105   CO2 25 25 24   BUN 41* 37* 43*   CREATININE 0.7 0.6 0.7   CALCIUM 9.6 9.9 9.8   PROT 7.3 7.6 7.5   BILITOT 1.2* 1.8* 0.8   ALKPHOS 64 68 66   ALT 28 27 24   AST 21 19 16     No results for input(s): CRP in the last 168 hours.    Estimated Creatinine Clearance: 101.8 mL/min (based on SCr of 0.7 mg/dL).      Micro:  2/22/22: urine: providencia  4/17/22: urine and blood: ESBL Ecoli  5/17/22: urine with pseudomonas (sen to pip/tazo, gent,cefepime,avycaz,zerbaxa)  5/22/22:sputum with proteus ESBL and pseudomonas (same, plus sens to colistin)    Microbiology Results (last 7 days)       Procedure Component Value Units Date/Time    Blood culture [[id number]] Collected: 06/11/22 1601    Order Status: Completed Specimen: Blood from Line, PICC Right Brachial Updated: 06/13/22 0612     Blood Culture, Routine No Growth to date      No Growth to date    Narrative:      From picc    Culture, Respiratory with Gram Stain [[id number]]  (Abnormal)  (Susceptibility) Collected: 06/09/22 0622    Order Status: Completed Specimen: Respiratory  from Tracheal Aspirate Updated: 06/12/22 1247     Respiratory Culture SERRATIA MARCESCENS  Few        PSEUDOMONAS AERUGINOSA  Few        PROTEUS MIRABILIS ESBL  Few       Gram Stain (Respiratory) Rare WBC's     Gram Stain (Respiratory) Few Gram negative rods     Gram Stain (Respiratory) Rare Gram positive rods    Urine culture [[id number]]  (Abnormal)  (Susceptibility) Collected: 06/09/22 1059    Order Status: Completed Specimen: Urine Updated: 06/12/22 1215     Urine Culture, Routine PSEUDOMONAS AERUGINOSA   >100,000 cfu/ml  Susceptibility pending      Narrative:      Specimen Source->Urine    Blood culture x two cultures. Draw prior to antibiotics. [[id number]]  (Abnormal)  (Susceptibility) Collected: 06/09/22 1031    Order Status: Completed Specimen: Blood Updated: 06/12/22 0923     Blood Culture, Routine Gram stain peds bottle: Gram positive cocci in clusters resembling Staph      Results called to and read back by:Edilma Chin RN 06/10/2022  15:43      STAPHYLOCOCCUS EPIDERMIDIS    Narrative:      Aerobic and anaerobic    Blood culture x two cultures. Draw prior to antibiotics. [[id number]]  (Abnormal)  (Susceptibility) Collected: 06/09/22 1031    Order Status: Completed Specimen: Blood from Antecubital, Right Hand Updated: 06/12/22 0922     Blood Culture, Routine Gram stain peds bottle: Gram positive cocci in clusters resembling Staph      Results called to and read back by:Edilma Chin RN 06/10/2022  15:44      STAPHYLOCOCCUS EPIDERMIDIS    Narrative:      Aerobic and anaerobic            Imaging Reviewed:   CXRs through feb 2022   CT abd x 2     Cardiology:    IMPRESSION & PLAN     1. Hypoxemic event on admission, resolved    2. Chronic RLL opacification (at least 4 months), chronically colonized with GNRs   Respiratory culture grew Serratia marcescens, Pseudomonas aeruginosa sensitive to Bactrim, carbapenems,. Cefepime, and Proteus mirabilis ESBL sensitive to Bactrim, and carbapenems.       3. Sepsis  resolved, multifactorial; UTi/VAP/Staph epidermidis bacteremia  4. Polycystic kidney disease with history of urosepsis, cyst hemorrhage, and persistently colonized with MDRO pseudomonas, cannot exclude active infection. Normal  PSA  5. Vent and trach, peg dependent from ALS  6. Chronic anticoagulation for pulmonary emboli  7. Chronic constipation      Recommendations:  Follow cultures   ECHO to rule out endocarditis   Procal ordered with AM labs   Continue vancomycin IV, keep level around 15  Zosyn 4.5g IV q8h for Pseudomonas , respiratory culture with multiple organisms, likely colonizers  Aspiration precautions   Will follow     D/w nursing, Dr Melendez     Medical Decision Making during this encounter was  [_] Low Complexity  [_] Moderate Complexity  [ xx ] High Complexity

## 2022-06-13 NOTE — PROGRESS NOTES
Wei Dominique [id number] is a 66 y.o. male who has been consulted for vancomycin dosing.    Vancomycin random has been changed to 06/13/22 at 1430.      Patient will be followed by pharmacy for changes in renal function, toxicity, and efficacy.    Thank you for allowing us to participate in this patient's care.     Eugene Tan, MarcosD

## 2022-06-13 NOTE — PLAN OF CARE
[facility]  Discharge Reassessment    Primary Care Provider: Mack Suero MD    Expected Discharge Date:      DC cancelled due to pt not being cleared from ID standpoint. Echo pending and cefepime initiated. CM continuing to follow for DC needs. Plan to return to [place] once medically cleared    Reassessment (most recent)     Discharge Reassessment - 06/13/22 1139        Discharge Reassessment    Assessment Type Discharge Planning Reassessment     Did the patient's condition or plan change since previous assessment? No     Discharge Plan A Return to nursing home     Discharge Plan B Return to Nursing Home     DME Needed Upon Discharge  none

## 2022-06-13 NOTE — PLAN OF CARE
POC reviewed with the patient and patient's wife via telephone; she verbalized understanding. All comments and concerns addressed.     Patient remains trach/vent. Patient able to blink once for yes and twice for no when communicating with staff. Copious oral/nasal secretions noted--patient receiving robinul and frequent suctioning required. Trach secretions thick, yellow, creamy. Oral care provided by nursing staff and RT. Afebrile, NSR, and normotensive. Edema noted to extremities and joints stiff, pillows utilized for positioning/elevation and towel rolls in hands for neutral hand placement. Lateral rotation on bed utilized. No BM this shift, patient passing flatus. Belly slightly distended, however patient denies discomfort. Tolerating TF at goal, no residuals. Phos replaced via peg. PRN pain medication utilized. Hutchinson--chronic, approx 1040cc output. PICC maintained.    Bed locked in lowest position with bed alarm set, call light within reach. Safety precautions maintained.

## 2022-06-13 NOTE — PROGRESS NOTES
[facility]  Adult Nutrition  Progress Note    SUMMARY   Intervention: nutrition support     Recommendations  1.) Continue with Glucerna 1.5 @ goal rate 50 ml/hr + 150 ml flush q 4 hr + Chauncey BID  ( provides 1800 kcal ( 100% EEN),99 g protein ( 90% EPN), and 910 ml free water)      2.) when MD believes glucose controlled/ if pt does not have DM- restart home TF  Isosource 1.5 @ 10 mls/hr advancing as tolerated to goal rate 50 mls/hr continuous (providing 1800 kcals/day, 82 g protein/day,  211 g CHO/day, and 917 mls water/day) FWF: 150 mls q4 hrs or per MD.      3.) If needed-Parenteral Nutrition: 5%AA/D15 @ goal rate 75 mls/hr continuous + 20% lipids (250mls) daily providing 1778 kcals/day, 90 g protein/day, and 1800 mls fluid/day. GIR:2.5 mg/kg/min.      Goals: 1.) enteral nutrition will advance within 48 hrs 2.) pt will tolerate enteral nutrition at goal rate   Nutrition Goal Status: 1.) goal met 2.) new  Communication of RD Recs: POC, sticky note, reviewed with RN        1. Tachycardia    2. Sepsis    3. Bacteremia due to coagulase-negative Staphylococcus    4. Septic shock      Past Medical History:   Diagnosis Date    ALS (amyotrophic lateral sclerosis)     ALS (amyotrophic lateral sclerosis)     Erectile dysfunction     Gait disturbance     Hyperlipidemia     Hypertension     Iron deficiency anemia     Motor neuron disease     Polycystic kidney disease     Tremor          Assessment and Plan  Nutrition Problem  Inadequate energy intake     Related to (etiology):   Chronic illness     Signs and Symptoms (as evidenced by):   NPO, s/p trach/peg TF meeting < 50% of needs x 1 day     Interventions:   above  Nutrition Diagnosis Status:   new          Malnutrition Assessment  Malnutrition Type: chronic illness  Cesar score = 11 + back, sacral, foot, wounds  Edematous  3-4+  NFPE WDL 6/10/22      Reason for Assessment  Reason For Assessment: RD follow-up  General Information Comments: 66  "y/[facility] and hyperglycemia. PEG/Trach pt, home TF above. No history of DM2. NFPE done 6/10/22 no wasting seen. Inadequate TF intake/NPO x 1 day.  22: discharge cancelled today, echo pending. Enteral nutrition infusing; tolerating. +2 BMs.       Nutrition Risk Screen  Nutrition Risk Screen: tube feeding or parenteral nutrition    Nutrition/Diet History  Spiritual, Cultural Beliefs, Holiness Practices, Values that Affect Care: yes (Oriental orthodox)  Food Allergies: NKFA  Factors Affecting Nutritional Intake: None identified at this time    Anthropometrics  Temp: 98.2 °F (36.8 °C)  Height: 5' 8"  Height (inches): 68 in  Weight Method: Bed Scale  Weight: 77.3 kg (170 lb 6.7 oz)  Weight (lb): 170.42 lb  Ideal Body Weight (IBW), Male: 154 lb  % Ideal Body Weight, Male (lb): 110.66 %  BMI (Calculated): 25.9  BMI Grade: 25 - 29.9 - overweight  Usual Body Weight (UBW), k.27 kg  % Usual Body Weight: 100.25  % Weight Change From Usual Weight: 0.04 %       Lab/Procedures/Meds  Pertinent Labs Reviewed: reviewed  BMP  Lab Results   Component Value Date     2022    K 4.6 2022     2022    CO2 24 2022    BUN 43 (H) 2022    CREATININE 0.7 2022    CALCIUM 9.8 2022    ANIONGAP 12 2022    ESTGFRAFRICA >60 2022    EGFRNONAA >60 2022     Lab Results   Component Value Date    ALBUMIN 2.5 (L) 2022     Lab Results   Component Value Date    CALCIUM 9.8 2022    PHOS 2.4 (L) 2022     No results for input(s): POCTGLUCOSE in the last 24 hours.    Pertinent Medications Reviewed: reviewed  Scheduled Meds:   apixaban  2.5 mg Per G Tube BID    baclofen  10 mg Per G Tube TID    busPIRone  5 mg Per G Tube BID    chlorhexidine  15 mL Mouth/Throat BID    glycopyrrolate  1 mg Per G Tube TID    metoprolol tartrate  50 mg Per G Tube Daily    mupirocin   Nasal BID    pantoprazole  40 mg Per G Tube BID    " piperacillin-tazobactam (ZOSYN) IVPB  4.5 g Intravenous Q8H    polyethylene glycol  17 g Per G Tube BID    sodium chloride 0.9%  10 mL Intravenous Q6H    traZODone  50 mg Per G Tube QHS    vancomycin (VANCOCIN) IVPB  500 mg Intravenous Once     Continuous Infusions:   sodium chloride 0.9% Stopped (06/12/22 0919)    sodium chloride 0.9% Stopped (06/10/22 1224)    sodium chloride 0.9% 10 mL/hr at 06/11/22 1349    NORepinephrine bitartrate-D5W Stopped (06/11/22 1721)         Estimated/Assessed Needs  Weight Used For Calorie Calculations: 77.3 kg (170 lb 6.7 oz)  Energy Calorie Requirements (kcal): 1666  Energy Need Method: [facility]  Protein Requirements: 80-92g  Weight Used For Protein Calculations: 77.3 kg (170 lb 6.7 oz)  Estimated Fluid Requirement Method: RDA Method  RDA Method (mL): 1666      Nutrition Prescription Ordered  Current Diet Order: NPO  Current Nutrition Support Formula Ordered: Glucerna 1.5  Current Nutrition Support Rate Ordered: 50 (ml)  Current Nutrition Support Frequency Ordered: continuous    Evaluation of Received Nutrient/Fluid Intake  Enteral Calories (kcal): 1800  Enteral Protein (gm): 99  Enteral (Free Water) Fluid (mL): 911  IV Fluid (mL): 717.9  Tolerance: tolerating  % Intake of Estimated Energy Needs: 75 - 100 %  % Meal Intake: NPO    Nutrition Risk  Level of Risk/Frequency of Follow-up:  (x2 weekly)     Monitor and Evaluation  Food and Nutrient Intake: energy intake, enteral nutrition intake  Food and Nutrient Adminstration: enteral and parenteral nutrition administration  Anthropometric Measurements: weight, weight change, body mass index  Biochemical Data, Medical Tests and Procedures: electrolyte and renal panel, glucose/endocrine profile, lipid profile  Nutrition-Focused Physical Findings: overall appearance     Nutrition Follow-Up  RD Follow-up?: Yes

## 2022-06-13 NOTE — CARE UPDATE
06/13/22 0733   Patient Assessment/Suction   Respiratory Effort Normal   Expansion/Accessory Muscles/Retractions expansion symmetric   All Lung Fields Breath Sounds Anterior:   SIM Breath Sounds coarse   RUL Breath Sounds coarse   Rhythm/Pattern, Respiratory assisted mechanically   Cough Frequency with stimulation   Cough Type fair   Suction Method tracheal   Suction Pressure (mmHg) 100 mmHg   $ Suction Charges Inline Suction Procedure Stat Charge   Secretions Amount moderate   Secretions Color tan;creamy   Secretions Characteristics tenacious   PRE-TX-O2   O2 Device (Oxygen Therapy) ventilator   $ Is the patient on Low Flow Oxygen? Yes   Oxygen Concentration (%) 30   SpO2 100 %   Pulse Oximetry Type Continuous   $ Pulse Oximetry - Multiple Charge Pulse Oximetry - Multiple   Pulse (!) 119   Resp (!) 24   Positioning HOB elevated 30 degrees   Skin Integrity   $ Wound Care Tech Time 15 min   Area Observed Neck;Neck under tracheostomy   Skin Appearance without discoloration        Surgical Airway Shiley Cuffed   No placement date or time found.   Present Prior to Hospital Arrival?: Yes  Brand: Shiley  Airway Device Size: 8.0  Style: Cuffed   Cuff Pressure 30 cm H2O   Cuff Inflation? Inflated   Status Secured   Site Assessment No drainage   Site Care Protective barrier to skin   Ties Assessment Intact;Secure   General Safety Checklist   Safety Promotion/Fall Prevention side rails raised   Airway Safety   Ambu bag with the patient? Yes, Adult Ambu   Is mask with the patient? Yes, Adult Mask   Vent Select   Conventional Vent Y   $ Ventilator Subsequent 1   Charged w/in last 24h YES   Preset Conventional Ventilator Settings   Vent Type    Ventilation Type VC   Vent Mode A/C   Humidity HME   Set Rate 16 BPM   Vt Set 500 mL   PEEP/CPAP 5 cmH20   Pressure Support 0 cmH20   Waveform RAMP   Peak Flow 60 L/min   Set Inspiratory Pressure 0 cmH20   Insp Time 0 Sec(s)   Plateau Set/Insp. Hold (sec) 0   Insp Rise Time  0 %    I-Trigger Type  Flow   Trigger Sensitivity Flow/I-Trigger 3 L/min   P High 0 cm H2O   P Low 0 cm H2O   T High 0 sec   T Low 0 sec   Patient Ventilator Parameters   Resp Rate Total 18 br/min   Peak Airway Pressure 29 cmH2O   Mean Airway Pressure 12 cmH20   Plateau Pressure 0 cmH20   Exhaled Vt 560 mL   Total Ve 10.2 mL   Spont Ve 0 L   I:E Ratio Measured 1:3.20   Conventional Ventilator Alarms   Alarms On Y   Ve High Alarm 25 L/min   Resp Rate High Alarm 40 br/min   Press High Alarm 50 cmH2O   Apnea Rate 16   Apnea Volume (mL) 500 mL   Apnea Oxygen Concentration  40   Apnea Flow Rate (L/min) 60   T Apnea 30 sec(s)   IHI Ventilator Associated Pneumonia Bundle (Required)   Head of Bed Elevated  HOB 30   Oral Care Mouth swabbed;Mouth moisturizer;Mouth suctioned   Ready to Wean/Extubation Screen   FIO2<=50 (chart decimal) 0.3   MV<16L (chart vol.) 10.2   PEEP <=8 (chart #) 5   Ready to Wean Parameters   F/VT Ratio<105 (RSBI) (!) 42.86   All alarms set and functioning.

## 2022-06-13 NOTE — SUBJECTIVE & OBJECTIVE
Interval History:  no new issues.  Levophed was titrated to off.  Seen by ID consultant.    Review of Systems   Unable to perform ROS: Other (tracheostomy connected to ventilator)   Objective:     Vital Signs (Most Recent):  Temp: 98.8 °F (37.1 °C) (06/12/22 1600)  Pulse: 107 (06/12/22 2000)  Resp: 17 (06/12/22 2000)  BP: (!) 169/72 (06/12/22 2000)  SpO2: 100 % (06/12/22 2000)   Vital Signs (24h Range):  Temp:  [98.1 °F (36.7 °C)-99.1 °F (37.3 °C)] 98.8 °F (37.1 °C)  Pulse:  [105-135] 107  Resp:  [16-33] 17  SpO2:  [96 %-100 %] 100 %  BP: (103-174)/(42-91) 169/72     Weight: 78.2 kg (172 lb 6.4 oz)  Body mass index is 26.21 kg/m².    Intake/Output Summary (Last 24 hours) at 6/12/2022 2129  Last data filed at 6/12/2022 2013  Gross per 24 hour   Intake 2514.88 ml   Output 1765 ml   Net 749.88 ml      Physical Exam  Constitutional:       General: He is not in acute distress.  HENT:      Head: Normocephalic and atraumatic.      Mouth/Throat:      Comments: Tongue has deep fissures  Eyes:      Conjunctiva/sclera:      Right eye: No exudate.     Left eye: No exudate.     Pupils: Pupils are equal, round, and reactive to light.   Neck:      Vascular: No JVD.      Trachea: Tracheostomy present.   Cardiovascular:      Rate and Rhythm: Regular rhythm. Tachycardia present.      Comments: Edema in both upper extremities  Pulmonary:      Breath sounds: Normal breath sounds.   Abdominal:      General: Abdomen is protuberant. Bowel sounds are normal. There is distension.      Tenderness: There is no abdominal tenderness.      Comments: G-tube site appears normal   Musculoskeletal:      Cervical back: Neck supple.      Right lower leg: Edema present.      Left lower leg: Edema present.   Neurological:      Mental Status: He is alert.      Comments: Communicates by blinking       Significant Labs: All pertinent labs within the past 24 hours have been reviewed.    Significant Imaging: I have reviewed all pertinent imaging  results/findings within the past 24 hours.

## 2022-06-13 NOTE — ASSESSMENT & PLAN NOTE
"This patient does have evidence of infective focus  My overall impression is sepsis. Vital signs were reviewed and noted in progress note.  Antibiotics given-   Antibiotics (From admission, onward)            Start     Stop Route Frequency Ordered    06/09/22 2330  mupirocin 2 % ointment         06/14 2059 Nasl 2 times daily 06/09/22 2217 06/09/22 2030  piperacillin-tazobactam 4.5 g in dextrose 5 % 100 mL IVPB (ready to mix system)  ( Pneumonia - Moderate-High MDR )         06/16 2029 IV Every 8 hours (non-standard times) 06/09/22 1610 06/09/22 1600  vancomycin - pharmacy to dose  ( Pneumonia - Moderate-High MDR )        "And" Linked Group Details    -- IV pharmacy to manage frequency 06/09/22 1600        Cultures were taken-   Microbiology Results (last 7 days)     Procedure Component Value Units Date/Time    Culture, Respiratory with Gram Stain [[id number]]  (Abnormal)  (Susceptibility) Collected: 06/09/22 1632    Order Status: Completed Specimen: Respiratory from Tracheal Aspirate Updated: 06/12/22 1247     Respiratory Culture SERRATIA MARCESCENS  Few        PSEUDOMONAS AERUGINOSA  Few        PROTEUS MIRABILIS ESBL  Few       Gram Stain (Respiratory) Rare WBC's     Gram Stain (Respiratory) Few Gram negative rods     Gram Stain (Respiratory) Rare Gram positive rods    Urine culture [[id number]]  (Abnormal)  (Susceptibility) Collected: 06/09/22 1059    Order Status: Completed Specimen: Urine Updated: 06/12/22 1215     Urine Culture, Routine PSEUDOMONAS AERUGINOSA   >100,000 cfu/ml  Susceptibility pending      Narrative:      Specimen Source->Urine    Blood culture x two cultures. Draw prior to antibiotics. [[id number]]  (Abnormal)  (Susceptibility) Collected: 06/09/22 1031    Order Status: Completed Specimen: Blood Updated: 06/12/22 0923     Blood Culture, Routine Gram stain peds bottle: Gram positive cocci in clusters resembling Staph      Results called to and read back by:Edilma Chin RN 06/10/2022  15:43 "      STAPHYLOCOCCUS EPIDERMIDIS    Narrative:      Aerobic and anaerobic    Blood culture x two cultures. Draw prior to antibiotics. [[id number]]  (Abnormal)  (Susceptibility) Collected: 06/09/22 1031    Order Status: Completed Specimen: Blood from Antecubital, Right Hand Updated: 06/12/22 0922     Blood Culture, Routine Gram stain peds bottle: Gram positive cocci in clusters resembling Staph      Results called to and read back by:Edilma Chin RN 06/10/2022  15:44      STAPHYLOCOCCUS EPIDERMIDIS    Narrative:      Aerobic and anaerobic    Blood culture [[id number]] Collected: 06/11/22 1601    Order Status: Completed Specimen: Blood from Line, PICC Right Brachial Updated: 06/12/22 0715     Blood Culture, Routine No Growth to date    Narrative:      From picc        Source- Pneumonia, possibly urinary tract    Source control Achieved by- antibiotics

## 2022-06-13 NOTE — ASSESSMENT & PLAN NOTE
CBG's are stable while he's on Glucerna.  No need to continue sticking his fingers.  He's not diabetic.    POCT Glucose   Date Value Ref Range Status   06/12/2022 142 (H) 70 - 110 mg/dL Final   06/12/2022 130 (H) 70 - 110 mg/dL Final   06/11/2022 151 (H) 70 - 110 mg/dL Final   06/11/2022 160 (H) 70 - 110 mg/dL Final   06/11/2022 146 (H) 70 - 110 mg/dL Final   06/11/2022 145 (H) 70 - 110 mg/dL Final   06/11/2022 159 (H) 70 - 110 mg/dL Final   06/10/2022 138 (H) 70 - 110 mg/dL Final   06/10/2022 123 (H) 70 - 110 mg/dL Final   06/10/2022 146 (H) 70 - 110 mg/dL Final   06/10/2022 107 70 - 110 mg/dL Final   06/10/2022 120 (H) 70 - 110 mg/dL Final   06/10/2022 113 (H) 70 - 110 mg/dL Final   06/09/2022 106 70 - 110 mg/dL Final

## 2022-06-14 VITALS
DIASTOLIC BLOOD PRESSURE: 74 MMHG | TEMPERATURE: 98 F | BODY MASS INDEX: 25.62 KG/M2 | RESPIRATION RATE: 26 BRPM | OXYGEN SATURATION: 100 % | HEIGHT: 68 IN | WEIGHT: 169.06 LBS | SYSTOLIC BLOOD PRESSURE: 138 MMHG | HEART RATE: 103 BPM

## 2022-06-14 LAB
ALBUMIN SERPL BCP-MCNC: 2.4 G/DL (ref 3.5–5.2)
ALP SERPL-CCNC: 68 U/L (ref 55–135)
ALT SERPL W/O P-5'-P-CCNC: 23 U/L (ref 10–44)
ANION GAP SERPL CALC-SCNC: 11 MMOL/L (ref 8–16)
AORTIC ROOT ANNULUS: 3.41 CM
AORTIC VALVE CUSP SEPERATION: 1.72 CM
AST SERPL-CCNC: 16 U/L (ref 10–40)
AV INDEX (PROSTH): 0.81
AV MEAN GRADIENT: 2 MMHG
AV PEAK GRADIENT: 4 MMHG
AV VALVE AREA: 3.41 CM2
AV VELOCITY RATIO: 0.88
BASOPHILS # BLD AUTO: 0.04 K/UL (ref 0–0.2)
BASOPHILS NFR BLD: 0.4 % (ref 0–1.9)
BILIRUB SERPL-MCNC: 0.7 MG/DL (ref 0.1–1)
BSA FOR ECHO PROCEDURE: 1.92 M2
BUN SERPL-MCNC: 34 MG/DL (ref 8–23)
CALCIUM SERPL-MCNC: 9.5 MG/DL (ref 8.7–10.5)
CHLORIDE SERPL-SCNC: 104 MMOL/L (ref 95–110)
CO2 SERPL-SCNC: 24 MMOL/L (ref 23–29)
CREAT SERPL-MCNC: 0.7 MG/DL (ref 0.5–1.4)
CV ECHO LV RWT: 0.47 CM
DIFFERENTIAL METHOD: ABNORMAL
DOP CALC AO PEAK VEL: 1.04 M/S
DOP CALC AO VTI: 18.72 CM
DOP CALC LVOT AREA: 4.2 CM2
DOP CALC LVOT DIAMETER: 2.32 CM
DOP CALC LVOT PEAK VEL: 0.92 M/S
DOP CALC LVOT STROKE VOLUME: 63.76 CM3
DOP CALC MV VTI: 11.84 CM
DOP CALCLVOT PEAK VEL VTI: 15.09 CM
E WAVE DECELERATION TIME: 206.19 MSEC
E/A RATIO: 0.76
E/E' RATIO: 9.17 M/S
ECHO LV POSTERIOR WALL: 0.83 CM (ref 0.6–1.1)
EJECTION FRACTION: 50 %
EOSINOPHIL # BLD AUTO: 0.7 K/UL (ref 0–0.5)
EOSINOPHIL NFR BLD: 8.1 % (ref 0–8)
ERYTHROCYTE [DISTWIDTH] IN BLOOD BY AUTOMATED COUNT: 20.7 % (ref 11.5–14.5)
EST. GFR  (AFRICAN AMERICAN): >60 ML/MIN/1.73 M^2
EST. GFR  (NON AFRICAN AMERICAN): >60 ML/MIN/1.73 M^2
FRACTIONAL SHORTENING: 22 % (ref 28–44)
GLUCOSE SERPL-MCNC: 111 MG/DL (ref 70–110)
HCT VFR BLD AUTO: 27.4 % (ref 40–54)
HGB BLD-MCNC: 8.2 G/DL (ref 14–18)
IMM GRANULOCYTES # BLD AUTO: 0.04 K/UL (ref 0–0.04)
IMM GRANULOCYTES NFR BLD AUTO: 0.4 % (ref 0–0.5)
INTERVENTRICULAR SEPTUM: 0.79 CM (ref 0.6–1.1)
IVRT: 79.92 MSEC
LA MAJOR: 4.92 CM
LA WIDTH: 3.32 CM
LEFT ATRIUM SIZE: 2.95 CM
LEFT ATRIUM VOLUME INDEX MOD: 30.6 ML/M2
LEFT ATRIUM VOLUME MOD: 58.51 CM3
LEFT INTERNAL DIMENSION IN SYSTOLE: 2.76 CM (ref 2.1–4)
LEFT VENTRICLE DIASTOLIC VOLUME INDEX: 27.06 ML/M2
LEFT VENTRICLE DIASTOLIC VOLUME: 51.68 ML
LEFT VENTRICLE MASS INDEX: 40 G/M2
LEFT VENTRICLE SYSTOLIC VOLUME INDEX: 14.9 ML/M2
LEFT VENTRICLE SYSTOLIC VOLUME: 28.47 ML
LEFT VENTRICULAR INTERNAL DIMENSION IN DIASTOLE: 3.52 CM (ref 3.5–6)
LEFT VENTRICULAR MASS: 77.3 G
LV LATERAL E/E' RATIO: 9.17 M/S
LV SEPTAL E/E' RATIO: 9.17 M/S
LYMPHOCYTES # BLD AUTO: 1.2 K/UL (ref 1–4.8)
LYMPHOCYTES NFR BLD: 13.6 % (ref 18–48)
MAGNESIUM SERPL-MCNC: 2.5 MG/DL (ref 1.6–2.6)
MCH RBC QN AUTO: 26.3 PG (ref 27–31)
MCHC RBC AUTO-ENTMCNC: 29.9 G/DL (ref 32–36)
MCV RBC AUTO: 88 FL (ref 82–98)
MONOCYTES # BLD AUTO: 0.6 K/UL (ref 0.3–1)
MONOCYTES NFR BLD: 6.5 % (ref 4–15)
MV A" WAVE DURATION": 7.23 MSEC
MV MEAN GRADIENT: 0 MMHG
MV PEAK A VEL: 0.72 M/S
MV PEAK E VEL: 0.55 M/S
MV PEAK GRADIENT: 3 MMHG
MV STENOSIS PRESSURE HALF TIME: 59.8 MS
MV VALVE AREA BY CONTINUITY EQUATION: 5.38 CM2
MV VALVE AREA P 1/2 METHOD: 3.68 CM2
NEUTROPHILS # BLD AUTO: 6.3 K/UL (ref 1.8–7.7)
NEUTROPHILS NFR BLD: 71 % (ref 38–73)
NRBC BLD-RTO: 0 /100 WBC
PHOSPHATE SERPL-MCNC: 3.5 MG/DL (ref 2.7–4.5)
PISA TR MAX VEL: 2.86 M/S
PLATELET # BLD AUTO: 280 K/UL (ref 150–450)
PMV BLD AUTO: 10.3 FL (ref 9.2–12.9)
POTASSIUM SERPL-SCNC: 4.4 MMOL/L (ref 3.5–5.1)
PROCALCITONIN SERPL IA-MCNC: 0.33 NG/ML
PROT SERPL-MCNC: 7.4 G/DL (ref 6–8.4)
PULM VEIN S/D RATIO: 1.12
PV PEAK D VEL: 0.57 M/S
PV PEAK S VEL: 0.64 M/S
PV PEAK VELOCITY: 0.99 CM/S
RA MAJOR: 4.21 CM
RA WIDTH: 2.94 CM
RBC # BLD AUTO: 3.12 M/UL (ref 4.6–6.2)
RIGHT VENTRICULAR END-DIASTOLIC DIMENSION: 2.64 CM
RV TISSUE DOPPLER FREE WALL SYSTOLIC VELOCITY 1 (APICAL 4 CHAMBER VIEW): 15.67 CM/S
SODIUM SERPL-SCNC: 139 MMOL/L (ref 136–145)
TDI LATERAL: 0.06 M/S
TDI SEPTAL: 0.06 M/S
TDI: 0.06 M/S
TR MAX PG: 33 MMHG
TRICUSPID ANNULAR PLANE SYSTOLIC EXCURSION: 2.53 CM
VANCOMYCIN SERPL-MCNC: 17 UG/ML
WBC # BLD AUTO: 8.92 K/UL (ref 3.9–12.7)

## 2022-06-14 PROCEDURE — 27000221 HC OXYGEN, UP TO 24 HOURS

## 2022-06-14 PROCEDURE — 84145 PROCALCITONIN (PCT): CPT | Performed by: STUDENT IN AN ORGANIZED HEALTH CARE EDUCATION/TRAINING PROGRAM

## 2022-06-14 PROCEDURE — A4216 STERILE WATER/SALINE, 10 ML: HCPCS | Performed by: HOSPITALIST

## 2022-06-14 PROCEDURE — 25000003 PHARM REV CODE 250: Performed by: INTERNAL MEDICINE

## 2022-06-14 PROCEDURE — 85025 COMPLETE CBC W/AUTO DIFF WBC: CPT | Performed by: HOSPITALIST

## 2022-06-14 PROCEDURE — 94761 N-INVAS EAR/PLS OXIMETRY MLT: CPT

## 2022-06-14 PROCEDURE — 99900026 HC AIRWAY MAINTENANCE (STAT)

## 2022-06-14 PROCEDURE — 76937 US GUIDE VASCULAR ACCESS: CPT

## 2022-06-14 PROCEDURE — 80053 COMPREHEN METABOLIC PANEL: CPT | Performed by: HOSPITALIST

## 2022-06-14 PROCEDURE — 94003 VENT MGMT INPAT SUBQ DAY: CPT

## 2022-06-14 PROCEDURE — 80202 ASSAY OF VANCOMYCIN: CPT | Performed by: HOSPITALIST

## 2022-06-14 PROCEDURE — 36410 VNPNXR 3YR/> PHY/QHP DX/THER: CPT

## 2022-06-14 PROCEDURE — 99233 PR SUBSEQUENT HOSPITAL CARE,LEVL III: ICD-10-PCS | Mod: S$GLB,,, | Performed by: STUDENT IN AN ORGANIZED HEALTH CARE EDUCATION/TRAINING PROGRAM

## 2022-06-14 PROCEDURE — 99900035 HC TECH TIME PER 15 MIN (STAT)

## 2022-06-14 PROCEDURE — 63600175 PHARM REV CODE 636 W HCPCS: Performed by: HOSPITALIST

## 2022-06-14 PROCEDURE — 84100 ASSAY OF PHOSPHORUS: CPT | Performed by: HOSPITALIST

## 2022-06-14 PROCEDURE — C1751 CATH, INF, PER/CENT/MIDLINE: HCPCS

## 2022-06-14 PROCEDURE — 99233 SBSQ HOSP IP/OBS HIGH 50: CPT | Mod: S$GLB,,, | Performed by: STUDENT IN AN ORGANIZED HEALTH CARE EDUCATION/TRAINING PROGRAM

## 2022-06-14 PROCEDURE — 25000003 PHARM REV CODE 250: Performed by: HOSPITALIST

## 2022-06-14 PROCEDURE — 83735 ASSAY OF MAGNESIUM: CPT | Performed by: HOSPITALIST

## 2022-06-14 RX ORDER — ALPRAZOLAM 0.5 MG/1
0.5 TABLET ORAL 3 TIMES DAILY PRN
Status: ON HOLD
Start: 2022-06-14 | End: 2022-08-30 | Stop reason: HOSPADM

## 2022-06-14 RX ADMIN — HYDROCODONE BITARTRATE AND ACETAMINOPHEN 15 ML: 7.5; 325 SOLUTION ORAL at 05:06

## 2022-06-14 RX ADMIN — BACLOFEN 10 MG: 10 TABLET ORAL at 03:06

## 2022-06-14 RX ADMIN — GLYCOPYRROLATE 1 MG: 1 TABLET ORAL at 08:06

## 2022-06-14 RX ADMIN — BUSPIRONE HYDROCHLORIDE 5 MG: 5 TABLET ORAL at 08:06

## 2022-06-14 RX ADMIN — PIPERACILLIN SODIUM AND TAZOBACTAM SODIUM 4.5 G: 4; .5 INJECTION, POWDER, LYOPHILIZED, FOR SOLUTION INTRAVENOUS at 04:06

## 2022-06-14 RX ADMIN — SODIUM CHLORIDE, PRESERVATIVE FREE 10 ML: 5 INJECTION INTRAVENOUS at 06:06

## 2022-06-14 RX ADMIN — CHLORHEXIDINE GLUCONATE 0.12% ORAL RINSE 15 ML: 1.2 LIQUID ORAL at 08:06

## 2022-06-14 RX ADMIN — POLYETHYLENE GLYCOL 3350 17 G: 17 POWDER, FOR SOLUTION ORAL at 08:06

## 2022-06-14 RX ADMIN — PANTOPRAZOLE SODIUM 40 MG: 40 GRANULE, DELAYED RELEASE ORAL at 08:06

## 2022-06-14 RX ADMIN — SODIUM CHLORIDE, PRESERVATIVE FREE 10 ML: 5 INJECTION INTRAVENOUS at 12:06

## 2022-06-14 RX ADMIN — BENZOCAINE: 100 GEL TOPICAL at 05:06

## 2022-06-14 RX ADMIN — APIXABAN 2.5 MG: 2.5 TABLET, FILM COATED ORAL at 08:06

## 2022-06-14 RX ADMIN — METOPROLOL TARTRATE 50 MG: 50 TABLET, FILM COATED ORAL at 08:06

## 2022-06-14 RX ADMIN — GLYCOPYRROLATE 1 MG: 1 TABLET ORAL at 03:06

## 2022-06-14 RX ADMIN — BACLOFEN 10 MG: 10 TABLET ORAL at 08:06

## 2022-06-14 RX ADMIN — MUPIROCIN: 20 OINTMENT TOPICAL at 08:06

## 2022-06-14 RX ADMIN — PIPERACILLIN SODIUM AND TAZOBACTAM SODIUM 4.5 G: 4; .5 INJECTION, POWDER, LYOPHILIZED, FOR SOLUTION INTRAVENOUS at 11:06

## 2022-06-14 RX ADMIN — HYDROCODONE BITARTRATE AND ACETAMINOPHEN 15 ML: 7.5; 325 SOLUTION ORAL at 11:06

## 2022-06-14 RX ADMIN — HYDROCODONE BITARTRATE AND ACETAMINOPHEN 15 ML: 7.5; 325 SOLUTION ORAL at 04:06

## 2022-06-14 NOTE — CARE UPDATE
06/14/22 0821   Patient Assessment/Suction   Level of Consciousness (AVPU) alert   Respiratory Effort Normal;Unlabored   Expansion/Accessory Muscles/Retractions no use of accessory muscles;no retractions;expansion symmetric   All Lung Fields Breath Sounds coarse   Rhythm/Pattern, Respiratory assisted mechanically   Cough Frequency with stimulation   Cough Type assisted   Suction Method tracheal;oral   $ Suction Charges Inline Suction Procedure Stat Charge   Secretions Amount large   Secretions Color cloudy   Secretions Characteristics thick   PRE-TX-O2   O2 Device (Oxygen Therapy) ventilator   $ Is the patient on Low Flow Oxygen? Yes   Oxygen Concentration (%) 30   SpO2 99 %   Pulse Oximetry Type Continuous   $ Pulse Oximetry - Multiple Charge Pulse Oximetry - Multiple   Pulse 93   Resp 18   Positioning HOB elevated 30 degrees   ETCO2   $ ETCO2 Usage Currently wearing   ETCO2 (mmHg) 31 mmHg   ETCO2 Device Type Bedside Monitor   Skin Integrity   $ Wound Care Tech Time 15 min   Area Observed Neck;Neck under tracheostomy   Skin Appearance without discoloration        Surgical Airway Shiley Cuffed   No placement date or time found.   Present Prior to Hospital Arrival?: Yes  Brand: Shiley  Airway Device Size: 8.0  Style: Cuffed   Cuff Pressure 32 cm H2O   Cuff Inflation? Inflated   Status Secured   Site Assessment Clean;Dry   Airway Safety   Ambu bag with the patient? Yes, Adult Ambu   Is mask with the patient? Yes, Adult Mask   Vent Select   Conventional Vent Y   Ventilator Initiated No   $ Ventilator Subsequent 1   Charged w/in last 24h YES   Preset Conventional Ventilator Settings   Vent Type    Ventilation Type VC   Vent Mode A/C   Humidity HME   Set Rate 16 BPM   Vt Set 500 mL   PEEP/CPAP 5 cmH20   Pressure Support 0 cmH20   Waveform RAMP   Peak Flow 60 L/min   Set Inspiratory Pressure 0 cmH20   Insp Time 0 Sec(s)   Plateau Set/Insp. Hold (sec) 0   Insp Rise Time  0 %   I-Trigger Type  Flow   Trigger  Sensitivity Flow/I-Trigger 3 L/min   P High 0 cm H2O   P Low 0 cm H2O   T High 0 sec   T Low 0 sec   Patient Ventilator Parameters   Resp Rate Total 18 br/min   Peak Airway Pressure 21 cmH2O   Mean Airway Pressure 11 cmH20   Plateau Pressure 0 cmH20   Exhaled Vt 401 mL   Total Ve 9.32 mL   Spont Ve 0 L   I:E Ratio Measured 1:1.10   Conventional Ventilator Alarms   Alarms On Y   Ve High Alarm 25 L/min   Ve Low Alarm 3 L/min   Vt High Alarm 1100 mL   Vt Low Alarm 250 mL   Resp Rate High Alarm 40 br/min   Press High Alarm 50 cmH2O   Apnea Rate 16   Apnea Volume (mL) 500 mL   Apnea Oxygen Concentration  40   Apnea Flow Rate (L/min) 60   T Apnea 30 sec(s)   Ready to Wean/Extubation Screen   FIO2<=50 (chart decimal) 0.3   MV<16L (chart vol.) 9.32   PEEP <=8 (chart #) 5   Ready to Wean Parameters   F/VT Ratio<105 (RSBI) (!) 44.89

## 2022-06-14 NOTE — PLAN OF CARE
POC reviewed with the patient. Patient to discharge to [facility] today. Hutchinson and midline to stay in at time of discharge. Remains trach/vent dependent. Appears to be tolerating tube feedings at this time via peg. Bed locked in lowest position with bed alarm set, call light within reach. Safety precautions maintained.

## 2022-06-14 NOTE — PLAN OF CARE
[facility] - report can be called to [facility] at [facility] . CM completed ADT 30 for transport.    06/14/22 2009   Post-Acute Status   Post-Acute Authorization Placement   Post-Acute Placement Status Set-up Complete/Auth obtained

## 2022-06-14 NOTE — PLAN OF CARE
Pt is cleared for discharge back to [facility].   06/14/22 1514   Final Note   Assessment Type Final Discharge Note   Anticipated Discharge Disposition care home [facility]

## 2022-06-14 NOTE — CONSULTS
18 Gx 10cm PowerGlide Midline placed to pts left cephalic vein with the use of ultrasound guidance by Joe.    Ultrasound guidance: yes  Vessel Caliber: large and patent, compressibility normal  Needle advanced into vessel with real time Ultrasound guidance.  Guidewire confirmed in vessel.  Image recorded and saved.  Sterile sheath used.  Sterile dressing applied  Arm circumference- 39.5cm   Dressing dated   Education provided to patient's nurse re: proper maintenance of line-  verbalized understanding

## 2022-06-14 NOTE — ASSESSMENT & PLAN NOTE
CBG's are stable while he's on Glucerna.  No need to continue sticking his fingers.  He's not diabetic.    POCT Glucose   Date Value Ref Range Status   06/12/2022 142 (H) 70 - 110 mg/dL Final   06/12/2022 130 (H) 70 - 110 mg/dL Final   06/11/2022 151 (H) 70 - 110 mg/dL Final   06/11/2022 160 (H) 70 - 110 mg/dL Final   06/11/2022 146 (H) 70 - 110 mg/dL Final   06/11/2022 145 (H) 70 - 110 mg/dL Final   06/11/2022 159 (H) 70 - 110 mg/dL Final

## 2022-06-14 NOTE — PROGRESS NOTES
Pharmacokinetic Assessment Follow Up: IV Vancomycin    Vancomycin serum concentration assessment(s):    The random level was drawn correctly and can be used to guide therapy at this time. The measurement is within the desired definitive target range of 15 to 20 mcg/mL.    Vancomycin Regimen Plan:    Vancomycin 750 mg x one dose today. Next level to be measured on 6/15 @ 1800.    Drug levels (last 3 results):  Recent Labs   Lab Result Units 06/12/22  1242 06/13/22  1429 06/14/22  1735   Vancomycin, Random ug/mL 19.1 19.8 17.0       Pharmacy will continue to follow and monitor vancomycin.    Please contact pharmacy at extension [phone] for questions regarding this assessment.    Thank you for the consult,   Allie Tan       Patient brief summary:  Wei Dominique is a 66 y.o. male initiated on antimicrobial therapy with IV Vancomycin for treatment of lower respiratory infection      Drug Allergies:   Review of patient's allergies indicates:  No Known Allergies    Actual Body Weight:   76.7 kg    Renal Function:   Estimated Creatinine Clearance: 101.8 mL/min (based on SCr of 0.7 mg/dL).,     Dialysis Method (if applicable):  N/A    CBC (last 72 hours):  Recent Labs   Lab Result Units 06/12/22  0408 06/13/22  0316 06/14/22  0325   WBC K/uL 14.92* 9.85 8.92   Hemoglobin g/dL 9.3* 8.7* 8.2*   Hematocrit % 31.1* 30.2* 27.4*   Platelets K/uL 305 312 280   Gran % % 88.5* 71.0 71.0   Lymph % % 4.9* 13.4* 13.6*   Mono % % 2.7* 5.5 6.5   Eosinophil % % 3.3 9.1* 8.1*   Basophil % % 0.3 0.5 0.4   Differential Method  Automated Automated Automated       Metabolic Panel (last 72 hours):  Recent Labs   Lab Result Units 06/12/22  0408 06/13/22  0316 06/13/22  0908 06/13/22  2059 06/14/22  0325   Sodium mmol/L 142 141  --   --  139   Potassium mmol/L 3.8 3.6 4.6  --  4.4   Chloride mmol/L 106 105  --   --  104   CO2 mmol/L 25 24  --   --  24   Glucose mg/dL 116* 110  --   --  111*   BUN mg/dL 37* 43*  --   --  34*   Creatinine mg/dL  0.6 0.7  --   --  0.7   Albumin g/dL 2.5* 2.5*  --   --  2.4*   Total Bilirubin mg/dL 1.8* 0.8  --   --  0.7   Alkaline Phosphatase U/L 68 66  --   --  68   AST U/L 19 16  --   --  16   ALT U/L 27 24  --   --  23   Magnesium mg/dL 2.6 2.7*  --   --  2.5   Phosphorus mg/dL 2.7 2.4*  --  3.8 3.5       Vancomycin Administrations:  vancomycin given in the last 96 hours                     vancomycin 500 mg in dextrose 5 % 100 mL IVPB (ready to mix system) (mg) 500 mg New Bag 06/13/22 1704    vancomycin 750 mg in dextrose 5 % 250 mL IVPB (ready to mix system) (mg) 750 mg New Bag 06/12/22 1520    vancomycin in dextrose 5 % 1 gram/250 mL IVPB 1,000 mg ()  Restarted 06/11/22 1429     1,000 mg New Bag  1339                    Microbiologic Results:  Microbiology Results (last 7 days)       Procedure Component Value Units Date/Time    Blood culture [[id number]] Collected: 06/11/22 1601    Order Status: Completed Specimen: Blood from Line, PICC Right Brachial Updated: 06/14/22 0612     Blood Culture, Routine No Growth to date      No Growth to date      No Growth to date    Narrative:      From picc    Urine culture [[id number]]  (Abnormal)  (Susceptibility) Collected: 06/09/22 1059    Order Status: Completed Specimen: Urine Updated: 06/13/22 1112     Urine Culture, Routine PSEUDOMONAS AERUGINOSA   >100,000 cfu/ml      Narrative:      Specimen Source->Urine    Culture, Respiratory with Gram Stain [[id number]]  (Abnormal)  (Susceptibility) Collected: 06/09/22 1632    Order Status: Completed Specimen: Respiratory from Tracheal Aspirate Updated: 06/13/22 0922     Respiratory Culture No S aureus isolated.      SERRATIA MARCESCENS  Few        PSEUDOMONAS AERUGINOSA  Few        PROTEUS MIRABILIS ESBL  Few  Normal respiratory marco also present       Gram Stain (Respiratory) Rare WBC's     Gram Stain (Respiratory) Few Gram negative rods     Gram Stain (Respiratory) Rare Gram positive rods    Blood culture x two cultures. Draw prior  to antibiotics. [[id number]]  (Abnormal)  (Susceptibility) Collected: 06/09/22 1031    Order Status: Completed Specimen: Blood Updated: 06/12/22 0923     Blood Culture, Routine Gram stain peds bottle: Gram positive cocci in clusters resembling Staph      Results called to and read back by:Edilma Chin RN 06/10/2022  15:43      STAPHYLOCOCCUS EPIDERMIDIS    Narrative:      Aerobic and anaerobic    Blood culture x two cultures. Draw prior to antibiotics. [[id number]]  (Abnormal)  (Susceptibility) Collected: 06/09/22 1031    Order Status: Completed Specimen: Blood from Antecubital, Right Hand Updated: 06/12/22 0922     Blood Culture, Routine Gram stain peds bottle: Gram positive cocci in clusters resembling Staph      Results called to and read back by:Edilma Chin RN 06/10/2022  15:44      STAPHYLOCOCCUS EPIDERMIDIS    Narrative:      Aerobic and anaerobic

## 2022-06-14 NOTE — PROGRESS NOTES
[facility] Hospital Medicine  Progress Note    Patient Name: Wei Dominique  MRN: [id number]  Patient Class: IP- Inpatient   Admission Date: 6/9/2022  Length of Stay: 4 days  Attending Physician: Aquiles Melendez MD  Primary Care Provider: Mack Suero MD        Subjective:     Principal Problem:Septic shock        HPI:  Patient is a 66 y.o. male with significant past medical history of ALS, s/p trach & peg, vent dependence, HTN, PE (on eliquis).  [facility] with hypoxia.  His sats reportedly were in the 40's.  At that time, he was taken off the ventilator and ventilated with ambu-bag.  Sats responded appropriately.  No other events reported.  Workup done in ED showed pneumonia.  Also has UTI.            Overview/Hospital Course:  He was admitted with sepsis from two potential sources, one being the lower respiratory tract and the other being the urinary tract.  He was put on broad spectrum IVAB.  Respiratory culture was collected.  Blood culture had Staphylococcus epidermidis from two separate sticks.   The ID service was consulted.  Pt required Levophed for a short time -- about 24 hours.  Meds were given to stimulate bowel movements after abdominal x-ray showed prominent amount of stool in lower colon.  The treatment worked.      Interval History:  no new issues.  Tolerating the tube feeds.    Review of Systems   Unable to perform ROS: Other (tracheostomy connected to ventilator)   Objective:     Vital Signs (Most Recent):  Temp: 98.2 °F (36.8 °C) (06/13/22 1600)  Pulse: 100 (06/13/22 2024)  Resp: 17 (06/13/22 2051)  BP: 132/66 (06/13/22 1800)  SpO2: 100 % (06/13/22 2024)   Vital Signs (24h Range):  Temp:  [97.5 °F (36.4 °C)-98.2 °F (36.8 °C)] 98.2 °F (36.8 °C)  Pulse:  [] 100  Resp:  [16-27] 17  SpO2:  [100 %] 100 %  BP: (122-165)/() 132/66     Weight: 77.1 kg (170 lb)  Body mass index is 25.85 kg/m².    Intake/Output Summary  (Last 24 hours) at 6/13/2022 2221  Last data filed at 6/13/2022 1804  Gross per 24 hour   Intake 1755 ml   Output 1741 ml   Net 14 ml        Physical Exam  Constitutional:       General: He is not in acute distress.  HENT:      Head: Normocephalic and atraumatic.      Mouth/Throat:      Comments: Tongue has deep fissures  Eyes:      Conjunctiva/sclera:      Right eye: No exudate.     Left eye: No exudate.     Pupils: Pupils are equal, round, and reactive to light.   Neck:      Vascular: No JVD.      Trachea: Tracheostomy present.   Cardiovascular:      Rate and Rhythm: Regular rhythm. Tachycardia present.      Comments: Edema in both upper extremities  Pulmonary:      Breath sounds: Normal breath sounds.   Abdominal:      General: Abdomen is protuberant. Bowel sounds are normal. There is distension.      Tenderness: There is no abdominal tenderness.      Comments: G-tube site appears normal   Musculoskeletal:      Cervical back: Neck supple.      Right lower leg: Edema present.      Left lower leg: Edema present.   Neurological:      Mental Status: He is alert.      Comments: Communicates by blinking       Significant Labs: All pertinent labs within the past 24 hours have been reviewed.    Significant Imaging: I have reviewed all pertinent imaging results/findings within the past 24 hours.      Assessment/Plan:      * Septic shock  Resolved.  Off of Levophed.    Hyperglycemia  CBG's are stable while he's on Glucerna.  No need to continue sticking his fingers.  He's not diabetic.    POCT Glucose   Date Value Ref Range Status   06/12/2022 142 (H) 70 - 110 mg/dL Final   06/12/2022 130 (H) 70 - 110 mg/dL Final   06/11/2022 151 (H) 70 - 110 mg/dL Final   06/11/2022 160 (H) 70 - 110 mg/dL Final   06/11/2022 146 (H) 70 - 110 mg/dL Final   06/11/2022 145 (H) 70 - 110 mg/dL Final   06/11/2022 159 (H) 70 - 110 mg/dL Final           Catheter-associated urinary tract infection  In ED, they switched out Hutchinson for a new  one.  He's on IVAB.  Urine culture growing  Pseudomonas.  Consulting with infectious diseases physician.      Gram-negative pneumonia  Continue antibiotics.  Monitor for improvement.  Respiratory culture growing Serratia, Pseudomonas, and Proteus  Imaging shows chronic consolidation of RLL.  Pneumonia in RLL?  Consulting with infectious disease specialist.    Tracheostomy in place  Have nurses provide routine trach care.      History of pulmonary embolism  Continue apixaban.      Dependent on ventilator  Continue vent settings.  Chlorhexidine orally every day.  Adjust vent if needed.    Vent Mode: A/C  Oxygen Concentration (%):  [30  Resp Rate Total: 16  Vt Set:  500 mL  PEEP/CPAP:  5 cmH20  Pressure Support:   0 cmH20    S/P percutaneous endoscopic gastrostomy (PEG) tube placement  Will have nurses provide routine PEG care.      ALS (amyotrophic lateral sclerosis)  Chronic.  No acute need to .      Essential hypertension  Will monitor blood pressure.  Use anti-hypertensives when needed.        VTE Risk Mitigation (From admission, onward)         Ordered     apixaban tablet 2.5 mg  2 times daily         06/09/22 1559     IP VTE HIGH RISK PATIENT  Once         06/09/22 1600     Place sequential compression device  Until discontinued         06/09/22 1600     Reason for No Pharmacological VTE Prophylaxis  Once        Question:  Reasons:  Answer:  Already adequately anticoagulated on oral Anticoagulants    06/09/22 1600                Discharge Planning   VENESSA: 6/15/2022     Code Status: Full Code   Is the patient medically ready for discharge?:     Reason for patient still in hospital (select all that apply): Patient trending condition, Treatment and Consult recommendations  Discharge Plan A: Return to nursing home          Aquiles Melendez MD  Department of Hospital Medicine   [facility]

## 2022-06-14 NOTE — HOSPITAL COURSE
He was admitted with sepsis from two potential sources, one being the lower respiratory tract and the other being the urinary tract.  He was put on broad spectrum IVAB.  Respiratory culture was collected.  Blood culture had Staphylococcus epidermidis from two separate sticks.   The ID service was consulted.  Pt required Levophed for a short time -- about 24 hours.  Meds were given to stimulate bowel movements after abdominal x-ray showed prominent amount of stool in lower colon.  The treatment worked.

## 2022-06-14 NOTE — PLAN OF CARE
3 day packet, discharge summary, transfer facility orders, and AVS sent to [place] to review for pts return. CM following.    06/14/22 1206   Post-Acute Status   Post-Acute Authorization Placement   Post-Acute Placement Status Pending payor medical review/second level review

## 2022-06-14 NOTE — DISCHARGE SUMMARY
[facility] Medicine  Discharge Summary      Patient Name: Wei Dominique  MRN: [id number]  Admission Date: 6/9/2022  Hospital Length of Stay: 5 days  Discharge Date and Time:  06/14/2022 12:09 PM  Attending Physician: Naif Huynh MD   Discharging Provider: Naif Huynh MD  Primary Care Provider: Mack Suero MD        HPI:     Patient is a 66 y.o. male with significant past medical history of ALS, s/p trach & peg, vent dependence, HTN, PE (on eliquis).  [facility] was [facility] with hypoxia.  His sats reportedly were in the 40's.  At that time, he was taken off the ventilator and ventilated with ambu-bag.  Sats responded appropriately.  No other events reported.  Workup done in ED showed pneumonia.  Also has UTI.              * No surgery found *      Hospital Course:     The patient was admitted with hypoxia believed result from a pneumonia.  Beyond this he was found to have a UTI in addition to Staph epidermidis bacteremia.    Infectious disease was consulted and patient was placed on antibiotics.  With this the patient's respiratory status improved significantly.  Infectious Disease consultant has recommended vancomycin and Zosyn for a total of 7 days, through 06/17/2022.  The result of the serratia  Sensitivities were discussed with ID, specifically the intermediate sensitivity to zosyn.  ID felt the serratia was a colonized organism and not a pathogen as the patient was improving.     Regarding the bacteremia, repeat cultures remain negative.  Echocardiogram did not demonstrate vegetation.    * Septic shock 2/2 UTI, Staph epi bacteremia, acute pneumonia   Resolved.  Off of Levophed.     Hyperglycemia  CBG's are stable while he's on Glucerna.  No need to continue sticking his fingers.  He's not diabetic.           POCT Glucose   Date Value Ref Range Status   06/12/2022 142 (H) 70 - 110 mg/dL Final   06/12/2022 130 (H) 70 - 110 mg/dL Final    06/11/2022 151 (H) 70 - 110 mg/dL Final   06/11/2022 160 (H) 70 - 110 mg/dL Final   06/11/2022 146 (H) 70 - 110 mg/dL Final   06/11/2022 145 (H) 70 - 110 mg/dL Final   06/11/2022 159 (H) 70 - 110 mg/dL Final               Catheter-associated urinary tract infection 2/2 PSEUDOMONAS AERUGINOSA    In ED, they switched out Hutchinson for a new one.  He's on IVAB.  Urine culture growing  Pseudomonas.  Consulting with infectious diseases physician.  -as above        Gram-negative pneumonia  Continue antibiotics.  Monitor for improvement.  Respiratory culture growing Serratia, Pseudomonas, and Proteus  Imaging shows chronic consolidation of RLL.  Pneumonia in RLL?  Consulting with infectious disease specialist.     Tracheostomy in place  Have nurses provide routine trach care.        History of pulmonary embolism  Continue apixaban.        Dependent on ventilator  Continue vent settings.  Chlorhexidine orally every day.  Adjust vent if needed.    S/P percutaneous endoscopic gastrostomy (PEG) tube placement  Will have nurses provide routine PEG care.        ALS (amyotrophic lateral sclerosis)  Chronic.  No acute need to .        Essential hypertension  Will monitor blood pressure.  Use anti-hypertensives when needed.        Acute bacterial pneumonia 2/2 Pseudomonas aeruginosa, Proteus mirabilis ESBL  -ID deemed Serratia marcescens to be a colonizer       STAPHYLOCOCCUS EPIDERMIDIS bacteremia  -source not identified.   -TTE without vegetation  -ID recommended no further work up as blood cultures negative.   -Patient was central line free  -midline to be placed at discharge and removed at end of antibiotic therapy             Consults:   Consults (From admission, onward)        Status Ordering Provider     Inpatient consult to Midline team  Once        Provider:  (Not yet assigned)    Acknowledged [facility]     Inpatient consult to Infectious Diseases  Once        Provider:  Tavia Jones MD  "   Completed AYSHA KRAUSE     Inpatient consult to Registered Dietitian/Nutritionist  Once        Provider:  (Not yet assigned)    Completed AYSHA KRAUSE     Inpatient consult to PICC team ([facility])  Once        Provider:  (Not yet assigned)    Completed AYSHA KRAUSE     Pharmacy to dose Vancomycin consult  Once        Provider:  (Not yet assigned)   "And" Linked Group Details    Completed AYSHA KRAUSE          Final Active Diagnoses:    Diagnosis Date Noted POA    PRINCIPAL PROBLEM:  Septic shock [A41.9, R65.21] 06/09/2022 Yes    Gram-negative pneumonia [J15.6] 06/09/2022 Yes    Catheter-associated urinary tract infection [T83.511A, N39.0] 06/09/2022 Yes    Hyperglycemia [R73.9] 06/09/2022 Yes    History of pulmonary embolism [Z86.711] 05/23/2022 Yes    Tracheostomy in place [Z93.0] 05/23/2022 Not Applicable    Dependent on ventilator [Z99.11] 03/21/2022 Not Applicable    S/P percutaneous endoscopic gastrostomy (PEG) tube placement [Z93.1] 10/13/2020 Not Applicable    ALS (amyotrophic lateral sclerosis) [G12.21] 12/09/2018 Yes    Essential hypertension [I10] 12/09/2018 Yes      Problems Resolved During this Admission:      Discharged Condition: stable    Disposition: Long Term Care    Follow Up:   Follow-up Information     [facility] Follow up.    Why: Nursing Home  Contact information:  [place] [address]  [phone]                       Patient Instructions:      Notify your health care provider if you experience any of the following:  temperature >100.4     Notify your health care provider if you experience any of the following:  persistent nausea and vomiting or diarrhea     Notify your health care provider if you experience any of the following:  severe uncontrolled pain     Notify your health care provider if you experience any of the following:  redness, tenderness, or signs of infection (pain, swelling, redness, odor or green/yellow discharge around " incision site)     Notify your health care provider if you experience any of the following:  difficulty breathing or increased cough     Notify your health care provider if you experience any of the following:  severe persistent headache     Notify your health care provider if you experience any of the following:  worsening rash     Notify your health care provider if you experience any of the following:  persistent dizziness, light-headedness, or visual disturbances     Notify your health care provider if you experience any of the following:  increased confusion or weakness     Medications:  Reconciled Home Medications:      Medication List      START taking these medications    PIPERACILLIN-TAZOBACTAM 4.5G/100ML D5W IVPB (READY TO MIX)  Inject 100 mLs (4.5 g total) into the vein every 8 (eight) hours. for 3 days     VANCOMYCIN 1 G/250 ML D5W (READY TO MIX SYSTEM)  Inject 125 mLs (500 mg total) into the vein before dinner. for 3 days        CHANGE how you take these medications    ALPRAZolam 0.5 MG tablet  Commonly known as: XANAX  Take 1 tablet (0.5 mg total) by mouth 3 (three) times daily as needed for Anxiety.  What changed: reasons to take this     apixaban 2.5 mg Tab  Commonly known as: ELIQUIS  1 tablet (2.5 mg total) by Per G Tube route 2 (two) times daily.  What changed: how to take this     baclofen 10 MG tablet  Commonly known as: LIORESAL  TAKE 1 TABLET THREE TIMES PER DAY  What changed: See the new instructions.     glycopyrrolate 1 mg Tab  Commonly known as: ROBINUL  START BY TAKING 1 TABLET DAILY. IF TOLERATING WELL, YOU CAN INCREASE IT UP TO 2 OR EVEN 3 TIMES DAILY.  What changed: See the new instructions.        CONTINUE taking these medications    albuterol-ipratropium 2.5 mg-0.5 mg/3 mL nebulizer solution  Commonly known as: DUO-NEB  Take 3 mLs by nebulization every 4 (four) hours. Rescue     busPIRone 5 MG Tab  Commonly known as: BUSPAR  1 tablet (5 mg total) by Per G Tube route 2 (two) times  daily.     chlorhexidine 0.12 % solution  Commonly known as: PERIDEX  Use as directed 15 mLs in the mouth or throat 2 (two) times daily.     furosemide 40 MG tablet  Commonly known as: LASIX  1 tablet (40 mg total) by Per G Tube route once daily.     glycerin adult suppository  Place 1 suppository rectally once daily.     lansoprazole 30 MG capsule  Commonly known as: PREVACID  Take 30 mg by mouth once daily.     melatonin 3 mg Tbdl  3 mg by Per G Tube route every evening.     metoprolol tartrate 50 MG tablet  Commonly known as: LOPRESSOR  Take 50 mg by mouth once daily.     ondansetron 4 MG tablet  Commonly known as: ZOFRAN  4 mg by Per G Tube route every 6 (six) hours as needed for Nausea.     pantoprazole 40 mg suspension  Commonly known as: PROTONIX  40 mg by FEEDING TUBE route 2 (two) times a day.     polyethylene glycol 17 gram Pwpk  Commonly known as: GLYCOLAX  17 g by Per G Tube route 2 (two) times daily.     riluzole 50 mg Tab tablet  TAKE 1 TABLET ON AN EMPTY STOMACH EVERY 12 HRS     traZODone 50 MG tablet  Commonly known as: DESYREL  50 mg by Per G Tube route every evening.        STOP taking these medications    phenazopyridine 200 MG tablet  Commonly known as: PYRIDIUM                Pending Diagnostic Studies:     None        Indwelling Lines/Drains at time of discharge:   Lines/Drains/Airways     Peripherally Inserted Central Catheter Line  Duration           PICC Triple Lumen 06/09/22 2315 right basilic 4 days          Drain  Duration                Gastrostomy/Enterostomy LUQ feeding -- days         Urethral Catheter 06/09/22 1100 Silicone 16 Fr. 5 days          Airway  Duration                Surgical Airway Shiley Cuffed -- days         Surgical Airway 02/21/22 1145 Shiley Cuffed 112 days                Time spent on the discharge of patient: 35 minutes    Critical care time spent on the evaluation and treatment of severe organ dysfunction, review of pertinent labs and imaging studies, discussions  with consulting providers and discussions with patient/family: 35 minutes.     Naif Huynh MD  Department of Hospital Medicine  [facility]

## 2022-06-14 NOTE — NURSING
Triple lumen PICC line removed from right arm; petroleum gauze applied, sterile gauze and tegaderm applied. Pressure held for 5 minutes.

## 2022-06-14 NOTE — NURSING
Report called to Kitty SAUCEDO at [facility]. All questions answered. CM set up transport--awaiting EMS arrival.

## 2022-06-14 NOTE — NURSING
Rounding on patient and found patient to have blood in mouth and on towel on chest. Patient confirmed he bit his tongue involuntarily. Oral care/suctioning provided. PRN norco and benzocaine gel given for comfort. Gauze in place to assist with clotting.

## 2022-06-14 NOTE — PLAN OF CARE
Per Kitty at [facility] please fax orders to her at [phone]. Orders sent and she will call [facility] at [phone] for report. CM following.        Per pts nurse - she has not heard from [facility] and is still awaiting pt to get a midline placed.     06/14/22 1247   Post-Acute Status   Post-Acute Authorization Placement   Post-Acute Placement Status Pending payor medical review/second level review

## 2022-06-14 NOTE — PROGRESS NOTES
Pharmacokinetic Assessment Follow Up: IV Vancomycin    Vancomycin serum concentration assessment(s):    The random level was drawn correctly and can be used to guide therapy at this time. The measurement is within the desired definitive target range of 15 to 20 mcg/mL.    Vancomycin Regimen Plan:    Vancomycin 500 mg x one dose today. Next level to be measured on 6/14/22 @ 1700    Drug levels (last 3 results):  Recent Labs   Lab Result Units 06/11/22  0500 06/12/22  1242 06/13/22  1429   Vancomycin, Random ug/mL 20.2 19.1 19.8       Pharmacy will continue to follow and monitor vancomycin.    Please contact pharmacy at extension [phone] for questions regarding this assessment.    Thank you for the consult,   Allie Tan       Patient brief summary:  Wei Dominique is a 66 y.o. male initiated on antimicrobial therapy with IV Vancomycin for treatment of lower respiratory infection      Drug Allergies:   Review of patient's allergies indicates:  No Known Allergies    Actual Body Weight:   77.1 kg    Renal Function:   Estimated Creatinine Clearance: 101.8 mL/min (based on SCr of 0.7 mg/dL).,     Dialysis Method (if applicable):  N/A    CBC (last 72 hours):  Recent Labs   Lab Result Units 06/11/22  0529 06/12/22  0408 06/13/22  0316   WBC K/uL 13.30* 14.92* 9.85   Hemoglobin g/dL 9.2* 9.3* 8.7*   Hematocrit % 30.7* 31.1* 30.2*   Platelets K/uL 340 305 312   Gran % % 80.6* 88.5* 71.0   Lymph % % 8.3* 4.9* 13.4*   Mono % % 4.6 2.7* 5.5   Eosinophil % % 5.5 3.3 9.1*   Basophil % % 0.5 0.3 0.5   Differential Method  Automated Automated Automated       Metabolic Panel (last 72 hours):  Recent Labs   Lab Result Units 06/11/22  0500 06/12/22  0408 06/13/22  0316 06/13/22  0908   Sodium mmol/L 141 142 141  --    Potassium mmol/L 3.6 3.8 3.6 4.6   Chloride mmol/L 107 106 105  --    CO2 mmol/L 25 25 24  --    Glucose mg/dL 125* 116* 110  --    BUN mg/dL 41* 37* 43*  --    Creatinine mg/dL 0.7 0.6 0.7  --    Albumin g/dL 2.6* 2.5*  2.5*  --    Total Bilirubin mg/dL 1.2* 1.8* 0.8  --    Alkaline Phosphatase U/L 64 68 66  --    AST U/L 21 19 16  --    ALT U/L 28 27 24  --    Magnesium mg/dL 2.3 2.6 2.7*  --    Phosphorus mg/dL 3.0 2.7 2.4*  --        Vancomycin Administrations:  vancomycin given in the last 96 hours                     vancomycin 500 mg in dextrose 5 % 100 mL IVPB (ready to mix system) (mg) 500 mg New Bag 06/13/22 1704    vancomycin 750 mg in dextrose 5 % 250 mL IVPB (ready to mix system) (mg) 750 mg New Bag 06/12/22 1520    vancomycin in dextrose 5 % 1 gram/250 mL IVPB 1,000 mg ()  Restarted 06/11/22 1429     1,000 mg New Bag  1339    vancomycin 1.25 g in dextrose 5% 250 mL IVPB (ready to mix) (mg) 1,250 mg New Bag 06/10/22 0054                    Microbiologic Results:  Microbiology Results (last 7 days)       Procedure Component Value Units Date/Time    Urine culture [[id number]]  (Abnormal)  (Susceptibility) Collected: 06/09/22 1059    Order Status: Completed Specimen: Urine Updated: 06/13/22 1112     Urine Culture, Routine PSEUDOMONAS AERUGINOSA   >100,000 cfu/ml      Narrative:      Specimen Source->Urine    Culture, Respiratory with Gram Stain [[id number]]  (Abnormal)  (Susceptibility) Collected: 06/09/22 1632    Order Status: Completed Specimen: Respiratory from Tracheal Aspirate Updated: 06/13/22 0922     Respiratory Culture No S aureus isolated.      SERRATIA MARCESCENS  Few        PSEUDOMONAS AERUGINOSA  Few        PROTEUS MIRABILIS ESBL  Few  Normal respiratory marco also present       Gram Stain (Respiratory) Rare WBC's     Gram Stain (Respiratory) Few Gram negative rods     Gram Stain (Respiratory) Rare Gram positive rods    Blood culture [[id number]] Collected: 06/11/22 1601    Order Status: Completed Specimen: Blood from Line, PICC Right Brachial Updated: 06/13/22 0612     Blood Culture, Routine No Growth to date      No Growth to date    Narrative:      From picc    Blood culture x two cultures. Draw prior to  antibiotics. [[id number]]  (Abnormal)  (Susceptibility) Collected: 06/09/22 1031    Order Status: Completed Specimen: Blood Updated: 06/12/22 0923     Blood Culture, Routine Gram stain peds bottle: Gram positive cocci in clusters resembling Staph      Results called to and read back by:Edilma Chin RN 06/10/2022  15:43      STAPHYLOCOCCUS EPIDERMIDIS    Narrative:      Aerobic and anaerobic    Blood culture x two cultures. Draw prior to antibiotics. [[id number]]  (Abnormal)  (Susceptibility) Collected: 06/09/22 1031    Order Status: Completed Specimen: Blood from Antecubital, Right Hand Updated: 06/12/22 0922     Blood Culture, Routine Gram stain peds bottle: Gram positive cocci in clusters resembling Staph      Results called to and read back by:Edilma Chin RN 06/10/2022  15:44      STAPHYLOCOCCUS EPIDERMIDIS    Narrative:      Aerobic and anaerobic

## 2022-06-14 NOTE — PLAN OF CARE
[facility]     Department of Hospital Medicine     [address] [place] [address]     [phone] after hours  [phone] fax       [facility] ORDERS    06/14/2022    Admit to [facility]: Regular Bed    Diagnoses:  Active Hospital Problems    Diagnosis  POA    *Septic shock [A41.9, R65.21]  Yes    Gram-negative pneumonia [J15.6]  Yes    Catheter-associated urinary tract infection [T83.511A, N39.0]  Yes    Hyperglycemia [R73.9]  Yes    History of pulmonary embolism [Z86.711]  Yes    Tracheostomy in place [Z93.0]  Not Applicable    Dependent on ventilator [Z99.11]  Not Applicable    S/P percutaneous endoscopic gastrostomy (PEG) tube placement [Z93.1]  Not Applicable    ALS (amyotrophic lateral sclerosis) [G12.21]  Yes    Essential hypertension [I10]  Yes      Resolved Hospital Problems   No resolved problems to display.       Patient is homebound due to:  Septic shock    Allergies:Review of patient's allergies indicates:  No Known Allergies    Vitals:     Every shift for one week then routine    Diet: NPO    Glucerna 1.5 Jaya at 50 cc/hr by G tube in tube feeding bag with 125 cc free water flush every 4 hours plus lupe BID by G tube        Acitivities:     - Up in a chair each morning as tolerated     LABS:  CBC, CMP, Mg, Phos, vancomycin trough every three days for two weeks. First shlomo on 6/15 prior to vancomycin dose.     Routine per facility thereafter.     Vent:   A/C mode  Vt: 500  Set rate: 16  PEEP: 5  FiO2: 30%      Nursing Precautions:     - Fall precautions per nursing home protocol     - Decubitus precautions:        -  for positioning   - Pressure reducing foam mattress   - Turn patient every two hours. Use wedge pillows to anchor patient    CONSULTS:       Speech Therapy  to evaluate and treat     Nutrition to evaluate and recommend diet        MISCELLANEOUS CARE:      PEG Care:  Clean site every 24 hours     Hutchinson Care: Empty  Hutchinson bag every shift.  Change Hutchinson every month     Routine Skin for Bedridden Patients:  Apply moisture barrier cream to all    skin folds and wet areas in perineal area daily and after baths and                           all bowel movements.    Routine midline care. Remove midline after end of antibiotic therapy on 6/17      Wound:     Clean bilateral buttocks and sacrum with mild soap and water or mild bath wipes and dry. Apply Triad hydrophilic wound dressing ointment and leave open to air every shift                   Medications: Discontinue all previous medication orders, if any. See new list below.    Next dose  -Vancomycin 500 mg at 5 PM  -Zosyn 4.5 grams at 8:30 PM        Medication List      START taking these medications    PIPERACILLIN-TAZOBACTAM 4.5G/100ML D5W IVPB (READY TO MIX)  Inject 100 mLs (4.5 g total) into the vein every 8 (eight) hours. for 3 days     VANCOMYCIN 1 G/250 ML D5W (READY TO MIX SYSTEM)  Inject 125 mLs (500 mg total) into the vein before dinner. for 3 days        CHANGE how you take these medications    ALPRAZolam 0.5 MG tablet  Commonly known as: XANAX  Take 1 tablet (0.5 mg total) by mouth 3 (three) times daily as needed for Anxiety.  What changed: reasons to take this     apixaban 2.5 mg Tab  Commonly known as: ELIQUIS  1 tablet (2.5 mg total) by Per G Tube route 2 (two) times daily.  What changed: how to take this     baclofen 10 MG tablet  Commonly known as: LIORESAL  TAKE 1 TABLET THREE TIMES PER DAY  What changed: See the new instructions.     glycopyrrolate 1 mg Tab  Commonly known as: ROBINUL  START BY TAKING 1 TABLET DAILY. IF TOLERATING WELL, YOU CAN INCREASE IT UP TO 2 OR EVEN 3 TIMES DAILY.  What changed: See the new instructions.        CONTINUE taking these medications    albuterol-ipratropium 2.5 mg-0.5 mg/3 mL nebulizer solution  Commonly known as: DUO-NEB  Take 3 mLs by nebulization every 4 (four) hours. Rescue     busPIRone 5 MG Tab  Commonly known as: BUSPAR  1  tablet (5 mg total) by Per G Tube route 2 (two) times daily.     chlorhexidine 0.12 % solution  Commonly known as: PERIDEX  Use as directed 15 mLs in the mouth or throat 2 (two) times daily.     furosemide 40 MG tablet  Commonly known as: LASIX  1 tablet (40 mg total) by Per G Tube route once daily.     glycerin adult suppository  Place 1 suppository rectally once daily.     lansoprazole 30 MG capsule  Commonly known as: PREVACID  Take 30 mg by mouth once daily.     melatonin 3 mg Tbdl  3 mg by Per G Tube route every evening.     metoprolol tartrate 50 MG tablet  Commonly known as: LOPRESSOR  Take 50 mg by mouth once daily.     ondansetron 4 MG tablet  Commonly known as: ZOFRAN  4 mg by Per G Tube route every 6 (six) hours as needed for Nausea.     pantoprazole 40 mg suspension  Commonly known as: PROTONIX  40 mg by FEEDING TUBE route 2 (two) times a day.     polyethylene glycol 17 gram Pwpk  Commonly known as: GLYCOLAX  17 g by Per G Tube route 2 (two) times daily.     riluzole 50 mg Tab tablet  TAKE 1 TABLET ON AN EMPTY STOMACH EVERY 12 HRS     traZODone 50 MG tablet  Commonly known as: DESYREL  50 mg by Per G Tube route every evening.        STOP taking these medications    phenazopyridine 200 MG tablet  Commonly known as: PYRIDIUM                  _________________________________  Naif Huynh MD  06/14/2022

## 2022-06-14 NOTE — SUBJECTIVE & OBJECTIVE
Interval History:  no new issues.  Tolerating the tube feeds.    Review of Systems   Unable to perform ROS: Other (tracheostomy connected to ventilator)   Objective:     Vital Signs (Most Recent):  Temp: 98.2 °F (36.8 °C) (06/13/22 1600)  Pulse: 100 (06/13/22 2024)  Resp: 17 (06/13/22 2051)  BP: 132/66 (06/13/22 1800)  SpO2: 100 % (06/13/22 2024)   Vital Signs (24h Range):  Temp:  [97.5 °F (36.4 °C)-98.2 °F (36.8 °C)] 98.2 °F (36.8 °C)  Pulse:  [] 100  Resp:  [16-27] 17  SpO2:  [100 %] 100 %  BP: (122-165)/() 132/66     Weight: 77.1 kg (170 lb)  Body mass index is 25.85 kg/m².    Intake/Output Summary (Last 24 hours) at 6/13/2022 2221  Last data filed at 6/13/2022 1804  Gross per 24 hour   Intake 1755 ml   Output 1741 ml   Net 14 ml        Physical Exam  Constitutional:       General: He is not in acute distress.  HENT:      Head: Normocephalic and atraumatic.      Mouth/Throat:      Comments: Tongue has deep fissures  Eyes:      Conjunctiva/sclera:      Right eye: No exudate.     Left eye: No exudate.     Pupils: Pupils are equal, round, and reactive to light.   Neck:      Vascular: No JVD.      Trachea: Tracheostomy present.   Cardiovascular:      Rate and Rhythm: Regular rhythm. Tachycardia present.      Comments: Edema in both upper extremities  Pulmonary:      Breath sounds: Normal breath sounds.   Abdominal:      General: Abdomen is protuberant. Bowel sounds are normal. There is distension.      Tenderness: There is no abdominal tenderness.      Comments: G-tube site appears normal   Musculoskeletal:      Cervical back: Neck supple.      Right lower leg: Edema present.      Left lower leg: Edema present.   Neurological:      Mental Status: He is alert.      Comments: Communicates by blinking       Significant Labs: All pertinent labs within the past 24 hours have been reviewed.    Significant Imaging: I have reviewed all pertinent imaging results/findings within the past 24 hours.

## 2022-06-14 NOTE — CARE UPDATE
06/13/22 2024   Patient Assessment/Suction   Level of Consciousness (AVPU) alert   Respiratory Effort Unlabored   Expansion/Accessory Muscles/Retractions no use of accessory muscles;no retractions;expansion symmetric   All Lung Fields Breath Sounds Anterior:;Lateral:;coarse;rhonchi   Rhythm/Pattern, Respiratory assisted mechanically   Cough Frequency with stimulation   Cough Type fair   Suction Method oral;tracheal   Suction Pressure (mmHg) 100 mmHg   $ Suction Charges Inline Suction Procedure Stat Charge   Secretions Amount large   Secretions Color cloudy   Secretions Characteristics thick   Sputum Collection sample obtained per suctioning   $ Swab or suction? Suction   Aspirate Toleration KARMEN (no adverse reactions)   Sent to the lab? No   PRE-TX-O2   O2 Device (Oxygen Therapy) ventilator   $ Is the patient on Low Flow Oxygen? Yes   Oxygen Concentration (%) 30   SpO2 100 %   Pulse Oximetry Type Continuous   $ Pulse Oximetry - Multiple Charge Pulse Oximetry - Multiple   Pulse 100   Resp 16        Surgical Airway Shiley Cuffed   No placement date or time found.   Present Prior to Hospital Arrival?: Yes  Brand: Shiley  Airway Device Size: 8.0  Style: Cuffed   Cuff Pressure 32 cm H2O   Cuff Inflation? Inflated   Status Secured   Site Assessment Clean;Dry;No bleeding;No drainage        Surgical Airway 02/21/22 1145 Shiley Cuffed   Placement Date/Time: 02/21/22 1145   Present Prior to Hospital Arrival?: Yes  Type: Tracheostomy  Brand: Shiley  Airway Device Size: 8.0  Style: Cuffed   Cuff Pressure 32 cm H2O   Cuff Inflation? Inflated   Status Secured   Site Assessment Clean;Dry   Ties Assessment Intact;Secure   Airway Safety   Ambu bag with the patient? Yes, Adult Ambu   Is mask with the patient? Yes, Adult Mask   Suction set is at the bedside? Yes   Vent Select   Conventional Vent Y   Charged w/in last 24h YES   Preset Conventional Ventilator Settings   Vent Type    Ventilation Type VC   Vent Mode A/C   Humidity  HME   Set Rate 16 BPM   Vt Set 500 mL   PEEP/CPAP 5 cmH20   Pressure Support 0 cmH20   Waveform RAMP   Peak Flow 60 L/min   Set Inspiratory Pressure 0 cmH20   Insp Time 0 Sec(s)   Plateau Set/Insp. Hold (sec) 0   Insp Rise Time  0 %   Trigger Sensitivity Flow/I-Trigger 3 L/min   P High 0 cm H2O   P Low 0 cm H2O   T High 0 sec   T Low 0 sec   Patient Ventilator Parameters   Resp Rate Total 20 br/min   Peak Airway Pressure 39 cmH2O   Mean Airway Pressure 12 cmH20   Plateau Pressure 0 cmH20   Exhaled Vt 350 mL   Total Ve 5.9 mL   Spont Ve 0 L   I:E Ratio Measured 1:3.20   Conventional Ventilator Alarms   Alarms On Y   Ve High Alarm 25 L/min   Ve Low Alarm 3 L/min   Vt High Alarm 1100 mL   Vt Low Alarm 250 mL   Resp Rate High Alarm 40 br/min   Press High Alarm 50 cmH2O   Apnea Rate 16   Apnea Volume (mL) 500 mL   Apnea Oxygen Concentration  40   Apnea Flow Rate (L/min) 60   T Apnea 30 sec(s)   Ready to Wean/Extubation Screen   FIO2<=50 (chart decimal) 0.3   MV<16L (chart vol.) 5.9   PEEP <=8 (chart #) 5   Ready to Wean Parameters   F/VT Ratio<105 (RSBI) (!) 45.71      No

## 2022-06-14 NOTE — PROGRESS NOTES
"Consult Note  Infectious Disease    Reason for Consult:  Sepsis, multiple positive cultures    HPI: Wei Dominique is a 66 y.o. male  "with a PMHx of ALS with trach & peg, vent dependent, HTN, and PE (on Eiquis) presenting to the ED(6/9) from [facility] due to an episode of hypoxemia prior to arrival. EMS states patient's sats dropped into the 40s, he turned grey in color, and they removed his ventilator and began manually bagging the patient. Patient's color returned shortly after but his pulse has been in the 130s since the incident. Patient is nonverbal however EMS states he "blinks once for yes and twice for no". He denied any pain en route however seems more fatigued than normal for EMS." per ED MD meri castellanos. He was here 2 weeks ago for tracheostomy occlusion as well, requiring a tracheostomy change and transfusion for severe anemia and disimpaction.  In the ED he had a temp of 99.2, WBC 15K, pyuria, hyperglycemia 300, abnormal CXR(unchanged from 2/2022  CXR and CT at which time he had RLL atelectasis/consolidation on CT, same as 5/2022 as well), abdominal distention felt to be due to constipation.   He was cultured and placed on vancomycin, zosyn and gentamicin.    Admitted to ICU under hospital medicine. portacath in situ, but would not aspirate despite cathflo. picc placed 6/9 pm.  Blood cultures became positive for GPC yesterday am,( drawn from antecubital right hand and antecubital left hand), he required fluid bolus and initiation of levophed. This was identified as Staph epidermidis. Urine culture with pseudomonas and meropenem added to zosyn and gent stopped due to shortage. Sputum with pseudomonas and Proteus, secretions are white to yellow.. He has had no fever since admission. WBC are about the same. Lactic acids all normal, Vent needs are stable, levophed needs decreased, urine output is good.     6/12: interim reviewed. Off levophed. tmax 99, WBC 15, urine culture with recurrent " pseudomonas , sensitive to zosyn (also cefepime), sputum culture with multiple orgs, blood cultures with identical Staph epi. Echo not yet done. Repeat blood culture negative so far. procalcitonin borderline, PSA normal. He looks comfortable but does indicate constipation related abd pain. The multiple cathartics he received yesterday have not worked. KUB noted.    6/13:  Interim reviewed, discussed with Dr. Jones.  Patient seen and examined at bedside, blinking once for yes twice for no.  Looks comfortable, trach to vent FiO2 21%.  Thick, tan, creamy secretions noted.  Labs reviewed, white count 9.8, no left shift, H&H 8.7/30.2, platelet count 312.  Stable kidney function, normal electrolytes.  Micro reviewed, respiratory culture polymicrobial; Serratia MRSA insists Pseudomonas aeruginosa both sensitive to cefepime.  And Proteus mirabilis ESBL sensitive to gentamicin, Bactrim, and carbapenems.    6/14:  Patient seen and examined at bedside, trach to vent FiO2 30%, secretions are less purulent, significant amount of oral secretions suctioned.  Hemodynamically stable, afebrile.  Echo reviewed, no mention of vegetations, all valves visualized.    EXAM & DIAGNOSTICS REVIEWED:   Vitals:     Temp:  [97.8 °F (36.6 °C)-98.2 °F (36.8 °C)]   Temp: 98 °F (36.7 °C) (06/14/22 0400)  Pulse: 85 (06/14/22 0645)  Resp: 16 (06/14/22 0645)  BP: (!) 101/53 (06/14/22 0600)  SpO2: 99 % (06/14/22 0645)    Intake/Output Summary (Last 24 hours) at 6/14/2022 0818  Last data filed at 6/14/2022 0600  Gross per 24 hour   Intake 1835 ml   Output 1515 ml   Net 320 ml       General:  In NAD. Alert and attentive, cooperative to the degree he can be, appears comfortable. Beyond quadriplegic, can only blink his eyes and grind his teeth  Eyes:  Anicteric, PERRL, EOMI  ENT:  Exam is limited by extremely high tone in jaw muscles. He has fair to poor dentition with loose lower posterior molars. He grinds his teeth audibly.   Neck:  Tracheostomy to  vent. Secretions less  Lungs: Clear on left, decreased BS RLL  Heart:  RRR, no gallop/murmur/rub noted  Abd:  Soft, NT, mildly distended, hypoactive,  BS, no masses or organomegaly appreciated. PEG in place, no signs of infection  :   Hutchinson, urine clear, meatus is fissured ventrally. No discharge. No scrotal swelling  Musc:  Joints without effusion, swelling, erythema, synovitis, .   Skin:  No rashes.   Neuro: Alert, attentive, quadriplegia, can only blink. Grinds his teeth but cannot open his mouth  Psych:  Calm, cooperative  Lymphatic:       Extrem: Generalized firm edema of disuse., with very edematous hands. no erythema, phlebitis, cellulitis, warm and well perfused  VAD:  Left upper chest port, not accessed. No redness or swelling. Right arm picc line 6/9    Isolation:  contact  Wound:                General Labs reviewed:  Recent Labs   Lab 06/12/22  0408 06/13/22  0316 06/14/22  0325   WBC 14.92* 9.85 8.92   HGB 9.3* 8.7* 8.2*   HCT 31.1* 30.2* 27.4*    312 280       Recent Labs   Lab 06/12/22  0408 06/13/22  0316 06/13/22  0908 06/14/22  0325    141  --  139   K 3.8 3.6 4.6 4.4    105  --  104   CO2 25 24  --  24   BUN 37* 43*  --  34*   CREATININE 0.6 0.7  --  0.7   CALCIUM 9.9 9.8  --  9.5   PROT 7.6 7.5  --  7.4   BILITOT 1.8* 0.8  --  0.7   ALKPHOS 68 66  --  68   ALT 27 24  --  23   AST 19 16  --  16     No results for input(s): CRP in the last 168 hours.    Estimated Creatinine Clearance: 101.8 mL/min (based on SCr of 0.7 mg/dL).      Micro:  2/22/22: urine: providencia  4/17/22: urine and blood: ESBL Ecoli  5/17/22: urine with pseudomonas (sen to pip/tazo, gent,cefepime,avycaz,zerbaxa)  5/22/22:sputum with proteus ESBL and pseudomonas (same, plus sens to colistin)    Microbiology Results (last 7 days)     Procedure Component Value Units Date/Time    Blood culture [[id number]] Collected: 06/11/22 1601    Order Status: Completed Specimen: Blood from Line, PICC Right Brachial  Updated: 06/14/22 0612     Blood Culture, Routine No Growth to date      No Growth to date      No Growth to date    Narrative:      From picc    Urine culture [[id number]]  (Abnormal)  (Susceptibility) Collected: 06/09/22 1059    Order Status: Completed Specimen: Urine Updated: 06/13/22 1112     Urine Culture, Routine PSEUDOMONAS AERUGINOSA   >100,000 cfu/ml      Narrative:      Specimen Source->Urine    Culture, Respiratory with Gram Stain [[id number]]  (Abnormal)  (Susceptibility) Collected: 06/09/22 1632    Order Status: Completed Specimen: Respiratory from Tracheal Aspirate Updated: 06/13/22 0922     Respiratory Culture No S aureus isolated.      SERRATIA MARCESCENS  Few        PSEUDOMONAS AERUGINOSA  Few        PROTEUS MIRABILIS ESBL  Few  Normal respiratory marco also present       Gram Stain (Respiratory) Rare WBC's     Gram Stain (Respiratory) Few Gram negative rods     Gram Stain (Respiratory) Rare Gram positive rods    Blood culture x two cultures. Draw prior to antibiotics. [[id number]]  (Abnormal)  (Susceptibility) Collected: 06/09/22 1031    Order Status: Completed Specimen: Blood Updated: 06/12/22 0923     Blood Culture, Routine Gram stain peds bottle: Gram positive cocci in clusters resembling Staph      Results called to and read back by:Edilma Chin RN 06/10/2022  15:43      STAPHYLOCOCCUS EPIDERMIDIS    Narrative:      Aerobic and anaerobic    Blood culture x two cultures. Draw prior to antibiotics. [[id number]]  (Abnormal)  (Susceptibility) Collected: 06/09/22 1031    Order Status: Completed Specimen: Blood from Antecubital, Right Hand Updated: 06/12/22 0922     Blood Culture, Routine Gram stain peds bottle: Gram positive cocci in clusters resembling Staph      Results called to and read back by:Edilma Chin RN 06/10/2022  15:44      STAPHYLOCOCCUS EPIDERMIDIS    Narrative:      Aerobic and anaerobic          Imaging Reviewed:   CXRs through feb 2022   CT abd x 2     Cardiology: ECHO  · The left  ventricle is normal in size with concentric remodeling and low normal systolic function.  · The estimated ejection fraction is 50%.  · Indeterminate left ventricular diastolic function.  · Normal right ventricular size with normal right ventricular systolic function.  · Mild tricuspid regurgitation.    All valves visualized, no mention of vegetations    IMPRESSION & PLAN     1. Hypoxemic event on admission, resolved    2. Chronic RLL opacification (at least 4 months), chronically colonized with GNRs   Respiratory culture grew Serratia marcescens, Pseudomonas aeruginosa sensitive to Bactrim, carbapenems,. Cefepime, and Proteus mirabilis ESBL sensitive to Bactrim, and carbapenems.    Procal 0.33, improving      3. Sepsis resolved, multifactorial; UTi/Staph epidermidis bacteremia   Urine culture CRP Pseudomonas aeruginosa, sensitive to Zosyn   Echo no mention of vegetations, all valves visulized   Repeat blood cultures x1 no growth    4. Polycystic kidney disease with history of urosepsis, cyst hemorrhage, and persistently colonized with MDRO pseudomonas, cannot exclude active infection. Normal  PSA  5. Vent and trach, peg dependent from ALS  6. Chronic anticoagulation for pulmonary emboli  7. Chronic constipation      Recommendations:  Midline  Continue vancomycin IV, keep level around 15  Zosyn 4.5g IV q8h for Pseudomonas  UTi, respiratory culture with multiple organisms, likely colonizers  Above for total of 7 days, end date 6/17/22  Aspiration precautions   OK to go back to NH    D/w nursing, Dr Huynh    Medical Decision Making during this encounter was  [_] Low Complexity  [_] Moderate Complexity  [ xx ] High Complexity

## 2022-06-14 NOTE — PLAN OF CARE
Talia patient in ICU d/t chronic trach and peg with vent dependency. Patient is awake and alert, Communicates by blinking once for yes and twice for no. Patient indicating that he is having tongue pain, upon assessment patient's tongue noted to be split in what looks like 3 places. It is hard to see because patient can only open his mouth slightly. Tube feeding glucerna at 40 mL/hour. Patient tolerating well. BM on shift. Hutchinson catheter in place, patent with adequate urine output. Wound care as ordered to bilateral buttocks. Aquaphor to body due to dry flaky skin. Patient stable overnight. Safety maintained, bed low and locked, bed alarm on.     Problem: Adult Inpatient Plan of Care  Goal: Plan of Care Review  Outcome: Ongoing, Progressing  Goal: Patient-Specific Goal (Individualized)  Outcome: Ongoing, Progressing  Goal: Absence of Hospital-Acquired Illness or Injury  Outcome: Ongoing, Progressing  Goal: Optimal Comfort and Wellbeing  Outcome: Ongoing, Progressing  Goal: Readiness for Transition of Care  Outcome: Ongoing, Progressing     Problem: Infection  Goal: Absence of Infection Signs and Symptoms  Outcome: Ongoing, Progressing     Problem: Impaired Wound Healing  Goal: Optimal Wound Healing  Outcome: Ongoing, Progressing     Problem: Communication Impairment (Mechanical Ventilation, Invasive)  Goal: Effective Communication  Outcome: Ongoing, Progressing     Problem: Device-Related Complication Risk (Mechanical Ventilation, Invasive)  Goal: Optimal Device Function  Outcome: Ongoing, Progressing     Problem: Inability to Wean (Mechanical Ventilation, Invasive)  Goal: Mechanical Ventilation Liberation  Outcome: Ongoing, Progressing     Problem: Nutrition Impairment (Mechanical Ventilation, Invasive)  Goal: Optimal Nutrition Delivery  Outcome: Ongoing, Progressing     Problem: Skin and Tissue Injury (Mechanical Ventilation, Invasive)  Goal: Absence of Device-Related Skin and Tissue Injury  Outcome: Ongoing,  Progressing     Problem: Ventilator-Induced Lung Injury (Mechanical Ventilation, Invasive)  Goal: Absence of Ventilator-Induced Lung Injury  Outcome: Ongoing, Progressing     Problem: Communication Impairment (Artificial Airway)  Goal: Effective Communication  Outcome: Ongoing, Progressing     Problem: Device-Related Complication Risk (Artificial Airway)  Goal: Optimal Device Function  Outcome: Ongoing, Progressing     Problem: Skin and Tissue Injury (Artificial Airway)  Goal: Absence of Device-Related Skin or Tissue Injury  Outcome: Ongoing, Progressing     Problem: Noninvasive Ventilation Acute  Goal: Effective Unassisted Ventilation and Oxygenation  Outcome: Ongoing, Progressing     Problem: Skin Injury Risk Increased  Goal: Skin Health and Integrity  Outcome: Ongoing, Progressing     Problem: Fall Injury Risk  Goal: Absence of Fall and Fall-Related Injury  Outcome: Ongoing, Progressing     Problem: Adjustment to Illness (Sepsis/Septic Shock)  Goal: Optimal Coping  Outcome: Ongoing, Progressing     Problem: Bleeding (Sepsis/Septic Shock)  Goal: Absence of Bleeding  Outcome: Ongoing, Progressing     Problem: Glycemic Control Impaired (Sepsis/Septic Shock)  Goal: Blood Glucose Level Within Desired Range  Outcome: Ongoing, Progressing     Problem: Infection Progression (Sepsis/Septic Shock)  Goal: Absence of Infection Signs and Symptoms  Outcome: Ongoing, Progressing     Problem: Nutrition Impaired (Sepsis/Septic Shock)  Goal: Optimal Nutrition Intake  Outcome: Ongoing, Progressing

## 2022-06-15 ENCOUNTER — TELEPHONE (OUTPATIENT)
Dept: MEDSURG UNIT | Facility: HOSPITAL | Age: 67
End: 2022-06-15
Payer: MEDICARE

## 2022-06-15 NOTE — PLAN OF CARE
Pt discharged via ambulance by charu. Report given to Arben Paramedic.  Pt left facility- safety maintained. Pt family notified of patient leaving.

## 2022-06-17 LAB — BACTERIA BLD CULT: NORMAL

## 2022-06-24 ENCOUNTER — APPOINTMENT (OUTPATIENT)
Dept: LAB | Facility: HOSPITAL | Age: 67
End: 2022-06-24
Attending: FAMILY MEDICINE
Payer: MEDICARE

## 2022-06-24 DIAGNOSIS — D72.829 LEUKOCYTOSIS: Primary | ICD-10-CM

## 2022-06-24 PROCEDURE — 87077 CULTURE AEROBIC IDENTIFY: CPT | Performed by: FAMILY MEDICINE

## 2022-06-24 PROCEDURE — 87186 SC STD MICRODIL/AGAR DIL: CPT | Mod: 59 | Performed by: FAMILY MEDICINE

## 2022-06-24 PROCEDURE — 87205 SMEAR GRAM STAIN: CPT | Performed by: FAMILY MEDICINE

## 2022-06-24 PROCEDURE — 87070 CULTURE OTHR SPECIMN AEROBIC: CPT | Performed by: FAMILY MEDICINE

## 2022-06-25 ENCOUNTER — LAB VISIT (OUTPATIENT)
Dept: LAB | Facility: HOSPITAL | Age: 67
End: 2022-06-25
Attending: FAMILY MEDICINE
Payer: MEDICARE

## 2022-06-25 DIAGNOSIS — G12.21 AMYOTROPHIC LATERAL SCLEROSIS: Primary | ICD-10-CM

## 2022-06-25 LAB
ANION GAP SERPL CALC-SCNC: 10 MMOL/L (ref 8–16)
BUN SERPL-MCNC: 54 MG/DL (ref 8–23)
CALCIUM SERPL-MCNC: 9.5 MG/DL (ref 8.7–10.5)
CHLORIDE SERPL-SCNC: 106 MMOL/L (ref 95–110)
CO2 SERPL-SCNC: 25 MMOL/L (ref 23–29)
CREAT SERPL-MCNC: 0.7 MG/DL (ref 0.5–1.4)
EST. GFR  (AFRICAN AMERICAN): >60 ML/MIN/1.73 M^2
EST. GFR  (NON AFRICAN AMERICAN): >60 ML/MIN/1.73 M^2
GLUCOSE SERPL-MCNC: 124 MG/DL (ref 70–110)
LACTATE SERPL-SCNC: 1.3 MMOL/L (ref 0.5–2.2)
POTASSIUM SERPL-SCNC: 5.3 MMOL/L (ref 3.5–5.1)
PROCALCITONIN SERPL IA-MCNC: 0.53 NG/ML
SODIUM SERPL-SCNC: 141 MMOL/L (ref 136–145)

## 2022-06-25 PROCEDURE — 36415 COLL VENOUS BLD VENIPUNCTURE: CPT | Performed by: FAMILY MEDICINE

## 2022-06-25 PROCEDURE — 84145 PROCALCITONIN (PCT): CPT | Performed by: FAMILY MEDICINE

## 2022-06-25 PROCEDURE — 80048 BASIC METABOLIC PNL TOTAL CA: CPT | Performed by: FAMILY MEDICINE

## 2022-06-25 PROCEDURE — 83605 ASSAY OF LACTIC ACID: CPT | Performed by: FAMILY MEDICINE

## 2022-06-28 LAB
BACTERIA SPEC AEROBE CULT: ABNORMAL
GRAM STN SPEC: ABNORMAL

## 2022-08-15 ENCOUNTER — HOSPITAL ENCOUNTER (EMERGENCY)
Facility: HOSPITAL | Age: 67
Discharge: HOME | End: 2022-08-15
Attending: EMERGENCY MEDICINE
Payer: MEDICARE

## 2022-08-15 VITALS
TEMPERATURE: 98 F | HEART RATE: 115 BPM | DIASTOLIC BLOOD PRESSURE: 76 MMHG | OXYGEN SATURATION: 100 % | SYSTOLIC BLOOD PRESSURE: 123 MMHG | RESPIRATION RATE: 22 BRPM

## 2022-08-15 DIAGNOSIS — Z43.1 PEG (PERCUTANEOUS ENDOSCOPIC GASTROSTOMY) ADJUSTMENT/REPLACEMENT/REMOVAL: ICD-10-CM

## 2022-08-15 PROCEDURE — 43761 REPOSITION GASTROSTOMY TUBE: CPT

## 2022-08-15 PROCEDURE — 94002 VENT MGMT INPAT INIT DAY: CPT

## 2022-08-15 PROCEDURE — 27000221 HC OXYGEN, UP TO 24 HOURS

## 2022-08-15 PROCEDURE — 94761 N-INVAS EAR/PLS OXIMETRY MLT: CPT

## 2022-08-15 PROCEDURE — 25000003 PHARM REV CODE 250: Performed by: EMERGENCY MEDICINE

## 2022-08-15 PROCEDURE — 99284 EMERGENCY DEPT VISIT MOD MDM: CPT | Mod: 25

## 2022-08-15 PROCEDURE — 99900035 HC TECH TIME PER 15 MIN (STAT)

## 2022-08-15 PROCEDURE — 43762 RPLC GTUBE NO REVJ TRC: CPT

## 2022-08-15 RX ORDER — LIDOCAINE HYDROCHLORIDE 20 MG/ML
JELLY TOPICAL
Status: COMPLETED | OUTPATIENT
Start: 2022-08-15 | End: 2022-08-15

## 2022-08-15 RX ADMIN — LIDOCAINE HYDROCHLORIDE 10 ML: 20 JELLY TOPICAL at 10:08

## 2022-08-16 NOTE — ED NOTES
PEG tube replacement by MD at this time. Procedure tolerated well. Pt to be discharged back to facility.

## 2022-08-16 NOTE — ED PROVIDER NOTES
Encounter Date: 8/15/2022    SCRIBE #1 NOTE: I, Toya Koch, am scribing for, and in the presence of, Antelmo Cochran MD.       History     Chief Complaint   Patient presents with    peg tube replacement     20 Fr PEG tube came out     Time seen by provider: 10:05 PM on 08/15/2022    Wei Dominique is a 67 y.o. male with a PMHx of ALS who presents to the ED via EMS from [facility] with a PEG tube problem. Per EMS, [facility] staff reported the patient pulled his PEG tube out. [facility] did not specify what time the PEG tube came out.    The history is provided by the EMS personnel and the snf.     Review of patient's allergies indicates:  No Known Allergies  Past Medical History:   Diagnosis Date    ALS (amyotrophic lateral sclerosis)     ALS (amyotrophic lateral sclerosis)     Erectile dysfunction     Gait disturbance     Hyperlipidemia     Hypertension     Iron deficiency anemia     Motor neuron disease     Polycystic kidney disease     Tremor      Past Surgical History:   Procedure Laterality Date    GASTROSTOMY      PEG    PORTACATH PLACEMENT      TRACHEOSTOMY       No family history on file.  Social History     Substance Use Topics    Drug use: Never     Review of Systems   Unable to perform ROS: Patient nonverbal       Physical Exam     Initial Vitals   BP Pulse Resp Temp SpO2   08/15/22 2221 08/15/22 2209 08/15/22 2209 08/15/22 2221 08/15/22 2209   123/76 78 18 97.9 °F (36.6 °C) 100 %      MAP       --                Physical Exam    Nursing note and vitals reviewed.  Constitutional: Vital signs are normal. He appears well-nourished.   HENT:   Head: Normocephalic and atraumatic.   Eyes: Conjunctivae are normal.   Neck: No thyroid mass present.   Trach in place.   Cardiovascular: Normal rate, regular rhythm and normal heart sounds. Exam reveals no gallop and no friction rub.    No murmur heard.  Pulmonary/Chest: Breath sounds normal. He has no wheezes. He has no rhonchi. He has no rales.    Abdominal: Abdomen is soft. Bowel sounds are normal. There is no abdominal tenderness.   Left upper PEG tube without breakdown or stomal.   Genitourinary:    Genitourinary Comments: Hutchinson catheter in place.       Neurological:   Atrophied extremities.   Skin: Skin is warm and dry. No rash noted. No erythema.         ED Course   Feeding Tube    Date/Time: 8/15/2022 10:21 PM  Location procedure was performed: [facility] EMERGENCY DEPARTMENT  Performed by: Antelmo Cochran MD  Authorized by: Antelmo Cochran MD   Indications: tube dislodged  Preparation: Patient was prepped and draped in the usual sterile fashion.    Sedation:  Patient sedated: no    Local anesthesia used: no    Anesthesia:  Local anesthesia used: no  Tube type: gastrostomy  Patient position: supine  Tube size: 20 Fr.  Endoscope used: no  Bulb inflation volume: 20 (ml)  Bulb inflation fluid: normal saline  Placement/position confirmation: x-ray  Complications: No  Patient tolerance: patient tolerated the procedure well with no immediate complications        Labs Reviewed - No data to display       Imaging Results          XR Non-Rad Performed NG/Gastric Tube Check (In process)  Result time 08/15/22 22:49:19   Procedure changed from X-Ray Abdomen AP 1 View (KUB)                  Medications   diatrizoate meglumineand-diatrizoate sodium (GASTROVIEW) solution 30 mL (has no administration in time range)   LIDOcaine HCl 2% urojet (10 mLs Mucous Membrane Given 8/15/22 2209)     Medical Decision Making:   History:   Old Medical Records: I decided to obtain old medical records.  Independently Interpreted Test(s):   I have ordered and independently interpreted X-rays - see prior notes.  Clinical Tests:   Radiological Study: Ordered and Reviewed  ED Management:  This patient was emergently assessed shortly after arrival.  Vital signs are stable.  PEG tube placed without complication.  Confirmed placement with radiographic dye study.  Stable for transport back to  assisted living facility.          Scribe Attestation:   Scribe #1: I performed the above scribed service and the documentation accurately describes the services I performed. I attest to the accuracy of the note.               I, Dr. Antelmo Cochran, personally performed the services described in this documentation. All medical record entries made by the scribe were at my direction and in my presence.  I have reviewed the chart and agree that the record reflects my personal performance and is accurate and complete. Antelmo Cochran MD.  10:50 PM 08/15/2022    Clinical Impression:   Final diagnoses:  [Z43.1] PEG (percutaneous endoscopic gastrostomy) adjustment/replacement/removal                 Antelmo Cochran MD  08/15/22 7971

## 2022-08-16 NOTE — CARE UPDATE
08/15/22 2320   PRE-TX-O2   O2 Device (Oxygen Therapy) ventilator   $ Is the patient on Low Flow Oxygen? Yes   SpO2 (!) 20 %   Pulse Oximetry Type Continuous   $ Pulse Oximetry - Multiple Charge Pulse Oximetry - Multiple   Pulse (!) 56     Called to bedside by rn for vent alarming, vent circuit on the floor and pt sats in the 20's, placed pt back on vent sats back to 100% informed shauna rn of pt's low sats.

## 2022-08-18 ENCOUNTER — HOSPITAL ENCOUNTER (INPATIENT)
Facility: HOSPITAL | Age: 67
LOS: 12 days | Discharge: ANOTHER HOSPITAL | DRG: 871 | End: 2022-08-31
Attending: EMERGENCY MEDICINE | Admitting: INTERNAL MEDICINE
Payer: MEDICARE

## 2022-08-18 DIAGNOSIS — R25.2 TRISMUS: ICD-10-CM

## 2022-08-18 DIAGNOSIS — G82.50 QUADRIPARESIS: ICD-10-CM

## 2022-08-18 DIAGNOSIS — R50.9 FEVER, UNSPECIFIED FEVER CAUSE: ICD-10-CM

## 2022-08-18 DIAGNOSIS — R78.81 BACTEREMIA: ICD-10-CM

## 2022-08-18 DIAGNOSIS — N39.0 URINARY TRACT INFECTION ASSOCIATED WITH INDWELLING URETHRAL CATHETER, SEQUELA: Primary | ICD-10-CM

## 2022-08-18 DIAGNOSIS — T83.511S URINARY TRACT INFECTION ASSOCIATED WITH INDWELLING URETHRAL CATHETER, SEQUELA: Primary | ICD-10-CM

## 2022-08-18 DIAGNOSIS — M62.838 MASSETER MUSCLE SPASM: ICD-10-CM

## 2022-08-18 DIAGNOSIS — R07.9 CHEST PAIN: ICD-10-CM

## 2022-08-18 DIAGNOSIS — S01.552A OPEN WOUND OF TONGUE DUE TO BITE: ICD-10-CM

## 2022-08-18 DIAGNOSIS — L03.211 FACIAL CELLULITIS: ICD-10-CM

## 2022-08-18 DIAGNOSIS — T83.511A CATHETER-ASSOCIATED URINARY TRACT INFECTION: ICD-10-CM

## 2022-08-18 DIAGNOSIS — L02.01 FACIAL ABSCESS: ICD-10-CM

## 2022-08-18 DIAGNOSIS — Z99.11 DEPENDENT ON VENTILATOR: ICD-10-CM

## 2022-08-18 DIAGNOSIS — R60.1 ANASARCA: ICD-10-CM

## 2022-08-18 DIAGNOSIS — J96.12 CHRONIC RESPIRATORY FAILURE WITH HYPOXIA AND HYPERCAPNIA: ICD-10-CM

## 2022-08-18 DIAGNOSIS — J96.11 CHRONIC RESPIRATORY FAILURE WITH HYPOXIA AND HYPERCAPNIA: ICD-10-CM

## 2022-08-18 DIAGNOSIS — N39.0 CATHETER-ASSOCIATED URINARY TRACT INFECTION: ICD-10-CM

## 2022-08-18 DIAGNOSIS — G12.21 ALS (AMYOTROPHIC LATERAL SCLEROSIS): ICD-10-CM

## 2022-08-18 LAB
ALBUMIN SERPL BCP-MCNC: 2.5 G/DL (ref 3.5–5.2)
ALP SERPL-CCNC: 122 U/L (ref 55–135)
ALT SERPL W/O P-5'-P-CCNC: 37 U/L (ref 10–44)
ANION GAP SERPL CALC-SCNC: 14 MMOL/L (ref 8–16)
AST SERPL-CCNC: 43 U/L (ref 10–40)
BACTERIA #/AREA URNS HPF: ABNORMAL /HPF
BASOPHILS # BLD AUTO: 0.04 K/UL (ref 0–0.2)
BASOPHILS NFR BLD: 0.2 % (ref 0–1.9)
BILIRUB SERPL-MCNC: 0.9 MG/DL (ref 0.1–1)
BILIRUB UR QL STRIP: NEGATIVE
BNP SERPL-MCNC: 66 PG/ML (ref 0–99)
BUN SERPL-MCNC: 64 MG/DL (ref 8–23)
CALCIUM SERPL-MCNC: 9.2 MG/DL (ref 8.7–10.5)
CHLORIDE SERPL-SCNC: 100 MMOL/L (ref 95–110)
CK MB SERPL-MCNC: 4.8 NG/ML (ref 0.1–6.5)
CK SERPL-CCNC: 53 U/L (ref 20–200)
CLARITY UR: ABNORMAL
CO2 SERPL-SCNC: 24 MMOL/L (ref 23–29)
COLOR UR: YELLOW
CREAT SERPL-MCNC: 0.7 MG/DL (ref 0.5–1.4)
CREAT SERPL-MCNC: 0.7 MG/DL (ref 0.5–1.4)
DIFFERENTIAL METHOD: ABNORMAL
EOSINOPHIL # BLD AUTO: 0 K/UL (ref 0–0.5)
EOSINOPHIL NFR BLD: 0.2 % (ref 0–8)
ERYTHROCYTE [DISTWIDTH] IN BLOOD BY AUTOMATED COUNT: 18.3 % (ref 11.5–14.5)
EST. GFR  (NO RACE VARIABLE): >60 ML/MIN/1.73 M^2
GLUCOSE SERPL-MCNC: 159 MG/DL (ref 70–110)
GLUCOSE UR QL STRIP: NEGATIVE
HCT VFR BLD AUTO: 26.4 % (ref 40–54)
HGB BLD-MCNC: 8 G/DL (ref 14–18)
HGB UR QL STRIP: ABNORMAL
HYALINE CASTS #/AREA URNS LPF: 12 /LPF
IMM GRANULOCYTES # BLD AUTO: 0.14 K/UL (ref 0–0.04)
IMM GRANULOCYTES NFR BLD AUTO: 0.7 % (ref 0–0.5)
KETONES UR QL STRIP: NEGATIVE
LACTATE SERPL-SCNC: 1.4 MMOL/L (ref 0.5–1.9)
LEUKOCYTE ESTERASE UR QL STRIP: ABNORMAL
LYMPHOCYTES # BLD AUTO: 1.1 K/UL (ref 1–4.8)
LYMPHOCYTES NFR BLD: 5.3 % (ref 18–48)
MAGNESIUM SERPL-MCNC: 2.6 MG/DL (ref 1.6–2.6)
MCH RBC QN AUTO: 24.6 PG (ref 27–31)
MCHC RBC AUTO-ENTMCNC: 30.3 G/DL (ref 32–36)
MCV RBC AUTO: 81 FL (ref 82–98)
MICROSCOPIC COMMENT: ABNORMAL
MONOCYTES # BLD AUTO: 0.6 K/UL (ref 0.3–1)
MONOCYTES NFR BLD: 2.9 % (ref 4–15)
NEUTROPHILS # BLD AUTO: 19.1 K/UL (ref 1.8–7.7)
NEUTROPHILS NFR BLD: 90.7 % (ref 38–73)
NITRITE UR QL STRIP: NEGATIVE
NRBC BLD-RTO: 0 /100 WBC
PH UR STRIP: 6 [PH] (ref 5–8)
PLATELET # BLD AUTO: 493 K/UL (ref 150–450)
PMV BLD AUTO: 11.1 FL (ref 9.2–12.9)
POTASSIUM SERPL-SCNC: 4.1 MMOL/L (ref 3.5–5.1)
PROT SERPL-MCNC: 9.1 G/DL (ref 6–8.4)
PROT UR QL STRIP: ABNORMAL
RBC # BLD AUTO: 3.25 M/UL (ref 4.6–6.2)
RBC #/AREA URNS HPF: 29 /HPF (ref 0–4)
SAMPLE: NORMAL
SARS-COV-2 RDRP RESP QL NAA+PROBE: NEGATIVE
SODIUM SERPL-SCNC: 138 MMOL/L (ref 136–145)
SP GR UR STRIP: 1.01 (ref 1–1.03)
SQUAMOUS #/AREA URNS HPF: 4 /HPF
TROPONIN I SERPL DL<=0.01 NG/ML-MCNC: 0.05 NG/ML
URN SPEC COLLECT METH UR: ABNORMAL
UROBILINOGEN UR STRIP-ACNC: NEGATIVE EU/DL
WBC # BLD AUTO: 21.04 K/UL (ref 3.9–12.7)
WBC #/AREA URNS HPF: 17 /HPF (ref 0–5)

## 2022-08-18 PROCEDURE — 25000003 PHARM REV CODE 250: Performed by: EMERGENCY MEDICINE

## 2022-08-18 PROCEDURE — 85025 COMPLETE CBC W/AUTO DIFF WBC: CPT | Performed by: EMERGENCY MEDICINE

## 2022-08-18 PROCEDURE — 99900031 HC PATIENT EDUCATION (STAT)

## 2022-08-18 PROCEDURE — 99900035 HC TECH TIME PER 15 MIN (STAT)

## 2022-08-18 PROCEDURE — 87040 BLOOD CULTURE FOR BACTERIA: CPT | Performed by: EMERGENCY MEDICINE

## 2022-08-18 PROCEDURE — 80053 COMPREHEN METABOLIC PANEL: CPT | Performed by: EMERGENCY MEDICINE

## 2022-08-18 PROCEDURE — U0002 COVID-19 LAB TEST NON-CDC: HCPCS | Performed by: EMERGENCY MEDICINE

## 2022-08-18 PROCEDURE — 81001 URINALYSIS AUTO W/SCOPE: CPT | Performed by: EMERGENCY MEDICINE

## 2022-08-18 PROCEDURE — 96361 HYDRATE IV INFUSION ADD-ON: CPT

## 2022-08-18 PROCEDURE — 83735 ASSAY OF MAGNESIUM: CPT | Performed by: EMERGENCY MEDICINE

## 2022-08-18 PROCEDURE — 87186 SC STD MICRODIL/AGAR DIL: CPT | Mod: 59 | Performed by: EMERGENCY MEDICINE

## 2022-08-18 PROCEDURE — 25500020 PHARM REV CODE 255: Performed by: EMERGENCY MEDICINE

## 2022-08-18 PROCEDURE — 93005 ELECTROCARDIOGRAM TRACING: CPT | Performed by: SPECIALIST

## 2022-08-18 PROCEDURE — 84484 ASSAY OF TROPONIN QUANT: CPT | Performed by: EMERGENCY MEDICINE

## 2022-08-18 PROCEDURE — 87086 URINE CULTURE/COLONY COUNT: CPT | Performed by: EMERGENCY MEDICINE

## 2022-08-18 PROCEDURE — 93010 EKG 12-LEAD: ICD-10-PCS | Mod: ,,, | Performed by: SPECIALIST

## 2022-08-18 PROCEDURE — 94761 N-INVAS EAR/PLS OXIMETRY MLT: CPT

## 2022-08-18 PROCEDURE — 83605 ASSAY OF LACTIC ACID: CPT | Performed by: EMERGENCY MEDICINE

## 2022-08-18 PROCEDURE — 99285 EMERGENCY DEPT VISIT HI MDM: CPT | Mod: 25

## 2022-08-18 PROCEDURE — 83880 ASSAY OF NATRIURETIC PEPTIDE: CPT | Performed by: EMERGENCY MEDICINE

## 2022-08-18 PROCEDURE — 82553 CREATINE MB FRACTION: CPT | Performed by: EMERGENCY MEDICINE

## 2022-08-18 PROCEDURE — 93010 ELECTROCARDIOGRAM REPORT: CPT | Mod: ,,, | Performed by: SPECIALIST

## 2022-08-18 PROCEDURE — 94002 VENT MGMT INPAT INIT DAY: CPT

## 2022-08-18 PROCEDURE — 82550 ASSAY OF CK (CPK): CPT | Performed by: EMERGENCY MEDICINE

## 2022-08-18 PROCEDURE — 27000221 HC OXYGEN, UP TO 24 HOURS

## 2022-08-18 PROCEDURE — 99900026 HC AIRWAY MAINTENANCE (STAT)

## 2022-08-18 PROCEDURE — 87077 CULTURE AEROBIC IDENTIFY: CPT | Performed by: EMERGENCY MEDICINE

## 2022-08-18 RX ORDER — ACETAMINOPHEN 160 MG/5ML
650 SOLUTION ORAL
Status: COMPLETED | OUTPATIENT
Start: 2022-08-18 | End: 2022-08-18

## 2022-08-18 RX ORDER — WARFARIN 2.5 MG/1
TABLET ORAL
Status: ON HOLD | COMMUNITY
Start: 2022-07-14 | End: 2022-08-30 | Stop reason: HOSPADM

## 2022-08-18 RX ORDER — MUPIROCIN 20 MG/G
1 OINTMENT TOPICAL 2 TIMES DAILY
COMMUNITY
Start: 2022-05-11

## 2022-08-18 RX ORDER — ALBUTEROL SULFATE 90 UG/1
2 AEROSOL, METERED RESPIRATORY (INHALATION) EVERY 6 HOURS PRN
Status: ON HOLD | COMMUNITY
Start: 2022-08-09 | End: 2022-08-30 | Stop reason: HOSPADM

## 2022-08-18 RX ORDER — ACETAMINOPHEN 325 MG/1
650 TABLET ORAL
Status: DISCONTINUED | OUTPATIENT
Start: 2022-08-18 | End: 2022-08-18

## 2022-08-18 RX ORDER — ENOXAPARIN SODIUM 100 MG/ML
INJECTION SUBCUTANEOUS
Status: ON HOLD | COMMUNITY
Start: 2022-05-19 | End: 2022-08-30 | Stop reason: HOSPADM

## 2022-08-18 RX ADMIN — IOHEXOL 100 ML: 350 INJECTION, SOLUTION INTRAVENOUS at 09:08

## 2022-08-18 RX ADMIN — SODIUM CHLORIDE 500 ML: 9 INJECTION, SOLUTION INTRAVENOUS at 10:08

## 2022-08-18 RX ADMIN — ACETAMINOPHEN 649.6 MG: 160 SUSPENSION ORAL at 10:08

## 2022-08-18 NOTE — ED PROVIDER NOTES
"Encounter Date: 8/18/2022       History     Chief Complaint   Patient presents with    Facial Swelling     Facial swelling that started "several days ago"      Patient with a history of ALS.  Patient reportedly with history of edema to extremities in the past.  Patient with more facial swelling according to paramedics.  Patient is unresponsive.  No family members at bedside for further history.  Rest of history obtained from old chart.        Review of patient's allergies indicates:  No Known Allergies  Past Medical History:   Diagnosis Date    ALS (amyotrophic lateral sclerosis)     ALS (amyotrophic lateral sclerosis)     Erectile dysfunction     Gait disturbance     Hyperlipidemia     Hypertension     Iron deficiency anemia     Motor neuron disease     Polycystic kidney disease     Tremor      Past Surgical History:   Procedure Laterality Date    GASTROSTOMY      PEG    PORTACATH PLACEMENT      TRACHEOSTOMY       No family history on file.  Social History     Substance Use Topics    Drug use: Never     Review of Systems   Unable to perform ROS: Patient nonverbal       Physical Exam     Initial Vitals   BP Pulse Resp Temp SpO2   08/18/22 1821 08/18/22 1814 08/18/22 1814 08/18/22 2110 08/18/22 1814   99/63 84 16 (!) 102.4 °F (39.1 °C) 100 %      MAP       --                Physical Exam    Nursing note and vitals reviewed.  Constitutional: He is not diaphoretic. No distress.   HENT:   There is some generalized facial swelling right greater than left.  Patient prefers to lay his head down to the right.  Unable to open patient's mouth.  There appears to be some purulent exudate to gingival area.  No palpable masses.  No submandibular firmness.   Eyes: Conjunctivae are normal.   Neck:   Tracheostomy in place.   Cardiovascular: Regular rhythm.   Pulmonary/Chest:   Good breath sounds bilaterally with ventilation.   Abdominal: Abdomen is soft. Bowel sounds are normal. There is no abdominal tenderness. "   Musculoskeletal:      Comments: Bilateral heels with superficial pressure ulcers.  Bilateral arms are very edematous.  Right arm with midline catheter in place with site that appears erythematous.  No drainage.     Neurological:   Patient is unresponsive.  Does not move any extremity   Skin: No rash noted.   Psychiatric:   Patient is nonverbal         ED Course   Critical Care    Date/Time: 8/18/2022 10:57 PM  Performed by: John Guzman MD  Authorized by: John Guzman MD   Direct patient critical care time: 15 minutes  Additional history critical care time: 5 minutes  Ordering / reviewing critical care time: 5 minutes  Documentation critical care time: 5 minutes  Consulting other physicians critical care time: 5 minutes  Total critical care time (exclusive of procedural time) : 35 minutes        Labs Reviewed   CBC W/ AUTO DIFFERENTIAL - Abnormal; Notable for the following components:       Result Value    WBC 21.04 (*)     RBC 3.25 (*)     Hemoglobin 8.0 (*)     Hematocrit 26.4 (*)     MCV 81 (*)     MCH 24.6 (*)     MCHC 30.3 (*)     RDW 18.3 (*)     Platelets 493 (*)     Immature Granulocytes 0.7 (*)     Gran # (ANC) 19.1 (*)     Immature Grans (Abs) 0.14 (*)     Gran % 90.7 (*)     Lymph % 5.3 (*)     Mono % 2.9 (*)     All other components within normal limits   COMPREHENSIVE METABOLIC PANEL - Abnormal; Notable for the following components:    Glucose 159 (*)     BUN 64 (*)     Total Protein 9.1 (*)     Albumin 2.5 (*)     AST 43 (*)     All other components within normal limits   TROPONIN I - Abnormal; Notable for the following components:    Troponin I 0.049 (*)     All other components within normal limits   URINALYSIS, REFLEX TO URINE CULTURE - Abnormal; Notable for the following components:    Appearance, UA Hazy (*)     Protein, UA 1+ (*)     Occult Blood UA 1+ (*)     Leukocytes, UA 3+ (*)     All other components within normal limits    Narrative:     Specimen Source->Urine   URINALYSIS  MICROSCOPIC - Abnormal; Notable for the following components:    RBC, UA 29 (*)     WBC, UA 17 (*)     Hyaline Casts, UA 12 (*)     All other components within normal limits    Narrative:     Specimen Source->Urine   CULTURE, BLOOD   CULTURE, BLOOD   CULTURE, URINE   SARS-COV-2 RNA AMPLIFICATION, QUAL   B-TYPE NATRIURETIC PEPTIDE   CK-MB   CK   MAGNESIUM   LACTIC ACID, PLASMA   ISTAT CREATININE   POCT CREATININE          Imaging Results          CT Maxillofacial With Contrast (Final result)  Result time 08/18/22 22:41:30    Final result by Corina Ramos MD (08/18/22 22:41:30)                 Narrative:    EXAM:  CT Maxillofacial With Intravenous Contrast  CLINICAL HISTORY:  67 years old, Male; Maxillary/facial abscess;  TECHNIQUE:  Axial computed tomography images of the face with intravenous contrast.  This CT exam was performed using one or more of the following dose reduction techniques:  automated exposure control, adjustment of the mA and/or kV according to patient size, and/or use of iterative reconstruction technique.  COMPARISON:  No relevant prior studies available.    FINDINGS:  Bones/joints:  No acute fracture.  Soft tissues:  Right maxillary and anterior maxillary soft tissue edema and thickening which extends along the right maxillary molars into the oropharynx.  Vasculature:  Bilateral carotid bulb calcifications.  Lymph nodes:  Prominent cervical chain lymph nodes.  Orbits:  Unremarkable.  Sinuses:  Bilateral maxillary sinus mucosal thickening. Ethmoid sinus mucosal disease. Sphenoid sinus mucosal thickening.  Mastoid air cells:  Bilateral mastoid air cell fluid.  Dental:  Beam hardening artifact from dental amalgam makes interpretation of the adjacent soft tissues difficult. Numerous missing dentition and periapical lucencies involving predominantly the maxillary dentition  Brain:  Limited images of the brain show atrophy and chronic ischemic changes.  Sella:  Prominent pituitary gland may  represent pituitary adenoma.  Nasopharynx:  Nasopharyngeal phlegm and frothy debris extends into the ethmoid and maxillary sinus.  Thyroid:  Right thyroid nodule measuring 1.9 cm.  Other findings:  Right perimandibular ovoid collection with rim enhancement noted measuring 3.0 x 1.8 cm.    IMPRESSION:  1.  Right maxillary and anterior maxillary soft tissue edema and thickening which extends along the right maxillary molars into the oropharynx. Correlate for cellulitis and or pharyngeal infection.  2.  Right perimandibular ovoid collection with rim enhancement noted measuring 3.0 x 1.8 cm. This appears to connect to the aforementioned maxillary skin thickening and edema.  3.  Nasopharyngeal phlegm and frothy debris extends into the ethmoid and maxillary sinus.  4.  Bilateral mastoid air cell fluid can be seen with mastoiditis.  5.  Right thyroid nodule measuring 1.9 cm. Recommend nonemergent thyroid ultrasound.  6.  Prominent pituitary gland may represent pituitary adenoma.    Electronically signed by:  Corina Ramos MD  8/18/2022 10:41 PM CDT Workstation: [id number]                             X-Ray Chest AP Portable (Final result)  Result time 08/18/22 18:32:15    Final result by Sangeeta Deleon MD (08/18/22 18:32:15)                 Narrative:    Portable chest x-ray at 6:23 PM is compared to prior study dated 6/9/2022    Clinical history is anasarca    There is a left subclavian vein Port-A-Cath with the tip overlying the superior vena cava. There is a tracheostomy tube.    The lungs are clear. The cardiomediastinal silhouette is normal in size. There are no acute osseous abnormalities.    IMPRESSION: No acute pulmonary process    Electronically signed by:  Sangeeta Deleon MD  8/18/2022 6:32 PM CDT Workstation: [id number]                               Medications   piperacillin-tazobactam 3.375 g in dextrose 5 % 50 mL IVPB (ready to mix system) (has no administration in time range)   dextrose 5 % and  0.45 % NaCl with KCl 20 mEq infusion (has no administration in time range)   iohexoL (OMNIPAQUE 350) injection 100 mL (100 mLs Intravenous Given 8/18/22 2141)   sodium chloride 0.9% bolus 500 mL (500 mLs Intravenous New Bag 8/18/22 2235)   acetaminophen 32 mg/mL liquid (PEDS) 649.6 mg (649.6 mg Oral Given 8/18/22 2236)     Medical Decision Making:   History:   Old Medical Records: I decided to obtain old medical records.  Clinical Tests:   Lab Tests: Reviewed  Radiological Study: Reviewed  Medical Tests: Reviewed  ED Management:  Patient presents with facial swelling associated with fever.  Broad-spectrum antibiotics initiated.  Patient appears to have significant cellulitis and facial abscess.  Possible mastoiditis.  Patient will oral maxillary surgery evaluation and possible intervention versus teen.  No such services available here.  Will arrange transfer.    [facility] only place with Oral surgery.  They are currently on ED saturation.  We will continue broad-spectrum antibiotics here.  Heart rate is improving.  I discussed plan of transfer with spouse.  She voices understanding.                      Clinical Impression:   Final diagnoses:  [R60.1] Anasarca  [L02.01] Facial abscess (Primary)  [L03.211] Facial cellulitis          ED Disposition Condition    Transfer to Another Facility Stable              John Guzman MD  08/19/22 0003

## 2022-08-19 LAB
CRP SERPL-MCNC: >38 MG/DL
GLUCOSE SERPL-MCNC: 256 MG/DL (ref 70–110)
INR PPP: 1.9
MRSA SCREEN BY PCR: POSITIVE
PROCALCITONIN SERPL IA-MCNC: 2.52 NG/ML (ref 0–0.5)
PROTHROMBIN TIME: 20.3 SEC (ref 11.4–13.7)

## 2022-08-19 PROCEDURE — 87077 CULTURE AEROBIC IDENTIFY: CPT | Mod: 59 | Performed by: INTERNAL MEDICINE

## 2022-08-19 PROCEDURE — 94003 VENT MGMT INPAT SUBQ DAY: CPT

## 2022-08-19 PROCEDURE — 87077 CULTURE AEROBIC IDENTIFY: CPT | Mod: 59 | Performed by: EMERGENCY MEDICINE

## 2022-08-19 PROCEDURE — 99900031 HC PATIENT EDUCATION (STAT)

## 2022-08-19 PROCEDURE — 63600175 PHARM REV CODE 636 W HCPCS: Performed by: INTERNAL MEDICINE

## 2022-08-19 PROCEDURE — 87147 CULTURE TYPE IMMUNOLOGIC: CPT | Performed by: EMERGENCY MEDICINE

## 2022-08-19 PROCEDURE — 87070 CULTURE OTHR SPECIMN AEROBIC: CPT | Performed by: EMERGENCY MEDICINE

## 2022-08-19 PROCEDURE — 63600175 PHARM REV CODE 636 W HCPCS: Performed by: EMERGENCY MEDICINE

## 2022-08-19 PROCEDURE — 85610 PROTHROMBIN TIME: CPT | Performed by: EMERGENCY MEDICINE

## 2022-08-19 PROCEDURE — 25000003 PHARM REV CODE 250: Performed by: INTERNAL MEDICINE

## 2022-08-19 PROCEDURE — 99223 1ST HOSP IP/OBS HIGH 75: CPT | Mod: ,,, | Performed by: INTERNAL MEDICINE

## 2022-08-19 PROCEDURE — 87070 CULTURE OTHR SPECIMN AEROBIC: CPT | Mod: 59 | Performed by: INTERNAL MEDICINE

## 2022-08-19 PROCEDURE — 84145 PROCALCITONIN (PCT): CPT | Performed by: INTERNAL MEDICINE

## 2022-08-19 PROCEDURE — 99900035 HC TECH TIME PER 15 MIN (STAT)

## 2022-08-19 PROCEDURE — 99900026 HC AIRWAY MAINTENANCE (STAT)

## 2022-08-19 PROCEDURE — 96365 THER/PROPH/DIAG IV INF INIT: CPT

## 2022-08-19 PROCEDURE — 87641 MR-STAPH DNA AMP PROBE: CPT | Performed by: INTERNAL MEDICINE

## 2022-08-19 PROCEDURE — 27000221 HC OXYGEN, UP TO 24 HOURS

## 2022-08-19 PROCEDURE — 87040 BLOOD CULTURE FOR BACTERIA: CPT | Performed by: EMERGENCY MEDICINE

## 2022-08-19 PROCEDURE — 99223 PR INITIAL HOSPITAL CARE,LEVL III: ICD-10-PCS | Mod: ,,, | Performed by: INTERNAL MEDICINE

## 2022-08-19 PROCEDURE — 25000003 PHARM REV CODE 250: Performed by: EMERGENCY MEDICINE

## 2022-08-19 PROCEDURE — 82962 GLUCOSE BLOOD TEST: CPT

## 2022-08-19 PROCEDURE — 20000000 HC ICU ROOM

## 2022-08-19 PROCEDURE — 63600175 PHARM REV CODE 636 W HCPCS

## 2022-08-19 PROCEDURE — 94799 UNLISTED PULMONARY SVC/PX: CPT

## 2022-08-19 PROCEDURE — 87205 SMEAR GRAM STAIN: CPT | Performed by: INTERNAL MEDICINE

## 2022-08-19 PROCEDURE — 87186 SC STD MICRODIL/AGAR DIL: CPT | Mod: 59 | Performed by: EMERGENCY MEDICINE

## 2022-08-19 PROCEDURE — 63600175 PHARM REV CODE 636 W HCPCS: Mod: JG | Performed by: INTERNAL MEDICINE

## 2022-08-19 PROCEDURE — 86140 C-REACTIVE PROTEIN: CPT | Performed by: INTERNAL MEDICINE

## 2022-08-19 PROCEDURE — 94761 N-INVAS EAR/PLS OXIMETRY MLT: CPT

## 2022-08-19 PROCEDURE — 36415 COLL VENOUS BLD VENIPUNCTURE: CPT | Performed by: INTERNAL MEDICINE

## 2022-08-19 PROCEDURE — 87186 SC STD MICRODIL/AGAR DIL: CPT | Mod: 59 | Performed by: INTERNAL MEDICINE

## 2022-08-19 RX ORDER — HYDROMORPHONE HYDROCHLORIDE 1 MG/ML
1 INJECTION, SOLUTION INTRAMUSCULAR; INTRAVENOUS; SUBCUTANEOUS EVERY 4 HOURS PRN
Status: DISCONTINUED | OUTPATIENT
Start: 2022-08-19 | End: 2022-08-31 | Stop reason: HOSPADM

## 2022-08-19 RX ORDER — HYDROMORPHONE HYDROCHLORIDE 1 MG/ML
1 INJECTION, SOLUTION INTRAMUSCULAR; INTRAVENOUS; SUBCUTANEOUS EVERY 6 HOURS PRN
Status: DISCONTINUED | OUTPATIENT
Start: 2022-08-19 | End: 2022-08-19

## 2022-08-19 RX ORDER — ARGININE/GLUTAMINE/CALCIUM BMB 7G-7G-1.5G
1 POWDER IN PACKET (EA) ORAL 2 TIMES DAILY
Status: ON HOLD | COMMUNITY
End: 2022-08-30 | Stop reason: HOSPADM

## 2022-08-19 RX ORDER — SODIUM CHLORIDE 9 MG/ML
INJECTION, SOLUTION INTRAVENOUS CONTINUOUS
Status: DISCONTINUED | OUTPATIENT
Start: 2022-08-19 | End: 2022-08-26

## 2022-08-19 RX ORDER — CLINDAMYCIN PHOSPHATE 900 MG/50ML
900 INJECTION, SOLUTION INTRAVENOUS
Status: DISCONTINUED | OUTPATIENT
Start: 2022-08-19 | End: 2022-08-22

## 2022-08-19 RX ORDER — DEXTROSE MONOHYDRATE, SODIUM CHLORIDE, AND POTASSIUM CHLORIDE 50; 1.49; 4.5 G/1000ML; G/1000ML; G/1000ML
INJECTION, SOLUTION INTRAVENOUS
Status: COMPLETED | OUTPATIENT
Start: 2022-08-19 | End: 2022-08-19

## 2022-08-19 RX ORDER — HYDROMORPHONE HYDROCHLORIDE 1 MG/ML
INJECTION, SOLUTION INTRAMUSCULAR; INTRAVENOUS; SUBCUTANEOUS
Status: COMPLETED
Start: 2022-08-19 | End: 2022-08-19

## 2022-08-19 RX ORDER — ACETAMINOPHEN 160 MG/5ML
650 SOLUTION ORAL EVERY 6 HOURS
Status: DISCONTINUED | OUTPATIENT
Start: 2022-08-19 | End: 2022-08-19

## 2022-08-19 RX ORDER — ACETAMINOPHEN 650 MG/20.3ML
650 LIQUID ORAL EVERY 6 HOURS
Status: DISCONTINUED | OUTPATIENT
Start: 2022-08-19 | End: 2022-08-23

## 2022-08-19 RX ORDER — VANCOMYCIN HCL IN 5 % DEXTROSE 1G/250ML
1000 PLASTIC BAG, INJECTION (ML) INTRAVENOUS ONCE
Status: DISCONTINUED | OUTPATIENT
Start: 2022-08-19 | End: 2022-08-19

## 2022-08-19 RX ADMIN — MEROPENEM 2 G: 1 INJECTION, POWDER, FOR SOLUTION INTRAVENOUS at 12:08

## 2022-08-19 RX ADMIN — CEFTOLOZANE AND TAZOBACTAM 1500 MG: 1; .5 INJECTION, POWDER, LYOPHILIZED, FOR SOLUTION INTRAVENOUS at 07:08

## 2022-08-19 RX ADMIN — VANCOMYCIN HYDROCHLORIDE 1500 MG: 1.5 INJECTION, POWDER, LYOPHILIZED, FOR SOLUTION INTRAVENOUS at 04:08

## 2022-08-19 RX ADMIN — ACETAMINOPHEN 650 MG: 650 SOLUTION ORAL at 06:08

## 2022-08-19 RX ADMIN — ACETAMINOPHEN 649.6 MG: 160 SUSPENSION ORAL at 06:08

## 2022-08-19 RX ADMIN — HYDROMORPHONE HYDROCHLORIDE 1 MG: 1 INJECTION, SOLUTION INTRAMUSCULAR; INTRAVENOUS; SUBCUTANEOUS at 05:08

## 2022-08-19 RX ADMIN — DEXTROSE, SODIUM CHLORIDE, AND POTASSIUM CHLORIDE: 5; .45; .15 INJECTION INTRAVENOUS at 01:08

## 2022-08-19 RX ADMIN — HYDROMORPHONE HYDROCHLORIDE 1 MG: 1 INJECTION, SOLUTION INTRAMUSCULAR; INTRAVENOUS; SUBCUTANEOUS at 10:08

## 2022-08-19 RX ADMIN — CLINDAMYCIN IN 5 PERCENT DEXTROSE 900 MG: 18 INJECTION, SOLUTION INTRAVENOUS at 10:08

## 2022-08-19 RX ADMIN — PIPERACILLIN AND TAZOBACTAM 3.38 G: 3; .375 INJECTION, POWDER, LYOPHILIZED, FOR SOLUTION INTRAVENOUS; PARENTERAL at 12:08

## 2022-08-19 RX ADMIN — SODIUM CHLORIDE: 0.9 INJECTION, SOLUTION INTRAVENOUS at 05:08

## 2022-08-19 RX ADMIN — ACETAMINOPHEN 649.6 MG: 160 SUSPENSION ORAL at 12:08

## 2022-08-19 NOTE — CONSULTS
Consult Note  Infectious Disease    Reason for Consult:  Facial abscess    HPI: Wei Dominique is a 67 y.o. male with known history of ALS, s/p trach & peg, vent dependence, HTN, PE (on eliquis) who presented with a primary complaint of facial swelling x few days. No family present at bedside. History was obtained from chart review and physician sign out. Patient had CT scan maxillofacial done that showed Right maxillary and anterior maxillary soft tissue edema and thickening which extends along the right maxillary molars into the oropharynx. Correlate for cellulitis and or pharyngeal infection.  Right perimandibular ovoid collection. Patient needs maxillofacial surgery however all hospitals are on diversion. Patient was in the ER waiting to be transferred for 18 hours and subsequently admitted to hospital medicine, ICU.   Seen by our service in June for pseudomonas  UTI  At that time he had deep bites and fissures in tongue as well.     Review of patient's allergies indicates:  No Known Allergies  Past Medical History:   Diagnosis Date    ALS (amyotrophic lateral sclerosis)     ALS (amyotrophic lateral sclerosis)     Dependent on ventilator 3/21/2022    Erectile dysfunction     Gait disturbance     Hyperlipidemia     Hypertension     Iron deficiency anemia     Motor neuron disease     Polycystic kidney disease     Quadriparesis 2/6/2018    Tremor      Past Surgical History:   Procedure Laterality Date    GASTROSTOMY      PEG    PORTACATH PLACEMENT      TRACHEOSTOMY       Social History     Socioeconomic History    Marital status:    Substance and Sexual Activity    Drug use: Never    Sexual activity: Not Currently     No family history on file.      Review of Systems:   Blinks once for yes and twice for no  He has pain, nausea, abd discomfort,     EXAM & DIAGNOSTICS REVIEWED:   Vitals:     Temp:  [99 °F (37.2 °C)-102.4 °F (39.1 °C)]   Temp: 99 °F (37.2 °C) (08/19/22 1515)  Pulse: (!)  119 (08/19/22 1600)  Resp: 16 (08/19/22 1712)  BP: (!) 160/71 (08/19/22 1600)  SpO2: 100 % (08/19/22 1600)    Intake/Output Summary (Last 24 hours) at 8/19/2022 1722  Last data filed at 8/19/2022 0102  Gross per 24 hour   Intake 550 ml   Output --   Net 550 ml       General:  Uncomfortable, tachycardic  Eyes:  Anicteric, PERRL, EOMI  ENT:  Copious intranasal secretions suctioned, because his mouth is profoundly difficult to open. His jaws have extremely high muscular tone. His tongue is severely lacerated and bulging out between his teeth on the right. He has a severe underbite. Copious saliva and bloody secretions suctioned from mouth. Even with dilaudid, we were only able to open his jaws less than a cm and I was able to insert a tongue blade only about half way. He has loose teeth, periodontal disease. I cannot see or palpate a soft tissue abscess.     Neck:  tracheosotomy  Lungs: Clear, no consolidation,   Heart:  RRR, no gallop/murmur/rub noted  Abd:  Soft, NT, ND, hypoactive BS, no masses or organomegaly appreciated. PEG with small amount of granulation.   :  Arvizu, grossly purulent urine in arvizu.   Musc:  Joints without effusion, swelling, erythema, synovitis, muscle wasting.   Skin:  No rashes. Left arm has widespread erythema and edema.   Neuro:             Alert, attentive,  quadriplegic  Psych:    Lymphatic:     Extrem: All 4 extremities are edematous, with left arm  Erythema,? phlebitis, cellulitis, warm     VAD:  Peripheral right arm     Isolation:  contact  Wound:                      General Labs reviewed:  Recent Labs   Lab 08/18/22 2010   WBC 21.04*   HGB 8.0*   HCT 26.4*   *       Recent Labs   Lab 08/18/22 2010      K 4.1      CO2 24   BUN 64*   CREATININE 0.7   CALCIUM 9.2   PROT 9.1*   BILITOT 0.9   ALKPHOS 122   ALT 37   AST 43*     No results for input(s): CRP in the last 168 hours.      Micro:  Microbiology Results (last 7 days)     Procedure Component Value Units  Date/Time    MRSA Screen by PCR [[id number]]     Order Status: No result Specimen: Nasopharyngeal Swab from Nasal     Culture, Respiratory with Gram Stain [[id number]]     Order Status: No result Specimen: Respiratory     IV catheter culture [[id number]] Collected: 08/19/22 0119    Order Status: Completed Specimen: Catheter Tip, Peripheral Updated: 08/19/22 1446     Aerobic Culture - Cath tip Further report to follow    Narrative:      Midline right arm    Blood culture [[id number]] Collected: 08/19/22 1234    Order Status: Sent Specimen: Blood from Peripheral, Upper Arm, Right Updated: 08/19/22 1241    Urine culture [[id number]] Collected: 08/18/22 1950    Order Status: Completed Specimen: Urine Updated: 08/19/22 0923     Urine Culture, Routine Insufficient incubation, culture in progress    Narrative:      Specimen Source->Urine    Blood culture [[id number]] Collected: 08/18/22 2010    Order Status: Completed Specimen: Blood from Peripheral, Forearm, Left Updated: 08/19/22 0517     Blood Culture, Routine No Growth to date          Imaging Reviewed:   CXR   CT maxillofacial  1.  Right maxillary and anterior maxillary soft tissue edema and thickening which extends along the right maxillary molars into the oropharynx. Correlate for cellulitis and or pharyngeal infection.  2.  Right perimandibular ovoid collection with rim enhancement noted measuring 3.0 x 1.8 cm. This appears to connect to the aforementioned maxillary skin thickening and edema.  3.  Nasopharyngeal phlegm and frothy debris extends into the ethmoid and maxillary sinus.  4.  Bilateral mastoid air cell fluid can be seen with mastoiditis.  5.  Right thyroid nodule measuring 1.9 cm. Recommend nonemergent thyroid ultrasound.  6.  Prominent pituitary gland may represent pituitary adenoma.    Cardiology:    IMPRESSION & PLAN   1. Severe swelling of the tongue with laceration of the tongue, bulging to the right   Extremely high tone preventing adequate exam   I do  not see or palpate any abscesses    2. Grossly purulent urine, h/o MDRO pseudomonas    3. Quadriplegic, ALS      Recommendations:  Clinda/zerbaxa  ENT Consult(Dr. Foster is not available this weekend)   Contact isolation    He may need anesthesia/ paralytics just to be examined.     D/w Dr. Robles      Medical Decision Making during this encounter was  [_] Low Complexity  [_] Moderate Complexity  [xxx ] High Complexity

## 2022-08-19 NOTE — ED NOTES
Pulled faulty midline and sent tip for culture. Applied gauze and tegaderm for pressure. No bleeding from site noted. Patient tolerated well.

## 2022-08-19 NOTE — ED NOTES
Performed danny-care and cleaned BM, applied new brief, linen change at this time as well. Sacral wound noted, applied barrier cream and mepilex.

## 2022-08-19 NOTE — CARE UPDATE
08/19/22 1436   Patient Assessment/Suction   Level of Consciousness (AVPU) alert   Respiratory Effort Unlabored   Expansion/Accessory Muscles/Retractions no retractions   Rhythm/Pattern, Respiratory assisted mechanically   Cough Frequency with stimulation   Cough Type assisted   Suction Method tracheal   Suction Pressure (mmHg) -120 mmHg   $ Suction Charges Inline Suction Procedure Stat Charge   Secretions Amount small   Secretions Color yellow   Secretions Characteristics thick   PRE-TX-O2   Oxygen Concentration (%) 40   SpO2 100 %   Pulse (!) 112   Resp 18   Vent Select   Conventional Vent Y   Charged w/in last 24h YES   Preset Conventional Ventilator Settings   Vent ID [id number]   Vent Type    Ventilation Type VC   Vent Mode A/C   Humidity HME   Set Rate 16 BPM   Vt Set 500 mL   PEEP/CPAP 5 cmH20   Pressure Support 0 cmH20   Waveform RAMP   Peak Flow 60 L/min   Plateau Set/Insp. Hold (sec) 0   Trigger Sensitivity Flow/I-Trigger 3 L/min   Patient Ventilator Parameters   Resp Rate Total 18 br/min   Peak Airway Pressure 35 cmH2O   Mean Airway Pressure 13 cmH20   Plateau Pressure 0 cmH20   Exhaled Vt 390 mL   Total Ve 9.27 mL   I:E Ratio Measured 1:3.20   Conventional Ventilator Alarms   Alarms On Y   Resp Rate High Alarm 50 br/min   Press High Alarm 65 cmH2O   Apnea Rate 10   Apnea Volume (mL) 0 mL   Apnea Oxygen Concentration  100   Apnea Flow Rate (L/min) 44   T Apnea 20 sec(s)   Ready to Wean/Extubation Screen   FIO2<=50 (chart decimal) 0.4   MV<16L (chart vol.) 9.27   PEEP <=8 (chart #) 5   Ready to Wean Parameters   F/VT Ratio<105 (RSBI) (!) 46.15

## 2022-08-19 NOTE — CONSULTS
Pulmonary/Critical Care Consult      Patient name: Wei Dominique  MRN: [id number]  Date: 08/19/2022    Admit Date: 8/18/2022  Consult Requested By: No att. providers found    Reason for Consult: Respiratory failure, trach    HPI:    8/19/2022 - P with h/o ALS with trach and vent dependence sent to ER for increased swelling.  Now admitted for evaluation and I was asked to see pt for ventilator management.  He is not able to provide any history.  Chart has been reviewed.  He is febrile, tachycardic (though better).  Cultures have been ordered and are pending.  Maxillofacial CT noted.  CXR shows no new infiltrates.  No ABG done yet.    Review of Systems    Review of Systems   Unable to perform ROS: Mental acuity       Past Medical History    Past Medical History:   Diagnosis Date    ALS (amyotrophic lateral sclerosis)     ALS (amyotrophic lateral sclerosis)     Erectile dysfunction     Gait disturbance     Hyperlipidemia     Hypertension     Iron deficiency anemia     Motor neuron disease     Polycystic kidney disease     Tremor        Past Surgical History    Past Surgical History:   Procedure Laterality Date    GASTROSTOMY      PEG    PORTACATH PLACEMENT      TRACHEOSTOMY         Medications (scheduled):      acetaminophen  649.6 mg Oral Q6H    meropenem (MERREM) IVPB  2 g Intravenous Q8H    vancomycin (VANCOCIN) IVPB  1,000 mg Intravenous Once    Followed by    vancomycin (VANCOCIN) IVPB  500 mg Intravenous Once       Medications (infusions):         Medications (prn):     Pharmacy to dose Vancomycin consult **AND** vancomycin - pharmacy to dose    Family History: No family history on file.    Social History: Tobacco:   Social History     Tobacco Use   Smoking Status Not on file   Smokeless Tobacco Not on file                                EtOH:   Social History     Substance and Sexual Activity   Alcohol Use None                                Drugs:   Social History     Substance and Sexual  "Activity   Drug Use Never       Physical Exam    Vital signs:  Temp:  [100.6 °F (38.1 °C)-102.4 °F (39.1 °C)]   Pulse:  []   Resp:  [16-26]   BP: ()/(54-73)   SpO2:  [97 %-100 %]     Intake/Output:     Intake/Output Summary (Last 24 hours) at 8/19/2022 1345  Last data filed at 8/19/2022 0102  Gross per 24 hour   Intake 550 ml   Output --   Net 550 ml        BMI: Estimated body mass index is 25.7 kg/m² as calculated from the following:    Height as of 6/13/22: 5' 8" (1.727 m).    Weight as of this encounter: 76.7 kg (169 lb).    Physical Exam  Vitals and nursing note reviewed.   Constitutional:       General: He is not in acute distress.     Appearance: Normal appearance. He is ill-appearing (chronically). He is not toxic-appearing or diaphoretic.   HENT:      Head: Normocephalic and atraumatic.      Comments: Some swelling to right face     Right Ear: External ear normal.      Left Ear: External ear normal.      Nose: Nose normal.      Mouth/Throat:      Mouth: Mucous membranes are moist.      Pharynx: Oropharynx is clear. No oropharyngeal exudate.   Eyes:      General: No scleral icterus.        Right eye: No discharge.         Left eye: No discharge.      Extraocular Movements: Extraocular movements intact.      Conjunctiva/sclera: Conjunctivae normal.      Pupils: Pupils are equal, round, and reactive to light.   Neck:      Vascular: No carotid bruit.      Comments: trach  Cardiovascular:      Rate and Rhythm: Normal rate and regular rhythm.      Pulses: Normal pulses.      Heart sounds: Normal heart sounds. No murmur heard.    No friction rub. No gallop.   Pulmonary:      Effort: Pulmonary effort is normal. No respiratory distress.      Breath sounds: Normal breath sounds. No stridor. No wheezing, rhonchi or rales.   Chest:      Chest wall: No tenderness.   Abdominal:      General: Bowel sounds are normal. There is no distension.      Tenderness: There is no abdominal tenderness. There is no " guarding.   Musculoskeletal:         General: No swelling. Normal range of motion.      Cervical back: Normal range of motion and neck supple. No rigidity or tenderness.      Right lower leg: No edema.      Left lower leg: No edema.      Comments: + edema in UE bilaterally   Lymphadenopathy:      Cervical: No cervical adenopathy.   Skin:     General: Skin is warm and dry.      Capillary Refill: Capillary refill takes less than 2 seconds.      Coloration: Skin is not jaundiced.      Findings: No bruising.      Comments: Some redness at prior midline site  Some heel breakdown   Neurological:      Mental Status: He is alert.      Comments: Not responding or moving much   Psychiatric:      Comments: Not able to assess         Laboratory    Recent Labs   Lab 08/18/22 2010   WBC 21.04*   RBC 3.25*   HGB 8.0*   HCT 26.4*   *   MCV 81*   MCH 24.6*   MCHC 30.3*       Recent Labs   Lab 08/18/22 2010   CALCIUM 9.2   PROT 9.1*      K 4.1   CO2 24      BUN 64*   CREATININE 0.7   ALKPHOS 122   ALT 37   AST 43*   BILITOT 0.9       Recent Labs   Lab 08/19/22  0810   INR 1.9       Recent Labs   Lab 08/18/22 2010   CPK 53   CPKMB 4.8   TROPONINI 0.049*       Additional labs: [email or web address]    Microbiology:       Microbiology Results (last 7 days)     Procedure Component Value Units Date/Time    Blood culture [[id number]] Collected: 08/19/22 1234    Order Status: Sent Specimen: Blood from Peripheral, Upper Arm, Right Updated: 08/19/22 1241    Urine culture [[id number]] Collected: 08/18/22 1950    Order Status: Completed Specimen: Urine Updated: 08/19/22 0923     Urine Culture, Routine Insufficient incubation, culture in progress    Narrative:      Specimen Source->Urine    Blood culture [[id number]] Collected: 08/18/22 2010    Order Status: Completed Specimen: Blood from Peripheral, Forearm, Left Updated: 08/19/22 0517     Blood Culture, Routine No Growth to date    IV catheter culture [[id number]] Collected: 08/19/22 0119     Order Status: Sent Specimen: Catheter Tip, Peripheral Updated: 08/19/22 0137          Radiology    CT Maxillofacial With Contrast  EXAM:  CT Maxillofacial With Intravenous Contrast  CLINICAL HISTORY:  67 years old, Male; Maxillary/facial abscess;  TECHNIQUE:  Axial computed tomography images of the face with intravenous contrast.  This CT exam was performed using one or more of the following dose reduction techniques:  automated exposure control, adjustment of the mA and/or kV according to patient size, and/or use of iterative reconstruction technique.  COMPARISON:  No relevant prior studies available.    FINDINGS:  Bones/joints:  No acute fracture.  Soft tissues:  Right maxillary and anterior maxillary soft tissue edema and thickening which extends along the right maxillary molars into the oropharynx.  Vasculature:  Bilateral carotid bulb calcifications.  Lymph nodes:  Prominent cervical chain lymph nodes.  Orbits:  Unremarkable.  Sinuses:  Bilateral maxillary sinus mucosal thickening. Ethmoid sinus mucosal disease. Sphenoid sinus mucosal thickening.  Mastoid air cells:  Bilateral mastoid air cell fluid.  Dental:  Beam hardening artifact from dental amalgam makes interpretation of the adjacent soft tissues difficult. Numerous missing dentition and periapical lucencies involving predominantly the maxillary dentition  Brain:  Limited images of the brain show atrophy and chronic ischemic changes.  Sella:  Prominent pituitary gland may represent pituitary adenoma.  Nasopharynx:  Nasopharyngeal phlegm and frothy debris extends into the ethmoid and maxillary sinus.  Thyroid:  Right thyroid nodule measuring 1.9 cm.  Other findings:  Right perimandibular ovoid collection with rim enhancement noted measuring 3.0 x 1.8 cm.    IMPRESSION:  1.  Right maxillary and anterior maxillary soft tissue edema and thickening which extends along the right maxillary molars into the oropharynx. Correlate for cellulitis and or pharyngeal  infection.  2.  Right perimandibular ovoid collection with rim enhancement noted measuring 3.0 x 1.8 cm. This appears to connect to the aforementioned maxillary skin thickening and edema.  3.  Nasopharyngeal phlegm and frothy debris extends into the ethmoid and maxillary sinus.  4.  Bilateral mastoid air cell fluid can be seen with mastoiditis.  5.  Right thyroid nodule measuring 1.9 cm. Recommend nonemergent thyroid ultrasound.  6.  Prominent pituitary gland may represent pituitary adenoma.    Electronically signed by:  Corina Ramos MD  8/18/2022 10:41 PM CDT Workstation: [id number]  X-Ray Chest AP Portable  Portable chest x-ray at 6:23 PM is compared to prior study dated 6/9/2022    Clinical history is anasarca    There is a left subclavian vein Port-A-Cath with the tip overlying the superior vena cava. There is a tracheostomy tube.    The lungs are clear. The cardiomediastinal silhouette is normal in size. There are no acute osseous abnormalities.    IMPRESSION: No acute pulmonary process    Electronically signed by:  Sangeeta Deleon MD  8/18/2022 6:32 PM CDT Workstation: [id number]        Additional Studies    reviewed    Ventilator Information    Vent Mode: A/C  Oxygen Concentration (%):  [40] 40  Resp Rate Total:  [16 br/min-26 br/min] 17 br/min  Vt Set:  [500 mL] 500 mL  PEEP/CPAP:  [5 cmH20] 5 cmH20  Pressure Support:  [0 cmH20] 0 cmH20  Mean Airway Pressure:  [12 pnA93-49 cmH20] 13 cmH20         No results for input(s): PH, PCO2, PO2, HCO3, POCSATURATED, BE in the last 72 hours.      Impression    Active Hospital Problems    Diagnosis  POA    Tracheostomy in place [Z93.0]  Not Applicable    Chronic respiratory failure with hypoxia and hypercapnia [J96.11, J96.12]  Yes    Dependent on ventilator [Z99.11]  Not Applicable    S/P percutaneous endoscopic gastrostomy (PEG) tube placement [Z93.1]  Not Applicable    ALS (amyotrophic lateral sclerosis) [G12.21]  Yes    Edema [R60.9]  Yes     Quadriparesis [G82.50]  Yes      Resolved Hospital Problems   No resolved problems to display.       Plan    · Check ABG to assess acid base status  · Panculture - pending - has h/o resistant organisms  · Contact isolation  · Antibiotics per ID  · ? US UE to rule out DVT ([facility] studies were negative in 5/2022)  · Follow H/H - no acute bleeding reported  · Wound care consult  · IVF and follow    Thank you for this consult.  I will follow with you while the patient is hospitalized.        Sy Kennedy MD  [facility] Pulmonary/Critical Care  08/19/2022

## 2022-08-19 NOTE — RESPIRATORY THERAPY
08/18/22 1909   Patient Assessment/Suction   Level of Consciousness (AVPU) responds to pain   Respiratory Effort Normal;Unlabored   Expansion/Accessory Muscles/Retractions no use of accessory muscles   All Lung Fields Breath Sounds coarse   Rhythm/Pattern, Respiratory assisted mechanically   Cough Frequency with stimulation   Cough Type assisted   Suction Method oral;tracheal   Suction Pressure (mmHg) -120 mmHg   $ Suction Charges Inline Suction Procedure Stat Charge   Secretions Amount small   Secretions Color yellow   Secretions Characteristics thick   Skin Integrity   $ Wound Care Tech Time 15 min   Area Observed Neck under tracheostomy;Neck   Skin Appearance without discoloration   Barrier used?   (gauze)   PRE-TX-O2   O2 Device (Oxygen Therapy) ventilator   $ Is the patient on Low Flow Oxygen? Yes   Oxygen Concentration (%) 40   SpO2 100 %   Pulse Oximetry Type Continuous   $ Pulse Oximetry - Multiple Charge Pulse Oximetry - Multiple   Pulse (!) 139   Vent Select   Conventional Vent Y   Charged w/in last 24h YES   Preset Conventional Ventilator Settings   Vent ID [id number]   Vent Type    Ventilation Type VC   Vent Mode A/C   Humidity HME   Set Rate 16 BPM   Vt Set 500 mL   PEEP/CPAP 5 cmH20   Pressure Support 0 cmH20   Waveform RAMP   Peak Flow 60 L/min   Plateau Set/Insp. Hold (sec) 0   Trigger Sensitivity Flow/I-Trigger 3 L/min   Patient Ventilator Parameters   Resp Rate Total 17 br/min   Peak Airway Pressure 38 cmH2O   Mean Airway Pressure 12 cmH20   Plateau Pressure 0 cmH20   Exhaled Vt 503 mL   Total Ve 8.72 mL   I:E Ratio Measured 1:3.20   Tubing ID (mm) 8 mm   Tube Type Trach   Conventional Ventilator Alarms   Alarms On Y   Resp Rate High Alarm 0 br/min   Press High Alarm 60 cmH2O   Apnea Rate 10   Apnea Volume (mL) 0 mL   Apnea Oxygen Concentration  100   Apnea Flow Rate (L/min) 44   T Apnea 20 sec(s)   Ready to Wean/Extubation Screen   FIO2<=50 (chart decimal) 0.4   MV<16L (chart vol.) 8.72   PEEP  <=8 (chart #) 5   Education   $ Education Ventilator Oxygen;Trach Care;15 min   Respiratory Evaluation   $ Care Plan Tech Time 15 min

## 2022-08-19 NOTE — H&P
"[facility] Medicine History & Physical Examination   Patient Name: Wei Dominique  MRN: [id number]  Patient Class: IP- Inpatient   Admission Date: 8/18/2022  5:47 PM  Length of Stay: 0  Attending Physician: Edie Robles MD  Primary Care Provider: Primary Doctor No  Face-to-Face encounter date: 08/19/2022  Code Status: Full Code  Chief Complaint: Facial Swelling (Facial swelling that started "several days ago" )        HISTORY OF PRESENT ILLNESS:   Wei Dominique is a 67 y.o. Black or  male with known history of ALS, s/p trach & peg, vent dependence, HTN, PE (on eliquis) who presented with a primary complaint of facial swelling x few days. No family present at bedside. History was obtained from chart review and physician sign out. Patient had CT scan maxillofacial done that showed Right maxillary and anterior maxillary soft tissue edema and thickening which extends along the right maxillary molars into the oropharynx. Correlate for cellulitis and or pharyngeal infection.  Right perimandibular ovoid collection. Patient needs maxillofacial surgery however all hospitals are on diversion. Patient was in the ER waiting to be transferred for 18 hours. I was consulted for further management and reinitiate transfer process once beds are available.      REVIEW OF SYSTEMS:   10 Point Review of System was performed and was found to be negative except for that mentioned already in the HPI above.     PAST MEDICAL HISTORY:     Past Medical History:   Diagnosis Date    ALS (amyotrophic lateral sclerosis)     ALS (amyotrophic lateral sclerosis)     Erectile dysfunction     Gait disturbance     Hyperlipidemia     Hypertension     Iron deficiency anemia     Motor neuron disease     Polycystic kidney disease     Tremor        PAST SURGICAL HISTORY:     Past Surgical History:   Procedure Laterality Date    GASTROSTOMY      PEG    PORTACATH PLACEMENT      TRACHEOSTOMY   "       ALLERGIES:   Patient has no known allergies.    FAMILY HISTORY:   Unable to obtain family history     SOCIAL HISTORY:     Unable to obtain social history     HOME MEDICATIONS:     Prior to Admission medications    Medication Sig Start Date End Date Taking? Authorizing Provider   albuterol (PROVENTIL/VENTOLIN HFA) 90 mcg/actuation inhaler Inhale 2 puffs into the lungs every 6 (six) hours as needed. 8/9/22  Yes Historical Provider   ALPRAZolam (XANAX) 0.5 MG tablet Take 1 tablet (0.5 mg total) by mouth 3 (three) times daily as needed for Anxiety. 6/14/22  Yes Naif Huynh MD   apixaban (ELIQUIS) 2.5 mg Tab 1 tablet (2.5 mg total) by Per G Tube route 2 (two) times daily. 6/14/22 6/14/23 Yes Naif Huynh MD   arginine-glutamine-calcium HMB (AYSE) 7-7-1.5 gram PwPk Take 1 packet by mouth 2 (two) times a day.   Yes Historical Provider   baclofen (LIORESAL) 10 MG tablet TAKE 1 TABLET THREE TIMES PER DAY  Patient taking differently: 10 mg by Per G Tube route 3 (three) times daily. 1/7/21  Yes Peter Ocasio MD   busPIRone (BUSPAR) 5 MG Tab 1 tablet (5 mg total) by Per G Tube route 2 (two) times daily. 2/23/22 2/23/23 Yes Monae Cox MD   furosemide (LASIX) 40 MG tablet 1 tablet (40 mg total) by Per G Tube route once daily. 5/17/22 8/19/22 Yes Mack Julian MD   glycopyrrolate (ROBINUL) 1 mg Tab START BY TAKING 1 TABLET DAILY. IF TOLERATING WELL, YOU CAN INCREASE IT UP TO 2 OR EVEN 3 TIMES DAILY.  Patient taking differently: 1 mg by Per G Tube route 3 (three) times daily. 2/22/21  Yes Peter Ocasio MD   lansoprazole (PREVACID) 30 MG capsule Take 30 mg by mouth once daily.   Yes Historical Provider   melatonin 3 mg TbDL 3 mg by Per G Tube route every evening.   Yes Historical Provider   metoprolol tartrate (LOPRESSOR) 50 MG tablet Take 50 mg by mouth once daily. 1/19/22  Yes Historical Provider   ondansetron (ZOFRAN) 4 MG tablet 4 mg by Per G Tube route every 6 (six) hours as needed for  Nausea.   Yes Historical Provider   polyethylene glycol (GLYCOLAX) 17 gram PwPk 17 g by Per G Tube route 2 (two) times daily. 22 Yes Lauren Rodney MD   riluzole 50 mg Tab tablet TAKE 1 TABLET ON AN EMPTY STOMACH EVERY 12 HRS  Patient taking differently: 50 mg by Per G Tube route. TAKE 1 TABLET ON AN EMPTY STOMACH EVERY 12 HRS 19  Yes Eli Kohler MD   traZODone (DESYREL) 50 MG tablet 50 mg by Per G Tube route every evening.   Yes Historical Provider   albuterol-ipratropium (DUO-NEB) 2.5 mg-0.5 mg/3 mL nebulizer solution Take 3 mLs by nebulization every 4 (four) hours. Rescue    Historical Provider   chlorhexidine (PERIDEX) 0.12 % solution Use as directed 15 mLs in the mouth or throat 2 (two) times daily. 22   Historical Provider   enoxaparin (LOVENOX) 60 mg/0.6 mL Syrg Inject into the skin. 22   Historical Provider   glycerin adult suppository Place 1 suppository rectally once daily. 22  Lauren Rodney MD   mupirocin (BACTROBAN) 2 % ointment SMARTSI Application Topical 2-3 Times Daily 22   Historical Provider   pantoprazole (PROTONIX) 40 mg suspension 40 mg by FEEDING TUBE route 2 (two) times a day.    Historical Provider   warfarin (COUMADIN) 2.5 MG tablet Take by mouth. 22   Historical Provider         PHYSICAL EXAM:   BP (!) 144/66   Pulse (!) 120   Temp 99 °F (37.2 °C) (Axillary)   Resp 18   Wt 78.1 kg (172 lb 2.9 oz)   SpO2 100%   BMI 26.18 kg/m²   Vitals Reviewed  General appearance: appears chronically sick  Skin: all four extremities are very swollen. Left upper extremity has changes consistent with cellulitis  Neuro: turns his head spontaneously. Does not follow any commands   HENT:trach present, breathing with the help of ventilator  Eyes: Normal extraocular movements.   Neck: Supple. No evidence of lymphadenopathy. No thyroidomegaly.  Lungs: Coarse breath sounds bilaterally   Heart: Re tachycardiac s1s2 present  Abdomen: Soft,  non-distended, non-tender. Peg present  Extremities: see exam above in skin, does not move any extremity  Psych/mental status: open eyes but does not follow any commands  EMERGENCY DEPARTMENT LABS AND IMAGING:     Labs Reviewed   CBC W/ AUTO DIFFERENTIAL - Abnormal; Notable for the following components:       Result Value    WBC 21.04 (*)     RBC 3.25 (*)     Hemoglobin 8.0 (*)     Hematocrit 26.4 (*)     MCV 81 (*)     MCH 24.6 (*)     MCHC 30.3 (*)     RDW 18.3 (*)     Platelets 493 (*)     Immature Granulocytes 0.7 (*)     Gran # (ANC) 19.1 (*)     Immature Grans (Abs) 0.14 (*)     Gran % 90.7 (*)     Lymph % 5.3 (*)     Mono % 2.9 (*)     All other components within normal limits   COMPREHENSIVE METABOLIC PANEL - Abnormal; Notable for the following components:    Glucose 159 (*)     BUN 64 (*)     Total Protein 9.1 (*)     Albumin 2.5 (*)     AST 43 (*)     All other components within normal limits   TROPONIN I - Abnormal; Notable for the following components:    Troponin I 0.049 (*)     All other components within normal limits   URINALYSIS, REFLEX TO URINE CULTURE - Abnormal; Notable for the following components:    Appearance, UA Hazy (*)     Protein, UA 1+ (*)     Occult Blood UA 1+ (*)     Leukocytes, UA 3+ (*)     All other components within normal limits    Narrative:     Specimen Source->Urine   URINALYSIS MICROSCOPIC - Abnormal; Notable for the following components:    RBC, UA 29 (*)     WBC, UA 17 (*)     Hyaline Casts, UA 12 (*)     All other components within normal limits    Narrative:     Specimen Source->Urine   PROTIME-INR - Abnormal; Notable for the following components:    PT 20.3 (*)     All other components within normal limits   POCT GLUCOSE - Abnormal; Notable for the following components:    POC Glucose 256 (*)     All other components within normal limits   CULTURE, BLOOD   CULTURE, URINE    Narrative:     Specimen Source->Urine   CULTURE, CATHETER TIP  (AEROBIC)    Narrative:     Midline  right arm   CULTURE, BLOOD   SARS-COV-2 RNA AMPLIFICATION, QUAL   B-TYPE NATRIURETIC PEPTIDE   CK-MB   CK   MAGNESIUM   LACTIC ACID, PLASMA   ISTAT CREATININE   POCT CREATININE       CT Maxillofacial With Contrast   Final Result      X-Ray Chest AP Portable   Final Result          ASSESSMENT & PLAN:   Wei Dominique is a 67 y.o. male admitted for    Severe sepsis secondary to mandibular abscess/cellulitis/left arm cellulitis/pneumonia - admit to ICU, treat with broad spectrum antibiotics, ID consulted, will initiate transfer again once we have bed availability   Chronic respiratory failure with trach dependent - pulmonary consulted  Left arm cellulitis - MRSA PCR, IV Vancomycin and Meropenem, ID consulted for futher management,  will obtain US both upper and Lower extremities   Right lobe pneumonia - history of Proteus Mirabilis ESBL and Pseudomonas aeruginosa   - ID consulted for further management  Leukocytosis - due to above  Anemia of chronic disease - transfuse below 7g/dl  2 mm right upper lobe pulmonary nodule.  Bilateral Renal cyst - monitor  Fecal impaction - laxatives  stercoral colitis - likely due to impaction, laxatives and antibiotics  ALS - resume home medications  Quadriparesis - due to above  H/o PE - DOAC, will obtain US both upper and Lower extremities   Trach and PEG dependent  Non specific troponin elevation - monitor  Deconditioning     Diet: NPO    DVT Prophylaxis: DOAC and Encourage ambulation. OOB as tolerated.     Discharge Planning and Disposition:  [facility]    Expected LOS: 2-3 days    This patient is high risk for life-threatening deterioration and death secondary to above comorbidities and need for IV treatment. This patient meets inpatient criteria.   ________________________________________________________________________________    Face-to-Face encounter date: 08/19/2022  Encounter included review of the medical records, interviewing and examining the patient face-to-face,  discussion with family and other health care providers including emergency medicine physician, admission orders, interpreting lab/test results and formulating a plan of care.   Medical Decision Making during this encounter was High Complexity due to Patient has a condition that poses threat to life and bodily function: sepsis  ________________________________________________________________________________    INPATIENT LIST OF MEDICATIONS     Current Facility-Administered Medications:     0.9%  NaCl infusion, , Intravenous, Continuous, Sy Kennedy MD    acetaminophen oral solution 650 mg, 650 mg, Oral, Q6H, Edie Robles MD    meropenem (MERREM) 2 g in sodium chloride 0.9% 100 mL IVPB, 2 g, Intravenous, Q8H, Edie Robles MD, Last Rate: 33.3 mL/hr at 08/19/22 1259, 2 g at 08/19/22 1259    vancomycin (VANCOCIN) 1,500 mg in dextrose 5 % 500 mL IVPB, 1,500 mg, Intravenous, Once, Edie Robles MD    Pharmacy to dose Vancomycin consult, , , Once **AND** vancomycin - pharmacy to dose, , Intravenous, pharmacy to manage frequency, Edie Robles MD    [START ON 8/20/2022] vancomycin 1.25 g in dextrose 5% 250 mL IVPB (ready to mix), 1,250 mg, Intravenous, Q12H, Edie Robles MD      Scheduled Meds:   acetaminophen  650 mg Oral Q6H    meropenem (MERREM) IVPB  2 g Intravenous Q8H    vancomycin (VANCOCIN) IVPB  1,500 mg Intravenous Once    [START ON 8/20/2022] vancomycin (VANCOCIN) IVPB  1,250 mg Intravenous Q12H     Continuous Infusions:   sodium chloride 0.9%       PRN Meds:.Pharmacy to dose Vancomycin consult **AND** vancomycin - pharmacy to dose      Edie Robles  [facility] Hospitalist  08/19/2022

## 2022-08-19 NOTE — PROGRESS NOTES
Pharmacokinetic Initial Assessment: IV Vancomycin    Assessment/Plan:    Initiate intravenous vancomycin with loading dose of 1500 mg once followed by a maintenance dose of vancomycin 1250mg IV every 12 hours  Desired empiric serum trough concentration is 10 to 15 mcg/mL  Draw vancomycin trough level 60 min prior to fourth dose on 8/21/22 at approximately 03:00 am.  Pharmacy will continue to follow and monitor vancomycin.      Please contact pharmacy at extension [phone] with any questions regarding this assessment.     Thank you for the consult,   Claudette Sotelo       Patient brief summary:  Wei Dominique is a 67 y.o. male initiated on antimicrobial therapy with IV Vancomycin for treatment of suspected skin & soft tissue infection    Drug Allergies:   Review of patient's allergies indicates:  No Known Allergies    Actual Body Weight:   76.7 kg    Renal Function:   Estimated Creatinine Clearance: 100.4 mL/min (based on SCr of 0.7 mg/dL).,     Dialysis Method (if applicable):  N/A    CBC (last 72 hours):  Recent Labs   Lab Result Units 08/18/22 2010   WBC K/uL 21.04*   Hemoglobin g/dL 8.0*   Hematocrit % 26.4*   Platelets K/uL 493*   Gran % % 90.7*   Lymph % % 5.3*   Mono % % 2.9*   Eosinophil % % 0.2   Basophil % % 0.2   Differential Method  Automated       Metabolic Panel (last 72 hours):  Recent Labs   Lab Result Units 08/18/22 1950 08/18/22 2010   Sodium mmol/L  --  138   Potassium mmol/L  --  4.1   Chloride mmol/L  --  100   CO2 mmol/L  --  24   Glucose mg/dL  --  159*   Glucose, UA  Negative  --    BUN mg/dL  --  64*   Creatinine mg/dL  --  0.7   Albumin g/dL  --  2.5*   Total Bilirubin mg/dL  --  0.9   Alkaline Phosphatase U/L  --  122   AST U/L  --  43*   ALT U/L  --  37   Magnesium mg/dL  --  2.6       Drug levels (last 3 results):  No results for input(s): VANCOMYCINRA, VANCORANDOM, VANCOMYCINPE, VANCOPEAK, VANCOMYCINTR, VANCOTROUGH in the last 72 hours.    Microbiologic Results:  Microbiology Results  (last 7 days)       Procedure Component Value Units Date/Time    IV catheter culture [[id number]] Collected: 08/19/22 0119    Order Status: Completed Specimen: Catheter Tip, Peripheral Updated: 08/19/22 1446     Aerobic Culture - Cath tip Further report to follow    Narrative:      Midline right arm    Blood culture [[id number]] Collected: 08/19/22 1234    Order Status: Sent Specimen: Blood from Peripheral, Upper Arm, Right Updated: 08/19/22 1241    Urine culture [[id number]] Collected: 08/18/22 1950    Order Status: Completed Specimen: Urine Updated: 08/19/22 0923     Urine Culture, Routine Insufficient incubation, culture in progress    Narrative:      Specimen Source->Urine    Blood culture [[id number]] Collected: 08/18/22 2010    Order Status: Completed Specimen: Blood from Peripheral, Forearm, Left Updated: 08/19/22 0517     Blood Culture, Routine No Growth to date

## 2022-08-19 NOTE — NURSING
Left subclavian PAC accessed per protocol. PAC flushes easily, but no blood return noted. Dr. Robles notified. Sterile dsg and biopatch applied to site. Site without redness, swelling.

## 2022-08-20 PROBLEM — R25.2 TRISMUS: Status: ACTIVE | Noted: 2022-08-20

## 2022-08-20 PROBLEM — S01.552A OPEN WOUND OF TONGUE DUE TO BITE: Status: ACTIVE | Noted: 2022-08-20

## 2022-08-20 LAB
ABO + RH BLD: NORMAL
ANION GAP SERPL CALC-SCNC: 12 MMOL/L (ref 8–16)
BASOPHILS # BLD AUTO: 0.03 K/UL (ref 0–0.2)
BASOPHILS NFR BLD: 0.2 % (ref 0–1.9)
BLD GP AB SCN CELLS X3 SERPL QL: NORMAL
BLD PROD TYP BPU: NORMAL
BLOOD UNIT EXPIRATION DATE: NORMAL
BLOOD UNIT TYPE CODE: 5100
BLOOD UNIT TYPE: NORMAL
BUN SERPL-MCNC: 42 MG/DL (ref 8–23)
CALCIUM SERPL-MCNC: 8.6 MG/DL (ref 8.7–10.5)
CHLORIDE SERPL-SCNC: 105 MMOL/L (ref 95–110)
CO2 SERPL-SCNC: 23 MMOL/L (ref 23–29)
CODING SYSTEM: NORMAL
CREAT SERPL-MCNC: 0.6 MG/DL (ref 0.5–1.4)
DIFFERENTIAL METHOD: ABNORMAL
DISPENSE STATUS: NORMAL
EOSINOPHIL # BLD AUTO: 0.1 K/UL (ref 0–0.5)
EOSINOPHIL NFR BLD: 1 % (ref 0–8)
ERYTHROCYTE [DISTWIDTH] IN BLOOD BY AUTOMATED COUNT: 17.6 % (ref 11.5–14.5)
EST. GFR  (NO RACE VARIABLE): >60 ML/MIN/1.73 M^2
GLUCOSE SERPL-MCNC: 149 MG/DL (ref 70–110)
HCT VFR BLD AUTO: 23.3 % (ref 40–54)
HCT VFR BLD AUTO: 26.9 % (ref 40–54)
HGB BLD-MCNC: 7 G/DL (ref 14–18)
HGB BLD-MCNC: 8.1 G/DL (ref 14–18)
IMM GRANULOCYTES # BLD AUTO: 0.08 K/UL (ref 0–0.04)
IMM GRANULOCYTES NFR BLD AUTO: 0.6 % (ref 0–0.5)
LYMPHOCYTES # BLD AUTO: 0.7 K/UL (ref 1–4.8)
LYMPHOCYTES NFR BLD: 5.1 % (ref 18–48)
MAGNESIUM SERPL-MCNC: 2.3 MG/DL (ref 1.6–2.6)
MCH RBC QN AUTO: 24.3 PG (ref 27–31)
MCHC RBC AUTO-ENTMCNC: 30 G/DL (ref 32–36)
MCV RBC AUTO: 81 FL (ref 82–98)
MONOCYTES # BLD AUTO: 0.7 K/UL (ref 0.3–1)
MONOCYTES NFR BLD: 5.1 % (ref 4–15)
NEUTROPHILS # BLD AUTO: 12.5 K/UL (ref 1.8–7.7)
NEUTROPHILS NFR BLD: 88 % (ref 38–73)
NRBC BLD-RTO: 0 /100 WBC
NUM UNITS TRANS PACKED RBC: NORMAL
PLATELET # BLD AUTO: 402 K/UL (ref 150–450)
PMV BLD AUTO: 9.5 FL (ref 9.2–12.9)
POTASSIUM SERPL-SCNC: 2.5 MMOL/L (ref 3.5–5.1)
POTASSIUM SERPL-SCNC: 2.8 MMOL/L (ref 3.5–5.1)
RBC # BLD AUTO: 2.88 M/UL (ref 4.6–6.2)
SODIUM SERPL-SCNC: 140 MMOL/L (ref 136–145)
WBC # BLD AUTO: 14.19 K/UL (ref 3.9–12.7)

## 2022-08-20 PROCEDURE — 36430 TRANSFUSION BLD/BLD COMPNT: CPT

## 2022-08-20 PROCEDURE — 63600175 PHARM REV CODE 636 W HCPCS: Performed by: INTERNAL MEDICINE

## 2022-08-20 PROCEDURE — 99900035 HC TECH TIME PER 15 MIN (STAT)

## 2022-08-20 PROCEDURE — 84132 ASSAY OF SERUM POTASSIUM: CPT | Performed by: INTERNAL MEDICINE

## 2022-08-20 PROCEDURE — C9113 INJ PANTOPRAZOLE SODIUM, VIA: HCPCS | Performed by: INTERNAL MEDICINE

## 2022-08-20 PROCEDURE — 83735 ASSAY OF MAGNESIUM: CPT | Performed by: INTERNAL MEDICINE

## 2022-08-20 PROCEDURE — 85025 COMPLETE CBC W/AUTO DIFF WBC: CPT | Performed by: INTERNAL MEDICINE

## 2022-08-20 PROCEDURE — 99223 PR INITIAL HOSPITAL CARE,LEVL III: ICD-10-PCS | Mod: ,,, | Performed by: OTOLARYNGOLOGY

## 2022-08-20 PROCEDURE — 94003 VENT MGMT INPAT SUBQ DAY: CPT

## 2022-08-20 PROCEDURE — C1751 CATH, INF, PER/CENT/MIDLINE: HCPCS

## 2022-08-20 PROCEDURE — 86850 RBC ANTIBODY SCREEN: CPT | Performed by: INTERNAL MEDICINE

## 2022-08-20 PROCEDURE — P9016 RBC LEUKOCYTES REDUCED: HCPCS | Performed by: INTERNAL MEDICINE

## 2022-08-20 PROCEDURE — 63700000 PHARM REV CODE 250 ALT 637 W/O HCPCS: Performed by: INTERNAL MEDICINE

## 2022-08-20 PROCEDURE — 20000000 HC ICU ROOM

## 2022-08-20 PROCEDURE — 99900026 HC AIRWAY MAINTENANCE (STAT)

## 2022-08-20 PROCEDURE — 99223 1ST HOSP IP/OBS HIGH 75: CPT | Mod: ,,, | Performed by: OTOLARYNGOLOGY

## 2022-08-20 PROCEDURE — 85014 HEMATOCRIT: CPT | Performed by: INTERNAL MEDICINE

## 2022-08-20 PROCEDURE — 99291 CRITICAL CARE FIRST HOUR: CPT | Mod: ,,, | Performed by: INTERNAL MEDICINE

## 2022-08-20 PROCEDURE — 99291 PR CRITICAL CARE, E/M 30-74 MINUTES: ICD-10-PCS | Mod: ,,, | Performed by: INTERNAL MEDICINE

## 2022-08-20 PROCEDURE — 25000003 PHARM REV CODE 250: Performed by: INTERNAL MEDICINE

## 2022-08-20 PROCEDURE — 80048 BASIC METABOLIC PNL TOTAL CA: CPT | Performed by: INTERNAL MEDICINE

## 2022-08-20 PROCEDURE — 99233 SBSQ HOSP IP/OBS HIGH 50: CPT | Mod: ,,, | Performed by: INTERNAL MEDICINE

## 2022-08-20 PROCEDURE — 63600175 PHARM REV CODE 636 W HCPCS: Mod: JG | Performed by: INTERNAL MEDICINE

## 2022-08-20 PROCEDURE — 36569 INSJ PICC 5 YR+ W/O IMAGING: CPT

## 2022-08-20 PROCEDURE — 27000221 HC OXYGEN, UP TO 24 HOURS

## 2022-08-20 PROCEDURE — 85018 HEMOGLOBIN: CPT | Performed by: INTERNAL MEDICINE

## 2022-08-20 PROCEDURE — 99900031 HC PATIENT EDUCATION (STAT)

## 2022-08-20 PROCEDURE — 86920 COMPATIBILITY TEST SPIN: CPT | Performed by: INTERNAL MEDICINE

## 2022-08-20 PROCEDURE — 94761 N-INVAS EAR/PLS OXIMETRY MLT: CPT

## 2022-08-20 PROCEDURE — 99233 PR SUBSEQUENT HOSPITAL CARE,LEVL III: ICD-10-PCS | Mod: ,,, | Performed by: INTERNAL MEDICINE

## 2022-08-20 PROCEDURE — A4216 STERILE WATER/SALINE, 10 ML: HCPCS | Performed by: INTERNAL MEDICINE

## 2022-08-20 PROCEDURE — 36415 COLL VENOUS BLD VENIPUNCTURE: CPT | Performed by: INTERNAL MEDICINE

## 2022-08-20 RX ORDER — POTASSIUM CHLORIDE 7.45 MG/ML
40 INJECTION INTRAVENOUS
Status: DISCONTINUED | OUTPATIENT
Start: 2022-08-20 | End: 2022-08-31 | Stop reason: HOSPADM

## 2022-08-20 RX ORDER — HYDROCODONE BITARTRATE AND ACETAMINOPHEN 500; 5 MG/1; MG/1
TABLET ORAL
Status: DISCONTINUED | OUTPATIENT
Start: 2022-08-20 | End: 2022-08-28

## 2022-08-20 RX ORDER — PROCHLORPERAZINE EDISYLATE 5 MG/ML
5 INJECTION INTRAMUSCULAR; INTRAVENOUS EVERY 6 HOURS PRN
Status: DISCONTINUED | OUTPATIENT
Start: 2022-08-20 | End: 2022-08-31 | Stop reason: HOSPADM

## 2022-08-20 RX ORDER — PANTOPRAZOLE SODIUM 40 MG/1
40 TABLET, DELAYED RELEASE ORAL
Status: DISCONTINUED | OUTPATIENT
Start: 2022-08-20 | End: 2022-08-20

## 2022-08-20 RX ORDER — BUSPIRONE HYDROCHLORIDE 5 MG/1
5 TABLET ORAL 2 TIMES DAILY
Status: DISCONTINUED | OUTPATIENT
Start: 2022-08-20 | End: 2022-08-31 | Stop reason: HOSPADM

## 2022-08-20 RX ORDER — NALOXONE HCL 0.4 MG/ML
0.02 VIAL (ML) INJECTION
Status: DISCONTINUED | OUTPATIENT
Start: 2022-08-20 | End: 2022-08-31 | Stop reason: HOSPADM

## 2022-08-20 RX ORDER — SODIUM CHLORIDE 0.9 % (FLUSH) 0.9 %
10 SYRINGE (ML) INJECTION EVERY 6 HOURS PRN
Status: DISCONTINUED | OUTPATIENT
Start: 2022-08-20 | End: 2022-08-31 | Stop reason: HOSPADM

## 2022-08-20 RX ORDER — TALC
9 POWDER (GRAM) TOPICAL NIGHTLY PRN
Status: DISCONTINUED | OUTPATIENT
Start: 2022-08-20 | End: 2022-08-31 | Stop reason: HOSPADM

## 2022-08-20 RX ORDER — ROCURONIUM BROMIDE 10 MG/ML
100 INJECTION, SOLUTION INTRAVENOUS ONCE
Status: COMPLETED | OUTPATIENT
Start: 2022-08-20 | End: 2022-08-20

## 2022-08-20 RX ORDER — FUROSEMIDE 40 MG/1
40 TABLET ORAL DAILY
Status: DISCONTINUED | OUTPATIENT
Start: 2022-08-20 | End: 2022-08-25

## 2022-08-20 RX ORDER — MAGNESIUM SULFATE HEPTAHYDRATE 40 MG/ML
2 INJECTION, SOLUTION INTRAVENOUS
Status: DISCONTINUED | OUTPATIENT
Start: 2022-08-20 | End: 2022-08-31 | Stop reason: HOSPADM

## 2022-08-20 RX ORDER — MIDAZOLAM HYDROCHLORIDE 5 MG/ML
5 INJECTION INTRAMUSCULAR; INTRAVENOUS ONCE
Status: DISCONTINUED | OUTPATIENT
Start: 2022-08-20 | End: 2022-08-20

## 2022-08-20 RX ORDER — IBUPROFEN 200 MG
16 TABLET ORAL
Status: DISCONTINUED | OUTPATIENT
Start: 2022-08-20 | End: 2022-08-31 | Stop reason: HOSPADM

## 2022-08-20 RX ORDER — MORPHINE SULFATE 4 MG/ML
4 INJECTION, SOLUTION INTRAMUSCULAR; INTRAVENOUS EVERY 4 HOURS PRN
Status: DISCONTINUED | OUTPATIENT
Start: 2022-08-20 | End: 2022-08-31 | Stop reason: HOSPADM

## 2022-08-20 RX ORDER — ONDANSETRON 2 MG/ML
4 INJECTION INTRAMUSCULAR; INTRAVENOUS EVERY 6 HOURS PRN
Status: DISCONTINUED | OUTPATIENT
Start: 2022-08-20 | End: 2022-08-31 | Stop reason: HOSPADM

## 2022-08-20 RX ORDER — AMOXICILLIN 250 MG
1 CAPSULE ORAL 2 TIMES DAILY
Status: DISCONTINUED | OUTPATIENT
Start: 2022-08-20 | End: 2022-08-31 | Stop reason: HOSPADM

## 2022-08-20 RX ORDER — SIMETHICONE 80 MG
1 TABLET,CHEWABLE ORAL 4 TIMES DAILY PRN
Status: DISCONTINUED | OUTPATIENT
Start: 2022-08-20 | End: 2022-08-31 | Stop reason: HOSPADM

## 2022-08-20 RX ORDER — FENTANYL CITRATE 50 UG/ML
100 INJECTION, SOLUTION INTRAMUSCULAR; INTRAVENOUS ONCE
Status: COMPLETED | OUTPATIENT
Start: 2022-08-20 | End: 2022-08-20

## 2022-08-20 RX ORDER — HYDROCODONE BITARTRATE AND ACETAMINOPHEN 5; 325 MG/1; MG/1
1 TABLET ORAL EVERY 6 HOURS PRN
Status: DISCONTINUED | OUTPATIENT
Start: 2022-08-20 | End: 2022-08-31 | Stop reason: HOSPADM

## 2022-08-20 RX ORDER — BALSAM PERU/CASTOR OIL
OINTMENT (GRAM) TOPICAL DAILY
Status: DISCONTINUED | OUTPATIENT
Start: 2022-08-20 | End: 2022-08-31 | Stop reason: HOSPADM

## 2022-08-20 RX ORDER — MAG HYDROX/ALUMINUM HYD/SIMETH 200-200-20
30 SUSPENSION, ORAL (FINAL DOSE FORM) ORAL 4 TIMES DAILY PRN
Status: DISCONTINUED | OUTPATIENT
Start: 2022-08-20 | End: 2022-08-31 | Stop reason: HOSPADM

## 2022-08-20 RX ORDER — TRAZODONE HYDROCHLORIDE 50 MG/1
50 TABLET ORAL NIGHTLY
Status: DISCONTINUED | OUTPATIENT
Start: 2022-08-20 | End: 2022-08-31 | Stop reason: HOSPADM

## 2022-08-20 RX ORDER — BACLOFEN 5 MG/1
10 TABLET ORAL 3 TIMES DAILY
Status: DISCONTINUED | OUTPATIENT
Start: 2022-08-20 | End: 2022-08-31 | Stop reason: HOSPADM

## 2022-08-20 RX ORDER — GLUCAGON 1 MG
1 KIT INJECTION
Status: DISCONTINUED | OUTPATIENT
Start: 2022-08-20 | End: 2022-08-31 | Stop reason: HOSPADM

## 2022-08-20 RX ORDER — SODIUM CHLORIDE 0.9 % (FLUSH) 0.9 %
10 SYRINGE (ML) INJECTION EVERY 6 HOURS
Status: DISCONTINUED | OUTPATIENT
Start: 2022-08-20 | End: 2022-08-31 | Stop reason: HOSPADM

## 2022-08-20 RX ORDER — METOPROLOL TARTRATE 50 MG/1
50 TABLET ORAL DAILY
Status: DISCONTINUED | OUTPATIENT
Start: 2022-08-20 | End: 2022-08-25

## 2022-08-20 RX ORDER — IPRATROPIUM BROMIDE AND ALBUTEROL SULFATE 2.5; .5 MG/3ML; MG/3ML
3 SOLUTION RESPIRATORY (INHALATION) EVERY 4 HOURS
Status: DISCONTINUED | OUTPATIENT
Start: 2022-08-20 | End: 2022-08-20

## 2022-08-20 RX ORDER — POLYETHYLENE GLYCOL 3350 17 G/17G
17 POWDER, FOR SOLUTION ORAL DAILY
Status: DISCONTINUED | OUTPATIENT
Start: 2022-08-20 | End: 2022-08-31 | Stop reason: HOSPADM

## 2022-08-20 RX ORDER — IPRATROPIUM BROMIDE AND ALBUTEROL SULFATE 2.5; .5 MG/3ML; MG/3ML
3 SOLUTION RESPIRATORY (INHALATION) EVERY 6 HOURS PRN
Status: DISCONTINUED | OUTPATIENT
Start: 2022-08-20 | End: 2022-08-31 | Stop reason: HOSPADM

## 2022-08-20 RX ORDER — ACETAMINOPHEN 325 MG/1
650 TABLET ORAL EVERY 4 HOURS PRN
Status: DISCONTINUED | OUTPATIENT
Start: 2022-08-20 | End: 2022-08-31 | Stop reason: HOSPADM

## 2022-08-20 RX ORDER — SODIUM CHLORIDE 0.9 % (FLUSH) 0.9 %
10 SYRINGE (ML) INJECTION
Status: DISCONTINUED | OUTPATIENT
Start: 2022-08-20 | End: 2022-08-31 | Stop reason: HOSPADM

## 2022-08-20 RX ORDER — MIDAZOLAM HYDROCHLORIDE 1 MG/ML
5 INJECTION INTRAMUSCULAR; INTRAVENOUS ONCE
Status: COMPLETED | OUTPATIENT
Start: 2022-08-20 | End: 2022-08-20

## 2022-08-20 RX ORDER — IBUPROFEN 200 MG
24 TABLET ORAL
Status: DISCONTINUED | OUTPATIENT
Start: 2022-08-20 | End: 2022-08-31 | Stop reason: HOSPADM

## 2022-08-20 RX ORDER — MAGNESIUM SULFATE 1 G/100ML
1 INJECTION INTRAVENOUS
Status: DISCONTINUED | OUTPATIENT
Start: 2022-08-20 | End: 2022-08-31 | Stop reason: HOSPADM

## 2022-08-20 RX ORDER — PANTOPRAZOLE SODIUM 40 MG/10ML
40 INJECTION, POWDER, LYOPHILIZED, FOR SOLUTION INTRAVENOUS DAILY
Status: DISCONTINUED | OUTPATIENT
Start: 2022-08-20 | End: 2022-08-31 | Stop reason: HOSPADM

## 2022-08-20 RX ORDER — POTASSIUM CHLORIDE 7.45 MG/ML
20 INJECTION INTRAVENOUS
Status: DISCONTINUED | OUTPATIENT
Start: 2022-08-20 | End: 2022-08-31 | Stop reason: HOSPADM

## 2022-08-20 RX ORDER — MAGNESIUM SULFATE HEPTAHYDRATE 40 MG/ML
4 INJECTION, SOLUTION INTRAVENOUS
Status: DISCONTINUED | OUTPATIENT
Start: 2022-08-20 | End: 2022-08-31 | Stop reason: HOSPADM

## 2022-08-20 RX ORDER — RILUZOLE 50 MG/1
50 TABLET, FILM COATED ORAL EVERY 12 HOURS
Status: DISCONTINUED | OUTPATIENT
Start: 2022-08-20 | End: 2022-08-31 | Stop reason: HOSPADM

## 2022-08-20 RX ORDER — ACETAMINOPHEN 500 MG
1000 TABLET ORAL EVERY 8 HOURS PRN
Status: DISCONTINUED | OUTPATIENT
Start: 2022-08-20 | End: 2022-08-31 | Stop reason: HOSPADM

## 2022-08-20 RX ORDER — PROPOFOL 10 MG/ML
0-50 INJECTION, EMULSION INTRAVENOUS CONTINUOUS
Status: DISCONTINUED | OUTPATIENT
Start: 2022-08-20 | End: 2022-08-25

## 2022-08-20 RX ADMIN — CEFTOLOZANE AND TAZOBACTAM 1500 MG: 1; .5 INJECTION, POWDER, LYOPHILIZED, FOR SOLUTION INTRAVENOUS at 02:08

## 2022-08-20 RX ADMIN — CASTOR OIL AND BALSAM, PERU: 788; 87 OINTMENT TOPICAL at 02:08

## 2022-08-20 RX ADMIN — PROPOFOL 20 MCG/KG/MIN: 10 INJECTION, EMULSION INTRAVENOUS at 10:08

## 2022-08-20 RX ADMIN — ROCURONIUM BROMIDE 50 MG: 10 INJECTION, SOLUTION INTRAVENOUS at 10:08

## 2022-08-20 RX ADMIN — CLINDAMYCIN IN 5 PERCENT DEXTROSE 900 MG: 18 INJECTION, SOLUTION INTRAVENOUS at 08:08

## 2022-08-20 RX ADMIN — TRAZODONE HYDROCHLORIDE 50 MG: 50 TABLET ORAL at 08:08

## 2022-08-20 RX ADMIN — CEFTOLOZANE AND TAZOBACTAM 1500 MG: 1; .5 INJECTION, POWDER, LYOPHILIZED, FOR SOLUTION INTRAVENOUS at 06:08

## 2022-08-20 RX ADMIN — ACETAMINOPHEN 650 MG: 650 SOLUTION ORAL at 06:08

## 2022-08-20 RX ADMIN — POTASSIUM CHLORIDE 40 MEQ: 7.46 INJECTION, SOLUTION INTRAVENOUS at 08:08

## 2022-08-20 RX ADMIN — CLINDAMYCIN IN 5 PERCENT DEXTROSE 900 MG: 18 INJECTION, SOLUTION INTRAVENOUS at 01:08

## 2022-08-20 RX ADMIN — CEFTOLOZANE AND TAZOBACTAM 1500 MG: 1; .5 INJECTION, POWDER, LYOPHILIZED, FOR SOLUTION INTRAVENOUS at 11:08

## 2022-08-20 RX ADMIN — BACLOFEN 10 MG: 5 TABLET ORAL at 08:08

## 2022-08-20 RX ADMIN — ACETAMINOPHEN 650 MG: 650 SOLUTION ORAL at 01:08

## 2022-08-20 RX ADMIN — POLYETHYLENE GLYCOL 3350 17 G: 17 POWDER, FOR SOLUTION ORAL at 08:08

## 2022-08-20 RX ADMIN — FENTANYL CITRATE 50 MCG: 50 INJECTION, SOLUTION INTRAMUSCULAR; INTRAVENOUS at 10:08

## 2022-08-20 RX ADMIN — APIXABAN 2.5 MG: 2.5 TABLET, FILM COATED ORAL at 08:08

## 2022-08-20 RX ADMIN — SENNOSIDES AND DOCUSATE SODIUM 1 TABLET: 50; 8.6 TABLET ORAL at 08:08

## 2022-08-20 RX ADMIN — BUSPIRONE HYDROCHLORIDE 5 MG: 5 TABLET ORAL at 08:08

## 2022-08-20 RX ADMIN — SODIUM CHLORIDE: 0.9 INJECTION, SOLUTION INTRAVENOUS at 02:08

## 2022-08-20 RX ADMIN — FUROSEMIDE 40 MG: 40 TABLET ORAL at 08:08

## 2022-08-20 RX ADMIN — RILUZOLE 50 MG: 50 TABLET ORAL at 09:08

## 2022-08-20 RX ADMIN — METOPROLOL TARTRATE 50 MG: 50 TABLET, FILM COATED ORAL at 08:08

## 2022-08-20 RX ADMIN — CLINDAMYCIN IN 5 PERCENT DEXTROSE 900 MG: 18 INJECTION, SOLUTION INTRAVENOUS at 05:08

## 2022-08-20 RX ADMIN — RILUZOLE 50 MG: 50 TABLET ORAL at 08:08

## 2022-08-20 RX ADMIN — SODIUM CHLORIDE, PRESERVATIVE FREE 10 ML: 5 INJECTION INTRAVENOUS at 06:08

## 2022-08-20 RX ADMIN — MIDAZOLAM 2 MG: 1 INJECTION INTRAMUSCULAR; INTRAVENOUS at 10:08

## 2022-08-20 RX ADMIN — BACLOFEN 10 MG: 5 TABLET ORAL at 03:08

## 2022-08-20 RX ADMIN — PANTOPRAZOLE SODIUM 40 MG: 40 INJECTION, POWDER, FOR SOLUTION INTRAVENOUS at 08:08

## 2022-08-20 NOTE — CARE UPDATE
08/19/22 1940   Patient Assessment/Suction   Level of Consciousness (AVPU) alert   Respiratory Effort Unlabored   Expansion/Accessory Muscles/Retractions no use of accessory muscles   All Lung Fields Breath Sounds coarse   Rhythm/Pattern, Respiratory assisted mechanically   Cough Frequency no cough   Suction Method tracheal;oral   Suction Pressure (mmHg) -120 mmHg   $ Suction Charges Inline Suction Procedure Stat Charge   Secretions Amount small   Secretions Color white;creamy   Secretions Characteristics thick   Skin Integrity   $ Wound Care Tech Time 15 min   Area Observed Neck under tracheostomy   Skin Appearance without discoloration   Barrier used?   (drain sponge)   Barrier Changed? Yes   PRE-TX-O2   O2 Device (Oxygen Therapy) ventilator   $ Is the patient on Low Flow Oxygen? Yes   Oxygen Concentration (%) 40   SpO2 100 %   Pulse Oximetry Type Continuous   $ Pulse Oximetry - Multiple Charge Pulse Oximetry - Multiple   Pulse (!) 125   Resp (!) 26        Surgical Airway 02/21/22 1145 Shiley Cuffed   Placement Date/Time: 02/21/22 1145   Present Prior to Hospital Arrival?: Yes  Type: Tracheostomy  Brand: Shiley  Airway Device Size: 8.0  Style: Cuffed   Cuff Inflation? Inflated   Status Secured   Site Assessment Drainage;Oozing secretions   Site Care Cleansed;Dried;Dressing applied   Inner Cannula Care Cleansed/dried   Ties Assessment Changed;Clean;Dry;Intact;Secure   Vent Select   Conventional Vent Y   Charged w/in last 24h NO   Preset Conventional Ventilator Settings   Vent ID [id number]   Vent Type    Ventilation Type VC   Vent Mode A/C   Humidity HME   Set Rate 16 BPM   Vt Set 500 mL   PEEP/CPAP 5 cmH20   Waveform RAMP   Peak Flow 60 L/min   Peak End Inspiratory Pressure 17 cmH20   I-Trigger Type  V-TRIG   Trigger Sensitivity Flow/I-Trigger 2 L/min   Patient Ventilator Parameters   Resp Rate Total 25 br/min   Peak Airway Pressure 21 cmH2O   Mean Airway Pressure 10 cmH20   Plateau Pressure 0 cmH20   Exhaled Vt  423 mL   Total Ve 12.4 mL   I:E Ratio Measured 1:1.20   Auto PEEP 0 cmH20   Tubing ID (mm) 8 mm   Tube Type Trach   Conventional Ventilator Alarms   Alarms On Y   Resp Rate High Alarm 40 br/min   Press High Alarm 50 cmH2O   Apnea Rate 16   Apnea Volume (mL) 0 mL   Apnea Oxygen Concentration  100   Apnea Flow Rate (L/min) 60   T Apnea 20 sec(s)   IHI Ventilator Associated Pneumonia Bundle (Required)   Daily Awakening Trials Performed Not applicable   Daily Assessment of Readiness to Extubate No (Comment)   Head of Bed Elevated  HOB 30   Oral Care Teeth brushed;Mouth swabbed;Mouth moisturizer;Mouth suctioned;with mouthwash;Lip moisturizer applied   Vent Circut Breaks Minimized Yes   Ready to Wean/Extubation Screen   FIO2<=50 (chart decimal) 0.4   MV<16L (chart vol.) 12.4   PEEP <=8 (chart #) 5   Ready to Wean Parameters   F/VT Ratio<105 (RSBI) (!) 61.47   Vital Capacity (mL) 0   Education   $ Education Suction;Trach Care;Ventilator Oxygen;15 min   Respiratory Evaluation   $ Care Plan Tech Time 15 min   $ Eval/Re-eval Charges Re-evaluation

## 2022-08-20 NOTE — PROCEDURES
"Wei Dominique is a 67 y.o. male patient.    Temp: 96.6 °F (35.9 °C) (08/20/22 1600)  Pulse: 87 (08/20/22 1600)  Resp: 18 (08/20/22 1600)  BP: (!) 119/56 (08/20/22 1600)  SpO2: 100 % (08/20/22 1600)  Weight: 75.6 kg (166 lb 10.7 oz) (08/20/22 0801)  Height: 5' 8" (172.7 cm) (08/20/22 0801)    PICC  Date/Time: 8/20/2022 4:30 PM  Performed by: Lester Min RN  Consent Done: Yes  Time out: Immediately prior to procedure a time out was called to verify the correct patient, procedure, equipment, support staff and site/side marked as required  Indications: med administration and vascular access  Anesthesia: local infiltration  Local anesthetic: lidocaine 1% without epinephrine  Anesthetic Total (mL): 2  Preparation: skin prepped with ChloraPrep  Skin prep agent dried: skin prep agent completely dried prior to procedure  Sterile barriers: all five maximum sterile barriers used - cap, mask, sterile gown, sterile gloves, and large sterile sheet  Hand hygiene: hand hygiene performed prior to central venous catheter insertion  Location details: right brachial  Catheter type: double lumen  Catheter size: 5 Fr  Catheter Length: 33cm    Ultrasound guidance: yes  Vessel Caliber: small, compressibility normal  Vascular Doppler: not done  Needle advanced into vessel with real time Ultrasound guidance.  Guidewire confirmed in vessel.  Number of attempts: 2  Post-procedure: blood return through all ports, chlorhexidine patch and sterile dressing applied    Assessment: placement verified by x-ray, tip termination and successful placement  Comments: BUE extremely swollen and difficult to see vein. US BUE negative for DVT. I attempted to access basilic vein but unable to thread wire. I was able to see a small brachial vein very proximal in the arm. No cephalic noted. LCW port           Name Lester Min RN   8/20/2022  "

## 2022-08-20 NOTE — CARE UPDATE
08/20/22 0825   PRE-TX-O2   Oxygen Concentration (%) 35   SpO2 100 %   Pulse 107   Resp 20   Vent Select   Conventional Vent Y   $ Ventilator Subsequent 1   Charged w/in last 24h YES   Preset Conventional Ventilator Settings   Vent ID [id number]   Vent Type    Ventilation Type VC   Vent Mode A/C   Set Rate 16 BPM   Vt Set 500 mL   PEEP/CPAP 5 cmH20   Peak Flow 60 L/min   Peak End Inspiratory Pressure 21 cmH20   I-Trigger Type  V-TRIG   Trigger Sensitivity Flow/I-Trigger 2 L/min   Patient Ventilator Parameters   Resp Rate Total 19 br/min   Peak Airway Pressure 27 cmH2O   Mean Airway Pressure 11 cmH20   Plateau Pressure 0 cmH20   Exhaled Vt 515 mL   Total Ve 9.95 mL   I:E Ratio Measured 1:2.80   Auto PEEP 0 cmH20   Tubing ID (mm) 8 mm   Tube Type Trach   Conventional Ventilator Alarms   Alarms On Y   Ve High Alarm 20 L/min   Ve Low Alarm 3 L/min   Vt High Alarm 1200 mL   Vt Low Alarm 250 mL   Resp Rate High Alarm 40 br/min   Press High Alarm 50 cmH2O   Apnea Rate 16   Apnea Volume (mL) 500 mL   Apnea Oxygen Concentration  100   Apnea Flow Rate (L/min) 60   T Apnea 20 sec(s)   IHI Ventilator Associated Pneumonia Bundle (Required)   Head of Bed Elevated  HOB 30   Ready to Wean/Extubation Screen   FIO2<=50 (chart decimal) 0.35   MV<16L (chart vol.) 9.95   PEEP <=8 (chart #) 5   Ready to Wean Parameters   F/VT Ratio<105 (RSBI) (!) 38.83   Vital Capacity (mL) 0   Education   $ Education Ventilator Oxygen;15 min;Suction   Respiratory Evaluation   $ Care Plan Tech Time 15 min

## 2022-08-20 NOTE — PLAN OF CARE
Problem: Feeding Intolerance (Enteral Nutrition)  Goal: Feeding Tolerance  Outcome: Ongoing, Progressing  Intervention: Prevent and Manage Feeding Intolerance  Flowsheets (Taken 8/20/2022 0512)  Nutrition Support Management: tube feeding initiated

## 2022-08-20 NOTE — NURSING
67 yr old male  Trach and vented  And medicated at this time to stop him from eating his tongue has a bite block in at this time     Bilat buttock stage 2 sheer injury  Dry flake skin area 15x15 bleeding        Left dorsal foot large thick scab     Left medial heel  Stage 2 with moisture injury 2x2    Recommendation:   Clean with chlorhexidine/ns  Pat dry. Apply triad and  antifungal powder daily. Place airflow pads in moist folds. Change pads prn for moisture.   Dry scaly areas  Clean with chlorhexidine/ns. Pat dry. Apply Hydrophor daily.  Not between the toes.  Left heel Clean area with chlorhexidine/ns  Pat dry  Apply venelex and cover with mepilex.   Turn reposition q2 as pt's condition will allow.  Bilat heel pillows   Float and elevate heels of bed with pillows.   air mattress

## 2022-08-20 NOTE — PROGRESS NOTES
Called by Dr. Robles regarding patient with advanced ALS status post trach and PEG with chronic wounds and cellulitis and a previous history of tongue trauma and known poor dentition with CT scan findings of pre-maxillary abscess. CT scan images reviewed reveal an ovoid fluid collection consistent with abscess and significant phlegmonous change including of the lateral pterygoid is likely the cause of his trismus.    There is report edly no oral surgery at [facility] and the patient has been unable to be transferred to a facility with oral surgery to address what appears to be an odontogenic abscess. He has no DNR or end-of-life planning as far as we are aware.    Patient was admitted to the ICU for antibiotic therapy given the inability to be transferred. I was called this evening to see if I would assist in management of his odontogenic abscess given the inability t o provide the patient with oral surgery care locally or to arrange transfer to a facility that has oral surgery. Attempts will continue to be made to transfer the patient tonight. If he remains at [facility] in the ICU tomorrow I will do my best to examine the mouth and determine if under local anesthesia I can drain the abscess to improve infection and obtain culture to guide therapy as best I can.

## 2022-08-20 NOTE — SUBJECTIVE & OBJECTIVE
Medications:  Continuous Infusions:   sodium chloride 0.9% 100 mL/hr at 08/20/22 0631    propofoL       Scheduled Meds:   acetaminophen  650 mg Oral Q6H    baclofen  10 mg Per G Tube TID    busPIRone  5 mg Per G Tube BID    ceftolozane-tazobactam  1,500 mg Intravenous Q8H    clindamycin (CLEOCIN) IVPB  900 mg Intravenous Q8H    fentaNYL  100 mcg Intravenous Once    furosemide  40 mg Per G Tube Daily    metoprolol tartrate  50 mg Oral Daily    midazolam  5 mg Intravenous Once    pantoprazole  40 mg Intravenous Daily    polyethylene glycol  17 g Oral Daily    riluzole  50 mg Oral Q12H    rocuronium  100 mg Intravenous Once    senna-docusate 8.6-50 mg  1 tablet Oral BID    traZODone  50 mg Per G Tube QHS     PRN Meds:sodium chloride, acetaminophen, acetaminophen, albuterol-ipratropium, aluminum-magnesium hydroxide-simethicone, calcium chloride IVPB, calcium chloride IVPB, calcium chloride IVPB, dextrose 10%, dextrose 10%, glucagon (human recombinant), glucose, glucose, HYDROcodone-acetaminophen, HYDROmorphone, magnesium sulfate IVPB, magnesium sulfate IVPB, magnesium sulfate IVPB, magnesium sulfate IVPB, melatonin, morphine, naloxone, ondansetron, potassium chloride in water, potassium chloride in water, potassium chloride in water, potassium chloride in water, prochlorperazine, simethicone, sodium chloride 0.9%, sodium phosphate IVPB, sodium phosphate IVPB, sodium phosphate IVPB, sodium phosphate IVPB, sodium phosphate IVPB     No current facility-administered medications on file prior to encounter.     Current Outpatient Medications on File Prior to Encounter   Medication Sig    albuterol (PROVENTIL/VENTOLIN HFA) 90 mcg/actuation inhaler Inhale 2 puffs into the lungs every 6 (six) hours as needed.    ALPRAZolam (XANAX) 0.5 MG tablet Take 1 tablet (0.5 mg total) by mouth 3 (three) times daily as needed for Anxiety.    apixaban (ELIQUIS) 2.5 mg Tab 1 tablet (2.5 mg total) by Per G Tube route 2 (two) times daily.     arginine-glutamine-calcium HMB (AYSE) 7-7-1.5 gram PwPk Take 1 packet by mouth 2 (two) times a day.    baclofen (LIORESAL) 10 MG tablet TAKE 1 TABLET THREE TIMES PER DAY (Patient taking differently: 10 mg by Per G Tube route 3 (three) times daily.)    busPIRone (BUSPAR) 5 MG Tab 1 tablet (5 mg total) by Per G Tube route 2 (two) times daily.    furosemide (LASIX) 40 MG tablet 1 tablet (40 mg total) by Per G Tube route once daily.    glycopyrrolate (ROBINUL) 1 mg Tab START BY TAKING 1 TABLET DAILY. IF TOLERATING WELL, YOU CAN INCREASE IT UP TO 2 OR EVEN 3 TIMES DAILY. (Patient taking differently: 1 mg by Per G Tube route 3 (three) times daily.)    lansoprazole (PREVACID) 30 MG capsule Take 30 mg by mouth once daily.    melatonin 3 mg TbDL 3 mg by Per G Tube route every evening.    metoprolol tartrate (LOPRESSOR) 50 MG tablet Take 50 mg by mouth once daily.    ondansetron (ZOFRAN) 4 MG tablet 4 mg by Per G Tube route every 6 (six) hours as needed for Nausea.    polyethylene glycol (GLYCOLAX) 17 gram PwPk 17 g by Per G Tube route 2 (two) times daily.    riluzole 50 mg Tab tablet TAKE 1 TABLET ON AN EMPTY STOMACH EVERY 12 HRS (Patient taking differently: 50 mg by Per G Tube route. TAKE 1 TABLET ON AN EMPTY STOMACH EVERY 12 HRS)    traZODone (DESYREL) 50 MG tablet 50 mg by Per G Tube route every evening.    albuterol-ipratropium (DUO-NEB) 2.5 mg-0.5 mg/3 mL nebulizer solution Take 3 mLs by nebulization every 4 (four) hours. Rescue    chlorhexidine (PERIDEX) 0.12 % solution Use as directed 15 mLs in the mouth or throat 2 (two) times daily.    enoxaparin (LOVENOX) 60 mg/0.6 mL Syrg Inject into the skin.    glycerin adult suppository Place 1 suppository rectally once daily.    mupirocin (BACTROBAN) 2 % ointment SMARTSI Application Topical 2-3 Times Daily    pantoprazole (PROTONIX) 40 mg suspension 40 mg by FEEDING TUBE route 2 (two) times a day.    warfarin (COUMADIN) 2.5 MG tablet Take by mouth.       Review of  patient's allergies indicates:  No Known Allergies    Past Medical History:   Diagnosis Date    ALS (amyotrophic lateral sclerosis)     ALS (amyotrophic lateral sclerosis)     Dependent on ventilator 3/21/2022    Erectile dysfunction     Gait disturbance     Hyperlipidemia     Hypertension     Iron deficiency anemia     Motor neuron disease     Polycystic kidney disease     Quadriparesis 2/6/2018    Tremor      Past Surgical History:   Procedure Laterality Date    GASTROSTOMY      PEG    PORTACATH PLACEMENT      TRACHEOSTOMY       Family History    None       Tobacco Use    Smoking status: Not on file    Smokeless tobacco: Not on file   Substance and Sexual Activity    Alcohol use: Not on file    Drug use: Never    Sexual activity: Not Currently     Review of Systems  Objective:     Vital Signs (Most Recent):  Temp: 99.5 °F (37.5 °C) (08/20/22 0301)  Pulse: 101 (08/20/22 0902)  Resp: (!) 8 (08/20/22 0902)  BP: 126/62 (08/20/22 0902)  SpO2: 100 % (08/20/22 0902)   Vital Signs (24h Range):  Temp:  [98.7 °F (37.1 °C)-99.5 °F (37.5 °C)] 99.5 °F (37.5 °C)  Pulse:  [101-129] 101  Resp:  [8-31] 8  SpO2:  [93 %-100 %] 100 %  BP: ()/(41-99) 126/62     Weight: 75.6 kg (166 lb 10.7 oz)  Body mass index is 25.34 kg/m².        Physical Exam  Vitals and nursing note reviewed.   Constitutional:       General: Distressed: communicates by blinking only to Y/N questions.   HENT:      Head: Normocephalic.      Nose: Nose normal.      Mouth/Throat:      Mouth: Mucous membranes are moist.      Comments: Right lateral and anterior tongue impacted between teeth with edema and some fibrinous/gelatenous exudate and granulation w/o gross necrosis, laterally into buccal area it is edematous but appears vital.  Exam o/w limited.  No anteiror maxllary mass, erythema or fluctuance  Cardiovascular:      Rate and Rhythm: Normal rate.   Pulmonary:      Effort: Pulmonary effort is normal.      Breath sounds: No stridor.      Comments:  Ventilated    Musculoskeletal:      Cervical back: No rigidity or tenderness.   Neurological:      Mental Status: He is alert. Mental status is at baseline.       Significant Labs:  CBC:   Recent Labs   Lab 08/20/22  0718   WBC 14.19*   RBC 2.88*   HGB 7.0*   HCT 23.3*      MCV 81*   MCH 24.3*   MCHC 30.0*     CRP:   Recent Labs   Lab 08/19/22  2109   CRP >38.00*       Significant Diagnostics:  CT: I have reviewed all pertinent results/findings within the past 24 hours and my personal findings are:  After exam images again reviewed with no abcess.  The ovoid ring enhanced area is the right lateral tongue in the right buccal area.

## 2022-08-20 NOTE — HPI
67 yom with advanced/end stage ALS with repeated tongue trauma admitted with worsening bed wounds, cellulitis and edema with facial swelling and tongue trapped between the teeth.  CT with concerns for facial/dental abscess.

## 2022-08-20 NOTE — CONSULTS
"Formerly [facility], later [facility]  Adult Nutrition   Consult Note (Initial Assessment)     SUMMARY     Recommendations  Recommendation/Intervention: 1. RD to place orders to start Jevity 1.5 @ 20 ml/hr via PEG--advancing as tolerated to goal rate of 50 ml/hr. At goal, TF to provide: 1800 kcals, 76 g protein, & 912 ml free water. FWF: 30 ml Q 4 hrs while on IVFs. 2. RD to follow and monitor TF tolerance, labs, etc.  Goals: 1. Initiation of PEG feedings with progression to goal rate 2. Tolerance of TF at goal rate  Nutrition Goal Status: new  Communication of RD Recs: reviewed with RN    Dietitian Rounds Brief    Pt assessed 2' consult for TFs. Noted hx of PEG, trach. Home TF regimen noted. Admitted for odontogenic abscess-currently no oral surgery at [facility]. Transfer being considered. IV antbx ongoing. RD to continue home TF regimen. Will continue to follow and monitor.    Reason for Assessment  Reason For Assessment: consult, new tube feeding  Diagnosis: other (see comments) (ALS)  Relevant Medical History: ALS, PEG, trach, vent dependency, HTN, PE    Nutrition Risk Screen  Nutrition Risk Screen: large or nonhealing wound, burn or pressure injury        Nutrition/Diet History  Patient Reported Diet/Restrictions/Preferences: no oral intake  Food Allergies: NKFA  Factors Affecting Nutritional Intake: NPO, on mechanical ventilation  Nutrition Support Formula Prior to Admit: Other (see commments) (Isosource 1.5)  Nutrition Support Rate Prior to Admit: 50 (ml)  Nutrtion Support Frequency Prior to Admit: ml/hr, continuously  Nutrition Support Provision Prior to Admit: 1800 kcals, 82 g protein, 917 ml free H20    Anthropometrics  Temp: 99.5 °F (37.5 °C)  Height: 5' 8" (172.7 cm)  Height (inches): 68 in  Weight Method: Bed Scale  Weight: 75.6 kg (166 lb 10.7 oz)  Weight (lb): 166.67 lb  Ideal Body Weight (IBW), Male: 154 lb  % Ideal Body Weight, Male (lb): 108.23 %  BMI (Calculated): 25.3  BMI Grade: 25 - 29.9 - overweight   "     Weight History:  Wt Readings from Last 10 Encounters:   08/20/22 75.6 kg (166 lb 10.7 oz)   06/14/22 76.7 kg (169 lb 1.5 oz)   05/28/22 73.9 kg (162 lb 14.7 oz)   05/17/22 78 kg (171 lb 15.3 oz)   02/21/22 77.4 kg (170 lb 10.2 oz)   01/22/20 80.7 kg (178 lb)   12/03/19 81.2 kg (179 lb)   09/19/19 84.4 kg (186 lb)   06/18/19 83.9 kg (185 lb)       Lab/Procedures/Meds: Pertinent Labs Reviewed    Clinical Chemistry:  Recent Labs   Lab 08/18/22 2010 08/20/22  0718    140   K 4.1 2.5*    105   CO2 24 23   * 149*   BUN 64* 42*   CREATININE 0.7 0.6   CALCIUM 9.2 8.6*   PROT 9.1*  --    ALBUMIN 2.5*  --    BILITOT 0.9  --    ALKPHOS 122  --    AST 43*  --    ALT 37  --    ANIONGAP 14 12   MG 2.6 2.3       CBC:   Recent Labs   Lab 08/20/22  0718   WBC 14.19*   RBC 2.88*   HGB 7.0*   HCT 23.3*      MCV 81*   MCH 24.3*   MCHC 30.0*       Cardiac Profile:  Recent Labs   Lab 08/18/22 2010   BNP 66   CPK 53   CPKMB 4.8   TROPONINI 0.049*       Inflammatory Labs:  Recent Labs   Lab 08/19/22  2109   CRP >38.00*         Medications: Pertinent Medications reviewed    Scheduled Meds:   acetaminophen  650 mg Oral Q6H    apixaban  2.5 mg Per G Tube BID    baclofen  10 mg Per G Tube TID    busPIRone  5 mg Per G Tube BID    ceftolozane-tazobactam  1,500 mg Intravenous Q8H    clindamycin (CLEOCIN) IVPB  900 mg Intravenous Q8H    furosemide  40 mg Per G Tube Daily    metoprolol tartrate  50 mg Oral Daily    pantoprazole  40 mg Oral Before breakfast    polyethylene glycol  17 g Oral Daily    riluzole  50 mg Oral Q12H    senna-docusate 8.6-50 mg  1 tablet Oral BID    traZODone  50 mg Per G Tube QHS       Continuous Infusions:   sodium chloride 0.9% 100 mL/hr at 08/20/22 0631       PRN Meds:.acetaminophen, acetaminophen, albuterol-ipratropium, aluminum-magnesium hydroxide-simethicone, dextrose 10%, dextrose 10%, glucagon (human recombinant), glucose, glucose, HYDROcodone-acetaminophen,  HYDROmorphone, melatonin, morphine, naloxone, ondansetron, prochlorperazine, simethicone, sodium chloride 0.9%    Estimated/Assessed Needs  Weight Used For Calorie Calculations: 75.6 kg (166 lb 10.7 oz)  Energy Calorie Requirements (kcal): 0537-2122 kcals (25-30 kcal/kg)  Energy Need Method: Kcal/kg  Protein Requirements: 75-90 g (1-1.2 g/kg)  Weight Used For Protein Calculations: 75.6 kg (166 lb 10.7 oz)     Estimated Fluid Requirement Method: RDA Method  RDA Method (mL): 1890       Nutrition Prescription Ordered  Current Diet Order: NPO    Evaluation of Received Nutrient/Fluid Intake  IV Fluid (mL): 2400 (NS @ 100 ml/hr)  Energy Calories Required: not meeting needs  Protein Required: not meeting needs  Fluid Required: meeting needs  Tolerance: other (see comments) (NPO)     Intake/Output Summary (Last 24 hours) at 8/20/2022 0808  Last data filed at 8/20/2022 0631  Gross per 24 hour   Intake 1851.67 ml   Output 1050 ml   Net 801.67 ml        % Meal Intake: NPO    Nutrition Risk  Level of Risk/Frequency of Follow-up: high     Monitor and Evaluation  Food and Nutrient Intake: energy intake, enteral nutrition intake  Food and Nutrient Adminstration: enteral and parenteral nutrition administration  Physical Activity and Function: nutrition-related ADLs and IADLs  Anthropometric Measurements: weight change, weight, body mass index  Biochemical Data, Medical Tests and Procedures: electrolyte and renal panel, gastrointestinal profile, glucose/endocrine profile  Nutrition-Focused Physical Findings: overall appearance     Nutrition Follow-Up  RD Follow-up?: Yes     Rolanda Penaloza RD 08/20/2022 8:08 AM

## 2022-08-20 NOTE — PROGRESS NOTES
"[facility] Medicine Progress Note  Patient Name: Wei Dominique MRN: [id number]   Patient Class: IP- Inpatient  Length of Stay: 1   Admission Date: 8/18/2022  5:47 PM Attending Physician: Edie Robles MD   Primary Care Provider: Primary Doctor No Face-to-Face encounter date: 08/20/2022   Chief Complaint: Facial Swelling (Facial swelling that started "several days ago" )      Subjective:    Interval History   Communicates with eyes, admits to pain  Discussed case with Dr. Jones and Dr. Kennedy.   Overnight no events  Discussed care with nursing staff  Discussed care with the wife who is planning to come to hospital    Review of Systems   ROS unable to obtain    Hospital Course     08/20/2022 admitted yesterday for sepsis, ENT consulted and did oral exam with no signs of abscess. Tongue swelling and laceration noted on exam      Objective:   Physical Exam  /60   Pulse 89   Temp 97.7 °F (36.5 °C) (Axillary)   Resp 16   Ht 5' 8" (1.727 m)   Wt 75.6 kg (166 lb 10.7 oz)   SpO2 100%   BMI 25.34 kg/m²   Constitutional:  open eyes  HENT: Atraumatic. Tracheostomy noted  Cardiovascular: Normal rate, regular rhythm and normal heart sounds.   Pulmonary/Chest: Effort normal. Clear to auscultation bilaterally. No wheezes.   Abdominal: Soft. Bowel sounds are normal. Exhibits no distension and no mass. No tenderness. PEG tube note  Neurological: opens eyes. Quadriplegic  Skin: Skin is warm and dry.     Labs and Imaging    Significant Labs: All pertinent labs within the past 24 hours have been reviewed.    Significant Imaging: I have reviewed all pertinent imaging results/findings within the past 24 hours.    I have reviewed the Vitals, labs and imaging as above.     Assessment & Plan:   Wei Dominique is a 67 y.o. male admitted for      Severe sepsis secondary to cellulitis? UTI - continue monitoring in ICU, treat with broad spectrum antibiotics, ID  Following, appreciate ENT " evaluation to rule out abscess  Chronic respiratory failure with trach dependent - pulmonary consulted  Left arm cellulitis - MRSA PCR, on IV Clindamycin and Zerbaxa, US negative for DVT  Right lobe pneumonia - history of Proteus Mirabilis ESBL and Pseudomonas aeruginosa   - ID consulted for further management  Leukocytosis - due to above  Anemia of chronic disease - transfuse below 7g/dl  2 mm right upper lobe pulmonary nodule.  Bilateral Renal cyst - monitor  Fecal impaction - laxatives  stercoral colitis - likely due to impaction, laxatives and antibiotics  ALS - resume home medications  Quadriparesis - due to above  H/o PE - DOAC  Trach and PEG dependent  Non specific troponin elevation - monitor  Deconditioning      Core measures:  - Code status:   - Diet: tube feeds  VTE Risk Mitigation (From admission, onward)         Ordered     IP VTE HIGH RISK PATIENT  Once         08/20/22 0440     Place sequential compression device  Until discontinued         08/20/22 0440     Reason for No Pharmacological VTE Prophylaxis  Once        Question:  Reasons:  Answer:  Already adequately anticoagulated on oral Anticoagulants    08/20/22 0440                Discharge Planning:   Discharge Planning   VENESSA: 08/25/2022    Code Status: Full Code   Is the patient medically ready for discharge?: no    Reason for patient still in hospital (select all that apply): Patient trending condition  Discharge Plan A: LTAC        Above encounter included review of the medical records, interviewing and examining the patient face-to-face, discussion with family and other health care providers, ordering and interpreting lab/test results and formulating a plan of care.     Medical Decision Making:  [] Low Complexity  [] Moderate Complexity  []x High Complexity    Edie Robles MD  [facility] Hospitalist  08/20/2022

## 2022-08-20 NOTE — PLAN OF CARE
08/20/22 0838   Medicare Message   Important Message from Medicare regarding Discharge Appeal Rights Given to patient/caregiver;Explained to patient/caregiver;Signed/date by patient/caregiver   Date IMM was signed 08/18/22   Time IMM was signed 1911     Pt's spouse gave verbal consent on the phone acknowledging form, per .

## 2022-08-20 NOTE — HOSPITAL COURSE
Admittent to ICU for vent and medical care.  On IV antibiotics from ID with decrease in WBC and PLT. No oral bleeding.  High CRP. VSS

## 2022-08-20 NOTE — NURSING
ENT Md at bedside, sedation and paralytic given in order to assess mouth, no abscess seen just tongue swelling and a laceration.  MD states to keep bite block in place to keep teeth out of tongue.  Vitals stable during assessment.  Picc team notified of PICC order.  Picc placed to right upper arm. 1 unit of PRBCs given for low H/H.

## 2022-08-20 NOTE — PROGRESS NOTES
Progress Note  PULMONARY    Admit Date: 8/18/2022 08/20/2022 8/19/2022 - P with h/o ALS with trach and vent dependence sent to ER for increased swelling.  Now admitted for evaluation and I was asked to see pt for ventilator management.  He is not able to provide any history.  Chart has been reviewed.  He is febrile, tachycardic (though better).  Cultures have been ordered and are pending.  Maxillofacial CT noted.  CXR shows no new infiltrates.  No ABG done yet.    SUBJECTIVE:     8/20/22- pt remains on vent w stable gas exchange and vs, needs sedation in order for ENT to reduce tongue/perform exam- will perform this am. D/w wife Jud over phone.      OBJECTIVE:     Vitals (Most recent):  Vitals:    08/20/22 0902   BP: 126/62   Pulse: 101   Resp: (!) 8   Temp:        Vitals (24 hour range):  Temp:  [98.7 °F (37.1 °C)-99.5 °F (37.5 °C)]   Pulse:  [101-129]   Resp:  [8-31]   BP: ()/(41-99)   SpO2:  [93 %-100 %]       Intake/Output Summary (Last 24 hours) at 8/20/2022 1011  Last data filed at 8/20/2022 0631  Gross per 24 hour   Intake 1851.67 ml   Output 1050 ml   Net 801.67 ml          Physical Exam:  The patient's neuro status (alertness,orientation,cognitive function,motor skills,), pharyngeal exam (oral lesions, hygiene, abn dentition,), Neck (jvd,mass,thyroid,nodes in neck and above/below clavicle),RESPIRATORY(symmetry,effort,fremitus,percussion,auscultation),  Cor(rhythm,heart tones including gallops,perfusion,edema)ABD(distention,hepatic&splenomegaly,tenderness,masses), Skin(rash,cyanosis),Psyc(affect,judgement,).  Exam negative except for these pertinent findings:    Awake, eyes open and tracking  Blinks once for yes to questions  Grimaces  Tongue tip protrudes, teeth clenched  Trach shiley 8 w/ vent  Breath sounds clear bilat  abd soft, PEG in place  Arms edematous with some areas of erythema      Radiographs reviewed: view by direct vision   CT max face 8/18/22-   IMPRESSION:  1.  Right maxillary  and anterior maxillary soft tissue edema and thickening which extends along the right maxillary molars into the oropharynx. Correlate for cellulitis and or pharyngeal infection.  2.  Right perimandibular ovoid collection with rim enhancement noted measuring 3.0 x 1.8 cm. This appears to connect to the aforementioned maxillary skin thickening and edema.  3.  Nasopharyngeal phlegm and frothy debris extends into the ethmoid and maxillary sinus.  4.  Bilateral mastoid air cell fluid can be seen with mastoiditis.  5.  Right thyroid nodule measuring 1.9 cm. Recommend nonemergent thyroid ultrasound.  6.  Prominent pituitary gland may represent pituitary adenoma.    Labs     Recent Labs   Lab 08/20/22  0718   WBC 14.19*   HGB 7.0*   HCT 23.3*        Recent Labs   Lab 08/19/22  2109 08/20/22  0718   NA  --  140   K  --  2.5*   CL  --  105   CO2  --  23   BUN  --  42*   CREATININE  --  0.6   GLU  --  149*   CALCIUM  --  8.6*   MG  --  2.3   CRP >38.00*  --    PROCAL 2.52*  --    No results for input(s): PH, PCO2, PO2, HCO3 in the last 24 hours.  Microbiology Results (last 7 days)     Procedure Component Value Units Date/Time    IV catheter culture [[id number]]  (Abnormal) Collected: 08/19/22 0119    Order Status: Completed Specimen: Catheter Tip, Peripheral Updated: 08/20/22 0851     Aerobic Culture - Cath tip STAPHYLOCOCCUS AUREUS  Too numerous to count  Susceptibility pending        GRAM NEGATIVE AUTUMN, NON-LACTOSE   Too numerous to count  Identification and susceptibility pending      Narrative:      Midline right arm    Culture, Respiratory with Gram Stain [[id number]] Collected: 08/19/22 1728    Order Status: Completed Specimen: Respiratory from Tracheal Aspirate Updated: 08/20/22 0759     Respiratory Culture Normal respiratory marco    Blood culture [[id number]] Collected: 08/18/22 2010    Order Status: Completed Specimen: Blood from Peripheral, Forearm, Left Updated: 08/19/22 2232     Blood Culture, Routine  No Growth to date      No Growth to date    MRSA Screen by PCR [[id number]]  (Abnormal) Collected: 08/19/22 1728    Order Status: Completed Specimen: Nasopharyngeal Swab from Nasal Updated: 08/19/22 2031     MRSA SCREEN BY PCR Positive     Comment: MRSA  critical result(s) called and verbal readback obtained from   Tierra Reyes RN (M3) by [facility] 08/19/2022 20:31         Narrative:      MRSA  critical result(s) called and verbal readback obtained from   Tierra Reyes RN (M3) by [facility] 08/19/2022 20:31    Blood culture [[id number]] Collected: 08/19/22 1234    Order Status: Completed Specimen: Blood from Peripheral, Upper Arm, Right Updated: 08/19/22 1917     Blood Culture, Routine No Growth to date    Urine culture [[id number]] Collected: 08/18/22 1950    Order Status: Completed Specimen: Urine Updated: 08/19/22 0923     Urine Culture, Routine Insufficient incubation, culture in progress    Narrative:      Specimen Source->Urine          Impression:  Active Hospital Problems    Diagnosis  POA    Tracheostomy in place [Z93.0]  Not Applicable    Chronic respiratory failure with hypoxia and hypercapnia [J96.11, J96.12]  Yes    Dependent on ventilator [Z99.11]  Not Applicable    S/P percutaneous endoscopic gastrostomy (PEG) tube placement [Z93.1]  Not Applicable    ALS (amyotrophic lateral sclerosis) [G12.21]  Yes    Edema [R60.9]  Yes    Quadriparesis [G82.50]  Yes      Resolved Hospital Problems   No resolved problems to display.               Plan:     - continue vent support, no weaning  - deep sedation planned for ENT exam- tongue reduced, bite block in place  - antibiotics per ID  - d/w susie quintero over phone    The following were evaluated and adjusted as needed: mechanical ventilator settings and weaning status, sedation and neurologic status and acid base balance and oxygenation needs       Critical Care  - THE PATIENT HAS A HIGH PROBABILITY OF IMMINENT OR LIFE THREATENING DETERIORATION.  Over 50%time of  encounter was in direct care at bedside.  Time was 30 to 74 minutes required for patient care.  Time needed for all of the above totaled 40 minutes.    Cindy Dudley MD  Pulmonary & Critical Care Medicine

## 2022-08-20 NOTE — PROGRESS NOTES
Consult Note  Infectious Disease    Reason for Consult:  Facial abscess    HPI: Wei Dominique is a 67 y.o. male with known history of ALS, s/p trach & peg, vent dependence, HTN, PE (on eliquis) who presented with a primary complaint of facial swelling x few days. No family present at bedside. History was obtained from chart review and physician sign out. Patient had CT scan maxillofacial done that showed Right maxillary and anterior maxillary soft tissue edema and thickening which extends along the right maxillary molars into the oropharynx. Correlate for cellulitis and or pharyngeal infection.  Right perimandibular ovoid collection. Patient needs maxillofacial surgery however all hospitals are on diversion. Patient was in the ER waiting to be transferred for 18 hours and subsequently admitted to hospital medicine, ICU.   Seen by our service in June for pseudomonas  UTI  At that time he had deep bites and fissures in tongue as well.     8/20: interim reviewed and seen with Dr. Kennedy. Temperatures have improved. MRSA screen was positive. Midline tip with SA and GNR. Sputum culture in progress, and urine culture has growth that has to be re-isolated. He continues to acknowledge pain, nausea. He is in the process of being sedated and paralyzed for adequate ENT exam. Arms remain edematous    EXAM & DIAGNOSTICS REVIEWED:   Vitals:     Temp:  [98.7 °F (37.1 °C)-99.5 °F (37.5 °C)]   Temp: 99.5 °F (37.5 °C) (08/20/22 0301)  Pulse: 101 (08/20/22 0902)  Resp: (!) 8 (08/20/22 0902)  BP: 126/62 (08/20/22 0902)  SpO2: 100 % (08/20/22 0902)    Intake/Output Summary (Last 24 hours) at 8/20/2022 1000  Last data filed at 8/20/2022 0631  Gross per 24 hour   Intake 1851.67 ml   Output 1050 ml   Net 801.67 ml       General:  Awake , attends, communicates with blink(s)   Eyes:  Anicteric, PERRL, EOMI  ENT:  8/19: Copious intranasal secretions suctioned, because his mouth is profoundly difficult to open. His jaws have extremely  high muscular tone. His tongue is severely lacerated and bulging out between his teeth on the right. He has a severe underbite. Copious saliva and bloody secretions suctioned from mouth. Even with dilaudid, we were only able to open his jaws less than a cm and I was able to insert a tongue blade only about half way. He has loose teeth, periodontal disease. I cannot see or palpate a soft tissue abscess.    8/20: exam deferred as he is going to be sedated/paralyzed for exam shortly    Neck:  tracheosotomy  Lungs: Clear, no consolidation,   Heart:  RRR, no gallop/murmur/rub noted  Abd:  Soft, NT, ND, hypoactive BS, no masses or organomegaly appreciated. PEG with small amount of granulation. Rectal tube with loose stool after miralax. No tube feeds at present  :  Hutchinson with clear urine  Musc:  Joints without effusion, swelling, erythema, synovitis, muscle wasting.   Skin:  No rashes. Left arm has widespread erythema and edema. Looks more like bruising today and less redness  Neuro:             Alert, attentive,  quadriplegic  Psych:   Calm, attends  Lymphatic:     Extrem: All 4 extremities are edematous, with left arm  Erythema,? phlebitis, cellulitis, warm     VAD:  Peripheral right arm     Isolation:  contact  Wound:                      General Labs reviewed:  Recent Labs   Lab 08/18/22 2010 08/20/22  0718   WBC 21.04* 14.19*   HGB 8.0* 7.0*   HCT 26.4* 23.3*   * 402       Recent Labs   Lab 08/18/22 2010 08/20/22  0718    140   K 4.1 2.5*    105   CO2 24 23   BUN 64* 42*   CREATININE 0.7 0.6   CALCIUM 9.2 8.6*   PROT 9.1*  --    BILITOT 0.9  --    ALKPHOS 122  --    ALT 37  --    AST 43*  --      Recent Labs   Lab 08/19/22 2109   CRP >38.00*         Micro:  Microbiology Results (last 7 days)     Procedure Component Value Units Date/Time    IV catheter culture [[id number]]  (Abnormal) Collected: 08/19/22 0119    Order Status: Completed Specimen: Catheter Tip, Peripheral Updated: 08/20/22 0851      Aerobic Culture - Cath tip STAPHYLOCOCCUS AUREUS  Too numerous to count  Susceptibility pending        GRAM NEGATIVE AUTUMN, NON-LACTOSE   Too numerous to count  Identification and susceptibility pending      Narrative:      Midline right arm    Culture, Respiratory with Gram Stain [[id number]] Collected: 08/19/22 1728    Order Status: Completed Specimen: Respiratory from Tracheal Aspirate Updated: 08/20/22 0759     Respiratory Culture Normal respiratory marco    Blood culture [[id number]] Collected: 08/18/22 2010    Order Status: Completed Specimen: Blood from Peripheral, Forearm, Left Updated: 08/19/22 2232     Blood Culture, Routine No Growth to date      No Growth to date    MRSA Screen by PCR [[id number]]  (Abnormal) Collected: 08/19/22 1728    Order Status: Completed Specimen: Nasopharyngeal Swab from Nasal Updated: 08/19/22 2031     MRSA SCREEN BY PCR Positive     Comment: MRSA  critical result(s) called and verbal readback obtained from   Tierra Reyes RN (M3) by [facility] 08/19/2022 20:31         Narrative:      MRSA  critical result(s) called and verbal readback obtained from   Tierra Reyes RN (M3) by [facility] 08/19/2022 20:31    Blood culture [[id number]] Collected: 08/19/22 1234    Order Status: Completed Specimen: Blood from Peripheral, Upper Arm, Right Updated: 08/19/22 1917     Blood Culture, Routine No Growth to date    Urine culture [[id number]] Collected: 08/18/22 1950    Order Status: Completed Specimen: Urine Updated: 08/19/22 0923     Urine Culture, Routine Insufficient incubation, culture in progress    Narrative:      Specimen Source->Urine          Imaging Reviewed:   CXR   CT maxillofacial  1.  Right maxillary and anterior maxillary soft tissue edema and thickening which extends along the right maxillary molars into the oropharynx. Correlate for cellulitis and or pharyngeal infection.  2.  Right perimandibular ovoid collection with rim enhancement noted measuring 3.0 x 1.8 cm. This appears to  connect to the aforementioned maxillary skin thickening and edema.  3.  Nasopharyngeal phlegm and frothy debris extends into the ethmoid and maxillary sinus.  4.  Bilateral mastoid air cell fluid can be seen with mastoiditis.  5.  Right thyroid nodule measuring 1.9 cm. Recommend nonemergent thyroid ultrasound.  6.  Prominent pituitary gland may represent pituitary adenoma.    US both arms: neg for DVT  US both legs: negative for DVT      Cardiology:    IMPRESSION & PLAN   1. Severe swelling of the tongue with laceration of the tongue, bulging to the right   Extremely high tone preventing adequate exam   I do not see or palpate any abscesses    2. Grossly purulent urine, h/o MDRO pseudomonas    3. Quadriplegic, ALS  4. Right arm midline was removed and cultured and has Staph aureus and a GNR, blood cultures negative      Recommendations:  Clinda/zerbaxa     Contact isolation     D/w Dr. Robles, johnny and Luis Enrique      Medical Decision Making during this encounter was  [_] Low Complexity  [_] Moderate Complexity  [xxx ] High Complexity

## 2022-08-20 NOTE — PLAN OF CARE
[facility]  Initial Discharge Assessment       Primary Care Provider: Primary Doctor No    Admission Diagnosis: Facial abscess [L02.01]    Admission Date: 8/18/2022  Expected Discharge Date:     Discharge Barriers Identified: None    Assessment completed over the phone with Pt's spouse, Eze Dominique, due to Pt being unable to speak due to the abcess in his mouth. Pt is a resident at [facility], [phone], and plan is for Pt to return there at discharge. Pt is not requesting any DME for discharge as it is all provided by facility. Facility will be able to arrange transport at discharge.     Payor: MEDICARE / Plan: MEDICARE PART A & B / Product Type: Government /     Extended Emergency Contact Information  Primary Emergency Contact: Eze Dominique  Mobile Phone: [phone]  Relation: Spouse  Preferred language: English  Secondary Emergency Contact: Deepali Dominique  Mobile Phone: [phone]  Relation: Sister    Discharge Plan A: Return to nursing home  Discharge Plan B: Return to Nursing Home      [facility] [address] [address]  Phone: [phone] Fax: [phone]      Initial Assessment (most recent)       Adult Discharge Assessment - 08/20/22 1140          Discharge Assessment    Assessment Type Discharge Planning Assessment     Confirmed/corrected address, phone number and insurance Yes     Confirmed Demographics Correct on Facesheet     Source of Information family     Communicated VENESSA with patient/caregiver Date not available/Unable to determine     Reason For Admission abcess in mouth     Lives With facility resident     Facility Arrived From: [facility], [phone] (P)      Do you expect to return to your current living situation? Yes     Do you have help at home or someone to help you manage your care at home? Yes     Who are your caregiver(s) and their phone number(s)? facility staff     Prior to  hospitilization cognitive status: No Deficits     Current cognitive status: No Deficits     Walking or Climbing Stairs Difficulty ambulation difficulty, dependent;transferring difficulty, dependent     Mobility Management facility staff     Dressing/Bathing Difficulty bathing difficulty, dependent;dressing difficulty, dependent     Dressing/Bathing Management facility staff     Equipment Currently Used at Home --   facility provides    Readmission within 30 days? No     Patient currently being followed by outpatient case management? No     Do you currently have service(s) that help you manage your care at home? No     Do you take prescription medications? Yes     Do you have prescription coverage? Yes     Coverage Medicare and Medicaid     Do you have any problems affording any of your prescribed medications? No     Is the patient taking medications as prescribed? yes     Who is going to help you get home at discharge? facility transport     How do you get to doctors appointments? agency     Are you on dialysis? No     Do you take coumadin? No     Discharge Plan A Return to nursing home     Discharge Plan B Return to Nursing Home     DME Needed Upon Discharge  none     Discharge Plan discussed with: Spouse/sig other     Name(s) and Number(s) Eze Dominique, [phone], spouse     Discharge Barriers Identified None

## 2022-08-20 NOTE — CONSULTS
[facility]  Otorhinolaryngology-Head & Neck Surgery  Consult Note    Patient Name: Wei Dominique  MRN: [id number]  Code Status: Full Code  Admission Date: 8/18/2022  Hospital Length of Stay: 1 days  Attending Physician: Edie Robles MD  Primary Care Provider: Primary Doctor No    Patient information was obtained from patient, spouse/SO, ER records and primary team.     Inpatient consult to ENT  Consult performed by: Omer Kennedy MD  Consult ordered by: Edie Robles MD  Reason for consult: abnormal CT concerning for facial abscess  Assessment/Recommendations: No findings of abscess.  Only impaction/laceration of the tongue between the jaws/teeth with laceration and edema.  Will plan to reduce tongue and exam OC/OP after deep sedation and paralytics from Dr. Dudley.  Consent obtained verbally from patient as well as wife Eze Dominique by phone.  Will need to maintain a bite block between the teeth.  Oral care/moisture to tongue.  Reconsult if needed for any worsening.  Should see oral surgery about loose and fractured teeth and consider Masseter Botox or other options from OMFS for trismus and repeated tongue trauma associated with ALS.        Subjective:     Chief Complaint/Reason for Admission: swelling    History of Present Illness: 67 yom with advanced/end stage ALS with repeated tongue trauma admitted with worsening bed wounds, cellulitis and edema with facial swelling and tongue trapped between the teeth.  CT with concerns for facial/dental abscess.       Medications:  Continuous Infusions:   sodium chloride 0.9% 100 mL/hr at 08/20/22 0631    propofoL       Scheduled Meds:   acetaminophen  650 mg Oral Q6H    baclofen  10 mg Per G Tube TID    busPIRone  5 mg Per G Tube BID    ceftolozane-tazobactam  1,500 mg Intravenous Q8H    clindamycin (CLEOCIN) IVPB  900 mg Intravenous Q8H    fentaNYL  100 mcg Intravenous Once    furosemide  40 mg Per G Tube Daily     metoprolol tartrate  50 mg Oral Daily    midazolam  5 mg Intravenous Once    pantoprazole  40 mg Intravenous Daily    polyethylene glycol  17 g Oral Daily    riluzole  50 mg Oral Q12H    rocuronium  100 mg Intravenous Once    senna-docusate 8.6-50 mg  1 tablet Oral BID    traZODone  50 mg Per G Tube QHS     PRN Meds:sodium chloride, acetaminophen, acetaminophen, albuterol-ipratropium, aluminum-magnesium hydroxide-simethicone, calcium chloride IVPB, calcium chloride IVPB, calcium chloride IVPB, dextrose 10%, dextrose 10%, glucagon (human recombinant), glucose, glucose, HYDROcodone-acetaminophen, HYDROmorphone, magnesium sulfate IVPB, magnesium sulfate IVPB, magnesium sulfate IVPB, magnesium sulfate IVPB, melatonin, morphine, naloxone, ondansetron, potassium chloride in water, potassium chloride in water, potassium chloride in water, potassium chloride in water, prochlorperazine, simethicone, sodium chloride 0.9%, sodium phosphate IVPB, sodium phosphate IVPB, sodium phosphate IVPB, sodium phosphate IVPB, sodium phosphate IVPB     No current facility-administered medications on file prior to encounter.     Current Outpatient Medications on File Prior to Encounter   Medication Sig    albuterol (PROVENTIL/VENTOLIN HFA) 90 mcg/actuation inhaler Inhale 2 puffs into the lungs every 6 (six) hours as needed.    ALPRAZolam (XANAX) 0.5 MG tablet Take 1 tablet (0.5 mg total) by mouth 3 (three) times daily as needed for Anxiety.    apixaban (ELIQUIS) 2.5 mg Tab 1 tablet (2.5 mg total) by Per G Tube route 2 (two) times daily.    arginine-glutamine-calcium HMB (AYSE) 7-7-1.5 gram PwPk Take 1 packet by mouth 2 (two) times a day.    baclofen (LIORESAL) 10 MG tablet TAKE 1 TABLET THREE TIMES PER DAY (Patient taking differently: 10 mg by Per G Tube route 3 (three) times daily.)    busPIRone (BUSPAR) 5 MG Tab 1 tablet (5 mg total) by Per G Tube route 2 (two) times daily.    furosemide (LASIX) 40 MG tablet 1 tablet (40 mg  total) by Per G Tube route once daily.    glycopyrrolate (ROBINUL) 1 mg Tab START BY TAKING 1 TABLET DAILY. IF TOLERATING WELL, YOU CAN INCREASE IT UP TO 2 OR EVEN 3 TIMES DAILY. (Patient taking differently: 1 mg by Per G Tube route 3 (three) times daily.)    lansoprazole (PREVACID) 30 MG capsule Take 30 mg by mouth once daily.    melatonin 3 mg TbDL 3 mg by Per G Tube route every evening.    metoprolol tartrate (LOPRESSOR) 50 MG tablet Take 50 mg by mouth once daily.    ondansetron (ZOFRAN) 4 MG tablet 4 mg by Per G Tube route every 6 (six) hours as needed for Nausea.    polyethylene glycol (GLYCOLAX) 17 gram PwPk 17 g by Per G Tube route 2 (two) times daily.    riluzole 50 mg Tab tablet TAKE 1 TABLET ON AN EMPTY STOMACH EVERY 12 HRS (Patient taking differently: 50 mg by Per G Tube route. TAKE 1 TABLET ON AN EMPTY STOMACH EVERY 12 HRS)    traZODone (DESYREL) 50 MG tablet 50 mg by Per G Tube route every evening.    albuterol-ipratropium (DUO-NEB) 2.5 mg-0.5 mg/3 mL nebulizer solution Take 3 mLs by nebulization every 4 (four) hours. Rescue    chlorhexidine (PERIDEX) 0.12 % solution Use as directed 15 mLs in the mouth or throat 2 (two) times daily.    enoxaparin (LOVENOX) 60 mg/0.6 mL Syrg Inject into the skin.    glycerin adult suppository Place 1 suppository rectally once daily.    mupirocin (BACTROBAN) 2 % ointment SMARTSI Application Topical 2-3 Times Daily    pantoprazole (PROTONIX) 40 mg suspension 40 mg by FEEDING TUBE route 2 (two) times a day.    warfarin (COUMADIN) 2.5 MG tablet Take by mouth.       Review of patient's allergies indicates:  No Known Allergies    Past Medical History:   Diagnosis Date    ALS (amyotrophic lateral sclerosis)     ALS (amyotrophic lateral sclerosis)     Dependent on ventilator 3/21/2022    Erectile dysfunction     Gait disturbance     Hyperlipidemia     Hypertension     Iron deficiency anemia     Motor neuron disease     Polycystic kidney disease      Quadriparesis 2/6/2018    Tremor      Past Surgical History:   Procedure Laterality Date    GASTROSTOMY      PEG    PORTACATH PLACEMENT      TRACHEOSTOMY       Family History    None       Tobacco Use    Smoking status: Not on file    Smokeless tobacco: Not on file   Substance and Sexual Activity    Alcohol use: Not on file    Drug use: Never    Sexual activity: Not Currently     Review of Systems  Objective:     Vital Signs (Most Recent):  Temp: 99.5 °F (37.5 °C) (08/20/22 0301)  Pulse: 101 (08/20/22 0902)  Resp: (!) 8 (08/20/22 0902)  BP: 126/62 (08/20/22 0902)  SpO2: 100 % (08/20/22 0902)   Vital Signs (24h Range):  Temp:  [98.7 °F (37.1 °C)-99.5 °F (37.5 °C)] 99.5 °F (37.5 °C)  Pulse:  [101-129] 101  Resp:  [8-31] 8  SpO2:  [93 %-100 %] 100 %  BP: ()/(41-99) 126/62     Weight: 75.6 kg (166 lb 10.7 oz)  Body mass index is 25.34 kg/m².        Physical Exam  Vitals and nursing note reviewed.   Constitutional:       General: Distressed: communicates by blinking only to Y/N questions.   HENT:      Head: Normocephalic.      Nose: Nose normal.      Mouth/Throat:      Mouth: Mucous membranes are moist.      Comments: Right lateral and anterior tongue impacted between teeth with edema and some fibrinous/gelatenous exudate and granulation w/o gross necrosis, laterally into buccal area it is edematous but appears vital.  Exam o/w limited.  No anteiror maxllary mass, erythema or fluctuance  Cardiovascular:      Rate and Rhythm: Normal rate.   Pulmonary:      Effort: Pulmonary effort is normal.      Breath sounds: No stridor.      Comments: Ventilated    Musculoskeletal:      Cervical back: No rigidity or tenderness.   Neurological:      Mental Status: He is alert. Mental status is at baseline.       Significant Labs:  CBC:   Recent Labs   Lab 08/20/22  0718   WBC 14.19*   RBC 2.88*   HGB 7.0*   HCT 23.3*      MCV 81*   MCH 24.3*   MCHC 30.0*     CRP:   Recent Labs   Lab 08/19/22 2109   CRP >38.00*        Significant Diagnostics:  CT: I have reviewed all pertinent results/findings within the past 24 hours and my personal findings are:  After exam images again reviewed with no abcess.  The ovoid ring enhanced area is the right lateral tongue in the right buccal area.      Assessment/Plan:     No findings of abscess.  Only impaction/laceration of the tongue between the jaws/teeth with laceration and edema.  Will plan to reduce tongue and exam OC/OP after deep sedation and paralytics from Dr. Dudley.  Consent obtained verbally from patient as well as wife Eze Dominique by phone.  Will need to maintain a bite block between the teeth.  Oral care/moisture to tongue.  Reconsult if needed for any worsening.  Should see oral surgery about loose and fractured teeth and consider Masseter Botox or other options from OMFS for trismus and repeated tongue trauma associated with ALS.    Discussed with team via secure chat.  Recommend Baclofen and neuro consult for Masseter BOTOX injections of 25-50 units to each masseter for ALS related spasm and trismus with repeated tongue trauma.     Thank you for your consult. I will sign off. Please contact us if you have any additional questions.    Omer Kennedy MD  Otorhinolaryngology-Head & Neck Surgery  [facility]    TIME SPENT: 75 minutes >50% at bedside; balance discussing with team, reviewing data, discussion with family and coordinating care

## 2022-08-21 LAB
ANION GAP SERPL CALC-SCNC: 9 MMOL/L (ref 8–16)
BACTERIA CATH TIP CULT: ABNORMAL
BACTERIA CATH TIP CULT: ABNORMAL
BASOPHILS # BLD AUTO: 0.03 K/UL (ref 0–0.2)
BASOPHILS NFR BLD: 0.2 % (ref 0–1.9)
BUN SERPL-MCNC: 36 MG/DL (ref 8–23)
CALCIUM SERPL-MCNC: 8.3 MG/DL (ref 8.7–10.5)
CHLORIDE SERPL-SCNC: 108 MMOL/L (ref 95–110)
CO2 SERPL-SCNC: 22 MMOL/L (ref 23–29)
CREAT SERPL-MCNC: 0.6 MG/DL (ref 0.5–1.4)
DIFFERENTIAL METHOD: ABNORMAL
EOSINOPHIL # BLD AUTO: 0.5 K/UL (ref 0–0.5)
EOSINOPHIL NFR BLD: 3.5 % (ref 0–8)
ERYTHROCYTE [DISTWIDTH] IN BLOOD BY AUTOMATED COUNT: 17.3 % (ref 11.5–14.5)
EST. GFR  (NO RACE VARIABLE): >60 ML/MIN/1.73 M^2
GLUCOSE SERPL-MCNC: 164 MG/DL (ref 70–110)
HCT VFR BLD AUTO: 28.5 % (ref 40–54)
HGB BLD-MCNC: 8.7 G/DL (ref 14–18)
IMM GRANULOCYTES # BLD AUTO: 0.08 K/UL (ref 0–0.04)
IMM GRANULOCYTES NFR BLD AUTO: 0.5 % (ref 0–0.5)
LYMPHOCYTES # BLD AUTO: 1 K/UL (ref 1–4.8)
LYMPHOCYTES NFR BLD: 6.5 % (ref 18–48)
MAGNESIUM SERPL-MCNC: 2.1 MG/DL (ref 1.6–2.6)
MCH RBC QN AUTO: 25.1 PG (ref 27–31)
MCHC RBC AUTO-ENTMCNC: 30.5 G/DL (ref 32–36)
MCV RBC AUTO: 82 FL (ref 82–98)
MONOCYTES # BLD AUTO: 0.6 K/UL (ref 0.3–1)
MONOCYTES NFR BLD: 4.2 % (ref 4–15)
NEUTROPHILS # BLD AUTO: 12.8 K/UL (ref 1.8–7.7)
NEUTROPHILS NFR BLD: 85.1 % (ref 38–73)
NRBC BLD-RTO: 0 /100 WBC
PLATELET # BLD AUTO: 380 K/UL (ref 150–450)
PMV BLD AUTO: 9.7 FL (ref 9.2–12.9)
POTASSIUM SERPL-SCNC: 3.3 MMOL/L (ref 3.5–5.1)
RBC # BLD AUTO: 3.46 M/UL (ref 4.6–6.2)
SODIUM SERPL-SCNC: 139 MMOL/L (ref 136–145)
WBC # BLD AUTO: 15.06 K/UL (ref 3.9–12.7)

## 2022-08-21 PROCEDURE — C9113 INJ PANTOPRAZOLE SODIUM, VIA: HCPCS | Performed by: INTERNAL MEDICINE

## 2022-08-21 PROCEDURE — A4216 STERILE WATER/SALINE, 10 ML: HCPCS | Performed by: INTERNAL MEDICINE

## 2022-08-21 PROCEDURE — 99900026 HC AIRWAY MAINTENANCE (STAT)

## 2022-08-21 PROCEDURE — 63600175 PHARM REV CODE 636 W HCPCS: Performed by: INTERNAL MEDICINE

## 2022-08-21 PROCEDURE — 63600175 PHARM REV CODE 636 W HCPCS: Mod: JG | Performed by: INTERNAL MEDICINE

## 2022-08-21 PROCEDURE — 25000003 PHARM REV CODE 250: Performed by: INTERNAL MEDICINE

## 2022-08-21 PROCEDURE — 27000221 HC OXYGEN, UP TO 24 HOURS

## 2022-08-21 PROCEDURE — 85025 COMPLETE CBC W/AUTO DIFF WBC: CPT | Performed by: INTERNAL MEDICINE

## 2022-08-21 PROCEDURE — 20000000 HC ICU ROOM

## 2022-08-21 PROCEDURE — 63700000 PHARM REV CODE 250 ALT 637 W/O HCPCS: Performed by: INTERNAL MEDICINE

## 2022-08-21 PROCEDURE — 94799 UNLISTED PULMONARY SVC/PX: CPT

## 2022-08-21 PROCEDURE — 94761 N-INVAS EAR/PLS OXIMETRY MLT: CPT

## 2022-08-21 PROCEDURE — 99900035 HC TECH TIME PER 15 MIN (STAT)

## 2022-08-21 PROCEDURE — 99900031 HC PATIENT EDUCATION (STAT)

## 2022-08-21 PROCEDURE — 99291 PR CRITICAL CARE, E/M 30-74 MINUTES: ICD-10-PCS | Mod: ,,, | Performed by: INTERNAL MEDICINE

## 2022-08-21 PROCEDURE — 99233 SBSQ HOSP IP/OBS HIGH 50: CPT | Mod: ,,, | Performed by: INTERNAL MEDICINE

## 2022-08-21 PROCEDURE — 99291 CRITICAL CARE FIRST HOUR: CPT | Mod: ,,, | Performed by: INTERNAL MEDICINE

## 2022-08-21 PROCEDURE — 83735 ASSAY OF MAGNESIUM: CPT | Performed by: INTERNAL MEDICINE

## 2022-08-21 PROCEDURE — 94003 VENT MGMT INPAT SUBQ DAY: CPT

## 2022-08-21 PROCEDURE — 80048 BASIC METABOLIC PNL TOTAL CA: CPT | Performed by: INTERNAL MEDICINE

## 2022-08-21 PROCEDURE — 99233 PR SUBSEQUENT HOSPITAL CARE,LEVL III: ICD-10-PCS | Mod: ,,, | Performed by: INTERNAL MEDICINE

## 2022-08-21 RX ORDER — MIDAZOLAM HYDROCHLORIDE 1 MG/ML
2 INJECTION INTRAMUSCULAR; INTRAVENOUS ONCE
Status: DISCONTINUED | OUTPATIENT
Start: 2022-08-21 | End: 2022-08-21

## 2022-08-21 RX ORDER — MIDAZOLAM HYDROCHLORIDE 5 MG/ML
2 INJECTION INTRAMUSCULAR; INTRAVENOUS ONCE
Status: DISCONTINUED | OUTPATIENT
Start: 2022-08-21 | End: 2022-08-21

## 2022-08-21 RX ORDER — FENTANYL CITRATE 50 UG/ML
INJECTION, SOLUTION INTRAMUSCULAR; INTRAVENOUS
Status: DISPENSED
Start: 2022-08-21 | End: 2022-08-22

## 2022-08-21 RX ORDER — MIDAZOLAM HYDROCHLORIDE 1 MG/ML
INJECTION INTRAMUSCULAR; INTRAVENOUS
Status: DISPENSED
Start: 2022-08-21 | End: 2022-08-22

## 2022-08-21 RX ORDER — FENTANYL CITRATE 50 UG/ML
50 INJECTION, SOLUTION INTRAMUSCULAR; INTRAVENOUS ONCE
Status: COMPLETED | OUTPATIENT
Start: 2022-08-21 | End: 2022-08-21

## 2022-08-21 RX ORDER — SCOLOPAMINE TRANSDERMAL SYSTEM 1 MG/1
1 PATCH, EXTENDED RELEASE TRANSDERMAL
Status: DISCONTINUED | OUTPATIENT
Start: 2022-08-21 | End: 2022-08-31 | Stop reason: HOSPADM

## 2022-08-21 RX ORDER — ETOMIDATE 2 MG/ML
20 INJECTION INTRAVENOUS ONCE
Status: COMPLETED | OUTPATIENT
Start: 2022-08-21 | End: 2022-08-21

## 2022-08-21 RX ORDER — ROCURONIUM BROMIDE 10 MG/ML
50 INJECTION, SOLUTION INTRAVENOUS ONCE
Status: COMPLETED | OUTPATIENT
Start: 2022-08-21 | End: 2022-08-21

## 2022-08-21 RX ADMIN — FUROSEMIDE 40 MG: 40 TABLET ORAL at 10:08

## 2022-08-21 RX ADMIN — ETOMIDATE 20 MG: 2 INJECTION INTRAVENOUS at 01:08

## 2022-08-21 RX ADMIN — SODIUM CHLORIDE, PRESERVATIVE FREE 10 ML: 5 INJECTION INTRAVENOUS at 06:08

## 2022-08-21 RX ADMIN — SODIUM CHLORIDE, PRESERVATIVE FREE 10 ML: 5 INJECTION INTRAVENOUS at 12:08

## 2022-08-21 RX ADMIN — ACETAMINOPHEN 650 MG: 650 SOLUTION ORAL at 06:08

## 2022-08-21 RX ADMIN — TRAZODONE HYDROCHLORIDE 50 MG: 50 TABLET ORAL at 09:08

## 2022-08-21 RX ADMIN — APIXABAN 2.5 MG: 2.5 TABLET, FILM COATED ORAL at 10:08

## 2022-08-21 RX ADMIN — SODIUM CHLORIDE: 0.9 INJECTION, SOLUTION INTRAVENOUS at 12:08

## 2022-08-21 RX ADMIN — CEFTOLOZANE AND TAZOBACTAM 1500 MG: 1; .5 INJECTION, POWDER, LYOPHILIZED, FOR SOLUTION INTRAVENOUS at 11:08

## 2022-08-21 RX ADMIN — ACETAMINOPHEN 650 MG: 650 SOLUTION ORAL at 11:08

## 2022-08-21 RX ADMIN — HYDROMORPHONE HYDROCHLORIDE 1 MG: 1 INJECTION, SOLUTION INTRAMUSCULAR; INTRAVENOUS; SUBCUTANEOUS at 07:08

## 2022-08-21 RX ADMIN — RILUZOLE 50 MG: 50 TABLET ORAL at 10:08

## 2022-08-21 RX ADMIN — SCOPOLAMINE 1 PATCH: 1 PATCH TRANSDERMAL at 01:08

## 2022-08-21 RX ADMIN — FENTANYL CITRATE 50 MCG: 50 INJECTION, SOLUTION INTRAMUSCULAR; INTRAVENOUS at 01:08

## 2022-08-21 RX ADMIN — BACLOFEN 10 MG: 5 TABLET ORAL at 09:08

## 2022-08-21 RX ADMIN — SODIUM CHLORIDE: 0.9 INJECTION, SOLUTION INTRAVENOUS at 09:08

## 2022-08-21 RX ADMIN — CEFTOLOZANE AND TAZOBACTAM 1500 MG: 1; .5 INJECTION, POWDER, LYOPHILIZED, FOR SOLUTION INTRAVENOUS at 01:08

## 2022-08-21 RX ADMIN — SENNOSIDES AND DOCUSATE SODIUM 1 TABLET: 50; 8.6 TABLET ORAL at 09:08

## 2022-08-21 RX ADMIN — METOPROLOL TARTRATE 50 MG: 50 TABLET, FILM COATED ORAL at 10:08

## 2022-08-21 RX ADMIN — HYDROMORPHONE HYDROCHLORIDE 1 MG: 1 INJECTION, SOLUTION INTRAMUSCULAR; INTRAVENOUS; SUBCUTANEOUS at 12:08

## 2022-08-21 RX ADMIN — CLINDAMYCIN IN 5 PERCENT DEXTROSE 900 MG: 18 INJECTION, SOLUTION INTRAVENOUS at 12:08

## 2022-08-21 RX ADMIN — BUSPIRONE HYDROCHLORIDE 5 MG: 5 TABLET ORAL at 09:08

## 2022-08-21 RX ADMIN — CLINDAMYCIN IN 5 PERCENT DEXTROSE 900 MG: 18 INJECTION, SOLUTION INTRAVENOUS at 04:08

## 2022-08-21 RX ADMIN — CASTOR OIL AND BALSAM, PERU: 788; 87 OINTMENT TOPICAL at 10:08

## 2022-08-21 RX ADMIN — APIXABAN 2.5 MG: 2.5 TABLET, FILM COATED ORAL at 09:08

## 2022-08-21 RX ADMIN — ROCURONIUM BROMIDE 50 MG: 10 INJECTION, SOLUTION INTRAVENOUS at 01:08

## 2022-08-21 RX ADMIN — CLINDAMYCIN IN 5 PERCENT DEXTROSE 900 MG: 18 INJECTION, SOLUTION INTRAVENOUS at 09:08

## 2022-08-21 RX ADMIN — PANTOPRAZOLE SODIUM 40 MG: 40 INJECTION, POWDER, FOR SOLUTION INTRAVENOUS at 10:08

## 2022-08-21 RX ADMIN — HYDROMORPHONE HYDROCHLORIDE 1 MG: 1 INJECTION, SOLUTION INTRAMUSCULAR; INTRAVENOUS; SUBCUTANEOUS at 09:08

## 2022-08-21 RX ADMIN — HYDROCODONE BITARTRATE AND ACETAMINOPHEN 1 TABLET: 5; 325 TABLET ORAL at 12:08

## 2022-08-21 RX ADMIN — HYDROMORPHONE HYDROCHLORIDE 1 MG: 1 INJECTION, SOLUTION INTRAMUSCULAR; INTRAVENOUS; SUBCUTANEOUS at 01:08

## 2022-08-21 RX ADMIN — CEFTOLOZANE AND TAZOBACTAM 1500 MG: 1; .5 INJECTION, POWDER, LYOPHILIZED, FOR SOLUTION INTRAVENOUS at 06:08

## 2022-08-21 RX ADMIN — BUSPIRONE HYDROCHLORIDE 5 MG: 5 TABLET ORAL at 10:08

## 2022-08-21 RX ADMIN — BACLOFEN 10 MG: 5 TABLET ORAL at 10:08

## 2022-08-21 NOTE — PROGRESS NOTES
Consult Note  Infectious Disease    Reason for Consult:  Facial abscess    HPI: Wei Dominique is a 67 y.o. male with known history of ALS, s/p trach & peg, vent dependence, HTN, PE (on eliquis) who presented with a primary complaint of facial swelling x few days. No family present at bedside. History was obtained from chart review and physician sign out. Patient had CT scan maxillofacial done that showed Right maxillary and anterior maxillary soft tissue edema and thickening which extends along the right maxillary molars into the oropharynx. Correlate for cellulitis and or pharyngeal infection.  Right perimandibular ovoid collection. Patient needs maxillofacial surgery however all hospitals are on diversion. Patient was in the ER waiting to be transferred for 18 hours and subsequently admitted to hospital medicine, ICU.   Seen by our service in June for pseudomonas  UTI  At that time he had deep bites and fissures in tongue as well.     8/20: interim reviewed and seen with Dr. Kennedy. Temperatures have improved. MRSA screen was positive. Midline tip with SA and GNR. Sputum culture in progress, and urine culture has growth that has to be re-isolated. He continues to acknowledge pain, nausea. He is in the process of being sedated and paralyzed for adequate ENT exam. Arms remain edematous  8/21:  Afebrile, interim reviewed.  The ENT note not complete but told by RN that no abscesses were discovered while sedated and paralyzed for oral exam.  The tongue was reduced into the mouth and bite block was placed.  Urine with 2 gram negative rods, 1 of which is Pseudomonas.  Sputum with Pseudomonas which is not new, chest x-ray with atelectasis.  Line tip with MRSA and Morganella, relevance unclear as his blood cultures were negative. Discussed with hospital medicine. My bedside questions about comfort care were answered in the negative, ie he does not want to be comfort care. He does want pain control however.      EXAM & DIAGNOSTICS REVIEWED:   Vitals:     Temp:  [96 °F (35.6 °C)-97.8 °F (36.6 °C)]   Temp: 97 °F (36.1 °C) (08/21/22 0301)  Pulse: 105 (08/21/22 0850)  Resp: 17 (08/21/22 0850)  BP: 130/73 (08/21/22 0601)  SpO2: 100 % (08/21/22 0850)    Intake/Output Summary (Last 24 hours) at 8/21/2022 1017  Last data filed at 8/21/2022 0559  Gross per 24 hour   Intake 3662.7 ml   Output 2250 ml   Net 1412.7 ml       General:  Awake , attends, communicates with blink(s)   Eyes:  Anicteric,   EOMI  ENT:  8/19: Copious intranasal secretions suctioned, because his mouth is profoundly difficult to open. His jaws have extremely high muscular tone. His tongue is severely lacerated and bulging out between his teeth on the right. He has a severe underbite. Copious saliva and bloody secretions suctioned from mouth. Even with dilaudid, we were only able to open his jaws less than a cm and I was able to insert a tongue blade only about half way. He has loose teeth, periodontal disease. I cannot see or palpate a soft tissue abscess.    8/20: exam deferred as he is going to be sedated/paralyzed for exam shortly   8/21: bite block is out. Only minimal tongue visible on right. Mouth is tender     Neck:  tracheosotomy  Lungs: Minimal basilar cracles   Heart:  RRR, no gallop/murmur/rub noted  Abd:  Soft, NT, ND, hypoactive BS, no masses or organomegaly appreciated. PEG with small amount of granulation. Rectal tube with loose stool after miralax.    :  Hutchinson with clear urine  Musc:  Joints without effusion, swelling, erythema, synovitis, muscle wasting.   Skin:  No rashes. Left arm has widespread erythema and edema. Looks more like bruising today and less redness  Neuro:             Alert, attentive,  quadriplegic  Psych:   Calm, attends  Lymphatic:     Extrem: All 4 extremities are edematous, with left arm  Erythema,/bruising left arm    VAD:  Peripheral right arm  , picc   Isolation:  contact  Wound:                      General Labs  reviewed:  Recent Labs   Lab 08/18/22 2010 08/20/22 0718 08/20/22  1630 08/21/22  0402   WBC 21.04* 14.19*  --  15.06*   HGB 8.0* 7.0* 8.1* 8.7*   HCT 26.4* 23.3* 26.9* 28.5*   * 402  --  380       Recent Labs   Lab 08/18/22 2010 08/20/22 0718 08/20/22  1630 08/21/22  0402    140  --  139   K 4.1 2.5* 2.8* 3.3*    105  --  108   CO2 24 23  --  22*   BUN 64* 42*  --  36*   CREATININE 0.7 0.6  --  0.6   CALCIUM 9.2 8.6*  --  8.3*   PROT 9.1*  --   --   --    BILITOT 0.9  --   --   --    ALKPHOS 122  --   --   --    ALT 37  --   --   --    AST 43*  --   --   --      Recent Labs   Lab 08/19/22 2109   CRP >38.00*         Micro:  Microbiology Results (last 7 days)     Procedure Component Value Units Date/Time    Culture, Respiratory with Gram Stain [[id number]]  (Abnormal) Collected: 08/19/22 1728    Order Status: Completed Specimen: Respiratory from Tracheal Aspirate Updated: 08/21/22 0739     Respiratory Culture PRESUMPTIVE PSEUDOMONAS SPECIES  Moderate  Identification and susceptibility pending       Gram Stain (Respiratory) Moderate WBC's     Gram Stain (Respiratory) Rare Gram positive cocci     Gram Stain (Respiratory) Rare Gram positive rods     Gram Stain (Respiratory) Rare Gram negative rods    Urine culture [[id number]]  (Abnormal) Collected: 08/18/22 1950    Order Status: Completed Specimen: Urine Updated: 08/21/22 0732     Urine Culture, Routine PRESUMPTIVE PSEUDOMONAS SPECIES  >100,000 cfu/ml  Identification and susceptibility pending        GRAM NEGATIVE AUTUMN, NON-LACTOSE   > 100,000 cfu/ml  Identification and susceptibility pending      Narrative:      Specimen Source->Urine    Blood culture [[id number]] Collected: 08/18/22 2010    Order Status: Completed Specimen: Blood from Peripheral, Forearm, Left Updated: 08/20/22 2232     Blood Culture, Routine No Growth to date      No Growth to date      No Growth to date    Blood culture [[id number]] Collected: 08/19/22 1234    Order  Status: Completed Specimen: Blood from Peripheral, Upper Arm, Right Updated: 08/20/22 1432     Blood Culture, Routine No Growth to date      No Growth to date    IV catheter culture [[id number]]  (Abnormal) Collected: 08/19/22 0119    Order Status: Completed Specimen: Catheter Tip, Peripheral Updated: 08/20/22 0851     Aerobic Culture - Cath tip STAPHYLOCOCCUS AUREUS  Too numerous to count  Susceptibility pending        GRAM NEGATIVE AUTUMN, NON-LACTOSE   Too numerous to count  Identification and susceptibility pending      Narrative:      Midline right arm    MRSA Screen by PCR [[id number]]  (Abnormal) Collected: 08/19/22 1728    Order Status: Completed Specimen: Nasopharyngeal Swab from Nasal Updated: 08/19/22 2031     MRSA SCREEN BY PCR Positive     Comment: MRSA  critical result(s) called and verbal readback obtained from   Tierra Reyes RN (M3) by [facility] 08/19/2022 20:31         Narrative:      MRSA  critical result(s) called and verbal readback obtained from   Tierra Reyes RN (M3) by [facility] 08/19/2022 20:31          Imaging Reviewed:   CXR   CT maxillofacial  1.  Right maxillary and anterior maxillary soft tissue edema and thickening which extends along the right maxillary molars into the oropharynx. Correlate for cellulitis and or pharyngeal infection.  2.  Right perimandibular ovoid collection with rim enhancement noted measuring 3.0 x 1.8 cm. This appears to connect to the aforementioned maxillary skin thickening and edema.  3.  Nasopharyngeal phlegm and frothy debris extends into the ethmoid and maxillary sinus.  4.  Bilateral mastoid air cell fluid can be seen with mastoiditis.  5.  Right thyroid nodule measuring 1.9 cm. Recommend nonemergent thyroid ultrasound.  6.  Prominent pituitary gland may represent pituitary adenoma.    US both arms: neg for DVT  US both legs: negative for DVT      Cardiology:    IMPRESSION & PLAN   1. Severe swelling of the tongue with laceration of the tongue, bulging to the  right   Extremely high tone preventing adequate exam   I did not see or palpate any abscesses nor did ENT on exam while paralyzed 8/20    2. Grossly purulent urine, h/o MDRO pseudomonas. Growing pseudomonas and a second GNR    3. Quadriplegic, ALS  4. Right arm midline was removed and cultured and has MRSA and Morganella, blood cultures negative, relevance unclear  5. Vent dependent, sputum is colonized, atelectasis on CXRs      Recommendations:  Continue zerbaxa, pending   change clindamycin to vanc  Contact isolation   awaiting final urine cultures  D/w  Dr. Robles      Medical Decision Making during this encounter was  [_] Low Complexity  [_] Moderate Complexity  [xxx ] High Complexity

## 2022-08-21 NOTE — CARE UPDATE
08/21/22 1320   Patient Assessment/Suction   Level of Consciousness (AVPU) unresponsive   Respiratory Effort Normal;Unlabored   Expansion/Accessory Muscles/Retractions no use of accessory muscles   Rhythm/Pattern, Respiratory assisted mechanically   PRE-TX-O2   O2 Device (Oxygen Therapy) ventilator   Oxygen Concentration (%) 35   SpO2 100 %   Pulse 91   Resp 16   BP (!) 101/59   Vent Select   Charged w/in last 24h YES   Preset Conventional Ventilator Settings   Ventilation Type VC   Vent Mode A/C   Set Rate 16 BPM   Vt Set 500 mL   PEEP/CPAP 5 cmH20   Peak Flow 60 L/min   Peak End Inspiratory Pressure 23 cmH20   I-Trigger Type  V-TRIG   Trigger Sensitivity Flow/I-Trigger 2 L/min   Patient Ventilator Parameters   Resp Rate Total 16 br/min   Peak Airway Pressure 25 cmH2O   Mean Airway Pressure 10 cmH20   Plateau Pressure 0 cmH20   Exhaled Vt 459 mL   Total Ve 7.33 mL   I:E Ratio Measured 1:3.20   Auto PEEP 0 cmH20   Conventional Ventilator Alarms   Resp Rate High Alarm 40 br/min   Press High Alarm 60 cmH2O   Apnea Rate 16   Apnea Volume (mL) 1 mL   Apnea Oxygen Concentration  100   Apnea Flow Rate (L/min) 60   T Apnea 20 sec(s)   DR. OSBORNE AT BEDSIDE AND ASSISTED WITH PUTTING BITE BLOCKS ON BOTH SIDES OF MOUTH AND SECURED WITH COMMERCIAL TUBE SNELL AND TAPE TO STOP PATIENT FROM BITING TONGUE

## 2022-08-21 NOTE — CARE UPDATE
08/21/22 0735   Patient Assessment/Suction   Level of Consciousness (AVPU) alert   Respiratory Effort Normal;Unlabored   Expansion/Accessory Muscles/Retractions no use of accessory muscles   All Lung Fields Breath Sounds coarse   Rhythm/Pattern, Respiratory assisted mechanically   Cough Frequency with stimulation   Cough Type assisted   Suction Method oral;tracheal   $ Suction Charges Inline Suction Procedure Stat Charge   Secretions Amount moderate   Secretions Color pale;yellow   Secretions Characteristics thick   PRE-TX-O2   O2 Device (Oxygen Therapy) ventilator   $ Is the patient on Low Flow Oxygen? Yes   Oxygen Concentration (%) 35   SpO2 100 %   Pulse Oximetry Type Continuous   $ Pulse Oximetry - Multiple Charge Pulse Oximetry - Multiple   Pulse (!) 115   Resp (!) 22   Aerosol Therapy   $ Aerosol Therapy Charges PRN treatment not required   Airway Safety   Ambu bag with the patient? Yes, Adult Ambu   Is mask with the patient? Yes, Adult Mask   Suction set is at the bedside? Yes   Extra trach at bedside? Yes   Extra trach sizes at bedside? 7.5   Vent Select   Conventional Vent Y   $ Ventilator Subsequent 1   Charged w/in last 24h YES   Preset Conventional Ventilator Settings   Vent ID [id number]   Vent Type    Ventilation Type VC   Vent Mode A/C   Humidity HME   Set Rate 16 BPM   Vt Set 500 mL   PEEP/CPAP 5 cmH20   Peak Flow 60 L/min   Peak End Inspiratory Pressure 26 cmH20   I-Trigger Type  V-TRIG   Trigger Sensitivity Flow/I-Trigger 2 L/min   Patient Ventilator Parameters   Resp Rate Total 21 br/min   Peak Airway Pressure 32 cmH2O   Mean Airway Pressure 14 cmH20   Plateau Pressure 0 cmH20   Exhaled Vt 552 mL   Total Ve 11.1 mL   I:E Ratio Measured 1:2.40   Auto PEEP 0 cmH20   Conventional Ventilator Alarms   Alarms On Y  (red cord plugged into call light)   Ve High Alarm 20 L/min   Ve Low Alarm 3 L/min   Vt High Alarm 1200 mL   Vt Low Alarm 250 mL   Resp Rate High Alarm 40 br/min   Press High Alarm 60 cmH2O    Apnea Rate 16   Apnea Volume (mL) 500 mL   Apnea Oxygen Concentration  100   Apnea Flow Rate (L/min) 60   T Apnea 20 sec(s)   Ready to Wean/Extubation Screen   FIO2<=50 (chart decimal) 0.35   MV<16L (chart vol.) 11.1   PEEP <=8 (chart #) 5   Ready to Wean Parameters   F/VT Ratio<105 (RSBI) (!) 39.86   Vital Capacity (mL) 0   Education   $ Education Suction;Ventilator Oxygen;15 min   Respiratory Evaluation   $ Care Plan Tech Time 15 min   $ Eval/Re-eval Charges Re-evaluation

## 2022-08-21 NOTE — PROGRESS NOTES
Progress Note  PULMONARY    Admit Date: 8/18/2022 08/21/2022 8/19/2022 - P with h/o ALS with trach and vent dependence sent to ER for increased swelling.  Now admitted for evaluation and I was asked to see pt for ventilator management.  He is not able to provide any history.  Chart has been reviewed.  He is febrile, tachycardic (though better).  Cultures have been ordered and are pending.  Maxillofacial CT noted.  CXR shows no new infiltrates.  No ABG done yet.    SUBJECTIVE:     8/20/22- pt remains on vent w stable gas exchange and vs, needs sedation in order for ENT to reduce tongue/perform exam- will perform this am. D/w wife Jud over phone.  8/21-  remains on vent, having oral pain and bite block dislodged, jaw clenched on tongue again.      OBJECTIVE:     Vitals (Most recent):  Vitals:    08/21/22 0850   BP:    Pulse: 105   Resp: 17   Temp:        Vitals (24 hour range):  Temp:  [96 °F (35.6 °C)-97.8 °F (36.6 °C)]   Pulse:  []   Resp:  [13-26]   BP: (101-156)/(54-74)   SpO2:  [99 %-100 %]       Intake/Output Summary (Last 24 hours) at 8/21/2022 0903  Last data filed at 8/21/2022 0559  Gross per 24 hour   Intake 3862.7 ml   Output 2250 ml   Net 1612.7 ml          Physical Exam:  The patient's neuro status (alertness,orientation,cognitive function,motor skills,), pharyngeal exam (oral lesions, hygiene, abn dentition,), Neck (jvd,mass,thyroid,nodes in neck and above/below clavicle),RESPIRATORY(symmetry,effort,fremitus,percussion,auscultation),  Cor(rhythm,heart tones including gallops,perfusion,edema)ABD(distention,hepatic&splenomegaly,tenderness,masses), Skin(rash,cyanosis),Psyc(affect,judgement,).  Exam negative except for these pertinent findings:    Awake, eyes open and tracking  Blinks once for yes to questions  Grimaces  Tongue tip protrudes anteriorly and laterally, teeth clenched  Examined under sedation, paralytic, tongue lacerated w/ minor bleeding on right, dusky purple edge on left- bite  blocks bilaterally placed w/ ETT holster to keep in place  Trach shiley 8 w/ vent  Breath sounds clear bilat  abd soft, PEG in place  Arms edematous with some areas of erythema  R upper ext picc    Radiographs reviewed: view by direct vision   CT max face 8/18/22-   IMPRESSION:  1.  Right maxillary and anterior maxillary soft tissue edema and thickening which extends along the right maxillary molars into the oropharynx. Correlate for cellulitis and or pharyngeal infection.  2.  Right perimandibular ovoid collection with rim enhancement noted measuring 3.0 x 1.8 cm. This appears to connect to the aforementioned maxillary skin thickening and edema.  3.  Nasopharyngeal phlegm and frothy debris extends into the ethmoid and maxillary sinus.  4.  Bilateral mastoid air cell fluid can be seen with mastoiditis.  5.  Right thyroid nodule measuring 1.9 cm. Recommend nonemergent thyroid ultrasound.  6.  Prominent pituitary gland may represent pituitary adenoma.    Labs     Recent Labs   Lab 08/21/22  0402   WBC 15.06*   HGB 8.7*   HCT 28.5*        Recent Labs   Lab 08/21/22  0402      K 3.3*      CO2 22*   BUN 36*   CREATININE 0.6   *   CALCIUM 8.3*   MG 2.1   No results for input(s): PH, PCO2, PO2, HCO3 in the last 24 hours.  Microbiology Results (last 7 days)     Procedure Component Value Units Date/Time    Culture, Respiratory with Gram Stain [[id number]]  (Abnormal) Collected: 08/19/22 1728    Order Status: Completed Specimen: Respiratory from Tracheal Aspirate Updated: 08/21/22 0739     Respiratory Culture PRESUMPTIVE PSEUDOMONAS SPECIES  Moderate  Identification and susceptibility pending       Gram Stain (Respiratory) Moderate WBC's     Gram Stain (Respiratory) Rare Gram positive cocci     Gram Stain (Respiratory) Rare Gram positive rods     Gram Stain (Respiratory) Rare Gram negative rods    Urine culture [[id number]]  (Abnormal) Collected: 08/18/22 1950    Order Status: Completed Specimen: Urine  Updated: 08/21/22 0732     Urine Culture, Routine PRESUMPTIVE PSEUDOMONAS SPECIES  >100,000 cfu/ml  Identification and susceptibility pending        GRAM NEGATIVE AUTUMN, NON-LACTOSE   > 100,000 cfu/ml  Identification and susceptibility pending      Narrative:      Specimen Source->Urine    Blood culture [[id number]] Collected: 08/18/22 2010    Order Status: Completed Specimen: Blood from Peripheral, Forearm, Left Updated: 08/20/22 2232     Blood Culture, Routine No Growth to date      No Growth to date      No Growth to date    Blood culture [[id number]] Collected: 08/19/22 1234    Order Status: Completed Specimen: Blood from Peripheral, Upper Arm, Right Updated: 08/20/22 1432     Blood Culture, Routine No Growth to date      No Growth to date    IV catheter culture [[id number]]  (Abnormal) Collected: 08/19/22 0119    Order Status: Completed Specimen: Catheter Tip, Peripheral Updated: 08/20/22 0851     Aerobic Culture - Cath tip STAPHYLOCOCCUS AUREUS  Too numerous to count  Susceptibility pending        GRAM NEGATIVE AUTUMN, NON-LACTOSE   Too numerous to count  Identification and susceptibility pending      Narrative:      Midline right arm    MRSA Screen by PCR [[id number]]  (Abnormal) Collected: 08/19/22 1728    Order Status: Completed Specimen: Nasopharyngeal Swab from Nasal Updated: 08/19/22 2031     MRSA SCREEN BY PCR Positive     Comment: MRSA  critical result(s) called and verbal readback obtained from   Tierra Reyes RN (M3) by [facility] 08/19/2022 20:31         Narrative:      MRSA  critical result(s) called and verbal readback obtained from   Tierra Reyes RN (M3) by [facility] 08/19/2022 20:31          Impression:  Active Hospital Problems    Diagnosis  POA    Open wound of tongue due to bite [S01.552A]  Unknown    Trismus [R25.2]  Unknown    Fever [R50.9]  Yes    Facial cellulitis [L03.211]  Yes    Tracheostomy in place [Z93.0]  Not Applicable    Chronic respiratory failure with hypoxia and hypercapnia  [J96.11, J96.12]  Yes    Dependent on ventilator [Z99.11]  Not Applicable    S/P percutaneous endoscopic gastrostomy (PEG) tube placement [Z93.1]  Not Applicable    ALS (amyotrophic lateral sclerosis) [G12.21]  Yes    Edema [R60.9]  Yes    Quadriparesis [G82.50]  Yes      Resolved Hospital Problems   No resolved problems to display.               Plan:     - continue vent support, no weaning  - continue bite blocks in place- replaced today  - cautious oral care  - feeds via PEG  - antibiotics per ID  - d/w ENT, hospitalist and ID  - baclofen started for jaw tightness  - per Dr. Kennedy botox to masseter may also help  - needs oromaxillofacial consult- pending higher level of care      The following were evaluated and adjusted as needed: mechanical ventilator settings and weaning status, intravenous fluids and nutritional status, support tubes and access lines and invasive monitoring and acid base balance and oxygenation needs       Critical Care  - THE PATIENT HAS A HIGH PROBABILITY OF IMMINENT OR LIFE THREATENING DETERIORATION.  Over 50%time of encounter was in direct care at bedside.  Time was 30 to 74 minutes required for patient care.  Time needed for all of the above totaled 45 minutes.    Cindy Dudley MD  Pulmonary & Critical Care Medicine

## 2022-08-21 NOTE — PROGRESS NOTES
"[facility] Medicine Progress Note  Patient Name: Wei Dominique MRN: [id number]   Patient Class: IP- Inpatient  Length of Stay: 2   Admission Date: 8/18/2022  5:47 PM Attending Physician: Edie Robles MD   Primary Care Provider: Primary Doctor No Face-to-Face encounter date: 08/21/2022   Chief Complaint: Facial Swelling (Facial swelling that started "several days ago" )      Subjective:    Interval History   Communicates with eyes, admits to pain  Discussed case with Dr. Jones and Dr. Kennedy.   Overnight no events  Discussed care with nursing staff  Discussed care with the wife who is planning to come to hospital    Review of Systems   ROS unable to obtain    Hospital Course     08/20/2022 - admitted yesterday for sepsis, ENT consulted and did oral exam with no signs of abscess. Tongue swelling and laceration noted on exam  08/21/2022 - continue antibiotics, bite block placed.     Objective:   Physical Exam  /65   Pulse 98   Temp 97 °F (36.1 °C)   Resp 16   Ht 5' 8" (1.727 m)   Wt 79.4 kg (175 lb 0.7 oz)   SpO2 100%   BMI 26.62 kg/m²   Constitutional:  open eyes  HENT: Atraumatic. Tracheostomy noted  Cardiovascular: Normal rate, regular rhythm and normal heart sounds.   Pulmonary/Chest: Effort normal. Clear to auscultation bilaterally. No wheezes.   Abdominal: Soft. Bowel sounds are normal. Exhibits no distension and no mass. No tenderness. PEG tube note  Neurological: opens eyes. Quadriplegic  Skin: Skin is warm and dry.     Labs and Imaging    Significant Labs: All pertinent labs within the past 24 hours have been reviewed.    Significant Imaging: I have reviewed all pertinent imaging results/findings within the past 24 hours.    I have reviewed the Vitals, labs and imaging as above.     Assessment & Plan:   Wei Dominique is a 67 y.o. male admitted for      Severe sepsis secondary to cellulitis? UTI - continue monitoring in ICU, treat with broad spectrum " antibiotics, ID  Following, appreciate ENT evaluation to rule out abscess  Chronic respiratory failure with trach dependent - pulmonary consulted  Left arm cellulitis - MRSA PCR, on IV Clindamycin and Zerbaxa, US negative for DVT  Trismus - baclofen started  Right lobe pneumonia - history of Proteus Mirabilis ESBL and Pseudomonas aeruginosa   - ID consulted for further management  Leukocytosis - due to above  Anemia of chronic disease - transfuse below 7g/dl  2 mm right upper lobe pulmonary nodule.  Bilateral Renal cyst - monitor  Fecal impaction - laxatives  stercoral colitis - likely due to impaction, laxatives and antibiotics  ALS - resume home medications  Quadriparesis - due to above  H/o PE - DOAC  Trach and PEG dependent  Non specific troponin elevation - monitor  Deconditioning      Core measures:  - Code status:   - Diet: tube feeds  VTE Risk Mitigation (From admission, onward)         Ordered     apixaban tablet 2.5 mg  2 times daily         08/20/22 1454     IP VTE HIGH RISK PATIENT  Once         08/20/22 0440     Place sequential compression device  Until discontinued         08/20/22 0440     Reason for No Pharmacological VTE Prophylaxis  Once        Question:  Reasons:  Answer:  Already adequately anticoagulated on oral Anticoagulants    08/20/22 0440                Discharge Planning:   Discharge Planning   VENESSA: 08/25/2022    Code Status: Full Code   Is the patient medically ready for discharge?: no    Reason for patient still in hospital (select all that apply): Patient trending condition  Discharge Plan A: LTAC      Above encounter included review of the medical records, interviewing and examining the patient face-to-face, discussion with family and other health care providers, ordering and interpreting lab/test results and formulating a plan of care.     Medical Decision Making:  [] Low Complexity  [] Moderate Complexity  []x High Complexity    Edie Robles MD  [facility]  Hospitalist  08/21/2022

## 2022-08-21 NOTE — RESPIRATORY THERAPY
08/20/22 1948   Patient Assessment/Suction   Level of Consciousness (AVPU) responds to voice   Respiratory Effort Normal;Unlabored   Expansion/Accessory Muscles/Retractions no use of accessory muscles   All Lung Fields Breath Sounds coarse   Rhythm/Pattern, Respiratory assisted mechanically   Cough Frequency with stimulation   Cough Type assisted   Suction Method tracheal;oral   Suction Pressure (mmHg) -120 mmHg   $ Suction Charges Inline Suction Procedure Stat Charge   Secretions Amount small   Secretions Color white;yellow   Secretions Characteristics thick   Skin Integrity   $ Wound Care Tech Time 15 min   Area Observed Neck;Neck under tracheostomy   Skin Appearance without discoloration   Barrier used?   (gauze)   Barrier Changed? Yes   PRE-TX-O2   O2 Device (Oxygen Therapy) ventilator   $ Is the patient on Low Flow Oxygen? Yes   Oxygen Concentration (%) 35   SpO2 100 %   Pulse Oximetry Type Continuous   $ Pulse Oximetry - Multiple Charge Pulse Oximetry - Multiple   Pulse 88   Resp 18   Aerosol Therapy   $ Aerosol Therapy Charges PRN treatment not required        Surgical Airway 02/21/22 1145 Shiley Cuffed   Placement Date/Time: 02/21/22 1145   Present Prior to Hospital Arrival?: Yes  Type: Tracheostomy  Brand: Shiley  Airway Device Size: 8.0  Style: Cuffed   Cuff Inflation? Inflated   Status Secured   Site Assessment Clean;Dry   Site Care Cleansed;Dried;Dressing applied   Inner Cannula Care Other (Comment)  (patent)   Ties Assessment Intact;Dry;Clean;Secure   Airway Safety   Ambu bag with the patient? Yes, Adult Ambu   Is mask with the patient? Yes, Adult Mask   Suction set is at the bedside? Yes   Respiratory Interventions   Airway/Ventilation Management airway patency maintained   Vent Select   Conventional Vent Y   Charged w/in last 24h YES   Preset Conventional Ventilator Settings   Vent ID [id number]   Vent Type    Ventilation Type VC   Vent Mode A/C   Humidity HME   Set Rate 16 BPM   Vt Set 500 mL    PEEP/CPAP 5 cmH20   Peak Flow 60 L/min   Peak End Inspiratory Pressure 20 cmH20   I-Trigger Type  V-TRIG   Trigger Sensitivity Flow/I-Trigger 2 L/min   Patient Ventilator Parameters   Resp Rate Total 19 br/min   Peak Airway Pressure 20 cmH2O   Mean Airway Pressure 10 cmH20   Plateau Pressure 0 cmH20   Exhaled Vt 569 mL   Total Ve 10 mL   I:E Ratio Measured 1:2.90   Auto PEEP 0 cmH20   Tubing ID (mm) 8 mm  (portex)   Tube Type Trach   Conventional Ventilator Alarms   Alarms On Y   Resp Rate High Alarm 40 br/min   Press High Alarm 60 cmH2O   Apnea Rate 16   Apnea Volume (mL) 1 mL   Apnea Oxygen Concentration  100   Apnea Flow Rate (L/min) 60   T Apnea 20 sec(s)   IHI Ventilator Associated Pneumonia Bundle (Required)   Head of Bed Elevated  HOB 30   Oral Care Other (Comment)  (not done due to wound on tongue)   Ready to Wean/Extubation Screen   FIO2<=50 (chart decimal) 0.35   MV<16L (chart vol.) 10   PEEP <=8 (chart #) 5   Ready to Wean Parameters   F/VT Ratio<105 (RSBI) (!) 31.63   Vital Capacity (mL) 0   Education   $ Education Ventilator Oxygen;Trach Care;15 min   Respiratory Evaluation   $ Care Plan Tech Time 15 min

## 2022-08-22 PROBLEM — M62.838 MASSETER MUSCLE SPASM: Status: ACTIVE | Noted: 2022-08-22

## 2022-08-22 LAB
ANION GAP SERPL CALC-SCNC: 5 MMOL/L (ref 8–16)
BACTERIA SPEC AEROBE CULT: ABNORMAL
BACTERIA SPEC AEROBE CULT: ABNORMAL
BACTERIA UR CULT: ABNORMAL
BACTERIA UR CULT: ABNORMAL
BASOPHILS # BLD AUTO: 0.04 K/UL (ref 0–0.2)
BASOPHILS NFR BLD: 0.3 % (ref 0–1.9)
BUN SERPL-MCNC: 27 MG/DL (ref 8–23)
CALCIUM SERPL-MCNC: 8 MG/DL (ref 8.7–10.5)
CHLORIDE SERPL-SCNC: 109 MMOL/L (ref 95–110)
CO2 SERPL-SCNC: 24 MMOL/L (ref 23–29)
CREAT SERPL-MCNC: 0.5 MG/DL (ref 0.5–1.4)
DIFFERENTIAL METHOD: ABNORMAL
EOSINOPHIL # BLD AUTO: 0.5 K/UL (ref 0–0.5)
EOSINOPHIL NFR BLD: 3.7 % (ref 0–8)
ERYTHROCYTE [DISTWIDTH] IN BLOOD BY AUTOMATED COUNT: 17.7 % (ref 11.5–14.5)
EST. GFR  (NO RACE VARIABLE): >60 ML/MIN/1.73 M^2
GLUCOSE SERPL-MCNC: 217 MG/DL (ref 70–110)
GRAM STN SPEC: ABNORMAL
HCT VFR BLD AUTO: 28.1 % (ref 40–54)
HGB BLD-MCNC: 8.3 G/DL (ref 14–18)
IMM GRANULOCYTES # BLD AUTO: 0.14 K/UL (ref 0–0.04)
IMM GRANULOCYTES NFR BLD AUTO: 1 % (ref 0–0.5)
LYMPHOCYTES # BLD AUTO: 1 K/UL (ref 1–4.8)
LYMPHOCYTES NFR BLD: 7.3 % (ref 18–48)
MAGNESIUM SERPL-MCNC: 2 MG/DL (ref 1.6–2.6)
MCH RBC QN AUTO: 24.7 PG (ref 27–31)
MCHC RBC AUTO-ENTMCNC: 29.5 G/DL (ref 32–36)
MCV RBC AUTO: 84 FL (ref 82–98)
MONOCYTES # BLD AUTO: 0.5 K/UL (ref 0.3–1)
MONOCYTES NFR BLD: 3.6 % (ref 4–15)
NEUTROPHILS # BLD AUTO: 11.2 K/UL (ref 1.8–7.7)
NEUTROPHILS NFR BLD: 84.1 % (ref 38–73)
NRBC BLD-RTO: 0 /100 WBC
PLATELET # BLD AUTO: 439 K/UL (ref 150–450)
PMV BLD AUTO: 10.1 FL (ref 9.2–12.9)
POTASSIUM SERPL-SCNC: 3.9 MMOL/L (ref 3.5–5.1)
RBC # BLD AUTO: 3.36 M/UL (ref 4.6–6.2)
SODIUM SERPL-SCNC: 138 MMOL/L (ref 136–145)
WBC # BLD AUTO: 13.35 K/UL (ref 3.9–12.7)

## 2022-08-22 PROCEDURE — 99232 PR SUBSEQUENT HOSPITAL CARE,LEVL II: ICD-10-PCS | Mod: ,,, | Performed by: INTERNAL MEDICINE

## 2022-08-22 PROCEDURE — 20000000 HC ICU ROOM

## 2022-08-22 PROCEDURE — A4216 STERILE WATER/SALINE, 10 ML: HCPCS | Performed by: INTERNAL MEDICINE

## 2022-08-22 PROCEDURE — 94003 VENT MGMT INPAT SUBQ DAY: CPT

## 2022-08-22 PROCEDURE — 63600175 PHARM REV CODE 636 W HCPCS: Performed by: INTERNAL MEDICINE

## 2022-08-22 PROCEDURE — 99900031 HC PATIENT EDUCATION (STAT)

## 2022-08-22 PROCEDURE — 25000003 PHARM REV CODE 250: Performed by: INTERNAL MEDICINE

## 2022-08-22 PROCEDURE — 63700000 PHARM REV CODE 250 ALT 637 W/O HCPCS: Performed by: INTERNAL MEDICINE

## 2022-08-22 PROCEDURE — 25000003 PHARM REV CODE 250

## 2022-08-22 PROCEDURE — 99900026 HC AIRWAY MAINTENANCE (STAT)

## 2022-08-22 PROCEDURE — 27000221 HC OXYGEN, UP TO 24 HOURS

## 2022-08-22 PROCEDURE — 80048 BASIC METABOLIC PNL TOTAL CA: CPT | Performed by: INTERNAL MEDICINE

## 2022-08-22 PROCEDURE — 94799 UNLISTED PULMONARY SVC/PX: CPT

## 2022-08-22 PROCEDURE — 99232 SBSQ HOSP IP/OBS MODERATE 35: CPT | Mod: ,,, | Performed by: INTERNAL MEDICINE

## 2022-08-22 PROCEDURE — 99900035 HC TECH TIME PER 15 MIN (STAT)

## 2022-08-22 PROCEDURE — 83735 ASSAY OF MAGNESIUM: CPT | Performed by: INTERNAL MEDICINE

## 2022-08-22 PROCEDURE — 94761 N-INVAS EAR/PLS OXIMETRY MLT: CPT

## 2022-08-22 PROCEDURE — C9113 INJ PANTOPRAZOLE SODIUM, VIA: HCPCS | Performed by: INTERNAL MEDICINE

## 2022-08-22 PROCEDURE — 85025 COMPLETE CBC W/AUTO DIFF WBC: CPT | Performed by: INTERNAL MEDICINE

## 2022-08-22 PROCEDURE — 99233 SBSQ HOSP IP/OBS HIGH 50: CPT | Mod: ,,, | Performed by: INTERNAL MEDICINE

## 2022-08-22 PROCEDURE — 99233 PR SUBSEQUENT HOSPITAL CARE,LEVL III: ICD-10-PCS | Mod: ,,, | Performed by: INTERNAL MEDICINE

## 2022-08-22 RX ORDER — CEFEPIME HYDROCHLORIDE 1 G/50ML
2 INJECTION, SOLUTION INTRAVENOUS
Status: DISCONTINUED | OUTPATIENT
Start: 2022-08-22 | End: 2022-08-26

## 2022-08-22 RX ORDER — CHLORHEXIDINE GLUCONATE ORAL RINSE 1.2 MG/ML
15 SOLUTION DENTAL 2 TIMES DAILY
Status: COMPLETED | OUTPATIENT
Start: 2022-08-22 | End: 2022-08-26

## 2022-08-22 RX ORDER — MUPIROCIN 20 MG/G
OINTMENT TOPICAL 2 TIMES DAILY
Status: COMPLETED | OUTPATIENT
Start: 2022-08-22 | End: 2022-08-26

## 2022-08-22 RX ADMIN — FUROSEMIDE 40 MG: 40 TABLET ORAL at 10:08

## 2022-08-22 RX ADMIN — BUSPIRONE HYDROCHLORIDE 5 MG: 5 TABLET ORAL at 09:08

## 2022-08-22 RX ADMIN — MUPIROCIN 1 G: 20 OINTMENT TOPICAL at 10:08

## 2022-08-22 RX ADMIN — SENNOSIDES AND DOCUSATE SODIUM 1 TABLET: 50; 8.6 TABLET ORAL at 09:08

## 2022-08-22 RX ADMIN — BACLOFEN 10 MG: 5 TABLET ORAL at 03:08

## 2022-08-22 RX ADMIN — ACETAMINOPHEN 650 MG: 650 SOLUTION ORAL at 12:08

## 2022-08-22 RX ADMIN — SODIUM CHLORIDE, PRESERVATIVE FREE 10 ML: 5 INJECTION INTRAVENOUS at 12:08

## 2022-08-22 RX ADMIN — SODIUM CHLORIDE: 0.9 INJECTION, SOLUTION INTRAVENOUS at 10:08

## 2022-08-22 RX ADMIN — CLINDAMYCIN IN 5 PERCENT DEXTROSE 900 MG: 18 INJECTION, SOLUTION INTRAVENOUS at 04:08

## 2022-08-22 RX ADMIN — HYDROMORPHONE HYDROCHLORIDE 1 MG: 1 INJECTION, SOLUTION INTRAMUSCULAR; INTRAVENOUS; SUBCUTANEOUS at 09:08

## 2022-08-22 RX ADMIN — CASTOR OIL AND BALSAM, PERU: 788; 87 OINTMENT TOPICAL at 03:08

## 2022-08-22 RX ADMIN — ACETAMINOPHEN 650 MG: 650 SOLUTION ORAL at 06:08

## 2022-08-22 RX ADMIN — POTASSIUM CHLORIDE 20 MEQ: 7.46 INJECTION, SOLUTION INTRAVENOUS at 06:08

## 2022-08-22 RX ADMIN — TRAZODONE HYDROCHLORIDE 50 MG: 50 TABLET ORAL at 09:08

## 2022-08-22 RX ADMIN — CHLORHEXIDINE GLUCONATE 15 ML: 1.2 RINSE ORAL at 09:08

## 2022-08-22 RX ADMIN — MUPIROCIN 1 G: 20 OINTMENT TOPICAL at 09:08

## 2022-08-22 RX ADMIN — HYDROMORPHONE HYDROCHLORIDE 1 MG: 1 INJECTION, SOLUTION INTRAMUSCULAR; INTRAVENOUS; SUBCUTANEOUS at 03:08

## 2022-08-22 RX ADMIN — SENNOSIDES AND DOCUSATE SODIUM 1 TABLET: 50; 8.6 TABLET ORAL at 10:08

## 2022-08-22 RX ADMIN — CHLORHEXIDINE GLUCONATE 15 ML: 1.2 RINSE ORAL at 10:08

## 2022-08-22 RX ADMIN — RILUZOLE 50 MG: 50 TABLET ORAL at 10:08

## 2022-08-22 RX ADMIN — BACLOFEN 10 MG: 5 TABLET ORAL at 09:08

## 2022-08-22 RX ADMIN — BACLOFEN 10 MG: 5 TABLET ORAL at 10:08

## 2022-08-22 RX ADMIN — APIXABAN 2.5 MG: 2.5 TABLET, FILM COATED ORAL at 10:08

## 2022-08-22 RX ADMIN — PANTOPRAZOLE SODIUM 40 MG: 40 INJECTION, POWDER, FOR SOLUTION INTRAVENOUS at 10:08

## 2022-08-22 RX ADMIN — CEFTOLOZANE AND TAZOBACTAM 1500 MG: 1; .5 INJECTION, POWDER, LYOPHILIZED, FOR SOLUTION INTRAVENOUS at 12:08

## 2022-08-22 RX ADMIN — CEFTOLOZANE AND TAZOBACTAM 1500 MG: 1; .5 INJECTION, POWDER, LYOPHILIZED, FOR SOLUTION INTRAVENOUS at 02:08

## 2022-08-22 RX ADMIN — METOPROLOL TARTRATE 50 MG: 50 TABLET, FILM COATED ORAL at 10:08

## 2022-08-22 RX ADMIN — CLINDAMYCIN IN 5 PERCENT DEXTROSE 900 MG: 18 INJECTION, SOLUTION INTRAVENOUS at 12:08

## 2022-08-22 RX ADMIN — HYDROCODONE BITARTRATE AND ACETAMINOPHEN 1 TABLET: 5; 325 TABLET ORAL at 10:08

## 2022-08-22 RX ADMIN — APIXABAN 2.5 MG: 2.5 TABLET, FILM COATED ORAL at 09:08

## 2022-08-22 RX ADMIN — BUSPIRONE HYDROCHLORIDE 5 MG: 5 TABLET ORAL at 10:08

## 2022-08-22 RX ADMIN — SODIUM CHLORIDE, PRESERVATIVE FREE 10 ML: 5 INJECTION INTRAVENOUS at 06:08

## 2022-08-22 RX ADMIN — POLYETHYLENE GLYCOL 3350 17 G: 17 POWDER, FOR SOLUTION ORAL at 10:08

## 2022-08-22 RX ADMIN — CEFEPIME HYDROCHLORIDE 2 G: 2 INJECTION, SOLUTION INTRAVENOUS at 07:08

## 2022-08-22 RX ADMIN — RILUZOLE 50 MG: 50 TABLET ORAL at 09:08

## 2022-08-22 NOTE — PROGRESS NOTES
Consult Note  Infectious Disease    Reason for Consult:  Facial abscess    HPI: Wei Dominique is a 67 y.o. male with known history of ALS, s/p trach & peg, vent dependence, HTN, PE (on eliquis) who presented with a primary complaint of facial swelling x few days. No family present at bedside. History was obtained from chart review and physician sign out. Patient had CT scan maxillofacial done that showed Right maxillary and anterior maxillary soft tissue edema and thickening which extends along the right maxillary molars into the oropharynx. Correlate for cellulitis and or pharyngeal infection.  Right perimandibular ovoid collection. Patient needs maxillofacial surgery however all hospitals are on diversion. Patient was in the ER waiting to be transferred for 18 hours and subsequently admitted to hospital medicine, ICU.   Seen by our service in June for pseudomonas  UTI  At that time he had deep bites and fissures in tongue as well.     8/20: interim reviewed and seen with Dr. Kennedy. Temperatures have improved. MRSA screen was positive. Midline tip with SA and GNR. Sputum culture in progress, and urine culture has growth that has to be re-isolated. He continues to acknowledge pain, nausea. He is in the process of being sedated and paralyzed for adequate ENT exam. Arms remain edematous  8/21:  Afebrile, interim reviewed.  The ENT note not complete but told by RN that no abscesses were discovered while sedated and paralyzed for oral exam.  The tongue was reduced into the mouth and bite block was placed.  Urine with 2 gram negative rods, 1 of which is Pseudomonas.  Sputum with Pseudomonas which is not new, chest x-ray with atelectasis.  Line tip with MRSA and Morganella, relevance unclear as his blood cultures were negative. Discussed with hospital medicine. My bedside questions about comfort care were answered in the negative, ie he does not want to be comfort care. He does want pain control however.   8/22:  afebrile. VSS. Vent needs are stable. Mouth pain is decreased. Breathing comfortably. No nausea or abdominal pain. Able to communicate and answer questions.     EXAM & DIAGNOSTICS REVIEWED:   Vitals:     Temp:  [96.7 °F (35.9 °C)-99.1 °F (37.3 °C)]   Temp: 96.8 °F (36 °C) (08/22/22 1100)  Pulse: 97 (08/22/22 1500)  Resp: (!) 22 (08/22/22 1500)  BP: 126/66 (08/22/22 1500)  SpO2: 100 % (08/22/22 1500)    Intake/Output Summary (Last 24 hours) at 8/22/2022 1623  Last data filed at 8/22/2022 1201  Gross per 24 hour   Intake 3122.5 ml   Output 1750 ml   Net 1372.5 ml       General:  Awake , attends, communicates with blink(s)   Eyes:  Anicteric,   EOMI  ENT:  8/19: Copious intranasal secretions suctioned, because his mouth is profoundly difficult to open. His jaws have extremely high muscular tone. His tongue is severely lacerated and bulging out between his teeth on the right. He has a severe underbite. Copious saliva and bloody secretions suctioned from mouth. Even with dilaudid, we were only able to open his jaws less than a cm and I was able to insert a tongue blade only about half way. He has loose teeth, periodontal disease. I cannot see or palpate a soft tissue abscess.    8/20: exam deferred as he is going to be sedated/paralyzed for exam shortly   8/21: bite block is out. Only minimal tongue visible on right. Mouth is tender   8/22: device to maintain safe bite in place.    Neck:  tracheosotomy  Lungs: clear  Heart:  RRR, no gallop/murmur/rub noted  Abd:  Soft, NT, ND, hypoactive BS, no masses or organomegaly appreciated. PEG with small amount of granulation. Rectal tube with loose stool after miralax.    :  Hutchinson with clear urine  Musc:  Joints without effusion, swelling, erythema, synovitis, muscle wasting.   Skin:  No rashes. Left arm erythema and bruising is resolved.   Neuro:             Alert, attentive,  quadriplegic  Psych:   Calm, attends  Lymphatic:     Extrem: LE 1+ edema, UE 4+ edema   VAD:    ,  picc Right arm , port left chest  Isolation:  contact  Wound:                      General Labs reviewed:  Recent Labs   Lab 08/20/22 0718 08/20/22  1630 08/21/22  0402 08/22/22  0419   WBC 14.19*  --  15.06* 13.35*   HGB 7.0* 8.1* 8.7* 8.3*   HCT 23.3* 26.9* 28.5* 28.1*     --  380 439       Recent Labs   Lab 08/18/22 2010 08/20/22 0718 08/20/22  1630 08/21/22  0402 08/22/22  0419    140  --  139 138   K 4.1 2.5* 2.8* 3.3* 3.9    105  --  108 109   CO2 24 23  --  22* 24   BUN 64* 42*  --  36* 27*   CREATININE 0.7 0.6  --  0.6 0.5   CALCIUM 9.2 8.6*  --  8.3* 8.0*   PROT 9.1*  --   --   --   --    BILITOT 0.9  --   --   --   --    ALKPHOS 122  --   --   --   --    ALT 37  --   --   --   --    AST 43*  --   --   --   --      Recent Labs   Lab 08/19/22 2109   CRP >38.00*         Micro:  Microbiology Results (last 7 days)     Procedure Component Value Units Date/Time    Blood culture [[id number]] Collected: 08/19/22 1234    Order Status: Completed Specimen: Blood from Peripheral, Upper Arm, Right Updated: 08/22/22 1432     Blood Culture, Routine No Growth to date      No Growth to date      No Growth to date      No Growth to date    Urine culture [[id number]]  (Abnormal)  (Susceptibility) Collected: 08/18/22 1950    Order Status: Completed Specimen: Urine Updated: 08/22/22 0658     Urine Culture, Routine PSEUDOMONAS AERUGINOSA   >100,000 cfu/ml  Known  patient        PROVIDENCIA STUARTII  > 100,000 cfu/ml      Narrative:      Specimen Source->Urine    Culture, Respiratory with Gram Stain [[id number]]  (Abnormal)  (Susceptibility) Collected: 08/19/22 1728    Order Status: Completed Specimen: Respiratory from Tracheal Aspirate Updated: 08/22/22 0652     Respiratory Culture Normal respiratory marco      PSEUDOMONAS AERUGINOSA  Moderate       Gram Stain (Respiratory) Moderate WBC's     Gram Stain (Respiratory) Rare Gram positive cocci     Gram Stain (Respiratory) Rare Gram positive rods     Gram  Stain (Respiratory) Rare Gram negative rods    Blood culture [[id number]] Collected: 08/18/22 2010    Order Status: Completed Specimen: Blood from Peripheral, Forearm, Left Updated: 08/21/22 2232     Blood Culture, Routine No Growth to date      No Growth to date      No Growth to date      No Growth to date    IV catheter culture [[id number]]  (Abnormal)  (Susceptibility) Collected: 08/19/22 0119    Order Status: Completed Specimen: Catheter Tip, Peripheral Updated: 08/21/22 1018     Aerobic Culture - Cath tip METHICILLIN RESISTANT STAPHYLOCOCCUS AUREUS  Too numerous to count  Results called to and read back by:  08/21/2022  10:18 JBM        MORGANELLA MORGANII  Too numerous to count      Narrative:      Midline right arm    MRSA Screen by PCR [[id number]]  (Abnormal) Collected: 08/19/22 1728    Order Status: Completed Specimen: Nasopharyngeal Swab from Nasal Updated: 08/19/22 2031     MRSA SCREEN BY PCR Positive     Comment: MRSA  critical result(s) called and verbal readback obtained from   Tierra Reyes RN (M3) by [facility] 08/19/2022 20:31         Narrative:      MRSA  critical result(s) called and verbal readback obtained from   Tierra Reyes RN (M3) by [facility] 08/19/2022 20:31          Imaging Reviewed:   CXR   CT maxillofacial  1.  Right maxillary and anterior maxillary soft tissue edema and thickening which extends along the right maxillary molars into the oropharynx. Correlate for cellulitis and or pharyngeal infection.  2.  Right perimandibular ovoid collection with rim enhancement noted measuring 3.0 x 1.8 cm. This appears to connect to the aforementioned maxillary skin thickening and edema.  3.  Nasopharyngeal phlegm and frothy debris extends into the ethmoid and maxillary sinus.  4.  Bilateral mastoid air cell fluid can be seen with mastoiditis.  5.  Right thyroid nodule measuring 1.9 cm. Recommend nonemergent thyroid ultrasound.  6.  Prominent pituitary gland may represent pituitary adenoma.    US both  arms: neg for DVT  US both legs: negative for DVT      Cardiology:    IMPRESSION & PLAN   1. Severe swelling of the tongue with laceration of the tongue, bulging to the right   Extremely high tone preventing adequate exam   I did not see or palpate any abscesses nor did ENT on exam while paralyzed 8/20    2. Grossly purulent urine, h/o MDRO pseudomonas. Growing pseudomonas and Providencia  3. Quadriplegic, ALS  4. Right arm midline was removed and cultured and has MRSA and Morganella, blood cultures negative, relevance unclear and improving without specific therapy for MRSA  5. Vent dependent, sputum is colonized, atelectasis on CXRs      Recommendations:  De-escalating to cefepime alone. This will cover the UTI organisms, the pseudomonas in the sputum(though I do not believe he has pneumonia)  Contact isolation     Per ENT needs Botox to masseter muscles      Medical Decision Making during this encounter was  [_] Low Complexity  [_] Moderate Complexity  [xxx ] High Complexity

## 2022-08-22 NOTE — PLAN OF CARE
Tongue protected by bite blocks, no bleeding noted. Turned q2 hours, wound care done to sacral, buttocks area. Wife informed of case and care.

## 2022-08-22 NOTE — CARE UPDATE
08/22/22 0730   Patient Assessment/Suction   Level of Consciousness (AVPU) alert   Respiratory Effort Normal;Unlabored   Expansion/Accessory Muscles/Retractions no use of accessory muscles   All Lung Fields Breath Sounds coarse  (slight)   Rhythm/Pattern, Respiratory assisted mechanically   Cough Frequency with stimulation   Cough Type assisted   Suction Method tracheal;oral   $ Suction Charges Inline Suction Procedure Stat Charge   Secretions Amount small   Secretions Color pale   Secretions Characteristics thick   PRE-TX-O2   O2 Device (Oxygen Therapy) ventilator   $ Is the patient on Low Flow Oxygen? Yes   Oxygen Concentration (%) 35   SpO2 100 %   Pulse Oximetry Type Continuous   $ Pulse Oximetry - Multiple Charge Pulse Oximetry - Multiple   Pulse 100   Resp 17   /64   Aerosol Therapy   $ Aerosol Therapy Charges PRN treatment not required        Surgical Airway 02/21/22 1145 Shiley Cuffed   Placement Date/Time: 02/21/22 1145   Present Prior to Hospital Arrival?: Yes  Type: Tracheostomy  Brand: Shiley  Airway Device Size: 8.0  Style: Cuffed   Cuff Inflation?   (mlt)   Status Secured   Airway Safety   Ambu bag with the patient? Yes, Adult Ambu   Is mask with the patient? Yes, Adult Mask   Suction set is at the bedside? Yes   Extra trach at bedside? Yes   Extra trach sizes at bedside? 7.5   Vent Select   Conventional Vent Y   $ Ventilator Subsequent 1   Charged w/in last 24h YES   Preset Conventional Ventilator Settings   Vent ID [id number]   Vent Type    Ventilation Type VC   Vent Mode A/C   Set Rate 16 BPM   Vt Set 500 mL   PEEP/CPAP 5 cmH20   Peak Flow 60 L/min   Peak End Inspiratory Pressure 18 cmH20   I-Trigger Type  V-TRIG   Trigger Sensitivity Flow/I-Trigger 2 L/min   Patient Ventilator Parameters   Resp Rate Total 19 br/min   Peak Airway Pressure 22 cmH2O   Mean Airway Pressure 10 cmH20   Plateau Pressure 0 cmH20   Exhaled Vt 516 mL   Total Ve 10.1 mL   I:E Ratio Measured 1:2.70   Auto PEEP 0 cmH20    Tubing ID (mm) 8 mm  (portex)   Tube Type Trach   Conventional Ventilator Alarms   Alarms On Y  (red cord plugged into call light)   Ve High Alarm 20 L/min   Ve Low Alarm 3 L/min   Vt High Alarm 1200 mL   Vt Low Alarm 250 mL   Resp Rate High Alarm 40 br/min   Press High Alarm 60 cmH2O   Apnea Rate 16   Apnea Volume (mL) 500 mL   Apnea Oxygen Concentration  100   Apnea Flow Rate (L/min) 60   T Apnea 20 sec(s)   Ready to Wean/Extubation Screen   FIO2<=50 (chart decimal) 0.35   MV<16L (chart vol.) 10.1   PEEP <=8 (chart #) 5   Education   $ Education Suction;Ventilator Oxygen;15 min   Respiratory Evaluation   $ Care Plan Tech Time 15 min   $ Eval/Re-eval Charges Re-evaluation

## 2022-08-22 NOTE — PROGRESS NOTES
"[facility] Medicine Progress Note  Patient Name: Wei Dominique MRN: [id number]   Patient Class: IP- Inpatient  Length of Stay: 3   Admission Date: 8/18/2022  5:47 PM Attending Physician: Edie Robles MD   Primary Care Provider: Primary Doctor No Face-to-Face encounter date: 08/22/2022   Chief Complaint: Facial Swelling (Facial swelling that started "several days ago" )      Subjective:    Interval History   Overnight no events  Discussed care with nursing staff    Review of Systems   ROS unable to obtain    Hospital Course     08/20/2022 - admitted yesterday for sepsis with concern of pharyngeal abscess, ENT consulted and did oral exam with no signs of abscess. Tongue swelling and laceration noted on exam  08/21/2022 - continue antibiotics, bite block placed. No other events, follow cultures. Sepsis likely secondary to UTI vs PNA  08/22/2022 - no acute events, leukocytosis improving, Respiratory cultures growing Pseudomonas, Ucx gpwing pseudomonas and providencia     Objective:   Physical Exam  /66   Pulse 97   Temp 96.8 °F (36 °C) (Axillary)   Resp (!) 22   Ht 5' 8" (1.727 m)   Wt 82.1 kg (181 lb)   SpO2 100%   BMI 27.52 kg/m²   Constitutional:  open eyes  HENT: Atraumatic. Tracheostomy noted  Cardiovascular: Normal rate, regular rhythm and normal heart sounds.   Pulmonary/Chest: coarse breath sounds. On MV  Abdominal: Soft. Bowel sounds are normal. Exhibits no distension and no mass. No tenderness. PEG tube note  Neurological: opens eyes. Quadriplegic  Skin: Skin is warm and dry.     Labs and Imaging    Significant Labs: All pertinent labs within the past 24 hours have been reviewed.    Significant Imaging: I have reviewed all pertinent imaging results/findings within the past 24 hours.    I have reviewed the Vitals, labs and imaging as above.     Assessment & Plan:   Wei Dominique is a 67 y.o. male admitted for      Severe sepsis secondary to cellulitis? UTI - " continue monitoring in ICU, treat with broad spectrum antibiotics, ID  Following, appreciate ENT evaluation to rule out abscess    pseudomonas and providencia UTI - abx as above. ID following    Chronic respiratory failure with trach dependent - pulmonary consulted    Left arm cellulitis - MRSA PCR, on IV Clindamycin changed to IV Vancomycin and Zerbaxa, US negative for DVT    Trismus - baclofen started    Right lobe pneumonia - history of Proteus Mirabilis ESBL and Pseudomonas aeruginosa   - ID consulted for further management    Leukocytosis - due to above    Anemia of chronic disease - transfuse below 7g/dl    2 mm right upper lobe pulmonary nodule.    Bilateral Renal cyst - monitor    Fecal impaction - laxatives    stercoral colitis - likely due to impaction, laxatives and antibiotics    ALS - resume home medications    Quadriparesis - due to above    H/o PE - DOAC    Trach and PEG dependent    Non specific troponin elevation - monitor    Deconditioning - return back to LTAC    awaiting wife to visit him to discuss goals of care     Core measures:  - Code status:   - Diet: tube feeds  VTE Risk Mitigation (From admission, onward)         Ordered     apixaban tablet 2.5 mg  2 times daily         08/20/22 1454     IP VTE HIGH RISK PATIENT  Once         08/20/22 0440     Place sequential compression device  Until discontinued         08/20/22 0440     Reason for No Pharmacological VTE Prophylaxis  Once        Question:  Reasons:  Answer:  Already adequately anticoagulated on oral Anticoagulants    08/20/22 0440                Discharge Planning:   Discharge Planning   VENESSA: 08/25/2022    Code Status: Full Code   Is the patient medically ready for discharge?: no    Reason for patient still in hospital (select all that apply): Patient trending condition  Discharge Plan A: LTAC    Above encounter included review of the medical records, interviewing and examining the patient face-to-face, discussion with family and  other health care providers, ordering and interpreting lab/test results and formulating a plan of care.     Medical Decision Making:  [] Low Complexity  [] Moderate Complexity  []x High Complexity    Edie Robles MD  [facility] Hospitalist  08/22/2022

## 2022-08-22 NOTE — PROGRESS NOTES
Pulmonary/Critical Care  Progress Note      Patient name: Wei Dominique  MRN: [id number]  Date: 08/22/2022    Admit Date: 8/18/2022  Consult Requested By: Edie Robles MD    Reason for Consult: Respiratory failure, trach    HPI:    8/19/2022 - P with h/o ALS with trach and vent dependence sent to ER for increased swelling.  Now admitted for evaluation and I was asked to see pt for ventilator management.  He is not able to provide any history.  Chart has been reviewed.  He is febrile, tachycardic (though better).  Cultures have been ordered and are pending.  Maxillofacial CT noted.  CXR shows no new infiltrates.  No ABG done yet.    8/22/2022 - Events of the weekend noted, no response to me this AM.  Dr Jones's note from yesterday seen and pt does not want comfort measures.  Mulitple + cultures (ID following for antibiotics).    Review of Systems    Review of Systems   Unable to perform ROS: Mental acuity       Past Medical History    Past Medical History:   Diagnosis Date    ALS (amyotrophic lateral sclerosis)     ALS (amyotrophic lateral sclerosis)     Dependent on ventilator 3/21/2022    Erectile dysfunction     Gait disturbance     Hyperlipidemia     Hypertension     Iron deficiency anemia     Motor neuron disease     Polycystic kidney disease     Quadriparesis 2/6/2018    Tremor        Past Surgical History    Past Surgical History:   Procedure Laterality Date    GASTROSTOMY      PEG    PORTACATH PLACEMENT      TRACHEOSTOMY         Medications (scheduled):      acetaminophen  650 mg Oral Q6H    apixaban  2.5 mg Oral BID    baclofen  10 mg Per G Tube TID    balsam peru-castor oiL   Topical (Top) Daily    busPIRone  5 mg Per G Tube BID    ceftolozane-tazobactam  1,500 mg Intravenous Q8H    chlorhexidine  15 mL Mouth/Throat BID    clindamycin (CLEOCIN) IVPB  900 mg Intravenous Q8H    furosemide  40 mg Per G Tube Daily    metoprolol tartrate  50 mg Oral Daily    mupirocin   Nasal  BID    pantoprazole  40 mg Intravenous Daily    polyethylene glycol  17 g Oral Daily    riluzole  50 mg Oral Q12H    scopolamine  1 patch Transdermal Q3 Days    senna-docusate 8.6-50 mg  1 tablet Oral BID    sodium chloride 0.9%  10 mL Intravenous Q6H    traZODone  50 mg Per G Tube QHS       Medications (infusions):      sodium chloride 0.9% 100 mL/hr at 08/22/22 1016    propofoL Stopped (08/20/22 1048)       Medications (prn):     sodium chloride, acetaminophen, acetaminophen, albuterol-ipratropium, aluminum-magnesium hydroxide-simethicone, calcium chloride IVPB, calcium chloride IVPB, calcium chloride IVPB, dextrose 10%, dextrose 10%, glucagon (human recombinant), glucose, glucose, HYDROcodone-acetaminophen, HYDROmorphone, magnesium sulfate IVPB, magnesium sulfate IVPB, magnesium sulfate IVPB, magnesium sulfate IVPB, melatonin, morphine, naloxone, ondansetron, potassium chloride in water, potassium chloride in water, potassium chloride in water, potassium chloride in water, prochlorperazine, simethicone, sodium chloride 0.9%, Flushing PICC Protocol **AND** sodium chloride 0.9% **AND** sodium chloride 0.9%, sodium phosphate IVPB, sodium phosphate IVPB, sodium phosphate IVPB, sodium phosphate IVPB, sodium phosphate IVPB, white petrolatum    Family History: No family history on file.    Social History: Tobacco:   Social History     Tobacco Use   Smoking Status Not on file   Smokeless Tobacco Not on file                                EtOH:   Social History     Substance and Sexual Activity   Alcohol Use None                                Drugs:   Social History     Substance and Sexual Activity   Drug Use Never       Physical Exam    Vital signs:  Temp:  [96.7 °F (35.9 °C)-99.1 °F (37.3 °C)]   Pulse:  []   Resp:  [16-23]   BP: (110-143)/(56-72)   SpO2:  [100 %]     Intake/Output:     Intake/Output Summary (Last 24 hours) at 8/22/2022 1441  Last data filed at 8/22/2022 1201  Gross per 24 hour   Intake  "3122.5 ml   Output 1750 ml   Net 1372.5 ml        BMI: Estimated body mass index is 27.52 kg/m² as calculated from the following:    Height as of this encounter: 5' 8" (1.727 m).    Weight as of this encounter: 82.1 kg (181 lb).    Physical Exam  Vitals and nursing note reviewed.   Constitutional:       General: He is not in acute distress.     Appearance: Normal appearance. He is ill-appearing (chronically). He is not toxic-appearing or diaphoretic.   HENT:      Head: Normocephalic and atraumatic.      Comments: Some swelling to right face     Right Ear: External ear normal.      Left Ear: External ear normal.      Nose: Nose normal.      Mouth/Throat:      Mouth: Mucous membranes are moist.      Pharynx: Oropharynx is clear. No oropharyngeal exudate.   Eyes:      General: No scleral icterus.        Right eye: No discharge.         Left eye: No discharge.      Extraocular Movements: Extraocular movements intact.      Conjunctiva/sclera: Conjunctivae normal.      Pupils: Pupils are equal, round, and reactive to light.   Neck:      Vascular: No carotid bruit.      Comments: trach  Cardiovascular:      Rate and Rhythm: Normal rate and regular rhythm.      Pulses: Normal pulses.      Heart sounds: Normal heart sounds. No murmur heard.    No friction rub. No gallop.   Pulmonary:      Effort: Pulmonary effort is normal. No respiratory distress.      Breath sounds: Normal breath sounds. No stridor. No wheezing, rhonchi or rales.   Chest:      Chest wall: No tenderness.   Abdominal:      General: Bowel sounds are normal. There is no distension.      Tenderness: There is no abdominal tenderness. There is no guarding.   Musculoskeletal:         General: No swelling. Normal range of motion.      Cervical back: Normal range of motion and neck supple. No rigidity or tenderness.      Right lower leg: No edema.      Left lower leg: No edema.      Comments: + edema in UE bilaterally   Lymphadenopathy:      Cervical: No cervical " adenopathy.   Skin:     General: Skin is warm and dry.      Capillary Refill: Capillary refill takes less than 2 seconds.      Coloration: Skin is not jaundiced.      Findings: No bruising.      Comments: Some redness at prior midline site  Some heel breakdown   Neurological:      Mental Status: He is alert.      Comments: Not responding or moving much   Psychiatric:      Comments: Not able to assess         Laboratory    Recent Labs   Lab 08/22/22 0419   WBC 13.35*   RBC 3.36*   HGB 8.3*   HCT 28.1*      MCV 84   MCH 24.7*   MCHC 29.5*       Recent Labs   Lab 08/22/22 0419   CALCIUM 8.0*      K 3.9   CO2 24      BUN 27*   CREATININE 0.5       No results for input(s): PT, INR, APTT in the last 24 hours.    No results for input(s): CPK, CPKMB, TROPONINI, MB in the last 24 hours.    Additional labs: reviewe    Microbiology:       Microbiology Results (last 7 days)     Procedure Component Value Units Date/Time    Blood culture [[id number]] Collected: 08/19/22 1234    Order Status: Completed Specimen: Blood from Peripheral, Upper Arm, Right Updated: 08/22/22 1432     Blood Culture, Routine No Growth to date      No Growth to date      No Growth to date      No Growth to date    Urine culture [[id number]]  (Abnormal)  (Susceptibility) Collected: 08/18/22 1950    Order Status: Completed Specimen: Urine Updated: 08/22/22 0658     Urine Culture, Routine PSEUDOMONAS AERUGINOSA   >100,000 cfu/ml  Known  patient        PROVIDENCIA STUARTII  > 100,000 cfu/ml      Narrative:      Specimen Source->Urine    Culture, Respiratory with Gram Stain [[id number]]  (Abnormal)  (Susceptibility) Collected: 08/19/22 1728    Order Status: Completed Specimen: Respiratory from Tracheal Aspirate Updated: 08/22/22 0652     Respiratory Culture Normal respiratory marco      PSEUDOMONAS AERUGINOSA  Moderate       Gram Stain (Respiratory) Moderate WBC's     Gram Stain (Respiratory) Rare Gram positive cocci     Gram Stain  (Respiratory) Rare Gram positive rods     Gram Stain (Respiratory) Rare Gram negative rods    Blood culture [[id number]] Collected: 08/18/22 2010    Order Status: Completed Specimen: Blood from Peripheral, Forearm, Left Updated: 08/21/22 2232     Blood Culture, Routine No Growth to date      No Growth to date      No Growth to date      No Growth to date    IV catheter culture [[id number]]  (Abnormal)  (Susceptibility) Collected: 08/19/22 0119    Order Status: Completed Specimen: Catheter Tip, Peripheral Updated: 08/21/22 1018     Aerobic Culture - Cath tip METHICILLIN RESISTANT STAPHYLOCOCCUS AUREUS  Too numerous to count  Results called to and read back by:  08/21/2022  10:18 JBM        MORGANELLA MORGANII  Too numerous to count      Narrative:      Midline right arm    MRSA Screen by PCR [[id number]]  (Abnormal) Collected: 08/19/22 1728    Order Status: Completed Specimen: Nasopharyngeal Swab from Nasal Updated: 08/19/22 2031     MRSA SCREEN BY PCR Positive     Comment: MRSA  critical result(s) called and verbal readback obtained from   Tierra Reyes RN (M3) by [facility] 08/19/2022 20:31         Narrative:      MRSA  critical result(s) called and verbal readback obtained from   Tierra Reyes RN (M3) by [facility] 08/19/2022 20:31          Radiology    X-Ray Chest AP Portable  Reason: picc placement PICC LINE PLCMNT    FINDINGS:  Portable chest at 1638 compared with 8/19/2022 shows placement of right PICC with tip in SVC.    Cardiomediastinal silhouette, tracheostomy tube, and left subclavian port unchanged.    Right basilar airspace opacity is mild and unchanged perhaps reflecting atelectasis or consolidation. Left lung appears clear. Lung volumes remain low. Pulmonary vasculature is normal. No acute osseous abnormality.    IMPRESSION:  Placement of right PICC with tip in SVC.    Electronically signed by:  Robin Arellano MD  8/20/2022 5:20 PM CDT Workstation: [id number]        Additional  Studies    reviewed    Ventilator Information    Vent Mode: A/C  Oxygen Concentration (%):  [35] 35  Resp Rate Total:  [16 br/min-24 br/min] 17 br/min  Vt Set:  [500 mL] 500 mL  PEEP/CPAP:  [5 cmH20] 5 cmH20  Mean Airway Pressure:  [10 tdZ63-67 cmH20] 10 cmH20         No results for input(s): PH, PCO2, PO2, HCO3, POCSATURATED, BE in the last 72 hours.      Impression    Active Hospital Problems    Diagnosis  POA    Masseter muscle spasm [M62.838]  Yes    Open wound of tongue due to bite [S01.552A]  Yes    Trismus [R25.2]  Yes    Fever [R50.9]  Yes    Facial cellulitis [L03.211]  Yes    Tracheostomy in place [Z93.0]  Not Applicable    Chronic respiratory failure with hypoxia and hypercapnia [J96.11, J96.12]  Yes    Dependent on ventilator [Z99.11]  Not Applicable    S/P percutaneous endoscopic gastrostomy (PEG) tube placement [Z93.1]  Not Applicable    ALS (amyotrophic lateral sclerosis) [G12.21]  Yes    Edema [R60.9]  Yes    Quadriparesis [G82.50]  Yes      Resolved Hospital Problems    Diagnosis Date Resolved POA    Facial abscess [L02.01] 08/22/2022 Yes       Plan    · Continue vent - no weaning  · Cultures reviewed  · Contact isolation  · Antibiotics per ID  · Follow H/H - no acute bleeding reported  · Wound care  · PEG feedings  · Has been evaluated by ENT    Thank you for this consult.  I will follow with you while the patient is hospitalized.        Sy Kennedy MD  [facility] Pulmonary/Critical Care  08/22/2022

## 2022-08-22 NOTE — PHYSICIAN QUERY
PT Name: Wei Dominique  MR #: [id number]     Documentation Clarification      CDS/: Michelle Iglesias               Contact information:[phone] or through epic messenger    This form is a permanent document in the medical record.     Query Date: August 22, 2022    By submitting this query, we are merely seeking further clarification of documentation. Please utilize your independent clinical judgment when addressing the question(s) below.    The Medical Record reflects the following:    Supporting Clinical Findings Location in Medical Record   Quadriparesis due to ALS HP, Dr Jones's consult and PNs, Dr Kennedy's consult   Quadriplegic, ALS Dr Jones's consult and PNs, hospitalist PN 8/21                                                                            Provider, please provide diagnosis or diagnoses associated with above clinical findings.    [   ]    [   ]    [   ] Functional Quadriplegia    Traumatic Quadriplegia    Quadriplegia, unspecified   [ xxx  ]    [   ] Quadriparesis meaning generalized muscle weakness secondary to ALS    Other (please specify)

## 2022-08-22 NOTE — PROGRESS NOTES
[facility]  Adult Nutrition   Progress Note (Nutrition Support Management)    SUMMARY      Recommendations:   1. Continue Jevity 1.5 @ the goal rate of 50 ml/hr to provide: 1800 kcals, 76 g protein, & 912 ml free water. FWF: 30 ml Q 4 hrs while on IVFs.   2. RD to follow and monitor TF tolerance, labs, etc.       Goals:   Pt to meet 75% or more of his estimated energy and protein needs from his PEG feeding.    Dietitian Rounds Brief  F/U Nutrition Note: Observed pt during ICU rounds and MD Lion inquiring about pt possibly with tetanus. LBM was today.    08/20/2022 - admitted yesterday for sepsis with concern of pharyngeal abscess, ENT consulted and did oral exam with no signs of abscess. Tongue swelling and laceration noted on exam  08/21/2022 - continue antibiotics, bite block placed. No other events, follow cultures. Sepsis likely secondary to UTI vs PNA    Will follow prn.    Diet order: NPO    TF rate/provision: 50/Jevity 1.5    % Intake of Estimated Energy Needs: 75 - 100 %  % Meal Intake: NPO    Estimated/Assessed Needs  Weight Used For Calorie Calculations: 75.6 kg (166 lb 10.7 oz)  Energy Calorie Requirements (kcal): 0219-4821 kcals (25-30 kcal/kg)  Energy Need Method: Kcal/kg  Protein Requirements: 75-90 g (1-1.2 g/kg)  Weight Used For Protein Calculations: 75.6 kg (166 lb 10.7 oz)     Estimated Fluid Requirement Method: RDA Method  RDA Method (mL): 1890       Weight History:  Wt Readings from Last 5 Encounters:   08/22/22 82.1 kg (181 lb)   06/14/22 76.7 kg (169 lb 1.5 oz)   05/28/22 73.9 kg (162 lb 14.7 oz)   05/17/22 78 kg (171 lb 15.3 oz)   02/21/22 77.4 kg (170 lb 10.2 oz)        Reason for Assessment  Reason For Assessment: consult, new tube feeding  Diagnosis: other (see comments) (ALS)  Relevant Medical History: ALS, PEG, trach, vent dependency, HTN, PE    Medications:Pertinent Medications Reviewed  Scheduled Meds:   acetaminophen  650 mg Oral Q6H    apixaban  2.5 mg Oral BID     baclofen  10 mg Per G Tube TID    balsam peru-castor oiL   Topical (Top) Daily    busPIRone  5 mg Per G Tube BID    ceFEPime (MAXIPIME) IVPB  2 g Intravenous Q12H    chlorhexidine  15 mL Mouth/Throat BID    furosemide  40 mg Per G Tube Daily    metoprolol tartrate  50 mg Oral Daily    mupirocin   Nasal BID    pantoprazole  40 mg Intravenous Daily    polyethylene glycol  17 g Oral Daily    riluzole  50 mg Oral Q12H    scopolamine  1 patch Transdermal Q3 Days    senna-docusate 8.6-50 mg  1 tablet Oral BID    sodium chloride 0.9%  10 mL Intravenous Q6H    traZODone  50 mg Per G Tube QHS     Continuous Infusions:   sodium chloride 0.9% 100 mL/hr at 08/22/22 1016    propofoL Stopped (08/20/22 1048)     PRN Meds:.sodium chloride, acetaminophen, acetaminophen, albuterol-ipratropium, aluminum-magnesium hydroxide-simethicone, calcium chloride IVPB, calcium chloride IVPB, calcium chloride IVPB, dextrose 10%, dextrose 10%, glucagon (human recombinant), glucose, glucose, HYDROcodone-acetaminophen, HYDROmorphone, magnesium sulfate IVPB, magnesium sulfate IVPB, magnesium sulfate IVPB, magnesium sulfate IVPB, melatonin, morphine, naloxone, ondansetron, potassium chloride in water, potassium chloride in water, potassium chloride in water, potassium chloride in water, prochlorperazine, simethicone, sodium chloride 0.9%, Flushing PICC Protocol **AND** sodium chloride 0.9% **AND** sodium chloride 0.9%, sodium phosphate IVPB, sodium phosphate IVPB, sodium phosphate IVPB, sodium phosphate IVPB, sodium phosphate IVPB, white petrolatum    Labs: Pertinent Labs Reviewed  Clinical Chemistry:  Recent Labs   Lab 08/18/22 2010 08/20/22  0718 08/22/22  0419      < > 138   K 4.1   < > 3.9      < > 109   CO2 24   < > 24   *   < > 217*   BUN 64*   < > 27*   CREATININE 0.7   < > 0.5   CALCIUM 9.2   < > 8.0*   PROT 9.1*  --   --    ALBUMIN 2.5*  --   --    BILITOT 0.9  --   --    ALKPHOS 122  --   --    AST 43*  --    --    ALT 37  --   --    ANIONGAP 14   < > 5*   MG 2.6   < > 2.0    < > = values in this interval not displayed.     CBC:   Recent Labs   Lab 08/22/22  0419   WBC 13.35*   RBC 3.36*   HGB 8.3*   HCT 28.1*      MCV 84   MCH 24.7*   MCHC 29.5*     No results for input(s): CHOL, HDL, LDLCALC, TRIG, CHOLHDL in the last 168 hours.  Cardiac Profile:  Recent Labs   Lab 08/18/22 2010   BNP 66   CPK 53   CPKMB 4.8   TROPONINI 0.049*     Inflammatory Labs:  Recent Labs   Lab 08/19/22 2109   CRP >38.00*     Monitor and Evaluation  Food and Nutrient Intake: energy intake, enteral nutrition intake  Food and Nutrient Adminstration: enteral and parenteral nutrition administration  Physical Activity and Function: nutrition-related ADLs and IADLs  Anthropometric Measurements: weight change, weight, body mass index  Biochemical Data, Medical Tests and Procedures: electrolyte and renal panel, gastrointestinal profile, glucose/endocrine profile  Nutrition-Focused Physical Findings: overall appearance     Nutrition Risk  Level of Risk/Frequency of Follow-up: high     Nutrition Follow-Up  RD Follow-up?: Yes    Linda Rodgers, LAUREN 08/22/2022 4:34 PM

## 2022-08-23 LAB
ANION GAP SERPL CALC-SCNC: 5 MMOL/L (ref 8–16)
BACTERIA BLD CULT: NORMAL
BASOPHILS # BLD AUTO: 0.06 K/UL (ref 0–0.2)
BASOPHILS NFR BLD: 0.4 % (ref 0–1.9)
BUN SERPL-MCNC: 22 MG/DL (ref 8–23)
CALCIUM SERPL-MCNC: 8 MG/DL (ref 8.7–10.5)
CHLORIDE SERPL-SCNC: 112 MMOL/L (ref 95–110)
CO2 SERPL-SCNC: 22 MMOL/L (ref 23–29)
CREAT SERPL-MCNC: 0.4 MG/DL (ref 0.5–1.4)
DIFFERENTIAL METHOD: ABNORMAL
EOSINOPHIL # BLD AUTO: 0.6 K/UL (ref 0–0.5)
EOSINOPHIL NFR BLD: 3.7 % (ref 0–8)
ERYTHROCYTE [DISTWIDTH] IN BLOOD BY AUTOMATED COUNT: 18.5 % (ref 11.5–14.5)
EST. GFR  (NO RACE VARIABLE): >60 ML/MIN/1.73 M^2
GLUCOSE SERPL-MCNC: 119 MG/DL (ref 70–110)
HCT VFR BLD AUTO: 27.5 % (ref 40–54)
HGB BLD-MCNC: 8.2 G/DL (ref 14–18)
IMM GRANULOCYTES # BLD AUTO: 0.24 K/UL (ref 0–0.04)
IMM GRANULOCYTES NFR BLD AUTO: 1.6 % (ref 0–0.5)
LYMPHOCYTES # BLD AUTO: 1.4 K/UL (ref 1–4.8)
LYMPHOCYTES NFR BLD: 9.1 % (ref 18–48)
MAGNESIUM SERPL-MCNC: 2.1 MG/DL (ref 1.6–2.6)
MCH RBC QN AUTO: 25 PG (ref 27–31)
MCHC RBC AUTO-ENTMCNC: 29.8 G/DL (ref 32–36)
MCV RBC AUTO: 84 FL (ref 82–98)
MONOCYTES # BLD AUTO: 0.5 K/UL (ref 0.3–1)
MONOCYTES NFR BLD: 3.3 % (ref 4–15)
NEUTROPHILS # BLD AUTO: 12.3 K/UL (ref 1.8–7.7)
NEUTROPHILS NFR BLD: 81.9 % (ref 38–73)
NRBC BLD-RTO: 0 /100 WBC
PLATELET # BLD AUTO: 447 K/UL (ref 150–450)
PMV BLD AUTO: 9.8 FL (ref 9.2–12.9)
POTASSIUM SERPL-SCNC: 3.7 MMOL/L (ref 3.5–5.1)
RBC # BLD AUTO: 3.28 M/UL (ref 4.6–6.2)
SODIUM SERPL-SCNC: 139 MMOL/L (ref 136–145)
WBC # BLD AUTO: 15.04 K/UL (ref 3.9–12.7)

## 2022-08-23 PROCEDURE — 25000003 PHARM REV CODE 250: Performed by: INTERNAL MEDICINE

## 2022-08-23 PROCEDURE — 99900035 HC TECH TIME PER 15 MIN (STAT)

## 2022-08-23 PROCEDURE — 63700000 PHARM REV CODE 250 ALT 637 W/O HCPCS: Performed by: INTERNAL MEDICINE

## 2022-08-23 PROCEDURE — 85025 COMPLETE CBC W/AUTO DIFF WBC: CPT | Performed by: INTERNAL MEDICINE

## 2022-08-23 PROCEDURE — 99233 PR SUBSEQUENT HOSPITAL CARE,LEVL III: ICD-10-PCS | Mod: ,,, | Performed by: INTERNAL MEDICINE

## 2022-08-23 PROCEDURE — 99232 PR SUBSEQUENT HOSPITAL CARE,LEVL II: ICD-10-PCS | Mod: ,,, | Performed by: INTERNAL MEDICINE

## 2022-08-23 PROCEDURE — 99900026 HC AIRWAY MAINTENANCE (STAT)

## 2022-08-23 PROCEDURE — 63600175 PHARM REV CODE 636 W HCPCS: Performed by: INTERNAL MEDICINE

## 2022-08-23 PROCEDURE — 80048 BASIC METABOLIC PNL TOTAL CA: CPT | Performed by: INTERNAL MEDICINE

## 2022-08-23 PROCEDURE — 99232 SBSQ HOSP IP/OBS MODERATE 35: CPT | Mod: ,,, | Performed by: INTERNAL MEDICINE

## 2022-08-23 PROCEDURE — 94761 N-INVAS EAR/PLS OXIMETRY MLT: CPT

## 2022-08-23 PROCEDURE — 99900031 HC PATIENT EDUCATION (STAT)

## 2022-08-23 PROCEDURE — 25000003 PHARM REV CODE 250

## 2022-08-23 PROCEDURE — 83735 ASSAY OF MAGNESIUM: CPT | Performed by: INTERNAL MEDICINE

## 2022-08-23 PROCEDURE — 94799 UNLISTED PULMONARY SVC/PX: CPT

## 2022-08-23 PROCEDURE — 27000221 HC OXYGEN, UP TO 24 HOURS

## 2022-08-23 PROCEDURE — 94003 VENT MGMT INPAT SUBQ DAY: CPT

## 2022-08-23 PROCEDURE — 99233 SBSQ HOSP IP/OBS HIGH 50: CPT | Mod: ,,, | Performed by: INTERNAL MEDICINE

## 2022-08-23 PROCEDURE — A4216 STERILE WATER/SALINE, 10 ML: HCPCS | Performed by: INTERNAL MEDICINE

## 2022-08-23 PROCEDURE — C9113 INJ PANTOPRAZOLE SODIUM, VIA: HCPCS | Performed by: INTERNAL MEDICINE

## 2022-08-23 PROCEDURE — 20000000 HC ICU ROOM

## 2022-08-23 RX ADMIN — POLYETHYLENE GLYCOL 3350 17 G: 17 POWDER, FOR SOLUTION ORAL at 08:08

## 2022-08-23 RX ADMIN — BACLOFEN 10 MG: 5 TABLET ORAL at 08:08

## 2022-08-23 RX ADMIN — BUSPIRONE HYDROCHLORIDE 5 MG: 5 TABLET ORAL at 08:08

## 2022-08-23 RX ADMIN — RILUZOLE 50 MG: 50 TABLET ORAL at 08:08

## 2022-08-23 RX ADMIN — CHLORHEXIDINE GLUCONATE 15 ML: 1.2 RINSE ORAL at 08:08

## 2022-08-23 RX ADMIN — APIXABAN 2.5 MG: 2.5 TABLET, FILM COATED ORAL at 08:08

## 2022-08-23 RX ADMIN — SENNOSIDES AND DOCUSATE SODIUM 1 TABLET: 50; 8.6 TABLET ORAL at 08:08

## 2022-08-23 RX ADMIN — SODIUM CHLORIDE: 0.9 INJECTION, SOLUTION INTRAVENOUS at 11:08

## 2022-08-23 RX ADMIN — CEFEPIME HYDROCHLORIDE 2 G: 2 INJECTION, SOLUTION INTRAVENOUS at 06:08

## 2022-08-23 RX ADMIN — SODIUM CHLORIDE, PRESERVATIVE FREE 10 ML: 5 INJECTION INTRAVENOUS at 12:08

## 2022-08-23 RX ADMIN — HYDROCODONE BITARTRATE AND ACETAMINOPHEN 1 TABLET: 5; 325 TABLET ORAL at 08:08

## 2022-08-23 RX ADMIN — SODIUM CHLORIDE, PRESERVATIVE FREE 10 ML: 5 INJECTION INTRAVENOUS at 06:08

## 2022-08-23 RX ADMIN — CASTOR OIL AND BALSAM, PERU: 788; 87 OINTMENT TOPICAL at 08:08

## 2022-08-23 RX ADMIN — POTASSIUM CHLORIDE 20 MEQ: 7.46 INJECTION, SOLUTION INTRAVENOUS at 06:08

## 2022-08-23 RX ADMIN — PANTOPRAZOLE SODIUM 40 MG: 40 INJECTION, POWDER, FOR SOLUTION INTRAVENOUS at 08:08

## 2022-08-23 RX ADMIN — HYDROCODONE BITARTRATE AND ACETAMINOPHEN 1 TABLET: 5; 325 TABLET ORAL at 12:08

## 2022-08-23 RX ADMIN — POTASSIUM CHLORIDE 20 MEQ: 7.46 INJECTION, SOLUTION INTRAVENOUS at 11:08

## 2022-08-23 RX ADMIN — MUPIROCIN 1 G: 20 OINTMENT TOPICAL at 08:08

## 2022-08-23 RX ADMIN — FUROSEMIDE 40 MG: 40 TABLET ORAL at 08:08

## 2022-08-23 RX ADMIN — METOPROLOL TARTRATE 50 MG: 50 TABLET, FILM COATED ORAL at 08:08

## 2022-08-23 RX ADMIN — ACETAMINOPHEN 650 MG: 650 SOLUTION ORAL at 06:08

## 2022-08-23 RX ADMIN — ACETAMINOPHEN 650 MG: 650 SOLUTION ORAL at 12:08

## 2022-08-23 RX ADMIN — TRAZODONE HYDROCHLORIDE 50 MG: 50 TABLET ORAL at 08:08

## 2022-08-23 RX ADMIN — BACLOFEN 10 MG: 5 TABLET ORAL at 03:08

## 2022-08-23 NOTE — PROGRESS NOTES
"[facility]  Adult Nutrition   Progress Note (Nutrition Support Management)    SUMMARY     Recommendations  Recommendation/Intervention: 1. Continue TF at goal rate as tolerated. Jevity 1.5 @ 50 ml/hr with 30 ml FWF Q4H 2. Added ProTGold BID and Chauncey BID to aid in wound healing. TF + modulars to provide 2120 kcal/day, 116 g/day protein, and 1092 ml/day free water  Goals: 1. Patient to tolerate TF. 2. Nutrition provision to meet at least 75% of EEN and EPN.  Nutrition Goal Status: new  Communication of RD Recs: reviewed with RN     Dietitian Rounds Brief  Patient tolerating TF at goal. RN states wounds are worsening. RD reassessed protein needs. Plans to move to air mattress per RN. RD added chauncey BID and protgold BID for wound healing/ skin integrity and to better meet protein needs.    Reason for Assessment  Reason For Assessment: RD follow-up  Diagnosis: other (see comments) (ALS)  Relevant Medical History: ALS, PEG, trach, vent dependency, HTN, PE    Nutrition Risk Screen  Nutrition Risk Screen: large or nonhealing wound, burn or pressure injury    Nutrition/Diet History  Patient Reported Diet/Restrictions/Preferences: no oral intake  Food Allergies: NKFA  Factors Affecting Nutritional Intake: NPO, on mechanical ventilation  Nutrition Support Formula Prior to Admit: Other (see commments) (Isosource 1.5)  Nutrition Support Rate Prior to Admit: 50 (ml)  Nutrtion Support Frequency Prior to Admit: ml/hr, continuously  Nutrition Support Provision Prior to Admit: 1800 kcals, 82 g protein, 917 ml free H20    Anthropometrics  Temp: 98.7 °F (37.1 °C)  Height: 5' 8" (172.7 cm)  Height (inches): 68 in  Weight Method: Bed Scale  Weight: 81.3 kg (179 lb 3.7 oz)  Weight (lb): 179.24 lb  Ideal Body Weight (IBW), Male: 154 lb  % Ideal Body Weight, Male (lb): 108.23 %  BMI (Calculated): 27.3  BMI Grade: 25 - 29.9 - overweight       Weight History:  Wt Readings from Last 10 Encounters:   08/23/22 81.3 kg (179 lb 3.7 " oz)   06/14/22 76.7 kg (169 lb 1.5 oz)   05/28/22 73.9 kg (162 lb 14.7 oz)   05/17/22 78 kg (171 lb 15.3 oz)   02/21/22 77.4 kg (170 lb 10.2 oz)   01/22/20 80.7 kg (178 lb)   12/03/19 81.2 kg (179 lb)   09/19/19 84.4 kg (186 lb)   06/18/19 83.9 kg (185 lb)       Lab/Procedures/Meds: Pertinent Labs Reviewed    Clinical Chemistry:  Recent Labs   Lab 08/18/22 2010 08/20/22  0718 08/23/22  0329      < > 139   K 4.1   < > 3.7      < > 112*   CO2 24   < > 22*   *   < > 119*   BUN 64*   < > 22   CREATININE 0.7   < > 0.4*   CALCIUM 9.2   < > 8.0*   PROT 9.1*  --   --    ALBUMIN 2.5*  --   --    BILITOT 0.9  --   --    ALKPHOS 122  --   --    AST 43*  --   --    ALT 37  --   --    ANIONGAP 14   < > 5*   MG 2.6   < > 2.1    < > = values in this interval not displayed.       CBC:   Recent Labs   Lab 08/23/22 0329   WBC 15.04*   RBC 3.28*   HGB 8.2*   HCT 27.5*      MCV 84   MCH 25.0*   MCHC 29.8*       Cardiac Profile:  Recent Labs   Lab 08/18/22 2010   BNP 66   CPK 53   CPKMB 4.8   TROPONINI 0.049*       Inflammatory Labs:  Recent Labs   Lab 08/19/22 2109   CRP >38.00*         Medications: Pertinent Medications reviewed    Scheduled Meds:   acetaminophen  650 mg Oral Q6H    apixaban  2.5 mg Oral BID    baclofen  10 mg Per G Tube TID    balsam peru-castor oiL   Topical (Top) Daily    busPIRone  5 mg Per G Tube BID    ceFEPime (MAXIPIME) IVPB  2 g Intravenous Q12H    chlorhexidine  15 mL Mouth/Throat BID    furosemide  40 mg Per G Tube Daily    metoprolol tartrate  50 mg Oral Daily    mupirocin   Nasal BID    onabotulinumtoxina  100 Units Subcutaneous Once    pantoprazole  40 mg Intravenous Daily    polyethylene glycol  17 g Oral Daily    riluzole  50 mg Oral Q12H    scopolamine  1 patch Transdermal Q3 Days    senna-docusate 8.6-50 mg  1 tablet Oral BID    sodium chloride 0.9%  10 mL Intravenous Q6H    traZODone  50 mg Per G Tube QHS       Continuous Infusions:   sodium chloride  0.9% 100 mL/hr at 08/23/22 1115    propofoL Stopped (08/20/22 1048)       PRN Meds:.sodium chloride, acetaminophen, acetaminophen, albuterol-ipratropium, aluminum-magnesium hydroxide-simethicone, calcium chloride IVPB, calcium chloride IVPB, calcium chloride IVPB, dextrose 10%, dextrose 10%, glucagon (human recombinant), glucose, glucose, HYDROcodone-acetaminophen, HYDROmorphone, magnesium sulfate IVPB, magnesium sulfate IVPB, magnesium sulfate IVPB, magnesium sulfate IVPB, melatonin, morphine, naloxone, ondansetron, potassium chloride in water, potassium chloride in water, potassium chloride in water, potassium chloride in water, prochlorperazine, simethicone, sodium chloride 0.9%, Flushing PICC Protocol **AND** sodium chloride 0.9% **AND** sodium chloride 0.9%, sodium phosphate IVPB, sodium phosphate IVPB, sodium phosphate IVPB, sodium phosphate IVPB, sodium phosphate IVPB, white petrolatum    Estimated/Assessed Needs  Weight Used For Calorie Calculations: 75.6 kg (166 lb 10.7 oz)  Energy Calorie Requirements (kcal): 4322-3358 kcals (25-30 kcal/kg)  Energy Need Method: Kcal/kg  Protein Requirements: 114 - 152 (1.5 - 2 g/kg)  Weight Used For Protein Calculations: 75.6 kg (166 lb 10.7 oz)     Estimated Fluid Requirement Method: RDA Method  RDA Method (mL): 1890       Nutrition Prescription Ordered  Current Diet Order: NPO  Current Nutrition Support Formula Ordered: Jevity 1.5  Current Nutrition Support Rate Ordered: 50 (ml)  Current Nutrition Support Frequency Ordered: ml/hr    Evaluation of Received Nutrient/Fluid Intake  Energy Calories Required: meeting needs  Protein Required: not meeting needs  Fluid Required: meeting needs  Tolerance: tolerating     Intake/Output Summary (Last 24 hours) at 8/23/2022 1604  Last data filed at 8/23/2022 0643  Gross per 24 hour   Intake 2563.33 ml   Output 1225 ml   Net 1338.33 ml      % Intake of Estimated Energy Needs: 75 - 100 %  % Meal Intake: NPO    Nutrition Risk  Level of  Risk/Frequency of Follow-up: high     Monitor and Evaluation  Food and Nutrient Intake: energy intake, enteral nutrition intake  Food and Nutrient Adminstration: enteral and parenteral nutrition administration  Physical Activity and Function: nutrition-related ADLs and IADLs, factors affecting access to physical activity  Anthropometric Measurements: weight, weight change, body mass index  Biochemical Data, Medical Tests and Procedures: electrolyte and renal panel, inflammatory profile, gastrointestinal profile, lipid profile, glucose/endocrine profile  Nutrition-Focused Physical Findings: overall appearance     Nutrition Follow-Up  RD Follow-up?: Yes     Sheri Lion RD 08/23/2022 4:04 PM

## 2022-08-23 NOTE — PROGRESS NOTES
Consult Note  Infectious Disease    Reason for Consult:  Facial abscess    HPI: Wei Dominique is a 67 y.o. male with known history of ALS, s/p trach & peg, vent dependence, HTN, PE (on eliquis) who presented with a primary complaint of facial swelling x few days. No family present at bedside. History was obtained from chart review and physician sign out. Patient had CT scan maxillofacial done that showed Right maxillary and anterior maxillary soft tissue edema and thickening which extends along the right maxillary molars into the oropharynx. Correlate for cellulitis and or pharyngeal infection.  Right perimandibular ovoid collection. Patient needs maxillofacial surgery however all hospitals are on diversion. Patient was in the ER waiting to be transferred for 18 hours and subsequently admitted to hospital medicine, ICU.   Seen by our service in June for pseudomonas  UTI  At that time he had deep bites and fissures in tongue as well.     8/20: interim reviewed and seen with Dr. Kennedy. Temperatures have improved. MRSA screen was positive. Midline tip with SA and GNR. Sputum culture in progress, and urine culture has growth that has to be re-isolated. He continues to acknowledge pain, nausea. He is in the process of being sedated and paralyzed for adequate ENT exam. Arms remain edematous  8/21:  Afebrile, interim reviewed.  The ENT note not complete but told by RN that no abscesses were discovered while sedated and paralyzed for oral exam.  The tongue was reduced into the mouth and bite block was placed.  Urine with 2 gram negative rods, 1 of which is Pseudomonas.  Sputum with Pseudomonas which is not new, chest x-ray with atelectasis.  Line tip with MRSA and Morganella, relevance unclear as his blood cultures were negative. Discussed with hospital medicine. My bedside questions about comfort care were answered in the negative, ie he does not want to be comfort care. He does want pain control however.   8/22:  afebrile. VSS. Vent needs are stable. Mouth pain is decreased. Breathing comfortably. No nausea or abdominal pain. Able to communicate and answer questions.     EXAM & DIAGNOSTICS REVIEWED:   Vitals:     Temp:  [96.7 °F (35.9 °C)-98.7 °F (37.1 °C)]   Temp: 98.7 °F (37.1 °C) (08/23/22 0301)  Pulse: (!) 117 (08/23/22 0720)  Resp: (!) 21 (08/23/22 0720)  BP: (!) 105/57 (08/23/22 0846)  SpO2: 100 % (08/23/22 0720)    Intake/Output Summary (Last 24 hours) at 8/23/2022 0857  Last data filed at 8/23/2022 0643  Gross per 24 hour   Intake 2743.33 ml   Output 1225 ml   Net 1518.33 ml       General:  Awake , attends, communicates with blink(s)   Eyes:  Anicteric,   EOMI  ENT:  8/19: Copious intranasal secretions suctioned, because his mouth is profoundly difficult to open. His jaws have extremely high muscular tone. His tongue is severely lacerated and bulging out between his teeth on the right. He has a severe underbite. Copious saliva and bloody secretions suctioned from mouth. Even with dilaudid, we were only able to open his jaws less than a cm and I was able to insert a tongue blade only about half way. He has loose teeth, periodontal disease. I cannot see or palpate a soft tissue abscess.    8/20: exam deferred as he is going to be sedated/paralyzed for exam shortly   8/21: bite block is out. Only minimal tongue visible on right. Mouth is tender   8/22: device to maintain safe bite in place.   8/23: same as above    Neck:  tracheosotomy  Lungs: clear  Heart:  RRR, no gallop/murmur/rub noted  Abd:  Soft, NT, ND, hypoactive BS, no masses or organomegaly appreciated. PEG with small amount of granulation. Rectal tube with loose stool after miralax.    :  Hutchinson with clear urine  Musc:  Joints without effusion, swelling, erythema, synovitis, muscle wasting.   Skin:  No rashes. Left arm erythema and bruising is resolved.   Neuro:             Alert, attentive,  quadriplegic  Psych:   Calm, attends  Lymphatic:     Extrem: LE 1+  edema, UE 4+ edema   VAD:    , picc Right arm , port left chest  Isolation:  contact  Wound:                      General Labs reviewed:  Recent Labs   Lab 08/21/22 0402 08/22/22 0419 08/23/22 0329   WBC 15.06* 13.35* 15.04*   HGB 8.7* 8.3* 8.2*   HCT 28.5* 28.1* 27.5*    439 447       Recent Labs   Lab 08/18/22 2010 08/20/22 0718 08/21/22 0402 08/22/22 0419 08/23/22  0329      < > 139 138 139   K 4.1   < > 3.3* 3.9 3.7      < > 108 109 112*   CO2 24   < > 22* 24 22*   BUN 64*   < > 36* 27* 22   CREATININE 0.7   < > 0.6 0.5 0.4*   CALCIUM 9.2   < > 8.3* 8.0* 8.0*   PROT 9.1*  --   --   --   --    BILITOT 0.9  --   --   --   --    ALKPHOS 122  --   --   --   --    ALT 37  --   --   --   --    AST 43*  --   --   --   --     < > = values in this interval not displayed.     Recent Labs   Lab 08/19/22 2109   CRP >38.00*         Micro:  Microbiology Results (last 7 days)     Procedure Component Value Units Date/Time    Blood culture [[id number]] Collected: 08/18/22 2010    Order Status: Completed Specimen: Blood from Peripheral, Forearm, Left Updated: 08/22/22 2232     Blood Culture, Routine No Growth to date      No Growth to date      No Growth to date      No Growth to date      No Growth to date    Blood culture [[id number]] Collected: 08/19/22 1234    Order Status: Completed Specimen: Blood from Peripheral, Upper Arm, Right Updated: 08/22/22 1432     Blood Culture, Routine No Growth to date      No Growth to date      No Growth to date      No Growth to date    Urine culture [[id number]]  (Abnormal)  (Susceptibility) Collected: 08/18/22 1950    Order Status: Completed Specimen: Urine Updated: 08/22/22 0658     Urine Culture, Routine PSEUDOMONAS AERUGINOSA   >100,000 cfu/ml  Known  patient        PROVIDENCIA STUARTII  > 100,000 cfu/ml      Narrative:      Specimen Source->Urine    Culture, Respiratory with Gram Stain [[id number]]  (Abnormal)  (Susceptibility) Collected: 08/19/22 0457     Order Status: Completed Specimen: Respiratory from Tracheal Aspirate Updated: 08/22/22 0652     Respiratory Culture Normal respiratory marco      PSEUDOMONAS AERUGINOSA  Moderate       Gram Stain (Respiratory) Moderate WBC's     Gram Stain (Respiratory) Rare Gram positive cocci     Gram Stain (Respiratory) Rare Gram positive rods     Gram Stain (Respiratory) Rare Gram negative rods    IV catheter culture [[id number]]  (Abnormal)  (Susceptibility) Collected: 08/19/22 0119    Order Status: Completed Specimen: Catheter Tip, Peripheral Updated: 08/21/22 1018     Aerobic Culture - Cath tip METHICILLIN RESISTANT STAPHYLOCOCCUS AUREUS  Too numerous to count  Results called to and read back by:  08/21/2022  10:18 JBM        MORGANELLA MORGANII  Too numerous to count      Narrative:      Midline right arm    MRSA Screen by PCR [[id number]]  (Abnormal) Collected: 08/19/22 1728    Order Status: Completed Specimen: Nasopharyngeal Swab from Nasal Updated: 08/19/22 2031     MRSA SCREEN BY PCR Positive     Comment: MRSA  critical result(s) called and verbal readback obtained from   Tierra Reyes RN (M3) by [facility] 08/19/2022 20:31         Narrative:      MRSA  critical result(s) called and verbal readback obtained from   Tierra Reeys RN (M3) by [facility] 08/19/2022 20:31          Imaging Reviewed:   CXR   CT maxillofacial  1.  Right maxillary and anterior maxillary soft tissue edema and thickening which extends along the right maxillary molars into the oropharynx. Correlate for cellulitis and or pharyngeal infection.  2.  Right perimandibular ovoid collection with rim enhancement noted measuring 3.0 x 1.8 cm. This appears to connect to the aforementioned maxillary skin thickening and edema.  3.  Nasopharyngeal phlegm and frothy debris extends into the ethmoid and maxillary sinus.  4.  Bilateral mastoid air cell fluid can be seen with mastoiditis.  5.  Right thyroid nodule measuring 1.9 cm. Recommend nonemergent thyroid  ultrasound.  6.  Prominent pituitary gland may represent pituitary adenoma.    US both arms: neg for DVT  US both legs: negative for DVT      Cardiology:    IMPRESSION & PLAN   1. Severe swelling of the tongue with laceration of the tongue, bulging to the right   Extremely high tone preventing adequate exam   I did not see or palpate any abscesses nor did ENT on exam while paralyzed 8/20    2. Grossly purulent urine, h/o MDRO pseudomonas. Growing pseudomonas and Providencia  3. Quadriplegic, ALS  4. Right arm midline was removed and cultured and has MRSA and Morganella, blood cultures negative, relevance unclear and improving without specific therapy for MRSA  5. Vent dependent, sputum is colonized, atelectasis on CXRs      Recommendations:  continue cefepime alone for 5 more days   This will cover the UTI organisms, the pseudomonas in the sputum(though I do not believe he has pneumonia)  Contact isolation     Per ENT needs Botox to masseter muscles  This needs to be done before discharge  D/w dr. gonzalez and nursing  Will follow peripherally    Medical Decision Making during this encounter was  [_] Low Complexity  [_] Moderate Complexity  [xxx ] High Complexity

## 2022-08-23 NOTE — PROGRESS NOTES
"Counts include 234 beds at the [facility] Medicine Progress Note  Patient Name: Wei Domiinque MRN: [id number]   Patient Class: IP- Inpatient  Length of Stay: 4   Admission Date: 8/18/2022  5:47 PM Attending Physician: ANJU King MD   Primary Care Provider: Primary Doctor No Face-to-Face encounter date: 08/23/2022   Chief Complaint: Facial Swelling (Facial swelling that started "several days ago" )      Subjective:    Interval History   Patient was seen and examined bedside, follows simple commands, discussed case with nursing staff, ENT and Infectious Disease    Review of Systems   ROS unable to obtain    Hospital Course     08/20/2022 - admitted yesterday for sepsis with concern of pharyngeal abscess, ENT consulted and did oral exam with no signs of abscess. Tongue swelling and laceration noted on exam  08/21/2022 - continue antibiotics, bite block placed. No other events, follow cultures. Sepsis likely secondary to UTI vs PNA  08/22/2022 - no acute events, leukocytosis improving, Respiratory cultures growing Pseudomonas, Ucx gpwing pseudomonas and providencia   08/23:  No acute events, cultures reviewed, ENT is planning masseteric Botox injection    Objective:   Physical Exam  BP (!) 105/57   Pulse (!) 117   Temp 98.7 °F (37.1 °C)   Resp (!) 21   Ht 5' 8" (1.727 m)   Wt 81.3 kg (179 lb 3.7 oz)   SpO2 100%   BMI 27.25 kg/m²   Constitutional:  open eyes  HENT: Atraumatic. Tracheostomy noted  Cardiovascular: Normal rate, regular rhythm and normal heart sounds.   Pulmonary/Chest: coarse breath sounds. On MV  Abdominal: Soft. Bowel sounds are normal. Exhibits no distension and no mass. No tenderness. PEG tube note  Neurological: opens eyes. Quadriplegic  Skin: Skin is warm and dry.     Labs and Imaging    Significant Labs: All pertinent labs within the past 24 hours have been reviewed.    Significant Imaging: I have reviewed all pertinent imaging results/findings within the past 24 hours.    I have reviewed the Vitals, " labs and imaging as above.     Assessment & Plan:   Wei Dominique is a 67 y.o. male admitted for      Severe sepsis secondary to cellulitis? UTI - continue monitoring in ICU, treat with broad spectrum antibiotics, ID  Following, appreciate ENT evaluation to rule out abscess.  Infectious Disease recommended 5 more days of cefepime    pseudomonas and providencia UTI - abx as above. ID following    Severe swelling of the tongue with laceration due to masseteric trismus-discussed with Infectious Disease, Neurology in ENT.  ENT will inject Botox in masseter later in the day    Chronic respiratory failure with trach dependent - pulmonary consulted    Left arm cellulitis - MRSA PCR, on IV Clindamycin changed to IV Vancomycin and Zerbaxa, US negative for DVT    Trismus - baclofen started    Right lobe pneumonia - history of Proteus Mirabilis ESBL and Pseudomonas aeruginosa   - ID consulted for further management    Leukocytosis - due to above    Anemia of chronic disease - transfuse below 7g/dl    2 mm right upper lobe pulmonary nodule.    Bilateral Renal cyst - monitor    Fecal impaction - laxatives    stercoral colitis - likely due to impaction, laxatives and antibiotics    ALS - resume home medications    Quadriparesis - due to above    H/o PE - DOAC    Trach and PEG dependent    Non specific troponin elevation - monitor    Deconditioning - return back to LTAC      Core measures:  - Code status:   - Diet: tube feeds  VTE Risk Mitigation (From admission, onward)         Ordered     apixaban tablet 2.5 mg  2 times daily         08/20/22 1454     IP VTE HIGH RISK PATIENT  Once         08/20/22 0440     Place sequential compression device  Until discontinued         08/20/22 0440     Reason for No Pharmacological VTE Prophylaxis  Once        Question:  Reasons:  Answer:  Already adequately anticoagulated on oral Anticoagulants    08/20/22 0440                Discharge Planning:   Discharge Planning   VENESSA: 08/25/2022     Code Status: Full Code   Is the patient medically ready for discharge?: no    Reason for patient still in hospital (select all that apply): Patient trending condition  Discharge Plan A: LTAC    Above encounter included review of the medical records, interviewing and examining the patient face-to-face, discussion with family and other health care providers, ordering and interpreting lab/test results and formulating a plan of care.     Medical Decision Making:  [] Low Complexity  [] Moderate Complexity  []x High Complexity    Rose Mary King MD  [facility] Hospitalist  08/23/2022

## 2022-08-23 NOTE — PROGRESS NOTES
Pulmonary/Critical Care  Progress Note      Patient name: Wei Dominique  MRN: [id number]  Date: 08/23/2022    Admit Date: 8/18/2022  Consult Requested By: Rose Mary King MD    Reason for Consult: Respiratory failure, trach    HPI:    8/19/2022 - P with h/o ALS with trach and vent dependence sent to ER for increased swelling.  Now admitted for evaluation and I was asked to see pt for ventilator management.  He is not able to provide any history.  Chart has been reviewed.  He is febrile, tachycardic (though better).  Cultures have been ordered and are pending.  Maxillofacial CT noted.  CXR shows no new infiltrates.  No ABG done yet.    8/22/2022 - Events of the weekend noted, no response to me this AM.  Dr Jones's note from yesterday seen and pt does not want comfort measures.  Mulitple + cultures (ID following for antibiotics).    8/23/2022 - Stable overnight, no new issues reported.  He is more awake and responsive.  Waiting on neuro to see for masseter BOTOX injections.  Dr Jones's note seen.  Stable on ventilator.        Review of Systems    Review of Systems   Reason unable to perform ROS: difficult to communicate with.   Constitutional: Negative for fever.   HENT:        + mouth pain  Bite blocks in place  Trach - OK   Respiratory:        Ventilated    Cardiovascular: Negative for chest pain.   Gastrointestinal:        + PEG   Neurological:        + ALS with chronic findings       Past Medical History    Past Medical History:   Diagnosis Date    ALS (amyotrophic lateral sclerosis)     ALS (amyotrophic lateral sclerosis)     Dependent on ventilator 3/21/2022    Erectile dysfunction     Gait disturbance     Hyperlipidemia     Hypertension     Iron deficiency anemia     Motor neuron disease     Polycystic kidney disease     Quadriparesis 2/6/2018    Tremor        Past Surgical History    Past Surgical History:   Procedure Laterality Date    GASTROSTOMY      PEG    PORTACATH PLACEMENT       TRACHEOSTOMY         Medications (scheduled):      acetaminophen  650 mg Oral Q6H    apixaban  2.5 mg Oral BID    baclofen  10 mg Per G Tube TID    balsam peru-castor oiL   Topical (Top) Daily    busPIRone  5 mg Per G Tube BID    ceFEPime (MAXIPIME) IVPB  2 g Intravenous Q12H    chlorhexidine  15 mL Mouth/Throat BID    furosemide  40 mg Per G Tube Daily    metoprolol tartrate  50 mg Oral Daily    mupirocin   Nasal BID    pantoprazole  40 mg Intravenous Daily    polyethylene glycol  17 g Oral Daily    riluzole  50 mg Oral Q12H    scopolamine  1 patch Transdermal Q3 Days    senna-docusate 8.6-50 mg  1 tablet Oral BID    sodium chloride 0.9%  10 mL Intravenous Q6H    traZODone  50 mg Per G Tube QHS       Medications (infusions):      sodium chloride 0.9% 100 mL/hr at 08/23/22 1115    propofoL Stopped (08/20/22 1048)       Medications (prn):     sodium chloride, acetaminophen, acetaminophen, albuterol-ipratropium, aluminum-magnesium hydroxide-simethicone, calcium chloride IVPB, calcium chloride IVPB, calcium chloride IVPB, dextrose 10%, dextrose 10%, glucagon (human recombinant), glucose, glucose, HYDROcodone-acetaminophen, HYDROmorphone, magnesium sulfate IVPB, magnesium sulfate IVPB, magnesium sulfate IVPB, magnesium sulfate IVPB, melatonin, morphine, naloxone, ondansetron, potassium chloride in water, potassium chloride in water, potassium chloride in water, potassium chloride in water, prochlorperazine, simethicone, sodium chloride 0.9%, Flushing PICC Protocol **AND** sodium chloride 0.9% **AND** sodium chloride 0.9%, sodium phosphate IVPB, sodium phosphate IVPB, sodium phosphate IVPB, sodium phosphate IVPB, sodium phosphate IVPB, white petrolatum    Family History: No family history on file.    Social History: Tobacco:   Social History     Tobacco Use   Smoking Status Not on file   Smokeless Tobacco Not on file                                EtOH:   Social History     Substance and Sexual  "Activity   Alcohol Use None                                Drugs:   Social History     Substance and Sexual Activity   Drug Use Never       Physical Exam    Vital signs:  Temp:  [96.7 °F (35.9 °C)-98.7 °F (37.1 °C)]   Pulse:  []   Resp:  [16-28]   BP: (104-136)/(52-69)   SpO2:  [97 %-100 %]     Intake/Output:     Intake/Output Summary (Last 24 hours) at 8/23/2022 1415  Last data filed at 8/23/2022 0643  Gross per 24 hour   Intake 2593.33 ml   Output 1225 ml   Net 1368.33 ml        BMI: Estimated body mass index is 27.25 kg/m² as calculated from the following:    Height as of this encounter: 5' 8" (1.727 m).    Weight as of this encounter: 81.3 kg (179 lb 3.7 oz).    Physical Exam  Vitals and nursing note reviewed.   Constitutional:       General: He is not in acute distress.     Appearance: Normal appearance. He is ill-appearing (chronically). He is not toxic-appearing or diaphoretic.   HENT:      Head: Normocephalic and atraumatic.      Comments: Some swelling to right face     Right Ear: External ear normal.      Left Ear: External ear normal.      Nose: Nose normal.      Mouth/Throat:      Mouth: Mucous membranes are moist.      Pharynx: Oropharynx is clear. No oropharyngeal exudate.   Eyes:      General: No scleral icterus.        Right eye: No discharge.         Left eye: No discharge.      Extraocular Movements: Extraocular movements intact.      Conjunctiva/sclera: Conjunctivae normal.      Pupils: Pupils are equal, round, and reactive to light.   Neck:      Vascular: No carotid bruit.      Comments: trach  Cardiovascular:      Rate and Rhythm: Normal rate and regular rhythm.      Pulses: Normal pulses.      Heart sounds: Normal heart sounds. No murmur heard.    No friction rub. No gallop.   Pulmonary:      Effort: Pulmonary effort is normal. No respiratory distress.      Breath sounds: Normal breath sounds. No stridor. No wheezing, rhonchi or rales.   Chest:      Chest wall: No tenderness.   Abdominal: "      General: Bowel sounds are normal. There is no distension.      Tenderness: There is no abdominal tenderness. There is no guarding.   Musculoskeletal:         General: No swelling. Normal range of motion.      Cervical back: Normal range of motion and neck supple. No rigidity or tenderness.      Right lower leg: No edema.      Left lower leg: No edema.      Comments: + edema in UE bilaterally   Lymphadenopathy:      Cervical: No cervical adenopathy.   Skin:     General: Skin is warm and dry.      Capillary Refill: Capillary refill takes less than 2 seconds.      Coloration: Skin is not jaundiced.      Findings: No bruising.      Comments: Some redness at prior midline site  Some heel breakdown   Neurological:      Mental Status: He is alert.      Comments: Not responding or moving much   Psychiatric:      Comments: Not able to assess         Laboratory    Recent Labs   Lab 08/23/22  0329   WBC 15.04*   RBC 3.28*   HGB 8.2*   HCT 27.5*      MCV 84   MCH 25.0*   MCHC 29.8*       Recent Labs   Lab 08/23/22 0329   CALCIUM 8.0*      K 3.7   CO2 22*   *   BUN 22   CREATININE 0.4*       No results for input(s): PT, INR, APTT in the last 24 hours.    No results for input(s): CPK, CPKMB, TROPONINI, MB in the last 24 hours.    Additional labs: reviewe    Microbiology:       Microbiology Results (last 7 days)     Procedure Component Value Units Date/Time    Blood culture [[id number]] Collected: 08/18/22 2010    Order Status: Completed Specimen: Blood from Peripheral, Forearm, Left Updated: 08/22/22 2232     Blood Culture, Routine No Growth to date      No Growth to date      No Growth to date      No Growth to date      No Growth to date    Blood culture [[id number]] Collected: 08/19/22 1234    Order Status: Completed Specimen: Blood from Peripheral, Upper Arm, Right Updated: 08/22/22 1432     Blood Culture, Routine No Growth to date      No Growth to date      No Growth to date      No Growth to date     Urine culture [[id number]]  (Abnormal)  (Susceptibility) Collected: 08/18/22 1950    Order Status: Completed Specimen: Urine Updated: 08/22/22 0658     Urine Culture, Routine PSEUDOMONAS AERUGINOSA   >100,000 cfu/ml  Known  patient        PROVIDENCIA STUARTII  > 100,000 cfu/ml      Narrative:      Specimen Source->Urine    Culture, Respiratory with Gram Stain [[id number]]  (Abnormal)  (Susceptibility) Collected: 08/19/22 1728    Order Status: Completed Specimen: Respiratory from Tracheal Aspirate Updated: 08/22/22 0652     Respiratory Culture Normal respiratory marco      PSEUDOMONAS AERUGINOSA  Moderate       Gram Stain (Respiratory) Moderate WBC's     Gram Stain (Respiratory) Rare Gram positive cocci     Gram Stain (Respiratory) Rare Gram positive rods     Gram Stain (Respiratory) Rare Gram negative rods    IV catheter culture [[id number]]  (Abnormal)  (Susceptibility) Collected: 08/19/22 0119    Order Status: Completed Specimen: Catheter Tip, Peripheral Updated: 08/21/22 1018     Aerobic Culture - Cath tip METHICILLIN RESISTANT STAPHYLOCOCCUS AUREUS  Too numerous to count  Results called to and read back by:  08/21/2022  10:18 JBM        MORGANELLA MORGANII  Too numerous to count      Narrative:      Midline right arm    MRSA Screen by PCR [[id number]]  (Abnormal) Collected: 08/19/22 1728    Order Status: Completed Specimen: Nasopharyngeal Swab from Nasal Updated: 08/19/22 2031     MRSA SCREEN BY PCR Positive     Comment: MRSA  critical result(s) called and verbal readback obtained from   Tierra Reyes RN (M3) by [facility] 08/19/2022 20:31         Narrative:      MRSA  critical result(s) called and verbal readback obtained from   Tierra Reyes RN (M3) by [facility] 08/19/2022 20:31          Radiology    X-Ray Chest AP Portable  Reason: picc placement PICC LINE PLCMNT    FINDINGS:  Portable chest at 1638 compared with 8/19/2022 shows placement of right PICC with tip in SVC.    Cardiomediastinal silhouette,  tracheostomy tube, and left subclavian port unchanged.    Right basilar airspace opacity is mild and unchanged perhaps reflecting atelectasis or consolidation. Left lung appears clear. Lung volumes remain low. Pulmonary vasculature is normal. No acute osseous abnormality.    IMPRESSION:  Placement of right PICC with tip in SVC.    Electronically signed by:  Robin Arellano MD  8/20/2022 5:20 PM CDT Workstation: [id number]        Additional Studies    reviewed    Ventilator Information    Vent Mode: A/C  Oxygen Concentration (%):  [35] 35  Resp Rate Total:  [16 br/min-29 br/min] 17 br/min  Vt Set:  [500 mL] 500 mL  PEEP/CPAP:  [5 cmH20] 5 cmH20  Mean Airway Pressure:  [9.3 bhV65-47 cmH20] 10 cmH20         No results for input(s): PH, PCO2, PO2, HCO3, POCSATURATED, BE in the last 72 hours.      Impression    Active Hospital Problems    Diagnosis  POA    Masseter muscle spasm [M62.838]  Yes    Open wound of tongue due to bite [S01.552A]  Yes    Trismus [R25.2]  Yes    Fever [R50.9]  Yes    Facial cellulitis [L03.211]  Yes    Tracheostomy in place [Z93.0]  Not Applicable    Chronic respiratory failure with hypoxia and hypercapnia [J96.11, J96.12]  Yes    Dependent on ventilator [Z99.11]  Not Applicable    S/P percutaneous endoscopic gastrostomy (PEG) tube placement [Z93.1]  Not Applicable    ALS (amyotrophic lateral sclerosis) [G12.21]  Yes    Edema [R60.9]  Yes    Quadriparesis [G82.50]  Yes      Resolved Hospital Problems    Diagnosis Date Resolved POA    Facial abscess [L02.01] 08/22/2022 Yes       Plan    · Continue vent - no weaning  · Cultures reviewed  · Contact isolation  · Antibiotics per ID  · Follow H/H - no acute bleeding reported  · Wound care  · PEG feedings  · Has been evaluated by ENT  · Waiting on neuro for BOTOX    Thank you for this consult.  I will follow with you while the patient is hospitalized.        Sy Kennedy MD  [facility] Pulmonary/Critical Care  08/23/2022

## 2022-08-23 NOTE — CARE UPDATE
08/23/22 0720   Patient Assessment/Suction   Level of Consciousness (AVPU) alert   Respiratory Effort Normal;Unlabored   Expansion/Accessory Muscles/Retractions expansion symmetric   All Lung Fields Breath Sounds coarse   Rhythm/Pattern, Respiratory assisted mechanically   Cough Frequency with stimulation   Cough Type assisted   Suction Method oral;tracheal   $ Suction Charges Inline Suction Procedure Stat Charge   Secretions Amount moderate   Secretions Color yellow;clear   Secretions Characteristics thick   PRE-TX-O2   O2 Device (Oxygen Therapy) ventilator   $ Is the patient on Low Flow Oxygen? Yes   Oxygen Concentration (%) 35   SpO2 100 %   Pulse Oximetry Type Continuous   $ Pulse Oximetry - Multiple Charge Pulse Oximetry - Multiple   Pulse (!) 117   Resp (!) 21        Surgical Airway 02/21/22 1145 Shiley Cuffed   Placement Date/Time: 02/21/22 1145   Present Prior to Hospital Arrival?: Yes  Type: Tracheostomy  Brand: Shiley  Airway Device Size: 8.0  Style: Cuffed   Cuff Inflation? Inflated   Status Secured   Site Assessment Drainage   Site Care Dressing applied;Dried;Cleansed   Ties Assessment Intact;Secure   Airway Safety   Ambu bag with the patient? Yes, Adult Ambu   Is mask with the patient? Yes, Adult Mask   Vent Select   Conventional Vent Y   $ Ventilator Subsequent 1   Charged w/in last 24h YES   Preset Conventional Ventilator Settings   Vent ID 8   Vent Type    Ventilation Type VC   Vent Mode A/C   Humidity HME   Set Rate 16 BPM   Vt Set 500 mL   PEEP/CPAP 5 cmH20   Peak Flow 60 L/min   Peak End Inspiratory Pressure 29 cmH20   I-Trigger Type  V-TRIG   Trigger Sensitivity Flow/I-Trigger 2 L/min   Patient Ventilator Parameters   Resp Rate Total 23 br/min   Peak Airway Pressure 34 cmH2O   Mean Airway Pressure 14 cmH20   Plateau Pressure 19 cmH20   Exhaled Vt 514 mL   Total Ve 11.7 mL   I:E Ratio Measured 1:1.90   Auto PEEP 0 cmH20   Conventional Ventilator Alarms   Alarms On Y   Ve High Alarm 20  L/min   Ve Low Alarm 3 L/min   Vt High Alarm 1200 mL   Vt Low Alarm 250 mL   Resp Rate High Alarm 40 br/min   Press High Alarm 60 cmH2O   Apnea Rate 16   Apnea Volume (mL) 500 mL   Apnea Oxygen Concentration  100   Apnea Flow Rate (L/min) 60   T Apnea 20 sec(s)   Ready to Wean/Extubation Screen   FIO2<=50 (chart decimal) 0.35   MV<16L (chart vol.) 11.7   PEEP <=8 (chart #) 5   Ready to Wean Parameters   F/VT Ratio<105 (RSBI) (!) 40.86   Vital Capacity (mL) 0   Education   $ Education Ventilator Oxygen;Trach Care;15 min   Respiratory Evaluation   $ Care Plan Tech Time 15 min

## 2022-08-23 NOTE — PLAN OF CARE
Problem: Impaired Wound Healing  Goal: Optimal Wound Healing  Outcome: Ongoing, Progressing  Intervention: Promote Wound Healing  Flowsheets (Taken 8/23/2022 3198)  Oral Nutrition Promotion: (Added Chauncey BID) other (see comments)

## 2022-08-23 NOTE — CARE UPDATE
08/22/22 2012   Patient Assessment/Suction   Level of Consciousness (AVPU) alert   Respiratory Effort Normal;Unlabored   Expansion/Accessory Muscles/Retractions no use of accessory muscles   All Lung Fields Breath Sounds Anterior:;coarse   Rhythm/Pattern, Respiratory assisted mechanically   Cough Frequency with stimulation   Cough Type assisted   Suction Method oral;tracheal   Suction Pressure (mmHg) -120 mmHg   $ Suction Charges Inline Suction Procedure Stat Charge   Secretions Amount small   Secretions Color yellow   Secretions Characteristics thick   PRE-TX-O2   O2 Device (Oxygen Therapy) ventilator   $ Is the patient on Low Flow Oxygen? Yes   Oxygen Concentration (%) 35   SpO2 100 %   Pulse Oximetry Type Continuous   $ Pulse Oximetry - Multiple Charge Pulse Oximetry - Multiple   Pulse (!) 111   Resp 18   /67        Surgical Airway 02/21/22 1145 Shiley Cuffed   Placement Date/Time: 02/21/22 1145   Present Prior to Hospital Arrival?: Yes  Type: Tracheostomy  Brand: Nuzzel  Airway Device Size: 8.0  Style: Cuffed   Cuff Pressure   (mlt)   Vent Select   Conventional Vent Y   Charged w/in last 24h YES   Preset Conventional Ventilator Settings   Vent Type    Ventilation Type VC   Vent Mode A/C   Humidity HME   Set Rate 16 BPM   Vt Set 500 mL   PEEP/CPAP 5 cmH20   Peak Flow 60 L/min   Peak End Inspiratory Pressure 21 cmH20   I-Trigger Type  V-TRIG   Trigger Sensitivity Flow/I-Trigger 2 L/min   Patient Ventilator Parameters   Resp Rate Total 19 br/min   Peak Airway Pressure 21 cmH2O   Mean Airway Pressure 10 cmH20   Plateau Pressure 19 cmH20   Exhaled Vt 472 mL   Total Ve 9.95 mL   I:E Ratio Measured 1:3.10   Auto PEEP 0 cmH20   Tubing ID (mm) 8 mm  (portex)   Conventional Ventilator Alarms   Alarms On Y   Resp Rate High Alarm 40 br/min   Press High Alarm 60 cmH2O   Apnea Rate 16   Apnea Volume (mL) 1 mL   Apnea Oxygen Concentration  100   Apnea Flow Rate (L/min) 60   T Apnea 20 sec(s)   IHI Ventilator  Associated Pneumonia Bundle (Required)   Head of Bed Elevated  HOB 30   Oral Care Mouth suctioned   Ready to Wean/Extubation Screen   FIO2<=50 (chart decimal) 0.35   MV<16L (chart vol.) 9.95   PEEP <=8 (chart #) 5   Ready to Wean Parameters   F/VT Ratio<105 (RSBI) (!) 38.14   Vital Capacity (mL) 0   Education   $ Education Ventilator Oxygen;15 min   Respiratory Evaluation   $ Care Plan Tech Time 15 min

## 2022-08-23 NOTE — CONSULTS
[facility]  Department of Neurology  Neurology Consultation Note        PATIENT NAME: Wei Dominique  MRN: [id number]  CSN: [id number]      TODAY'S DATE: 08/23/2022  ADMIT DATE: 8/18/2022                            CONSULTING PROVIDER: Peter Paez MD  CONSULT REQUESTED BY: Rose Mary King MD      Reason for consult: Botox for tongue swelling with masseter spasms       History obtained from chart review.    HPI: Wei Dominique is a 67 y.o. male with history of ALS, s/p trach & peg, vent dependence, HTN, PE (on eliquis) who presented with a primary complaint of facial swelling for few days. No family present at bedside. History was obtained from chart review and physician sign out. Patient had CT scan maxillofacial done that showed Right maxillary and anterior maxillary soft tissue edema and thickening which extends along the right maxillary molars into the oropharynx.     Admitted for sepsis with concern for pharyngeal abscess.  ENT was consulted and did an oral exam and seen no signs of abscess. There was tongue swelling and laceration noted on exam. Tongue bite block was placed.  Patient was continued with antibiotics for possible respiratory infection and urinary tract infection.  ENT has seen the patient and recommended masseter Botox for trismus and masseter muscle spasm.  Neurology was consulted for Botox.      PREVIOUS MEDICAL HISTORY:  Past Medical History:   Diagnosis Date    ALS (amyotrophic lateral sclerosis)     ALS (amyotrophic lateral sclerosis)     Dependent on ventilator 3/21/2022    Erectile dysfunction     Gait disturbance     Hyperlipidemia     Hypertension     Iron deficiency anemia     Motor neuron disease     Polycystic kidney disease     Quadriparesis 2/6/2018    Tremor      PREVIOUS SURGICAL HISTORY:  Past Surgical History:   Procedure Laterality Date    GASTROSTOMY      PEG    PORTACATH PLACEMENT      TRACHEOSTOMY       FAMILY MEDICAL HISTORY:  No family  history on file.  SOCIAL HISTORY:  Social History     Substance Use Topics    Drug use: Never     ALLERGIES:  Review of patient's allergies indicates:  No Known Allergies  HOME MEDICATIONS:  Prior to Admission medications    Medication Sig Start Date End Date Taking? Authorizing Provider   albuterol (PROVENTIL/VENTOLIN HFA) 90 mcg/actuation inhaler Inhale 2 puffs into the lungs every 6 (six) hours as needed. 8/9/22  Yes Historical Provider   ALPRAZolam (XANAX) 0.5 MG tablet Take 1 tablet (0.5 mg total) by mouth 3 (three) times daily as needed for Anxiety. 6/14/22  Yes Naif Huynh MD   apixaban (ELIQUIS) 2.5 mg Tab 1 tablet (2.5 mg total) by Per G Tube route 2 (two) times daily. 6/14/22 6/14/23 Yes Naif Huynh MD   arginine-glutamine-calcium HMB (AYSE) 7-7-1.5 gram PwPk Take 1 packet by mouth 2 (two) times a day.   Yes Historical Provider   baclofen (LIORESAL) 10 MG tablet TAKE 1 TABLET THREE TIMES PER DAY  Patient taking differently: 10 mg by Per G Tube route 3 (three) times daily. 1/7/21  Yes Peter Ocasio MD   busPIRone (BUSPAR) 5 MG Tab 1 tablet (5 mg total) by Per G Tube route 2 (two) times daily. 2/23/22 2/23/23 Yes Monae Cox MD   furosemide (LASIX) 40 MG tablet 1 tablet (40 mg total) by Per G Tube route once daily. 5/17/22 8/19/22 Yes Mack Julian MD   glycopyrrolate (ROBINUL) 1 mg Tab START BY TAKING 1 TABLET DAILY. IF TOLERATING WELL, YOU CAN INCREASE IT UP TO 2 OR EVEN 3 TIMES DAILY.  Patient taking differently: 1 mg by Per G Tube route 3 (three) times daily. 2/22/21  Yes Peter Ocasio MD   lansoprazole (PREVACID) 30 MG capsule Take 30 mg by mouth once daily.   Yes Historical Provider   melatonin 3 mg TbDL 3 mg by Per G Tube route every evening.   Yes Historical Provider   metoprolol tartrate (LOPRESSOR) 50 MG tablet Take 50 mg by mouth once daily. 1/19/22  Yes Historical Provider   ondansetron (ZOFRAN) 4 MG tablet 4 mg by Per G Tube route every 6 (six) hours as  needed for Nausea.   Yes Historical Provider   polyethylene glycol (GLYCOLAX) 17 gram PwPk 17 g by Per G Tube route 2 (two) times daily. 22 Yes Lauren Rodney MD   riluzole 50 mg Tab tablet TAKE 1 TABLET ON AN EMPTY STOMACH EVERY 12 HRS  Patient taking differently: 50 mg by Per G Tube route. TAKE 1 TABLET ON AN EMPTY STOMACH EVERY 12 HRS 19  Yes Eli Kohler MD   traZODone (DESYREL) 50 MG tablet 50 mg by Per G Tube route every evening.   Yes Historical Provider   albuterol-ipratropium (DUO-NEB) 2.5 mg-0.5 mg/3 mL nebulizer solution Take 3 mLs by nebulization every 4 (four) hours. Rescue    Historical Provider   chlorhexidine (PERIDEX) 0.12 % solution Use as directed 15 mLs in the mouth or throat 2 (two) times daily. 22   Historical Provider   enoxaparin (LOVENOX) 60 mg/0.6 mL Syrg Inject into the skin. 22   Historical Provider   glycerin adult suppository Place 1 suppository rectally once daily. 22  Lauren Rodney MD   mupirocin (BACTROBAN) 2 % ointment SMARTSI Application Topical 2-3 Times Daily 22   Historical Provider   pantoprazole (PROTONIX) 40 mg suspension 40 mg by FEEDING TUBE route 2 (two) times a day.    Historical Provider   warfarin (COUMADIN) 2.5 MG tablet Take by mouth. 22   Historical Provider     CURRENT SCHEDULED MEDICATIONS:   apixaban  2.5 mg Oral BID    baclofen  10 mg Per G Tube TID    balsam peru-castor oiL   Topical (Top) Daily    busPIRone  5 mg Per G Tube BID    ceFEPime (MAXIPIME) IVPB  2 g Intravenous Q12H    chlorhexidine  15 mL Mouth/Throat BID    furosemide  40 mg Per G Tube Daily    metoprolol tartrate  50 mg Oral Daily    mupirocin   Nasal BID    onabotulinumtoxina  100 Units Subcutaneous Once    pantoprazole  40 mg Intravenous Daily    polyethylene glycol  17 g Oral Daily    riluzole  50 mg Oral Q12H    scopolamine  1 patch Transdermal Q3 Days    senna-docusate 8.6-50 mg  1 tablet Oral BID    sodium  chloride 0.9%  10 mL Intravenous Q6H    traZODone  50 mg Per G Tube QHS     CURRENT INFUSIONS:   sodium chloride 0.9% 100 mL/hr at 08/23/22 1115    propofoL Stopped (08/20/22 1048)     CURRENT PRN MEDICATIONS:  sodium chloride, acetaminophen, acetaminophen, albuterol-ipratropium, aluminum-magnesium hydroxide-simethicone, calcium chloride IVPB, calcium chloride IVPB, calcium chloride IVPB, dextrose 10%, dextrose 10%, glucagon (human recombinant), glucose, glucose, HYDROcodone-acetaminophen, HYDROmorphone, magnesium sulfate IVPB, magnesium sulfate IVPB, magnesium sulfate IVPB, magnesium sulfate IVPB, melatonin, morphine, naloxone, ondansetron, potassium chloride in water, potassium chloride in water, potassium chloride in water, potassium chloride in water, prochlorperazine, simethicone, sodium chloride 0.9%, Flushing PICC Protocol **AND** sodium chloride 0.9% **AND** sodium chloride 0.9%, sodium phosphate IVPB, sodium phosphate IVPB, sodium phosphate IVPB, sodium phosphate IVPB, sodium phosphate IVPB, white petrolatum    REVIEW OF SYSTEMS:  A review of systems cannot be obtained as the patient is unable to respond to questions appropriately.       PHYSICAL EXAM:  Patient Vitals for the past 24 hrs:   BP Temp Temp src Pulse Resp SpO2 Weight   08/23/22 1800 119/60 -- -- 106 18 100 % --   08/23/22 1730 127/62 -- -- 104 18 100 % --   08/23/22 1724 -- -- -- 100 19 100 % --   08/23/22 1700 (!) 106/54 -- -- 107 19 100 % --   08/23/22 1630 (!) 104/53 -- -- (!) 118 20 100 % --   08/23/22 1600 (!) 111/59 -- -- 108 20 100 % --   08/23/22 1515 132/60 98.7 °F (37.1 °C) Axillary 90 (!) 21 100 % --   08/23/22 1430 (!) 108/52 -- -- 91 17 100 % --   08/23/22 1400 (!) 107/55 -- -- 94 19 100 % --   08/23/22 1338 -- -- -- 87 17 100 % --   08/23/22 1330 (!) 108/56 -- -- 91 19 100 % --   08/23/22 1300 (!) 109/55 -- -- 90 (!) 21 100 % --   08/23/22 1252 -- -- -- -- (!) 28 -- --   08/23/22 1230 (!) 115/59 -- -- 94 (!) 27 100 % --    08/23/22 1200 (!) 109/55 -- -- 97 (!) 24 100 % --   08/23/22 1136 -- -- -- 86 18 100 % --   08/23/22 1130 106/60 -- -- 87 19 100 % --   08/23/22 1100 (!) 112/58 98.5 °F (36.9 °C) Axillary 83 19 100 % --   08/23/22 1000 (!) 122/56 -- -- 89 16 100 % --   08/23/22 0930 (!) 113/56 -- -- 85 16 100 % --   08/23/22 0926 -- -- -- 91 18 100 % --   08/23/22 0900 (!) 111/59 -- -- (!) 114 (!) 21 100 % --   08/23/22 0846 (!) 105/57 -- -- -- -- -- --   08/23/22 0830 (!) 114/57 -- -- 103 17 100 % --   08/23/22 0800 117/67 -- -- (!) 118 19 100 % --   08/23/22 0730 122/62 -- -- (!) 113 20 100 % --   08/23/22 0720 -- -- -- (!) 117 (!) 21 100 % --   08/23/22 0715 125/64 98.2 °F (36.8 °C) Axillary (!) 116 (!) 22 100 % --   08/23/22 0700 121/67 -- -- (!) 119 18 100 % --   08/23/22 0626 -- -- -- -- -- -- 81.3 kg (179 lb 3.7 oz)   08/23/22 0601 (!) 110/52 -- -- (!) 113 20 100 % --   08/23/22 0501 136/63 -- -- 106 17 100 % --   08/23/22 0456 -- -- -- 109 16 100 % --   08/23/22 0401 (!) 120/59 -- -- (!) 114 16 100 % --   08/23/22 0301 116/60 98.7 °F (37.1 °C) -- (!) 120 18 100 % --   08/23/22 0201 -- -- -- (!) 126 (!) 25 100 % --   08/23/22 0115 135/69 -- -- (!) 122 (!) 25 99 % --   08/23/22 0101 (!) 123/57 -- -- (!) 121 (!) 24 98 % --   08/23/22 0001 116/60 -- -- (!) 118 20 97 % --   08/22/22 2302 (!) 108/56 -- -- (!) 118 (!) 22 99 % --   08/22/22 2301 -- 98.4 °F (36.9 °C) -- (!) 118 (!) 21 99 % --   08/22/22 2201 108/65 -- -- (!) 112 16 100 % --   08/22/22 2133 -- -- -- -- (!) 21 -- --   08/22/22 2101 124/68 -- -- (!) 114 18 100 % --   08/22/22 2012 127/67 -- -- (!) 111 18 100 % --   08/22/22 2001 111/60 -- -- (!) 112 20 100 % --   08/22/22 1901 134/62 98.4 °F (36.9 °C) -- 101 19 100 % --   08/22/22 1830 (!) 104/53 -- -- 110 18 100 % --       GENERAL APPEARANCE:  Patient is awake, status post trach on ventilator.  He follows commands with his eyes  HEENT:  Tongue bite blocks in place.  Could not examine tongue.  PULM: on vent.  CV:  RRR.  ABDOMEN: Soft, nontender.    NEURO:  MENTAL STATUS:  Patient is awake, status post trach on ventilator.  He follows commands with his eyes    CRANIAL NERVES:  PERRL, EOMI, Face is symmetric    MOTOR:  Bulk normal. Tone Decreased throughout.  Abnormal movements absent.  Tremor: none present.  Strength Patient unable to move any extremities to commands.  Cannot assess strength..    REFLEXES:  Diminished throughout.  Plantar response equivocal bilaterally.  SENSATION: not tested.  COORDINATION: Could not be tested.  STATION: could not be tested.  GAIT: could not be tested.        Labs:  Recent Labs   Lab 08/21/22 0402 08/22/22 0419 08/23/22  0329    138 139   K 3.3* 3.9 3.7    109 112*   CO2 22* 24 22*   BUN 36* 27* 22   CREATININE 0.6 0.5 0.4*   * 217* 119*   CALCIUM 8.3* 8.0* 8.0*   MG 2.1 2.0 2.1     Recent Labs   Lab 08/21/22 0402 08/22/22 0419 08/23/22  0329   WBC 15.06* 13.35* 15.04*   HGB 8.7* 8.3* 8.2*   HCT 28.5* 28.1* 27.5*    439 447     Recent Labs   Lab 08/18/22 2010   ALBUMIN 2.5*   PROT 9.1*   BILITOT 0.9   ALKPHOS 122   ALT 37   AST 43*     Lab Results   Component Value Date    INR 1.9 08/19/2022     No results found for: CHOLTOT, TRIG, HDL, CHOLHDL, LDL  Lab Results   Component Value Date    HGBA1C 4.3 06/10/2022     No results found for: PROTEINCSF, GLUCCSF, WBCCSF    Imaging:  I have reviewed and interpreted the pertinent imaging and lab results.      X-Ray Chest AP Portable  Reason: picc placement PICC LINE PLCMNT    FINDINGS:  Portable chest at 1638 compared with 8/19/2022 shows placement of right PICC with tip in SVC.    Cardiomediastinal silhouette, tracheostomy tube, and left subclavian port unchanged.    Right basilar airspace opacity is mild and unchanged perhaps reflecting atelectasis or consolidation. Left lung appears clear. Lung volumes remain low. Pulmonary vasculature is normal. No acute osseous abnormality.    IMPRESSION:  Placement of right PICC  with tip in [facility] [place].    Electronically signed by:  Robin Arellano MD  8/20/2022 5:20 PM CDT Workstation: [id number]         ASSESSMENT & PLAN:    Amyotropic lateral sclerosis  Tongue trauma and swelling  Masseter muscle spasm    Plan:   · Etiology of tongue trauma could be secondary to masseter muscle spasm and trismus repeated leading to tongue biting.  · ENT has seen the patient and recommended Botox for which neurology is consulted.  I agree that Botox would likely help this patient improve tongue trauma considering risks versus benefits.  Recommend getting an opinion from [facility] [place] as [place].  · I personally do not perform Botox injections and recommend consulting provider who can perform Botox on this patient.   · No further neurological recommendations at this time.  Will sign off, please call with any questions      Thank you kindly for including us in the care of this patient. Please do not hesitate to contact us with any questions.        40 minutes of care time has been spent evaluating with the patient. Time includes chart review not limited to diagnostic imaging, labs, and vitals, patient assessment, discussion with family and nursing, current order evaluations, and new order entries.       Peter Paez MD  Neurology/vascular Neurology  Date of Service: 08/23/2022  6:27 PM    --------------------------------------------------------------------------------------------------------------------------------------------------------------------------------------------------------------------------------------------------------------  Please note: This note was transcribed using voice recognition software. Because of this technology there are often uinintended grammatical, spelling, and other transcription errors. Please disregard these errors.

## 2022-08-24 LAB
ANION GAP SERPL CALC-SCNC: 5 MMOL/L (ref 8–16)
BACTERIA BLD CULT: NORMAL
BASOPHILS # BLD AUTO: 0.04 K/UL (ref 0–0.2)
BASOPHILS NFR BLD: 0.2 % (ref 0–1.9)
BUN SERPL-MCNC: 22 MG/DL (ref 8–23)
CALCIUM SERPL-MCNC: 7.6 MG/DL (ref 8.7–10.5)
CHLORIDE SERPL-SCNC: 111 MMOL/L (ref 95–110)
CO2 SERPL-SCNC: 21 MMOL/L (ref 23–29)
CREAT SERPL-MCNC: 0.4 MG/DL (ref 0.5–1.4)
DIFFERENTIAL METHOD: ABNORMAL
EOSINOPHIL # BLD AUTO: 0.2 K/UL (ref 0–0.5)
EOSINOPHIL NFR BLD: 1.1 % (ref 0–8)
ERYTHROCYTE [DISTWIDTH] IN BLOOD BY AUTOMATED COUNT: 19 % (ref 11.5–14.5)
EST. GFR  (NO RACE VARIABLE): >60 ML/MIN/1.73 M^2
GLUCOSE SERPL-MCNC: 150 MG/DL (ref 70–110)
GLUCOSE SERPL-MCNC: 232 MG/DL (ref 70–110)
HCT VFR BLD AUTO: 26 % (ref 40–54)
HGB BLD-MCNC: 7.7 G/DL (ref 14–18)
IMM GRANULOCYTES # BLD AUTO: 0.34 K/UL (ref 0–0.04)
IMM GRANULOCYTES NFR BLD AUTO: 1.8 % (ref 0–0.5)
LYMPHOCYTES # BLD AUTO: 1 K/UL (ref 1–4.8)
LYMPHOCYTES NFR BLD: 5.4 % (ref 18–48)
MAGNESIUM SERPL-MCNC: 2.2 MG/DL (ref 1.6–2.6)
MCH RBC QN AUTO: 24.9 PG (ref 27–31)
MCHC RBC AUTO-ENTMCNC: 29.6 G/DL (ref 32–36)
MCV RBC AUTO: 84 FL (ref 82–98)
MONOCYTES # BLD AUTO: 0.9 K/UL (ref 0.3–1)
MONOCYTES NFR BLD: 4.8 % (ref 4–15)
NEUTROPHILS # BLD AUTO: 16.5 K/UL (ref 1.8–7.7)
NEUTROPHILS NFR BLD: 86.7 % (ref 38–73)
NRBC BLD-RTO: 0 /100 WBC
PLATELET # BLD AUTO: 413 K/UL (ref 150–450)
PMV BLD AUTO: 9.6 FL (ref 9.2–12.9)
POTASSIUM SERPL-SCNC: 3.9 MMOL/L (ref 3.5–5.1)
RBC # BLD AUTO: 3.09 M/UL (ref 4.6–6.2)
SODIUM SERPL-SCNC: 137 MMOL/L (ref 136–145)
WBC # BLD AUTO: 19.08 K/UL (ref 3.9–12.7)

## 2022-08-24 PROCEDURE — 27000221 HC OXYGEN, UP TO 24 HOURS

## 2022-08-24 PROCEDURE — 63600175 PHARM REV CODE 636 W HCPCS: Performed by: INTERNAL MEDICINE

## 2022-08-24 PROCEDURE — 99900031 HC PATIENT EDUCATION (STAT)

## 2022-08-24 PROCEDURE — 80048 BASIC METABOLIC PNL TOTAL CA: CPT | Performed by: INTERNAL MEDICINE

## 2022-08-24 PROCEDURE — 99900035 HC TECH TIME PER 15 MIN (STAT)

## 2022-08-24 PROCEDURE — 25000003 PHARM REV CODE 250: Performed by: INTERNAL MEDICINE

## 2022-08-24 PROCEDURE — 94003 VENT MGMT INPAT SUBQ DAY: CPT

## 2022-08-24 PROCEDURE — 20000000 HC ICU ROOM

## 2022-08-24 PROCEDURE — 25000003 PHARM REV CODE 250

## 2022-08-24 PROCEDURE — 63700000 PHARM REV CODE 250 ALT 637 W/O HCPCS: Performed by: INTERNAL MEDICINE

## 2022-08-24 PROCEDURE — 94761 N-INVAS EAR/PLS OXIMETRY MLT: CPT

## 2022-08-24 PROCEDURE — 99900026 HC AIRWAY MAINTENANCE (STAT)

## 2022-08-24 PROCEDURE — 83735 ASSAY OF MAGNESIUM: CPT | Performed by: INTERNAL MEDICINE

## 2022-08-24 PROCEDURE — 63600175 PHARM REV CODE 636 W HCPCS: Mod: JG | Performed by: HOSPITALIST

## 2022-08-24 PROCEDURE — A4216 STERILE WATER/SALINE, 10 ML: HCPCS | Performed by: INTERNAL MEDICINE

## 2022-08-24 PROCEDURE — 64615 PR CHEMODENERVATION OF MUSCLE FOR CHRONIC MIGRAINE: ICD-10-PCS | Mod: ,,, | Performed by: OTOLARYNGOLOGY

## 2022-08-24 PROCEDURE — 64615 CHEMODENERV MUSC MIGRAINE: CPT | Mod: ,,, | Performed by: OTOLARYNGOLOGY

## 2022-08-24 PROCEDURE — 99233 PR SUBSEQUENT HOSPITAL CARE,LEVL III: ICD-10-PCS | Mod: ,,, | Performed by: INTERNAL MEDICINE

## 2022-08-24 PROCEDURE — 94799 UNLISTED PULMONARY SVC/PX: CPT

## 2022-08-24 PROCEDURE — 85025 COMPLETE CBC W/AUTO DIFF WBC: CPT | Performed by: INTERNAL MEDICINE

## 2022-08-24 PROCEDURE — C9113 INJ PANTOPRAZOLE SODIUM, VIA: HCPCS | Performed by: INTERNAL MEDICINE

## 2022-08-24 PROCEDURE — 99233 SBSQ HOSP IP/OBS HIGH 50: CPT | Mod: ,,, | Performed by: INTERNAL MEDICINE

## 2022-08-24 RX ADMIN — CHLORHEXIDINE GLUCONATE 15 ML: 1.2 RINSE ORAL at 08:08

## 2022-08-24 RX ADMIN — SODIUM CHLORIDE, PRESERVATIVE FREE 10 ML: 5 INJECTION INTRAVENOUS at 06:08

## 2022-08-24 RX ADMIN — SODIUM CHLORIDE: 0.9 INJECTION, SOLUTION INTRAVENOUS at 09:08

## 2022-08-24 RX ADMIN — FUROSEMIDE 40 MG: 40 TABLET ORAL at 08:08

## 2022-08-24 RX ADMIN — SENNOSIDES AND DOCUSATE SODIUM 1 TABLET: 50; 8.6 TABLET ORAL at 08:08

## 2022-08-24 RX ADMIN — MUPIROCIN 1 G: 20 OINTMENT TOPICAL at 09:08

## 2022-08-24 RX ADMIN — APIXABAN 2.5 MG: 2.5 TABLET, FILM COATED ORAL at 08:08

## 2022-08-24 RX ADMIN — RILUZOLE 50 MG: 50 TABLET ORAL at 08:08

## 2022-08-24 RX ADMIN — PANTOPRAZOLE SODIUM 40 MG: 40 INJECTION, POWDER, FOR SOLUTION INTRAVENOUS at 08:08

## 2022-08-24 RX ADMIN — SODIUM CHLORIDE, PRESERVATIVE FREE 10 ML: 5 INJECTION INTRAVENOUS at 12:08

## 2022-08-24 RX ADMIN — CHLORHEXIDINE GLUCONATE 15 ML: 1.2 RINSE ORAL at 09:08

## 2022-08-24 RX ADMIN — MUPIROCIN 1 G: 20 OINTMENT TOPICAL at 08:08

## 2022-08-24 RX ADMIN — CEFEPIME HYDROCHLORIDE 2 G: 2 INJECTION, SOLUTION INTRAVENOUS at 05:08

## 2022-08-24 RX ADMIN — BACLOFEN 10 MG: 5 TABLET ORAL at 04:08

## 2022-08-24 RX ADMIN — ONABOTULINUMTOXINA 100 UNITS: 100 INJECTION, POWDER, LYOPHILIZED, FOR SOLUTION INTRADERMAL; INTRAMUSCULAR at 05:08

## 2022-08-24 RX ADMIN — SCOPOLAMINE 1 PATCH: 1 PATCH TRANSDERMAL at 01:08

## 2022-08-24 RX ADMIN — POLYETHYLENE GLYCOL 3350 17 G: 17 POWDER, FOR SOLUTION ORAL at 08:08

## 2022-08-24 RX ADMIN — BACLOFEN 10 MG: 5 TABLET ORAL at 09:08

## 2022-08-24 RX ADMIN — TRAZODONE HYDROCHLORIDE 50 MG: 50 TABLET ORAL at 09:08

## 2022-08-24 RX ADMIN — HYDROMORPHONE HYDROCHLORIDE 1 MG: 1 INJECTION, SOLUTION INTRAMUSCULAR; INTRAVENOUS; SUBCUTANEOUS at 01:08

## 2022-08-24 RX ADMIN — BUSPIRONE HYDROCHLORIDE 5 MG: 5 TABLET ORAL at 09:08

## 2022-08-24 RX ADMIN — APIXABAN 2.5 MG: 2.5 TABLET, FILM COATED ORAL at 09:08

## 2022-08-24 RX ADMIN — RILUZOLE 50 MG: 50 TABLET ORAL at 09:08

## 2022-08-24 RX ADMIN — METOPROLOL TARTRATE 50 MG: 50 TABLET, FILM COATED ORAL at 08:08

## 2022-08-24 RX ADMIN — BACLOFEN 10 MG: 5 TABLET ORAL at 08:08

## 2022-08-24 RX ADMIN — SENNOSIDES AND DOCUSATE SODIUM 1 TABLET: 50; 8.6 TABLET ORAL at 09:08

## 2022-08-24 RX ADMIN — CASTOR OIL AND BALSAM, PERU: 788; 87 OINTMENT TOPICAL at 08:08

## 2022-08-24 RX ADMIN — HYDROCODONE BITARTRATE AND ACETAMINOPHEN 1 TABLET: 5; 325 TABLET ORAL at 11:08

## 2022-08-24 RX ADMIN — MORPHINE SULFATE 4 MG: 4 INJECTION, SOLUTION INTRAMUSCULAR; INTRAVENOUS at 05:08

## 2022-08-24 RX ADMIN — BUSPIRONE HYDROCHLORIDE 5 MG: 5 TABLET ORAL at 08:08

## 2022-08-24 RX ADMIN — HYDROCODONE BITARTRATE AND ACETAMINOPHEN 1 TABLET: 5; 325 TABLET ORAL at 02:08

## 2022-08-24 RX ADMIN — CEFEPIME HYDROCHLORIDE 2 G: 2 INJECTION, SOLUTION INTRAVENOUS at 06:08

## 2022-08-24 NOTE — PLAN OF CARE
08/24/22 0757   Patient Assessment/Suction   Level of Consciousness (AVPU) alert   Respiratory Effort Unlabored;Normal   Expansion/Accessory Muscles/Retractions no use of accessory muscles   All Lung Fields Breath Sounds equal bilaterally;coarse   Rhythm/Pattern, Respiratory assisted mechanically   Cough Frequency with stimulation   Cough Type productive   Suction Method oral;tracheal   Suction Pressure (mmHg) -120 mmHg   $ Suction Charges Inline Suction Procedure Stat Charge   Secretions Amount moderate   Secretions Color clear;cloudy   Secretions Characteristics thick   Skin Integrity   $ Wound Care Tech Time 15 min   Area Observed Neck under tracheostomy   Skin Appearance without discoloration   Barrier Changed? Yes   PRE-TX-O2   O2 Device (Oxygen Therapy) ventilator   $ Is the patient on Low Flow Oxygen? Yes   Oxygen Concentration (%) 30   SpO2 100 %   Pulse Oximetry Type Continuous   $ Pulse Oximetry - Multiple Charge Pulse Oximetry - Multiple   Pulse (!) 130   Resp (!) 30        Surgical Airway 02/21/22 1145 Shiley Cuffed   Placement Date/Time: 02/21/22 1145   Present Prior to Hospital Arrival?: Yes  Type: Tracheostomy  Brand: Shiley  Airway Device Size: 8.0  Style: Cuffed   Cuff Inflation? Inflated   Status Secured   Site Assessment Dry   Site Care Cleansed;Dried;Dressing applied   Ties Assessment Clean;Dry;Intact        Nasopharyngeal Airway   Placement Date/Time: 08/23/22 1140   Size (mm): 30.0 Fr  Tube Location: Right Nostril  Inserted By: RT  Insertion attempts (enter comment if more than 2 attempts): 2   Measured At Nare   Secured Location Right   Site Condition Dry   Airway Safety   Ambu bag with the patient? Yes, Adult Ambu   Is mask with the patient? Yes, Adult Mask   Suction set is at the bedside? Yes   ETT at Bedside? Yes   ETT Size 8   Extra trach at bedside? Yes   Extra trach sizes at bedside? 7.5   Is obturator available? Yes   Location of obturator?  Bedside table   Vent Select   Conventional  Vent Y   $ Ventilator Subsequent 1   Charged w/in last 24h YES   Preset Conventional Ventilator Settings   Vent ID [id number]   Vent Type    Ventilation Type VC   Vent Mode A/C   Set Rate 16 BPM   Vt Set 500 mL   PEEP/CPAP 5 cmH20   Peak Flow 60 L/min   Peak End Inspiratory Pressure 37 cmH20   I-Trigger Type  V-TRIG   Trigger Sensitivity Flow/I-Trigger 2 L/min   Patient Ventilator Parameters   Resp Rate Total 27 br/min   Peak Airway Pressure 37 cmH2O   Mean Airway Pressure 13 cmH20   Plateau Pressure 19 cmH20   Exhaled Vt 510 mL   Total Ve 13.7 mL   I:E Ratio Measured 1:1.60   Auto PEEP 0 cmH20   Tubing ID (mm) 8 mm   Tube Type Trach   Conventional Ventilator Alarms   Alarms On Y   Ve High Alarm 20 L/min   Ve Low Alarm 3 L/min   Vt High Alarm 1200 mL   Vt Low Alarm 250 mL   Resp Rate High Alarm 40 br/min   Press High Alarm 60 cmH2O   Apnea Rate 16   Apnea Volume (mL) 1 mL   Apnea Oxygen Concentration  100   Apnea Flow Rate (L/min) 60   T Apnea 20 sec(s)   IHI Ventilator Associated Pneumonia Bundle (Required)   Daily Awakening Trials Performed Not applicable   Head of Bed Elevated  HOB 45   Oral Care Teeth brushed;Lip moisturizer applied;Mouth swabbed   Vent Circut Breaks Minimized Yes   Ready to Wean/Extubation Screen   FIO2<=50 (chart decimal) 0.3   MV<16L (chart vol.) 13.7   PEEP <=8 (chart #) 5   Ready to Wean Parameters   F/VT Ratio<105 (RSBI) (!) 58.82   Vital Capacity (mL) 0   Education   $ Education Bronchodilator   Respiratory Evaluation   $ Care Plan Tech Time 15 min   $ Eval/Re-eval Charges Re-evaluation   Evaluation For Re-Eval 3 day

## 2022-08-24 NOTE — PROCEDURES
"Wei Dominique is a 67 y.o. male patient. With repeated tongue trauma 2/2 masseter spasm and trismus 2/2 ALS.  Slightly improved with Baclofen.  Consented directly and via wife on phone for high dose botox injection with all risks discussed at length.    Temp: 99 °F (37.2 °C) (08/24/22 1500)  Pulse: 105 (08/24/22 1600)  Resp: (!) 23 (08/24/22 1600)  BP: (!) 93/57 (08/24/22 1600)  SpO2: 99 % (08/24/22 1600)  Weight: 81.3 kg (179 lb 3.7 oz) (08/23/22 0626)  Height: 5' 8" (172.7 cm) (08/20/22 0801)       Skin prepped with alcohol.  Masseter palpated and 100 units/10 ml injected in five 0.1 ml intramuscular injections via 27g needle into each masseter with negative aspirations.  Tolerated well w/o any immediate complication.    08/24/2022 5:36 PM   "

## 2022-08-24 NOTE — CARE UPDATE
08/23/22 1949   Patient Assessment/Suction   Level of Consciousness (AVPU) alert   Respiratory Effort Normal;Unlabored   Expansion/Accessory Muscles/Retractions expansion symmetric   All Lung Fields Breath Sounds coarse   Rhythm/Pattern, Respiratory assisted mechanically   Cough Frequency with stimulation   Cough Type assisted   Suction Method oral;tracheal   Suction Pressure (mmHg) -120 mmHg   $ Suction Charges Inline Suction Procedure Stat Charge   Secretions Amount moderate   Secretions Color yellow   Secretions Characteristics thick   Skin Integrity   $ Wound Care Tech Time 15 min   Area Observed Neck;Neck under tracheostomy   Skin Appearance without discoloration   Barrier Changed? Yes   PRE-TX-O2   O2 Device (Oxygen Therapy) ventilator   $ Is the patient on Low Flow Oxygen? Yes   Oxygen Concentration (%) 35   SpO2 100 %   Pulse Oximetry Type Continuous   $ Pulse Oximetry - Multiple Charge Pulse Oximetry - Multiple   Pulse (!) 115   Resp 20   Aerosol Therapy   $ Aerosol Therapy Charges PRN treatment not required        Surgical Airway 02/21/22 1145 Shiley Cuffed   Placement Date/Time: 02/21/22 1145   Present Prior to Hospital Arrival?: Yes  Type: Tracheostomy  Brand: Shiley  Airway Device Size: 8.0  Style: Cuffed   Cuff Inflation? Inflated   Status Secured   Site Assessment Clean;Dry;Drainage   Site Care Cleansed;Dried;Dressing applied;Protective barrier to skin   Inner Cannula Care Cleansed/dried   Ties Assessment Changed;Clean;Dry;Intact   Airway Safety   Ambu bag with the patient? Yes, Adult Ambu   Is mask with the patient? Yes, Adult Mask   Suction set is at the bedside? Yes   Extra trach at bedside? Yes   Extra trach sizes at bedside? 7.5   Is obturator available? Yes   Respiratory Interventions   Airway/Ventilation Management airway patency maintained   Vent Select   Conventional Vent Y   Charged w/in last 24h YES   Preset Conventional Ventilator Settings   Vent ID [id number]   Vent Type    Ventilation  Type VC   Vent Mode A/C   Humidity HME   Set Rate 16 BPM   Vt Set 500 mL   PEEP/CPAP 5 cmH20   Peak Flow 60 L/min   Peak End Inspiratory Pressure 25 cmH20   I-Trigger Type  V-TRIG   Trigger Sensitivity Flow/I-Trigger 2 L/min   Patient Ventilator Parameters   Resp Rate Total 18 br/min   Peak Airway Pressure 28 cmH2O   Mean Airway Pressure 11 cmH20   Plateau Pressure 19 cmH20   Exhaled Vt 510 mL   Total Ve 9.37 mL   I:E Ratio Measured 1:2.50   Auto PEEP 0 cmH20   Conventional Ventilator Alarms   Alarms On Y   Resp Rate High Alarm 40 br/min   Press High Alarm 60 cmH2O   Apnea Rate 16   Apnea Volume (mL) 1 mL   Apnea Oxygen Concentration  100   Apnea Flow Rate (L/min) 60   T Apnea 20 sec(s)   IHI Ventilator Associated Pneumonia Bundle (Required)   Oral Care Teeth brushed;Mouth swabbed;Mouth moisturizer;Mouth suctioned;with mouthwash   Ready to Wean/Extubation Screen   FIO2<=50 (chart decimal) 0.35   MV<16L (chart vol.) 9.37   PEEP <=8 (chart #) 5   Ready to Wean Parameters   F/VT Ratio<105 (RSBI) (!) 39.22   Vital Capacity (mL) 0   Education   $ Education Bronchodilator;Suction;Trach Care;Ventilator Oxygen;15 min   Respiratory Evaluation   $ Care Plan Tech Time 15 min   $ Eval/Re-eval Charges Re-evaluation

## 2022-08-24 NOTE — PLAN OF CARE
Today we did wound care and took new pictures, wound healing. Placed him on a rotation bed. Dr. Kennedy came and injected the Botox into his jaw muscles, see his note. Mouth guards remain inplace at this time. Tolerated well.

## 2022-08-24 NOTE — PROGRESS NOTES
"[facility] Medicine Progress Note  Patient Name: Wei Dominique MRN: [id number]   Patient Class: IP- Inpatient  Length of Stay: 5   Admission Date: 8/18/2022  5:47 PM Attending Physician: ANJU King MD   Primary Care Provider: Primary Doctor No Face-to-Face encounter date: 08/24/2022   Chief Complaint: Facial Swelling (Facial swelling that started "several days ago" )      Subjective:    Interval History   Patient was seen and examined bedside, follows simple commands by blinking his eyes, discussed case with nursing staff, ENT and pulmonologist    Review of Systems   ROS unable to obtain    Hospital Course     08/20/2022 - admitted yesterday for sepsis with concern of pharyngeal abscess, ENT consulted and did oral exam with no signs of abscess. Tongue swelling and laceration noted on exam  08/21/2022 - continue antibiotics, bite block placed. No other events, follow cultures. Sepsis likely secondary to UTI vs PNA  08/22/2022 - no acute events, leukocytosis improving, Respiratory cultures growing Pseudomonas, Ucx gpwing pseudomonas and providencia   08/23:  No acute events, cultures reviewed, ENT is planning masseteric Botox injection  08/24:  No acute events, H and H has down trended however no overt bleeding, waiting for ENT for masseteric Botox injection    Objective:   Physical Exam  /65   Pulse 99   Temp 99 °F (37.2 °C) (Axillary)   Resp 19   Ht 5' 8" (1.727 m)   Wt 81.3 kg (179 lb 3.7 oz)   SpO2 100%   BMI 27.25 kg/m²   Constitutional:  open eyes  HENT: Atraumatic. Tracheostomy noted  Cardiovascular: Normal rate, regular rhythm and normal heart sounds.   Pulmonary/Chest: coarse breath sounds. On MV  Abdominal: Soft. Bowel sounds are normal. Exhibits no distension and no mass. No tenderness. PEG tube noted  Neurological: opens eyes. Quadriplegic  Skin: Skin is warm and dry.     Labs and Imaging    Significant Labs: All pertinent labs within the past 24 hours have been " reviewed.    Significant Imaging: I have reviewed all pertinent imaging results/findings within the past 24 hours.    I have reviewed the Vitals, labs and imaging as above.     Assessment & Plan:   Wei Dominique is a 67 y.o. male admitted for      Severe sepsis secondary to cellulitis? UTI - continue monitoring in ICU, treat with broad spectrum antibiotics, ID  Following, appreciate ENT evaluation to rule out abscess.  Infectious Disease recommended 5 more days of cefepime    pseudomonas and providencia UTI - abx as above. 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  ENT will inject Botox in masseter later in the day today    Chronic respiratory failure with trach dependent - pulmonary consulted    Left arm cellulitis - resolved    Trismus - baclofen started    Right lobe pneumonia - history of Proteus Mirabilis ESBL and Pseudomonas aeruginosa   - ID consulted for further management    Leukocytosis - due to above    Anemia of chronic disease - transfuse below 7g/dl    2 mm right upper lobe pulmonary nodule.    Bilateral Renal cyst - monitor    Fecal impaction - laxatives    stercoral colitis - likely due to impaction, laxatives and antibiotics    ALS - resume home medications    Quadriparesis - due to above    H/o PE - DOAC    Trach and PEG dependent    Non specific troponin elevation - monitor    Deconditioning - return back to LTAC      Core measures:  - Code status:   - Diet: tube feeds  VTE Risk Mitigation (From admission, onward)         Ordered     apixaban tablet 2.5 mg  2 times daily         08/20/22 1454     IP VTE HIGH RISK PATIENT  Once         08/20/22 0440     Place sequential compression device  Until discontinued         08/20/22 0440     Reason for No Pharmacological VTE Prophylaxis  Once        Question:  Reasons:  Answer:  Already adequately anticoagulated on oral Anticoagulants    08/20/22 0440                 Discharge Planning:   Discharge Planning   VENESSA: 08/25/2022    Code Status: Full Code   Is the patient medically ready for discharge?: no    Reason for patient still in hospital (select all that apply): Patient trending condition  Discharge Plan A: LTAC    Above encounter included review of the medical records, interviewing and examining the patient face-to-face, discussion with family and other health care providers, ordering and interpreting lab/test results and formulating a plan of care.     Medical Decision Making:  [] Low Complexity  [] Moderate Complexity  []x High Complexity    Rose Mary King MD  [facility] Hospitalist  08/24/2022

## 2022-08-25 LAB
ANION GAP SERPL CALC-SCNC: 9 MMOL/L (ref 8–16)
BASOPHILS # BLD AUTO: 0.05 K/UL (ref 0–0.2)
BASOPHILS NFR BLD: 0.3 % (ref 0–1.9)
BUN SERPL-MCNC: 21 MG/DL (ref 8–23)
CALCIUM SERPL-MCNC: 8.3 MG/DL (ref 8.7–10.5)
CHLORIDE SERPL-SCNC: 113 MMOL/L (ref 95–110)
CO2 SERPL-SCNC: 21 MMOL/L (ref 23–29)
CREAT SERPL-MCNC: 0.3 MG/DL (ref 0.5–1.4)
DIFFERENTIAL METHOD: ABNORMAL
EOSINOPHIL # BLD AUTO: 0.5 K/UL (ref 0–0.5)
EOSINOPHIL NFR BLD: 2.8 % (ref 0–8)
ERYTHROCYTE [DISTWIDTH] IN BLOOD BY AUTOMATED COUNT: 19 % (ref 11.5–14.5)
EST. GFR  (NO RACE VARIABLE): >60 ML/MIN/1.73 M^2
GLUCOSE SERPL-MCNC: 129 MG/DL (ref 70–110)
HCT VFR BLD AUTO: 25.7 % (ref 40–54)
HGB BLD-MCNC: 7.7 G/DL (ref 14–18)
IMM GRANULOCYTES # BLD AUTO: 0.46 K/UL (ref 0–0.04)
IMM GRANULOCYTES NFR BLD AUTO: 2.5 % (ref 0–0.5)
LYMPHOCYTES # BLD AUTO: 1.3 K/UL (ref 1–4.8)
LYMPHOCYTES NFR BLD: 7.1 % (ref 18–48)
MAGNESIUM SERPL-MCNC: 1.8 MG/DL (ref 1.6–2.6)
MCH RBC QN AUTO: 24.8 PG (ref 27–31)
MCHC RBC AUTO-ENTMCNC: 30 G/DL (ref 32–36)
MCV RBC AUTO: 83 FL (ref 82–98)
MONOCYTES # BLD AUTO: 1.2 K/UL (ref 0.3–1)
MONOCYTES NFR BLD: 6.6 % (ref 4–15)
NEUTROPHILS # BLD AUTO: 15.2 K/UL (ref 1.8–7.7)
NEUTROPHILS NFR BLD: 80.7 % (ref 38–73)
NRBC BLD-RTO: 0 /100 WBC
PLATELET # BLD AUTO: 393 K/UL (ref 150–450)
PMV BLD AUTO: 9.4 FL (ref 9.2–12.9)
POTASSIUM SERPL-SCNC: 3.5 MMOL/L (ref 3.5–5.1)
RBC # BLD AUTO: 3.1 M/UL (ref 4.6–6.2)
SODIUM SERPL-SCNC: 143 MMOL/L (ref 136–145)
WBC # BLD AUTO: 18.77 K/UL (ref 3.9–12.7)

## 2022-08-25 PROCEDURE — C9113 INJ PANTOPRAZOLE SODIUM, VIA: HCPCS | Performed by: INTERNAL MEDICINE

## 2022-08-25 PROCEDURE — 20000000 HC ICU ROOM

## 2022-08-25 PROCEDURE — A4216 STERILE WATER/SALINE, 10 ML: HCPCS | Performed by: INTERNAL MEDICINE

## 2022-08-25 PROCEDURE — 99900035 HC TECH TIME PER 15 MIN (STAT)

## 2022-08-25 PROCEDURE — 99233 SBSQ HOSP IP/OBS HIGH 50: CPT | Mod: ,,, | Performed by: INTERNAL MEDICINE

## 2022-08-25 PROCEDURE — 63600175 PHARM REV CODE 636 W HCPCS: Performed by: INTERNAL MEDICINE

## 2022-08-25 PROCEDURE — 25000003 PHARM REV CODE 250: Performed by: INTERNAL MEDICINE

## 2022-08-25 PROCEDURE — 99233 PR SUBSEQUENT HOSPITAL CARE,LEVL III: ICD-10-PCS | Mod: ,,, | Performed by: INTERNAL MEDICINE

## 2022-08-25 PROCEDURE — 83735 ASSAY OF MAGNESIUM: CPT | Performed by: INTERNAL MEDICINE

## 2022-08-25 PROCEDURE — 27000221 HC OXYGEN, UP TO 24 HOURS

## 2022-08-25 PROCEDURE — 94761 N-INVAS EAR/PLS OXIMETRY MLT: CPT

## 2022-08-25 PROCEDURE — 94003 VENT MGMT INPAT SUBQ DAY: CPT

## 2022-08-25 PROCEDURE — 94799 UNLISTED PULMONARY SVC/PX: CPT

## 2022-08-25 PROCEDURE — 80048 BASIC METABOLIC PNL TOTAL CA: CPT | Performed by: INTERNAL MEDICINE

## 2022-08-25 PROCEDURE — 99900031 HC PATIENT EDUCATION (STAT)

## 2022-08-25 PROCEDURE — 25000003 PHARM REV CODE 250: Performed by: HOSPITALIST

## 2022-08-25 PROCEDURE — 85025 COMPLETE CBC W/AUTO DIFF WBC: CPT | Performed by: INTERNAL MEDICINE

## 2022-08-25 PROCEDURE — 99900026 HC AIRWAY MAINTENANCE (STAT)

## 2022-08-25 PROCEDURE — 63700000 PHARM REV CODE 250 ALT 637 W/O HCPCS: Performed by: INTERNAL MEDICINE

## 2022-08-25 PROCEDURE — 25000003 PHARM REV CODE 250

## 2022-08-25 RX ADMIN — HYDROCODONE BITARTRATE AND ACETAMINOPHEN 1 TABLET: 5; 325 TABLET ORAL at 02:08

## 2022-08-25 RX ADMIN — SENNOSIDES AND DOCUSATE SODIUM 1 TABLET: 50; 8.6 TABLET ORAL at 08:08

## 2022-08-25 RX ADMIN — SODIUM CHLORIDE: 0.9 INJECTION, SOLUTION INTRAVENOUS at 12:08

## 2022-08-25 RX ADMIN — HYDROMORPHONE HYDROCHLORIDE 1 MG: 1 INJECTION, SOLUTION INTRAMUSCULAR; INTRAVENOUS; SUBCUTANEOUS at 05:08

## 2022-08-25 RX ADMIN — CEFEPIME HYDROCHLORIDE 2 G: 2 INJECTION, SOLUTION INTRAVENOUS at 05:08

## 2022-08-25 RX ADMIN — SODIUM CHLORIDE 500 ML: 0.9 INJECTION, SOLUTION INTRAVENOUS at 12:08

## 2022-08-25 RX ADMIN — CASTOR OIL AND BALSAM, PERU: 788; 87 OINTMENT TOPICAL at 08:08

## 2022-08-25 RX ADMIN — RILUZOLE 50 MG: 50 TABLET ORAL at 08:08

## 2022-08-25 RX ADMIN — SODIUM CHLORIDE: 0.9 INJECTION, SOLUTION INTRAVENOUS at 08:08

## 2022-08-25 RX ADMIN — MAGNESIUM SULFATE HEPTAHYDRATE 2 G: 40 INJECTION, SOLUTION INTRAVENOUS at 05:08

## 2022-08-25 RX ADMIN — APIXABAN 2.5 MG: 2.5 TABLET, FILM COATED ORAL at 08:08

## 2022-08-25 RX ADMIN — MUPIROCIN 1 G: 20 OINTMENT TOPICAL at 08:08

## 2022-08-25 RX ADMIN — BACLOFEN 10 MG: 5 TABLET ORAL at 08:08

## 2022-08-25 RX ADMIN — POLYETHYLENE GLYCOL 3350 17 G: 17 POWDER, FOR SOLUTION ORAL at 08:08

## 2022-08-25 RX ADMIN — BUSPIRONE HYDROCHLORIDE 5 MG: 5 TABLET ORAL at 08:08

## 2022-08-25 RX ADMIN — METOPROLOL TARTRATE 50 MG: 50 TABLET, FILM COATED ORAL at 08:08

## 2022-08-25 RX ADMIN — ACETAMINOPHEN 1000 MG: 500 TABLET, FILM COATED ORAL at 11:08

## 2022-08-25 RX ADMIN — FUROSEMIDE 40 MG: 40 TABLET ORAL at 08:08

## 2022-08-25 RX ADMIN — BACLOFEN 10 MG: 5 TABLET ORAL at 02:08

## 2022-08-25 RX ADMIN — SODIUM CHLORIDE, PRESERVATIVE FREE 10 ML: 5 INJECTION INTRAVENOUS at 12:08

## 2022-08-25 RX ADMIN — HYDROCODONE BITARTRATE AND ACETAMINOPHEN 1 TABLET: 5; 325 TABLET ORAL at 08:08

## 2022-08-25 RX ADMIN — PANTOPRAZOLE SODIUM 40 MG: 40 INJECTION, POWDER, FOR SOLUTION INTRAVENOUS at 08:08

## 2022-08-25 RX ADMIN — CHLORHEXIDINE GLUCONATE 15 ML: 1.2 RINSE ORAL at 08:08

## 2022-08-25 RX ADMIN — SODIUM CHLORIDE, PRESERVATIVE FREE 10 ML: 5 INJECTION INTRAVENOUS at 06:08

## 2022-08-25 RX ADMIN — POTASSIUM CHLORIDE 40 MEQ: 7.46 INJECTION, SOLUTION INTRAVENOUS at 05:08

## 2022-08-25 RX ADMIN — MORPHINE SULFATE 4 MG: 4 INJECTION, SOLUTION INTRAMUSCULAR; INTRAVENOUS at 05:08

## 2022-08-25 RX ADMIN — TRAZODONE HYDROCHLORIDE 50 MG: 50 TABLET ORAL at 08:08

## 2022-08-25 NOTE — PLAN OF CARE
08/25/22 0755   Patient Assessment/Suction   Level of Consciousness (AVPU) alert   Respiratory Effort Unlabored   Expansion/Accessory Muscles/Retractions no use of accessory muscles   All Lung Fields Breath Sounds coarse   Rhythm/Pattern, Respiratory assisted mechanically   Cough Frequency with stimulation   Cough Type fair   Suction Method oral;tracheal   Suction Pressure (mmHg) -120 mmHg   $ Suction Charges Inline Suction Procedure Stat Charge   Secretions Amount moderate   Secretions Color clear;white   Secretions Characteristics thick   Skin Integrity   $ Wound Care Tech Time 15 min   Area Observed Neck under tracheostomy   Skin Appearance redness blanchable   Barrier Changed? Yes   PRE-TX-O2   O2 Device (Oxygen Therapy) ventilator   $ Is the patient on Low Flow Oxygen? Yes   Oxygen Concentration (%) 30   SpO2 100 %   Pulse Oximetry Type Continuous   $ Pulse Oximetry - Multiple Charge Pulse Oximetry - Multiple   Oximetry Probe Site Assessed   Pulse (!) 117   Resp (!) 29        Surgical Airway 02/21/22 1145 Shiley Cuffed   Placement Date/Time: 02/21/22 1145   Present Prior to Hospital Arrival?: Yes  Type: Tracheostomy  Brand: Shiley  Airway Device Size: 8.0  Style: Cuffed   Cuff Pressure   (MLT)   Cuff Inflation? Inflated   Status Secured   Site Assessment Oozing secretions   Site Care Cleansed;Dried   Ties Assessment Clean;Dry;Intact        Nasopharyngeal Airway   Placement Date/Time: 08/23/22 1140   Size (mm): 30.0 Fr  Tube Location: Right Nostril  Inserted By: RT  Insertion attempts (enter comment if more than 2 attempts): 2   Measured At Nare  (TURNED)   Secured Location Right   Site Condition Dry   General Safety Checklist   Safety Promotion/Fall Prevention side rails raised   Airway Safety   Ambu bag with the patient? Yes, Adult Ambu   Is mask with the patient? Yes, Adult Mask   Suction set is at the bedside? Yes   ETT at Bedside? Yes   ETT Size 8   Extra trach at bedside? Yes   Extra trach sizes at  bedside? 6   Is obturator available? Yes   Location of obturator?  Bedside table   Vent Select   Conventional Vent Y   $ Ventilator Subsequent 1   Charged w/in last 24h YES   Preset Conventional Ventilator Settings   Vent ID [id number]   Vent Type    Ventilation Type VC   Vent Mode A/C   Humidity HME   Set Rate 16 BPM   Vt Set 500 mL   PEEP/CPAP 5 cmH20   Peak Flow 60 L/min   Peak End Inspiratory Pressure 33 cmH20   I-Trigger Type  V-TRIG   Trigger Sensitivity Flow/I-Trigger 2 L/min   Patient Ventilator Parameters   Resp Rate Total 18 br/min   Peak Airway Pressure 44 cmH2O   Mean Airway Pressure 16 cmH20   Plateau Pressure 24 cmH20   Exhaled Vt 524 mL   Total Ve 9.07 mL   I:E Ratio Measured 1:2.10   Auto PEEP 0 cmH20   Tubing ID (mm) 8 mm   Tube Type Trach   Conventional Ventilator Alarms   Alarms On Y   Ve High Alarm 24 L/min   Ve Low Alarm 5 L/min   Vt High Alarm 1200 mL   Vt Low Alarm 250 mL   Resp Rate High Alarm 40 br/min   Press High Alarm 60 cmH2O   Apnea Rate 16   Apnea Volume (mL) 1 mL   Apnea Oxygen Concentration  100   Apnea Flow Rate (L/min) 60   T Apnea 20 sec(s)   IHI Ventilator Associated Pneumonia Bundle (Required)   Daily Awakening Trials Performed Not applicable   Head of Bed Elevated  HOB 30   Oral Care Mouth swabbed;Mouth suctioned   Vent Circut Breaks Minimized Yes   Ready to Wean/Extubation Screen   FIO2<=50 (chart decimal) 0.3   MV<16L (chart vol.) 9.07   PEEP <=8 (chart #) 5   Ready to Wean Parameters   F/VT Ratio<105 (RSBI) (!) 55.34   Vital Capacity (mL) 0   Education   $ Education Ventilator Oxygen   Respiratory Evaluation   $ Care Plan Tech Time 15 min   $ Eval/Re-eval Charges Re-evaluation   Evaluation For Re-Eval 5+ day

## 2022-08-25 NOTE — PROGRESS NOTES
[facility] Medicine  Progress Note    Patient name: Wei Dominique  MRN: [id number]  Admit Date: 8/18/2022   LOS: 6 days     SUBJECTIVE:     Principal problem: Catheter-associated urinary tract infection    Interval History:  Patient was seen and examined bedside, clinically looks same, blood pressure is borderline low, borderline tachycardic.  Good urine output, received Botox injection in masseteric muscle yesterday p.m.    Scheduled Meds:   apixaban  2.5 mg Oral BID    baclofen  10 mg Per G Tube TID    balsam peru-castor oiL   Topical (Top) Daily    busPIRone  5 mg Per G Tube BID    ceFEPime (MAXIPIME) IVPB  2 g Intravenous Q12H    chlorhexidine  15 mL Mouth/Throat BID    mupirocin   Nasal BID    pantoprazole  40 mg Intravenous Daily    polyethylene glycol  17 g Oral Daily    riluzole  50 mg Oral Q12H    scopolamine  1 patch Transdermal Q3 Days    senna-docusate 8.6-50 mg  1 tablet Oral BID    sodium chloride 0.9%  10 mL Intravenous Q6H    traZODone  50 mg Per G Tube QHS     Continuous Infusions:   sodium chloride 0.9% 100 mL/hr at 08/25/22 1245     PRN Meds:sodium chloride, acetaminophen, acetaminophen, albuterol-ipratropium, aluminum-magnesium hydroxide-simethicone, calcium chloride IVPB, calcium chloride IVPB, calcium chloride IVPB, dextrose 10%, dextrose 10%, glucagon (human recombinant), glucose, glucose, HYDROcodone-acetaminophen, HYDROmorphone, magnesium sulfate IVPB, magnesium sulfate IVPB, magnesium sulfate IVPB, magnesium sulfate IVPB, melatonin, morphine, naloxone, ondansetron, potassium chloride in water, potassium chloride in water, potassium chloride in water, potassium chloride in water, prochlorperazine, simethicone, sodium chloride 0.9%, Flushing PICC Protocol **AND** sodium chloride 0.9% **AND** sodium chloride 0.9%, sodium phosphate IVPB, sodium phosphate IVPB, sodium phosphate IVPB, sodium phosphate IVPB, sodium phosphate IVPB, white petrolatum    Review  of patient's allergies indicates:  No Known Allergies    Review of Systems: As per interval history    OBJECTIVE:     Vital Signs (Most Recent)  Temp: 97.7 °F (36.5 °C) (08/25/22 1101)  Pulse: 95 (08/25/22 1330)  Resp: 18 (08/25/22 1430)  BP: (!) 101/59 (08/25/22 1300)  SpO2: 100 % (08/25/22 1330)    Vital Signs Range (Last 24H):  Temp:  [97.7 °F (36.5 °C)-100 °F (37.8 °C)]   Pulse:  []   Resp:  [16-38]   BP: ()/(46-83)   SpO2:  [99 %-100 %]     I & O (Last 24H):    Intake/Output Summary (Last 24 hours) at 8/25/2022 1603  Last data filed at 8/25/2022 1345  Gross per 24 hour   Intake 1240 ml   Output 2250 ml   Net -1010 ml       Physical Exam:  General:  Chronically ill-appearing quadriplegic man in no distress  Eyes: No conjunctival injection. No scleral icterus.  ENT: Hearing grossly intact. No discharge from ears. No nasal discharge.  Bite blocks in place  Neck: Supple, trachea midline. No JVD, tracheostomy in place  CVS: RRR. ++ LE edema BL  Lungs:  Mechanical breath sound, No tachypnea or accessory muscle use.    Abdomen:  Soft, nontender and nondistended.  No organomegaly, peg tube in place  Neuro: AOx3. Moves all extremities. Follows commands. Responds appropriately   Skin:  Pictures as below              Laboratory:  I have reviewed all pertinent lab results within the past 24 hours.    Diagnostic Results:  Reviewed all imaging    ASSESSMENT/PLAN:     67-year-old gentleman with ALS, quadriplegia, chronic trach and PEG being treated for catheter associated UTI, masseter muscle spasm causing severe tongue trauma and other chronic problems    Active Hospital Problems    Diagnosis  POA    *Catheter-associated urinary tract infection [T83.511A, N39.0]  Yes     Priority: 1 - High    Masseter muscle spasm [M62.838]  Yes     Priority: 2     Open wound of tongue due to bite [S01.552A]  Yes    Trismus [R25.2]  Yes    Fever [R50.9]  Yes    Facial cellulitis [L03.211]  Yes    Tracheostomy in place  [Z93.0]  Not Applicable    Chronic respiratory failure with hypoxia and hypercapnia [J96.11, J96.12]  Yes    Dependent on ventilator [Z99.11]  Not Applicable    S/P percutaneous endoscopic gastrostomy (PEG) tube placement [Z93.1]  Not Applicable    ALS (amyotrophic lateral sclerosis) [G12.21]  Yes    Edema [R60.9]  Yes    Quadriparesis [G82.50]  Yes      Resolved Hospital Problems    Diagnosis Date Resolved POA    Facial abscess [L02.01] 08/22/2022 Yes         Plan:   Status post Botox injection to masseteric muscle by ENT yesterday p.m. help from ENT greatly appreciated  Continue antibiotics as per Infectious Disease  Vent management as per Pulmonary  Today his blood pressure is borderline low, tachycardic, will give him more IV fluids, hold Lasix and metoprolol until blood pressure is better  Baclofen  Anemia chronic disease, transfuse below 7 grams/dl  History of PE, continue low-dose Eliquis  Tracheostomy and PEG tube care      VTE Risk Mitigation (From admission, onward)         Ordered     apixaban tablet 2.5 mg  2 times daily         08/20/22 1454     IP VTE HIGH RISK PATIENT  Once         08/20/22 0440     Place sequential compression device  Until discontinued         08/20/22 0440     Reason for No Pharmacological VTE Prophylaxis  Once        Question:  Reasons:  Answer:  Already adequately anticoagulated on oral Anticoagulants    08/20/22 0440                    Department Hospital Medicine  [facility]  Rose Mary King MD  Date of service: 08/25/2022

## 2022-08-25 NOTE — PLAN OF CARE
08/25/22 1421   Discharge Reassessment   Assessment Type Discharge Planning Reassessment   Did the patient's condition or plan change since previous assessment? No   Discharge Plan discussed with:   (attending)   Communicated VENESSA with patient/caregiver Date not available/Unable to determine   Discharge Plan A Return to nursing home   Discharge Plan B Return to Nursing Home   DME Needed Upon Discharge  none   Discharge Barriers Identified None   Why the patient remains in the hospital Requires continued medical care   Post-Acute Status   Post-Acute Authorization Placement   Post-Acute Placement Status Pending medical clearance/testing  (Patient to return to [facility])   Discharge Delays None known at this time

## 2022-08-25 NOTE — PROGRESS NOTES
"[facility]  Adult Nutrition   Progress Note (Nutrition Support Management)    SUMMARY     Recommendations  Recommendation/Intervention:   1) Continue current TF regimen of Jevity 1.5 at 50ml/hr, Chauncey BID and ProTGold 2pkts daily.   2) RD will continue to monitor TF rate/tolerance, labs, weight and will make recommendations PRN.    Goals: 1. Patient to tolerate TF. 2. Nutrition provision to meet at least 75% of EEN and EPN.  Nutrition Goal Status: progressing towards goal  Communication of RD Recs: reviewed with RN    Dietitian Rounds Brief  Patient is tolerating TF at goal rate. TF stopped for almost 2 hours but restarted. LBM: 8/25/22. Patient is intubated but awake.    Diet order:   Current Diet Order: NPO      Current Nutrition Support Formula Ordered: Jevity 1.5  Current Nutrition Support Rate Ordered: 50 (ml)  Current Nutrition Support Frequency Ordered: ml/hr    Evaluation of Received Nutrient/Fluid Intake  Enteral Calories (kcal): 1800  Enteral Protein (gm): 77  Enteral (Free Water) Fluid (mL): 912  Free Water Flush Fluid (mL): 180  Energy Calories Required: meeting needs  Protein Required: meeting needs  Fluid Required: meeting needs  Tolerance: tolerating     % Intake of Estimated Energy Needs: 75 - 100 %  % Meal Intake: NPO      Intake/Output Summary (Last 24 hours) at 8/25/2022 1403  Last data filed at 8/25/2022 1345  Gross per 24 hour   Intake 1240 ml   Output 3175 ml   Net -1935 ml        Anthropometrics  Temp: 97.7 °F (36.5 °C)  Height: 5' 8" (172.7 cm)  Height (inches): 68 in  Weight Method: Bed Scale  Weight: 81.3 kg (179 lb 3.7 oz)  Weight (lb): 179.24 lb  Ideal Body Weight (IBW), Male: 154 lb  % Ideal Body Weight, Male (lb): 108.23 %  BMI (Calculated): 27.3  BMI Grade: 25 - 29.9 - overweight     Estimated/Assessed Needs  Weight Used For Calorie Calculations: 75.6 kg (166 lb 10.7 oz)  Energy Calorie Requirements (kcal): 1358-7910 kcals (25-30 kcal/kg)  Energy Need Method: " Kcal/kg  Protein Requirements: 114 - 152 (1.5 - 2 g/kg)  Weight Used For Protein Calculations: 75.6 kg (166 lb 10.7 oz)     Estimated Fluid Requirement Method: RDA Method  RDA Method (mL): 1890     Reason for Assessment  Reason For Assessment: RD follow-up  Diagnosis: other (see comments) (ALS)  Relevant Medical History: ALS, PEG, trach, vent dependency, HTN, PE    Nutrition/Diet History  Patient Reported Diet/Restrictions/Preferences: no oral intake  Food Allergies: NKFA  Factors Affecting Nutritional Intake: NPO, on mechanical ventilation  Nutrition Support Formula Prior to Admit: Other (see commments) (Isosource 1.5)  Nutrition Support Rate Prior to Admit: 50 (ml)  Nutrtion Support Frequency Prior to Admit: ml/hr, continuously  Nutrition Support Provision Prior to Admit: 1800 kcals, 82 g protein, 917 ml free H20    Nutrition Risk Screen  Nutrition Risk Screen: large or nonhealing wound, burn or pressure injury, tube feeding or parenteral nutrition    Weight History:  Wt Readings from Last 10 Encounters:   08/23/22 81.3 kg (179 lb 3.7 oz)   06/14/22 76.7 kg (169 lb 1.5 oz)   05/28/22 73.9 kg (162 lb 14.7 oz)   05/17/22 78 kg (171 lb 15.3 oz)   02/21/22 77.4 kg (170 lb 10.2 oz)   01/22/20 80.7 kg (178 lb)   12/03/19 81.2 kg (179 lb)   09/19/19 84.4 kg (186 lb)   06/18/19 83.9 kg (185 lb)        Lab/Procedures/Meds: Pertinent Labs/Meds Reviewed    Medications:Pertinent Medications Reviewed  Scheduled Meds:   apixaban  2.5 mg Oral BID    baclofen  10 mg Per G Tube TID    balsam peru-castor oiL   Topical (Top) Daily    busPIRone  5 mg Per G Tube BID    ceFEPime (MAXIPIME) IVPB  2 g Intravenous Q12H    chlorhexidine  15 mL Mouth/Throat BID    mupirocin   Nasal BID    pantoprazole  40 mg Intravenous Daily    polyethylene glycol  17 g Oral Daily    riluzole  50 mg Oral Q12H    scopolamine  1 patch Transdermal Q3 Days    senna-docusate 8.6-50 mg  1 tablet Oral BID    sodium chloride 0.9%  10 mL Intravenous  Q6H    traZODone  50 mg Per G Tube QHS     Continuous Infusions:   sodium chloride 0.9% 100 mL/hr at 08/25/22 1245     PRN Meds:.sodium chloride, acetaminophen, acetaminophen, albuterol-ipratropium, aluminum-magnesium hydroxide-simethicone, calcium chloride IVPB, calcium chloride IVPB, calcium chloride IVPB, dextrose 10%, dextrose 10%, glucagon (human recombinant), glucose, glucose, HYDROcodone-acetaminophen, HYDROmorphone, magnesium sulfate IVPB, magnesium sulfate IVPB, magnesium sulfate IVPB, magnesium sulfate IVPB, melatonin, morphine, naloxone, ondansetron, potassium chloride in water, potassium chloride in water, potassium chloride in water, potassium chloride in water, prochlorperazine, simethicone, sodium chloride 0.9%, Flushing PICC Protocol **AND** sodium chloride 0.9% **AND** sodium chloride 0.9%, sodium phosphate IVPB, sodium phosphate IVPB, sodium phosphate IVPB, sodium phosphate IVPB, sodium phosphate IVPB, white petrolatum    Labs: Pertinent Labs Reviewed  Clinical Chemistry:  Recent Labs   Lab 08/18/22 2010 08/20/22 0718 08/25/22  0330      < > 143   K 4.1   < > 3.5      < > 113*   CO2 24   < > 21*   *   < > 129*   BUN 64*   < > 21   CREATININE 0.7   < > 0.3*   CALCIUM 9.2   < > 8.3*   PROT 9.1*  --   --    ALBUMIN 2.5*  --   --    BILITOT 0.9  --   --    ALKPHOS 122  --   --    AST 43*  --   --    ALT 37  --   --    ANIONGAP 14   < > 9   MG 2.6   < > 1.8    < > = values in this interval not displayed.     CBC:   Recent Labs   Lab 08/25/22  0330   WBC 18.77*   RBC 3.10*   HGB 7.7*   HCT 25.7*      MCV 83   MCH 24.8*   MCHC 30.0*     Cardiac Profile:  Recent Labs   Lab 08/18/22 2010   BNP 66   CPK 53   CPKMB 4.8   TROPONINI 0.049*     Inflammatory Labs:  Recent Labs   Lab 08/19/22 2109   CRP >38.00*     Monitor and Evaluation  Food and Nutrient Intake: energy intake, enteral nutrition intake  Food and Nutrient Adminstration: enteral and parenteral nutrition  administration  Physical Activity and Function: nutrition-related ADLs and IADLs, factors affecting access to physical activity  Anthropometric Measurements: weight, weight change, body mass index  Biochemical Data, Medical Tests and Procedures: electrolyte and renal panel, inflammatory profile, gastrointestinal profile, lipid profile, glucose/endocrine profile  Nutrition-Focused Physical Findings: overall appearance     Nutrition Risk  Level of Risk/Frequency of Follow-up: high     Nutrition Follow-Up  RD Follow-up?: Yes      Rosamaria Dyson RD 08/25/2022 2:03 PM

## 2022-08-25 NOTE — PROGRESS NOTES
Pulmonary/Critical Care  Progress Note      Patient name: Wei Dominique  MRN: [id number]  Date: 08/25/2022    Admit Date: 8/18/2022  Consult Requested By: Rose Mary King MD    Reason for Consult: Respiratory failure, trach    HPI:    8/19/2022 - P with h/o ALS with trach and vent dependence sent to ER for increased swelling.  Now admitted for evaluation and I was asked to see pt for ventilator management.  He is not able to provide any history.  Chart has been reviewed.  He is febrile, tachycardic (though better).  Cultures have been ordered and are pending.  Maxillofacial CT noted.  CXR shows no new infiltrates.  No ABG done yet.    8/22/2022 - Events of the weekend noted, no response to me this AM.  Dr Jones's note from yesterday seen and pt does not want comfort measures.  Mulitple + cultures (ID following for antibiotics).    8/23/2022 - Stable overnight, no new issues reported.  He is more awake and responsive.  Waiting on neuro to see for masseter BOTOX injections.  Dr Jones's note seen.  Stable on ventilator.     8/24/2022 - Stable overnight, no new issues reported.  Neuro note seen but they do not do Botox injections and have recommended OMFS consult (I do not believe that they come here) - ? Do we have to consider transfer to [facility] for evaluation/treatment per OMFS.  Respiratory status is stable.    8/25/2022 - Stable overnight, had BOTOX injection per ENT.  No new issues reported.  Low grade fever.    Review of Systems    Review of Systems   Reason unable to perform ROS: difficult to communicate with.   Constitutional: Negative for fever.   HENT:        + mouth pain  Bite blocks in place  Trach - OK   Respiratory:        Ventilated    Cardiovascular: Negative for chest pain.   Gastrointestinal:        + PEG   Neurological:        + ALS with chronic findings       Past Medical History    Past Medical History:   Diagnosis Date    ALS (amyotrophic lateral sclerosis)     ALS (amyotrophic  lateral sclerosis)     Dependent on ventilator 3/21/2022    Erectile dysfunction     Gait disturbance     Hyperlipidemia     Hypertension     Iron deficiency anemia     Motor neuron disease     Polycystic kidney disease     Quadriparesis 2/6/2018    Tremor        Past Surgical History    Past Surgical History:   Procedure Laterality Date    GASTROSTOMY      PEG    PORTACATH PLACEMENT      TRACHEOSTOMY         Medications (scheduled):      apixaban  2.5 mg Oral BID    baclofen  10 mg Per G Tube TID    balsam peru-castor oiL   Topical (Top) Daily    busPIRone  5 mg Per G Tube BID    ceFEPime (MAXIPIME) IVPB  2 g Intravenous Q12H    chlorhexidine  15 mL Mouth/Throat BID    furosemide  40 mg Per G Tube Daily    metoprolol tartrate  50 mg Oral Daily    mupirocin   Nasal BID    pantoprazole  40 mg Intravenous Daily    polyethylene glycol  17 g Oral Daily    riluzole  50 mg Oral Q12H    scopolamine  1 patch Transdermal Q3 Days    senna-docusate 8.6-50 mg  1 tablet Oral BID    sodium chloride 0.9%  10 mL Intravenous Q6H    traZODone  50 mg Per G Tube QHS       Medications (infusions):      sodium chloride 0.9% 100 mL/hr at 08/24/22 2122    propofoL Stopped (08/20/22 1048)       Medications (prn):     sodium chloride, acetaminophen, acetaminophen, albuterol-ipratropium, aluminum-magnesium hydroxide-simethicone, calcium chloride IVPB, calcium chloride IVPB, calcium chloride IVPB, dextrose 10%, dextrose 10%, glucagon (human recombinant), glucose, glucose, HYDROcodone-acetaminophen, HYDROmorphone, magnesium sulfate IVPB, magnesium sulfate IVPB, magnesium sulfate IVPB, magnesium sulfate IVPB, melatonin, morphine, naloxone, ondansetron, potassium chloride in water, potassium chloride in water, potassium chloride in water, potassium chloride in water, prochlorperazine, simethicone, sodium chloride 0.9%, Flushing PICC Protocol **AND** sodium chloride 0.9% **AND** sodium chloride 0.9%, sodium phosphate  "IVPB, sodium phosphate IVPB, sodium phosphate IVPB, sodium phosphate IVPB, sodium phosphate IVPB, white petrolatum    Family History: No family history on file.    Social History: Tobacco:   Social History     Tobacco Use   Smoking Status Not on file   Smokeless Tobacco Not on file                                EtOH:   Social History     Substance and Sexual Activity   Alcohol Use None                                Drugs:   Social History     Substance and Sexual Activity   Drug Use Never       Physical Exam    Vital signs:  Temp:  [98.8 °F (37.1 °C)-100 °F (37.8 °C)]   Pulse:  []   Resp:  [16-38]   BP: ()/(50-83)   SpO2:  [99 %-100 %]     Intake/Output:     Intake/Output Summary (Last 24 hours) at 8/25/2022 0829  Last data filed at 8/25/2022 0556  Gross per 24 hour   Intake 560 ml   Output 3175 ml   Net -2615 ml        BMI: Estimated body mass index is 27.25 kg/m² as calculated from the following:    Height as of this encounter: 5' 8" (1.727 m).    Weight as of this encounter: 81.3 kg (179 lb 3.7 oz).    Physical Exam  Vitals and nursing note reviewed.   Constitutional:       General: He is not in acute distress.     Appearance: Normal appearance. He is ill-appearing (chronically). He is not toxic-appearing or diaphoretic.   HENT:      Head: Normocephalic and atraumatic.      Comments: Some swelling to right face     Right Ear: External ear normal.      Left Ear: External ear normal.      Nose: Nose normal.      Mouth/Throat:      Mouth: Mucous membranes are moist.      Pharynx: Oropharynx is clear. No oropharyngeal exudate.   Eyes:      General: No scleral icterus.        Right eye: No discharge.         Left eye: No discharge.      Extraocular Movements: Extraocular movements intact.      Conjunctiva/sclera: Conjunctivae normal.      Pupils: Pupils are equal, round, and reactive to light.   Neck:      Vascular: No carotid bruit.      Comments: trach  Cardiovascular:      Rate and Rhythm: Normal rate " and regular rhythm.      Pulses: Normal pulses.      Heart sounds: Normal heart sounds. No murmur heard.    No friction rub. No gallop.   Pulmonary:      Effort: Pulmonary effort is normal. No respiratory distress.      Breath sounds: Normal breath sounds. No stridor. No wheezing, rhonchi or rales.   Chest:      Chest wall: No tenderness.   Abdominal:      General: Bowel sounds are normal. There is no distension.      Tenderness: There is no abdominal tenderness. There is no guarding.   Musculoskeletal:         General: No swelling. Normal range of motion.      Cervical back: Normal range of motion and neck supple. No rigidity or tenderness.      Right lower leg: No edema.      Left lower leg: No edema.      Comments: + edema in UE bilaterally   Lymphadenopathy:      Cervical: No cervical adenopathy.   Skin:     General: Skin is warm and dry.      Capillary Refill: Capillary refill takes less than 2 seconds.      Coloration: Skin is not jaundiced.      Findings: No bruising.      Comments: Some redness at prior midline site  Some heel breakdown   Neurological:      Mental Status: He is alert.      Comments: Not responding or moving much   Psychiatric:      Comments: Not able to assess         Laboratory    Recent Labs   Lab 08/25/22  0330   WBC 18.77*   RBC 3.10*   HGB 7.7*   HCT 25.7*      MCV 83   MCH 24.8*   MCHC 30.0*       Recent Labs   Lab 08/25/22  0330   CALCIUM 8.3*      K 3.5   CO2 21*   *   BUN 21   CREATININE 0.3*       No results for input(s): PT, INR, APTT in the last 24 hours.    No results for input(s): CPK, CPKMB, TROPONINI, MB in the last 24 hours.    Additional labs: reviewe    Microbiology:       Microbiology Results (last 7 days)     Procedure Component Value Units Date/Time    Blood culture [[id number]] Collected: 08/19/22 1234    Order Status: Completed Specimen: Blood from Peripheral, Upper Arm, Right Updated: 08/24/22 1432     Blood Culture, Routine No growth after 5 days.     Blood culture [[id number]] Collected: 08/18/22 2010    Order Status: Completed Specimen: Blood from Peripheral, Forearm, Left Updated: 08/23/22 2232     Blood Culture, Routine No growth after 5 days.    Urine culture [[id number]]  (Abnormal)  (Susceptibility) Collected: 08/18/22 1950    Order Status: Completed Specimen: Urine Updated: 08/22/22 0658     Urine Culture, Routine PSEUDOMONAS AERUGINOSA   >100,000 cfu/ml  Known  patient        PROVIDENCIA STUARTII  > 100,000 cfu/ml      Narrative:      Specimen Source->Urine    Culture, Respiratory with Gram Stain [[id number]]  (Abnormal)  (Susceptibility) Collected: 08/19/22 1728    Order Status: Completed Specimen: Respiratory from Tracheal Aspirate Updated: 08/22/22 0652     Respiratory Culture Normal respiratory marco      PSEUDOMONAS AERUGINOSA  Moderate       Gram Stain (Respiratory) Moderate WBC's     Gram Stain (Respiratory) Rare Gram positive cocci     Gram Stain (Respiratory) Rare Gram positive rods     Gram Stain (Respiratory) Rare Gram negative rods    IV catheter culture [[id number]]  (Abnormal)  (Susceptibility) Collected: 08/19/22 0119    Order Status: Completed Specimen: Catheter Tip, Peripheral Updated: 08/21/22 1018     Aerobic Culture - Cath tip METHICILLIN RESISTANT STAPHYLOCOCCUS AUREUS  Too numerous to count  Results called to and read back by:  08/21/2022  10:18 JBM        MORGANELLA MORGANII  Too numerous to count      Narrative:      Midline right arm    MRSA Screen by PCR [[id number]]  (Abnormal) Collected: 08/19/22 1728    Order Status: Completed Specimen: Nasopharyngeal Swab from Nasal Updated: 08/19/22 2031     MRSA SCREEN BY PCR Positive     Comment: MRSA  critical result(s) called and verbal readback obtained from   Tierra Reyes RN (M3) by [facility] 08/19/2022 20:31         Narrative:      MRSA  critical result(s) called and verbal readback obtained from   Tierra Reyes RN (M3) by [facility] 08/19/2022 20:31          Radiology    X-Ray  Chest AP Portable  Reason: picc placement PICC LINE PLCMNT    FINDINGS:  Portable chest at 1638 compared with 8/19/2022 shows placement of right PICC with tip in SVC.    Cardiomediastinal silhouette, tracheostomy tube, and left subclavian port unchanged.    Right basilar airspace opacity is mild and unchanged perhaps reflecting atelectasis or consolidation. Left lung appears clear. Lung volumes remain low. Pulmonary vasculature is normal. No acute osseous abnormality.    IMPRESSION:  Placement of right PICC with tip in SVC.    Electronically signed by:  Robin Arellano MD  8/20/2022 5:20 PM CDT Workstation: [id number]        Additional Studies    reviewed    Ventilator Information    Vent Mode: A/C  Oxygen Concentration (%):  [30] 30  Resp Rate Total:  [16 br/min-36 br/min] 18 br/min  Vt Set:  [500 mL] 500 mL  PEEP/CPAP:  [5 cmH20] 5 cmH20  Mean Airway Pressure:  [9.9 omP46-09 cmH20] 16 cmH20         No results for input(s): PH, PCO2, PO2, HCO3, POCSATURATED, BE in the last 72 hours.      Impression    Active Hospital Problems    Diagnosis  POA    Masseter muscle spasm [M62.838]  Yes    Open wound of tongue due to bite [S01.552A]  Yes    Trismus [R25.2]  Yes    Fever [R50.9]  Yes    Facial cellulitis [L03.211]  Yes    Catheter-associated urinary tract infection [T83.511A, N39.0]  Yes    Tracheostomy in place [Z93.0]  Not Applicable    Chronic respiratory failure with hypoxia and hypercapnia [J96.11, J96.12]  Yes    Dependent on ventilator [Z99.11]  Not Applicable    S/P percutaneous endoscopic gastrostomy (PEG) tube placement [Z93.1]  Not Applicable    ALS (amyotrophic lateral sclerosis) [G12.21]  Yes    Edema [R60.9]  Yes    Quadriparesis [G82.50]  Yes      Resolved Hospital Problems    Diagnosis Date Resolved POA    Facial abscess [L02.01] 08/22/2022 Yes       Plan    · Continue vent - no weaning  · Cultures reviewed  · Contact isolation  · Antibiotics per ID  · Follow H/H - no acute bleeding  reported  · Wound care  · PEG feedings  · S/p BOTOX  · ? Back to [facility] soon    Thank you for this consult.  I will follow with you while the patient is hospitalized.        Sy Kennedy MD  [facility] Pulmonary/Critical Care  08/25/2022

## 2022-08-25 NOTE — CARE UPDATE
08/24/22 1940   Patient Assessment/Suction   Level of Consciousness (AVPU) alert   Respiratory Effort Unlabored   Expansion/Accessory Muscles/Retractions no use of accessory muscles   All Lung Fields Breath Sounds coarse   Rhythm/Pattern, Respiratory assisted mechanically   Cough Frequency with stimulation   Cough Type assisted   Suction Method oral;tracheal   Suction Pressure (mmHg) -120 mmHg   $ Suction Charges Inline Suction Procedure Stat Charge   Secretions Amount moderate   Secretions Color yellow   Secretions Characteristics thick   Skin Integrity   $ Wound Care Tech Time 15 min   Area Observed Neck under tracheostomy;Neck   Skin Appearance redness blanchable   Barrier Changed? Yes   PRE-TX-O2   O2 Device (Oxygen Therapy) ventilator   $ Is the patient on Low Flow Oxygen? Yes   Oxygen Concentration (%) 30   SpO2 100 %   Pulse Oximetry Type Continuous   $ Pulse Oximetry - Multiple Charge Pulse Oximetry - Multiple   Pulse (!) 120   Resp (!) 26   Aerosol Therapy   $ Aerosol Therapy Charges PRN treatment not required   Daily Review of Necessity (SVN) completed   Respiratory Treatment Status (SVN) PRN treatment not required        Surgical Airway 02/21/22 1145 Shiley Cuffed   Placement Date/Time: 02/21/22 1145   Present Prior to Hospital Arrival?: Yes  Type: Tracheostomy  Brand: Shiley  Airway Device Size: 8.0  Style: Cuffed   Cuff Inflation? Inflated   Status Secured   Site Assessment Drainage;Oozing secretions   Site Care Cleansed;Dried;Dressing applied   Inner Cannula Care Cleansed/dried   Ties Assessment Changed;Clean;Dry        Nasopharyngeal Airway   Placement Date/Time: 08/23/22 1140   Size (mm): 30.0 Fr  Tube Location: Right Nostril  Inserted By: RT  Insertion attempts (enter comment if more than 2 attempts): 2   Measured At Nare   Secured Location Right   Site Condition Drainage (Comment)  (clear)   Airway Safety   Ambu bag with the patient? Yes, Adult Ambu   Is mask with the patient? Yes, Adult Mask    Suction set is at the bedside? Yes   Extra trach at bedside? Yes   Extra trach sizes at bedside? 7.5   Is obturator available? Yes   Location of obturator?  Bedside table   Respiratory Interventions   Airway/Ventilation Management airway patency maintained   Vent Select   Conventional Vent Y   Charged w/in last 24h YES   Preset Conventional Ventilator Settings   Vent ID [id number]   Vent Type    Ventilation Type VC   Vent Mode A/C   Humidity HME   Set Rate 16 BPM   Vt Set 500 mL   PEEP/CPAP 5 cmH20   Peak Flow 60 L/min   Peak End Inspiratory Pressure 23 cmH20   I-Trigger Type  V-TRIG   Trigger Sensitivity Flow/I-Trigger 2 L/min   Patient Ventilator Parameters   Resp Rate Total 23 br/min   Peak Airway Pressure 23 cmH2O   Mean Airway Pressure 12 cmH20   Plateau Pressure 19 cmH20   Exhaled Vt 451 mL   Total Ve 11.8 mL   I:E Ratio Measured 1.00:1   Auto PEEP 0 cmH20   Tubing ID (mm) 8 mm   Tube Type Trach   Conventional Ventilator Alarms   Alarms On Y   Resp Rate High Alarm 40 br/min   Press High Alarm 60 cmH2O   Apnea Rate 16   Apnea Volume (mL) 1 mL   Apnea Oxygen Concentration  100   Apnea Flow Rate (L/min) 60   T Apnea 20 sec(s)   IHI Ventilator Associated Pneumonia Bundle (Required)   Oral Care Teeth brushed;Mouth swabbed;Mouth suctioned;with mouthwash;Lip moisturizer applied   Ready to Wean/Extubation Screen   FIO2<=50 (chart decimal) 0.3   MV<16L (chart vol.) 11.8   PEEP <=8 (chart #) 5   Ready to Wean Parameters   F/VT Ratio<105 (RSBI) (!) 57.65   Vital Capacity (mL) 0   Education   $ Education Bronchodilator;Suction;Trach Care;Ventilator Oxygen;15 min   Respiratory Evaluation   $ Care Plan Tech Time 15 min   $ Eval/Re-eval Charges Re-evaluation

## 2022-08-26 LAB
ANION GAP SERPL CALC-SCNC: 4 MMOL/L (ref 8–16)
BACTERIA #/AREA URNS HPF: NEGATIVE /HPF
BASOPHILS # BLD AUTO: 0.05 K/UL (ref 0–0.2)
BASOPHILS NFR BLD: 0.3 % (ref 0–1.9)
BILIRUB UR QL STRIP: NEGATIVE
BUN SERPL-MCNC: 36 MG/DL (ref 8–23)
CALCIUM SERPL-MCNC: 8.1 MG/DL (ref 8.7–10.5)
CHLORIDE SERPL-SCNC: 113 MMOL/L (ref 95–110)
CLARITY UR: CLEAR
CO2 SERPL-SCNC: 22 MMOL/L (ref 23–29)
COLOR UR: YELLOW
CREAT SERPL-MCNC: 0.4 MG/DL (ref 0.5–1.4)
CRP SERPL-MCNC: 24.67 MG/DL
DIFFERENTIAL METHOD: ABNORMAL
EOSINOPHIL # BLD AUTO: 0.6 K/UL (ref 0–0.5)
EOSINOPHIL NFR BLD: 3.5 % (ref 0–8)
ERYTHROCYTE [DISTWIDTH] IN BLOOD BY AUTOMATED COUNT: 19.1 % (ref 11.5–14.5)
EST. GFR  (NO RACE VARIABLE): >60 ML/MIN/1.73 M^2
GLUCOSE SERPL-MCNC: 104 MG/DL (ref 70–110)
GLUCOSE UR QL STRIP: NEGATIVE
HCT VFR BLD AUTO: 25 % (ref 40–54)
HGB BLD-MCNC: 7.3 G/DL (ref 14–18)
HGB UR QL STRIP: ABNORMAL
HYALINE CASTS #/AREA URNS LPF: 2 /LPF
IMM GRANULOCYTES # BLD AUTO: 0.33 K/UL (ref 0–0.04)
IMM GRANULOCYTES NFR BLD AUTO: 1.9 % (ref 0–0.5)
KETONES UR QL STRIP: NEGATIVE
LEUKOCYTE ESTERASE UR QL STRIP: NEGATIVE
LYMPHOCYTES # BLD AUTO: 1.8 K/UL (ref 1–4.8)
LYMPHOCYTES NFR BLD: 10 % (ref 18–48)
MAGNESIUM SERPL-MCNC: 2.2 MG/DL (ref 1.6–2.6)
MCH RBC QN AUTO: 24.8 PG (ref 27–31)
MCHC RBC AUTO-ENTMCNC: 29.2 G/DL (ref 32–36)
MCV RBC AUTO: 85 FL (ref 82–98)
MICROSCOPIC COMMENT: ABNORMAL
MONOCYTES # BLD AUTO: 1.1 K/UL (ref 0.3–1)
MONOCYTES NFR BLD: 6.1 % (ref 4–15)
NEUTROPHILS # BLD AUTO: 13.7 K/UL (ref 1.8–7.7)
NEUTROPHILS NFR BLD: 78.2 % (ref 38–73)
NITRITE UR QL STRIP: NEGATIVE
NRBC BLD-RTO: 0 /100 WBC
PH UR STRIP: 7 [PH] (ref 5–8)
PLATELET # BLD AUTO: 390 K/UL (ref 150–450)
PMV BLD AUTO: 9.7 FL (ref 9.2–12.9)
POTASSIUM SERPL-SCNC: 4.1 MMOL/L (ref 3.5–5.1)
PROCALCITONIN SERPL IA-MCNC: 0.39 NG/ML (ref 0–0.5)
PROT UR QL STRIP: ABNORMAL
RBC # BLD AUTO: 2.94 M/UL (ref 4.6–6.2)
RBC #/AREA URNS HPF: 3 /HPF (ref 0–4)
SODIUM SERPL-SCNC: 139 MMOL/L (ref 136–145)
SP GR UR STRIP: 1.01 (ref 1–1.03)
SQUAMOUS #/AREA URNS HPF: 1 /HPF
URN SPEC COLLECT METH UR: ABNORMAL
UROBILINOGEN UR STRIP-ACNC: NEGATIVE EU/DL
WBC # BLD AUTO: 17.58 K/UL (ref 3.9–12.7)
WBC #/AREA URNS HPF: 1 /HPF (ref 0–5)

## 2022-08-26 PROCEDURE — 94003 VENT MGMT INPAT SUBQ DAY: CPT

## 2022-08-26 PROCEDURE — 99900035 HC TECH TIME PER 15 MIN (STAT)

## 2022-08-26 PROCEDURE — 99233 SBSQ HOSP IP/OBS HIGH 50: CPT | Mod: ,,, | Performed by: INTERNAL MEDICINE

## 2022-08-26 PROCEDURE — 63600175 PHARM REV CODE 636 W HCPCS: Performed by: INTERNAL MEDICINE

## 2022-08-26 PROCEDURE — 87077 CULTURE AEROBIC IDENTIFY: CPT | Mod: 59 | Performed by: HOSPITALIST

## 2022-08-26 PROCEDURE — 25000003 PHARM REV CODE 250: Performed by: INTERNAL MEDICINE

## 2022-08-26 PROCEDURE — 87040 BLOOD CULTURE FOR BACTERIA: CPT | Mod: 59 | Performed by: INTERNAL MEDICINE

## 2022-08-26 PROCEDURE — 36415 COLL VENOUS BLD VENIPUNCTURE: CPT | Performed by: HOSPITALIST

## 2022-08-26 PROCEDURE — 27000221 HC OXYGEN, UP TO 24 HOURS

## 2022-08-26 PROCEDURE — 87186 SC STD MICRODIL/AGAR DIL: CPT | Performed by: INTERNAL MEDICINE

## 2022-08-26 PROCEDURE — 94761 N-INVAS EAR/PLS OXIMETRY MLT: CPT

## 2022-08-26 PROCEDURE — 87147 CULTURE TYPE IMMUNOLOGIC: CPT | Performed by: HOSPITALIST

## 2022-08-26 PROCEDURE — 84145 PROCALCITONIN (PCT): CPT | Performed by: HOSPITALIST

## 2022-08-26 PROCEDURE — 20000000 HC ICU ROOM

## 2022-08-26 PROCEDURE — 87040 BLOOD CULTURE FOR BACTERIA: CPT | Mod: 59 | Performed by: HOSPITALIST

## 2022-08-26 PROCEDURE — 25000003 PHARM REV CODE 250: Performed by: HOSPITALIST

## 2022-08-26 PROCEDURE — 99900031 HC PATIENT EDUCATION (STAT)

## 2022-08-26 PROCEDURE — 99233 PR SUBSEQUENT HOSPITAL CARE,LEVL III: ICD-10-PCS | Mod: ,,, | Performed by: INTERNAL MEDICINE

## 2022-08-26 PROCEDURE — A4216 STERILE WATER/SALINE, 10 ML: HCPCS | Performed by: INTERNAL MEDICINE

## 2022-08-26 PROCEDURE — 87070 CULTURE OTHR SPECIMN AEROBIC: CPT | Performed by: INTERNAL MEDICINE

## 2022-08-26 PROCEDURE — 63700000 PHARM REV CODE 250 ALT 637 W/O HCPCS: Performed by: INTERNAL MEDICINE

## 2022-08-26 PROCEDURE — 85025 COMPLETE CBC W/AUTO DIFF WBC: CPT | Performed by: INTERNAL MEDICINE

## 2022-08-26 PROCEDURE — 87077 CULTURE AEROBIC IDENTIFY: CPT | Performed by: INTERNAL MEDICINE

## 2022-08-26 PROCEDURE — C9113 INJ PANTOPRAZOLE SODIUM, VIA: HCPCS | Performed by: INTERNAL MEDICINE

## 2022-08-26 PROCEDURE — 63600175 PHARM REV CODE 636 W HCPCS: Mod: JG | Performed by: INTERNAL MEDICINE

## 2022-08-26 PROCEDURE — 87186 SC STD MICRODIL/AGAR DIL: CPT | Mod: 59 | Performed by: HOSPITALIST

## 2022-08-26 PROCEDURE — 36415 COLL VENOUS BLD VENIPUNCTURE: CPT | Performed by: INTERNAL MEDICINE

## 2022-08-26 PROCEDURE — 86140 C-REACTIVE PROTEIN: CPT | Performed by: INTERNAL MEDICINE

## 2022-08-26 PROCEDURE — 94799 UNLISTED PULMONARY SVC/PX: CPT

## 2022-08-26 PROCEDURE — 80048 BASIC METABOLIC PNL TOTAL CA: CPT | Performed by: INTERNAL MEDICINE

## 2022-08-26 PROCEDURE — 87205 SMEAR GRAM STAIN: CPT | Performed by: INTERNAL MEDICINE

## 2022-08-26 PROCEDURE — 83735 ASSAY OF MAGNESIUM: CPT | Performed by: INTERNAL MEDICINE

## 2022-08-26 PROCEDURE — 99900026 HC AIRWAY MAINTENANCE (STAT)

## 2022-08-26 PROCEDURE — 25000003 PHARM REV CODE 250

## 2022-08-26 PROCEDURE — 81001 URINALYSIS AUTO W/SCOPE: CPT | Performed by: INTERNAL MEDICINE

## 2022-08-26 RX ORDER — METOPROLOL TARTRATE 25 MG/1
12.5 TABLET ORAL 2 TIMES DAILY
Status: DISCONTINUED | OUTPATIENT
Start: 2022-08-26 | End: 2022-08-31 | Stop reason: HOSPADM

## 2022-08-26 RX ORDER — METOPROLOL TARTRATE 25 MG/1
12.5 TABLET ORAL ONCE
Status: COMPLETED | OUTPATIENT
Start: 2022-08-26 | End: 2022-08-26

## 2022-08-26 RX ORDER — NYSTATIN 100000 [USP'U]/ML
500000 SUSPENSION ORAL 4 TIMES DAILY
Status: DISCONTINUED | OUTPATIENT
Start: 2022-08-26 | End: 2022-08-31 | Stop reason: HOSPADM

## 2022-08-26 RX ORDER — MEROPENEM AND SODIUM CHLORIDE 1 G/50ML
1 INJECTION, SOLUTION INTRAVENOUS
Status: DISCONTINUED | OUTPATIENT
Start: 2022-08-26 | End: 2022-08-29

## 2022-08-26 RX ADMIN — MEROPENEM AND SODIUM CHLORIDE 1 G: 1 INJECTION, SOLUTION INTRAVENOUS at 08:08

## 2022-08-26 RX ADMIN — METOPROLOL TARTRATE 12.5 MG: 25 TABLET, FILM COATED ORAL at 06:08

## 2022-08-26 RX ADMIN — CHLORHEXIDINE GLUCONATE 15 ML: 1.2 RINSE ORAL at 08:08

## 2022-08-26 RX ADMIN — HYDROMORPHONE HYDROCHLORIDE 1 MG: 1 INJECTION, SOLUTION INTRAMUSCULAR; INTRAVENOUS; SUBCUTANEOUS at 02:08

## 2022-08-26 RX ADMIN — MEROPENEM AND SODIUM CHLORIDE 1 G: 1 INJECTION, SOLUTION INTRAVENOUS at 03:08

## 2022-08-26 RX ADMIN — APIXABAN 2.5 MG: 2.5 TABLET, FILM COATED ORAL at 08:08

## 2022-08-26 RX ADMIN — NYSTATIN 500000 UNITS: 100000 SUSPENSION ORAL at 05:08

## 2022-08-26 RX ADMIN — METOPROLOL TARTRATE 12.5 MG: 25 TABLET, FILM COATED ORAL at 09:08

## 2022-08-26 RX ADMIN — CEFEPIME HYDROCHLORIDE 2 G: 2 INJECTION, SOLUTION INTRAVENOUS at 05:08

## 2022-08-26 RX ADMIN — NYSTATIN 500000 UNITS: 100000 SUSPENSION ORAL at 02:08

## 2022-08-26 RX ADMIN — CHLORHEXIDINE GLUCONATE 15 ML: 1.2 RINSE ORAL at 09:08

## 2022-08-26 RX ADMIN — MUPIROCIN 1 G: 20 OINTMENT TOPICAL at 09:08

## 2022-08-26 RX ADMIN — RILUZOLE 50 MG: 50 TABLET ORAL at 09:08

## 2022-08-26 RX ADMIN — ACETAMINOPHEN 1000 MG: 500 TABLET, FILM COATED ORAL at 11:08

## 2022-08-26 RX ADMIN — BACLOFEN 10 MG: 5 TABLET ORAL at 09:08

## 2022-08-26 RX ADMIN — ONDANSETRON 4 MG: 2 INJECTION INTRAMUSCULAR; INTRAVENOUS at 12:08

## 2022-08-26 RX ADMIN — SODIUM CHLORIDE: 0.9 INJECTION, SOLUTION INTRAVENOUS at 05:08

## 2022-08-26 RX ADMIN — ALTEPLASE 2 MG: 2.2 INJECTION, POWDER, LYOPHILIZED, FOR SOLUTION INTRAVENOUS at 02:08

## 2022-08-26 RX ADMIN — SODIUM CHLORIDE, PRESERVATIVE FREE 10 ML: 5 INJECTION INTRAVENOUS at 06:08

## 2022-08-26 RX ADMIN — MORPHINE SULFATE 4 MG: 4 INJECTION, SOLUTION INTRAMUSCULAR; INTRAVENOUS at 06:08

## 2022-08-26 RX ADMIN — HYDROMORPHONE HYDROCHLORIDE 1 MG: 1 INJECTION, SOLUTION INTRAMUSCULAR; INTRAVENOUS; SUBCUTANEOUS at 09:08

## 2022-08-26 RX ADMIN — APIXABAN 2.5 MG: 2.5 TABLET, FILM COATED ORAL at 09:08

## 2022-08-26 RX ADMIN — PANTOPRAZOLE SODIUM 40 MG: 40 INJECTION, POWDER, FOR SOLUTION INTRAVENOUS at 09:08

## 2022-08-26 RX ADMIN — SENNOSIDES AND DOCUSATE SODIUM 1 TABLET: 50; 8.6 TABLET ORAL at 08:08

## 2022-08-26 RX ADMIN — MUPIROCIN 1 G: 20 OINTMENT TOPICAL at 08:08

## 2022-08-26 RX ADMIN — BACLOFEN 10 MG: 5 TABLET ORAL at 02:08

## 2022-08-26 RX ADMIN — CASTOR OIL AND BALSAM, PERU: 788; 87 OINTMENT TOPICAL at 09:08

## 2022-08-26 RX ADMIN — MORPHINE SULFATE 4 MG: 4 INJECTION, SOLUTION INTRAMUSCULAR; INTRAVENOUS at 10:08

## 2022-08-26 RX ADMIN — NYSTATIN 500000 UNITS: 100000 SUSPENSION ORAL at 08:08

## 2022-08-26 RX ADMIN — BACLOFEN 10 MG: 5 TABLET ORAL at 08:08

## 2022-08-26 RX ADMIN — POLYETHYLENE GLYCOL 3350 17 G: 17 POWDER, FOR SOLUTION ORAL at 09:08

## 2022-08-26 RX ADMIN — SODIUM CHLORIDE, PRESERVATIVE FREE 10 ML: 5 INJECTION INTRAVENOUS at 12:08

## 2022-08-26 RX ADMIN — METOPROLOL TARTRATE 12.5 MG: 25 TABLET, FILM COATED ORAL at 08:08

## 2022-08-26 RX ADMIN — ACETAMINOPHEN 1000 MG: 500 TABLET, FILM COATED ORAL at 09:08

## 2022-08-26 RX ADMIN — BUSPIRONE HYDROCHLORIDE 5 MG: 5 TABLET ORAL at 09:08

## 2022-08-26 RX ADMIN — RILUZOLE 50 MG: 50 TABLET ORAL at 08:08

## 2022-08-26 RX ADMIN — MORPHINE SULFATE 4 MG: 4 INJECTION, SOLUTION INTRAMUSCULAR; INTRAVENOUS at 12:08

## 2022-08-26 RX ADMIN — BUSPIRONE HYDROCHLORIDE 5 MG: 5 TABLET ORAL at 08:08

## 2022-08-26 RX ADMIN — TRAZODONE HYDROCHLORIDE 50 MG: 50 TABLET ORAL at 08:08

## 2022-08-26 RX ADMIN — SENNOSIDES AND DOCUSATE SODIUM 1 TABLET: 50; 8.6 TABLET ORAL at 09:08

## 2022-08-26 NOTE — PROGRESS NOTES
Pulmonary/Critical Care  Progress Note      Patient name: Wei Dominique  MRN: [id number]  Date: 08/26/2022    Admit Date: 8/18/2022  Consult Requested By: Rose Mary King MD    Reason for Consult: Respiratory failure, trach    HPI:    8/19/2022 - P with h/o ALS with trach and vent dependence sent to ER for increased swelling.  Now admitted for evaluation and I was asked to see pt for ventilator management.  He is not able to provide any history.  Chart has been reviewed.  He is febrile, tachycardic (though better).  Cultures have been ordered and are pending.  Maxillofacial CT noted.  CXR shows no new infiltrates.  No ABG done yet.    8/22/2022 - Events of the weekend noted, no response to me this AM.  Dr Jones's note from yesterday seen and pt does not want comfort measures.  Mulitple + cultures (ID following for antibiotics).    8/23/2022 - Stable overnight, no new issues reported.  He is more awake and responsive.  Waiting on neuro to see for masseter BOTOX injections.  Dr Jones's note seen.  Stable on ventilator.     8/24/2022 - Stable overnight, no new issues reported.  Neuro note seen but they do not do Botox injections and have recommended OMFS consult (I do not believe that they come here) - ? Do we have to consider transfer to [facility] for evaluation/treatment per OMFS.  Respiratory status is stable.    8/25/2022 - Stable overnight, had BOTOX injection per ENT.  No new issues reported.  Low grade fever.    8/26/2022 - Stable overnight, no new issues reported.  He has some increased movement and ability to open mouth this AM.  Temp up a little yesterday.  H/H down a little but no active bleeding reported.  CXR with RLL atelectasis/infiltratre and possible effusion    Review of Systems    Review of Systems   Reason unable to perform ROS: difficult to communicate with.   Constitutional: Negative for fever.   HENT:        + mouth pain  Bite blocks in place  Trach - OK   Respiratory:        Ventilated     Cardiovascular: Negative for chest pain.   Gastrointestinal:        + PEG   Neurological:        + ALS with chronic findings       Past Medical History    Past Medical History:   Diagnosis Date    ALS (amyotrophic lateral sclerosis)     ALS (amyotrophic lateral sclerosis)     Dependent on ventilator 3/21/2022    Erectile dysfunction     Gait disturbance     Hyperlipidemia     Hypertension     Iron deficiency anemia     Motor neuron disease     Polycystic kidney disease     Quadriparesis 2/6/2018    Tremor        Past Surgical History    Past Surgical History:   Procedure Laterality Date    GASTROSTOMY      PEG    PORTACATH PLACEMENT      TRACHEOSTOMY         Medications (scheduled):      apixaban  2.5 mg Oral BID    baclofen  10 mg Per G Tube TID    balsam peru-castor oiL   Topical (Top) Daily    busPIRone  5 mg Per G Tube BID    ceFEPime (MAXIPIME) IVPB  2 g Intravenous Q12H    chlorhexidine  15 mL Mouth/Throat BID    metoprolol tartrate  12.5 mg Per G Tube BID    mupirocin   Nasal BID    pantoprazole  40 mg Intravenous Daily    polyethylene glycol  17 g Oral Daily    riluzole  50 mg Oral Q12H    scopolamine  1 patch Transdermal Q3 Days    senna-docusate 8.6-50 mg  1 tablet Oral BID    sodium chloride 0.9%  10 mL Intravenous Q6H    traZODone  50 mg Per G Tube QHS       Medications (infusions):      sodium chloride 0.9% 100 mL/hr at 08/26/22 0538       Medications (prn):     sodium chloride, acetaminophen, acetaminophen, albuterol-ipratropium, aluminum-magnesium hydroxide-simethicone, calcium chloride IVPB, calcium chloride IVPB, calcium chloride IVPB, dextrose 10%, dextrose 10%, glucagon (human recombinant), glucose, glucose, HYDROcodone-acetaminophen, HYDROmorphone, magnesium sulfate IVPB, magnesium sulfate IVPB, magnesium sulfate IVPB, magnesium sulfate IVPB, melatonin, morphine, naloxone, ondansetron, potassium chloride in water, potassium chloride in water, potassium chloride  "in water, potassium chloride in water, prochlorperazine, simethicone, sodium chloride 0.9%, Flushing PICC Protocol **AND** sodium chloride 0.9% **AND** sodium chloride 0.9%, sodium phosphate IVPB, sodium phosphate IVPB, sodium phosphate IVPB, sodium phosphate IVPB, sodium phosphate IVPB, white petrolatum    Family History: No family history on file.    Social History: Tobacco:   Social History     Tobacco Use   Smoking Status Not on file   Smokeless Tobacco Not on file                                EtOH:   Social History     Substance and Sexual Activity   Alcohol Use None                                Drugs:   Social History     Substance and Sexual Activity   Drug Use Never       Physical Exam    Vital signs:  Temp:  [97.5 °F (36.4 °C)-100.4 °F (38 °C)]   Pulse:  []   Resp:  [16-29]   BP: ()/(46-66)   SpO2:  [98 %-100 %]     Intake/Output:     Intake/Output Summary (Last 24 hours) at 8/26/2022 1057  Last data filed at 8/26/2022 0924  Gross per 24 hour   Intake 1870 ml   Output 3300 ml   Net -1430 ml        BMI: Estimated body mass index is 27.25 kg/m² as calculated from the following:    Height as of this encounter: 5' 8" (1.727 m).    Weight as of this encounter: 81.3 kg (179 lb 3.7 oz).    Physical Exam  Vitals and nursing note reviewed.   Constitutional:       General: He is not in acute distress.     Appearance: Normal appearance. He is ill-appearing (chronically). He is not toxic-appearing or diaphoretic.   HENT:      Head: Normocephalic and atraumatic.      Comments: Some swelling to right face     Right Ear: External ear normal.      Left Ear: External ear normal.      Nose: Nose normal.      Mouth/Throat:      Mouth: Mucous membranes are moist.      Pharynx: Oropharynx is clear. No oropharyngeal exudate.   Eyes:      General: No scleral icterus.        Right eye: No discharge.         Left eye: No discharge.      Extraocular Movements: Extraocular movements intact.      Conjunctiva/sclera: " Conjunctivae normal.      Pupils: Pupils are equal, round, and reactive to light.   Neck:      Vascular: No carotid bruit.      Comments: trach  Cardiovascular:      Rate and Rhythm: Normal rate and regular rhythm.      Pulses: Normal pulses.      Heart sounds: Normal heart sounds. No murmur heard.    No friction rub. No gallop.   Pulmonary:      Effort: Pulmonary effort is normal. No respiratory distress.      Breath sounds: Normal breath sounds. No stridor. No wheezing, rhonchi or rales.   Chest:      Chest wall: No tenderness.   Abdominal:      General: Bowel sounds are normal. There is no distension.      Tenderness: There is no abdominal tenderness. There is no guarding.   Musculoskeletal:         General: No swelling. Normal range of motion.      Cervical back: Normal range of motion and neck supple. No rigidity or tenderness.      Right lower leg: No edema.      Left lower leg: No edema.      Comments: + edema in UE bilaterally   Lymphadenopathy:      Cervical: No cervical adenopathy.   Skin:     General: Skin is warm and dry.      Capillary Refill: Capillary refill takes less than 2 seconds.      Coloration: Skin is not jaundiced.      Findings: No bruising.      Comments: Some redness at prior midline site  Some heel breakdown   Neurological:      Mental Status: He is alert.      Comments: Not responding or moving much   Psychiatric:      Comments: Not able to assess         Laboratory    Recent Labs   Lab 08/26/22  0350   WBC 17.58*   RBC 2.94*   HGB 7.3*   HCT 25.0*      MCV 85   MCH 24.8*   MCHC 29.2*       Recent Labs   Lab 08/26/22  0350   CALCIUM 8.1*      K 4.1   CO2 22*   *   BUN 36*   CREATININE 0.4*       No results for input(s): PT, INR, APTT in the last 24 hours.    No results for input(s): CPK, CPKMB, TROPONINI, MB in the last 24 hours.    Additional labs: reviewe    Microbiology:       Microbiology Results (last 7 days)     Procedure Component Value Units Date/Time    Blood  culture [[id number]] Collected: 08/26/22 0957    Order Status: Sent Specimen: Blood Updated: 08/26/22 1002    Blood culture [[id number]] Collected: 08/26/22 0957    Order Status: Sent Specimen: Blood Updated: 08/26/22 1002    Blood culture [[id number]] Collected: 08/19/22 1234    Order Status: Completed Specimen: Blood from Peripheral, Upper Arm, Right Updated: 08/24/22 1432     Blood Culture, Routine No growth after 5 days.    Blood culture [[id number]] Collected: 08/18/22 2010    Order Status: Completed Specimen: Blood from Peripheral, Forearm, Left Updated: 08/23/22 2232     Blood Culture, Routine No growth after 5 days.    Urine culture [[id number]]  (Abnormal)  (Susceptibility) Collected: 08/18/22 1950    Order Status: Completed Specimen: Urine Updated: 08/22/22 0658     Urine Culture, Routine PSEUDOMONAS AERUGINOSA   >100,000 cfu/ml  Known  patient        PROVIDENCIA STUARTII  > 100,000 cfu/ml      Narrative:      Specimen Source->Urine    Culture, Respiratory with Gram Stain [[id number]]  (Abnormal)  (Susceptibility) Collected: 08/19/22 1728    Order Status: Completed Specimen: Respiratory from Tracheal Aspirate Updated: 08/22/22 0652     Respiratory Culture Normal respiratory marco      PSEUDOMONAS AERUGINOSA  Moderate       Gram Stain (Respiratory) Moderate WBC's     Gram Stain (Respiratory) Rare Gram positive cocci     Gram Stain (Respiratory) Rare Gram positive rods     Gram Stain (Respiratory) Rare Gram negative rods    IV catheter culture [[id number]]  (Abnormal)  (Susceptibility) Collected: 08/19/22 0119    Order Status: Completed Specimen: Catheter Tip, Peripheral Updated: 08/21/22 1018     Aerobic Culture - Cath tip METHICILLIN RESISTANT STAPHYLOCOCCUS AUREUS  Too numerous to count  Results called to and read back by:  08/21/2022  10:18 JBM        MORGANELLA MORGANII  Too numerous to count      Narrative:      Midline right arm    MRSA Screen by PCR [[id number]]  (Abnormal) Collected:  08/19/22 1728    Order Status: Completed Specimen: Nasopharyngeal Swab from Nasal Updated: 08/19/22 2031     MRSA SCREEN BY PCR Positive     Comment: MRSA  critical result(s) called and verbal readback obtained from   Tierra Reyes RN (M3) by [facility] 08/19/2022 20:31         Narrative:      MRSA  critical result(s) called and verbal readback obtained from   Tierra Reyes RN (M3) by [facility] 08/19/2022 20:31          Radiology    X-Ray Chest AP Portable  HISTORY: fever    FINDINGS: Portable chest radiograph at 0911 hours compared to 08/20/2022 shows tracheostomy cannula, right PICC and left subclavian injection port-type central venous catheter, all unchanged in position. The cardiomediastinal silhouette and pulmonary vasculature are stable and within normal limits, with aortic vascular calcifications.    The lungs are hypoexpanded, with persistent lower lung zone linear opacities and right lower lung hazy density, suggesting atelectasis and perhaps small layering right pleural effusion. The upper lungs are clear, with no evidence of interstitial pulmonary edema or pneumothorax.    IMPRESSION: Lower lung zone opacities suggesting atelectasis, with small right pleural effusion not excluded.    Electronically signed by:  Murphy Luna MD  8/26/2022 9:43 AM CDT Workstation: [id number]        Additional Studies    reviewed    Ventilator Information    Vent Mode: A/C  Oxygen Concentration (%):  [30] 30  Resp Rate Total:  [16 br/min-30 br/min] 22 br/min  Vt Set:  [500 mL] 500 mL  PEEP/CPAP:  [5 cmH20] 5 cmH20  Mean Airway Pressure:  [12 pdN16-07 cmH20] 13 cmH20         No results for input(s): PH, PCO2, PO2, HCO3, POCSATURATED, BE in the last 72 hours.      Impression    Active Hospital Problems    Diagnosis  POA    *Catheter-associated urinary tract infection [T83.511A, N39.0]  Yes    Masseter muscle spasm [M62.838]  Yes    Open wound of tongue due to bite [S01.552A]  Yes    Trismus [R25.2]  Yes    Fever [R50.9]  Yes     Facial cellulitis [L03.211]  Yes    Tracheostomy in place [Z93.0]  Not Applicable    Chronic respiratory failure with hypoxia and hypercapnia [J96.11, J96.12]  Yes    Dependent on ventilator [Z99.11]  Not Applicable    S/P percutaneous endoscopic gastrostomy (PEG) tube placement [Z93.1]  Not Applicable    ALS (amyotrophic lateral sclerosis) [G12.21]  Yes    Edema [R60.9]  Yes    Quadriparesis [G82.50]  Yes      Resolved Hospital Problems    Diagnosis Date Resolved POA    Facial abscess [L02.01] 08/22/2022 Yes       Plan    · Continue vent - no weaning  · Cultures reviewed  · Contact isolation  · Antibiotics per ID  · Follow H/H - no acute bleeding reported  · Wound care  · PEG feedings  · S/p BOTOX - maybe a little better today  · Follow CXR    Thank you for this consult.  I will follow with you while the patient is hospitalized.        Sy Kennedy MD  [facility] Pulmonary/Critical Care  08/26/2022

## 2022-08-26 NOTE — NURSING
Unable to draw Blood Cultures from pt's Chest wall port.  One set is drawn from pt's GAURI PICC line  Dr Jones informed.  Orders received for alteplase 2mg to treat port, medications given per MAR, nurse still unable to draw blood after alteplase.  Dr Jones informed.

## 2022-08-26 NOTE — PROGRESS NOTES
Consult Note  Infectious Disease    Reason for Consult:  Facial abscess    HPI: Wei Dominique is a 67 y.o. male with known history of ALS, s/p trach & peg, vent dependence, HTN, PE (on eliquis) who presented with a primary complaint of facial swelling x few days. No family present at bedside. History was obtained from chart review and physician sign out. Patient had CT scan maxillofacial done that showed Right maxillary and anterior maxillary soft tissue edema and thickening which extends along the right maxillary molars into the oropharynx. Correlate for cellulitis and or pharyngeal infection.  Right perimandibular ovoid collection. Patient needs maxillofacial surgery however all hospitals are on diversion. Patient was in the ER waiting to be transferred for 18 hours and subsequently admitted to hospital medicine, ICU.   Seen by our service in June for pseudomonas  UTI  At that time he had deep bites and fissures in tongue as well.     8/20: interim reviewed and seen with Dr. Kennedy. Temperatures have improved. MRSA screen was positive. Midline tip with SA and GNR. Sputum culture in progress, and urine culture has growth that has to be re-isolated. He continues to acknowledge pain, nausea. He is in the process of being sedated and paralyzed for adequate ENT exam. Arms remain edematous  8/21:  Afebrile, interim reviewed.  The ENT note not complete but told by RN that no abscesses were discovered while sedated and paralyzed for oral exam.  The tongue was reduced into the mouth and bite block was placed.  Urine with 2 gram negative rods, 1 of which is Pseudomonas.  Sputum with Pseudomonas which is not new, chest x-ray with atelectasis.  Line tip with MRSA and Morganella, relevance unclear as his blood cultures were negative. Discussed with hospital medicine. My bedside questions about comfort care were answered in the negative, ie he does not want to be comfort care. He does want pain control however.    8/22: afebrile. VSS. Vent needs are stable. Mouth pain is decreased. Breathing comfortably. No nausea or abdominal pain. Able to communicate and answer questions.     8/26(reconsult): interim reviewed.   Received bilateral masseter botox injections on 8/24. Low grade temp on 8/25, BP a bit low, tachycardic. Temp max 100.4 and he has lots of purulent secretions from and around the ET tube. CXR with RLL atelectasis/effusion/infiltrate? Which is a little more than admission films. Vent needs are not increased. WBC about the same. procalcitonin has normalized, from admission level. Wound photos are improved/benign. hgb down to 7.3. he is alert, able to open his jaws a little more. The tongue laceration left side is bleeding a little. No abscesses, hematomas, facial cellulitis, or ulcers. No obvious thrush.     EXAM & DIAGNOSTICS REVIEWED:   Vitals:     Temp:  [97.5 °F (36.4 °C)-100.4 °F (38 °C)]   Temp: 99 °F (37.2 °C) (08/26/22 0925)  Pulse: (!) 112 (08/26/22 1000)  Resp: 17 (08/26/22 1000)  BP: (!) 119/59 (08/26/22 1000)  SpO2: 100 % (08/26/22 1000)    Intake/Output Summary (Last 24 hours) at 8/26/2022 1148  Last data filed at 8/26/2022 0924  Gross per 24 hour   Intake 1840 ml   Output 3300 ml   Net -1460 ml       General:  Awake , attends, communicates with blink(s)   Eyes:  Anicteric,   EOMI  ENT:  8/19: Copious intranasal secretions suctioned, because his mouth is profoundly difficult to open. His jaws have extremely high muscular tone. His tongue is severely lacerated and bulging out between his teeth on the right. He has a severe underbite. Copious saliva and bloody secretions suctioned from mouth. Even with dilaudid, we were only able to open his jaws less than a cm and I was able to insert a tongue blade only about half way. He has loose teeth, periodontal disease. I cannot see or palpate a soft tissue abscess.    8/20: exam deferred as he is going to be sedated/paralyzed for exam shortly   8/21: bite block  is out. Only minimal tongue visible on right. Mouth is tender   8/22: device to maintain safe bite in place.   8/23: same as above   8/26: able to open his jaws a little more. The tongue laceration left side is bleeding a little. No abscesses, hematomas, facial cellulitis, or ulcers. No obvious thrush.     Neck:  tracheosotomy  Lungs: Clear with decreased BS. Trach site has just been cleaned  Heart:  RRR, no gallop/murmur/rub noted  Abd:  Soft, NT, ND, hypoactive BS, no masses or organomegaly appreciated. PEG with small amount of granulation. Rectal tube with loose stool , volume moderate on miralax and senna  :  Hutchinson with clear urine. Some distal penile swelling. No balanitis  Musc:  Joints without effusion, swelling, erythema, synovitis, muscle wasting.   Skin:  No rashes. Left arm erythema and bruising is resolved.   Neuro:             Alert, attentive,  quadriplegic  Psych:   Calm, attends  Lymphatic:     Extrem: LE 1+ edema, UE 4+ edema   VAD:   right arm picc , port left chest, neither have redness or drainage  Isolation:  contact  Wound:           8/26:                  General Labs reviewed:  Recent Labs   Lab 08/24/22  0315 08/25/22  0330 08/26/22  0350   WBC 19.08* 18.77* 17.58*   HGB 7.7* 7.7* 7.3*   HCT 26.0* 25.7* 25.0*    393 390       Recent Labs   Lab 08/24/22  0315 08/25/22  0330 08/26/22  0350    143 139   K 3.9 3.5 4.1   * 113* 113*   CO2 21* 21* 22*   BUN 22 21 36*   CREATININE 0.4* 0.3* 0.4*   CALCIUM 7.6* 8.3* 8.1*     Recent Labs   Lab 08/19/22  2109   CRP >38.00*         Micro:  Microbiology Results (last 7 days)     Procedure Component Value Units Date/Time    Blood culture [[id number]] Collected: 08/26/22 0957    Order Status: Sent Specimen: Blood Updated: 08/26/22 1002    Blood culture [[id number]] Collected: 08/26/22 0957    Order Status: Sent Specimen: Blood Updated: 08/26/22 1002    Blood culture [[id number]] Collected: 08/19/22 1234    Order Status: Completed  Specimen: Blood from Peripheral, Upper Arm, Right Updated: 08/24/22 1432     Blood Culture, Routine No growth after 5 days.    Blood culture [[id number]] Collected: 08/18/22 2010    Order Status: Completed Specimen: Blood from Peripheral, Forearm, Left Updated: 08/23/22 2232     Blood Culture, Routine No growth after 5 days.    Urine culture [[id number]]  (Abnormal)  (Susceptibility) Collected: 08/18/22 1950    Order Status: Completed Specimen: Urine Updated: 08/22/22 0658     Urine Culture, Routine PSEUDOMONAS AERUGINOSA   >100,000 cfu/ml  Known  patient        PROVIDENCIA STUARTII  > 100,000 cfu/ml      Narrative:      Specimen Source->Urine    Culture, Respiratory with Gram Stain [[id number]]  (Abnormal)  (Susceptibility) Collected: 08/19/22 1728    Order Status: Completed Specimen: Respiratory from Tracheal Aspirate Updated: 08/22/22 0652     Respiratory Culture Normal respiratory marco      PSEUDOMONAS AERUGINOSA  Moderate       Gram Stain (Respiratory) Moderate WBC's     Gram Stain (Respiratory) Rare Gram positive cocci     Gram Stain (Respiratory) Rare Gram positive rods     Gram Stain (Respiratory) Rare Gram negative rods    IV catheter culture [[id number]]  (Abnormal)  (Susceptibility) Collected: 08/19/22 0119    Order Status: Completed Specimen: Catheter Tip, Peripheral Updated: 08/21/22 1018     Aerobic Culture - Cath tip METHICILLIN RESISTANT STAPHYLOCOCCUS AUREUS  Too numerous to count  Results called to and read back by:  08/21/2022  10:18 JBM        MORGANELLA MORGANII  Too numerous to count      Narrative:      Midline right arm    MRSA Screen by PCR [[id number]]  (Abnormal) Collected: 08/19/22 1728    Order Status: Completed Specimen: Nasopharyngeal Swab from Nasal Updated: 08/19/22 2031     MRSA SCREEN BY PCR Positive     Comment: MRSA  critical result(s) called and verbal readback obtained from   Tierra Reyes RN (M3) by SW1 08/19/2022 20:31         Narrative:      MRSA  critical  result(s) called and verbal readback obtained from   Tierra Reyes RN (M3) by SW1 08/19/2022 20:31          Imaging Reviewed:   CXR   CT maxillofacial  1.  Right maxillary and anterior maxillary soft tissue edema and thickening which extends along the right maxillary molars into the oropharynx. Correlate for cellulitis and or pharyngeal infection.  2.  Right perimandibular ovoid collection with rim enhancement noted measuring 3.0 x 1.8 cm. This appears to connect to the aforementioned maxillary skin thickening and edema.  3.  Nasopharyngeal phlegm and frothy debris extends into the ethmoid and maxillary sinus.  4.  Bilateral mastoid air cell fluid can be seen with mastoiditis.  5.  Right thyroid nodule measuring 1.9 cm. Recommend nonemergent thyroid ultrasound.  6.  Prominent pituitary gland may represent pituitary adenoma.    US both arms: neg for DVT  US both legs: negative for DVT      Cardiology:    IMPRESSION & PLAN   1. Severe swelling of the tongue with laceration of the tongue, bulging to the right   Extremely high tone preventing adequate exam   I did not see or palpate any abscesses nor did ENT on exam while paralyzed 8/20    2. Grossly purulent urine, h/o MDRO pseudomonas. Growing pseudomonas and Providencia, treated.  3. Quadriplegic, ALS  4. Right arm midline was removed and cultured and has MRSA and Morganella, blood cultures negative, relevance unclear and improving without specific therapy for MRSA  5. Vent dependent, sputum is colonized, atelectasis on CXRs    6. Fever 8/26 could be from RLL with selection of prior more resistant organisms or from oral cavity lesion that is not visualized      Recommendations:  Converting to merrem  Blood culture from port and picc   UA with reflex  Sputum culture  Contact isolation        D/w dr. gonzalez and nursing    Dr. bond to follow through 9/5     Medical Decision Making during this encounter was  [_] Low Complexity  [_] Moderate Complexity  [xxx ] High  Complexity

## 2022-08-26 NOTE — PROGRESS NOTES
[facility]  Progress Note    Patient name: Wei Dominique  MRN: [id number]  Admit Date: 8/18/2022   LOS: 7 days     SUBJECTIVE:     Principal problem: Catheter-associated urinary tract infection    Interval History:  Patient was seen and examined bedside, clinically looks same, blood pressure is borderline low, borderline tachycardic.  Good urine output, he spiked fever, significant secretions from tracheostomy.  No other acute events overnight as per nursing staff.  Jaw tightness has improved    Scheduled Meds:   apixaban  2.5 mg Oral BID    baclofen  10 mg Per G Tube TID    balsam peru-castor oiL   Topical (Top) Daily    busPIRone  5 mg Per G Tube BID    ceFEPime (MAXIPIME) IVPB  2 g Intravenous Q12H    chlorhexidine  15 mL Mouth/Throat BID    metoprolol tartrate  12.5 mg Per G Tube BID    mupirocin   Nasal BID    pantoprazole  40 mg Intravenous Daily    polyethylene glycol  17 g Oral Daily    riluzole  50 mg Oral Q12H    scopolamine  1 patch Transdermal Q3 Days    senna-docusate 8.6-50 mg  1 tablet Oral BID    sodium chloride 0.9%  10 mL Intravenous Q6H    traZODone  50 mg Per G Tube QHS     Continuous Infusions:   sodium chloride 0.9% 100 mL/hr at 08/26/22 0538     PRN Meds:sodium chloride, acetaminophen, acetaminophen, albuterol-ipratropium, aluminum-magnesium hydroxide-simethicone, calcium chloride IVPB, calcium chloride IVPB, calcium chloride IVPB, dextrose 10%, dextrose 10%, glucagon (human recombinant), glucose, glucose, HYDROcodone-acetaminophen, HYDROmorphone, magnesium sulfate IVPB, magnesium sulfate IVPB, magnesium sulfate IVPB, magnesium sulfate IVPB, melatonin, morphine, naloxone, ondansetron, potassium chloride in water, potassium chloride in water, potassium chloride in water, potassium chloride in water, prochlorperazine, simethicone, sodium chloride 0.9%, Flushing PICC Protocol **AND** sodium chloride 0.9% **AND** sodium chloride 0.9%, sodium  phosphate IVPB, sodium phosphate IVPB, sodium phosphate IVPB, sodium phosphate IVPB, sodium phosphate IVPB, white petrolatum    Review of patient's allergies indicates:  No Known Allergies    Review of Systems: As per interval history    OBJECTIVE:     Vital Signs (Most Recent)  Temp: 99.3 °F (37.4 °C) (08/26/22 0401)  Pulse: 101 (08/26/22 0718)  Resp: 17 (08/26/22 0718)  BP: (!) (P) 117/56 (08/26/22 0701)  SpO2: 100 % (08/26/22 0718)    Vital Signs Range (Last 24H):  Temp:  [97.5 °F (36.4 °C)-100.4 °F (38 °C)]   Pulse:  []   Resp:  [16-29]   BP: ()/(46-66)   SpO2:  [98 %-100 %]     I & O (Last 24H):    Intake/Output Summary (Last 24 hours) at 8/26/2022 0854  Last data filed at 8/26/2022 0607  Gross per 24 hour   Intake 1750 ml   Output 3300 ml   Net -1550 ml       Physical Exam:  General:  Chronically ill-appearing quadriplegic man in no distress  Eyes: No conjunctival injection. No scleral icterus.  ENT: Hearing grossly intact. No discharge from ears. No nasal discharge.  Bite blocks in place  Neck: Supple, trachea midline. No JVD, tracheostomy in place  CVS: RRR. ++ LE edema BL  Lungs:  Mechanical breath sound, No tachypnea or accessory muscle use.    Abdomen:  Soft, nontender and nondistended.  No organomegaly, peg tube in place  Neuro: AOx3. Moves all extremities. Follows commands. Responds appropriately   Skin:  Pictures as below              Laboratory:  I have reviewed all pertinent lab results within the past 24 hours.    Diagnostic Results:  Reviewed all imaging    ASSESSMENT/PLAN:     67-year-old gentleman with ALS, quadriplegia, chronic trach and PEG being treated for catheter associated UTI, masseter muscle spasm causing severe tongue trauma and other chronic problems    Active Hospital Problems    Diagnosis  POA    *Catheter-associated urinary tract infection [T83.511A, N39.0]  Yes     Priority: 1 - High    Masseter muscle spasm [M62.838]  Yes     Priority: 2     Open wound of tongue due  to bite [S01.552A]  Yes    Trismus [R25.2]  Yes    Fever [R50.9]  Yes    Facial cellulitis [L03.211]  Yes    Tracheostomy in place [Z93.0]  Not Applicable    Chronic respiratory failure with hypoxia and hypercapnia [J96.11, J96.12]  Yes    Dependent on ventilator [Z99.11]  Not Applicable    S/P percutaneous endoscopic gastrostomy (PEG) tube placement [Z93.1]  Not Applicable    ALS (amyotrophic lateral sclerosis) [G12.21]  Yes    Edema [R60.9]  Yes    Quadriparesis [G82.50]  Yes      Resolved Hospital Problems    Diagnosis Date Resolved POA    Facial abscess [L02.01] 08/22/2022 Yes         Plan:   Status post Botox injection to bilateral masseteric muscle by ENT on 08/24. Help from ENT greatly appreciated  Continue antibiotics as per Infectious Disease.  Noted escalation to meropenem considering new fever  Repeat blood cultures, chest x-ray  Stop IV fluids, hold Lasix, lowered dose of metoprolol  Vent management as per Pulmonary  Baclofen  Anemia chronic disease, transfuse below 7 grams/dl  History of PE, continue low-dose Eliquis  Tracheostomy and PEG tube care      VTE Risk Mitigation (From admission, onward)         Ordered     apixaban tablet 2.5 mg  2 times daily         08/20/22 1454     IP VTE HIGH RISK PATIENT  Once         08/20/22 0440     Place sequential compression device  Until discontinued         08/20/22 0440     Reason for No Pharmacological VTE Prophylaxis  Once        Question:  Reasons:  Answer:  Already adequately anticoagulated on oral Anticoagulants    08/20/22 0440                    Department Hospital Medicine  [facility]  Rose Mary King MD  Date of service: 08/26/2022

## 2022-08-26 NOTE — CARE UPDATE
08/26/22 0718   Patient Assessment/Suction   Level of Consciousness (AVPU) alert   Respiratory Effort Normal;Unlabored   Expansion/Accessory Muscles/Retractions no use of accessory muscles   All Lung Fields Breath Sounds coarse   Rhythm/Pattern, Respiratory assisted mechanically   Cough Frequency with stimulation   Cough Type assisted   Suction Method tracheal;oral   $ Suction Charges Inline Suction Procedure Stat Charge   Secretions Amount moderate   Secretions Color pale;yellow   Secretions Characteristics thick   PRE-TX-O2   O2 Device (Oxygen Therapy) ventilator   $ Is the patient on Low Flow Oxygen? Yes   Oxygen Concentration (%) 30   SpO2 100 %   Pulse Oximetry Type Continuous   $ Pulse Oximetry - Multiple Charge Pulse Oximetry - Multiple   Pulse 101   Resp 17   Aerosol Therapy   $ Aerosol Therapy Charges PRN treatment not required        Surgical Airway 02/21/22 1145 Shiley Cuffed   Placement Date/Time: 02/21/22 1145   Present Prior to Hospital Arrival?: Yes  Type: Tracheostomy  Brand: Yikuaiqu  Airway Device Size: 8.0  Style: Cuffed   Cuff Pressure   (mlt)   Status Secured   Airway Safety   Ambu bag with the patient? Yes, Adult Ambu   Is mask with the patient? Yes, Adult Mask   Suction set is at the bedside? Yes   Extra trach at bedside? Yes   Extra trach sizes at bedside? 6   Vent Select   Conventional Vent Y   $ Ventilator Subsequent 1   Charged w/in last 24h YES   Preset Conventional Ventilator Settings   Vent ID [id number]   Vent Type    Ventilation Type VC   Vent Mode A/C   Set Rate 16 BPM   Vt Set 500 mL   PEEP/CPAP 5 cmH20   Peak Flow 60 L/min   Peak End Inspiratory Pressure 27 cmH20   I-Trigger Type  V-TRIG   Trigger Sensitivity Flow/I-Trigger 2 L/min   Patient Ventilator Parameters   Resp Rate Total 16 br/min   Peak Airway Pressure 44 cmH2O   Mean Airway Pressure 13 cmH20   Plateau Pressure 24 cmH20   Exhaled Vt 513 mL   Total Ve 8.39 mL   I:E Ratio Measured 1:2.70   Auto PEEP 0 cmH20   Tubing ID (mm)  8 mm   Tube Type Trach   Conventional Ventilator Alarms   Alarms On Y  (red cord plugged into call light)   Ve High Alarm 20 L/min   Ve Low Alarm 3 L/min   Vt High Alarm 1200 mL   Vt Low Alarm 250 mL   Resp Rate High Alarm 40 br/min   Press High Alarm 60 cmH2O   Apnea Rate 16   Apnea Volume (mL) 500 mL   Apnea Oxygen Concentration  100   Apnea Flow Rate (L/min) 60   T Apnea 20 sec(s)   Ready to Wean/Extubation Screen   FIO2<=50 (chart decimal) 0.3   MV<16L (chart vol.) 8.39   PEEP <=8 (chart #) 5   Education   $ Education Suction;Ventilator Oxygen;15 min   Respiratory Evaluation   $ Care Plan Tech Time 15 min   $ Eval/Re-eval Charges Re-evaluation

## 2022-08-27 LAB
ANION GAP SERPL CALC-SCNC: 5 MMOL/L (ref 8–16)
BASOPHILS # BLD AUTO: 0.06 K/UL (ref 0–0.2)
BASOPHILS NFR BLD: 0.4 % (ref 0–1.9)
BNP SERPL-MCNC: 101 PG/ML (ref 0–99)
BUN SERPL-MCNC: 43 MG/DL (ref 8–23)
CALCIUM SERPL-MCNC: 8.6 MG/DL (ref 8.7–10.5)
CHLORIDE SERPL-SCNC: 114 MMOL/L (ref 95–110)
CO2 SERPL-SCNC: 24 MMOL/L (ref 23–29)
CREAT SERPL-MCNC: 0.4 MG/DL (ref 0.5–1.4)
CRP SERPL-MCNC: 18.74 MG/DL
DIFFERENTIAL METHOD: ABNORMAL
EOSINOPHIL # BLD AUTO: 0.6 K/UL (ref 0–0.5)
EOSINOPHIL NFR BLD: 3.9 % (ref 0–8)
ERYTHROCYTE [DISTWIDTH] IN BLOOD BY AUTOMATED COUNT: 19.1 % (ref 11.5–14.5)
EST. GFR  (NO RACE VARIABLE): >60 ML/MIN/1.73 M^2
GLUCOSE SERPL-MCNC: 124 MG/DL (ref 70–110)
HCT VFR BLD AUTO: 26 % (ref 40–54)
HGB BLD-MCNC: 7.5 G/DL (ref 14–18)
IMM GRANULOCYTES # BLD AUTO: 0.21 K/UL (ref 0–0.04)
IMM GRANULOCYTES NFR BLD AUTO: 1.3 % (ref 0–0.5)
LYMPHOCYTES # BLD AUTO: 1.6 K/UL (ref 1–4.8)
LYMPHOCYTES NFR BLD: 10.1 % (ref 18–48)
MAGNESIUM SERPL-MCNC: 1.9 MG/DL (ref 1.6–2.6)
MCH RBC QN AUTO: 24.2 PG (ref 27–31)
MCHC RBC AUTO-ENTMCNC: 28.8 G/DL (ref 32–36)
MCV RBC AUTO: 84 FL (ref 82–98)
MONOCYTES # BLD AUTO: 0.8 K/UL (ref 0.3–1)
MONOCYTES NFR BLD: 4.9 % (ref 4–15)
NEUTROPHILS # BLD AUTO: 12.6 K/UL (ref 1.8–7.7)
NEUTROPHILS NFR BLD: 79.4 % (ref 38–73)
NRBC BLD-RTO: 0 /100 WBC
PLATELET # BLD AUTO: 405 K/UL (ref 150–450)
PMV BLD AUTO: 9.8 FL (ref 9.2–12.9)
POTASSIUM SERPL-SCNC: 3.8 MMOL/L (ref 3.5–5.1)
RBC # BLD AUTO: 3.1 M/UL (ref 4.6–6.2)
SODIUM SERPL-SCNC: 143 MMOL/L (ref 136–145)
WBC # BLD AUTO: 15.79 K/UL (ref 3.9–12.7)

## 2022-08-27 PROCEDURE — 20000000 HC ICU ROOM

## 2022-08-27 PROCEDURE — 87040 BLOOD CULTURE FOR BACTERIA: CPT | Performed by: STUDENT IN AN ORGANIZED HEALTH CARE EDUCATION/TRAINING PROGRAM

## 2022-08-27 PROCEDURE — 25000003 PHARM REV CODE 250: Performed by: HOSPITALIST

## 2022-08-27 PROCEDURE — 99233 SBSQ HOSP IP/OBS HIGH 50: CPT | Mod: ,,, | Performed by: INTERNAL MEDICINE

## 2022-08-27 PROCEDURE — 99900031 HC PATIENT EDUCATION (STAT)

## 2022-08-27 PROCEDURE — 36415 COLL VENOUS BLD VENIPUNCTURE: CPT | Performed by: INTERNAL MEDICINE

## 2022-08-27 PROCEDURE — 94761 N-INVAS EAR/PLS OXIMETRY MLT: CPT

## 2022-08-27 PROCEDURE — 86140 C-REACTIVE PROTEIN: CPT | Performed by: INTERNAL MEDICINE

## 2022-08-27 PROCEDURE — 63600175 PHARM REV CODE 636 W HCPCS: Performed by: STUDENT IN AN ORGANIZED HEALTH CARE EDUCATION/TRAINING PROGRAM

## 2022-08-27 PROCEDURE — 25000003 PHARM REV CODE 250: Performed by: STUDENT IN AN ORGANIZED HEALTH CARE EDUCATION/TRAINING PROGRAM

## 2022-08-27 PROCEDURE — C9113 INJ PANTOPRAZOLE SODIUM, VIA: HCPCS | Performed by: INTERNAL MEDICINE

## 2022-08-27 PROCEDURE — 27000221 HC OXYGEN, UP TO 24 HOURS

## 2022-08-27 PROCEDURE — 25000003 PHARM REV CODE 250: Performed by: INTERNAL MEDICINE

## 2022-08-27 PROCEDURE — 83880 ASSAY OF NATRIURETIC PEPTIDE: CPT | Performed by: INTERNAL MEDICINE

## 2022-08-27 PROCEDURE — 99233 PR SUBSEQUENT HOSPITAL CARE,LEVL III: ICD-10-PCS | Mod: ,,, | Performed by: INTERNAL MEDICINE

## 2022-08-27 PROCEDURE — 63700000 PHARM REV CODE 250 ALT 637 W/O HCPCS: Performed by: INTERNAL MEDICINE

## 2022-08-27 PROCEDURE — 83735 ASSAY OF MAGNESIUM: CPT | Performed by: INTERNAL MEDICINE

## 2022-08-27 PROCEDURE — 99233 PR SUBSEQUENT HOSPITAL CARE,LEVL III: ICD-10-PCS | Mod: ,,, | Performed by: STUDENT IN AN ORGANIZED HEALTH CARE EDUCATION/TRAINING PROGRAM

## 2022-08-27 PROCEDURE — 63600175 PHARM REV CODE 636 W HCPCS: Performed by: INTERNAL MEDICINE

## 2022-08-27 PROCEDURE — A4216 STERILE WATER/SALINE, 10 ML: HCPCS | Performed by: INTERNAL MEDICINE

## 2022-08-27 PROCEDURE — 99900035 HC TECH TIME PER 15 MIN (STAT)

## 2022-08-27 PROCEDURE — 85025 COMPLETE CBC W/AUTO DIFF WBC: CPT | Performed by: INTERNAL MEDICINE

## 2022-08-27 PROCEDURE — 99233 SBSQ HOSP IP/OBS HIGH 50: CPT | Mod: ,,, | Performed by: STUDENT IN AN ORGANIZED HEALTH CARE EDUCATION/TRAINING PROGRAM

## 2022-08-27 PROCEDURE — 94799 UNLISTED PULMONARY SVC/PX: CPT

## 2022-08-27 PROCEDURE — 99900026 HC AIRWAY MAINTENANCE (STAT)

## 2022-08-27 PROCEDURE — 94003 VENT MGMT INPAT SUBQ DAY: CPT

## 2022-08-27 PROCEDURE — 80048 BASIC METABOLIC PNL TOTAL CA: CPT | Performed by: INTERNAL MEDICINE

## 2022-08-27 RX ORDER — VANCOMYCIN HCL IN 5 % DEXTROSE 1G/250ML
1000 PLASTIC BAG, INJECTION (ML) INTRAVENOUS ONCE
Status: DISCONTINUED | OUTPATIENT
Start: 2022-08-27 | End: 2022-08-27

## 2022-08-27 RX ADMIN — BUSPIRONE HYDROCHLORIDE 5 MG: 5 TABLET ORAL at 08:08

## 2022-08-27 RX ADMIN — CASTOR OIL AND BALSAM, PERU: 788; 87 OINTMENT TOPICAL at 11:08

## 2022-08-27 RX ADMIN — SODIUM CHLORIDE, PRESERVATIVE FREE 10 ML: 5 INJECTION INTRAVENOUS at 12:08

## 2022-08-27 RX ADMIN — NYSTATIN 500000 UNITS: 100000 SUSPENSION ORAL at 08:08

## 2022-08-27 RX ADMIN — RILUZOLE 50 MG: 50 TABLET ORAL at 11:08

## 2022-08-27 RX ADMIN — POTASSIUM CHLORIDE 20 MEQ: 7.46 INJECTION, SOLUTION INTRAVENOUS at 05:08

## 2022-08-27 RX ADMIN — MEROPENEM AND SODIUM CHLORIDE 1 G: 1 INJECTION, SOLUTION INTRAVENOUS at 08:08

## 2022-08-27 RX ADMIN — MEROPENEM AND SODIUM CHLORIDE 1 G: 1 INJECTION, SOLUTION INTRAVENOUS at 04:08

## 2022-08-27 RX ADMIN — METOPROLOL TARTRATE 12.5 MG: 25 TABLET, FILM COATED ORAL at 08:08

## 2022-08-27 RX ADMIN — PANTOPRAZOLE SODIUM 40 MG: 40 INJECTION, POWDER, FOR SOLUTION INTRAVENOUS at 11:08

## 2022-08-27 RX ADMIN — BACLOFEN 10 MG: 5 TABLET ORAL at 08:08

## 2022-08-27 RX ADMIN — ACETAMINOPHEN 1000 MG: 500 TABLET, FILM COATED ORAL at 07:08

## 2022-08-27 RX ADMIN — BACLOFEN 10 MG: 5 TABLET ORAL at 11:08

## 2022-08-27 RX ADMIN — APIXABAN 2.5 MG: 2.5 TABLET, FILM COATED ORAL at 08:08

## 2022-08-27 RX ADMIN — NYSTATIN 500000 UNITS: 100000 SUSPENSION ORAL at 04:08

## 2022-08-27 RX ADMIN — NYSTATIN 500000 UNITS: 100000 SUSPENSION ORAL at 11:08

## 2022-08-27 RX ADMIN — VANCOMYCIN HYDROCHLORIDE 1500 MG: 1.5 INJECTION, POWDER, LYOPHILIZED, FOR SOLUTION INTRAVENOUS at 09:08

## 2022-08-27 RX ADMIN — MORPHINE SULFATE 4 MG: 4 INJECTION, SOLUTION INTRAMUSCULAR; INTRAVENOUS at 02:08

## 2022-08-27 RX ADMIN — SENNOSIDES AND DOCUSATE SODIUM 1 TABLET: 50; 8.6 TABLET ORAL at 08:08

## 2022-08-27 RX ADMIN — BUSPIRONE HYDROCHLORIDE 5 MG: 5 TABLET ORAL at 11:08

## 2022-08-27 RX ADMIN — VANCOMYCIN HYDROCHLORIDE 1500 MG: 1.5 INJECTION, POWDER, LYOPHILIZED, FOR SOLUTION INTRAVENOUS at 11:08

## 2022-08-27 RX ADMIN — METOPROLOL TARTRATE 12.5 MG: 25 TABLET, FILM COATED ORAL at 11:08

## 2022-08-27 RX ADMIN — BACLOFEN 10 MG: 5 TABLET ORAL at 04:08

## 2022-08-27 RX ADMIN — POLYETHYLENE GLYCOL 3350 17 G: 17 POWDER, FOR SOLUTION ORAL at 09:08

## 2022-08-27 RX ADMIN — SENNOSIDES AND DOCUSATE SODIUM 1 TABLET: 50; 8.6 TABLET ORAL at 09:08

## 2022-08-27 RX ADMIN — MORPHINE SULFATE 4 MG: 4 INJECTION, SOLUTION INTRAMUSCULAR; INTRAVENOUS at 09:08

## 2022-08-27 RX ADMIN — APIXABAN 2.5 MG: 2.5 TABLET, FILM COATED ORAL at 11:08

## 2022-08-27 RX ADMIN — TRAZODONE HYDROCHLORIDE 50 MG: 50 TABLET ORAL at 08:08

## 2022-08-27 RX ADMIN — RILUZOLE 50 MG: 50 TABLET ORAL at 08:08

## 2022-08-27 RX ADMIN — MORPHINE SULFATE 4 MG: 4 INJECTION, SOLUTION INTRAMUSCULAR; INTRAVENOUS at 04:08

## 2022-08-27 RX ADMIN — MORPHINE SULFATE 4 MG: 4 INJECTION, SOLUTION INTRAMUSCULAR; INTRAVENOUS at 12:08

## 2022-08-27 RX ADMIN — SCOPOLAMINE 1 PATCH: 1 PATCH TRANSDERMAL at 04:08

## 2022-08-27 RX ADMIN — MEROPENEM AND SODIUM CHLORIDE 1 G: 1 INJECTION, SOLUTION INTRAVENOUS at 05:08

## 2022-08-27 RX ADMIN — MAGNESIUM SULFATE IN DEXTROSE 1 G: 10 INJECTION, SOLUTION INTRAVENOUS at 09:08

## 2022-08-27 RX ADMIN — SODIUM CHLORIDE, PRESERVATIVE FREE 10 ML: 5 INJECTION INTRAVENOUS at 06:08

## 2022-08-27 RX ADMIN — MORPHINE SULFATE 4 MG: 4 INJECTION, SOLUTION INTRAMUSCULAR; INTRAVENOUS at 07:08

## 2022-08-27 NOTE — PROGRESS NOTES
Consult Note  Infectious Disease    Reason for Consult:  Facial abscess    HPI: Wei Dominique is a 67 y.o. male with known history of ALS, s/p trach & peg, vent dependence, HTN, PE (on eliquis) who presented with a primary complaint of facial swelling x few days. No family present at bedside. History was obtained from chart review and physician sign out. Patient had CT scan maxillofacial done that showed Right maxillary and anterior maxillary soft tissue edema and thickening which extends along the right maxillary molars into the oropharynx. Correlate for cellulitis and or pharyngeal infection.  Right perimandibular ovoid collection. Patient needs maxillofacial surgery however all hospitals are on diversion. Patient was in the ER waiting to be transferred for 18 hours and subsequently admitted to hospital medicine, ICU.   Seen by our service in June for pseudomonas  UTI  At that time he had deep bites and fissures in tongue as well.     8/20: interim reviewed and seen with Dr. Kennedy. Temperatures have improved. MRSA screen was positive. Midline tip with SA and GNR. Sputum culture in progress, and urine culture has growth that has to be re-isolated. He continues to acknowledge pain, nausea. He is in the process of being sedated and paralyzed for adequate ENT exam. Arms remain edematous  8/21:  Afebrile, interim reviewed.  The ENT note not complete but told by RN that no abscesses were discovered while sedated and paralyzed for oral exam.  The tongue was reduced into the mouth and bite block was placed.  Urine with 2 gram negative rods, 1 of which is Pseudomonas.  Sputum with Pseudomonas which is not new, chest x-ray with atelectasis.  Line tip with MRSA and Morganella, relevance unclear as his blood cultures were negative. Discussed with hospital medicine. My bedside questions about comfort care were answered in the negative, ie he does not want to be comfort care. He does want pain control however.    8/22: afebrile. VSS. Vent needs are stable. Mouth pain is decreased. Breathing comfortably. No nausea or abdominal pain. Able to communicate and answer questions.     8/26(reconsult): interim reviewed.   Received bilateral masseter botox injections on 8/24. Low grade temp on 8/25, BP a bit low, tachycardic. Temp max 100.4 and he has lots of purulent secretions from and around the ET tube. CXR with RLL atelectasis/effusion/infiltrate? Which is a little more than admission films. Vent needs are not increased. WBC about the same. procalcitonin has normalized, from admission level. Wound photos are improved/benign. hgb down to 7.3. he is alert, able to open his jaws a little more. The tongue laceration left side is bleeding a little. No abscesses, hematomas, facial cellulitis, or ulcers. No obvious thrush.     8/27 (Link):  Interim reviewed, discussed with Dr Jones.  Patient seen and examined at bedside, T-max 100.4°, currently 100.2, hemodynamically stable.  Patient awake, alert, blinking upon verbal commands.  Oral cavity suctioned, thick purulent secretions noted.  Labs reviewed, leukocytosis 15.7, PMN 79.4%, H&H 7.5/26, platelet count 405.  Creatinine 0.4, normal electrolytes.  CRP downtrending 18.7 today.  Repeat UA from yesterday negative for UTI, 2+ occult blood.  Micro reviewed, blood cultures 1/4 aerobic bottles from 08/26 [facility], pending final.  Started on vancomycin IV overnight.  Chest x-ray from yesterday with bilateral lung opacities suggesting atelectasis.    EXAM & DIAGNOSTICS REVIEWED:   Vitals:     Temp:  [99 °F (37.2 °C)-100.4 °F (38 °C)]   Temp: 100.2 °F (37.9 °C) (ax) (08/27/22 0756)  Pulse: (!) 135 (08/27/22 0601)  Resp: 19 (08/27/22 0757)  BP: (!) 143/77 (08/27/22 0601)  SpO2: 100 % (08/27/22 0601)    Intake/Output Summary (Last 24 hours) at 8/27/2022 0803  Last data filed at 8/27/2022 0628  Gross per 24 hour   Intake 2000 ml   Output 3750 ml   Net -1750 ml       General:  Awake , attends,  communicates blinking  Eyes:  Anicteric,   EOMI  ENT:  8/19: Copious intranasal secretions suctioned, because his mouth is profoundly difficult to open. His jaws have extremely high muscular tone. His tongue is severely lacerated and bulging out between his teeth on the right. He has a severe underbite. Copious saliva and bloody secretions suctioned from mouth. Even with dilaudid, we were only able to open his jaws less than a cm and I was able to insert a tongue blade only about half way. He has loose teeth, periodontal disease. I cannot see or palpate a soft tissue abscess.    8/20: exam deferred as he is going to be sedated/paralyzed for exam shortly   8/21: bite block is out. Only minimal tongue visible on right. Mouth is tender   8/22: device to maintain safe bite in place.   8/23: same as above   8/26: able to open his jaws a little more. The tongue laceration left side is bleeding a little. No abscesses, hematomas, facial cellulitis, or ulcers. No obvious thrush.    8/27:  Thick purulent copious secretions suctioned from oral cavity    Neck:  tracheosotomy  Lungs: Decreased breath sounds bilaterally, mild crackles right base  Heart:  Tachycardic, S1/S2+, regular rhythm, no murmur  Abd:  Soft, NT, ND, hypoactive BS, no masses or organomegaly appreciated. PEG with small amount of granulation. Rectal tube with loose stool , volume moderate on miralax and senna  :  Hutchinson with clear urine  Musc:  Joints without effusion, swelling, erythema, synovitis, muscle wasting  Skin:  No rashes.  Neuro:             Alert, attentive,  quadriplegic  Psych:   Calm, attends  Lymphatic:     Extrem: LE 1+ edema, UE 4+ edema   VAD:   right arm picc , port left chest, neither have redness or drainage  Isolation:  contact  Wound:           8/26:             General Labs reviewed:  Recent Labs   Lab 08/25/22  0330 08/26/22  0350 08/27/22  0300   WBC 18.77* 17.58* 15.79*   HGB 7.7* 7.3* 7.5*   HCT 25.7* 25.0* 26.0*    390 405        Recent Labs   Lab 08/25/22  0330 08/26/22  0350 08/27/22  0300    139 143   K 3.5 4.1 3.8   * 113* 114*   CO2 21* 22* 24   BUN 21 36* 43*   CREATININE 0.3* 0.4* 0.4*   CALCIUM 8.3* 8.1* 8.6*     Recent Labs   Lab 08/26/22  0353 08/27/22  0300   CRP 24.67* 18.74*     Estimated Creatinine Clearance: 175.8 mL/min (A) (based on SCr of 0.4 mg/dL (L)).    Micro:  Microbiology Results (last 7 days)       Procedure Component Value Units Date/Time    Blood culture [[id number]]     Order Status: No result Specimen: Blood     Blood culture [[id number]] Collected: 08/26/22 0957    Order Status: Completed Specimen: Blood Updated: 08/27/22 0629     Blood Culture, Routine Gram stain aer bottle: Gram positive cocci in clusters resembling Staph      Results called to and read back by: Porfirio Lerma3 08/27/2022  06:29      fgf    Culture, Respiratory with Gram Stain [[id number]] Collected: 08/26/22 1313    Order Status: Completed Specimen: Respiratory from Endotracheal Aspirate Updated: 08/27/22 0125     Gram Stain (Respiratory) <10 epithelial cells per low power field.     Gram Stain (Respiratory) Moderate WBC's     Gram Stain (Respiratory) Few Gram negative rods     Gram Stain (Respiratory) Few Gram positive rods    Blood culture [[id number]] Collected: 08/26/22 1300    Order Status: Completed Specimen: Blood from Line, PICC Right Brachial Updated: 08/26/22 2117     Blood Culture, Routine No Growth to date    Narrative:      From port  Collection has been rescheduled by RE1 at 08/26/2022 12:23 Reason:   Nurse Draw  Collection has been rescheduled by RE1 at 08/26/2022 12:23 Reason:   Nurse Draw    Blood culture [[id number]] Collected: 08/26/22 0957    Order Status: Completed Specimen: Blood Updated: 08/26/22 1717     Blood Culture, Routine No Growth to date    Blood culture [[id number]]     Order Status: Canceled Specimen: Blood     Blood culture [[id number]] Collected: 08/19/22 1234    Order Status: Completed  Specimen: Blood from Peripheral, Upper Arm, Right Updated: 08/24/22 1432     Blood Culture, Routine No growth after 5 days.    Blood culture [[id number]] Collected: 08/18/22 2010    Order Status: Completed Specimen: Blood from Peripheral, Forearm, Left Updated: 08/23/22 2232     Blood Culture, Routine No growth after 5 days.    Urine culture [[id number]]  (Abnormal)  (Susceptibility) Collected: 08/18/22 1950    Order Status: Completed Specimen: Urine Updated: 08/22/22 0658     Urine Culture, Routine PSEUDOMONAS AERUGINOSA   >100,000 cfu/ml  Known  patient        PROVIDENCIA STUARTII  > 100,000 cfu/ml      Narrative:      Specimen Source->Urine    Culture, Respiratory with Gram Stain [[id number]]  (Abnormal)  (Susceptibility) Collected: 08/19/22 1728    Order Status: Completed Specimen: Respiratory from Tracheal Aspirate Updated: 08/22/22 0652     Respiratory Culture Normal respiratory marco      PSEUDOMONAS AERUGINOSA  Moderate       Gram Stain (Respiratory) Moderate WBC's     Gram Stain (Respiratory) Rare Gram positive cocci     Gram Stain (Respiratory) Rare Gram positive rods     Gram Stain (Respiratory) Rare Gram negative rods    IV catheter culture [[id number]]  (Abnormal)  (Susceptibility) Collected: 08/19/22 0119    Order Status: Completed Specimen: Catheter Tip, Peripheral Updated: 08/21/22 1018     Aerobic Culture - Cath tip METHICILLIN RESISTANT STAPHYLOCOCCUS AUREUS  Too numerous to count  Results called to and read back by:  08/21/2022  10:18 JBM        MORGANELLA MORGANII  Too numerous to count      Narrative:      Midline right arm            Imaging Reviewed:   CXR   CT maxillofacial  1.  Right maxillary and anterior maxillary soft tissue edema and thickening which extends along the right maxillary molars into the oropharynx. Correlate for cellulitis and or pharyngeal infection.  2.  Right perimandibular ovoid collection with rim enhancement noted measuring 3.0 x 1.8 cm. This appears to  connect to the aforementioned maxillary skin thickening and edema.  3.  Nasopharyngeal phlegm and frothy debris extends into the ethmoid and maxillary sinus.  4.  Bilateral mastoid air cell fluid can be seen with mastoiditis.  5.  Right thyroid nodule measuring 1.9 cm. Recommend nonemergent thyroid ultrasound.  6.  Prominent pituitary gland may represent pituitary adenoma.    US both arms: neg for DVT  US both legs: negative for DVT      Cardiology:    IMPRESSION & PLAN     1. Sepsis multifactorial; severe swelling of the tongue with laceration, no evidence of abscess, polymicrobial marco isolated from midline catheter tip; MRSA and Morganella, blood cultures on admission negative, possible VAP, and polymicrobial UTI  Repeat blood cultures 8/26 1/4 bottles GPC, pending final   MRSA nasal swab detected   Respiratory culture 8/19 pansensitive Pseudomonas aeruginosa   Prespiratory culture 8/26 few GNR and GPC, pending final   Urine culture 8/18 Pseudomonas  sensitive and Providencia stuartii both sensitive to Cefepime, s/p Zerbaxa 4 days and Cefepime 4 days, completed treatment    Repeat UA negative    2. Quadriplegic, ALS, debility, bed-bound    Recommendations:  Repeat blood cultures   Continue Vancomycin IV, keep level 15-20  Meropenem 1g IV q8h  Follow cultures   Agreed on UE and LE US to rule out DVTs  Aspiration precautions  Contact isolation     D/w nursing, Dr King     Medical Decision Making during this encounter was  [_] Low Complexity  [_] Moderate Complexity  [xxx ] High Complexity

## 2022-08-27 NOTE — RESPIRATORY THERAPY
08/26/22 2010   Patient Assessment/Suction   Level of Consciousness (AVPU) alert   Respiratory Effort Normal;Unlabored   Expansion/Accessory Muscles/Retractions no use of accessory muscles   All Lung Fields Breath Sounds Anterior:;coarse   Rhythm/Pattern, Respiratory assisted mechanically   Cough Frequency with stimulation   Cough Type assisted   Suction Method oral;tracheal   Suction Pressure (mmHg) -120 mmHg   $ Suction Charges Inline Suction Procedure Stat Charge   Secretions Amount small   Secretions Color yellow   Secretions Characteristics thick   $ Swab or suction? Suction   Aspirate Toleration KARMEN (no adverse reactions)   Skin Integrity   $ Wound Care Tech Time 15 min   Area Observed Neck under tracheostomy   Skin Appearance without discoloration   PRE-TX-O2   O2 Device (Oxygen Therapy) ventilator   Oxygen Concentration (%) 30   SpO2 100 %   Pulse Oximetry Type Continuous   $ Pulse Oximetry - Multiple Charge Pulse Oximetry - Multiple   Pulse (!) 124   Resp (!) 22        Surgical Airway 02/21/22 1145 Shiley Cuffed   Placement Date/Time: 02/21/22 1145   Present Prior to Hospital Arrival?: Yes  Type: Tracheostomy  Brand: Shiley  Airway Device Size: 8.0  Style: Cuffed   Cuff Inflation? Inflated   Status Secured   Site Assessment Clean;Dry;No bleeding;No drainage   Site Care Cleansed;Dressing applied;Dried   Inner Cannula Care Cleansed/dried   Ties Assessment Intact;Clean;Dry   Airway Safety   Ambu bag with the patient? Yes, Adult Ambu   Is mask with the patient? Yes, Adult Mask   Suction set is at the bedside? Yes   ETT at Bedside? Yes   Extra trach at bedside? Yes   Extra trach sizes at bedside? 6   Is obturator available? Yes   Vent Select   Conventional Vent Y   Charged w/in last 24h YES   Preset Conventional Ventilator Settings   Vent ID [id number]   Vent Type    Ventilation Type VC   Vent Mode A/C   Humidity HME   Set Rate 16 BPM   Vt Set 500 mL   PEEP/CPAP 5 cmH20   Peak Flow 60 L/min   Peak End  Inspiratory Pressure 21 cmH20   I-Trigger Type  V-TRIG   Trigger Sensitivity Flow/I-Trigger 2 L/min   Patient Ventilator Parameters   Resp Rate Total 25 br/min   Peak Airway Pressure 25 cmH2O   Mean Airway Pressure 13 cmH20   Plateau Pressure 31 cmH20   Exhaled Vt 513 mL   Total Ve 13.2 mL   I:E Ratio Measured 1:1.60   Auto PEEP 0 cmH20   Conventional Ventilator Alarms   Alarms On Y   Resp Rate High Alarm 40 br/min   Press High Alarm 60 cmH2O   Apnea Rate 16   Apnea Volume (mL) 1 mL   Apnea Oxygen Concentration  100   Apnea Flow Rate (L/min) 60   T Apnea 20 sec(s)   Ready to Wean/Extubation Screen   FIO2<=50 (chart decimal) 0.3   MV<16L (chart vol.) 13.2   PEEP <=8 (chart #) 5   Ready to Wean Parameters   F/VT Ratio<105 (RSBI) (!) 42.88   Vital Capacity (mL) 0   Education   $ Education Suction;Ventilator Oxygen;30 min   Respiratory Evaluation   $ Care Plan Tech Time 15 min   $ Eval/Re-eval Charges Re-evaluation

## 2022-08-27 NOTE — PROGRESS NOTES
Pulmonary/Critical Care  Progress Note      Patient name: Wei Dominique  MRN: [id number]  Date: 08/27/2022    Admit Date: 8/18/2022  Consult Requested By: Rose Mary King MD    Reason for Consult: Respiratory failure, trach    HPI:    8/19/2022 - P with h/o ALS with trach and vent dependence sent to ER for increased swelling.  Now admitted for evaluation and I was asked to see pt for ventilator management.  He is not able to provide any history.  Chart has been reviewed.  He is febrile, tachycardic (though better).  Cultures have been ordered and are pending.  Maxillofacial CT noted.  CXR shows no new infiltrates.  No ABG done yet.    8/22/2022 - Events of the weekend noted, no response to me this AM.  Dr Jones's note from yesterday seen and pt does not want comfort measures.  Mulitple + cultures (ID following for antibiotics).    8/23/2022 - Stable overnight, no new issues reported.  He is more awake and responsive.  Waiting on neuro to see for masseter BOTOX injections.  Dr Jones's note seen.  Stable on ventilator.     8/24/2022 - Stable overnight, no new issues reported.  Neuro note seen but they do not do Botox injections and have recommended OMFS consult (I do not believe that they come here) - ? Do we have to consider transfer to [facility] for evaluation/treatment per OMFS.  Respiratory status is stable.    8/25/2022 - Stable overnight, had BOTOX injection per ENT.  No new issues reported.  Low grade fever.    8/26/2022 - Stable overnight, no new issues reported.  He has some increased movement and ability to open mouth this AM.  Temp up a little yesterday.  H/H down a little but no active bleeding reported.  CXR with RLL atelectasis/infiltratre and possible effusion    8/27 the patient is running a temp.  He has MRSA on a catheter tip.  And is MRSA positive in his nares.  He is able to open his mouth.  He is having back pain.    Review of Systems    Review of Systems   Reason unable to perform ROS:  difficult to communicate with.   Constitutional:  Negative for fever.   Respiratory:          Ventilated    Cardiovascular:  Negative for chest pain.   Gastrointestinal:         + PEG   Musculoskeletal:  Positive for back pain.   Neurological:         + ALS with chronic findings     Past Medical History    Past Medical History:   Diagnosis Date    ALS (amyotrophic lateral sclerosis)     ALS (amyotrophic lateral sclerosis)     Dependent on ventilator 3/21/2022    Erectile dysfunction     Gait disturbance     Hyperlipidemia     Hypertension     Iron deficiency anemia     Motor neuron disease     Polycystic kidney disease     Quadriparesis 2/6/2018    Tremor        Past Surgical History    Past Surgical History:   Procedure Laterality Date    GASTROSTOMY      PEG    PORTACATH PLACEMENT      TRACHEOSTOMY         Medications (scheduled):      apixaban  2.5 mg Oral BID    baclofen  10 mg Per G Tube TID    balsam peru-castor oiL   Topical (Top) Daily    busPIRone  5 mg Per G Tube BID    meropenem (MERREM) IVPB  1 g Intravenous Q8H    metoprolol tartrate  12.5 mg Per G Tube BID    nystatin  500,000 Units Mouth/Throat QID    pantoprazole  40 mg Intravenous Daily    polyethylene glycol  17 g Oral Daily    riluzole  50 mg Oral Q12H    scopolamine  1 patch Transdermal Q3 Days    senna-docusate 8.6-50 mg  1 tablet Oral BID    sodium chloride 0.9%  10 mL Intravenous Q6H    traZODone  50 mg Per G Tube QHS    vancomycin (VANCOCIN) IVPB  1,500 mg Intravenous Q12H       Medications (infusions):           Medications (prn):     sodium chloride, acetaminophen, acetaminophen, albuterol-ipratropium, aluminum-magnesium hydroxide-simethicone, calcium chloride IVPB, calcium chloride IVPB, calcium chloride IVPB, dextrose 10%, dextrose 10%, glucagon (human recombinant), glucose, glucose, HYDROcodone-acetaminophen, HYDROmorphone, magnesium sulfate IVPB, magnesium sulfate IVPB, magnesium sulfate IVPB, magnesium sulfate IVPB, melatonin,  "morphine, naloxone, ondansetron, potassium chloride in water, potassium chloride in water, potassium chloride in water, potassium chloride in water, prochlorperazine, simethicone, sodium chloride 0.9%, Flushing PICC Protocol **AND** sodium chloride 0.9% **AND** sodium chloride 0.9%, sodium phosphate IVPB, sodium phosphate IVPB, sodium phosphate IVPB, sodium phosphate IVPB, sodium phosphate IVPB, Pharmacy to dose Vancomycin consult **AND** vancomycin - pharmacy to dose, white petrolatum    Family History: No family history on file.    Social History: Tobacco:   Social History     Tobacco Use   Smoking Status Not on file   Smokeless Tobacco Not on file                                EtOH:   Social History     Substance and Sexual Activity   Alcohol Use None                                Drugs:   Social History     Substance and Sexual Activity   Drug Use Never       Physical Exam    Vital signs:  Temp:  [99.4 °F (37.4 °C)-100.4 °F (38 °C)]   Pulse:  [109-137]   Resp:  [18-74]   BP: (112-149)/(58-86)   SpO2:  [99 %-100 %]     Intake/Output:     Intake/Output Summary (Last 24 hours) at 8/27/2022 1209  Last data filed at 8/27/2022 0628  Gross per 24 hour   Intake 1760 ml   Output 3200 ml   Net -1440 ml          BMI: Estimated body mass index is 27.25 kg/m² as calculated from the following:    Height as of this encounter: 5' 8" (1.727 m).    Weight as of this encounter: 81.3 kg (179 lb 3.7 oz).    Physical Exam  Vitals and nursing note reviewed.   Constitutional:       General: He is not in acute distress.     Appearance: Normal appearance. He is ill-appearing (chronically). He is not toxic-appearing or diaphoretic.   HENT:      Head: Normocephalic and atraumatic.      Comments: Some swelling to right face     Right Ear: External ear normal.      Left Ear: External ear normal.      Nose: Nose normal.      Mouth/Throat:      Mouth: Mucous membranes are moist.      Pharynx: Oropharynx is clear. No oropharyngeal exudate.    "   Comments: Bite block removed  Eyes:      General: No scleral icterus.        Right eye: No discharge.         Left eye: No discharge.      Extraocular Movements: Extraocular movements intact.      Conjunctiva/sclera: Conjunctivae normal.      Pupils: Pupils are equal, round, and reactive to light.   Neck:      Vascular: No carotid bruit.      Comments: trach  Cardiovascular:      Rate and Rhythm: Normal rate and regular rhythm.      Pulses: Normal pulses.      Heart sounds: Normal heart sounds. No murmur heard.    No friction rub. No gallop.   Pulmonary:      Effort: Pulmonary effort is normal. No respiratory distress.      Breath sounds: Normal breath sounds. No stridor. No wheezing, rhonchi or rales.   Chest:      Chest wall: No tenderness.   Abdominal:      General: Bowel sounds are normal. There is no distension.      Tenderness: There is no abdominal tenderness. There is no guarding.      Comments: Peg tube in place   Musculoskeletal:         General: Swelling (Arms are very edematous) present. Normal range of motion.      Cervical back: Normal range of motion and neck supple. No rigidity or tenderness.      Right lower leg: No edema.      Left lower leg: No edema.      Comments: + edema in UE bilaterally   Lymphadenopathy:      Cervical: No cervical adenopathy.   Skin:     General: Skin is warm and dry.      Capillary Refill: Capillary refill takes less than 2 seconds.      Coloration: Skin is not jaundiced.      Findings: No bruising.      Comments: Some redness at prior midline site  Some heel breakdown   Neurological:      Mental Status: He is alert.      Comments: Completely paralyzed except for his eyes and he is able to open his mouth he cannot move his tongue   Psychiatric:      Comments: Not able to assess       Laboratory    Recent Labs   Lab 08/27/22  0300   WBC 15.79*   RBC 3.10*   HGB 7.5*   HCT 26.0*      MCV 84   MCH 24.2*   MCHC 28.8*         Recent Labs   Lab 08/27/22  0300   CALCIUM  8.6*      K 3.8   CO2 24   *   BUN 43*   CREATININE 0.4*           Microbiology:       Microbiology Results (last 7 days)       Procedure Component Value Units Date/Time    Blood culture [[id number]] Collected: 08/27/22 0945    Order Status: Sent Specimen: Blood from Line, PICC Right Brachial Updated: 08/27/22 1136    Narrative:      Collection has been rescheduled by [email or web address] at 08/27/2022 09:13 Reason:   Nurse Draw  Per Dr. King  Collection has been rescheduled by [email or web address] at 08/27/2022 09:16 Reason:   Nurse Draw  Collection has been rescheduled by [email or web address] at 08/27/2022 09:13 Reason:   Nurse Draw  Per Dr. King  Collection has been rescheduled by [email or web address] at 08/27/2022 09:16 Reason:   Nurse Draw    Culture, Respiratory with Gram Stain [[id number]]  (Abnormal) Collected: 08/26/22 1313    Order Status: Completed Specimen: Respiratory from Endotracheal Aspirate Updated: 08/27/22 1044     Respiratory Culture No normal respiratory marco      GRAM NEGATIVE AUTUMN  Moderate  Identification and susceptibility pending       Gram Stain (Respiratory) <10 epithelial cells per low power field.     Gram Stain (Respiratory) Moderate WBC's     Gram Stain (Respiratory) Few Gram negative rods     Gram Stain (Respiratory) Few Gram positive rods    Blood culture [[id number]] Collected: 08/26/22 0957    Order Status: Completed Specimen: Blood Updated: 08/27/22 1032     Blood Culture, Routine No Growth to date      No Growth to date    Blood culture [[id number]] Collected: 08/26/22 0957    Order Status: Completed Specimen: Blood Updated: 08/27/22 0629     Blood Culture, Routine Gram stain aer bottle: Gram positive cocci in clusters resembling Staph      Results called to and read back by: Porfirio Lerma [email or web address] 08/27/2022  06:29      fgf    Blood culture [[id number]] Collected: 08/26/22 1300    Order Status: Completed Specimen: Blood from Line, PICC Right Brachial Updated: 08/26/22 2117     Blood Culture, Routine No Growth to date    Narrative:       From port  Collection has been rescheduled by RE1 at 08/26/2022 12:23 Reason:   Nurse Draw  Collection has been rescheduled by RE1 at 08/26/2022 12:23 Reason:   Nurse Draw    Blood culture [[id number]]     Order Status: Canceled Specimen: Blood     Blood culture [[id number]] Collected: 08/19/22 1234    Order Status: Completed Specimen: Blood from Peripheral, Upper Arm, Right Updated: 08/24/22 1432     Blood Culture, Routine No growth after 5 days.    Blood culture [[id number]] Collected: 08/18/22 2010    Order Status: Completed Specimen: Blood from Peripheral, Forearm, Left Updated: 08/23/22 2232     Blood Culture, Routine No growth after 5 days.    Urine culture [[id number]]  (Abnormal)  (Susceptibility) Collected: 08/18/22 1950    Order Status: Completed Specimen: Urine Updated: 08/22/22 0658     Urine Culture, Routine PSEUDOMONAS AERUGINOSA   >100,000 cfu/ml  Known  patient        PROVIDENCIA STUARTII  > 100,000 cfu/ml      Narrative:      Specimen Source->Urine    Culture, Respiratory with Gram Stain [[id number]]  (Abnormal)  (Susceptibility) Collected: 08/19/22 1728    Order Status: Completed Specimen: Respiratory from Tracheal Aspirate Updated: 08/22/22 0652     Respiratory Culture Normal respiratory marco      PSEUDOMONAS AERUGINOSA  Moderate       Gram Stain (Respiratory) Moderate WBC's     Gram Stain (Respiratory) Rare Gram positive cocci     Gram Stain (Respiratory) Rare Gram positive rods     Gram Stain (Respiratory) Rare Gram negative rods    IV catheter culture [[id number]]  (Abnormal)  (Susceptibility) Collected: 08/19/22 0119    Order Status: Completed Specimen: Catheter Tip, Peripheral Updated: 08/21/22 1018     Aerobic Culture - Cath tip METHICILLIN RESISTANT STAPHYLOCOCCUS AUREUS  Too numerous to count  Results called to and read back by:  08/21/2022  10:18 JBM        MORGANELLA MORGANII  Too numerous to count      Narrative:      Midline right arm            Radiology    US Upper  Extremity Veins Bilateral  Reason: Bilateral upper extremity swelling    COMPARISON: 8/19/2022    FINDINGS:  Grayscale, color Doppler, and spectral Doppler analysis was performed.    Bilateral internal jugular veins demonstrates normal compressibility and color and spectral Doppler flow. Bilateral subclavian veins demonstrates normal color and spectral Doppler flow.    Bilateral axillary, brachial, basilic, and cephalic veins demonstrate normal compressibility and color and spectral Doppler flow. Bilateral radial and ulnar veins are unremarkable.    There is moderate subcutaneous edema within the upper extremities    IMPRESSION:  Negative for venous thrombosis of bilateral upper extremities    Moderate upper extremity edema    Electronically signed by:  Sangeeta Deleon MD  8/27/2022 10:48 AM CDT Workstation: [id number]  X-Ray Chest AP Portable  Portable chest x-ray at 9:23 AM is compared to prior study dated 8/26/2010    Clinical history is hypoxia    The tracheostomy tube, left subclavian vein Port-A-Cath and right PICC line are in stable position. There is hypoinflation.    The cardiomediastinal silhouette is normal in size.  There is a stable small right pleural effusion with improvement of the right lower lobe airspace disease. There is stable linear airspace disease in left lung base. The upper lobes are clear.    There are no acute osseous anomalies. There is a gastrostomy tube in place.    IMPRESSION: Hypoinflation with improvement of the airspace disease in the right lung base with small right pleural effusion    Stable faint airspace disease in the left lung base    Electronically signed by:  Sangeeta Deleon MD  8/27/2022 10:00 AM CDT Workstation: [id number]        Ventilator Information    Vent Mode: A/C  Oxygen Concentration (%):  [30] 30  Resp Rate Total:  [18 br/min-33 br/min] 20 br/min  Vt Set:  [500 mL] 500 mL  PEEP/CPAP:  [5 cmH20] 5 cmH20  Mean Airway Pressure:  [10 xqA58-34 cmH20] 13  cmH20         No results for input(s): PH, PCO2, PO2, HCO3, POCSATURATED, BE in the last 72 hours.      Impression    Active Hospital Problems    Diagnosis  POA    *Catheter-associated urinary tract infection [T83.511A, N39.0]  Yes    Masseter muscle spasm [M62.838]  Yes    Open wound of tongue due to bite [S01.552A]  Yes    Trismus [R25.2]  Yes    Fever [R50.9]  Yes    Facial cellulitis [L03.211]  Yes    Tracheostomy in place [Z93.0]  Not Applicable    Chronic respiratory failure with hypoxia and hypercapnia [J96.11, J96.12]  Yes    Dependent on ventilator [Z99.11]  Not Applicable    S/P percutaneous endoscopic gastrostomy (PEG) tube placement [Z93.1]  Not Applicable    ALS (amyotrophic lateral sclerosis) [G12.21]  Yes    Edema [R60.9]  Yes    Quadriparesis [G82.50]  Yes      Resolved Hospital Problems    Diagnosis Date Resolved POA    Facial abscess [L02.01] 08/22/2022 Yes   Elevated temperature with T-max of 100.4°  MRSA on cath tip  Trismus improved with Botox  ALS with inability to move anything but his eyes and open his jaw  Bibasilar atelectasis  Plan    Continue vent - no weaning  Contact isolation  Antibiotics per ID  Follow H/H - no acute bleeding reported  Wound care  PEG feedings  S/p BOTOX   Follow CXR  Back to [place] when temp curve allows

## 2022-08-27 NOTE — PROGRESS NOTES
VANCOMYCIN PHARMACOKINETIC NOTE:  Vancomycin Day # 1    Objective/Assessment:    Diagnosis/Indication for Vancomycin: Bacteremia     67 y.o., male; Actual Body Weight = 81.3 kg (179 lb 3.7 oz).    The patient has the following labs:  8/27/2022 Estimated Creatinine Clearance: 175.8 mL/min (A) (based on SCr of 0.4 mg/dL (L)).   Lab Results   Component Value Date    BUN 43 (H) 08/27/2022       Lab Results   Component Value Date    WBC 15.79 (H) 08/27/2022      .    Plan:  Adjust vancomycin dose and/or frequency based on the patient's actual weight and renal function:  Initiate Vancomycin 1500 mg IV every 12 hours.  Orders have been entered into patient's chart.    Vancomycin dose = 18.5 mg/kg actual body weight    Vancomycin trough level has been ordered for 8/28 at 21:00..    Pharmacy will manage vancomycin therapy, monitor serum vancomycin levels, monitor renal function and adjust regimen as necessary.    Will follow random vancomycin levels and redose when serum vancomycin level decreases to 15-20 mcg/m    Thank you for allowing us to participate in this patient's care.     Linda Lima 8/27/2022 9:39 AM  Department of Pharmacy  Ext [phone]

## 2022-08-27 NOTE — CARE UPDATE
08/27/22 0719   Patient Assessment/Suction   Level of Consciousness (AVPU) alert   Respiratory Effort Normal;Unlabored   Expansion/Accessory Muscles/Retractions expansion symmetric   All Lung Fields Breath Sounds coarse   Rhythm/Pattern, Respiratory assisted mechanically   Cough Frequency with stimulation   Cough Type assisted   Suction Method oral   Secretions Amount small   Secretions Color clear   PRE-TX-O2   O2 Device (Oxygen Therapy) ventilator   $ Is the patient on Low Flow Oxygen? Yes   Oxygen Concentration (%) 30   SpO2 100 %   Pulse Oximetry Type Continuous   $ Pulse Oximetry - Multiple Charge Pulse Oximetry - Multiple   Pulse (!) 137   Resp (!) 50        Surgical Airway 02/21/22 1145 Shiley Cuffed   Placement Date/Time: 02/21/22 1145   Present Prior to Hospital Arrival?: Yes  Type: Tracheostomy  Brand: Shiley  Airway Device Size: 8.0  Style: Cuffed   Cuff Inflation? Inflated   Status Secured   Site Assessment Drainage   Site Care Cleansed;Dried;Dressing applied   Ties Assessment Clean;Dry;Intact;Secure   Airway Safety   Ambu bag with the patient? Yes, Adult Ambu   Is mask with the patient? Yes, Adult Mask   Extra trach at bedside? Yes   Extra trach sizes at bedside? 6   Vent Select   Conventional Vent Y   $ Ventilator Subsequent 1   Charged w/in last 24h YES   Preset Conventional Ventilator Settings   Vent ID [id number]   Vent Type    Ventilation Type VC   Vent Mode A/C   Set Rate 16 BPM   Vt Set 500 mL   PEEP/CPAP 5 cmH20   Peak Flow 60 L/min   Peak End Inspiratory Pressure 23 cmH20   I-Trigger Type  V-TRIG   Trigger Sensitivity Flow/I-Trigger 2 L/min   Patient Ventilator Parameters   Resp Rate Total 20 br/min   Peak Airway Pressure 26 cmH2O   Mean Airway Pressure 13 cmH20   Plateau Pressure 31 cmH20   Exhaled Vt 417 mL   Total Ve 10.6 mL   I:E Ratio Measured 1:1.40   Auto PEEP 0 cmH20   Conventional Ventilator Alarms   Alarms On Y   Ve High Alarm 20 L/min   Ve Low Alarm 3 L/min   Vt High Alarm 1200 mL    Vt Low Alarm 250 mL   Resp Rate High Alarm 40 br/min   Press High Alarm 60 cmH2O   Apnea Rate 16   Apnea Volume (mL) 500 mL   Apnea Oxygen Concentration  100   Apnea Flow Rate (L/min) 60   T Apnea 20 sec(s)   Ready to Wean/Extubation Screen   FIO2<=50 (chart decimal) 0.3   MV<16L (chart vol.) 10.6   PEEP <=8 (chart #) 5   Ready to Wean Parameters   F/VT Ratio<105 (RSBI) 119.9   Vital Capacity (mL) 0   Education   $ Education Trach Care;Ventilator Oxygen;15 min   Respiratory Evaluation   $ Care Plan Tech Time 15 min

## 2022-08-27 NOTE — PROGRESS NOTES
[facility] Medicine  Progress Note    Patient name: Wei Dominique  MRN: [id number]  Admit Date: 8/18/2022   LOS: 8 days     SUBJECTIVE:     Principal problem: Catheter-associated urinary tract infection    Interval History:  Patient was seen and examined bedside, clinically looks same, blood pressure is borderline low, tachycardic, febrile, significant greenish secretions noted.  No other acute events overnight as per nursing staff.  Jaw tightness has improved significantly.     Scheduled Meds:   apixaban  2.5 mg Oral BID    baclofen  10 mg Per G Tube TID    balsam peru-castor oiL   Topical (Top) Daily    busPIRone  5 mg Per G Tube BID    meropenem (MERREM) IVPB  1 g Intravenous Q8H    metoprolol tartrate  12.5 mg Per G Tube BID    nystatin  500,000 Units Mouth/Throat QID    pantoprazole  40 mg Intravenous Daily    polyethylene glycol  17 g Oral Daily    riluzole  50 mg Oral Q12H    scopolamine  1 patch Transdermal Q3 Days    senna-docusate 8.6-50 mg  1 tablet Oral BID    sodium chloride 0.9%  10 mL Intravenous Q6H    traZODone  50 mg Per G Tube QHS    vancomycin (VANCOCIN) IVPB  1,500 mg Intravenous Once     Continuous Infusions:      PRN Meds:sodium chloride, acetaminophen, acetaminophen, albuterol-ipratropium, aluminum-magnesium hydroxide-simethicone, calcium chloride IVPB, calcium chloride IVPB, calcium chloride IVPB, dextrose 10%, dextrose 10%, glucagon (human recombinant), glucose, glucose, HYDROcodone-acetaminophen, HYDROmorphone, magnesium sulfate IVPB, magnesium sulfate IVPB, magnesium sulfate IVPB, magnesium sulfate IVPB, melatonin, morphine, naloxone, ondansetron, potassium chloride in water, potassium chloride in water, potassium chloride in water, potassium chloride in water, prochlorperazine, simethicone, sodium chloride 0.9%, Flushing PICC Protocol **AND** sodium chloride 0.9% **AND** sodium chloride 0.9%, sodium phosphate IVPB, sodium phosphate IVPB, sodium phosphate IVPB,  sodium phosphate IVPB, sodium phosphate IVPB, Pharmacy to dose Vancomycin consult **AND** vancomycin - pharmacy to dose, white petrolatum    Review of patient's allergies indicates:  No Known Allergies    Review of Systems: As per interval history    OBJECTIVE:     Vital Signs (Most Recent)  Temp: 100.2 °F (37.9 °C) (ax) (08/27/22 0756)  Pulse: (!) 135 (08/27/22 0601)  Resp: 19 (08/27/22 0757)  BP: (!) 143/77 (08/27/22 0601)  SpO2: 100 % (08/27/22 0601)    Vital Signs Range (Last 24H):  Temp:  [99 °F (37.2 °C)-100.4 °F (38 °C)]   Pulse:  [106-136]   Resp:  [17-74]   BP: (112-149)/(58-86)   SpO2:  [99 %-100 %]     I & O (Last 24H):    Intake/Output Summary (Last 24 hours) at 8/27/2022 0900  Last data filed at 8/27/2022 0628  Gross per 24 hour   Intake 2000 ml   Output 3750 ml   Net -1750 ml         Physical Exam:  General:  Chronically ill-appearing quadriplegic man in no distress  Eyes: No conjunctival injection. No scleral icterus.  ENT: Hearing grossly intact. No discharge from ears. No nasal discharge.  Bite blocks in place  Neck: Supple, trachea midline. No JVD, tracheostomy in place  CVS: RRR. ++ LE edema BL  Lungs:  Mechanical breath sound, No tachypnea or accessory muscle use.    Abdomen:  Soft, nontender and nondistended.  No organomegaly, peg tube in place  Neuro: AOx3. Moves all extremities. Follows commands. Responds appropriately   Skin:  Pictures as below              Laboratory:  I have reviewed all pertinent lab results within the past 24 hours.    Diagnostic Results:  Reviewed all imaging    ASSESSMENT/PLAN:     67-year-old gentleman with ALS, quadriplegia, chronic trach and PEG being treated for catheter associated UTI, masseter muscle spasm causing severe tongue trauma and other chronic problems    Active Hospital Problems    Diagnosis  POA    *Catheter-associated urinary tract infection [T83.511A, N39.0]  Yes     Priority: 1 - High    Masseter muscle spasm [M62.838]  Yes     Priority: 2     Open  wound of tongue due to bite [S01.552A]  Yes    Trismus [R25.2]  Yes    Fever [R50.9]  Yes    Facial cellulitis [L03.211]  Yes    Tracheostomy in place [Z93.0]  Not Applicable    Chronic respiratory failure with hypoxia and hypercapnia [J96.11, J96.12]  Yes    Dependent on ventilator [Z99.11]  Not Applicable    S/P percutaneous endoscopic gastrostomy (PEG) tube placement [Z93.1]  Not Applicable    ALS (amyotrophic lateral sclerosis) [G12.21]  Yes    Edema [R60.9]  Yes    Quadriparesis [G82.50]  Yes      Resolved Hospital Problems    Diagnosis Date Resolved POA    Facial abscess [L02.01] 08/22/2022 Yes         Plan:   Status post Botox injection to bilateral masseteric muscle by ENT on 08/24. Help from ENT greatly appreciated.   Continue antibiotics as per Infectious Disease.  Noted escalation to meropenem and addition of vancomycin considering new fever  Bilateral upper extremity venous duplex  Repeat blood cultures, chest x-ray, BNP  Stop IV fluids, hold Lasix, lowered dose of metoprolol  Vent management as per Pulmonary  Baclofen  Anemia chronic disease, transfuse below 7 grams/dl  History of PE, continue low-dose Eliquis  Tracheostomy and PEG tube care      VTE Risk Mitigation (From admission, onward)           Ordered     apixaban tablet 2.5 mg  2 times daily         08/20/22 1454     IP VTE HIGH RISK PATIENT  Once         08/20/22 0440     Place sequential compression device  Until discontinued         08/20/22 0440     Reason for No Pharmacological VTE Prophylaxis  Once        Question:  Reasons:  Answer:  Already adequately anticoagulated on oral Anticoagulants    08/20/22 0440                        Department Hospital Medicine  [facility]  Rose Mary King MD  Date of service: 08/27/2022

## 2022-08-28 ENCOUNTER — CLINICAL SUPPORT (OUTPATIENT)
Dept: CARDIOLOGY | Facility: HOSPITAL | Age: 67
DRG: 871 | End: 2022-08-28
Attending: STUDENT IN AN ORGANIZED HEALTH CARE EDUCATION/TRAINING PROGRAM
Payer: MEDICARE

## 2022-08-28 LAB
ABO + RH BLD: NORMAL
ANION GAP SERPL CALC-SCNC: 6 MMOL/L (ref 8–16)
BASOPHILS # BLD AUTO: 0.05 K/UL (ref 0–0.2)
BASOPHILS NFR BLD: 0.4 % (ref 0–1.9)
BLD GP AB SCN CELLS X3 SERPL QL: NORMAL
BLD PROD TYP BPU: NORMAL
BLD PROD TYP BPU: NORMAL
BLOOD UNIT EXPIRATION DATE: NORMAL
BLOOD UNIT EXPIRATION DATE: NORMAL
BLOOD UNIT TYPE CODE: 5100
BLOOD UNIT TYPE CODE: 5100
BLOOD UNIT TYPE: NORMAL
BLOOD UNIT TYPE: NORMAL
BUN SERPL-MCNC: 29 MG/DL (ref 8–23)
CALCIUM SERPL-MCNC: 8.3 MG/DL (ref 8.7–10.5)
CHLORIDE SERPL-SCNC: 109 MMOL/L (ref 95–110)
CO2 SERPL-SCNC: 26 MMOL/L (ref 23–29)
CODING SYSTEM: NORMAL
CODING SYSTEM: NORMAL
CREAT SERPL-MCNC: 0.4 MG/DL (ref 0.5–1.4)
CRP SERPL-MCNC: 11.07 MG/DL
DIFFERENTIAL METHOD: ABNORMAL
DISPENSE STATUS: NORMAL
DISPENSE STATUS: NORMAL
EOSINOPHIL # BLD AUTO: 0.5 K/UL (ref 0–0.5)
EOSINOPHIL NFR BLD: 3.4 % (ref 0–8)
ERYTHROCYTE [DISTWIDTH] IN BLOOD BY AUTOMATED COUNT: 19.2 % (ref 11.5–14.5)
EST. GFR  (NO RACE VARIABLE): >60 ML/MIN/1.73 M^2
FERRITIN SERPL-MCNC: 144 NG/ML (ref 20–300)
GLUCOSE SERPL-MCNC: 192 MG/DL (ref 70–110)
HCT VFR BLD AUTO: 23.1 % (ref 40–54)
HGB BLD-MCNC: 6.8 G/DL (ref 14–18)
IMM GRANULOCYTES # BLD AUTO: 0.19 K/UL (ref 0–0.04)
IMM GRANULOCYTES NFR BLD AUTO: 1.3 % (ref 0–0.5)
IRON SERPL-MCNC: 14 UG/DL (ref 45–160)
LYMPHOCYTES # BLD AUTO: 1.5 K/UL (ref 1–4.8)
LYMPHOCYTES NFR BLD: 10.8 % (ref 18–48)
MAGNESIUM SERPL-MCNC: 1.9 MG/DL (ref 1.6–2.6)
MCH RBC QN AUTO: 24.5 PG (ref 27–31)
MCHC RBC AUTO-ENTMCNC: 29.4 G/DL (ref 32–36)
MCV RBC AUTO: 83 FL (ref 82–98)
MONOCYTES # BLD AUTO: 0.6 K/UL (ref 0.3–1)
MONOCYTES NFR BLD: 4.3 % (ref 4–15)
NEUTROPHILS # BLD AUTO: 11.4 K/UL (ref 1.8–7.7)
NEUTROPHILS NFR BLD: 79.8 % (ref 38–73)
NRBC BLD-RTO: 0 /100 WBC
NUM UNITS TRANS PACKED RBC: NORMAL
NUM UNITS TRANS PACKED RBC: NORMAL
PLATELET # BLD AUTO: 351 K/UL (ref 150–450)
PMV BLD AUTO: 9.5 FL (ref 9.2–12.9)
POTASSIUM SERPL-SCNC: 4.2 MMOL/L (ref 3.5–5.1)
RBC # BLD AUTO: 2.78 M/UL (ref 4.6–6.2)
RETICS/RBC NFR AUTO: 1.1 % (ref 0.4–2)
SATURATED IRON: 11 % (ref 20–50)
SODIUM SERPL-SCNC: 141 MMOL/L (ref 136–145)
TOTAL IRON BINDING CAPACITY: 123 UG/DL (ref 250–450)
TRANSFERRIN SERPL-MCNC: 88 MG/DL (ref 200–375)
VANCOMYCIN TROUGH SERPL-MCNC: 36 UG/ML
WBC # BLD AUTO: 14.22 K/UL (ref 3.9–12.7)

## 2022-08-28 PROCEDURE — 27000221 HC OXYGEN, UP TO 24 HOURS

## 2022-08-28 PROCEDURE — 84466 ASSAY OF TRANSFERRIN: CPT | Performed by: INTERNAL MEDICINE

## 2022-08-28 PROCEDURE — 20000000 HC ICU ROOM

## 2022-08-28 PROCEDURE — 85045 AUTOMATED RETICULOCYTE COUNT: CPT | Performed by: INTERNAL MEDICINE

## 2022-08-28 PROCEDURE — 25000003 PHARM REV CODE 250: Performed by: STUDENT IN AN ORGANIZED HEALTH CARE EDUCATION/TRAINING PROGRAM

## 2022-08-28 PROCEDURE — P9016 RBC LEUKOCYTES REDUCED: HCPCS | Performed by: HOSPITALIST

## 2022-08-28 PROCEDURE — 99233 SBSQ HOSP IP/OBS HIGH 50: CPT | Mod: ,,, | Performed by: STUDENT IN AN ORGANIZED HEALTH CARE EDUCATION/TRAINING PROGRAM

## 2022-08-28 PROCEDURE — 82728 ASSAY OF FERRITIN: CPT | Performed by: INTERNAL MEDICINE

## 2022-08-28 PROCEDURE — 25000003 PHARM REV CODE 250: Performed by: INTERNAL MEDICINE

## 2022-08-28 PROCEDURE — 63600175 PHARM REV CODE 636 W HCPCS: Performed by: INTERNAL MEDICINE

## 2022-08-28 PROCEDURE — 99900035 HC TECH TIME PER 15 MIN (STAT)

## 2022-08-28 PROCEDURE — C9113 INJ PANTOPRAZOLE SODIUM, VIA: HCPCS | Performed by: INTERNAL MEDICINE

## 2022-08-28 PROCEDURE — 86850 RBC ANTIBODY SCREEN: CPT | Performed by: HOSPITALIST

## 2022-08-28 PROCEDURE — 36415 COLL VENOUS BLD VENIPUNCTURE: CPT | Performed by: STUDENT IN AN ORGANIZED HEALTH CARE EDUCATION/TRAINING PROGRAM

## 2022-08-28 PROCEDURE — 99233 PR SUBSEQUENT HOSPITAL CARE,LEVL III: ICD-10-PCS | Mod: ,,, | Performed by: INTERNAL MEDICINE

## 2022-08-28 PROCEDURE — 94761 N-INVAS EAR/PLS OXIMETRY MLT: CPT

## 2022-08-28 PROCEDURE — 99900031 HC PATIENT EDUCATION (STAT)

## 2022-08-28 PROCEDURE — 99233 PR SUBSEQUENT HOSPITAL CARE,LEVL III: ICD-10-PCS | Mod: ,,, | Performed by: STUDENT IN AN ORGANIZED HEALTH CARE EDUCATION/TRAINING PROGRAM

## 2022-08-28 PROCEDURE — 93308 TTE F-UP OR LMTD: CPT

## 2022-08-28 PROCEDURE — 85025 COMPLETE CBC W/AUTO DIFF WBC: CPT | Performed by: INTERNAL MEDICINE

## 2022-08-28 PROCEDURE — 63600175 PHARM REV CODE 636 W HCPCS: Performed by: STUDENT IN AN ORGANIZED HEALTH CARE EDUCATION/TRAINING PROGRAM

## 2022-08-28 PROCEDURE — 99900026 HC AIRWAY MAINTENANCE (STAT)

## 2022-08-28 PROCEDURE — 94003 VENT MGMT INPAT SUBQ DAY: CPT

## 2022-08-28 PROCEDURE — 83735 ASSAY OF MAGNESIUM: CPT | Performed by: INTERNAL MEDICINE

## 2022-08-28 PROCEDURE — 93308 ECHO (CUPID ONLY): ICD-10-PCS | Mod: 26,,, | Performed by: INTERNAL MEDICINE

## 2022-08-28 PROCEDURE — 80202 ASSAY OF VANCOMYCIN: CPT | Performed by: HOSPITALIST

## 2022-08-28 PROCEDURE — 99233 SBSQ HOSP IP/OBS HIGH 50: CPT | Mod: ,,, | Performed by: INTERNAL MEDICINE

## 2022-08-28 PROCEDURE — 93308 TTE F-UP OR LMTD: CPT | Mod: 26,,, | Performed by: INTERNAL MEDICINE

## 2022-08-28 PROCEDURE — 86140 C-REACTIVE PROTEIN: CPT | Performed by: INTERNAL MEDICINE

## 2022-08-28 PROCEDURE — 87040 BLOOD CULTURE FOR BACTERIA: CPT | Performed by: STUDENT IN AN ORGANIZED HEALTH CARE EDUCATION/TRAINING PROGRAM

## 2022-08-28 PROCEDURE — A4216 STERILE WATER/SALINE, 10 ML: HCPCS | Performed by: INTERNAL MEDICINE

## 2022-08-28 PROCEDURE — 86920 COMPATIBILITY TEST SPIN: CPT | Performed by: HOSPITALIST

## 2022-08-28 PROCEDURE — 94799 UNLISTED PULMONARY SVC/PX: CPT

## 2022-08-28 PROCEDURE — 25000003 PHARM REV CODE 250: Performed by: HOSPITALIST

## 2022-08-28 PROCEDURE — 80048 BASIC METABOLIC PNL TOTAL CA: CPT | Performed by: INTERNAL MEDICINE

## 2022-08-28 PROCEDURE — 63700000 PHARM REV CODE 250 ALT 637 W/O HCPCS: Performed by: INTERNAL MEDICINE

## 2022-08-28 PROCEDURE — 36415 COLL VENOUS BLD VENIPUNCTURE: CPT | Performed by: INTERNAL MEDICINE

## 2022-08-28 RX ORDER — SUCCINYLCHOLINE CHLORIDE 20 MG/ML INJECTION SOLUTION
100 SOLUTION ONCE
Status: COMPLETED | OUTPATIENT
Start: 2022-08-28 | End: 2022-08-28

## 2022-08-28 RX ORDER — ETOMIDATE 2 MG/ML
0.3 INJECTION INTRAVENOUS ONCE
Status: COMPLETED | OUTPATIENT
Start: 2022-08-28 | End: 2022-08-28

## 2022-08-28 RX ORDER — HYDROCODONE BITARTRATE AND ACETAMINOPHEN 500; 5 MG/1; MG/1
TABLET ORAL
Status: DISCONTINUED | OUTPATIENT
Start: 2022-08-28 | End: 2022-08-31 | Stop reason: HOSPADM

## 2022-08-28 RX ADMIN — BACLOFEN 10 MG: 5 TABLET ORAL at 02:08

## 2022-08-28 RX ADMIN — RILUZOLE 50 MG: 50 TABLET ORAL at 09:08

## 2022-08-28 RX ADMIN — NYSTATIN 500000 UNITS: 100000 SUSPENSION ORAL at 09:08

## 2022-08-28 RX ADMIN — SODIUM CHLORIDE, PRESERVATIVE FREE 10 ML: 5 INJECTION INTRAVENOUS at 06:08

## 2022-08-28 RX ADMIN — APIXABAN 2.5 MG: 2.5 TABLET, FILM COATED ORAL at 11:08

## 2022-08-28 RX ADMIN — NYSTATIN 500000 UNITS: 100000 SUSPENSION ORAL at 11:08

## 2022-08-28 RX ADMIN — BACLOFEN 10 MG: 5 TABLET ORAL at 11:08

## 2022-08-28 RX ADMIN — MORPHINE SULFATE 4 MG: 4 INJECTION, SOLUTION INTRAMUSCULAR; INTRAVENOUS at 02:08

## 2022-08-28 RX ADMIN — VANCOMYCIN HYDROCHLORIDE 1500 MG: 1.5 INJECTION, POWDER, LYOPHILIZED, FOR SOLUTION INTRAVENOUS at 09:08

## 2022-08-28 RX ADMIN — MEROPENEM AND SODIUM CHLORIDE 1 G: 1 INJECTION, SOLUTION INTRAVENOUS at 09:08

## 2022-08-28 RX ADMIN — SODIUM CHLORIDE, PRESERVATIVE FREE 10 ML: 5 INJECTION INTRAVENOUS at 12:08

## 2022-08-28 RX ADMIN — BUSPIRONE HYDROCHLORIDE 5 MG: 5 TABLET ORAL at 11:08

## 2022-08-28 RX ADMIN — BACLOFEN 10 MG: 5 TABLET ORAL at 09:08

## 2022-08-28 RX ADMIN — SENNOSIDES AND DOCUSATE SODIUM 1 TABLET: 50; 8.6 TABLET ORAL at 09:08

## 2022-08-28 RX ADMIN — BUSPIRONE HYDROCHLORIDE 5 MG: 5 TABLET ORAL at 09:08

## 2022-08-28 RX ADMIN — ETOMIDATE 24.4 MG: 40 INJECTION, SOLUTION INTRAVENOUS at 08:08

## 2022-08-28 RX ADMIN — MEROPENEM AND SODIUM CHLORIDE 1 G: 1 INJECTION, SOLUTION INTRAVENOUS at 02:08

## 2022-08-28 RX ADMIN — PANTOPRAZOLE SODIUM 40 MG: 40 INJECTION, POWDER, FOR SOLUTION INTRAVENOUS at 11:08

## 2022-08-28 RX ADMIN — NYSTATIN 500000 UNITS: 100000 SUSPENSION ORAL at 05:08

## 2022-08-28 RX ADMIN — METOPROLOL TARTRATE 12.5 MG: 25 TABLET, FILM COATED ORAL at 11:08

## 2022-08-28 RX ADMIN — VANCOMYCIN HYDROCHLORIDE 1500 MG: 1.5 INJECTION, POWDER, LYOPHILIZED, FOR SOLUTION INTRAVENOUS at 11:08

## 2022-08-28 RX ADMIN — APIXABAN 2.5 MG: 2.5 TABLET, FILM COATED ORAL at 09:08

## 2022-08-28 RX ADMIN — METOPROLOL TARTRATE 12.5 MG: 25 TABLET, FILM COATED ORAL at 09:08

## 2022-08-28 RX ADMIN — MORPHINE SULFATE 4 MG: 4 INJECTION, SOLUTION INTRAMUSCULAR; INTRAVENOUS at 11:08

## 2022-08-28 RX ADMIN — MEROPENEM AND SODIUM CHLORIDE 1 G: 1 INJECTION, SOLUTION INTRAVENOUS at 06:08

## 2022-08-28 RX ADMIN — SENNOSIDES AND DOCUSATE SODIUM 1 TABLET: 50; 8.6 TABLET ORAL at 11:08

## 2022-08-28 RX ADMIN — RILUZOLE 50 MG: 50 TABLET ORAL at 11:08

## 2022-08-28 RX ADMIN — ACETAMINOPHEN 650 MG: 325 TABLET ORAL at 02:08

## 2022-08-28 RX ADMIN — Medication 100 MG: at 09:08

## 2022-08-28 RX ADMIN — TRAZODONE HYDROCHLORIDE 50 MG: 50 TABLET ORAL at 09:08

## 2022-08-28 RX ADMIN — CASTOR OIL AND BALSAM, PERU: 788; 87 OINTMENT TOPICAL at 09:08

## 2022-08-28 NOTE — CARE UPDATE
08/28/22 0900   PRE-TX-O2   Oxygen Concentration (%) 30   SpO2 100 %   Pulse (!) 119   Resp (!) 22   /63   Vent Select   Charged w/in last 24h NO   Preset Conventional Ventilator Settings   Ventilation Type VC   Vent Mode A/C   Set Rate 16 BPM   Vt Set 500 mL   PEEP/CPAP 5 cmH20   Peak Flow 60 L/min   Peak End Inspiratory Pressure 19 cmH20   I-Trigger Type  V-TRIG   Trigger Sensitivity Flow/I-Trigger 2 L/min   Patient Ventilator Parameters   Resp Rate Total 23 br/min   Peak Airway Pressure 21 cmH2O   Mean Airway Pressure 11 cmH20   Plateau Pressure 31 cmH20   Exhaled Vt 520 mL   Total Ve 11.9 mL   I:E Ratio Measured 1:2.40   Auto PEEP 0 cmH20   Conventional Ventilator Alarms   Alarms On Y   Resp Rate High Alarm 40 br/min   Press High Alarm 50 cmH2O   Apnea Rate 16   Apnea Volume (mL) 1 mL   Apnea Oxygen Concentration  100   Apnea Flow Rate (L/min) 60   T Apnea 20 sec(s)   IHI Ventilator Associated Pneumonia Bundle (Required)   Head of Bed Elevated  HOB 30   Ready to Wean/Extubation Screen   FIO2<=50 (chart decimal) 0.3   MV<16L (chart vol.) 11.9   PEEP <=8 (chart #) 5   Ready to Wean Parameters   F/VT Ratio<105 (RSBI) (!) 42.31   Vital Capacity (mL) 0   Education   $ Education Suction;15 min  (BITE BLOCK)   Respiratory Evaluation   $ Care Plan Tech Time 15 min   $ Eval/Re-eval Charges Re-evaluation   PLACED BITE BLOCKS ON BILATERAL CORNERS OF MOUTH WITH NURSING ASSITANCE PER DR SMITH ORDERS

## 2022-08-28 NOTE — PROGRESS NOTES
Consult Note  Infectious Disease    Reason for Consult:  Facial abscess    HPI: Wei Dominique is a 67 y.o. male with known history of ALS, s/p trach & peg, vent dependence, HTN, PE (on eliquis) who presented with a primary complaint of facial swelling x few days. No family present at bedside. History was obtained from chart review and physician sign out. Patient had CT scan maxillofacial done that showed Right maxillary and anterior maxillary soft tissue edema and thickening which extends along the right maxillary molars into the oropharynx. Correlate for cellulitis and or pharyngeal infection.  Right perimandibular ovoid collection. Patient needs maxillofacial surgery however all hospitals are on diversion. Patient was in the ER waiting to be transferred for 18 hours and subsequently admitted to hospital medicine, ICU.   Seen by our service in June for pseudomonas  UTI  At that time he had deep bites and fissures in tongue as well.     8/20: interim reviewed and seen with Dr. Kennedy. Temperatures have improved. MRSA screen was positive. Midline tip with SA and GNR. Sputum culture in progress, and urine culture has growth that has to be re-isolated. He continues to acknowledge pain, nausea. He is in the process of being sedated and paralyzed for adequate ENT exam. Arms remain edematous  8/21:  Afebrile, interim reviewed.  The ENT note not complete but told by RN that no abscesses were discovered while sedated and paralyzed for oral exam.  The tongue was reduced into the mouth and bite block was placed.  Urine with 2 gram negative rods, 1 of which is Pseudomonas.  Sputum with Pseudomonas which is not new, chest x-ray with atelectasis.  Line tip with MRSA and Morganella, relevance unclear as his blood cultures were negative. Discussed with hospital medicine. My bedside questions about comfort care were answered in the negative, ie he does not want to be comfort care. He does want pain control however.    8/22: afebrile. VSS. Vent needs are stable. Mouth pain is decreased. Breathing comfortably. No nausea or abdominal pain. Able to communicate and answer questions.     8/26(reconsult): interim reviewed.   Received bilateral masseter botox injections on 8/24. Low grade temp on 8/25, BP a bit low, tachycardic. Temp max 100.4 and he has lots of purulent secretions from and around the ET tube. CXR with RLL atelectasis/effusion/infiltrate? Which is a little more than admission films. Vent needs are not increased. WBC about the same. procalcitonin has normalized, from admission level. Wound photos are improved/benign. hgb down to 7.3. he is alert, able to open his jaws a little more. The tongue laceration left side is bleeding a little. No abscesses, hematomas, facial cellulitis, or ulcers. No obvious thrush.     8/27 (Link):  Interim reviewed, discussed with Dr Jones.  Patient seen and examined at bedside, T-max 100.4°, currently 100.2, hemodynamically stable.  Patient awake, alert, blinking upon verbal commands.  Oral cavity suctioned, thick purulent secretions noted.  Labs reviewed, leukocytosis 15.7, PMN 79.4%, H&H 7.5/26, platelet count 405.  Creatinine 0.4, normal electrolytes.  CRP downtrending 18.7 today.  Repeat UA from yesterday negative for UTI, 2+ occult blood.  Micro reviewed, blood cultures 1/4 aerobic bottles from 08/26 [facility], pending final.  Started on vancomycin IV overnight.  Chest x-ray from yesterday with bilateral lung opacities suggesting atelectasis.    8/28:  Patient seen examined at bedside, seen with respiratory therapist and nursing, paralyzed again to replace mouth pieces giving severe macroglossia.  Tachycardic, tachypneic, T-max a 100.2°, currently afebrile.  Labs reviewed, H&H 6.8/23.1, , persistent leukocytosis of 14.2, PMN 79.8%, platelet count 351.  Stable kidney function, CRP trending down 1107 from greater than 38.  Micro reviewed, 3 sets of blood cultures; 4 bottles grew CoNS, lab  called, id and sensitivities available tomorrow.  Patient with history of Staph epidermidis bacteremia in June this year.  Respiratory culture 8/26 with GNR, id and sensitivities pending.    EXAM & DIAGNOSTICS REVIEWED:   Vitals:     Temp:  [98.4 °F (36.9 °C)-99.9 °F (37.7 °C)]   Temp: 99 °F (37.2 °C) (08/28/22 0701)  Pulse: (!) 121 (08/28/22 0811)  Resp: (!) 37 (08/28/22 0811)  BP: 116/71 (08/28/22 0801)  SpO2: 100 % (08/28/22 0811)    Intake/Output Summary (Last 24 hours) at 8/28/2022 0835  Last data filed at 8/28/2022 0600  Gross per 24 hour   Intake 2459.9 ml   Output 2475 ml   Net -15.1 ml       General:  Status post sedation/paralyzed for replacement of mouth pieces  Eyes:  Anicteric,  EOMI  ENT:  8/19: Copious intranasal secretions suctioned, because his mouth is profoundly difficult to open. His jaws have extremely high muscular tone. His tongue is severely lacerated and bulging out between his teeth on the right. He has a severe underbite. Copious saliva and bloody secretions suctioned from mouth. Even with dilaudid, we were only able to open his jaws less than a cm and I was able to insert a tongue blade only about half way. He has loose teeth, periodontal disease. I cannot see or palpate a soft tissue abscess.    8/20: exam deferred as he is going to be sedated/paralyzed for exam shortly   8/21: bite block is out. Only minimal tongue visible on right. Mouth is tender   8/22: device to maintain safe bite in place.   8/23: same as above   8/26: able to open his jaws a little more. The tongue laceration left side is bleeding a little. No abscesses, hematomas, facial cellulitis, or ulcers. No obvious thrush.    8/27:  Thick purulent copious secretions suctioned from oral cavity   8/28:  Severe macroglossia, dry blood noted, thick secretions noted, suction    Neck:  tracheosotomy  Lungs: Decreased breath sounds bilaterally, mild crackles right base  Heart:  S1/S2+, regular rhythm, no murmur  Abd:  Soft, NT,  ND, hypoactive BS, no masses or organomegaly appreciated. PEG with small amount of granulation. Rectal tube with loose stool , volume moderate on miralax and senna  :  Hutchinson with clear urine  Musc:  Joints without effusion, swelling, erythema, synovitis, muscle wasting  Skin:  No rashes.  Neuro:             Alert, attentive,  quadriplegic  Psych:   Calm, attends  Lymphatic:     Extrem: LE 1+ edema, UE 4+ edema   VAD:  Right arm PICC 8/20/22  port left chest, 8/19/22 neither have redness or drainage  Isolation:  contact  Wound:           8/26:             General Labs reviewed:  Recent Labs   Lab 08/26/22  0350 08/27/22  0300 08/28/22  0455   WBC 17.58* 15.79* 14.22*   HGB 7.3* 7.5* 6.8*   HCT 25.0* 26.0* 23.1*    405 351       Recent Labs   Lab 08/26/22  0350 08/27/22  0300 08/28/22  0455    143 141   K 4.1 3.8 4.2   * 114* 109   CO2 22* 24 26   BUN 36* 43* 29*   CREATININE 0.4* 0.4* 0.4*   CALCIUM 8.1* 8.6* 8.3*     Recent Labs   Lab 08/26/22  0353 08/27/22  0300 08/28/22  0455   CRP 24.67* 18.74* 11.07*     Estimated Creatinine Clearance: 175.8 mL/min (A) (based on SCr of 0.4 mg/dL (L)).    Micro:  2/22/22: urine: providencia  4/17/22: urine and blood: ESBL Ecoli  5/17/22: urine with pseudomonas (sen to pip/tazo, gent,cefepime,avycaz,zerbaxa)  5/22/22:sputum with proteus ESBL and pseudomonas (same, plus sens to colistin)  06/09/2022 staphylococcal epidermidis bacteremia   06/09/2022 urine culture  Pseudomonas aeruginosa sensitive to Zerbaxa/Avycaz  06/09/2022 respiratory culture Proteus mirabilis ESBL, Pseudomonas aeruginosa, Serratia marcescens  06/24/2022 respiratory culture Proteus mirabilis ESBL, and Pseudomonas aeruginosa  sensitive to Avycaz and Zerbaxa    Micro:  Microbiology Results (last 7 days)       Procedure Component Value Units Date/Time    Blood culture [[id number]]  (Abnormal) Collected: 08/26/22 1300    Order Status: Completed Specimen: Blood from Line, PICC Right  Brachial Updated: 08/28/22 1021     Blood Culture, Routine Gram stain luisa bottle: Gram positive cocci in clusters resembling Staph      Results called to and read back by: porfirio Mclean MICU3 08/27/2022      22:23 fgf      COAGULASE-NEGATIVE STAPHYLOCOCCUS SPECIES  For susceptibility see order #[id number]      Narrative:      From port  Collection has been rescheduled by [email or web address] at 08/26/2022 12:23 Reason:   Nurse Draw  Collection has been rescheduled by RE1 at 08/26/2022 12:23 Reason:   Nurse Draw    Blood culture [[id number]]  (Abnormal) Collected: 08/26/22 0957    Order Status: Completed Specimen: Blood Updated: 08/28/22 1021     Blood Culture, Routine Gram stain aer bottle: Gram positive cocci      Positive results previously called      COAGULASE-NEGATIVE STAPHYLOCOCCUS SPECIES  For susceptibility see order #[id number]      Blood culture [[id number]]  (Abnormal) Collected: 08/26/22 0957    Order Status: Completed Specimen: Blood Updated: 08/28/22 1018     Blood Culture, Routine Gram stain aer bottle: Gram positive cocci in clusters resembling Staph      Results called to and read back by: Porfirio Lerma MICU3 08/27/2022  06:29      fgf      Gram stain luisa bottle: Gram positive cocci      Positive results previously called      COAGULASE-NEGATIVE STAPHYLOCOCCUS SPECIES  Identification and susceptibility pending      Blood culture [[id number]] Collected: 08/27/22 0945    Order Status: Completed Specimen: Blood from Line, PICC Right Brachial Updated: 08/27/22 1758     Blood Culture, Routine No Growth to date    Narrative:      Collection has been rescheduled by [email or web address] at 08/27/2022 09:13 Reason:   Nurse Draw  Per Dr. King  Collection has been rescheduled by [email or web address] at 08/27/2022 09:16 Reason:   Nurse Draw  Collection has been rescheduled by [email or web address] at 08/27/2022 09:13 Reason:   Nurse Draw  Per Dr. King  Collection has been rescheduled by [email or web address] at 08/27/2022 09:16 Reason:   Nurse Draw    Culture, Respiratory with Gram Stain  [[id number]]  (Abnormal) Collected: 08/26/22 1313    Order Status: Completed Specimen: Respiratory from Endotracheal Aspirate Updated: 08/27/22 1044     Respiratory Culture No normal respiratory marco      GRAM NEGATIVE AUTUMN  Moderate  Identification and susceptibility pending       Gram Stain (Respiratory) <10 epithelial cells per low power field.     Gram Stain (Respiratory) Moderate WBC's     Gram Stain (Respiratory) Few Gram negative rods     Gram Stain (Respiratory) Few Gram positive rods    Blood culture [[id number]]     Order Status: Canceled Specimen: Blood     Blood culture [[id number]] Collected: 08/19/22 1234    Order Status: Completed Specimen: Blood from Peripheral, Upper Arm, Right Updated: 08/24/22 1432     Blood Culture, Routine No growth after 5 days.    Blood culture [[id number]] Collected: 08/18/22 2010    Order Status: Completed Specimen: Blood from Peripheral, Forearm, Left Updated: 08/23/22 2232     Blood Culture, Routine No growth after 5 days.    Urine culture [[id number]]  (Abnormal)  (Susceptibility) Collected: 08/18/22 1950    Order Status: Completed Specimen: Urine Updated: 08/22/22 0658     Urine Culture, Routine PSEUDOMONAS AERUGINOSA   >100,000 cfu/ml  Known  patient        PROVIDENCIA STUARTII  > 100,000 cfu/ml      Narrative:      Specimen Source->Urine    Culture, Respiratory with Gram Stain [[id number]]  (Abnormal)  (Susceptibility) Collected: 08/19/22 1728    Order Status: Completed Specimen: Respiratory from Tracheal Aspirate Updated: 08/22/22 0652     Respiratory Culture Normal respiratory marco      PSEUDOMONAS AERUGINOSA  Moderate       Gram Stain (Respiratory) Moderate WBC's     Gram Stain (Respiratory) Rare Gram positive cocci     Gram Stain (Respiratory) Rare Gram positive rods     Gram Stain (Respiratory) Rare Gram negative rods                Imaging Reviewed:   CXR   CT maxillofacial  1.  Right maxillary and anterior maxillary soft tissue edema and thickening which  extends along the right maxillary molars into the oropharynx. Correlate for cellulitis and or pharyngeal infection.  2.  Right perimandibular ovoid collection with rim enhancement noted measuring 3.0 x 1.8 cm. This appears to connect to the aforementioned maxillary skin thickening and edema.  3.  Nasopharyngeal phlegm and frothy debris extends into the ethmoid and maxillary sinus.  4.  Bilateral mastoid air cell fluid can be seen with mastoiditis.  5.  Right thyroid nodule measuring 1.9 cm. Recommend nonemergent thyroid ultrasound.  6.  Prominent pituitary gland may represent pituitary adenoma.    US both arms: neg for DVT  US both legs: negative for DVT  Reepat  UE US: negative fto DVT, moderate UE edema      Cardiology:    IMPRESSION & PLAN     1. Sepsis multifactorial; severe swelling of the tongue with laceration, no evidence of abscess, polymicrobial marco isolated from midline catheter tip; MRSA and Morganella, blood cultures on admission negative, possible VAP, and polymicrobial UTI  Repeat blood cultures 8/26 4/6 bottles  CoNS - ID and sensitivities available 8/29  MRSA nasal swab detected   Respiratory culture 8/19 pansensitive Pseudomonas aeruginosa  Respiratory culture 8/26 GNR, likely Pseudomonas   Urine culture 8/18 Pseudomonas  sensitive and Providencia stuartii both sensitive to Cefepime, s/p Zerbaxa 4 days and Cefepime 4 days, completed treatment    Repeat UA negative    2. Quadriplegic, ALS, debility, bed-bound  5. H/o MDRO    Recommendations:  Repeat blood cultures   Echo to rule out vegetations, patient with prior history of Staph epidermidis bacteremia in June  Continue Vancomycin IV, keep level 15-20  Meropenem 1g IV q8h  Follow cultures   Agreed on UE and LE US to rule out DVTs  Aspiration precautions  Contact isolation     D/w nursing, Dr King     Medical Decision Making during this encounter was  [_] Low Complexity  [_] Moderate Complexity  [xxx ] High Complexity

## 2022-08-28 NOTE — PROGRESS NOTES
[facility] Medicine  Progress Note    Patient name: Wei Dominique  MRN: [id number]  Admit Date: 8/18/2022   LOS: 9 days     SUBJECTIVE:     Principal problem: Catheter-associated urinary tract infection    Interval History:  Patient was seen and examined bedside, clinically looks same, blood pressure is better, fever curve is better, unfortunately trismus has returned we had to paralyze him and reinsert bilateral bite blocks to protect this tongue.     Scheduled Meds:   apixaban  2.5 mg Oral BID    baclofen  10 mg Per G Tube TID    balsam peru-castor oiL   Topical (Top) Daily    busPIRone  5 mg Per G Tube BID    meropenem (MERREM) IVPB  1 g Intravenous Q8H    metoprolol tartrate  12.5 mg Per G Tube BID    nystatin  500,000 Units Mouth/Throat QID    pantoprazole  40 mg Intravenous Daily    polyethylene glycol  17 g Oral Daily    riluzole  50 mg Oral Q12H    scopolamine  1 patch Transdermal Q3 Days    senna-docusate 8.6-50 mg  1 tablet Oral BID    sodium chloride 0.9%  10 mL Intravenous Q6H    traZODone  50 mg Per G Tube QHS    vancomycin (VANCOCIN) IVPB  1,500 mg Intravenous Q12H     Continuous Infusions:      PRN Meds:sodium chloride, sodium chloride, acetaminophen, acetaminophen, albuterol-ipratropium, aluminum-magnesium hydroxide-simethicone, calcium chloride IVPB, calcium chloride IVPB, calcium chloride IVPB, dextrose 10%, dextrose 10%, glucagon (human recombinant), glucose, glucose, HYDROcodone-acetaminophen, HYDROmorphone, magnesium sulfate IVPB, magnesium sulfate IVPB, magnesium sulfate IVPB, magnesium sulfate IVPB, melatonin, morphine, naloxone, ondansetron, potassium chloride in water, potassium chloride in water, potassium chloride in water, potassium chloride in water, prochlorperazine, simethicone, sodium chloride 0.9%, Flushing PICC Protocol **AND** sodium chloride 0.9% **AND** sodium chloride 0.9%, sodium phosphate IVPB, sodium phosphate IVPB, sodium phosphate IVPB, sodium  phosphate IVPB, sodium phosphate IVPB, Pharmacy to dose Vancomycin consult **AND** vancomycin - pharmacy to dose, white petrolatum    Review of patient's allergies indicates:  No Known Allergies    Review of Systems: As per interval history    OBJECTIVE:     Vital Signs (Most Recent)  Temp: 99.1 °F (37.3 °C) (08/28/22 0301)  Pulse: (!) 116 (08/28/22 0457)  Resp: (!) 34 (08/28/22 0457)  BP: (!) 124/58 (08/28/22 0301)  SpO2: 100 % (08/28/22 0457)    Vital Signs Range (Last 24H):  Temp:  [33.8 °F (1 °C)-100.2 °F (37.9 °C)]   Pulse:  [108-133]   Resp:  [19-50]   BP: (123-154)/(58-78)   SpO2:  [100 %]     I & O (Last 24H):    Intake/Output Summary (Last 24 hours) at 8/28/2022 0737  Last data filed at 8/28/2022 0600  Gross per 24 hour   Intake 2519.9 ml   Output 2475 ml   Net 44.9 ml         Physical Exam:  General:  Chronically ill-appearing quadriplegic man in no distress  Eyes: No conjunctival injection. No scleral icterus.  ENT: Hearing grossly intact. No discharge from ears. No nasal discharge.  Bite blocks in place  Neck: Supple, trachea midline. No JVD, tracheostomy in place  CVS: RRR. ++ LE edema BL  Lungs:  Mechanical breath sound, No tachypnea or accessory muscle use.    Abdomen:  Soft, nontender and nondistended.  No organomegaly, peg tube in place  Neuro: AOx3. Moves all extremities. Follows commands. Responds appropriately   Skin:  Pictures as below              Laboratory:  I have reviewed all pertinent lab results within the past 24 hours.    Diagnostic Results:  Reviewed all imaging    ASSESSMENT/PLAN:     67-year-old gentleman with ALS, quadriplegia, chronic trach and PEG being treated for catheter associated UTI, masseter muscle spasm causing severe tongue trauma and other chronic problems    Active Hospital Problems    Diagnosis  POA    *Catheter-associated urinary tract infection [T83.511A, N39.0]  Yes     Priority: 1 - High    Masseter muscle spasm [M62.838]  Yes     Priority: 2     Open wound of  tongue due to bite [S01.552A]  Yes    Trismus [R25.2]  Yes    Fever [R50.9]  Yes    Facial cellulitis [L03.211]  Yes    Tracheostomy in place [Z93.0]  Not Applicable    Chronic respiratory failure with hypoxia and hypercapnia [J96.11, J96.12]  Yes    Dependent on ventilator [Z99.11]  Not Applicable    S/P percutaneous endoscopic gastrostomy (PEG) tube placement [Z93.1]  Not Applicable    ALS (amyotrophic lateral sclerosis) [G12.21]  Yes    Edema [R60.9]  Yes    Quadriparesis [G82.50]  Yes      Resolved Hospital Problems    Diagnosis Date Resolved POA    Facial abscess [L02.01] 08/22/2022 Yes         Plan:   Status post Botox injection to bilateral masseteric muscle by ENT on 08/24. Help from ENT greatly appreciated.  Unfortunately today his trismus has returned, we had to paralyze him and reinsert bilateral bite blocks  Continue antibiotics as per Infectious Disease.  Noted escalation to meropenem and addition of vancomycin considering new fever.  Noted plan for 2D echo  Bilateral upper extremity venous duplex did not show any VT  Continue holding Lasix, lowered dose of metoprolol  Vent management as per Pulmonary  Baclofen  Anemia chronic disease, transfuse below 7 grams/dl.  Will transfuse 1 unit PRBC today  History of PE, continue low-dose Eliquis  Tracheostomy and PEG tube care      VTE Risk Mitigation (From admission, onward)           Ordered     apixaban tablet 2.5 mg  2 times daily         08/20/22 1454     IP VTE HIGH RISK PATIENT  Once         08/20/22 0440     Place sequential compression device  Until discontinued         08/20/22 0440     Reason for No Pharmacological VTE Prophylaxis  Once        Question:  Reasons:  Answer:  Already adequately anticoagulated on oral Anticoagulants    08/20/22 0440                        Department Hospital Medicine  [facility]  Rose Mary King MD  Date of service: 08/28/2022

## 2022-08-28 NOTE — PROGRESS NOTES
Pulmonary/Critical Care  Progress Note      Patient name: Wei Dominique  MRN: [id number]  Date: 08/28/2022    Admit Date: 8/18/2022  Consult Requested By: Rose Mary King MD    Reason for Consult: Respiratory failure, trach    HPI:    8/19/2022 - P with h/o ALS with trach and vent dependence sent to ER for increased swelling.  Now admitted for evaluation and I was asked to see pt for ventilator management.  He is not able to provide any history.  Chart has been reviewed.  He is febrile, tachycardic (though better).  Cultures have been ordered and are pending.  Maxillofacial CT noted.  CXR shows no new infiltrates.  No ABG done yet.    8/22/2022 - Events of the weekend noted, no response to me this AM.  Dr Jones's note from yesterday seen and pt does not want comfort measures.  Mulitple + cultures (ID following for antibiotics).    8/23/2022 - Stable overnight, no new issues reported.  He is more awake and responsive.  Waiting on neuro to see for masseter BOTOX injections.  Dr Jones's note seen.  Stable on ventilator.     8/24/2022 - Stable overnight, no new issues reported.  Neuro note seen but they do not do Botox injections and have recommended OMFS consult (I do not believe that they come here) - ? Do we have to consider transfer to [facility] for evaluation/treatment per OMFS.  Respiratory status is stable.    8/25/2022 - Stable overnight, had BOTOX injection per ENT.  No new issues reported.  Low grade fever.    8/26/2022 - Stable overnight, no new issues reported.  He has some increased movement and ability to open mouth this AM.  Temp up a little yesterday.  H/H down a little but no active bleeding reported.  CXR with RLL atelectasis/infiltratre and possible effusion    8/27 the patient is running a temp.  He has MRSA on a catheter tip.  And is MRSA positive in his nares.  He is able to open his mouth.  He is having back pain.    8/28 the patient's trismus has returned.  It was necessary to paralyze  him after sedating him, and reinsert bilateral bite blocks between his teeth to protect his tongue.    Review of Systems    Review of Systems   Reason unable to perform ROS: Sedated to replace bite blocks.     Past Medical History    Past Medical History:   Diagnosis Date    ALS (amyotrophic lateral sclerosis)     ALS (amyotrophic lateral sclerosis)     Dependent on ventilator 3/21/2022    Erectile dysfunction     Gait disturbance     Hyperlipidemia     Hypertension     Iron deficiency anemia     Motor neuron disease     Polycystic kidney disease     Quadriparesis 2/6/2018    Tremor        Past Surgical History    Past Surgical History:   Procedure Laterality Date    GASTROSTOMY      PEG    PORTACATH PLACEMENT      TRACHEOSTOMY         Medications (scheduled):      apixaban  2.5 mg Oral BID    baclofen  10 mg Per G Tube TID    balsam peru-castor oiL   Topical (Top) Daily    busPIRone  5 mg Per G Tube BID    meropenem (MERREM) IVPB  1 g Intravenous Q8H    metoprolol tartrate  12.5 mg Per G Tube BID    nystatin  500,000 Units Mouth/Throat QID    pantoprazole  40 mg Intravenous Daily    polyethylene glycol  17 g Oral Daily    riluzole  50 mg Oral Q12H    scopolamine  1 patch Transdermal Q3 Days    senna-docusate 8.6-50 mg  1 tablet Oral BID    sodium chloride 0.9%  10 mL Intravenous Q6H    traZODone  50 mg Per G Tube QHS    vancomycin (VANCOCIN) IVPB  1,500 mg Intravenous Q12H       Medications (infusions):           Medications (prn):     sodium chloride, acetaminophen, acetaminophen, albuterol-ipratropium, aluminum-magnesium hydroxide-simethicone, calcium chloride IVPB, calcium chloride IVPB, calcium chloride IVPB, dextrose 10%, dextrose 10%, glucagon (human recombinant), glucose, glucose, HYDROcodone-acetaminophen, HYDROmorphone, magnesium sulfate IVPB, magnesium sulfate IVPB, magnesium sulfate IVPB, magnesium sulfate IVPB, melatonin, morphine, naloxone, ondansetron, potassium chloride in water, potassium chloride  "in water, potassium chloride in water, potassium chloride in water, prochlorperazine, simethicone, sodium chloride 0.9%, Flushing PICC Protocol **AND** sodium chloride 0.9% **AND** sodium chloride 0.9%, sodium phosphate IVPB, sodium phosphate IVPB, sodium phosphate IVPB, sodium phosphate IVPB, sodium phosphate IVPB, Pharmacy to dose Vancomycin consult **AND** vancomycin - pharmacy to dose, white petrolatum    Family History: No family history on file.    Social History: Tobacco:   Social History     Tobacco Use   Smoking Status Not on file   Smokeless Tobacco Not on file                                EtOH:   Social History     Substance and Sexual Activity   Alcohol Use None                                Drugs:   Social History     Substance and Sexual Activity   Drug Use Never       Physical Exam    Vital signs:  Temp:  [98.4 °F (36.9 °C)-99.9 °F (37.7 °C)]   Pulse:  [108-127]   Resp:  [19-50]   BP: (116-154)/(58-78)   SpO2:  [100 %]     Intake/Output:     Intake/Output Summary (Last 24 hours) at 8/28/2022 1100  Last data filed at 8/28/2022 0600  Gross per 24 hour   Intake 2359.9 ml   Output 2475 ml   Net -115.1 ml          BMI: Estimated body mass index is 27.25 kg/m² as calculated from the following:    Height as of this encounter: 5' 8" (1.727 m).    Weight as of this encounter: 81.3 kg (179 lb 3.7 oz).    Physical Exam  Vitals and nursing note reviewed.   Constitutional:       General: He is not in acute distress.     Appearance: Normal appearance. He is ill-appearing (chronically). He is not toxic-appearing or diaphoretic.   HENT:      Head: Normocephalic and atraumatic.      Comments: Tongue remains edematous     Right Ear: External ear normal.      Left Ear: External ear normal.      Nose: Nose normal.      Mouth/Throat:      Mouth: Mucous membranes are moist.      Pharynx: Oropharynx is clear. No oropharyngeal exudate.      Comments: Bite blocks bilaterally  Eyes:      General: No scleral icterus.        " Right eye: No discharge.         Left eye: No discharge.      Extraocular Movements: Extraocular movements intact.      Conjunctiva/sclera: Conjunctivae normal.      Pupils: Pupils are equal, round, and reactive to light.   Neck:      Vascular: No carotid bruit.      Comments: trach  Cardiovascular:      Rate and Rhythm: Normal rate and regular rhythm.      Pulses: Normal pulses.      Heart sounds: Normal heart sounds. No murmur heard.    No friction rub. No gallop.   Pulmonary:      Effort: Pulmonary effort is normal. No respiratory distress.      Breath sounds: Normal breath sounds. No stridor. No wheezing, rhonchi or rales.   Chest:      Chest wall: No tenderness.   Abdominal:      General: Bowel sounds are normal. There is no distension.      Tenderness: There is no abdominal tenderness. There is no guarding.      Comments: Peg tube in place   Musculoskeletal:         General: Swelling (Arms are very edematous) present. Normal range of motion.      Cervical back: Normal range of motion and neck supple. No rigidity or tenderness.      Right lower leg: No edema.      Left lower leg: No edema.      Comments: + edema in UE bilaterally   Lymphadenopathy:      Cervical: No cervical adenopathy.   Skin:     General: Skin is warm and dry.      Capillary Refill: Capillary refill takes less than 2 seconds.      Coloration: Skin is not jaundiced.      Findings: No bruising.      Comments: Some redness at prior midline site  Some heel breakdown   Neurological:      Mental Status: He is alert.      Comments: Completely paralyzed except for his eyes.  Trismus has returned   Psychiatric:      Comments: Not able to assess       Laboratory    Recent Labs   Lab 08/28/22  0455   WBC 14.22*   RBC 2.78*   HGB 6.8*   HCT 23.1*      MCV 83   MCH 24.5*   MCHC 29.4*         Recent Labs   Lab 08/28/22  0455   CALCIUM 8.3*      K 4.2   CO2 26      BUN 29*   CREATININE 0.4*           Microbiology:       Microbiology  Results (last 7 days)       Procedure Component Value Units Date/Time    Blood culture [[id number]]     Order Status: No result Specimen: Blood     Blood culture [[id number]]  (Abnormal) Collected: 08/26/22 1300    Order Status: Completed Specimen: Blood from Line, PICC Right Brachial Updated: 08/28/22 1021     Blood Culture, Routine Gram stain luisa bottle: Gram positive cocci in clusters resembling Staph      Results called to and read back by: porfirio Mclean MICU3 08/27/2022      22:23 fgf      COAGULASE-NEGATIVE STAPHYLOCOCCUS SPECIES  For susceptibility see order #[id number]      Narrative:      From port  Collection has been rescheduled by [email or web address] at 08/26/2022 12:23 Reason:   Nurse Draw  Collection has been rescheduled by RE1 at 08/26/2022 12:23 Reason:   Nurse Draw    Blood culture [[id number]]  (Abnormal) Collected: 08/26/22 0957    Order Status: Completed Specimen: Blood Updated: 08/28/22 1021     Blood Culture, Routine Gram stain aer bottle: Gram positive cocci      Positive results previously called      COAGULASE-NEGATIVE STAPHYLOCOCCUS SPECIES  For susceptibility see order #[id number]      Blood culture [[id number]]  (Abnormal) Collected: 08/26/22 0957    Order Status: Completed Specimen: Blood Updated: 08/28/22 1018     Blood Culture, Routine Gram stain aer bottle: Gram positive cocci in clusters resembling Staph      Results called to and read back by: Porfirio Lerma MICU3 08/27/2022  06:29      fgf      Gram stain luisa bottle: Gram positive cocci      Positive results previously called      COAGULASE-NEGATIVE STAPHYLOCOCCUS SPECIES  Identification and susceptibility pending      Blood culture [[id number]] Collected: 08/27/22 0945    Order Status: Completed Specimen: Blood from Line, PICC Right Brachial Updated: 08/27/22 1758     Blood Culture, Routine No Growth to date    Narrative:      Collection has been rescheduled by [email or web address] at 08/27/2022 09:13 Reason:   Nurse Draw  Per Dr. King  Collection has been  rescheduled by [email or web address] at 08/27/2022 09:16 Reason:   Nurse Draw  Collection has been rescheduled by [email or web address] at 08/27/2022 09:13 Reason:   Nurse Draw  Per Dr. King  Collection has been rescheduled by [email or web address] at 08/27/2022 09:16 Reason:   Nurse Draw    Culture, Respiratory with Gram Stain [[id number]]  (Abnormal) Collected: 08/26/22 1313    Order Status: Completed Specimen: Respiratory from Endotracheal Aspirate Updated: 08/27/22 1044     Respiratory Culture No normal respiratory marco      GRAM NEGATIVE AUTUMN  Moderate  Identification and susceptibility pending       Gram Stain (Respiratory) <10 epithelial cells per low power field.     Gram Stain (Respiratory) Moderate WBC's     Gram Stain (Respiratory) Few Gram negative rods     Gram Stain (Respiratory) Few Gram positive rods    Blood culture [[id number]]     Order Status: Canceled Specimen: Blood     Blood culture [[id number]] Collected: 08/19/22 1234    Order Status: Completed Specimen: Blood from Peripheral, Upper Arm, Right Updated: 08/24/22 1432     Blood Culture, Routine No growth after 5 days.    Blood culture [[id number]] Collected: 08/18/22 2010    Order Status: Completed Specimen: Blood from Peripheral, Forearm, Left Updated: 08/23/22 2232     Blood Culture, Routine No growth after 5 days.    Urine culture [[id number]]  (Abnormal)  (Susceptibility) Collected: 08/18/22 1950    Order Status: Completed Specimen: Urine Updated: 08/22/22 0658     Urine Culture, Routine PSEUDOMONAS AERUGINOSA   >100,000 cfu/ml  Known  patient        PROVIDENCIA STUARTII  > 100,000 cfu/ml      Narrative:      Specimen Source->Urine    Culture, Respiratory with Gram Stain [[id number]]  (Abnormal)  (Susceptibility) Collected: 08/19/22 1728    Order Status: Completed Specimen: Respiratory from Tracheal Aspirate Updated: 08/22/22 0652     Respiratory Culture Normal respiratory marco      PSEUDOMONAS AERUGINOSA  Moderate       Gram Stain (Respiratory) Moderate WBC's     Gram Stain  (Respiratory) Rare Gram positive cocci     Gram Stain (Respiratory) Rare Gram positive rods     Gram Stain (Respiratory) Rare Gram negative rods            Radiology    US Upper Extremity Veins Bilateral  Reason: Bilateral upper extremity swelling    COMPARISON: 8/19/2022    FINDINGS:  Grayscale, color Doppler, and spectral Doppler analysis was performed.    Bilateral internal jugular veins demonstrates normal compressibility and color and spectral Doppler flow. Bilateral subclavian veins demonstrates normal color and spectral Doppler flow.    Bilateral axillary, brachial, basilic, and cephalic veins demonstrate normal compressibility and color and spectral Doppler flow. Bilateral radial and ulnar veins are unremarkable.    There is moderate subcutaneous edema within the upper extremities    IMPRESSION:  Negative for venous thrombosis of bilateral upper extremities    Moderate upper extremity edema    Electronically signed by:  Sangeeta Deleon MD  8/27/2022 10:48 AM CDT Workstation: [id number]  X-Ray Chest AP Portable  Portable chest x-ray at 9:23 AM is compared to prior study dated 8/26/2010    Clinical history is hypoxia    The tracheostomy tube, left subclavian vein Port-A-Cath and right PICC line are in stable position. There is hypoinflation.    The cardiomediastinal silhouette is normal in size.  There is a stable small right pleural effusion with improvement of the right lower lobe airspace disease. There is stable linear airspace disease in left lung base. The upper lobes are clear.    There are no acute osseous anomalies. There is a gastrostomy tube in place.    IMPRESSION: Hypoinflation with improvement of the airspace disease in the right lung base with small right pleural effusion    Stable faint airspace disease in the left lung base    Electronically signed by:  Sangeeta Deleon MD  8/27/2022 10:00 AM CDT Workstation: [id number]        Ventilator Information    Vent Mode: A/C  Oxygen Concentration  (%):  [30] 30  Resp Rate Total:  [16 br/min-30 br/min] 26 br/min  Vt Set:  [500 mL] 500 mL  PEEP/CPAP:  [5 cmH20] 5 cmH20  Mean Airway Pressure:  [9.8 lxD34-22 cmH20] 12 cmH20         No results for input(s): PH, PCO2, PO2, HCO3, POCSATURATED, BE in the last 72 hours.      Impression    Active Hospital Problems    Diagnosis  POA    *Catheter-associated urinary tract infection [T83.511A, N39.0]  Yes    Masseter muscle spasm [M62.838]  Yes    Open wound of tongue due to bite [S01.552A]  Yes    Trismus [R25.2]  Yes    Fever [R50.9]  Yes    Facial cellulitis [L03.211]  Yes    Tracheostomy in place [Z93.0]  Not Applicable    Chronic respiratory failure with hypoxia and hypercapnia [J96.11, J96.12]  Yes    Dependent on ventilator [Z99.11]  Not Applicable    S/P percutaneous endoscopic gastrostomy (PEG) tube placement [Z93.1]  Not Applicable    ALS (amyotrophic lateral sclerosis) [G12.21]  Yes    Edema [R60.9]  Yes    Quadriparesis [G82.50]  Yes      Resolved Hospital Problems    Diagnosis Date Resolved POA    Facial abscess [L02.01] 08/22/2022 Yes   Elevated temperature with T-max of 99.6   Pseudomonas in sputum  Coag-negative staph in multiple blood cultures  MRSA on cath tip  Trismus worsened again today  ALS with inability to move anything but his eyes   Bibasilar atelectasis    Plan    Continue vent - decreased FiO2 to 28%  Contact isolation  Antibiotics per ID  Transfuse 1 unit packed red blood cells  Wound care  PEG feedings  Re-consult ENT for more Botox  Follow CXR  Back to [place] when trismus is resolved and antibiotic plans are confirmed

## 2022-08-28 NOTE — CARE UPDATE
08/28/22 1306   Patient Assessment/Suction   Level of Consciousness (AVPU) alert   Respiratory Effort Normal;Unlabored   Expansion/Accessory Muscles/Retractions no use of accessory muscles   All Lung Fields Breath Sounds rhonchi   Rhythm/Pattern, Respiratory assisted mechanically   Cough Frequency with stimulation   Cough Type congested   Suction Method tracheal   PRE-TX-O2   O2 Device (Oxygen Therapy) ventilator   $ Is the patient on Low Flow Oxygen? Yes   Oxygen Concentration (%) 28   SpO2 100 %   Pulse Oximetry Type Continuous   $ Pulse Oximetry - Multiple Charge Pulse Oximetry - Multiple   Pulse (!) 111   Resp (!) 22   Positioning HOB elevated 30 degrees   Aerosol Therapy   $ Aerosol Therapy Charges PRN treatment not required        Surgical Airway 02/21/22 1145 Shiley Cuffed   Placement Date/Time: 02/21/22 1145   Present Prior to Hospital Arrival?: Yes  Type: Tracheostomy  Brand: Shiley  Airway Device Size: 8.0  Style: Cuffed   Cuff Pressure   (MLT)   Cuff Inflation? Inflated   Site Assessment Midline;Drainage   Site Care Cleansed;Dried;Dressing applied   Inner Cannula Care Other (Comment)  (PATENT)   Ties Assessment Clean;Dry;Intact;Secure   Airway Safety   Ambu bag with the patient? Yes, Adult Ambu   Is mask with the patient? Yes, Adult Mask   Extra trach at bedside? Yes   Extra trach sizes at bedside? 7.5  (NEW FLEX SIZE)   Vent Select   Conventional Vent Y   Charged w/in last 24h YES   Preset Conventional Ventilator Settings   Vent ID [id number]   Vent Type    Ventilation Type VC   Vent Mode A/C   Humidity HME   Set Rate 16 BPM   Vt Set 500 mL   PEEP/CPAP 5 cmH20   Peak Flow 60 L/min   Peak End Inspiratory Pressure 23 cmH20   I-Trigger Type  V-TRIG   Trigger Sensitivity Flow/I-Trigger 2 L/min   Patient Ventilator Parameters   Resp Rate Total 20 br/min   Peak Airway Pressure 27 cmH2O   Mean Airway Pressure 12 cmH20   Plateau Pressure 31 cmH20   Exhaled Vt 542 mL   Total Ve 10 mL   I:E Ratio Measured 1:2.10    Auto PEEP 0 cmH20   Conventional Ventilator Alarms   Alarms On Y   Ve High Alarm 20 L/min   Ve Low Alarm 3 L/min   Vt High Alarm 1200 mL   Vt Low Alarm 300 mL   Resp Rate High Alarm 40 br/min   Press High Alarm 50 cmH2O   Apnea Rate 16   Apnea Volume (mL) 500 mL   Apnea Oxygen Concentration  100   Apnea Flow Rate (L/min) 60   T Apnea 20 sec(s)   IHI Ventilator Associated Pneumonia Bundle (Required)   Head of Bed Elevated  HOB 30   Oral Care   (PT HAS ENT CONSULT FOR ORAL/TONGUE)   Ready to Wean/Extubation Screen   FIO2<=50 (chart decimal) 0.28   MV<16L (chart vol.) 10   PEEP <=8 (chart #) 5   Ready to Wean Parameters   F/VT Ratio<105 (RSBI) (!) 40.59   Vital Capacity (mL) 0   Respiratory Evaluation   $ Care Plan Tech Time 15 min

## 2022-08-28 NOTE — PLAN OF CARE
Problem: Adult Inpatient Plan of Care  Goal: Plan of Care Review  8/27/2022 1904 by Chaya Saba RN  Outcome: Ongoing, Progressing  8/27/2022 1904 by Chaya Saba RN  Outcome: Ongoing, Progressing  Goal: Patient-Specific Goal (Individualized)  8/27/2022 1904 by Chaya Saba RN  Outcome: Ongoing, Progressing  8/27/2022 1904 by Chaya Saba RN  Outcome: Ongoing, Progressing  Goal: Absence of Hospital-Acquired Illness or Injury  8/27/2022 1904 by Chaya Saba RN  Outcome: Ongoing, Progressing  Right ear pressure injury assessed and treated  8/27/2022 1904 by Chaya Saba RN  Outcome: Ongoing, Progressing  Goal: Optimal Comfort and Wellbeing  8/27/2022 1904 by Chaya Saba RN  Outcome: Ongoing, Progressing  Pain controlled with pain meds as ordered prn, repositioning, and hygiene  8/27/2022 1904 by Chaya Saba RN  Outcome: Ongoing, Progressing  Goal: Readiness for Transition of Care  8/27/2022 1904 by Chaya Saba RN  Outcome: Ongoing, Progressing  Fevers monitored, only required 1 dose of tylenol today  8/27/2022 1904 by Chaya Saba RN  Outcome: Ongoing, Progressing     Problem: Adjustment to Illness (Sepsis/Septic Shock)  Goal: Optimal Coping  8/27/2022 1904 by Chaya Saba RN  Outcome: Ongoing, Progressing  8/27/2022 1904 by Chaya Saba RN  Outcome: Ongoing, Progressing     Problem: Bleeding (Sepsis/Septic Shock)  Goal: Absence of Bleeding  8/27/2022 1904 by Chaya Saba RN  Outcome: Ongoing, Progressing  Dressing to sacrum left in place for bleeding control  8/27/2022 1904 by Chaya Saba RN  Outcome: Ongoing, Progressing     Problem: Glycemic Control Impaired (Sepsis/Septic Shock)  Goal: Blood Glucose Level Within Desired Range  WNL  8/27/2022 1904 by Chaya Saba RN  Outcome: Ongoing, Progressing  8/27/2022 1904 by Chaya Saba RN  Outcome: Ongoing, Progressing     Problem: Infection Progression (Sepsis/Septic Shock)  Goal: Absence of Infection Signs and Symptoms  8/27/2022  1904 by Chaya Saba RN  Outcome: Ongoing, Progressing  8/27/2022 1904 by Chaya Saba RN  Outcome: Ongoing, Progressing     Problem: Nutrition Impaired (Sepsis/Septic Shock)  Goal: Optimal Nutrition Intake  8/27/2022 1904 by Chaya Saba RN  Outcome: Ongoing, Progressing  Tolerating TF  8/27/2022 1904 by Chaya Saba RN  Outcome: Ongoing, Progressing     Problem: Infection  Goal: Absence of Infection Signs and Symptoms  8/27/2022 1904 by Chaya Saba RN  Outcome: Ongoing, Progressing  Monitoring  8/27/2022 1904 by Chaya Saba RN  Outcome: Ongoing, Progressing     Problem: Impaired Wound Healing  Goal: Optimal Wound Healing  8/27/2022 1904 by Chaya Saba RN  Outcome: Ongoing, Progressing  Wound care per orders/protocol  8/27/2022 1904 by Chaya Saba RN  Outcome: Ongoing, Progressing     Problem: Communication Impairment (Mechanical Ventilation, Invasive)  Goal: Effective Communication  8/27/2022 1904 by Chaya Saba RN  Outcome: Ongoing, Progressing  Communicating with eye blinks  8/27/2022 1904 by Chaay Saba RN  Outcome: Ongoing, Progressing     Problem: Device-Related Complication Risk (Mechanical Ventilation, Invasive)  Goal: Optimal Device Function  8/27/2022 1904 by Chaya Saba RN  Outcome: Ongoing, Progressing  No vent complications  8/27/2022 1904 by Chaya Saba RN  Outcome: Ongoing, Progressing     Problem: Inability to Wean (Mechanical Ventilation, Invasive)  Goal: Mechanical Ventilation Liberation  No plans to wean at this time  8/27/2022 1904 by Chaya Saba RN  Outcome: Ongoing, Progressing  8/27/2022 1904 by Chaya Saba RN  Outcome: Ongoing, Progressing     Problem: Nutrition Impairment (Mechanical Ventilation, Invasive)  Goal: Optimal Nutrition Delivery  8/27/2022 1904 by Chaya Saba RN  Outcome: Ongoing, Progressing  Tolerating TF and supplements  8/27/2022 1904 by Chaya Saba RN  Outcome: Ongoing, Progressing     Problem: Skin and Tissue Injury (Mechanical  Ventilation, Invasive)  Goal: Absence of Device-Related Skin and Tissue Injury  8/27/2022 1904 by Chaya Saba RN  Outcome: Ongoing, Progressing  8/27/2022 1904 by Chaya Saba RN  Outcome: Ongoing, Progressing     Problem: Ventilator-Induced Lung Injury (Mechanical Ventilation, Invasive)  Goal: Absence of Ventilator-Induced Lung Injury  8/27/2022 1904 by Chaya Saba RN  Outcome: Ongoing, Progressing  8/27/2022 1904 by Chaya Saba RN  Outcome: Ongoing, Progressing     Problem: Communication Impairment (Artificial Airway)  Goal: Effective Communication  8/27/2022 1904 by Chaya Saba RN  Outcome: Ongoing, Progressing  8/27/2022 1904 by Chaya Saba RN  Outcome: Ongoing, Progressing     Problem: Device-Related Complication Risk (Artificial Airway)  Goal: Optimal Device Function  8/27/2022 1904 by Chaya Saba RN  Outcome: Ongoing, Progressing  8/27/2022 1904 by Chaya Saba RN  Outcome: Ongoing, Progressing     Problem: Skin and Tissue Injury (Artificial Airway)  Goal: Absence of Device-Related Skin or Tissue Injury  8/27/2022 1904 by Chaya Saba RN  Outcome: Ongoing, Progressing  8/27/2022 1904 by Chaya Saba RN  Outcome: Ongoing, Progressing     Problem: Noninvasive Ventilation Acute  Goal: Effective Unassisted Ventilation and Oxygenation  8/27/2022 1904 by Chaya Saba RN  Outcome: Ongoing, Progressing  8/27/2022 1904 by Chaya Saba RN  Outcome: Ongoing, Progressing     Problem: Fall Injury Risk  Goal: Absence of Fall and Fall-Related Injury  8/27/2022 1904 by Chaya Saba RN  Outcome: Ongoing, Progressing  No falls today  8/27/2022 1904 by Chaya Saba RN  Outcome: Ongoing, Progressing     Problem: Skin Injury Risk Increased  Goal: Skin Health and Integrity  8/27/2022 1904 by Chaya Saba RN  Outcome: Ongoing, Progressing  8/27/2022 1904 by Chaya Saba RN  Outcome: Ongoing, Progressing     Problem: Feeding Intolerance (Enteral Nutrition)  Goal: Feeding Tolerance  8/27/2022  1904 by Chaya Saba RN  Outcome: Ongoing, Progressing  8/27/2022 1904 by Chaya Saba RN  Outcome: Ongoing, Progressing

## 2022-08-28 NOTE — RESPIRATORY THERAPY
08/27/22 2024   Patient Assessment/Suction   Level of Consciousness (AVPU) alert   Respiratory Effort Normal;Unlabored   Expansion/Accessory Muscles/Retractions no use of accessory muscles   All Lung Fields Breath Sounds Anterior:;coarse   Rhythm/Pattern, Respiratory assisted mechanically   Cough Frequency with stimulation   Cough Type assisted   Suction Method oral;tracheal   Suction Pressure (mmHg) -120 mmHg   $ Suction Charges Inline Suction Procedure Stat Charge   Secretions Amount moderate   Secretions Color white;clear   Secretions Characteristics thick   $ Swab or suction? Suction   Aspirate Toleration KARMEN (no adverse reactions)   PRE-TX-O2   O2 Device (Oxygen Therapy) ventilator   Oxygen Concentration (%) 30   SpO2 100 %   Pulse Oximetry Type Continuous   $ Pulse Oximetry - Multiple Charge Pulse Oximetry - Multiple   Pulse (!) 127   Resp (!) 32   Aerosol Therapy   $ Aerosol Therapy Charges PRN treatment not required   Daily Review of Necessity (SVN) completed   Respiratory Treatment Status (SVN) PRN treatment not required        Surgical Airway 02/21/22 1145 Shiley Cuffed   Placement Date/Time: 02/21/22 1145   Present Prior to Hospital Arrival?: Yes  Type: Tracheostomy  Brand: Terascalaley  Airway Device Size: 8.0  Style: Cuffed   Cuff Inflation? Inflated   Status Secured   Site Assessment Clean;Dry;No bleeding;No drainage   Site Care Cleansed;Dried;Dressing applied   Inner Cannula Care Cleansed/dried   Ties Assessment Clean;Dry;Intact   Airway Safety   Ambu bag with the patient? Yes, Adult Ambu   Is mask with the patient? Yes, Adult Mask   Suction set is at the bedside? Yes   ETT at Bedside? Yes   ETT Size 8   Extra trach at bedside? Yes   Extra trach sizes at bedside? 6   Is obturator available? Yes   Location of obturator?  Bedside table   Vent Select   Conventional Vent Y   Charged w/in last 24h YES   Preset Conventional Ventilator Settings   Vent ID [id number]   Vent Type    Ventilation Type VC   Vent Mode  A/C   Humidity HME   Set Rate 16 BPM   Vt Set 500 mL   PEEP/CPAP 5 cmH20   Peak Flow 60 L/min   Peak End Inspiratory Pressure 21 cmH20   I-Trigger Type  V-TRIG   Trigger Sensitivity Flow/I-Trigger 2 L/min   Patient Ventilator Parameters   Resp Rate Total 24 br/min   Peak Airway Pressure 25 cmH2O   Mean Airway Pressure 13 cmH20   Plateau Pressure 31 cmH20   Exhaled Vt 531 mL   Total Ve 12.5 mL   I:E Ratio Measured 1:2.00   Auto PEEP 0 cmH20   Conventional Ventilator Alarms   Alarms On Y   Resp Rate High Alarm 40 br/min   Press High Alarm 60 cmH2O   Apnea Rate 16   Apnea Volume (mL) 1 mL   Apnea Oxygen Concentration  100   Apnea Flow Rate (L/min) 60   T Apnea 20 sec(s)   Ready to Wean/Extubation Screen   FIO2<=50 (chart decimal) 0.3   MV<16L (chart vol.) 12.5   PEEP <=8 (chart #) 5   Ready to Wean Parameters   F/VT Ratio<105 (RSBI) (!) 60.26   Vital Capacity (mL) 0   Education   $ Education Bronchodilator;Trach Care;15 min   Respiratory Evaluation   $ Care Plan Tech Time 15 min   $ Eval/Re-eval Charges Re-evaluation

## 2022-08-28 NOTE — CARE UPDATE
08/28/22 0820   Patient Assessment/Suction   Level of Consciousness (AVPU) alert   Respiratory Effort Normal;Unlabored   Expansion/Accessory Muscles/Retractions no use of accessory muscles   All Lung Fields Breath Sounds rhonchi   Rhythm/Pattern, Respiratory assisted mechanically   Cough Frequency with stimulation;infrequent   Cough Type congested;good   Suction Method tracheal   $ Suction Charges Inline Suction Procedure Stat Charge   PRE-TX-O2   O2 Device (Oxygen Therapy) ventilator   $ Is the patient on Low Flow Oxygen? Yes   Oxygen Concentration (%) 30   SpO2 100 %   Pulse Oximetry Type Continuous   $ Pulse Oximetry - Multiple Charge Pulse Oximetry - Multiple   Pulse (!) 113   Resp (!) 36   Positioning HOB elevated 30 degrees   Aerosol Therapy   $ Aerosol Therapy Charges PRN treatment not required        Surgical Airway 02/21/22 1145 Shiley Cuffed   Placement Date/Time: 02/21/22 1145   Present Prior to Hospital Arrival?: Yes  Type: Tracheostomy  Brand: [employer] Endeavors  Airway Device Size: 8.0  Style: Cuffed   Cuff Inflation? Inflated  (MLT)   Airway Safety   Ambu bag with the patient? Yes, Adult Ambu   Vent Select   Conventional Vent Y   Charged w/in last 24h NO   Preset Conventional Ventilator Settings   Vent ID [id number]   Vent Type    Ventilation Type VC   Vent Mode A/C   Humidity HME   Set Rate 16 BPM   Vt Set 500 mL   PEEP/CPAP 5 cmH20   Peak Flow 60 L/min   Peak End Inspiratory Pressure 23 cmH20   I-Trigger Type  V-TRIG   Trigger Sensitivity Flow/I-Trigger 2 L/min   Patient Ventilator Parameters   Resp Rate Total 19 br/min   Peak Airway Pressure 23 cmH2O   Mean Airway Pressure 11 cmH20   Plateau Pressure 31 cmH20   Exhaled Vt 452 mL   Total Ve 9.7 mL   I:E Ratio Measured 1:2.80   Auto PEEP 0 cmH20   Tubing ID (mm) 8 mm   Tube Type Trach   Conventional Ventilator Alarms   Alarms On Y   Ve High Alarm 320 L/min   Ve Low Alarm 3 L/min   Vt High Alarm 1200 mL   Vt Low Alarm 300 mL   Resp Rate High Alarm 40 br/min    Press High Alarm 50 cmH2O   Apnea Rate 16   Apnea Volume (mL) 500 mL   Apnea Oxygen Concentration  100   Apnea Flow Rate (L/min) 60   T Apnea 20 sec(s)   IHI Ventilator Associated Pneumonia Bundle (Required)   Head of Bed Elevated  HOB 30   Ready to Wean/Extubation Screen   FIO2<=50 (chart decimal) 0.3   MV<16L (chart vol.) 9.7   PEEP <=8 (chart #) 5   Ready to Wean Parameters   F/VT Ratio<105 (RSBI) (!) 79.65   Vital Capacity (mL) 0   Education   $ Education Bronchodilator;Suction;15 min   Respiratory Evaluation   $ Care Plan Tech Time 15 min

## 2022-08-29 LAB
ALBUMIN SERPL BCP-MCNC: 1.6 G/DL (ref 3.5–5.2)
ALP SERPL-CCNC: 74 U/L (ref 55–135)
ALT SERPL W/O P-5'-P-CCNC: 14 U/L (ref 10–44)
ANION GAP SERPL CALC-SCNC: 4 MMOL/L (ref 8–16)
ANION GAP SERPL CALC-SCNC: 4 MMOL/L (ref 8–16)
AST SERPL-CCNC: 17 U/L (ref 10–40)
BACTERIA SPEC AEROBE CULT: ABNORMAL
BACTERIA SPEC AEROBE CULT: ABNORMAL
BASOPHILS # BLD AUTO: 0.06 K/UL (ref 0–0.2)
BASOPHILS NFR BLD: 0.4 % (ref 0–1.9)
BILIRUB SERPL-MCNC: 0.6 MG/DL (ref 0.1–1)
BUN SERPL-MCNC: 21 MG/DL (ref 8–23)
BUN SERPL-MCNC: 21 MG/DL (ref 8–23)
CA-I BLDV-SCNC: 1.22 MMOL/L (ref 1.06–1.42)
CALCIUM SERPL-MCNC: 7.9 MG/DL (ref 8.7–10.5)
CALCIUM SERPL-MCNC: 7.9 MG/DL (ref 8.7–10.5)
CHLORIDE SERPL-SCNC: 105 MMOL/L (ref 95–110)
CHLORIDE SERPL-SCNC: 105 MMOL/L (ref 95–110)
CO2 SERPL-SCNC: 25 MMOL/L (ref 23–29)
CO2 SERPL-SCNC: 25 MMOL/L (ref 23–29)
CREAT SERPL-MCNC: 0.4 MG/DL (ref 0.5–1.4)
CREAT SERPL-MCNC: 0.4 MG/DL (ref 0.5–1.4)
CRP SERPL-MCNC: 7.64 MG/DL
DIFFERENTIAL METHOD: ABNORMAL
EOSINOPHIL # BLD AUTO: 0.5 K/UL (ref 0–0.5)
EOSINOPHIL NFR BLD: 2.8 % (ref 0–8)
ERYTHROCYTE [DISTWIDTH] IN BLOOD BY AUTOMATED COUNT: 18.7 % (ref 11.5–14.5)
EST. GFR  (NO RACE VARIABLE): >60 ML/MIN/1.73 M^2
EST. GFR  (NO RACE VARIABLE): >60 ML/MIN/1.73 M^2
GLUCOSE SERPL-MCNC: 123 MG/DL (ref 70–110)
GLUCOSE SERPL-MCNC: 123 MG/DL (ref 70–110)
GRAM STN SPEC: ABNORMAL
HCT VFR BLD AUTO: 26.2 % (ref 40–54)
HGB BLD-MCNC: 8 G/DL (ref 14–18)
IMM GRANULOCYTES # BLD AUTO: 0.2 K/UL (ref 0–0.04)
IMM GRANULOCYTES NFR BLD AUTO: 1.3 % (ref 0–0.5)
LYMPHOCYTES # BLD AUTO: 1.5 K/UL (ref 1–4.8)
LYMPHOCYTES NFR BLD: 9.4 % (ref 18–48)
MAGNESIUM SERPL-MCNC: 1.7 MG/DL (ref 1.6–2.6)
MCH RBC QN AUTO: 25.7 PG (ref 27–31)
MCHC RBC AUTO-ENTMCNC: 30.5 G/DL (ref 32–36)
MCV RBC AUTO: 84 FL (ref 82–98)
MONOCYTES # BLD AUTO: 0.6 K/UL (ref 0.3–1)
MONOCYTES NFR BLD: 3.7 % (ref 4–15)
NEUTROPHILS # BLD AUTO: 13.1 K/UL (ref 1.8–7.7)
NEUTROPHILS NFR BLD: 82.4 % (ref 38–73)
NRBC BLD-RTO: 0 /100 WBC
PLATELET # BLD AUTO: 334 K/UL (ref 150–450)
PMV BLD AUTO: 9.6 FL (ref 9.2–12.9)
POTASSIUM SERPL-SCNC: 4.2 MMOL/L (ref 3.5–5.1)
POTASSIUM SERPL-SCNC: 4.2 MMOL/L (ref 3.5–5.1)
PROT SERPL-MCNC: 7.1 G/DL (ref 6–8.4)
RBC # BLD AUTO: 3.11 M/UL (ref 4.6–6.2)
SODIUM SERPL-SCNC: 134 MMOL/L (ref 136–145)
SODIUM SERPL-SCNC: 134 MMOL/L (ref 136–145)
VANCOMYCIN SERPL-MCNC: 31.6 UG/ML
VANCOMYCIN SERPL-MCNC: 53.4 UG/ML
WBC # BLD AUTO: 15.84 K/UL (ref 3.9–12.7)

## 2022-08-29 PROCEDURE — 20000000 HC ICU ROOM

## 2022-08-29 PROCEDURE — 99233 PR SUBSEQUENT HOSPITAL CARE,LEVL III: ICD-10-PCS | Mod: ,,, | Performed by: STUDENT IN AN ORGANIZED HEALTH CARE EDUCATION/TRAINING PROGRAM

## 2022-08-29 PROCEDURE — 25000003 PHARM REV CODE 250: Performed by: HOSPITALIST

## 2022-08-29 PROCEDURE — 94003 VENT MGMT INPAT SUBQ DAY: CPT

## 2022-08-29 PROCEDURE — 63600175 PHARM REV CODE 636 W HCPCS: Performed by: STUDENT IN AN ORGANIZED HEALTH CARE EDUCATION/TRAINING PROGRAM

## 2022-08-29 PROCEDURE — 83735 ASSAY OF MAGNESIUM: CPT | Performed by: INTERNAL MEDICINE

## 2022-08-29 PROCEDURE — 25000003 PHARM REV CODE 250: Performed by: INTERNAL MEDICINE

## 2022-08-29 PROCEDURE — 63700000 PHARM REV CODE 250 ALT 637 W/O HCPCS: Performed by: INTERNAL MEDICINE

## 2022-08-29 PROCEDURE — 86140 C-REACTIVE PROTEIN: CPT | Performed by: INTERNAL MEDICINE

## 2022-08-29 PROCEDURE — 99233 SBSQ HOSP IP/OBS HIGH 50: CPT | Mod: ,,, | Performed by: STUDENT IN AN ORGANIZED HEALTH CARE EDUCATION/TRAINING PROGRAM

## 2022-08-29 PROCEDURE — 82330 ASSAY OF CALCIUM: CPT | Performed by: INTERNAL MEDICINE

## 2022-08-29 PROCEDURE — 87070 CULTURE OTHR SPECIMN AEROBIC: CPT | Performed by: STUDENT IN AN ORGANIZED HEALTH CARE EDUCATION/TRAINING PROGRAM

## 2022-08-29 PROCEDURE — 94799 UNLISTED PULMONARY SVC/PX: CPT

## 2022-08-29 PROCEDURE — 99900035 HC TECH TIME PER 15 MIN (STAT)

## 2022-08-29 PROCEDURE — A4216 STERILE WATER/SALINE, 10 ML: HCPCS | Performed by: INTERNAL MEDICINE

## 2022-08-29 PROCEDURE — 63600175 PHARM REV CODE 636 W HCPCS: Performed by: INTERNAL MEDICINE

## 2022-08-29 PROCEDURE — 85025 COMPLETE CBC W/AUTO DIFF WBC: CPT | Performed by: INTERNAL MEDICINE

## 2022-08-29 PROCEDURE — 36415 COLL VENOUS BLD VENIPUNCTURE: CPT | Performed by: INTERNAL MEDICINE

## 2022-08-29 PROCEDURE — 94761 N-INVAS EAR/PLS OXIMETRY MLT: CPT

## 2022-08-29 PROCEDURE — 99900026 HC AIRWAY MAINTENANCE (STAT)

## 2022-08-29 PROCEDURE — 80202 ASSAY OF VANCOMYCIN: CPT | Mod: 91 | Performed by: INTERNAL MEDICINE

## 2022-08-29 PROCEDURE — 80202 ASSAY OF VANCOMYCIN: CPT | Performed by: HOSPITALIST

## 2022-08-29 PROCEDURE — 99233 SBSQ HOSP IP/OBS HIGH 50: CPT | Mod: ,,, | Performed by: INTERNAL MEDICINE

## 2022-08-29 PROCEDURE — 80053 COMPREHEN METABOLIC PANEL: CPT | Performed by: INTERNAL MEDICINE

## 2022-08-29 PROCEDURE — 99233 PR SUBSEQUENT HOSPITAL CARE,LEVL III: ICD-10-PCS | Mod: ,,, | Performed by: INTERNAL MEDICINE

## 2022-08-29 PROCEDURE — 99900031 HC PATIENT EDUCATION (STAT)

## 2022-08-29 PROCEDURE — 27000221 HC OXYGEN, UP TO 24 HOURS

## 2022-08-29 PROCEDURE — C9113 INJ PANTOPRAZOLE SODIUM, VIA: HCPCS | Performed by: INTERNAL MEDICINE

## 2022-08-29 RX ORDER — CEFEPIME HYDROCHLORIDE 1 G/50ML
2 INJECTION, SOLUTION INTRAVENOUS
Status: DISCONTINUED | OUTPATIENT
Start: 2022-08-29 | End: 2022-08-31 | Stop reason: HOSPADM

## 2022-08-29 RX ADMIN — BUSPIRONE HYDROCHLORIDE 5 MG: 5 TABLET ORAL at 08:08

## 2022-08-29 RX ADMIN — METOPROLOL TARTRATE 12.5 MG: 25 TABLET, FILM COATED ORAL at 08:08

## 2022-08-29 RX ADMIN — APIXABAN 2.5 MG: 2.5 TABLET, FILM COATED ORAL at 08:08

## 2022-08-29 RX ADMIN — PANTOPRAZOLE SODIUM 40 MG: 40 INJECTION, POWDER, FOR SOLUTION INTRAVENOUS at 08:08

## 2022-08-29 RX ADMIN — MORPHINE SULFATE 4 MG: 4 INJECTION, SOLUTION INTRAMUSCULAR; INTRAVENOUS at 01:08

## 2022-08-29 RX ADMIN — HYDROCODONE BITARTRATE AND ACETAMINOPHEN 1 TABLET: 5; 325 TABLET ORAL at 02:08

## 2022-08-29 RX ADMIN — NYSTATIN 500000 UNITS: 100000 SUSPENSION ORAL at 08:08

## 2022-08-29 RX ADMIN — RILUZOLE 50 MG: 50 TABLET ORAL at 08:08

## 2022-08-29 RX ADMIN — SODIUM CHLORIDE, PRESERVATIVE FREE 10 ML: 5 INJECTION INTRAVENOUS at 06:08

## 2022-08-29 RX ADMIN — CEFEPIME 2 G: 2 INJECTION, POWDER, FOR SOLUTION INTRAVENOUS at 11:08

## 2022-08-29 RX ADMIN — NYSTATIN 500000 UNITS: 100000 SUSPENSION ORAL at 12:08

## 2022-08-29 RX ADMIN — BACLOFEN 10 MG: 5 TABLET ORAL at 08:08

## 2022-08-29 RX ADMIN — HYDROMORPHONE HYDROCHLORIDE 1 MG: 1 INJECTION, SOLUTION INTRAMUSCULAR; INTRAVENOUS; SUBCUTANEOUS at 04:08

## 2022-08-29 RX ADMIN — BACLOFEN 10 MG: 5 TABLET ORAL at 02:08

## 2022-08-29 RX ADMIN — TRAZODONE HYDROCHLORIDE 50 MG: 50 TABLET ORAL at 08:08

## 2022-08-29 RX ADMIN — CEFEPIME 2 G: 2 INJECTION, POWDER, FOR SOLUTION INTRAVENOUS at 07:08

## 2022-08-29 RX ADMIN — POLYETHYLENE GLYCOL 3350 17 G: 17 POWDER, FOR SOLUTION ORAL at 11:08

## 2022-08-29 RX ADMIN — MAGNESIUM SULFATE HEPTAHYDRATE 2 G: 40 INJECTION, SOLUTION INTRAVENOUS at 05:08

## 2022-08-29 RX ADMIN — SODIUM CHLORIDE, PRESERVATIVE FREE 10 ML: 5 INJECTION INTRAVENOUS at 12:08

## 2022-08-29 RX ADMIN — NYSTATIN 500000 UNITS: 100000 SUSPENSION ORAL at 06:08

## 2022-08-29 RX ADMIN — CASTOR OIL AND BALSAM, PERU: 788; 87 OINTMENT TOPICAL at 08:08

## 2022-08-29 RX ADMIN — SENNOSIDES AND DOCUSATE SODIUM 1 TABLET: 50; 8.6 TABLET ORAL at 11:08

## 2022-08-29 RX ADMIN — MORPHINE SULFATE 4 MG: 4 INJECTION, SOLUTION INTRAMUSCULAR; INTRAVENOUS at 04:08

## 2022-08-29 RX ADMIN — MEROPENEM AND SODIUM CHLORIDE 1 G: 1 INJECTION, SOLUTION INTRAVENOUS at 05:08

## 2022-08-29 RX ADMIN — HYDROCODONE BITARTRATE AND ACETAMINOPHEN 1 TABLET: 5; 325 TABLET ORAL at 07:08

## 2022-08-29 RX ADMIN — SENNOSIDES AND DOCUSATE SODIUM 1 TABLET: 50; 8.6 TABLET ORAL at 08:08

## 2022-08-29 NOTE — SUBJECTIVE & OBJECTIVE
Interval History: Mr Dominique continues to experience trismus    Review of Systems   Unable to perform ROS: Patient nonverbal   Objective:     Vital Signs (Most Recent):  Temp: 99 °F (37.2 °C) (08/29/22 0730)  Pulse: (!) 112 (08/29/22 1130)  Resp: (!) 28 (08/29/22 1325)  BP: 135/70 (08/29/22 1130)  SpO2: 100 % (08/29/22 1130)   Vital Signs (24h Range):  Temp:  [98.7 °F (37.1 °C)-100 °F (37.8 °C)] 99 °F (37.2 °C)  Pulse:  [101-118] 112  Resp:  [] 28  SpO2:  [97 %-100 %] 100 %  BP: (101-147)/(53-82) 135/70     Weight: 81.3 kg (179 lb 3.7 oz)  Body mass index is 27.25 kg/m².    Intake/Output Summary (Last 24 hours) at 8/29/2022 1446  Last data filed at 8/29/2022 0600  Gross per 24 hour   Intake 2377.45 ml   Output 1775 ml   Net 602.45 ml      Physical Exam  Vitals and nursing note reviewed.   Constitutional:       General: He is not in acute distress.     Appearance: He is not ill-appearing.   HENT:      Head: Normocephalic and atraumatic.      Nose: Nose normal.      Mouth/Throat:      Mouth: Mucous membranes are moist.   Eyes:      Extraocular Movements: Extraocular movements intact.      Pupils: Pupils are equal, round, and reactive to light.   Cardiovascular:      Rate and Rhythm: Normal rate and regular rhythm.   Pulmonary:      Effort: Pulmonary effort is normal.      Breath sounds: Rales present.   Abdominal:      General: There is distension.      Tenderness: There is no abdominal tenderness. There is no guarding or rebound.   Musculoskeletal:      Cervical back: Normal range of motion and neck supple.      Comments: Quadriparesis   Skin:     General: Skin is warm.   Neurological:      Mental Status: He is alert.      Comments: Unable to assess   Psychiatric:      Comments: Unable to assess       Significant Labs: All pertinent labs within the past 24 hours have been reviewed.  Recent Lab Results         08/29/22  0336   08/28/22  2133        Albumin 1.6         Alkaline Phosphatase 74         ALT 14          Anion Gap 4          4         AST 17         Baso # 0.06         Basophil % 0.4         BILIRUBIN TOTAL 0.6  Comment: For infants and newborns, interpretation of results should be based  on gestational age, weight and in agreement with clinical  observations.    Premature Infant recommended reference ranges:  Up to 24 hours.............<8.0 mg/dL  Up to 48 hours............<12.0 mg/dL  3-5 days..................<15.0 mg/dL  6-29 days.................<15.0 mg/dL           BUN 21          21         Calcium 7.9          7.9         Ionized Calcium 1.22         Chloride 105          105         CO2 25          25         Creatinine 0.4          0.4         CRP 7.64         Differential Method Automated         eGFR >60.0          >60.0         Eos # 0.5         Eosinophil % 2.8         Glucose 123          123         Gran # (ANC) 13.1         Gran % 82.4         Hematocrit 26.2         Hemoglobin 8.0         Immature Grans (Abs) 0.20  Comment: Mild elevation in immature granulocytes is non specific and   can be seen in a variety of conditions including stress response,   acute inflammation, trauma and pregnancy. Correlation with other   laboratory and clinical findings is essential.           Immature Granulocytes 1.3         Lymph # 1.5         Lymph % 9.4         Magnesium 1.7         MCH 25.7         MCHC 30.5         MCV 84         Mono # 0.6         Mono % 3.7         MPV 9.6         nRBC 0         Platelets 334         Potassium 4.2          4.2         PROTEIN TOTAL 7.1         RBC 3.11         RDW 18.7         Sodium 134          134         Vancomycin, Random 53.4         Vancomycin-Trough   36.0  Comment: Therapeutic Range: 10.0-20.0 ug/mL  VANCOMYCIN   critical result(s) called and verbal readback obtained   from  DIANN NICKERSON RN MICU3. by TC1 08/28/2022 22:42         WBC 15.84                 Significant Imaging: I have reviewed all pertinent imaging results/findings within the past 24 hours.

## 2022-08-29 NOTE — PROGRESS NOTES
Consult Note  Infectious Disease    Reason for Consult:  Facial abscess    HPI: Wei Dominique is a 67 y.o. male with known history of ALS, s/p trach & peg, vent dependence, HTN, PE (on eliquis) who presented with a primary complaint of facial swelling x few days. No family present at bedside. History was obtained from chart review and physician sign out. Patient had CT scan maxillofacial done that showed Right maxillary and anterior maxillary soft tissue edema and thickening which extends along the right maxillary molars into the oropharynx. Correlate for cellulitis and or pharyngeal infection.  Right perimandibular ovoid collection. Patient needs maxillofacial surgery however all hospitals are on diversion. Patient was in the ER waiting to be transferred for 18 hours and subsequently admitted to hospital medicine, ICU.   Seen by our service in June for pseudomonas  UTI  At that time he had deep bites and fissures in tongue as well.     8/20: interim reviewed and seen with Dr. Kennedy. Temperatures have improved. MRSA screen was positive. Midline tip with SA and GNR. Sputum culture in progress, and urine culture has growth that has to be re-isolated. He continues to acknowledge pain, nausea. He is in the process of being sedated and paralyzed for adequate ENT exam. Arms remain edematous  8/21:  Afebrile, interim reviewed.  The ENT note not complete but told by RN that no abscesses were discovered while sedated and paralyzed for oral exam.  The tongue was reduced into the mouth and bite block was placed.  Urine with 2 gram negative rods, 1 of which is Pseudomonas.  Sputum with Pseudomonas which is not new, chest x-ray with atelectasis.  Line tip with MRSA and Morganella, relevance unclear as his blood cultures were negative. Discussed with hospital medicine. My bedside questions about comfort care were answered in the negative, ie he does not want to be comfort care. He does want pain control however.    8/22: afebrile. VSS. Vent needs are stable. Mouth pain is decreased. Breathing comfortably. No nausea or abdominal pain. Able to communicate and answer questions.     8/26(reconsult): interim reviewed.   Received bilateral masseter botox injections on 8/24. Low grade temp on 8/25, BP a bit low, tachycardic. Temp max 100.4 and he has lots of purulent secretions from and around the ET tube. CXR with RLL atelectasis/effusion/infiltrate? Which is a little more than admission films. Vent needs are not increased. WBC about the same. procalcitonin has normalized, from admission level. Wound photos are improved/benign. hgb down to 7.3. he is alert, able to open his jaws a little more. The tongue laceration left side is bleeding a little. No abscesses, hematomas, facial cellulitis, or ulcers. No obvious thrush.     8/27 (Link):  Interim reviewed, discussed with Dr Jones.  Patient seen and examined at bedside, T-max 100.4°, currently 100.2, hemodynamically stable.  Patient awake, alert, blinking upon verbal commands.  Oral cavity suctioned, thick purulent secretions noted.  Labs reviewed, leukocytosis 15.7, PMN 79.4%, H&H 7.5/26, platelet count 405.  Creatinine 0.4, normal electrolytes.  CRP downtrending 18.7 today.  Repeat UA from yesterday negative for UTI, 2+ occult blood.  Micro reviewed, blood cultures 1/4 aerobic bottles from 08/26 [facility], pending final.  Started on vancomycin IV overnight.  Chest x-ray from yesterday with bilateral lung opacities suggesting atelectasis.    8/28:  Patient seen examined at bedside, seen with respiratory therapist and nursing, paralyzed again to replace mouth pieces giving severe macroglossia.  Tachycardic, tachypneic, T-max a 100.2°, currently afebrile.  Labs reviewed, H&H 6.8/23.1, , persistent leukocytosis of 14.2, PMN 79.8%, platelet count 351.  Stable kidney function, CRP trending down 1107 from greater than 38.  Micro reviewed, 3 sets of blood cultures; 4 bottles grew CoNS, lab  called, id and sensitivities available tomorrow.  Patient with history of Staph epidermidis bacteremia in June this year.  Respiratory culture 8/26 with GNR, id and sensitivities pending.    8/29:  Interim reviewed, patient seen examined at bedside.  Awake, alert, blinking.  Mouth pieces in place, oral hygiene performed earlier this morning, minimal secretions suctioned.  Labile BP, currently stable, T-max a 100° overnight, currently 99.  Labs reviewed, leukocytosis 15.8, PMN 82.4%, H&H 8/26.2, platelet count 334.  Micro reviewed, serial blood cultures with c/o NS, id and sensitivities to be available later today.  Awaiting echocardiogram.    EXAM & DIAGNOSTICS REVIEWED:   Vitals:     Temp:  [98.7 °F (37.1 °C)-100 °F (37.8 °C)]   Temp: 99 °F (37.2 °C) (08/29/22 0730)  Pulse: 108 (08/29/22 0730)  Resp: 16 (08/29/22 0730)  BP: 108/64 (08/29/22 0730)  SpO2: 98 % (08/29/22 0730)    Intake/Output Summary (Last 24 hours) at 8/29/2022 0933  Last data filed at 8/29/2022 0600  Gross per 24 hour   Intake 2377.45 ml   Output 1775 ml   Net 602.45 ml       General:  Status post sedation/paralyzed for replacement of mouth pieces  Eyes:  Anicteric,  EOMI  ENT:  8/19: Copious intranasal secretions suctioned, because his mouth is profoundly difficult to open. His jaws have extremely high muscular tone. His tongue is severely lacerated and bulging out between his teeth on the right. He has a severe underbite. Copious saliva and bloody secretions suctioned from mouth. Even with dilaudid, we were only able to open his jaws less than a cm and I was able to insert a tongue blade only about half way. He has loose teeth, periodontal disease. I cannot see or palpate a soft tissue abscess.    8/20: exam deferred as he is going to be sedated/paralyzed for exam shortly   8/21: bite block is out. Only minimal tongue visible on right. Mouth is tender   8/22: device to maintain safe bite in place.   8/23: same as above   8/26: able to open his  jaws a little more. The tongue laceration left side is bleeding a little. No abscesses, hematomas, facial cellulitis, or ulcers. No obvious thrush.    8/27:  Thick purulent copious secretions suctioned from oral cavity   8/28:  Severe macroglossia, dry blood noted, thick secretions noted, suction    Neck:  tracheosotomy  Lungs: Decreased breath sounds bilaterally, mild crackles right base  Heart:  S1/S2+, regular rhythm, no murmur  Abd:  Soft, NT, ND, hypoactive BS, no masses or organomegaly appreciated. PEG with small amount of granulation. Rectal tube with loose stool , volume moderate on miralax and senna  :  Hutchinson with clear urine  Musc:  Joints without effusion, swelling, erythema, synovitis, muscle wasting  Skin:  No rashes.  Neuro:             Alert, attentive,  quadriplegic  Psych:   Calm, attends  Lymphatic:     Extrem: LE 1+ edema, UE 4+ edema   VAD:  Right arm PICC 8/20/22  port left chest, 8/19/22 neither have redness or drainage  Isolation:  contact  Wound:           8/26:             General Labs reviewed:  Recent Labs   Lab 08/27/22 0300 08/28/22 0455 08/29/22  0336   WBC 15.79* 14.22* 15.84*   HGB 7.5* 6.8* 8.0*   HCT 26.0* 23.1* 26.2*    351 334       Recent Labs   Lab 08/27/22 0300 08/28/22 0455 08/29/22  0336    141 134*  134*   K 3.8 4.2 4.2  4.2   * 109 105  105   CO2 24 26 25  25   BUN 43* 29* 21  21   CREATININE 0.4* 0.4* 0.4*  0.4*   CALCIUM 8.6* 8.3* 7.9*  7.9*   PROT  --   --  7.1   BILITOT  --   --  0.6   ALKPHOS  --   --  74   ALT  --   --  14   AST  --   --  17     Recent Labs   Lab 08/27/22 0300 08/28/22 0455 08/29/22  0336   CRP 18.74* 11.07* 7.64*     Estimated Creatinine Clearance: 175.8 mL/min (A) (based on SCr of 0.4 mg/dL (L)).    Micro:  2/22/22: urine: providencia  4/17/22: urine and blood: ESBL Ecoli  5/17/22: urine with pseudomonas (sen to pip/tazo, gent,cefepime,avycaz,zerbaxa)  5/22/22:sputum with proteus ESBL and pseudomonas (same,  plus sens to colistin)  06/09/2022 staphylococcal epidermidis bacteremia   06/09/2022 urine culture  Pseudomonas aeruginosa sensitive to Zerbaxa/Avycaz  06/09/2022 respiratory culture Proteus mirabilis ESBL, Pseudomonas aeruginosa, Serratia marcescens  06/24/2022 respiratory culture Proteus mirabilis ESBL, and Pseudomonas aeruginosa  sensitive to Avycaz and Zerbaxa    Micro:  Microbiology Results (last 7 days)       Procedure Component Value Units Date/Time    Culture, Respiratory with Gram Stain [[id number]]  (Abnormal)  (Susceptibility) Collected: 08/26/22 1313    Order Status: Completed Specimen: Respiratory from Endotracheal Aspirate Updated: 08/29/22 0859     Respiratory Culture No normal respiratory marco      PSEUDOMONAS AERUGINOSA   Moderate  Known CRPA patient       Gram Stain (Respiratory) <10 epithelial cells per low power field.     Gram Stain (Respiratory) Moderate WBC's     Gram Stain (Respiratory) Few Gram negative rods     Gram Stain (Respiratory) Few Gram positive rods    Blood culture [[id number]] Collected: 08/27/22 0945    Order Status: Completed Specimen: Blood from Line, PICC Right Brachial Updated: 08/29/22 0713     Blood Culture, Routine Gram stain aer bottle: Gram positive cocci      Positive results previously called    Narrative:      Collection has been rescheduled by [email or web address] at 08/27/2022 09:13 Reason:   Nurse Draw  Per Dr. King  Collection has been rescheduled by [email or web address] at 08/27/2022 09:16 Reason:   Nurse Draw  Collection has been rescheduled by [email or web address] at 08/27/2022 09:13 Reason:   Nurse Draw  Per Dr. King  Collection has been rescheduled by [email or web address] at 08/27/2022 09:16 Reason:   Nurse Draw    Blood culture [[id number]] Collected: 08/28/22 1412    Order Status: Completed Specimen: Blood from Peripheral, Left Wrist Updated: 08/28/22 2117     Blood Culture, Routine No Growth to date    Blood culture [[id number]]  (Abnormal) Collected: 08/26/22 1300    Order Status: Completed Specimen: Blood  from Line, PICC Right Brachial Updated: 08/28/22 1021     Blood Culture, Routine Gram stain luisa bottle: Gram positive cocci in clusters resembling Staph      Results called to and read back by: porfirio Mclean [email or web address] 08/27/2022      22:23 fgf      COAGULASE-NEGATIVE STAPHYLOCOCCUS SPECIES  For susceptibility see order #[id number]      Narrative:      From port  Collection has been rescheduled by RE1 at 08/26/2022 12:23 Reason:   Nurse Draw  Collection has been rescheduled by RE1 at 08/26/2022 12:23 Reason:   Nurse Draw    Blood culture [[id number]]  (Abnormal) Collected: 08/26/22 0957    Order Status: Completed Specimen: Blood Updated: 08/28/22 1021     Blood Culture, Routine Gram stain aer bottle: Gram positive cocci      Positive results previously called      COAGULASE-NEGATIVE STAPHYLOCOCCUS SPECIES  For susceptibility see order #[id number]      Blood culture [[id number]]  (Abnormal) Collected: 08/26/22 0957    Order Status: Completed Specimen: Blood Updated: 08/28/22 1018     Blood Culture, Routine Gram stain aer bottle: Gram positive cocci in clusters resembling Staph      Results called to and read back by: Porfirio Lerma MICU3 08/27/2022  06:29      fgf      Gram stain luisa bottle: Gram positive cocci      Positive results previously called      COAGULASE-NEGATIVE STAPHYLOCOCCUS SPECIES  Identification and susceptibility pending      Blood culture [[id number]]     Order Status: Canceled Specimen: Blood     Blood culture [[id number]] Collected: 08/19/22 1234    Order Status: Completed Specimen: Blood from Peripheral, Upper Arm, Right Updated: 08/24/22 1432     Blood Culture, Routine No growth after 5 days.    Blood culture [[id number]] Collected: 08/18/22 2010    Order Status: Completed Specimen: Blood from Peripheral, Forearm, Left Updated: 08/23/22 2232     Blood Culture, Routine No growth after 5 days.                Imaging Reviewed:   CXR   CT maxillofacial  1.  Right maxillary and anterior maxillary soft tissue  edema and thickening which extends along the right maxillary molars into the oropharynx. Correlate for cellulitis and or pharyngeal infection.  2.  Right perimandibular ovoid collection with rim enhancement noted measuring 3.0 x 1.8 cm. This appears to connect to the aforementioned maxillary skin thickening and edema.  3.  Nasopharyngeal phlegm and frothy debris extends into the ethmoid and maxillary sinus.  4.  Bilateral mastoid air cell fluid can be seen with mastoiditis.  5.  Right thyroid nodule measuring 1.9 cm. Recommend nonemergent thyroid ultrasound.  6.  Prominent pituitary gland may represent pituitary adenoma.    US both arms: neg for DVT  US both legs: negative for DVT  Reepat  UE US: negative fto DVT, moderate UE edema      Cardiology: Follow up ECHO    IMPRESSION & PLAN     1. Sepsis multifactorial; severe swelling of the tongue with laceration, no evidence of abscess, polymicrobial marco isolated from midline catheter tip; MRSA and Morganella, possible VAP vs polymicrobial UTI vs CoNS bacteremia, ID and sensitivities pending  Repeat blood cultures 8/26 - 27 5/7 bottles  CoNS - ID and sensitivities available later today  MRSA nasal swab detected   Respiratory culture 8/19 pansensitive Pseudomonas aeruginosa  Respiratory culture 8/26  Pseudomonas aeruginosa, sensitive to cefepime, SANAM <4  Urine culture 8/18 Pseudomonas  sensitive and Providencia stuartii both sensitive to Cefepime, s/p Zerbaxa 4 days and Cefepime 4 days, completed treatment    Repeat UA negative   CRP >38-->7.64, trending down    Procalcitonin 2.52-->0.39    2. Quadriplegic, ALS, debility, bed-bound  5. H/o MDRO    Recommendations:  Patient with PICC line and Port-A-Cath  Remove PICC line, please send tip for culture  Procalcitonin  Repeat blood cultures   Echo to rule out vegetations, patient with prior history of Staph epidermidis bacteremia in June  Continue Vancomycin IV, keep level 15-20 for CoNS  Stop meropenem   Cefepime 2  g IV q.8 for Pseudomonas aeruginosa  pneumonia  Follow cultures   Aspiration precautions  Contact isolation     D/w nursing, Dr Low      Medical Decision Making during this encounter was  [_] Low Complexity  [_] Moderate Complexity  [xxx ] High Complexity

## 2022-08-29 NOTE — PROGRESS NOTES
[facility]  Otorhinolaryngology-Head & Neck Surgery  Progress Note    Subjective:     Provider notes reviewed there appeared to be some voluntary patient mouth opening and improvement in his symptoms shortly after Botox injection only to worsen with again biting his tongue requiring sedation and disimpaction of the tongue and replacement of bite blocks.      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    I communicated with Dr. Amie Lion through secure text that I injected 50 units Botox into each masseter by palpation only (EMG guidance is ideal per review of Neuro literature).  My prior notes said 100 units in 10 mL of saline it was actually 100 units in 1 mL of saline injected 0.5 mL in each masseter dividing 0.1 mL (10 units) into each injection site of 5 on each side.      Between muscle relaxants and Botox injections as per review of Neuro literature I am not sure what more can be done to improve mouth opening.  Possibly more injections.  Possibly injection of the temporalis and lateral pterygoids (I have not done this before just like I have not injected the masseters before).    Is my hope that he can get a neurologist who is more confident in treating neurogenic trismus from ALS (is my understanding this is not particularly uncommon) to provide consultation and additional therapies.      If it is simply an issue of tongue trauma it may be necessary for him to have dental extractions from Oral surgery to prevent tongue trauma if he is not able to have his trismus and jaw spasms otherwise treated medically.    Omer Kennedy MD  Otorhinolaryngology-Head & Neck Surgery  [facility]

## 2022-08-29 NOTE — RESPIRATORY THERAPY
08/28/22 2022   Patient Assessment/Suction   Level of Consciousness (AVPU) alert   Respiratory Effort Normal;Unlabored   Expansion/Accessory Muscles/Retractions no use of accessory muscles   All Lung Fields Breath Sounds Anterior:;coarse   Rhythm/Pattern, Respiratory assisted mechanically   Cough Frequency with stimulation   Suction Method tracheal;oral   Suction Pressure (mmHg) -120 mmHg   $ Suction Charges Inline Suction Procedure Stat Charge   Secretions Amount moderate   Secretions Color white;clear   Secretions Characteristics thick   $ Swab or suction? Suction   Aspirate Toleration KARMEN (no adverse reactions)   Skin Integrity   $ Wound Care Tech Time 15 min   Area Observed Neck under tracheostomy   Skin Appearance without discoloration   Barrier used? Other (see comments)  (split gauze)   Barrier Changed? Yes   PRE-TX-O2   O2 Device (Oxygen Therapy) ventilator   Oxygen Concentration (%) 28   SpO2 100 %   Pulse Oximetry Type Continuous   $ Pulse Oximetry - Multiple Charge Pulse Oximetry - Multiple   Aerosol Therapy   $ Aerosol Therapy Charges PRN treatment not required   Daily Review of Necessity (SVN) completed   Respiratory Treatment Status (SVN) PRN treatment not required        Surgical Airway 02/21/22 1145 Shiley Cuffed   Placement Date/Time: 02/21/22 1145   Present Prior to Hospital Arrival?: Yes  Type: Tracheostomy  Brand: Shiley  Airway Device Size: 8.0  Style: Cuffed   Cuff Inflation? Inflated   Status Secured   Site Assessment Clean;Dry;No bleeding;No drainage   Site Care Cleansed;Dried;Dressing applied   Inner Cannula Care Cleansed/dried   Ties Assessment Changed;Dry;Intact;Clean;Secure   Airway Safety   Ambu bag with the patient? Yes, Adult Ambu   Is mask with the patient? Yes, Adult Mask   Suction set is at the bedside? Yes   Extra trach at bedside? Yes   Extra trach sizes at bedside? 7.5   Respiratory Interventions   Airway/Ventilation Management airway patency maintained   Vent Select    Conventional Vent Y   Charged w/in last 24h YES   Preset Conventional Ventilator Settings   Vent ID [id number]   Vent Type    Ventilation Type VC   Vent Mode A/C   Humidity HME   Set Rate 16 BPM   Vt Set 500 mL   PEEP/CPAP 5 cmH20   Peak Flow 60 L/min   Peak End Inspiratory Pressure 30 cmH20   I-Trigger Type  V-TRIG   Trigger Sensitivity Flow/I-Trigger 2 L/min   Patient Ventilator Parameters   Resp Rate Total 25 br/min   Peak Airway Pressure 31 cmH2O   Mean Airway Pressure 16 cmH20   Plateau Pressure 31 cmH20   Exhaled Vt 547 mL   Total Ve 12.8 mL   I:E Ratio Measured 1:2.30   Auto PEEP 0 cmH20   Conventional Ventilator Alarms   Alarms On Y   Ve High Alarm 20 L/min   Ve Low Alarm 3 L/min   Vt High Alarm 1200 mL   Vt Low Alarm 300 mL   Resp Rate High Alarm 40 br/min   Press High Alarm 50 cmH2O   Apnea Rate 16   Apnea Volume (mL) 1 mL   Apnea Oxygen Concentration  100   Apnea Flow Rate (L/min) 60   T Apnea 20 sec(s)   Ready to Wean/Extubation Screen   FIO2<=50 (chart decimal) 0.28   MV<16L (chart vol.) 12.8   PEEP <=8 (chart #) 5   Ready to Wean Parameters   Vital Capacity (mL) 0   Education   $ Education Ventilator Oxygen;Trach Care;30 min   Respiratory Evaluation   $ Care Plan Tech Time 15 min   $ Eval/Re-eval Charges Re-evaluation   Evaluation For Re-Eval 5+ day

## 2022-08-29 NOTE — PROGRESS NOTES
Pharmacokinetic Assessment Follow Up: IV Vancomycin    Patient brief summary:  Wei Dominique is a 67 y.o. male initiated on antimicrobial therapy with IV Vancomycin for treatment of Bacteremia    The patient's current regimen is 1500 mg every 12 hours.      Actual Body Weight = 81.3 kg (179 lb 3.7 oz).    Renal Function:   Estimated Creatinine Clearance: 175.8 mL/min (A) (based on SCr of 0.4 mg/dL (L)).      Vancomycin serum concentration assessment(s):    The trough level was drawn correctly and can be used to guide therapy at this time. The measurement is above the desired definitive target range of 15 to 20 mcg/mL.    Vancomycin Regimen Plan:    Discontinue the scheduled vancomycin regimen and re-dose when the random level is less than 20 mcg/mL, next level to be drawn at 0500 on 8/29.    Drug levels (last 3 results):  Recent Labs   Lab Result Units 08/28/22  2133   Vancomycin-Trough ug/mL 36.0*       Pharmacy will continue to follow and monitor vancomycin.    Please contact pharmacy at extension [phone] for questions regarding this assessment.    Thank you for the consult,   Jena Tan

## 2022-08-29 NOTE — PLAN OF CARE
08/29/22 1253   Discharge Reassessment   Assessment Type Discharge Planning Reassessment   Did the patient's condition or plan change since previous assessment? No   Discharge Plan discussed with:   (MD, chart review)   Communicated VENESSA with patient/caregiver Date not available/Unable to determine   Discharge Plan A Return to nursing home   Discharge Plan B Return to Nursing Home   DME Needed Upon Discharge  none   Discharge Barriers Identified None   Why the patient remains in the hospital Requires continued medical care   Post-Acute Status   Post-Acute Placement Status Pending medical clearance/testing   Discharge Delays None known at this time   Patient not cleared for discharge.

## 2022-08-29 NOTE — HOSPITAL COURSE
8/29/2022  Mr Dominique continues to have trismus but definitely tracked me with his eyes. Dr Lion is attempting to arrange Botox therapy for the patient    8/30/2022  Case discussed with Dr Link. PICC line removed due to staph epi sepsis sensitive to vancomycin. The port is no longer functioning and we have consulted Dr Estevez to remove it.  Dr Parada ENT feels that he will require a neurologist familiar with ALS, trismus and Botox to manage this patient    8/31/2022  Mr Dominique is being transferred to [facility] main [facility] neuro critical care. He is awake and alert this AM and has his Computer for communication. I have informed his wife of his acceptance/transfer  ROS: pt could express no complaints at present  PE: in no acute distress ill appearing HEENT tongue visible with injury KHANH EOM intact moist mucus membranes Neck supple no use of accessory muscles tracheostomy in place Lungs occasional basilar crackles without wheezes or rhonchi Heart S 1 S 2 RRR no murmur Abdomen Distended nontender BS + Ext without CC or E contractures Quadriplegia Skin no acute finding Neuro pt tracks the visitor anf blinks yes or no

## 2022-08-29 NOTE — PROGRESS NOTES
[facility]  Adult Nutrition   Progress Note (Nutrition Support Management)    SUMMARY      Recommendations:   1. Continue current TF of Jevity 1.5 at 50 mls/h with AYSE and ProTGold BID to provide 2100 kcals, 117 g protein and 912 mls water.  2. Continue FWF's of 30 mls q4H to prevent tube from clogging.  3. RD will continue to monitor tolerance, weight, labs, etc.     Goals:   1. Patient to tolerate TF. 2. Nutrition provision to meet at least 75% of EEN and EPN.    Dietitian Rounds Brief  F/U Nutrition Note: Observed pt lying bed with TF infusing at 50 mls/h and I did not see any packets of AYSE or ProTGold lying around so hopefully they are giving it to him. I messaged the nurse and told her how important that it was. Pt seems to be tolerating and his LBM was today x 2 and he has fecal incontinence.     Per MD PN's today:    8/29/2022 - Events of the weekend noted, no new issues reported.  Reviewed literature and trismus is described with ALS and botox for treatment as well.  ENT to resee pt and ? If he will be redosed.  ANother thought from nursing would be the possibility of tetanus (there is a reported cephalic form) though this seems less likely.  Antibiotics should have treated any possible infection. ? If there would be any role for human tetanus nimmune globulin.       Will continue to follow prn.    Diet order: NPO    TF rate/provision: 50/Jevity 1.5    % Intake of Estimated Energy Needs: 75 - 100 %  % Meal Intake: NPO    Estimated/Assessed Needs  Weight Used For Calorie Calculations: 75.6 kg (166 lb 10.7 oz)  Energy Calorie Requirements (kcal): 0852-2950 kcals (25-30 kcal/kg)  Energy Need Method: Kcal/kg  Protein Requirements: 114 - 152 (1.5 - 2 g/kg)  Weight Used For Protein Calculations: 75.6 kg (166 lb 10.7 oz)     Estimated Fluid Requirement Method: RDA Method  RDA Method (mL): 1890       Weight History:  Wt Readings from Last 5 Encounters:   08/23/22 81.3 kg (179 lb 3.7 oz)    06/14/22 76.7 kg (169 lb 1.5 oz)   05/28/22 73.9 kg (162 lb 14.7 oz)   05/17/22 78 kg (171 lb 15.3 oz)   02/21/22 77.4 kg (170 lb 10.2 oz)        Reason for Assessment  Reason For Assessment: RD follow-up  Diagnosis: other (see comments) (ALS)  Relevant Medical History: ALS, PEG, trach, vent dependency, HTN, PE    Medications:Pertinent Medications Reviewed  Scheduled Meds:   apixaban  2.5 mg Oral BID    baclofen  10 mg Per G Tube TID    balsam peru-castor oiL   Topical (Top) Daily    busPIRone  5 mg Per G Tube BID    ceFEPime (MAXIPIME) IVPB  2 g Intravenous Q8H    metoprolol tartrate  12.5 mg Per G Tube BID    nystatin  500,000 Units Mouth/Throat QID    pantoprazole  40 mg Intravenous Daily    polyethylene glycol  17 g Oral Daily    riluzole  50 mg Oral Q12H    scopolamine  1 patch Transdermal Q3 Days    senna-docusate 8.6-50 mg  1 tablet Oral BID    sodium chloride 0.9%  10 mL Intravenous Q6H    traZODone  50 mg Per G Tube QHS     Continuous Infusions:  PRN Meds:.sodium chloride, acetaminophen, acetaminophen, albuterol-ipratropium, aluminum-magnesium hydroxide-simethicone, calcium chloride IVPB, calcium chloride IVPB, calcium chloride IVPB, dextrose 10%, dextrose 10%, glucagon (human recombinant), glucose, glucose, HYDROcodone-acetaminophen, HYDROmorphone, magnesium sulfate IVPB, magnesium sulfate IVPB, magnesium sulfate IVPB, magnesium sulfate IVPB, melatonin, morphine, naloxone, ondansetron, potassium chloride in water, potassium chloride in water, potassium chloride in water, potassium chloride in water, prochlorperazine, simethicone, sodium chloride 0.9%, Flushing PICC Protocol **AND** sodium chloride 0.9% **AND** sodium chloride 0.9%, sodium phosphate IVPB, sodium phosphate IVPB, sodium phosphate IVPB, sodium phosphate IVPB, sodium phosphate IVPB, Pharmacy to dose Vancomycin consult **AND** vancomycin - pharmacy to dose, white petrolatum    Labs: Pertinent Labs Reviewed  Clinical Chemistry:  Recent Labs    Lab 08/29/22  0336   *  134*   K 4.2  4.2     105   CO2 25  25   *  123*   BUN 21  21   CREATININE 0.4*  0.4*   CALCIUM 7.9*  7.9*   PROT 7.1   ALBUMIN 1.6*   BILITOT 0.6   ALKPHOS 74   AST 17   ALT 14   ANIONGAP 4*  4*   MG 1.7     CBC:   Recent Labs   Lab 08/29/22 0336   WBC 15.84*   RBC 3.11*   HGB 8.0*   HCT 26.2*      MCV 84   MCH 25.7*   MCHC 30.5*     No results for input(s): CHOL, HDL, LDLCALC, TRIG, CHOLHDL in the last 168 hours.  Cardiac Profile:  Recent Labs   Lab 08/27/22  0300   *     Inflammatory Labs:  Recent Labs   Lab 08/27/22  0300 08/28/22  0455 08/29/22 0336   CRP 18.74* 11.07* 7.64*       Monitor and Evaluation  Food and Nutrient Intake: energy intake, enteral nutrition intake  Food and Nutrient Adminstration: enteral and parenteral nutrition administration  Physical Activity and Function: nutrition-related ADLs and IADLs, factors affecting access to physical activity  Anthropometric Measurements: weight, weight change, body mass index  Biochemical Data, Medical Tests and Procedures: electrolyte and renal panel, inflammatory profile, gastrointestinal profile, lipid profile, glucose/endocrine profile  Nutrition-Focused Physical Findings: overall appearance     Nutrition Risk  Level of Risk/Frequency of Follow-up: high     Nutrition Follow-Up  RD Follow-up?: Yes    Linda Rodgers, LAUREN 08/29/2022 4:01 PM

## 2022-08-29 NOTE — PROGRESS NOTES
[facility] Medicine  Progress Note    Patient Name: Wei Dominique  MRN: [id number]  Patient Class: IP- Inpatient   Admission Date: 8/18/2022  Length of Stay: 10 days  Attending Physician: Lobito Blanc MD  Primary Care Provider: Primary Doctor No        Subjective:     Principal Problem:Catheter-associated urinary tract infection        HPI:  No notes on file    Overview/Hospital Course:  8/29/2022  Mr Dominique continues to have trismus but definitely tracked me with his eyes. Dr Lion is attempting to arrange Botox therapy for the patient      Interval History: Mr Dominique continues to experience trismus    Review of Systems   Unable to perform ROS: Patient nonverbal   Objective:     Vital Signs (Most Recent):  Temp: 99 °F (37.2 °C) (08/29/22 0730)  Pulse: (!) 112 (08/29/22 1130)  Resp: (!) 28 (08/29/22 1325)  BP: 135/70 (08/29/22 1130)  SpO2: 100 % (08/29/22 1130)   Vital Signs (24h Range):  Temp:  [98.7 °F (37.1 °C)-100 °F (37.8 °C)] 99 °F (37.2 °C)  Pulse:  [101-118] 112  Resp:  [] 28  SpO2:  [97 %-100 %] 100 %  BP: (101-147)/(53-82) 135/70     Weight: 81.3 kg (179 lb 3.7 oz)  Body mass index is 27.25 kg/m².    Intake/Output Summary (Last 24 hours) at 8/29/2022 1446  Last data filed at 8/29/2022 0600  Gross per 24 hour   Intake 2377.45 ml   Output 1775 ml   Net 602.45 ml      Physical Exam  Vitals and nursing note reviewed.   Constitutional:       General: He is not in acute distress.     Appearance: He is not ill-appearing.   HENT:      Head: Normocephalic and atraumatic.      Nose: Nose normal.      Mouth/Throat:      Mouth: Mucous membranes are moist.   Eyes:      Extraocular Movements: Extraocular movements intact.      Pupils: Pupils are equal, round, and reactive to light.   Cardiovascular:      Rate and Rhythm: Normal rate and regular rhythm.   Pulmonary:      Effort: Pulmonary effort is normal.      Breath sounds: Rales present.   Abdominal:      General: There is  distension.      Tenderness: There is no abdominal tenderness. There is no guarding or rebound.   Musculoskeletal:      Cervical back: Normal range of motion and neck supple.      Comments: Quadriparesis   Skin:     General: Skin is warm.   Neurological:      Mental Status: He is alert.      Comments: Unable to assess   Psychiatric:      Comments: Unable to assess       Significant Labs: All pertinent labs within the past 24 hours have been reviewed.  Recent Lab Results         08/29/22  0336   08/28/22  2133        Albumin 1.6         Alkaline Phosphatase 74         ALT 14         Anion Gap 4          4         AST 17         Baso # 0.06         Basophil % 0.4         BILIRUBIN TOTAL 0.6  Comment: For infants and newborns, interpretation of results should be based  on gestational age, weight and in agreement with clinical  observations.    Premature Infant recommended reference ranges:  Up to 24 hours.............<8.0 mg/dL  Up to 48 hours............<12.0 mg/dL  3-5 days..................<15.0 mg/dL  6-29 days.................<15.0 mg/dL           BUN 21          21         Calcium 7.9          7.9         Ionized Calcium 1.22         Chloride 105          105         CO2 25          25         Creatinine 0.4          0.4         CRP 7.64         Differential Method Automated         eGFR >60.0          >60.0         Eos # 0.5         Eosinophil % 2.8         Glucose 123          123         Gran # (ANC) 13.1         Gran % 82.4         Hematocrit 26.2         Hemoglobin 8.0         Immature Grans (Abs) 0.20  Comment: Mild elevation in immature granulocytes is non specific and   can be seen in a variety of conditions including stress response,   acute inflammation, trauma and pregnancy. Correlation with other   laboratory and clinical findings is essential.           Immature Granulocytes 1.3         Lymph # 1.5         Lymph % 9.4         Magnesium 1.7         MCH 25.7         MCHC 30.5         MCV 84          Mono # 0.6         Mono % 3.7         MPV 9.6         nRBC 0         Platelets 334         Potassium 4.2          4.2         PROTEIN TOTAL 7.1         RBC 3.11         RDW 18.7         Sodium 134          134         Vancomycin, Random 53.4         Vancomycin-Trough   36.0  Comment: Therapeutic Range: 10.0-20.0 ug/mL  VANCOMYCIN   critical result(s) called and verbal readback obtained   from  DIANN NICKERSON RN MICU3. by TC1 08/28/2022 22:42         WBC 15.84                 Significant Imaging: I have reviewed all pertinent imaging results/findings within the past 24 hours.      Assessment/Plan:      Trismus  Awaiting Botox therapy      ALS (amyotrophic lateral sclerosis)  I and Dr Lion are concerned Re his quality of life        VTE Risk Mitigation (From admission, onward)         Ordered     apixaban tablet 2.5 mg  2 times daily         08/20/22 1454     IP VTE HIGH RISK PATIENT  Once         08/20/22 0440     Place sequential compression device  Until discontinued         08/20/22 0440     Reason for No Pharmacological VTE Prophylaxis  Once        Question:  Reasons:  Answer:  Already adequately anticoagulated on oral Anticoagulants    08/20/22 0440                Discharge Planning   VENESSA: 9/7/2022     Code Status: Full Code   Is the patient medically ready for discharge?:     Reason for patient still in hospital (select all that apply): Treatment, Consult recommendations and Pending disposition  Discharge Plan A: Return to nursing home   Discharge Delays: None known at this time              Lobito Blanc MD  Department of Hospital Medicine   [facility]

## 2022-08-29 NOTE — PROGRESS NOTES
Pulmonary/Critical Care  Progress Note      Patient name: Wei Dominique  MRN: [id number]  Date: 08/29/2022    Admit Date: 8/18/2022  Consult Requested By: Lobito Blanc MD    Reason for Consult: Respiratory failure, trach    HPI:    8/19/2022 - P with h/o ALS with trach and vent dependence sent to ER for increased swelling.  Now admitted for evaluation and I was asked to see pt for ventilator management.  He is not able to provide any history.  Chart has been reviewed.  He is febrile, tachycardic (though better).  Cultures have been ordered and are pending.  Maxillofacial CT noted.  CXR shows no new infiltrates.  No ABG done yet.    8/22/2022 - Events of the weekend noted, no response to me this AM.  Dr Jones's note from yesterday seen and pt does not want comfort measures.  Mulitple + cultures (ID following for antibiotics).    8/23/2022 - Stable overnight, no new issues reported.  He is more awake and responsive.  Waiting on neuro to see for masseter BOTOX injections.  Dr Jones's note seen.  Stable on ventilator.     8/24/2022 - Stable overnight, no new issues reported.  Neuro note seen but they do not do Botox injections and have recommended OMFS consult (I do not believe that they come here) - ? Do we have to consider transfer to [facility] for evaluation/treatment per OMFS.  Respiratory status is stable.    8/25/2022 - Stable overnight, had BOTOX injection per ENT.  No new issues reported.  Low grade fever.    8/26/2022 - Stable overnight, no new issues reported.  He has some increased movement and ability to open mouth this AM.  Temp up a little yesterday.  H/H down a little but no active bleeding reported.  CXR with RLL atelectasis/infiltratre and possible effusion    8/27 the patient is running a temp.  He has MRSA on a catheter tip.  And is MRSA positive in his nares.  He is able to open his mouth.  He is having back pain.    8/28 the patient's trismus has returned.  It was necessary to paralyze  him after sedating him, and reinsert bilateral bite blocks between his teeth to protect his tongue.    8/29/2022 - Events of the weekend noted, no new issues reported.  Reviewed literature and trismus is described with ALS and botox for treatment as well.  ENT to resee pt and ? If he will be redosed.  ANother thought from nursing would be the possibility of tetanus (there is a reported cephalic form) though this seems less likely.  Antibiotics should have treated any possible infection. ? If there would be any role for human tetanus nimmune globulin.    Review of Systems    Review of Systems   Reason unable to perform ROS: Sedated to replace bite blocks.     Past Medical History    Past Medical History:   Diagnosis Date    ALS (amyotrophic lateral sclerosis)     ALS (amyotrophic lateral sclerosis)     Dependent on ventilator 3/21/2022    Erectile dysfunction     Gait disturbance     Hyperlipidemia     Hypertension     Iron deficiency anemia     Motor neuron disease     Polycystic kidney disease     Quadriparesis 2/6/2018    Tremor        Past Surgical History    Past Surgical History:   Procedure Laterality Date    GASTROSTOMY      PEG    PORTACATH PLACEMENT      TRACHEOSTOMY         Medications (scheduled):      apixaban  2.5 mg Oral BID    baclofen  10 mg Per G Tube TID    balsam peru-castor oiL   Topical (Top) Daily    busPIRone  5 mg Per G Tube BID    ceFEPime (MAXIPIME) IVPB  2 g Intravenous Q8H    metoprolol tartrate  12.5 mg Per G Tube BID    nystatin  500,000 Units Mouth/Throat QID    pantoprazole  40 mg Intravenous Daily    polyethylene glycol  17 g Oral Daily    riluzole  50 mg Oral Q12H    scopolamine  1 patch Transdermal Q3 Days    senna-docusate 8.6-50 mg  1 tablet Oral BID    sodium chloride 0.9%  10 mL Intravenous Q6H    traZODone  50 mg Per G Tube QHS       Medications (infusions):           Medications (prn):     sodium chloride, acetaminophen, acetaminophen, albuterol-ipratropium,  "aluminum-magnesium hydroxide-simethicone, calcium chloride IVPB, calcium chloride IVPB, calcium chloride IVPB, dextrose 10%, dextrose 10%, glucagon (human recombinant), glucose, glucose, HYDROcodone-acetaminophen, HYDROmorphone, magnesium sulfate IVPB, magnesium sulfate IVPB, magnesium sulfate IVPB, magnesium sulfate IVPB, melatonin, morphine, naloxone, ondansetron, potassium chloride in water, potassium chloride in water, potassium chloride in water, potassium chloride in water, prochlorperazine, simethicone, sodium chloride 0.9%, Flushing PICC Protocol **AND** sodium chloride 0.9% **AND** sodium chloride 0.9%, sodium phosphate IVPB, sodium phosphate IVPB, sodium phosphate IVPB, sodium phosphate IVPB, sodium phosphate IVPB, Pharmacy to dose Vancomycin consult **AND** vancomycin - pharmacy to dose, white petrolatum    Family History: No family history on file.    Social History: Tobacco:   Social History     Tobacco Use   Smoking Status Not on file   Smokeless Tobacco Not on file                                EtOH:   Social History     Substance and Sexual Activity   Alcohol Use None                                Drugs:   Social History     Substance and Sexual Activity   Drug Use Never       Physical Exam    Vital signs:  Temp:  [98.7 °F (37.1 °C)-100 °F (37.8 °C)]   Pulse:  [101-118]   Resp:  []   BP: (101-147)/(53-82)   SpO2:  [97 %-100 %]     Intake/Output:     Intake/Output Summary (Last 24 hours) at 8/29/2022 1307  Last data filed at 8/29/2022 0600  Gross per 24 hour   Intake 2377.45 ml   Output 1775 ml   Net 602.45 ml          BMI: Estimated body mass index is 27.25 kg/m² as calculated from the following:    Height as of this encounter: 5' 8" (1.727 m).    Weight as of this encounter: 81.3 kg (179 lb 3.7 oz).    Physical Exam  Vitals and nursing note reviewed.   Constitutional:       General: He is not in acute distress.     Appearance: Normal appearance. He is ill-appearing (chronically). He is not " toxic-appearing or diaphoretic.   HENT:      Head: Normocephalic and atraumatic.      Comments: Tongue remains edematous     Right Ear: External ear normal.      Left Ear: External ear normal.      Nose: Nose normal.      Mouth/Throat:      Mouth: Mucous membranes are moist.      Pharynx: Oropharynx is clear. No oropharyngeal exudate.      Comments: Bite blocks bilaterally  Eyes:      General: No scleral icterus.        Right eye: No discharge.         Left eye: No discharge.      Extraocular Movements: Extraocular movements intact.      Conjunctiva/sclera: Conjunctivae normal.      Pupils: Pupils are equal, round, and reactive to light.   Neck:      Vascular: No carotid bruit.      Comments: trach  Cardiovascular:      Rate and Rhythm: Normal rate and regular rhythm.      Pulses: Normal pulses.      Heart sounds: Normal heart sounds. No murmur heard.    No friction rub. No gallop.   Pulmonary:      Effort: Pulmonary effort is normal. No respiratory distress.      Breath sounds: Normal breath sounds. No stridor. No wheezing, rhonchi or rales.   Chest:      Chest wall: No tenderness.   Abdominal:      General: Bowel sounds are normal. There is no distension.      Tenderness: There is no abdominal tenderness. There is no guarding.      Comments: Peg tube in place   Musculoskeletal:         General: Swelling (Arms are very edematous) present. Normal range of motion.      Cervical back: Normal range of motion and neck supple. No rigidity or tenderness.      Right lower leg: No edema.      Left lower leg: No edema.      Comments: + edema in UE bilaterally   Lymphadenopathy:      Cervical: No cervical adenopathy.   Skin:     General: Skin is warm and dry.      Capillary Refill: Capillary refill takes less than 2 seconds.      Coloration: Skin is not jaundiced.      Findings: No bruising.      Comments: Some redness at prior midline site  Some heel breakdown   Neurological:      Mental Status: He is alert.      Comments:  Completely paralyzed except for his eyes.  Trismus has returned   Psychiatric:      Comments: Not able to assess       Laboratory    Recent Labs   Lab 08/29/22  0336   WBC 15.84*   RBC 3.11*   HGB 8.0*   HCT 26.2*      MCV 84   MCH 25.7*   MCHC 30.5*         Recent Labs   Lab 08/29/22  0336   CALCIUM 7.9*  7.9*   PROT 7.1   *  134*   K 4.2  4.2   CO2 25  25     105   BUN 21  21   CREATININE 0.4*  0.4*   ALKPHOS 74   ALT 14   AST 17   BILITOT 0.6           Microbiology:       Microbiology Results (last 7 days)       Procedure Component Value Units Date/Time    IV catheter culture [[id number]]     Order Status: No result Specimen: Catheter Tip, PICC     Blood culture [[id number]]     Order Status: No result Specimen: Blood     Culture, Respiratory with Gram Stain [[id number]]  (Abnormal)  (Susceptibility) Collected: 08/26/22 1313    Order Status: Completed Specimen: Respiratory from Endotracheal Aspirate Updated: 08/29/22 0859     Respiratory Culture No normal respiratory marco      PSEUDOMONAS AERUGINOSA   Moderate  Known CRPA patient       Gram Stain (Respiratory) <10 epithelial cells per low power field.     Gram Stain (Respiratory) Moderate WBC's     Gram Stain (Respiratory) Few Gram negative rods     Gram Stain (Respiratory) Few Gram positive rods    Blood culture [[id number]] Collected: 08/27/22 0945    Order Status: Completed Specimen: Blood from Line, PICC Right Brachial Updated: 08/29/22 0713     Blood Culture, Routine Gram stain aer bottle: Gram positive cocci      Positive results previously called    Narrative:      Collection has been rescheduled by [email or web address] at 08/27/2022 09:13 Reason:   Nurse Draw  Per Dr. King  Collection has been rescheduled by [email or web address] at 08/27/2022 09:16 Reason:   Nurse Draw  Collection has been rescheduled by [email or web address] at 08/27/2022 09:13 Reason:   Nurse Draw  Per Dr. King  Collection has been rescheduled by [email or web address] at 08/27/2022 09:16 Reason:   Nurse Draw    Blood  culture [[id number]] Collected: 08/28/22 1412    Order Status: Completed Specimen: Blood from Peripheral, Left Wrist Updated: 08/28/22 2117     Blood Culture, Routine No Growth to date    Blood culture [[id number]]  (Abnormal) Collected: 08/26/22 1300    Order Status: Completed Specimen: Blood from Line, PICC Right Brachial Updated: 08/28/22 1021     Blood Culture, Routine Gram stain luisa bottle: Gram positive cocci in clusters resembling Staph      Results called to and read back by: porfirio Mclean MICU3 08/27/2022      22:23 fgf      COAGULASE-NEGATIVE STAPHYLOCOCCUS SPECIES  For susceptibility see order #[id number]      Narrative:      From port  Collection has been rescheduled by RE1 at 08/26/2022 12:23 Reason:   Nurse Draw  Collection has been rescheduled by RE1 at 08/26/2022 12:23 Reason:   Nurse Draw    Blood culture [[id number]]  (Abnormal) Collected: 08/26/22 0957    Order Status: Completed Specimen: Blood Updated: 08/28/22 1021     Blood Culture, Routine Gram stain aer bottle: Gram positive cocci      Positive results previously called      COAGULASE-NEGATIVE STAPHYLOCOCCUS SPECIES  For susceptibility see order #[id number]      Blood culture [[id number]]  (Abnormal) Collected: 08/26/22 0957    Order Status: Completed Specimen: Blood Updated: 08/28/22 1018     Blood Culture, Routine Gram stain aer bottle: Gram positive cocci in clusters resembling Staph      Results called to and read back by: Porfirio Lerma MICU3 08/27/2022  06:29      fgf      Gram stain luisa bottle: Gram positive cocci      Positive results previously called      COAGULASE-NEGATIVE STAPHYLOCOCCUS SPECIES  Identification and susceptibility pending      Blood culture [[id number]]     Order Status: Canceled Specimen: Blood     Blood culture [[id number]] Collected: 08/19/22 1234    Order Status: Completed Specimen: Blood from Peripheral, Upper Arm, Right Updated: 08/24/22 1432     Blood Culture, Routine No growth after 5 days.    Blood culture  [[id number]] Collected: 08/18/22 2010    Order Status: Completed Specimen: Blood from Peripheral, Forearm, Left Updated: 08/23/22 2232     Blood Culture, Routine No growth after 5 days.            Radiology    X-Ray Chest 1 View  HISTORY: pneumonia    FINDINGS: Portable chest radiograph at 0956 hours compared to 08/27/2022 shows tracheostomy cannula, right PICC and left subclavian injection port-type central venous catheter, all unchanged in position. The cardiomediastinal silhouette and pulmonary vasculature are stable.    There are persistent right lower lung opacities and hazy density, unchanged. No new pleural or parenchymal abnormality. No pneumothorax.    IMPRESSION: No significant interval change.    Electronically signed by:  Murphy Luna MD  8/29/2022 11:40 AM CDT Workstation: [id number]        Ventilator Information    Vent Mode: A/C  Oxygen Concentration (%):  [28] 28  Resp Rate Total:  [16 br/min-27 br/min] 17 br/min  Vt Set:  [500 mL] 500 mL  PEEP/CPAP:  [5 cmH20] 5 cmH20  Mean Airway Pressure:  [9.6 wgX01-75 cmH20] 12 cmH20         No results for input(s): PH, PCO2, PO2, HCO3, POCSATURATED, BE in the last 72 hours.      Impression    Active Hospital Problems    Diagnosis  POA    *Catheter-associated urinary tract infection [T83.511A, N39.0]  Yes    Masseter muscle spasm [M62.838]  Yes    Open wound of tongue due to bite [S01.552A]  Yes    Trismus [R25.2]  Yes    Fever [R50.9]  Yes    Facial cellulitis [L03.211]  Yes    Tracheostomy in place [Z93.0]  Not Applicable    Chronic respiratory failure with hypoxia and hypercapnia [J96.11, J96.12]  Yes    Dependent on ventilator [Z99.11]  Not Applicable    S/P percutaneous endoscopic gastrostomy (PEG) tube placement [Z93.1]  Not Applicable    ALS (amyotrophic lateral sclerosis) [G12.21]  Yes    Edema [R60.9]  Yes    Quadriparesis [G82.50]  Yes      Resolved Hospital Problems    Diagnosis Date Resolved POA    Facial abscess [L02.01] 08/22/2022 Yes   Elevated  temperature with T-max of 99.6   Pseudomonas in sputum  Coag-negative staph in multiple blood cultures  MRSA on cath tip  Trismus worsened again today  ALS with inability to move anything but his eyes   Bibasilar atelectasis    Plan    Continue vent - decreased FiO2 to 28%  Contact isolation  Antibiotics per ID  Transfuse 1 unit packed red blood cells  Wound care  PEG feedings  Re-consult ENT for more Botox  Follow CXR  Back to [facility] when trismus is resolved and antibiotic plans are confirmed      Sy Kennedy MD  [facility] Pulmonary/Critical Care  08/29/2022

## 2022-08-29 NOTE — PROGRESS NOTES
Pharmacokinetic Assessment Follow Up: IV Vancomycin    Vancomycin serum concentration assessment(s):    The random level was drawn correctly and can be used to guide therapy at this time. The measurement is above the desired definitive target range of 15 to 20 mcg/mL.    Vancomycin Regimen Plan:    Re-dose when the random level is less than 20 mcg/mL, next level to be drawn at 8/30/22 on morning labs.    Drug levels (last 3 results):  Recent Labs   Lab Result Units 08/28/22  2133 08/29/22  0336 08/29/22  1652   Vancomycin, Random ug/mL  --  53.4 31.6   Vancomycin-Trough ug/mL 36.0*  --   --        Pharmacy will continue to follow and monitor vancomycin.    Please contact pharmacy at extension [phone] for questions regarding this assessment.    Thank you for the consult,   Claudette Sotelo       Patient brief summary:  Wei Dominique is a 67 y.o. male initiated on antimicrobial therapy with IV Vancomycin for treatment of bacteremia    The patient's current regimen is Vancomycin Intermittent dosing.    Drug Allergies:   Review of patient's allergies indicates:  No Known Allergies    Actual Body Weight:   81.3 kg    Renal Function:   Estimated Creatinine Clearance: 175.8 mL/min (A) (based on SCr of 0.4 mg/dL (L)).,       CBC (last 72 hours):  Recent Labs   Lab Result Units 08/27/22 0300 08/28/22 0455 08/29/22  0336   WBC K/uL 15.79* 14.22* 15.84*   Hemoglobin g/dL 7.5* 6.8* 8.0*   Hematocrit % 26.0* 23.1* 26.2*   Platelets K/uL 405 351 334   Gran % % 79.4* 79.8* 82.4*   Lymph % % 10.1* 10.8* 9.4*   Mono % % 4.9 4.3 3.7*   Eosinophil % % 3.9 3.4 2.8   Basophil % % 0.4 0.4 0.4   Differential Method  Automated Automated Automated       Metabolic Panel (last 72 hours):  Recent Labs   Lab Result Units 08/27/22  0300 08/28/22 0455 08/29/22  0336   Sodium mmol/L 143 141 134*  134*   Potassium mmol/L 3.8 4.2 4.2  4.2   Chloride mmol/L 114* 109 105  105   CO2 mmol/L 24 26 25  25   Glucose mg/dL 124* 192* 123*  123*   BUN  mg/dL 43* 29* 21  21   Creatinine mg/dL 0.4* 0.4* 0.4*  0.4*   Albumin g/dL  --   --  1.6*   Total Bilirubin mg/dL  --   --  0.6   Alkaline Phosphatase U/L  --   --  74   AST U/L  --   --  17   ALT U/L  --   --  14   Magnesium mg/dL 1.9 1.9 1.7       Vancomycin Administrations:  vancomycin given in the last 96 hours                     vancomycin (VANCOCIN) 1,500 mg in dextrose 5 % 500 mL IVPB (mg) 1,500 mg New Bag 08/28/22 2141     1,500 mg New Bag  1141     1,500 mg New Bag 08/27/22 2152     1,500 mg New Bag  1120                    Microbiologic Results:  Microbiology Results (last 7 days)       Procedure Component Value Units Date/Time    Blood culture [[id number]] Collected: 08/28/22 1412    Order Status: Completed Specimen: Blood from Peripheral, Left Wrist Updated: 08/29/22 1432     Blood Culture, Routine No Growth to date      No Growth to date    IV catheter culture [[id number]] Collected: 08/29/22 1355    Order Status: Sent Specimen: Catheter Tip, PICC Updated: 08/29/22 1420    Blood culture [[id number]]     Order Status: No result Specimen: Blood     Culture, Respiratory with Gram Stain [[id number]]  (Abnormal)  (Susceptibility) Collected: 08/26/22 1313    Order Status: Completed Specimen: Respiratory from Endotracheal Aspirate Updated: 08/29/22 0859     Respiratory Culture No normal respiratory marco      PSEUDOMONAS AERUGINOSA   Moderate  Known CRPA patient       Gram Stain (Respiratory) <10 epithelial cells per low power field.     Gram Stain (Respiratory) Moderate WBC's     Gram Stain (Respiratory) Few Gram negative rods     Gram Stain (Respiratory) Few Gram positive rods    Blood culture [[id number]] Collected: 08/27/22 0945    Order Status: Completed Specimen: Blood from Line, PICC Right Brachial Updated: 08/29/22 0713     Blood Culture, Routine Gram stain aer bottle: Gram positive cocci      Positive results previously called    Narrative:      Collection has been rescheduled by [email or web address] at 08/27/2022  09:13 Reason:   Nurse Draw  Per Dr. King  Collection has been rescheduled by [email or web address] at 08/27/2022 09:16 Reason:   Nurse Draw  Collection has been rescheduled by [email or web address] at 08/27/2022 09:13 Reason:   Nurse Draw  Per Dr. King  Collection has been rescheduled by [email or web address] at 08/27/2022 09:16 Reason:   Nurse Draw    Blood culture [[id number]]  (Abnormal) Collected: 08/26/22 1300    Order Status: Completed Specimen: Blood from Line, PICC Right Brachial Updated: 08/28/22 1021     Blood Culture, Routine Gram stain luisa bottle: Gram positive cocci in clusters resembling Staph      Results called to and read back by: porfirio Mclean MICU3 08/27/2022      22:23 fgf      COAGULASE-NEGATIVE STAPHYLOCOCCUS SPECIES  For susceptibility see order #[id number]      Narrative:      From port  Collection has been rescheduled by RE1 at 08/26/2022 12:23 Reason:   Nurse Draw  Collection has been rescheduled by RE1 at 08/26/2022 12:23 Reason:   Nurse Draw    Blood culture [[id number]]  (Abnormal) Collected: 08/26/22 0957    Order Status: Completed Specimen: Blood Updated: 08/28/22 1021     Blood Culture, Routine Gram stain aer bottle: Gram positive cocci      Positive results previously called      COAGULASE-NEGATIVE STAPHYLOCOCCUS SPECIES  For susceptibility see order #[id number]      Blood culture [[id number]]  (Abnormal) Collected: 08/26/22 0957    Order Status: Completed Specimen: Blood Updated: 08/28/22 1018     Blood Culture, Routine Gram stain aer bottle: Gram positive cocci in clusters resembling Staph      Results called to and read back by: Porfirio Lerma MICU3 08/27/2022  06:29      fgf      Gram stain luisa bottle: Gram positive cocci      Positive results previously called      COAGULASE-NEGATIVE STAPHYLOCOCCUS SPECIES  Identification and susceptibility pending      Blood culture [[id number]]     Order Status: Canceled Specimen: Blood     Blood culture [[id number]] Collected: 08/19/22 1234    Order Status: Completed Specimen: Blood from  Peripheral, Upper Arm, Right Updated: 08/24/22 1432     Blood Culture, Routine No growth after 5 days.    Blood culture [[id number]] Collected: 08/18/22 2010    Order Status: Completed Specimen: Blood from Peripheral, Forearm, Left Updated: 08/23/22 2232     Blood Culture, Routine No growth after 5 days.

## 2022-08-29 NOTE — NURSING
"Complete CHD bath done.  Lotion applied  liberally to entire body.  Flaky, sloughing skin noted over entire body.    Entire back with thick sloughing skin that flaked off with     gentle bath.     Sacral/ buttock area stage 2,Mepilex removed, are cleaned gently with mild soap and water-- area lighly  bled easily. Applied venelex ointment and an new Mepilex.      Heels intact, floated with pillows.    Linens changed.     New gown.    Cath care done.    Site care to port a cath done, and re-accessed with new maddox needle.    PICC line D/C'd  and tip sent to lab for culture.    PEG site cleaned and re-dressed.  Site pink.    Fecal containment system intact, and collecting well. ( No Stool on skin)    Pain treated PRN ,    Pt blinks once for yes, and twice for no-- pt is decent at communication with "yes and no" answers.    Explained all of above prior to proceeding.      TF tubing changed.     Wife phoned to check on pt.   Update given.  Informed pt that wife called as instructed by wife.      Pt blinks once for yes, twice for no.  Communicates fairly  "

## 2022-08-30 PROBLEM — R78.81 BACTEREMIA: Status: ACTIVE | Noted: 2022-08-30

## 2022-08-30 LAB
ANION GAP SERPL CALC-SCNC: 5 MMOL/L (ref 8–16)
BASOPHILS # BLD AUTO: 0.06 K/UL (ref 0–0.2)
BASOPHILS NFR BLD: 0.4 % (ref 0–1.9)
BUN SERPL-MCNC: 26 MG/DL (ref 8–23)
CALCIUM SERPL-MCNC: 8.2 MG/DL (ref 8.7–10.5)
CHLORIDE SERPL-SCNC: 103 MMOL/L (ref 95–110)
CO2 SERPL-SCNC: 26 MMOL/L (ref 23–29)
CREAT SERPL-MCNC: 0.4 MG/DL (ref 0.5–1.4)
CRP SERPL-MCNC: 7.63 MG/DL
DIFFERENTIAL METHOD: ABNORMAL
EOSINOPHIL # BLD AUTO: 0.5 K/UL (ref 0–0.5)
EOSINOPHIL NFR BLD: 3 % (ref 0–8)
ERYTHROCYTE [DISTWIDTH] IN BLOOD BY AUTOMATED COUNT: 19 % (ref 11.5–14.5)
EST. GFR  (NO RACE VARIABLE): >60 ML/MIN/1.73 M^2
GLUCOSE SERPL-MCNC: 139 MG/DL (ref 70–110)
HCT VFR BLD AUTO: 28.7 % (ref 40–54)
HGB BLD-MCNC: 8.3 G/DL (ref 14–18)
IMM GRANULOCYTES # BLD AUTO: 0.21 K/UL (ref 0–0.04)
IMM GRANULOCYTES NFR BLD AUTO: 1.4 % (ref 0–0.5)
LYMPHOCYTES # BLD AUTO: 1.6 K/UL (ref 1–4.8)
LYMPHOCYTES NFR BLD: 11 % (ref 18–48)
MAGNESIUM SERPL-MCNC: 2.3 MG/DL (ref 1.6–2.6)
MCH RBC QN AUTO: 25.1 PG (ref 27–31)
MCHC RBC AUTO-ENTMCNC: 28.9 G/DL (ref 32–36)
MCV RBC AUTO: 87 FL (ref 82–98)
MONOCYTES # BLD AUTO: 0.7 K/UL (ref 0.3–1)
MONOCYTES NFR BLD: 4.6 % (ref 4–15)
NEUTROPHILS # BLD AUTO: 11.8 K/UL (ref 1.8–7.7)
NEUTROPHILS NFR BLD: 79.6 % (ref 38–73)
NRBC BLD-RTO: 0 /100 WBC
PLATELET # BLD AUTO: 398 K/UL (ref 150–450)
PMV BLD AUTO: 10 FL (ref 9.2–12.9)
POTASSIUM SERPL-SCNC: 4.7 MMOL/L (ref 3.5–5.1)
RBC # BLD AUTO: 3.31 M/UL (ref 4.6–6.2)
SARS-COV-2 RDRP RESP QL NAA+PROBE: NEGATIVE
SODIUM SERPL-SCNC: 134 MMOL/L (ref 136–145)
VANCOMYCIN SERPL-MCNC: 32.1 UG/ML
WBC # BLD AUTO: 14.82 K/UL (ref 3.9–12.7)

## 2022-08-30 PROCEDURE — 25000003 PHARM REV CODE 250: Performed by: INTERNAL MEDICINE

## 2022-08-30 PROCEDURE — 99900035 HC TECH TIME PER 15 MIN (STAT)

## 2022-08-30 PROCEDURE — 94761 N-INVAS EAR/PLS OXIMETRY MLT: CPT

## 2022-08-30 PROCEDURE — 36569 INSJ PICC 5 YR+ W/O IMAGING: CPT

## 2022-08-30 PROCEDURE — 36590 PR REMOVAL TUNNELED CV CATH W SUBQ PORT OR PUMP: ICD-10-PCS | Mod: ,,, | Performed by: SURGERY

## 2022-08-30 PROCEDURE — 85025 COMPLETE CBC W/AUTO DIFF WBC: CPT | Performed by: INTERNAL MEDICINE

## 2022-08-30 PROCEDURE — 86140 C-REACTIVE PROTEIN: CPT | Performed by: INTERNAL MEDICINE

## 2022-08-30 PROCEDURE — 25000003 PHARM REV CODE 250: Performed by: STUDENT IN AN ORGANIZED HEALTH CARE EDUCATION/TRAINING PROGRAM

## 2022-08-30 PROCEDURE — 99900026 HC AIRWAY MAINTENANCE (STAT)

## 2022-08-30 PROCEDURE — 83735 ASSAY OF MAGNESIUM: CPT | Performed by: INTERNAL MEDICINE

## 2022-08-30 PROCEDURE — 99233 SBSQ HOSP IP/OBS HIGH 50: CPT | Mod: ,,, | Performed by: INTERNAL MEDICINE

## 2022-08-30 PROCEDURE — 80202 ASSAY OF VANCOMYCIN: CPT | Performed by: STUDENT IN AN ORGANIZED HEALTH CARE EDUCATION/TRAINING PROGRAM

## 2022-08-30 PROCEDURE — 99222 1ST HOSP IP/OBS MODERATE 55: CPT | Mod: 25,,, | Performed by: SURGERY

## 2022-08-30 PROCEDURE — 36415 COLL VENOUS BLD VENIPUNCTURE: CPT | Performed by: STUDENT IN AN ORGANIZED HEALTH CARE EDUCATION/TRAINING PROGRAM

## 2022-08-30 PROCEDURE — A4216 STERILE WATER/SALINE, 10 ML: HCPCS | Performed by: INTERNAL MEDICINE

## 2022-08-30 PROCEDURE — 63600175 PHARM REV CODE 636 W HCPCS: Performed by: INTERNAL MEDICINE

## 2022-08-30 PROCEDURE — U0002 COVID-19 LAB TEST NON-CDC: HCPCS | Performed by: INTERNAL MEDICINE

## 2022-08-30 PROCEDURE — 27000221 HC OXYGEN, UP TO 24 HOURS

## 2022-08-30 PROCEDURE — 36590 REMOVAL TUNNELED CV CATH: CPT | Mod: ,,, | Performed by: SURGERY

## 2022-08-30 PROCEDURE — 63700000 PHARM REV CODE 250 ALT 637 W/O HCPCS: Performed by: INTERNAL MEDICINE

## 2022-08-30 PROCEDURE — 99233 PR SUBSEQUENT HOSPITAL CARE,LEVL III: ICD-10-PCS | Mod: ,,, | Performed by: STUDENT IN AN ORGANIZED HEALTH CARE EDUCATION/TRAINING PROGRAM

## 2022-08-30 PROCEDURE — 25000003 PHARM REV CODE 250

## 2022-08-30 PROCEDURE — C9113 INJ PANTOPRAZOLE SODIUM, VIA: HCPCS | Performed by: INTERNAL MEDICINE

## 2022-08-30 PROCEDURE — 99233 SBSQ HOSP IP/OBS HIGH 50: CPT | Mod: ,,, | Performed by: STUDENT IN AN ORGANIZED HEALTH CARE EDUCATION/TRAINING PROGRAM

## 2022-08-30 PROCEDURE — 87040 BLOOD CULTURE FOR BACTERIA: CPT | Performed by: STUDENT IN AN ORGANIZED HEALTH CARE EDUCATION/TRAINING PROGRAM

## 2022-08-30 PROCEDURE — C1751 CATH, INF, PER/CENT/MIDLINE: HCPCS

## 2022-08-30 PROCEDURE — 63600175 PHARM REV CODE 636 W HCPCS: Performed by: STUDENT IN AN ORGANIZED HEALTH CARE EDUCATION/TRAINING PROGRAM

## 2022-08-30 PROCEDURE — 99233 PR SUBSEQUENT HOSPITAL CARE,LEVL III: ICD-10-PCS | Mod: ,,, | Performed by: INTERNAL MEDICINE

## 2022-08-30 PROCEDURE — 87070 CULTURE OTHR SPECIMN AEROBIC: CPT | Performed by: STUDENT IN AN ORGANIZED HEALTH CARE EDUCATION/TRAINING PROGRAM

## 2022-08-30 PROCEDURE — 94003 VENT MGMT INPAT SUBQ DAY: CPT

## 2022-08-30 PROCEDURE — 99222 PR INITIAL HOSPITAL CARE,LEVL II: ICD-10-PCS | Mod: 25,,, | Performed by: SURGERY

## 2022-08-30 PROCEDURE — 20000000 HC ICU ROOM

## 2022-08-30 PROCEDURE — 25000003 PHARM REV CODE 250: Performed by: HOSPITALIST

## 2022-08-30 PROCEDURE — 99900031 HC PATIENT EDUCATION (STAT)

## 2022-08-30 PROCEDURE — 80048 BASIC METABOLIC PNL TOTAL CA: CPT | Performed by: INTERNAL MEDICINE

## 2022-08-30 RX ORDER — SODIUM CHLORIDE 0.9 % (FLUSH) 0.9 %
10 SYRINGE (ML) INJECTION
Start: 2022-08-30

## 2022-08-30 RX ORDER — NYSTATIN 100000 [USP'U]/ML
500000 SUSPENSION ORAL 4 TIMES DAILY
Qty: 200 ML | Refills: 0
Start: 2022-08-30 | End: 2022-09-09

## 2022-08-30 RX ORDER — LIDOCAINE HYDROCHLORIDE AND EPINEPHRINE 10; 10 MG/ML; UG/ML
INJECTION, SOLUTION INFILTRATION; PERINEURAL
Status: COMPLETED
Start: 2022-08-30 | End: 2022-08-30

## 2022-08-30 RX ORDER — PANTOPRAZOLE SODIUM 40 MG/10ML
40 INJECTION, POWDER, LYOPHILIZED, FOR SOLUTION INTRAVENOUS DAILY
Qty: 30 EACH | Refills: 11 | Status: SHIPPED | OUTPATIENT
Start: 2022-08-31 | End: 2023-08-31

## 2022-08-30 RX ORDER — SODIUM CHLORIDE 0.9 % (FLUSH) 0.9 %
10 SYRINGE (ML) INJECTION EVERY 6 HOURS
Start: 2022-08-31

## 2022-08-30 RX ORDER — CEFEPIME HYDROCHLORIDE 1 G/50ML
2 INJECTION, SOLUTION INTRAVENOUS EVERY 8 HOURS
Start: 2022-08-30

## 2022-08-30 RX ADMIN — HYDROCODONE BITARTRATE AND ACETAMINOPHEN 1 TABLET: 5; 325 TABLET ORAL at 04:08

## 2022-08-30 RX ADMIN — CASTOR OIL AND BALSAM, PERU: 788; 87 OINTMENT TOPICAL at 08:08

## 2022-08-30 RX ADMIN — TRAZODONE HYDROCHLORIDE 50 MG: 50 TABLET ORAL at 08:08

## 2022-08-30 RX ADMIN — METOPROLOL TARTRATE 12.5 MG: 25 TABLET, FILM COATED ORAL at 08:08

## 2022-08-30 RX ADMIN — NYSTATIN 500000 UNITS: 100000 SUSPENSION ORAL at 08:08

## 2022-08-30 RX ADMIN — MORPHINE SULFATE 4 MG: 4 INJECTION, SOLUTION INTRAMUSCULAR; INTRAVENOUS at 11:08

## 2022-08-30 RX ADMIN — SODIUM CHLORIDE 1000 ML: 0.9 INJECTION, SOLUTION INTRAVENOUS at 01:08

## 2022-08-30 RX ADMIN — BACLOFEN 10 MG: 5 TABLET ORAL at 08:08

## 2022-08-30 RX ADMIN — CEFEPIME 2 G: 2 INJECTION, POWDER, FOR SOLUTION INTRAVENOUS at 07:08

## 2022-08-30 RX ADMIN — CEFEPIME 2 G: 2 INJECTION, POWDER, FOR SOLUTION INTRAVENOUS at 10:08

## 2022-08-30 RX ADMIN — HYDROMORPHONE HYDROCHLORIDE 1 MG: 1 INJECTION, SOLUTION INTRAMUSCULAR; INTRAVENOUS; SUBCUTANEOUS at 12:08

## 2022-08-30 RX ADMIN — NYSTATIN 500000 UNITS: 100000 SUSPENSION ORAL at 01:08

## 2022-08-30 RX ADMIN — HYDROMORPHONE HYDROCHLORIDE 1 MG: 1 INJECTION, SOLUTION INTRAMUSCULAR; INTRAVENOUS; SUBCUTANEOUS at 09:08

## 2022-08-30 RX ADMIN — PANTOPRAZOLE SODIUM 40 MG: 40 INJECTION, POWDER, FOR SOLUTION INTRAVENOUS at 08:08

## 2022-08-30 RX ADMIN — BUSPIRONE HYDROCHLORIDE 5 MG: 5 TABLET ORAL at 08:08

## 2022-08-30 RX ADMIN — BACLOFEN 10 MG: 5 TABLET ORAL at 02:08

## 2022-08-30 RX ADMIN — MORPHINE SULFATE 4 MG: 4 INJECTION, SOLUTION INTRAMUSCULAR; INTRAVENOUS at 04:08

## 2022-08-30 RX ADMIN — CEFEPIME 2 G: 2 INJECTION, POWDER, FOR SOLUTION INTRAVENOUS at 03:08

## 2022-08-30 RX ADMIN — SENNOSIDES AND DOCUSATE SODIUM 1 TABLET: 50; 8.6 TABLET ORAL at 08:08

## 2022-08-30 RX ADMIN — HYDROMORPHONE HYDROCHLORIDE 1 MG: 1 INJECTION, SOLUTION INTRAMUSCULAR; INTRAVENOUS; SUBCUTANEOUS at 02:08

## 2022-08-30 RX ADMIN — SCOPOLAMINE 1 PATCH: 1 PATCH TRANSDERMAL at 02:08

## 2022-08-30 RX ADMIN — RILUZOLE 50 MG: 50 TABLET ORAL at 08:08

## 2022-08-30 RX ADMIN — APIXABAN 2.5 MG: 2.5 TABLET, FILM COATED ORAL at 08:08

## 2022-08-30 RX ADMIN — POLYETHYLENE GLYCOL 3350 17 G: 17 POWDER, FOR SOLUTION ORAL at 08:08

## 2022-08-30 RX ADMIN — SODIUM CHLORIDE, PRESERVATIVE FREE 10 ML: 5 INJECTION INTRAVENOUS at 06:08

## 2022-08-30 RX ADMIN — LIDOCAINE HYDROCHLORIDE AND EPINEPHRINE 20 ML: 10; 10 INJECTION, SOLUTION INFILTRATION; PERINEURAL at 04:08

## 2022-08-30 RX ADMIN — HYDROMORPHONE HYDROCHLORIDE 1 MG: 1 INJECTION, SOLUTION INTRAMUSCULAR; INTRAVENOUS; SUBCUTANEOUS at 04:08

## 2022-08-30 RX ADMIN — ACETAMINOPHEN 1000 MG: 500 TABLET, FILM COATED ORAL at 02:08

## 2022-08-30 RX ADMIN — SODIUM CHLORIDE, PRESERVATIVE FREE 10 ML: 5 INJECTION INTRAVENOUS at 12:08

## 2022-08-30 RX ADMIN — RILUZOLE 50 MG: 50 TABLET ORAL at 09:08

## 2022-08-30 RX ADMIN — HYDROMORPHONE HYDROCHLORIDE 1 MG: 1 INJECTION, SOLUTION INTRAMUSCULAR; INTRAVENOUS; SUBCUTANEOUS at 08:08

## 2022-08-30 RX ADMIN — NYSTATIN 500000 UNITS: 100000 SUSPENSION ORAL at 05:08

## 2022-08-30 NOTE — HPI
Gerard 68 yo M with history of ALS who has been in hospital for a week with complications from ALS principally trismus..  There is also concern about port catheter infection and surgery Has been consulted for removal.  He is responsive and d/w pt and wife was held recommending port removal.  Port was placed several years ago.

## 2022-08-30 NOTE — PROGRESS NOTES
Pulmonary/Critical Care  Progress Note      Patient name: Wei Dominique  MRN: [id number]  Date: 08/30/2022    Admit Date: 8/18/2022  Consult Requested By: Lobito Blanc MD    Reason for Consult: Respiratory failure, trach    HPI:    8/19/2022 - P with h/o ALS with trach and vent dependence sent to ER for increased swelling.  Now admitted for evaluation and I was asked to see pt for ventilator management.  He is not able to provide any history.  Chart has been reviewed.  He is febrile, tachycardic (though better).  Cultures have been ordered and are pending.  Maxillofacial CT noted.  CXR shows no new infiltrates.  No ABG done yet.    8/22/2022 - Events of the weekend noted, no response to me this AM.  Dr Jones's note from yesterday seen and pt does not want comfort measures.  Mulitple + cultures (ID following for antibiotics).    8/23/2022 - Stable overnight, no new issues reported.  He is more awake and responsive.  Waiting on neuro to see for masseter BOTOX injections.  Dr Jones's note seen.  Stable on ventilator.     8/24/2022 - Stable overnight, no new issues reported.  Neuro note seen but they do not do Botox injections and have recommended OMFS consult (I do not believe that they come here) - ? Do we have to consider transfer to [facility] for evaluation/treatment per OMFS.  Respiratory status is stable.    8/25/2022 - Stable overnight, had BOTOX injection per ENT.  No new issues reported.  Low grade fever.    8/26/2022 - Stable overnight, no new issues reported.  He has some increased movement and ability to open mouth this AM.  Temp up a little yesterday.  H/H down a little but no active bleeding reported.  CXR with RLL atelectasis/infiltratre and possible effusion    8/27 the patient is running a temp.  He has MRSA on a catheter tip.  And is MRSA positive in his nares.  He is able to open his mouth.  He is having back pain.    8/28 the patient's trismus has returned.  It was necessary to paralyze  him after sedating him, and reinsert bilateral bite blocks between his teeth to protect his tongue.    8/29/2022 - Events of the weekend noted, no new issues reported.  Reviewed literature and trismus is described with ALS and botox for treatment as well.  ENT to resee pt and ? If he will be redosed.  ANother thought from nursing would be the possibility of tetanus (there is a reported cephalic form) though this seems less likely.  Antibiotics should have treated any possible infection. ? If there would be any role for human tetanus nimmune globulin.    8/30/2022 - Stable overnight, no new issues reported.  I am told that arrangements are being made for another dose of BOTOX per ENT.  D/W pt and he indicates that he is current on tetanus vaccine.  Catheter tip culture pending.  CXR noted.  ECHO report pending    Review of Systems    Review of Systems   Reason unable to perform ROS: Sedated to replace bite blocks.     Past Medical History    Past Medical History:   Diagnosis Date    ALS (amyotrophic lateral sclerosis)     ALS (amyotrophic lateral sclerosis)     Dependent on ventilator 3/21/2022    Erectile dysfunction     Gait disturbance     Hyperlipidemia     Hypertension     Iron deficiency anemia     Motor neuron disease     Polycystic kidney disease     Quadriparesis 2/6/2018    Tremor        Past Surgical History    Past Surgical History:   Procedure Laterality Date    GASTROSTOMY      PEG    PORTACATH PLACEMENT      TRACHEOSTOMY         Medications (scheduled):      apixaban  2.5 mg Oral BID    baclofen  10 mg Per G Tube TID    balsam peru-castor oiL   Topical (Top) Daily    busPIRone  5 mg Per G Tube BID    ceFEPime (MAXIPIME) IVPB  2 g Intravenous Q8H    metoprolol tartrate  12.5 mg Per G Tube BID    nystatin  500,000 Units Mouth/Throat QID    pantoprazole  40 mg Intravenous Daily    polyethylene glycol  17 g Oral Daily    riluzole  50 mg Oral Q12H    scopolamine  1 patch Transdermal Q3 Days     "senna-docusate 8.6-50 mg  1 tablet Oral BID    sodium chloride 0.9%  10 mL Intravenous Q6H    traZODone  50 mg Per G Tube QHS       Medications (infusions):           Medications (prn):     sodium chloride, acetaminophen, acetaminophen, albuterol-ipratropium, aluminum-magnesium hydroxide-simethicone, calcium chloride IVPB, calcium chloride IVPB, calcium chloride IVPB, dextrose 10%, dextrose 10%, glucagon (human recombinant), glucose, glucose, HYDROcodone-acetaminophen, HYDROmorphone, magnesium sulfate IVPB, magnesium sulfate IVPB, magnesium sulfate IVPB, magnesium sulfate IVPB, melatonin, morphine, naloxone, ondansetron, potassium chloride in water, potassium chloride in water, potassium chloride in water, potassium chloride in water, prochlorperazine, simethicone, sodium chloride 0.9%, Flushing PICC Protocol **AND** sodium chloride 0.9% **AND** sodium chloride 0.9%, sodium phosphate IVPB, sodium phosphate IVPB, sodium phosphate IVPB, sodium phosphate IVPB, sodium phosphate IVPB, Pharmacy to dose Vancomycin consult **AND** vancomycin - pharmacy to dose, white petrolatum    Family History: No family history on file.    Social History: Tobacco:   Social History     Tobacco Use   Smoking Status Not on file   Smokeless Tobacco Not on file                                EtOH:   Social History     Substance and Sexual Activity   Alcohol Use None                                Drugs:   Social History     Substance and Sexual Activity   Drug Use Never       Physical Exam    Vital signs:  Temp:  [99.1 °F (37.3 °C)-99.9 °F (37.7 °C)]   Pulse:  [101-139]   Resp:  [16-28]   BP: ()/(48-90)   SpO2:  [95 %-100 %]     Intake/Output:     Intake/Output Summary (Last 24 hours) at 8/30/2022 0818  Last data filed at 8/30/2022 0600  Gross per 24 hour   Intake 1530 ml   Output 1700 ml   Net -170 ml          BMI: Estimated body mass index is 27.25 kg/m² as calculated from the following:    Height as of this encounter: 5' 8" (1.727 " m).    Weight as of this encounter: 81.3 kg (179 lb 3.7 oz).    Physical Exam  Vitals and nursing note reviewed.   Constitutional:       General: He is not in acute distress.     Appearance: Normal appearance. He is ill-appearing (chronically). He is not toxic-appearing or diaphoretic.   HENT:      Head: Normocephalic and atraumatic.      Comments: Tongue remains edematous     Right Ear: External ear normal.      Left Ear: External ear normal.      Nose: Nose normal.      Mouth/Throat:      Mouth: Mucous membranes are moist.      Pharynx: Oropharynx is clear. No oropharyngeal exudate.      Comments: Bite blocks bilaterally  Eyes:      General: No scleral icterus.        Right eye: No discharge.         Left eye: No discharge.      Extraocular Movements: Extraocular movements intact.      Conjunctiva/sclera: Conjunctivae normal.      Pupils: Pupils are equal, round, and reactive to light.   Neck:      Vascular: No carotid bruit.      Comments: trach  Cardiovascular:      Rate and Rhythm: Normal rate and regular rhythm.      Pulses: Normal pulses.      Heart sounds: Normal heart sounds. No murmur heard.    No friction rub. No gallop.   Pulmonary:      Effort: Pulmonary effort is normal. No respiratory distress.      Breath sounds: Normal breath sounds. No stridor. No wheezing, rhonchi or rales.   Chest:      Chest wall: No tenderness.   Abdominal:      General: Bowel sounds are normal. There is no distension.      Tenderness: There is no abdominal tenderness. There is no guarding.      Comments: Peg tube in place   Musculoskeletal:         General: Swelling (Arms are very edematous) present. Normal range of motion.      Cervical back: Normal range of motion and neck supple. No rigidity or tenderness.      Right lower leg: No edema.      Left lower leg: No edema.      Comments: + edema in UE bilaterally   Lymphadenopathy:      Cervical: No cervical adenopathy.   Skin:     General: Skin is warm and dry.      Capillary  Refill: Capillary refill takes less than 2 seconds.      Coloration: Skin is not jaundiced.      Findings: No bruising.      Comments: Some redness at prior midline site  Some heel breakdown   Neurological:      Mental Status: He is alert.      Comments: Completely paralyzed except for his eyes.  Trismus has returned   Psychiatric:      Comments: Not able to assess       Laboratory    Recent Labs   Lab 08/30/22 0314   WBC 14.82*   RBC 3.31*   HGB 8.3*   HCT 28.7*      MCV 87   MCH 25.1*   MCHC 28.9*         Recent Labs   Lab 08/30/22 0314   CALCIUM 8.2*   *   K 4.7   CO2 26      BUN 26*   CREATININE 0.4*           Microbiology:       Microbiology Results (last 7 days)       Procedure Component Value Units Date/Time    IV catheter culture [[id number]] Collected: 08/29/22 1355    Order Status: Completed Specimen: Catheter Tip, PICC Updated: 08/30/22 0738     Aerobic Culture - Cath tip No growth    Blood culture [[id number]] Collected: 08/28/22 1412    Order Status: Completed Specimen: Blood from Peripheral, Left Wrist Updated: 08/29/22 1432     Blood Culture, Routine No Growth to date      No Growth to date    Blood culture [[id number]]     Order Status: No result Specimen: Blood     Culture, Respiratory with Gram Stain [[id number]]  (Abnormal)  (Susceptibility) Collected: 08/26/22 1313    Order Status: Completed Specimen: Respiratory from Endotracheal Aspirate Updated: 08/29/22 0859     Respiratory Culture No normal respiratory marco      PSEUDOMONAS AERUGINOSA   Moderate  Known CRPA patient       Gram Stain (Respiratory) <10 epithelial cells per low power field.     Gram Stain (Respiratory) Moderate WBC's     Gram Stain (Respiratory) Few Gram negative rods     Gram Stain (Respiratory) Few Gram positive rods    Blood culture [[id number]] Collected: 08/27/22 0945    Order Status: Completed Specimen: Blood from Line, PICC Right Brachial Updated: 08/29/22 0713     Blood Culture, Routine Gram stain aer  bottle: Gram positive cocci      Positive results previously called    Narrative:      Collection has been rescheduled by [email or web address] at 08/27/2022 09:13 Reason:   Nurse Draw  Per Dr. King  Collection has been rescheduled by [email or web address] at 08/27/2022 09:16 Reason:   Nurse Draw  Collection has been rescheduled by [email or web address] at 08/27/2022 09:13 Reason:   Nurse Draw  Per Dr. King  Collection has been rescheduled by [email or web address] at 08/27/2022 09:16 Reason:   Nurse Draw    Blood culture [[id number]]  (Abnormal) Collected: 08/26/22 1300    Order Status: Completed Specimen: Blood from Line, PICC Right Brachial Updated: 08/28/22 1021     Blood Culture, Routine Gram stain luisa bottle: Gram positive cocci in clusters resembling Staph      Results called to and read back by: porfirio Mclean MICU3 08/27/2022      22:23 fgf      COAGULASE-NEGATIVE STAPHYLOCOCCUS SPECIES  For susceptibility see order #[id number]      Narrative:      From port  Collection has been rescheduled by [email or web address] at 08/26/2022 12:23 Reason:   Nurse Draw  Collection has been rescheduled by RE1 at 08/26/2022 12:23 Reason:   Nurse Draw    Blood culture [[id number]]  (Abnormal) Collected: 08/26/22 0957    Order Status: Completed Specimen: Blood Updated: 08/28/22 1021     Blood Culture, Routine Gram stain aer bottle: Gram positive cocci      Positive results previously called      COAGULASE-NEGATIVE STAPHYLOCOCCUS SPECIES  For susceptibility see order #[id number]      Blood culture [[id number]]  (Abnormal) Collected: 08/26/22 0957    Order Status: Completed Specimen: Blood Updated: 08/28/22 1018     Blood Culture, Routine Gram stain aer bottle: Gram positive cocci in clusters resembling Staph      Results called to and read back by: Porfirio Lerma MICU3 08/27/2022  06:29      fgf      Gram stain luisa bottle: Gram positive cocci      Positive results previously called      COAGULASE-NEGATIVE STAPHYLOCOCCUS SPECIES  Identification and susceptibility pending      Blood culture [[id number]]     Order  Status: Canceled Specimen: Blood     Blood culture [[id number]] Collected: 08/19/22 1234    Order Status: Completed Specimen: Blood from Peripheral, Upper Arm, Right Updated: 08/24/22 1432     Blood Culture, Routine No growth after 5 days.    Blood culture [[id number]] Collected: 08/18/22 2010    Order Status: Completed Specimen: Blood from Peripheral, Forearm, Left Updated: 08/23/22 2232     Blood Culture, Routine No growth after 5 days.            Radiology    X-Ray Chest 1 View  HISTORY: pneumonia    FINDINGS: Portable chest radiograph at 0956 hours compared to 08/27/2022 shows tracheostomy cannula, right PICC and left subclavian injection port-type central venous catheter, all unchanged in position. The cardiomediastinal silhouette and pulmonary vasculature are stable.    There are persistent right lower lung opacities and hazy density, unchanged. No new pleural or parenchymal abnormality. No pneumothorax.    IMPRESSION: No significant interval change.    Electronically signed by:  Murphy Luna MD  8/29/2022 11:40 AM CDT Workstation: [id number]        Ventilator Information    Vent Mode: A/C  Oxygen Concentration (%):  [28] 28  Resp Rate Total:  [16 br/min-26 br/min] 25 br/min  Vt Set:  [500 mL] 500 mL  PEEP/CPAP:  [5 cmH20] 5 cmH20  Mean Airway Pressure:  [11 loC79-49 cmH20] 13 cmH20         No results for input(s): PH, PCO2, PO2, HCO3, POCSATURATED, BE in the last 72 hours.      Impression    Active Hospital Problems    Diagnosis  POA    *Catheter-associated urinary tract infection [T83.511A, N39.0]  Yes    Masseter muscle spasm [M62.838]  Yes    Open wound of tongue due to bite [S01.552A]  Yes    Trismus [R25.2]  Yes    Fever [R50.9]  Yes    Facial cellulitis [L03.211]  Yes    Tracheostomy in place [Z93.0]  Not Applicable    Chronic respiratory failure with hypoxia and hypercapnia [J96.11, J96.12]  Yes    Dependent on ventilator [Z99.11]  Not Applicable    S/P percutaneous endoscopic gastrostomy (PEG) tube  placement [Z93.1]  Not Applicable    ALS (amyotrophic lateral sclerosis) [G12.21]  Yes    Edema [R60.9]  Yes    Quadriparesis [G82.50]  Yes      Resolved Hospital Problems    Diagnosis Date Resolved POA    Facial abscess [L02.01] 08/22/2022 Yes   Elevated temperature with T-max of 99.6   Pseudomonas in sputum  Coag-negative staph in multiple blood cultures  MRSA on cath tip  Trismus worsened again today  ALS with inability to move anything but his eyes   Bibasilar atelectasis    Plan    Continue vent - decrease FiO2   Contact isolation  Antibiotics per ID  Wound care  PEG feedings  Re-consult ENT for more Botox  Follow CXR  Back to [facility] when trismus is resolved and antibiotic plans are confirmed      Sy Kennedy MD  [facility] Pulmonary/Critical Care  08/30/2022

## 2022-08-30 NOTE — SUBJECTIVE & OBJECTIVE
No current facility-administered medications on file prior to encounter.     Current Outpatient Medications on File Prior to Encounter   Medication Sig    albuterol (PROVENTIL/VENTOLIN HFA) 90 mcg/actuation inhaler Inhale 2 puffs into the lungs every 6 (six) hours as needed.    ALPRAZolam (XANAX) 0.5 MG tablet Take 1 tablet (0.5 mg total) by mouth 3 (three) times daily as needed for Anxiety.    apixaban (ELIQUIS) 2.5 mg Tab 1 tablet (2.5 mg total) by Per G Tube route 2 (two) times daily.    arginine-glutamine-calcium HMB (AYSE) 7-7-1.5 gram PwPk Take 1 packet by mouth 2 (two) times a day.    baclofen (LIORESAL) 10 MG tablet TAKE 1 TABLET THREE TIMES PER DAY (Patient taking differently: 10 mg by Per G Tube route 3 (three) times daily.)    busPIRone (BUSPAR) 5 MG Tab 1 tablet (5 mg total) by Per G Tube route 2 (two) times daily.    furosemide (LASIX) 40 MG tablet 1 tablet (40 mg total) by Per G Tube route once daily.    glycopyrrolate (ROBINUL) 1 mg Tab START BY TAKING 1 TABLET DAILY. IF TOLERATING WELL, YOU CAN INCREASE IT UP TO 2 OR EVEN 3 TIMES DAILY. (Patient taking differently: 1 mg by Per G Tube route 3 (three) times daily.)    lansoprazole (PREVACID) 30 MG capsule Take 30 mg by mouth once daily.    melatonin 3 mg TbDL 3 mg by Per G Tube route every evening.    metoprolol tartrate (LOPRESSOR) 50 MG tablet Take 50 mg by mouth once daily.    ondansetron (ZOFRAN) 4 MG tablet 4 mg by Per G Tube route every 6 (six) hours as needed for Nausea.    polyethylene glycol (GLYCOLAX) 17 gram PwPk 17 g by Per G Tube route 2 (two) times daily.    riluzole 50 mg Tab tablet TAKE 1 TABLET ON AN EMPTY STOMACH EVERY 12 HRS (Patient taking differently: 50 mg by Per G Tube route. TAKE 1 TABLET ON AN EMPTY STOMACH EVERY 12 HRS)    traZODone (DESYREL) 50 MG tablet 50 mg by Per G Tube route every evening.    albuterol-ipratropium (DUO-NEB) 2.5 mg-0.5 mg/3 mL nebulizer solution Take 3 mLs by nebulization every 4 (four) hours. Rescue     chlorhexidine (PERIDEX) 0.12 % solution Use as directed 15 mLs in the mouth or throat 2 (two) times daily.    enoxaparin (LOVENOX) 60 mg/0.6 mL Syrg Inject into the skin.    glycerin adult suppository Place 1 suppository rectally once daily.    mupirocin (BACTROBAN) 2 % ointment SMARTSI Application Topical 2-3 Times Daily    pantoprazole (PROTONIX) 40 mg suspension 40 mg by FEEDING TUBE route 2 (two) times a day.    warfarin (COUMADIN) 2.5 MG tablet Take by mouth.       Review of patient's allergies indicates:  No Known Allergies    Past Medical History:   Diagnosis Date    ALS (amyotrophic lateral sclerosis)     ALS (amyotrophic lateral sclerosis)     Dependent on ventilator 3/21/2022    Erectile dysfunction     Gait disturbance     Hyperlipidemia     Hypertension     Iron deficiency anemia     Motor neuron disease     Polycystic kidney disease     Quadriparesis 2018    Tremor      Past Surgical History:   Procedure Laterality Date    GASTROSTOMY      PEG    PORTACATH PLACEMENT      TRACHEOSTOMY       Family History    None       Tobacco Use    Smoking status: Not on file    Smokeless tobacco: Not on file   Substance and Sexual Activity    Alcohol use: Not on file    Drug use: Never    Sexual activity: Not Currently     Review of Systems   Unable to perform ROS: Patient nonverbal   Objective:     Vital Signs (Most Recent):  Temp: 98.9 °F (37.2 °C) (22 1100)  Pulse: (!) 115 (22 1315)  Resp: (!) 22 (22 1613)  BP: 92/66 (22 1300)  SpO2: 100 % (22 1315)   Vital Signs (24h Range):  Temp:  [98.2 °F (36.8 °C)-99.9 °F (37.7 °C)] 98.9 °F (37.2 °C)  Pulse:  [101-139] 115  Resp:  [16-31] 22  SpO2:  [95 %-100 %] 100 %  BP: ()/(50-93) 92/66     Weight: 81.3 kg (179 lb 3.7 oz)  Body mass index is 27.25 kg/m².    Physical Exam  Vitals reviewed.   Cardiovascular:      Rate and Rhythm: Normal rate.      Pulses: Normal pulses.   Pulmonary:      Effort: Pulmonary effort is normal.    Abdominal:      General: Abdomen is flat. There is no distension.      Tenderness: There is no abdominal tenderness.   Skin:     Comments: Port noted in L chest   Neurological:      Mental Status: He is alert.       Significant Labs:  I have reviewed all pertinent lab results within the past 24 hours.  CBC:   Recent Labs   Lab 08/30/22 0314   WBC 14.82*   RBC 3.31*   HGB 8.3*   HCT 28.7*      MCV 87   MCH 25.1*   MCHC 28.9*     BMP:   Recent Labs   Lab 08/30/22 0314   *   *   K 4.7      CO2 26   BUN 26*   CREATININE 0.4*   CALCIUM 8.2*   MG 2.3       Significant Diagnostics:

## 2022-08-30 NOTE — CARE UPDATE
Patient discharge dependent on clinical course.  Will return to [facility]) when cleared.

## 2022-08-30 NOTE — PROGRESS NOTES
Pharmacokinetic Assessment Follow Up: IV Vancomycin    Patient brief summary:  Wei Dominique is a 67 y.o. male initiated on antimicrobial therapy with IV Vancomycin for treatment of bacteremia.  Actual Body Weight: 81.3 kg  Renal Function: Estimated Creatinine Clearance: 175.8 mL/min (A) (based on SCr of 0.4 mg/dL (L)).,   Dialysis Method (if applicable):N/A    Vancomycin serum concentration assessment(s):  Drug levels (last 3 results):  Recent Labs   Lab Result Units 08/28/22 2133 08/29/22 0336 08/29/22  1652 08/30/22  0314   Vancomycin, Random ug/mL  --  53.4 31.6 32.1   Vancomycin-Trough ug/mL 36.0*  --   --   --        The random level was drawn correctly and can be used to guide therapy at this time. The measurement is above the desired definitive target range of 15 to 20 mcg/mL.      Vancomycin Regimen Plan:  Vancomycin therapy is currently on hold due to elevated levels. Re-dose when the random level is less than 20 mcg/mL, next level to be drawn with morning labs on 8/31.    Pharmacy will continue to follow and monitor vancomycin.    Please contact pharmacy at extension [phone] for questions regarding this assessment.    Thank you for the consult,   rPabha Cardoza    Drug Allergies:   Review of patient's allergies indicates:  No Known Allergies    CBC (last 72 hours):  Recent Labs   Lab Result Units 08/28/22 0455 08/29/22 0336 08/30/22  0314   WBC K/uL 14.22* 15.84* 14.82*   Hemoglobin g/dL 6.8* 8.0* 8.3*   Hematocrit % 23.1* 26.2* 28.7*   Platelets K/uL 351 334 398   Gran % % 79.8* 82.4* 79.6*   Lymph % % 10.8* 9.4* 11.0*   Mono % % 4.3 3.7* 4.6   Eosinophil % % 3.4 2.8 3.0   Basophil % % 0.4 0.4 0.4   Differential Method  Automated Automated Automated       Metabolic Panel (last 72 hours):  Recent Labs   Lab Result Units 08/28/22 0455 08/29/22  0336 08/30/22  0314   Sodium mmol/L 141 134*  134* 134*   Potassium mmol/L 4.2 4.2  4.2 4.7   Chloride mmol/L 109 105  105 103   CO2 mmol/L 26 25  25 26    Glucose mg/dL 192* 123*  123* 139*   BUN mg/dL 29* 21  21 26*   Creatinine mg/dL 0.4* 0.4*  0.4* 0.4*   Albumin g/dL  --  1.6*  --    Total Bilirubin mg/dL  --  0.6  --    Alkaline Phosphatase U/L  --  74  --    AST U/L  --  17  --    ALT U/L  --  14  --    Magnesium mg/dL 1.9 1.7 2.3       Vancomycin Administrations:  vancomycin given in the last 96 hours                     vancomycin (VANCOCIN) 1,500 mg in dextrose 5 % 500 mL IVPB (mg) 1,500 mg New Bag 08/28/22 2141     1,500 mg New Bag  1141     1,500 mg New Bag 08/27/22 2152     1,500 mg New Bag  1120        Microbiologic Results:  Microbiology Results (last 7 days)       Procedure Component Value Units Date/Time    IV catheter culture [[id number]] Collected: 08/29/22 1355    Order Status: Completed Specimen: Catheter Tip, PICC Updated: 08/30/22 0738     Aerobic Culture - Cath tip No growth    Blood culture [[id number]] Collected: 08/28/22 1412    Order Status: Completed Specimen: Blood from Peripheral, Left Wrist Updated: 08/29/22 1432     Blood Culture, Routine No Growth to date      No Growth to date    Blood culture [[id number]]     Order Status: No result Specimen: Blood     Culture, Respiratory with Gram Stain [[id number]]  (Abnormal)  (Susceptibility) Collected: 08/26/22 1313    Order Status: Completed Specimen: Respiratory from Endotracheal Aspirate Updated: 08/29/22 0859     Respiratory Culture No normal respiratory marco      PSEUDOMONAS AERUGINOSA   Moderate  Known CRPA patient       Gram Stain (Respiratory) <10 epithelial cells per low power field.     Gram Stain (Respiratory) Moderate WBC's     Gram Stain (Respiratory) Few Gram negative rods     Gram Stain (Respiratory) Few Gram positive rods    Blood culture [[id number]] Collected: 08/27/22 0945    Order Status: Completed Specimen: Blood from Line, PICC Right Brachial Updated: 08/29/22 0713     Blood Culture, Routine Gram stain aer bottle: Gram positive cocci      Positive results previously  called    Narrative:      Collection has been rescheduled by [email or web address] at 08/27/2022 09:13 Reason:   Nurse Draw  Per Dr. King  Collection has been rescheduled by [email or web address] at 08/27/2022 09:16 Reason:   Nurse Draw  Collection has been rescheduled by [email or web address] at 08/27/2022 09:13 Reason:   Nurse Draw  Per Dr. King  Collection has been rescheduled by [email or web address] at 08/27/2022 09:16 Reason:   Nurse Draw    Blood culture [[id number]]  (Abnormal) Collected: 08/26/22 1300    Order Status: Completed Specimen: Blood from Line, PICC Right Brachial Updated: 08/28/22 1021     Blood Culture, Routine Gram stain luisa bottle: Gram positive cocci in clusters resembling Staph      Results called to and read back by: porfirio Mclean MICU3 08/27/2022      22:23 fgf      COAGULASE-NEGATIVE STAPHYLOCOCCUS SPECIES  For susceptibility see order #[id number]      Narrative:      From port  Collection has been rescheduled by RE1 at 08/26/2022 12:23 Reason:   Nurse Draw  Collection has been rescheduled by RE1 at 08/26/2022 12:23 Reason:   Nurse Draw    Blood culture [[id number]]  (Abnormal) Collected: 08/26/22 0957    Order Status: Completed Specimen: Blood Updated: 08/28/22 1021     Blood Culture, Routine Gram stain aer bottle: Gram positive cocci      Positive results previously called      COAGULASE-NEGATIVE STAPHYLOCOCCUS SPECIES  For susceptibility see order #[id number]      Blood culture [[id number]]  (Abnormal) Collected: 08/26/22 0957    Order Status: Completed Specimen: Blood Updated: 08/28/22 1018     Blood Culture, Routine Gram stain aer bottle: Gram positive cocci in clusters resembling Staph      Results called to and read back by: Porfirio Lerma MICU3 08/27/2022  06:29      fgf      Gram stain luisa bottle: Gram positive cocci      Positive results previously called      COAGULASE-NEGATIVE STAPHYLOCOCCUS SPECIES  Identification and susceptibility pending      Blood culture [[id number]]     Order Status: Canceled Specimen: Blood     Blood culture [[id number]]  Collected: 08/19/22 1234    Order Status: Completed Specimen: Blood from Peripheral, Upper Arm, Right Updated: 08/24/22 1432     Blood Culture, Routine No growth after 5 days.    Blood culture [[id number]] Collected: 08/18/22 2010    Order Status: Completed Specimen: Blood from Peripheral, Forearm, Left Updated: 08/23/22 2232     Blood Culture, Routine No growth after 5 days.

## 2022-08-30 NOTE — SUBJECTIVE & OBJECTIVE
Interval History: I have initiated the transfer process thru the transfer center as ENT has suggested a referral to a Neurology service familiar with trismus, ALS and Botox therapy for the above    Review of Systems   Unable to perform ROS: Patient nonverbal   Objective:     Vital Signs (Most Recent):  Temp: 98.2 °F (36.8 °C) (08/30/22 0701)  Pulse: (!) 125 (08/30/22 0800)  Resp: (!) 27 (08/30/22 0800)  BP: 139/77 (08/30/22 0800)  SpO2: 100 % (08/30/22 0800)   Vital Signs (24h Range):  Temp:  [98.2 °F (36.8 °C)-99.9 °F (37.7 °C)] 98.2 °F (36.8 °C)  Pulse:  [101-139] 125  Resp:  [16-28] 27  SpO2:  [95 %-100 %] 100 %  BP: ()/(48-90) 139/77     Weight: 81.3 kg (179 lb 3.7 oz)  Body mass index is 27.25 kg/m².    Intake/Output Summary (Last 24 hours) at 8/30/2022 0834  Last data filed at 8/30/2022 0600  Gross per 24 hour   Intake 1530 ml   Output 1700 ml   Net -170 ml      Physical Exam  Vitals and nursing note reviewed.   Constitutional:       General: He is not in acute distress.     Appearance: He is ill-appearing.   HENT:      Nose: Nose normal.      Mouth/Throat:      Mouth: Mucous membranes are moist.      Comments: Tongue trauma as per previous  Eyes:      Pupils: Pupils are equal, round, and reactive to light.   Cardiovascular:      Rate and Rhythm: Normal rate and regular rhythm.   Pulmonary:      Breath sounds: Rales present.   Abdominal:      General: There is distension.      Tenderness: There is no abdominal tenderness. There is no guarding or rebound.   Musculoskeletal:      Cervical back: Rigidity present.      Comments: Quadriplegia   Skin:     General: Skin is warm.   Neurological:      Mental Status: He is alert.      Comments: Responds to the examiner with his eyes   Psychiatric:      Comments: Unable to assess       Significant Labs: All pertinent labs within the past 24 hours have been reviewed.  Recent Lab Results         08/30/22  0314   08/29/22  1652   08/29/22  1355        Aerobic Culture  - Cath tip     No growth  [P]       Anion Gap 5           Baso # 0.06           Basophil % 0.4           BUN 26           Calcium 8.2           Chloride 103           CO2 26           Creatinine 0.4           CRP 7.63           Differential Method Automated           eGFR >60.0           Eos # 0.5           Eosinophil % 3.0           Glucose 139           Gran # (ANC) 11.8           Gran % 79.6           Hematocrit 28.7           Hemoglobin 8.3           Immature Grans (Abs) 0.21  Comment: Mild elevation in immature granulocytes is non specific and   can be seen in a variety of conditions including stress response,   acute inflammation, trauma and pregnancy. Correlation with other   laboratory and clinical findings is essential.             Immature Granulocytes 1.4           Lymph # 1.6           Lymph % 11.0           Magnesium 2.3           MCH 25.1           MCHC 28.9           MCV 87           Mono # 0.7           Mono % 4.6           MPV 10.0           nRBC 0           Platelets 398           Potassium 4.7           RBC 3.31           RDW 19.0           Sodium 134           Vancomycin, Random 32.1   31.6         WBC 14.82                    [P] - Preliminary Result               Significant Imaging: I have reviewed all pertinent imaging results/findings within the past 24 hours.

## 2022-08-30 NOTE — PLAN OF CARE
Problem: Adult Inpatient Plan of Care  Goal: Plan of Care Review  Outcome: Ongoing, Progressing  Goal: Patient-Specific Goal (Individualized)  Outcome: Ongoing, Progressing  Goal: Absence of Hospital-Acquired Illness or Injury  Outcome: Ongoing, Progressing  Goal: Optimal Comfort and Wellbeing  Outcome: Ongoing, Progressing  Goal: Readiness for Transition of Care  Outcome: Ongoing, Progressing     Problem: Adjustment to Illness (Sepsis/Septic Shock)  Goal: Optimal Coping  Outcome: Ongoing, Progressing     Problem: Bleeding (Sepsis/Septic Shock)  Goal: Absence of Bleeding  Outcome: Ongoing, Progressing     Problem: Glycemic Control Impaired (Sepsis/Septic Shock)  Goal: Blood Glucose Level Within Desired Range  Outcome: Ongoing, Progressing     Problem: Infection Progression (Sepsis/Septic Shock)  Goal: Absence of Infection Signs and Symptoms  Outcome: Ongoing, Progressing     Problem: Nutrition Impaired (Sepsis/Septic Shock)  Goal: Optimal Nutrition Intake  Outcome: Ongoing, Progressing     Problem: Infection  Goal: Absence of Infection Signs and Symptoms  Outcome: Ongoing, Progressing     Problem: Impaired Wound Healing  Goal: Optimal Wound Healing  Outcome: Ongoing, Progressing     Problem: Communication Impairment (Mechanical Ventilation, Invasive)  Goal: Effective Communication  Outcome: Ongoing, Progressing     Problem: Device-Related Complication Risk (Mechanical Ventilation, Invasive)  Goal: Optimal Device Function  Outcome: Ongoing, Progressing     Problem: Inability to Wean (Mechanical Ventilation, Invasive)  Goal: Mechanical Ventilation Liberation  Outcome: Ongoing, Progressing     Problem: Nutrition Impairment (Mechanical Ventilation, Invasive)  Goal: Optimal Nutrition Delivery  Outcome: Ongoing, Progressing     Problem: Skin and Tissue Injury (Mechanical Ventilation, Invasive)  Goal: Absence of Device-Related Skin and Tissue Injury  Outcome: Ongoing, Progressing     Problem: Ventilator-Induced Lung  Injury (Mechanical Ventilation, Invasive)  Goal: Absence of Ventilator-Induced Lung Injury  Outcome: Ongoing, Progressing     Problem: Communication Impairment (Artificial Airway)  Goal: Effective Communication  Outcome: Ongoing, Progressing     Problem: Device-Related Complication Risk (Artificial Airway)  Goal: Optimal Device Function  Outcome: Ongoing, Progressing     Problem: Skin and Tissue Injury (Artificial Airway)  Goal: Absence of Device-Related Skin or Tissue Injury  Outcome: Ongoing, Progressing     Problem: Noninvasive Ventilation Acute  Goal: Effective Unassisted Ventilation and Oxygenation  Outcome: Ongoing, Progressing     Problem: Fall Injury Risk  Goal: Absence of Fall and Fall-Related Injury  Outcome: Ongoing, Progressing     Problem: Skin Injury Risk Increased  Goal: Skin Health and Integrity  Outcome: Ongoing, Progressing     Problem: Feeding Intolerance (Enteral Nutrition)  Goal: Feeding Tolerance  Outcome: Ongoing, Progressing

## 2022-08-30 NOTE — CONSULTS
[facility]  General Surgery  Consult Note    Patient Name: Wei Dominique  MRN: [id number]  Code Status: Full Code  Admission Date: 8/18/2022  Hospital Length of Stay: 11 days  Attending Physician: Lobito Blanc MD  Primary Care Provider: Primary Doctor No    Patient information was obtained from patient and ER records.     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  There is also concern about port catheter infection and surgery Has been consulted for removal.  He is responsive and d/w pt and wife was held recommending port removal.  Port was placed several years ago.        No current facility-administered medications on file prior to encounter.     Current Outpatient Medications on File Prior to Encounter   Medication Sig    albuterol (PROVENTIL/VENTOLIN HFA) 90 mcg/actuation inhaler Inhale 2 puffs into the lungs every 6 (six) hours as needed.    ALPRAZolam (XANAX) 0.5 MG tablet Take 1 tablet (0.5 mg total) by mouth 3 (three) times daily as needed for Anxiety.    apixaban (ELIQUIS) 2.5 mg Tab 1 tablet (2.5 mg total) by Per G Tube route 2 (two) times daily.    arginine-glutamine-calcium HMB (AYSE) 7-7-1.5 gram PwPk Take 1 packet by mouth 2 (two) times a day.    baclofen (LIORESAL) 10 MG tablet TAKE 1 TABLET THREE TIMES PER DAY (Patient taking differently: 10 mg by Per G Tube route 3 (three) times daily.)    busPIRone (BUSPAR) 5 MG Tab 1 tablet (5 mg total) by Per G Tube route 2 (two) times daily.    furosemide (LASIX) 40 MG tablet 1 tablet (40 mg total) by Per G Tube route once daily.    glycopyrrolate (ROBINUL) 1 mg Tab START BY TAKING 1 TABLET DAILY. IF TOLERATING WELL, YOU  CAN INCREASE IT UP TO 2 OR EVEN 3 TIMES DAILY. (Patient taking differently: 1 mg by Per G Tube route 3 (three) times daily.)    lansoprazole (PREVACID) 30 MG capsule Take 30 mg by mouth once daily.    melatonin 3 mg TbDL 3 mg by Per G Tube route every evening.    metoprolol tartrate (LOPRESSOR) 50 MG tablet Take 50 mg by mouth once daily.    ondansetron (ZOFRAN) 4 MG tablet 4 mg by Per G Tube route every 6 (six) hours as needed for Nausea.    polyethylene glycol (GLYCOLAX) 17 gram PwPk 17 g by Per G Tube route 2 (two) times daily.    riluzole 50 mg Tab tablet TAKE 1 TABLET ON AN EMPTY STOMACH EVERY 12 HRS (Patient taking differently: 50 mg by Per G Tube route. TAKE 1 TABLET ON AN EMPTY STOMACH EVERY 12 HRS)    traZODone (DESYREL) 50 MG tablet 50 mg by Per G Tube route every evening.    albuterol-ipratropium (DUO-NEB) 2.5 mg-0.5 mg/3 mL nebulizer solution Take 3 mLs by nebulization every 4 (four) hours. Rescue    chlorhexidine (PERIDEX) 0.12 % solution Use as directed 15 mLs in the mouth or throat 2 (two) times daily.    enoxaparin (LOVENOX) 60 mg/0.6 mL Syrg Inject into the skin.    glycerin adult suppository Place 1 suppository rectally once daily.    mupirocin (BACTROBAN) 2 % ointment SMARTSI Application Topical 2-3 Times Daily    pantoprazole (PROTONIX) 40 mg suspension 40 mg by FEEDING TUBE route 2 (two) times a day.    warfarin (COUMADIN) 2.5 MG tablet Take by mouth.       Review of patient's allergies indicates:  No Known Allergies    Past Medical History:   Diagnosis Date    ALS (amyotrophic lateral sclerosis)     ALS (amyotrophic lateral sclerosis)     Dependent on ventilator 3/21/2022    Erectile dysfunction     Gait disturbance     Hyperlipidemia     Hypertension     Iron deficiency anemia     Motor neuron disease     Polycystic kidney disease     Quadriparesis 2018    Tremor      Past Surgical History:   Procedure Laterality Date    GASTROSTOMY      PEG    PORTACATH  PLACEMENT      TRACHEOSTOMY       Family History    None       Tobacco Use    Smoking status: Not on file    Smokeless tobacco: Not on file   Substance and Sexual Activity    Alcohol use: Not on file    Drug use: Never    Sexual activity: Not Currently     Review of Systems   Unable to perform ROS: Patient nonverbal   Objective:     Vital Signs (Most Recent):  Temp: 98.9 °F (37.2 °C) (08/30/22 1100)  Pulse: (!) 115 (08/30/22 1315)  Resp: (!) 22 (08/30/22 1613)  BP: 92/66 (08/30/22 1300)  SpO2: 100 % (08/30/22 1315)   Vital Signs (24h Range):  Temp:  [98.2 °F (36.8 °C)-99.9 °F (37.7 °C)] 98.9 °F (37.2 °C)  Pulse:  [101-139] 115  Resp:  [16-31] 22  SpO2:  [95 %-100 %] 100 %  BP: ()/(50-93) 92/66     Weight: 81.3 kg (179 lb 3.7 oz)  Body mass index is 27.25 kg/m².    Physical Exam  Vitals reviewed.   Cardiovascular:      Rate and Rhythm: Normal rate.      Pulses: Normal pulses.   Pulmonary:      Effort: Pulmonary effort is normal.   Abdominal:      General: Abdomen is flat. There is no distension.      Tenderness: There is no abdominal tenderness.   Skin:     Comments: Port noted in L chest   Neurological:      Mental Status: He is alert.       Significant Labs:  I have reviewed all pertinent lab results within the past 24 hours.  CBC:   Recent Labs   Lab 08/30/22  0314   WBC 14.82*   RBC 3.31*   HGB 8.3*   HCT 28.7*      MCV 87   MCH 25.1*   MCHC 28.9*     BMP:   Recent Labs   Lab 08/30/22  0314   *   *   K 4.7      CO2 26   BUN 26*   CREATININE 0.4*   CALCIUM 8.2*   MG 2.3       Significant Diagnostics:        Assessment/Plan:     * Catheter-associated urinary tract infection  D/w pt and wife.  Will remove port a bedside tonight    Having received informed consent L chest was prepped and draped.  Time out performed. A valeriano infiltrated with 6 cc's of 1% lidocaine with epi.  I then make an incision over old scar and carry down onto the port.  POrt dissected around the fascia.  The  entire port and catheter are removed intact without event.  Catheter tip cut and sent to pathology.  WOund closed in two layers with 3 0 vicryl and 4 0 monocryl  No immediate complications    Will sign off      VTE Risk Mitigation (From admission, onward)         Ordered     apixaban tablet 2.5 mg  2 times daily         08/20/22 1454     IP VTE HIGH RISK PATIENT  Once         08/20/22 0440     Place sequential compression device  Until discontinued         08/20/22 0440     Reason for No Pharmacological VTE Prophylaxis  Once        Question:  Reasons:  Answer:  Already adequately anticoagulated on oral Anticoagulants    08/20/22 0440                Thank you for your consult. I will sign off. Please contact us if you have any additional questions.    Lester Mckeon MD  General Surgery  [facility]

## 2022-08-30 NOTE — PROGRESS NOTES
[facility]  Adult Nutrition   Progress Note (Nutrition Support Management)    SUMMARY      Recommendations:   1. RD recommends to change pts enteral feeding from Jevity 1.5 to Vital HP at a goal of 65 mls/h with 2 packets of AYSE and ProTGold to provide: 2172 kcals, 157 g protein and 1265 mls water  2. Continue FWF's of 30 Q4H (pt needs an additional 727 mls water to meet fluid needs in addition to what TF and FWF's provide.  3. RD will continue to monitor and make changes accordingly.     Goals:   1. Patient to tolerate TF.   2. Nutrition provision to meet at least 75% of EEN and EPN.    Dietitian Rounds Brief  F/U Nutrition Note: Considering pts infection status and wounds to buttocks, pts enteral feeding has been changed to better meet his protein needs. It requires a lot of protein to heal from his situation and just wanted to be sure he got the estimated recommended amount for him to heal. EEN and EPN are 5800-0993 kcals/day and 114-152 g protein/day.    Per MD PN's today:   8/30:  Interim reviewed, patient seen examined at bedside. 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  He is hemodynamically stable, afebrile.  Remains on FiO2 25%, adequate urine output, large amount of soft/liquid stool overnight.  He stills blinks upon verbal commands, blinks when asked regarding pain on his mouth, and belly.  Labs reviewed, white count 14.8, PMN 79.6%, H&H 8.2/28.7, platelet count 398.  Stable kidney function, CRP 7.  Micro reviewed, blood cultures with Staphylococcus epidermidis, susceptible to vancomycin, report will be available later today.  PICC line removed yesterday, tip sent for culture, no growth to date, pending final.  Repeat blood cultures 7/28 no growth to date, pending final.  Discussed with hospitalist, plan to remove left port  a cath by surgery, will place new PICC line today.    8/30/2022 - Stable overnight, no new issues reported.  I am told that arrangements are being made for another dose of BOTOX per ENT.  D/W pt and he indicates that he is current on tetanus vaccine.  Catheter tip culture pending.  CXR noted.  ECHO report pending    Wound Ostomy notes today: Getting new line at this time   Nursing says he is still bleeding at times on the buttock   With bathing yesterday nursing cleaned the back of a lot of the dry skin flakes      Will continue to follow daily and prn till discharge.     Diet order: NPO    TF rate/provision: 50/Jevity 1.5 with AYSE and ProTGold BID    % Intake of Estimated Energy Needs: 75 - 100 %  % Meal Intake: NPO    Estimated/Assessed Needs  Weight Used For Calorie Calculations: 75.6 kg (166 lb 10.7 oz)  Energy Calorie Requirements (kcal): 1209-9083 kcals (25-30 kcal/kg)  Energy Need Method: Kcal/kg  Protein Requirements: 114 - 152 (1.5 - 2 g/kg)  Weight Used For Protein Calculations: 75.6 kg (166 lb 10.7 oz)     Estimated Fluid Requirement Method: RDA Method  RDA Method (mL): 1890       Weight History:  Wt Readings from Last 5 Encounters:   08/23/22 81.3 kg (179 lb 3.7 oz)   06/14/22 76.7 kg (169 lb 1.5 oz)   05/28/22 73.9 kg (162 lb 14.7 oz)   05/17/22 78 kg (171 lb 15.3 oz)   02/21/22 77.4 kg (170 lb 10.2 oz)        Reason for Assessment  Reason For Assessment: RD follow-up  Diagnosis: other (see comments) (ALS)  Relevant Medical History: ALS, PEG, trach, vent dependency, HTN, PE    Medications:Pertinent Medications Reviewed  Scheduled Meds:   apixaban  2.5 mg Oral BID    baclofen  10 mg Per G Tube TID    balsam peru-castor oiL   Topical (Top) Daily    busPIRone  5 mg Per G Tube BID    ceFEPime (MAXIPIME) IVPB  2 g Intravenous Q8H    LIDOcaine-EPINEPHrine 1%-1:100,000        metoprolol tartrate  12.5 mg Per G Tube BID    nystatin  500,000 Units Mouth/Throat QID    onabotulinumtoxina  100 Units Intramuscular  Once    pantoprazole  40 mg Intravenous Daily    polyethylene glycol  17 g Oral Daily    riluzole  50 mg Oral Q12H    scopolamine  1 patch Transdermal Q3 Days    senna-docusate 8.6-50 mg  1 tablet Oral BID    sodium chloride 0.9%  1,000 mL Intravenous Once    sodium chloride 0.9%  10 mL Intravenous Q6H    traZODone  50 mg Per G Tube QHS     Continuous Infusions:  PRN Meds:.sodium chloride, acetaminophen, acetaminophen, albuterol-ipratropium, aluminum-magnesium hydroxide-simethicone, calcium chloride IVPB, calcium chloride IVPB, calcium chloride IVPB, dextrose 10%, dextrose 10%, glucagon (human recombinant), glucose, glucose, HYDROcodone-acetaminophen, HYDROmorphone, magnesium sulfate IVPB, magnesium sulfate IVPB, magnesium sulfate IVPB, magnesium sulfate IVPB, melatonin, morphine, naloxone, ondansetron, potassium chloride in water, potassium chloride in water, potassium chloride in water, potassium chloride in water, prochlorperazine, simethicone, sodium chloride 0.9%, Flushing PICC Protocol **AND** sodium chloride 0.9% **AND** sodium chloride 0.9%, sodium phosphate IVPB, sodium phosphate IVPB, sodium phosphate IVPB, sodium phosphate IVPB, sodium phosphate IVPB, Pharmacy to dose Vancomycin consult **AND** vancomycin - pharmacy to dose, white petrolatum    Labs: Pertinent Labs Reviewed  Clinical Chemistry:  Recent Labs   Lab 08/29/22  0336 08/30/22 0314   *  134* 134*   K 4.2  4.2 4.7     105 103   CO2 25  25 26   *  123* 139*   BUN 21  21 26*   CREATININE 0.4*  0.4* 0.4*   CALCIUM 7.9*  7.9* 8.2*   PROT 7.1  --    ALBUMIN 1.6*  --    BILITOT 0.6  --    ALKPHOS 74  --    AST 17  --    ALT 14  --    ANIONGAP 4*  4* 5*   MG 1.7 2.3     CBC:   Recent Labs   Lab 08/30/22 0314   WBC 14.82*   RBC 3.31*   HGB 8.3*   HCT 28.7*      MCV 87   MCH 25.1*   MCHC 28.9*     Cardiac Profile:  Recent Labs   Lab 08/27/22  0300   *     Inflammatory Labs:  Recent Labs   Lab 08/28/22  9595  08/29/22  0336 08/30/22  0314   CRP 11.07* 7.64* 7.63*     Monitor and Evaluation  Food and Nutrient Intake: energy intake, enteral nutrition intake  Food and Nutrient Adminstration: enteral and parenteral nutrition administration  Physical Activity and Function: nutrition-related ADLs and IADLs, factors affecting access to physical activity  Anthropometric Measurements: weight, weight change, body mass index  Biochemical Data, Medical Tests and Procedures: electrolyte and renal panel, inflammatory profile, gastrointestinal profile, lipid profile, glucose/endocrine profile  Nutrition-Focused Physical Findings: overall appearance     Nutrition Risk  Level of Risk/Frequency of Follow-up: high     Nutrition Follow-Up  RD Follow-up?: Yes    Linda Rodgers, LAUREN 08/30/2022 3:12 PM

## 2022-08-30 NOTE — RESPIRATORY THERAPY
08/1955   Patient Assessment/Suction   Level of Consciousness (AVPU) alert   Respiratory Effort Normal;Unlabored   Expansion/Accessory Muscles/Retractions no use of accessory muscles   All Lung Fields Breath Sounds Anterior:;equal bilaterally;coarse   Rhythm/Pattern, Respiratory assisted mechanically   Cough Frequency infrequent   Suction Method tracheal;oral   Suction Pressure (mmHg) -120 mmHg   $ Suction Charges Inline Suction Procedure Stat Charge   Secretions Amount large   Secretions Color yellow;white   Secretions Characteristics thick   $ Swab or suction? Suction   Aspirate Toleration KARMEN (no adverse reactions)   Skin Integrity   $ Wound Care Tech Time 15 min   Area Observed Neck under tracheostomy   Skin Appearance without discoloration   Barrier used?   (split gauze)   Barrier Changed? Yes   PRE-TX-O2   O2 Device (Oxygen Therapy) ventilator   Oxygen Concentration (%) 28   SpO2 100 %   Pulse Oximetry Type Continuous   $ Pulse Oximetry - Multiple Charge Pulse Oximetry - Multiple   Pulse (!) 135   Aerosol Therapy   $ Aerosol Therapy Charges PRN treatment not required   Daily Review of Necessity (SVN) completed   Respiratory Treatment Status (SVN) PRN treatment not required        Surgical Airway 02/21/22 1145 Shiley Cuffed   Placement Date/Time: 02/21/22 1145   Present Prior to Hospital Arrival?: Yes  Type: Tracheostomy  Brand: Shiley  Airway Device Size: 8.0  Style: Cuffed   Cuff Inflation? Inflated   Status Secured   Site Assessment Clean;Dry;No bleeding;No drainage   Site Care Cleansed;Dried;Dressing applied   Inner Cannula Care Cleansed/dried   Ties Assessment Changed;Clean;Dry;Intact   Airway Safety   Ambu bag with the patient? Yes, Adult Ambu   Is mask with the patient? Yes, Adult Mask   Suction set is at the bedside? Yes   Extra trach at bedside? Yes   Is obturator available? Yes   Respiratory Interventions   Airway/Ventilation Management airway patency maintained   Vent Select   Conventional  Vent Y   Charged w/in last 24h YES   Preset Conventional Ventilator Settings   Vent ID [id number]   Vent Type    Ventilation Type VC   Vent Mode A/C   Humidity HME   Set Rate 16 BPM   Vt Set 500 mL   PEEP/CPAP 5 cmH20   Peak Flow 60 L/min   Peak End Inspiratory Pressure 35 cmH20   I-Trigger Type  V-TRIG   Trigger Sensitivity Flow/I-Trigger 3 L/min   Patient Ventilator Parameters   Resp Rate Total 21 br/min   Peak Airway Pressure 39 cmH2O   Mean Airway Pressure 13 cmH20   Plateau Pressure 0 cmH20   Exhaled Vt 523 mL   Total Ve 8.31 mL   I:E Ratio Measured 1:3.20   Auto PEEP 0 cmH20   Conventional Ventilator Alarms   Alarms On Y   Resp Rate High Alarm 43 br/min   Press High Alarm 50 cmH2O   Apnea Rate 10   Apnea Volume (mL) 0 mL   Apnea Oxygen Concentration  100   Apnea Flow Rate (L/min) 44   T Apnea 20 sec(s)   IHI Ventilator Associated Pneumonia Bundle (Required)   Head of Bed Elevated  HOB 30   Oral Care Lip moisturizer applied;Mouth swabbed;Mouth moisturizer;Mouth suctioned   Vent Circut Breaks Minimized Yes   Ready to Wean/Extubation Screen   FIO2<=50 (chart decimal) 0.28   MV<16L (chart vol.) 8.31   PEEP <=8 (chart #) 5   Ready to Wean Parameters   Vital Capacity (mL) 0   Education   $ Education Suction;Trach Care;Ventilator Oxygen;15 min   Respiratory Evaluation   $ Care Plan Tech Time 15 min   $ Eval/Re-eval Charges Re-evaluation

## 2022-08-30 NOTE — CARE UPDATE
08/30/22 0736   Patient Assessment/Suction   Level of Consciousness (AVPU) alert   Respiratory Effort Unlabored   Expansion/Accessory Muscles/Retractions expansion symmetric   All Lung Fields Breath Sounds coarse   Rhythm/Pattern, Respiratory assisted mechanically   Cough Frequency with stimulation   Cough Type assisted   Suction Method oral;tracheal   $ Suction Charges Inline Suction Procedure Stat Charge   Secretions Amount small   Secretions Color clear   Secretions Characteristics thick   PRE-TX-O2   O2 Device (Oxygen Therapy) ventilator   $ Is the patient on Low Flow Oxygen? Yes   Oxygen Concentration (%) 28   SpO2 100 %   Pulse Oximetry Type Continuous   $ Pulse Oximetry - Multiple Charge Pulse Oximetry - Multiple   Pulse (!) 125   Resp (!) 28        Surgical Airway 02/21/22 1145 Shiley Cuffed   Placement Date/Time: 02/21/22 1145   Present Prior to Hospital Arrival?: Yes  Type: Tracheostomy  Brand: Shiley  Airway Device Size: 8.0  Style: Cuffed   Cuff Inflation? Inflated   Status Secured   Site Assessment Clean;Dry   Site Care Dressing applied;Open to air   Ties Assessment Changed;Clean;Dry;Intact;Secure   Airway Safety   Ambu bag with the patient? Yes, Adult Ambu   Is mask with the patient? Yes, Adult Mask   Extra trach at bedside? Yes   Vent Select   Conventional Vent Y   $ Ventilator Subsequent 1   Charged w/in last 24h YES   Preset Conventional Ventilator Settings   Vent ID 4   Vent Type    Ventilation Type VC   Vent Mode A/C   Humidity HME   Set Rate 16 BPM   Vt Set 500 mL   PEEP/CPAP 5 cmH20   Peak Flow 60 L/min   Peak End Inspiratory Pressure 23 cmH20   I-Trigger Type  V-TRIG   Trigger Sensitivity Flow/I-Trigger 3 L/min   Patient Ventilator Parameters   Resp Rate Total 25 br/min   Peak Airway Pressure 27 cmH2O   Mean Airway Pressure 13 cmH20   Plateau Pressure 0 cmH20   Exhaled Vt 444 mL   Total Ve 12.5 mL   I:E Ratio Measured 1:1.30   Auto PEEP 0 cmH20   Conventional Ventilator Alarms   Alarms  On Y   Ve High Alarm 20 L/min   Ve Low Alarm 3 L/min   Vt High Alarm 1200 mL   Vt Low Alarm 250 mL   Resp Rate High Alarm 43 br/min   Press High Alarm 50 cmH2O   Apnea Rate 10   Apnea Volume (mL) 500 mL   Apnea Oxygen Concentration  100   Apnea Flow Rate (L/min) 44   T Apnea 20 sec(s)   Ready to Wean/Extubation Screen   FIO2<=50 (chart decimal) 0.28   MV<16L (chart vol.) 12.5   PEEP <=8 (chart #) 5   Ready to Wean Parameters   F/VT Ratio<105 (RSBI) (!) 63.06   Vital Capacity (mL) 0   Education   $ Education 15 min;Ventilator Oxygen;Trach Care   Respiratory Evaluation   $ Care Plan Tech Time 15 min

## 2022-08-30 NOTE — NURSING
Getting new line at this time   Nursing says he is still bleeding at times on the buttock   With bathing yesterday nursing cleaned the back of a lot of the dry skin flakes

## 2022-08-30 NOTE — PROGRESS NOTES
[facility]  Progress Note    Patient Name: Wei Dominique  MRN: [id number]  Patient Class: IP- Inpatient   Admission Date: 8/18/2022  Length of Stay: 11 days  Attending Physician: Lobito Blanc MD  Primary Care Provider: Primary Doctor No        Subjective:     Principal Problem:Catheter-associated urinary tract infection        HPI:  No notes on file    Overview/Hospital Course:  8/29/2022  Mr Dominique continues to have trismus but definitely tracked me with his eyes. Dr Lion is attempting to arrange Botox therapy for the patient    8/30/2022  Case discussed with Dr Link. PICC line removed due to staph epi sepsis sensitive to vancomycin. The port is no longer functioning and we have consulted Dr Estevez to remove it.  Dr Parada ENT feels that he will require a neurologist familiar with ALS, trismus and Botox to manage this patient        Interval History: I have initiated the transfer process thru the transfer center as ENT has suggested a referral to a Neurology service familiar with trismus, ALS and Botox therapy for the above    Review of Systems   Unable to perform ROS: Patient nonverbal   Objective:     Vital Signs (Most Recent):  Temp: 98.2 °F (36.8 °C) (08/30/22 0701)  Pulse: (!) 125 (08/30/22 0800)  Resp: (!) 27 (08/30/22 0800)  BP: 139/77 (08/30/22 0800)  SpO2: 100 % (08/30/22 0800)   Vital Signs (24h Range):  Temp:  [98.2 °F (36.8 °C)-99.9 °F (37.7 °C)] 98.2 °F (36.8 °C)  Pulse:  [101-139] 125  Resp:  [16-28] 27  SpO2:  [95 %-100 %] 100 %  BP: ()/(48-90) 139/77     Weight: 81.3 kg (179 lb 3.7 oz)  Body mass index is 27.25 kg/m².    Intake/Output Summary (Last 24 hours) at 8/30/2022 0834  Last data filed at 8/30/2022 0600  Gross per 24 hour   Intake 1530 ml   Output 1700 ml   Net -170 ml      Physical Exam  Vitals and nursing note reviewed.   Constitutional:       General: He is not in acute distress.     Appearance: He is ill-appearing.   HENT:      Nose:  Nose normal.      Mouth/Throat:      Mouth: Mucous membranes are moist.      Comments: Tongue trauma as per previous  Eyes:      Pupils: Pupils are equal, round, and reactive to light.   Cardiovascular:      Rate and Rhythm: Normal rate and regular rhythm.   Pulmonary:      Breath sounds: Rales present.   Abdominal:      General: There is distension.      Tenderness: There is no abdominal tenderness. There is no guarding or rebound.   Musculoskeletal:      Cervical back: Rigidity present.      Comments: Quadriplegia   Skin:     General: Skin is warm.   Neurological:      Mental Status: He is alert.      Comments: Responds to the examiner with his eyes   Psychiatric:      Comments: Unable to assess       Significant Labs: All pertinent labs within the past 24 hours have been reviewed.  Recent Lab Results         08/30/22  0314   08/29/22  1652   08/29/22  1355        Aerobic Culture - Cath tip     No growth  [P]       Anion Gap 5           Baso # 0.06           Basophil % 0.4           BUN 26           Calcium 8.2           Chloride 103           CO2 26           Creatinine 0.4           CRP 7.63           Differential Method Automated           eGFR >60.0           Eos # 0.5           Eosinophil % 3.0           Glucose 139           Gran # (ANC) 11.8           Gran % 79.6           Hematocrit 28.7           Hemoglobin 8.3           Immature Grans (Abs) 0.21  Comment: Mild elevation in immature granulocytes is non specific and   can be seen in a variety of conditions including stress response,   acute inflammation, trauma and pregnancy. Correlation with other   laboratory and clinical findings is essential.             Immature Granulocytes 1.4           Lymph # 1.6           Lymph % 11.0           Magnesium 2.3           MCH 25.1           MCHC 28.9           MCV 87           Mono # 0.7           Mono % 4.6           MPV 10.0           nRBC 0           Platelets 398           Potassium 4.7           RBC 3.31            RDW 19.0           Sodium 134           Vancomycin, Random 32.1   31.6         WBC 14.82                    [P] - Preliminary Result               Significant Imaging: I have reviewed all pertinent imaging results/findings within the past 24 hours.      Assessment/Plan:      Trismus  Awaiting Botox therapy    ENT suggests transfer to a center with Neurology familiar with ALS, botox and trismus.  Pt's wife Jud agrees to the above  I have called the transfer center and initiated the above      ALS (amyotrophic lateral sclerosis)  I and Dr Lion are concerned Re his quality of life        VTE Risk Mitigation (From admission, onward)         Ordered     apixaban tablet 2.5 mg  2 times daily         08/20/22 1454     IP VTE HIGH RISK PATIENT  Once         08/20/22 0440     Place sequential compression device  Until discontinued         08/20/22 0440     Reason for No Pharmacological VTE Prophylaxis  Once        Question:  Reasons:  Answer:  Already adequately anticoagulated on oral Anticoagulants    08/20/22 0440                Discharge Planning   VENESSA: 9/7/2022     Code Status: Full Code   Is the patient medically ready for discharge?:     Reason for patient still in hospital (select all that apply): Patient new problem, Treatment, Consult recommendations and Pending disposition  Discharge Plan A: Return to nursing home   Discharge Delays: None known at this time              Lobito Blanc MD  Department of Hospital Medicine   [facility]

## 2022-08-30 NOTE — ASSESSMENT & PLAN NOTE
Awaiting Botox therapy    ENT suggests transfer to a center with Neurology familiar with ALS, botox and trismus.  Pt's wife Jud agrees to the above  I have called the transfer center and initiated the above

## 2022-08-30 NOTE — PROGRESS NOTES
Consult Note  Infectious Disease    Reason for Consult:  Facial abscess    HPI: Wei Dominique is a 67 y.o. male with known history of ALS, s/p trach & peg, vent dependence, HTN, PE (on eliquis) who presented with a primary complaint of facial swelling x few days. No family present at bedside. History was obtained from chart review and physician sign out. Patient had CT scan maxillofacial done that showed Right maxillary and anterior maxillary soft tissue edema and thickening which extends along the right maxillary molars into the oropharynx. Correlate for cellulitis and or pharyngeal infection.  Right perimandibular ovoid collection. Patient needs maxillofacial surgery however all hospitals are on diversion. Patient was in the ER waiting to be transferred for 18 hours and subsequently admitted to hospital medicine, ICU.   Seen by our service in June for pseudomonas  UTI  At that time he had deep bites and fissures in tongue as well.     8/20: interim reviewed and seen with Dr. Kennedy. Temperatures have improved. MRSA screen was positive. Midline tip with SA and GNR. Sputum culture in progress, and urine culture has growth that has to be re-isolated. He continues to acknowledge pain, nausea. He is in the process of being sedated and paralyzed for adequate ENT exam. Arms remain edematous  8/21:  Afebrile, interim reviewed.  The ENT note not complete but told by RN that no abscesses were discovered while sedated and paralyzed for oral exam.  The tongue was reduced into the mouth and bite block was placed.  Urine with 2 gram negative rods, 1 of which is Pseudomonas.  Sputum with Pseudomonas which is not new, chest x-ray with atelectasis.  Line tip with MRSA and Morganella, relevance unclear as his blood cultures were negative. Discussed with hospital medicine. My bedside questions about comfort care were answered in the negative, ie he does not want to be comfort care. He does want pain control however.   8/22:  afebrile. VSS. Vent needs are stable. Mouth pain is decreased. Breathing comfortably. No nausea or abdominal pain. Able to communicate and answer questions.     8/26(reconsult): interim reviewed.   Received bilateral masseter botox injections on 8/24. Low grade temp on 8/25, BP a bit low, tachycardic. Temp max 100.4 and he has lots of purulent secretions from and around the ET tube. CXR with RLL atelectasis/effusion/infiltrate? Which is a little more than admission films. Vent needs are not increased. WBC about the same. procalcitonin has normalized, from admission level. Wound photos are improved/benign. hgb down to 7.3. he is alert, able to open his jaws a little more. The tongue laceration left side is bleeding a little. No abscesses, hematomas, facial cellulitis, or ulcers. No obvious thrush.     8/27 (Link):  Interim reviewed, discussed with Dr Jones.  Patient seen and examined at bedside, T-max 100.4°, currently 100.2, hemodynamically stable.  Patient awake, alert, blinking upon verbal commands.  Oral cavity suctioned, thick purulent secretions noted.  Labs reviewed, leukocytosis 15.7, PMN 79.4%, H&H 7.5/26, platelet count 405.  Creatinine 0.4, normal electrolytes.  CRP downtrending 18.7 today.  Repeat UA from yesterday negative for UTI, 2+ occult blood.  Micro reviewed, blood cultures 1/4 aerobic bottles from 08/26 [facility], pending final.  Started on vancomycin IV overnight.  Chest x-ray from yesterday with bilateral lung opacities suggesting atelectasis.    8/28:  Patient seen examined at bedside, seen with respiratory therapist and nursing, paralyzed again to replace mouth pieces giving severe macroglossia.  Tachycardic, tachypneic, T-max a 100.2°, currently afebrile.  Labs reviewed, H&H 6.8/23.1, , persistent leukocytosis of 14.2, PMN 79.8%, platelet count 351.  Stable kidney function, CRP trending down 1107 from greater than 38.  Micro reviewed, 3 sets of blood cultures; 4 bottles grew CoNS, lab called,  id and sensitivities available tomorrow.  Patient with history of Staph epidermidis bacteremia in June this year.  Respiratory culture 8/26 with GNR, id and sensitivities pending.    8/29:  Interim reviewed, patient seen examined at bedside.  Awake, alert, blinking.  Mouth pieces in place, oral hygiene performed earlier this morning, minimal secretions suctioned.  Labile BP, currently stable, T-max a 100° overnight, currently 99.  Labs reviewed, leukocytosis 15.8, PMN 82.4%, H&H 8/26.2, platelet count 334.  Micro reviewed, serial blood cultures with c/o NS, id and sensitivities to be available later today.  Awaiting echocardiogram.    8/30:  Interim reviewed, patient seen examined at bedside. 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  He is hemodynamically stable, afebrile.  Remains on FiO2 25%, adequate urine output, large amount of soft/liquid stool overnight.  He stills blinks upon verbal commands, blinks when asked regarding pain on his mouth, and belly.  Labs reviewed, white count 14.8, PMN 79.6%, H&H 8.2/28.7, platelet count 398.  Stable kidney function, CRP 7.  Micro reviewed, blood cultures with Staphylococcus epidermidis, susceptible to vancomycin, report will be available later today.  PICC line removed yesterday, tip sent for culture, no growth to date, pending final.  Repeat blood cultures 7/28 no growth to date, pending final.  Discussed with hospitalist, plan to remove left port a cath by surgery, will place new PICC line today.      EXAM & DIAGNOSTICS REVIEWED:   Vitals:     Temp:  [99.1 °F (37.3 °C)-99.9 °F (37.7 °C)]   Temp: 99.9 °F (37.7 °C) (08/30/22 0400)  Pulse: (!) 125 (08/30/22 0736)  Resp: (!) 28 (08/30/22 0736)  BP: 133/64 (08/30/22 0500)  SpO2: 100 % (08/30/22 0736)    Intake/Output Summary (Last 24 hours) at 8/30/2022  0804  Last data filed at 8/30/2022 0600  Gross per 24 hour   Intake 1530 ml   Output 1700 ml   Net -170 ml       General:  Awake, alert, blinking upon verbal commands, FiO2 25%  Eyes:  Anicteric,  EOMI  ENT:  8/19: Copious intranasal secretions suctioned, because his mouth is profoundly difficult to open. His jaws have extremely high muscular tone. His tongue is severely lacerated and bulging out between his teeth on the right. He has a severe underbite. Copious saliva and bloody secretions suctioned from mouth. Even with dilaudid, we were only able to open his jaws less than a cm and I was able to insert a tongue blade only about half way. He has loose teeth, periodontal disease. I cannot see or palpate a soft tissue abscess.    8/20: exam deferred as he is going to be sedated/paralyzed for exam shortly   8/21: bite block is out. Only minimal tongue visible on right. Mouth is tender   8/22: device to maintain safe bite in place.   8/23: same as above   8/26: able to open his jaws a little more. The tongue laceration left side is bleeding a little. No abscesses, hematomas, facial cellulitis, or ulcers. No obvious thrush.    8/27:  Thick purulent copious secretions suctioned from oral cavity   8/28:  Severe macroglossia, dry blood noted, thick secretions noted, suction   8/:  Macroglossia, thick purulent secretions suctioned from oral cavity, tender to palpation    Neck:  tracheosotomy  Lungs: Decreased breath sounds bilaterally, mild crackles right base  Heart:  S1/S2+, regular rhythm, no murmur  Abd:  Soft, +BS, tender to diffuse palpation, no rebound/ PEG with small amount of granulation. Rectal tube with loose stool, volume moderate on miralax and senna   :  Hutchinson with clear urine  Musc:  Joints without effusion, swelling, erythema, synovitis, muscle wasting  Skin:  No rashes  Neuro:             Alert, attentive,  quadriplegic  Psych:   Calm, attends  Lymphatic:     Extrem: LE 1+ edema, UE 4+ edema    VAD:  Right arm PICC 8/20/22--removed 8/29  port left chest, 8/19/22 --plan to be removed today  Isolation:  contact  Wound:           8/26:             General Labs reviewed:  Recent Labs   Lab 08/28/22 0455 08/29/22 0336 08/30/22  0314   WBC 14.22* 15.84* 14.82*   HGB 6.8* 8.0* 8.3*   HCT 23.1* 26.2* 28.7*    334 398       Recent Labs   Lab 08/28/22 0455 08/29/22  0336 08/30/22  0314    134*  134* 134*   K 4.2 4.2  4.2 4.7    105  105 103   CO2 26 25  25 26   BUN 29* 21  21 26*   CREATININE 0.4* 0.4*  0.4* 0.4*   CALCIUM 8.3* 7.9*  7.9* 8.2*   PROT  --  7.1  --    BILITOT  --  0.6  --    ALKPHOS  --  74  --    ALT  --  14  --    AST  --  17  --      Recent Labs   Lab 08/28/22 0455 08/29/22  0336 08/30/22  0314   CRP 11.07* 7.64* 7.63*     Estimated Creatinine Clearance: 175.8 mL/min (A) (based on SCr of 0.4 mg/dL (L)).    Micro:  2/22/22: urine: providencia  4/17/22: urine and blood: ESBL Ecoli  5/17/22: urine with pseudomonas (sen to pip/tazo, gent,cefepime,avycaz,zerbaxa)  5/22/22:sputum with proteus ESBL and pseudomonas (same, plus sens to colistin)  06/09/2022 Staphylococcal epidermidis bacteremia   06/09/2022 urine culture  Pseudomonas aeruginosa sensitive to Zerbaxa/Avycaz  06/09/2022 respiratory culture Proteus mirabilis ESBL, Pseudomonas aeruginosa, Serratia marcescens  06/24/2022 respiratory culture Proteus mirabilis ESBL, and Pseudomonas aeruginosa  sensitive to Avycaz and Zerbaxa    Micro:  Microbiology Results (last 7 days)       Procedure Component Value Units Date/Time    IV catheter culture [[id number]] Collected: 08/29/22 1355    Order Status: Completed Specimen: Catheter Tip, PICC Updated: 08/30/22 0738     Aerobic Culture - Cath tip No growth    Blood culture [[id number]] Collected: 08/28/22 1412    Order Status: Completed Specimen: Blood from Peripheral, Left Wrist Updated: 08/29/22 1432     Blood Culture, Routine No Growth to date      No Growth to date     Blood culture [[id number]]     Order Status: No result Specimen: Blood     Culture, Respiratory with Gram Stain [[id number]]  (Abnormal)  (Susceptibility) Collected: 08/26/22 1313    Order Status: Completed Specimen: Respiratory from Endotracheal Aspirate Updated: 08/29/22 0859     Respiratory Culture No normal respiratory marco      PSEUDOMONAS AERUGINOSA   Moderate  Known CRPA patient       Gram Stain (Respiratory) <10 epithelial cells per low power field.     Gram Stain (Respiratory) Moderate WBC's     Gram Stain (Respiratory) Few Gram negative rods     Gram Stain (Respiratory) Few Gram positive rods    Blood culture [[id number]] Collected: 08/27/22 0945    Order Status: Completed Specimen: Blood from Line, PICC Right Brachial Updated: 08/29/22 0713     Blood Culture, Routine Gram stain aer bottle: Gram positive cocci      Positive results previously called    Narrative:      Collection has been rescheduled by [email or web address] at 08/27/2022 09:13 Reason:   Nurse Draw  Per Dr. King  Collection has been rescheduled by [email or web address] at 08/27/2022 09:16 Reason:   Nurse Draw  Collection has been rescheduled by [email or web address] at 08/27/2022 09:13 Reason:   Nurse Draw  Per Dr. King  Collection has been rescheduled by [email or web address] at 08/27/2022 09:16 Reason:   Nurse Draw    Blood culture [[id number]]  (Abnormal) Collected: 08/26/22 1300    Order Status: Completed Specimen: Blood from Line, PICC Right Brachial Updated: 08/28/22 1021     Blood Culture, Routine Gram stain luisa bottle: Gram positive cocci in clusters resembling Staph      Results called to and read back by: enmanuel Mclean [email or web address] 08/27/2022      22:23 fgf      COAGULASE-NEGATIVE STAPHYLOCOCCUS SPECIES  For susceptibility see order #[id number]      Narrative:      From port  Collection has been rescheduled by RE1 at 08/26/2022 12:23 Reason:   Nurse Draw  Collection has been rescheduled by RE1 at 08/26/2022 12:23 Reason:   Nurse Draw    Blood culture [[id number]]  (Abnormal) Collected: 08/26/22  0957    Order Status: Completed Specimen: Blood Updated: 08/28/22 1021     Blood Culture, Routine Gram stain aer bottle: Gram positive cocci      Positive results previously called      COAGULASE-NEGATIVE STAPHYLOCOCCUS SPECIES  For susceptibility see order #[id number]      Blood culture [[id number]]  (Abnormal) Collected: 08/26/22 0957    Order Status: Completed Specimen: Blood Updated: 08/28/22 1018     Blood Culture, Routine Gram stain aer bottle: Gram positive cocci in clusters resembling Staph      Results called to and read back by: Porfirio Lerma MICU3 08/27/2022  06:29      fgf      Gram stain luisa bottle: Gram positive cocci      Positive results previously called      COAGULASE-NEGATIVE STAPHYLOCOCCUS SPECIES  Identification and susceptibility pending      Blood culture [[id number]]     Order Status: Canceled Specimen: Blood     Blood culture [[id number]] Collected: 08/19/22 1234    Order Status: Completed Specimen: Blood from Peripheral, Upper Arm, Right Updated: 08/24/22 1432     Blood Culture, Routine No growth after 5 days.    Blood culture [[id number]] Collected: 08/18/22 2010    Order Status: Completed Specimen: Blood from Peripheral, Forearm, Left Updated: 08/23/22 2232     Blood Culture, Routine No growth after 5 days.                Imaging Reviewed:   CXR   CT maxillofacial  1.  Right maxillary and anterior maxillary soft tissue edema and thickening which extends along the right maxillary molars into the oropharynx. Correlate for cellulitis and or pharyngeal infection.  2.  Right perimandibular ovoid collection with rim enhancement noted measuring 3.0 x 1.8 cm. This appears to connect to the aforementioned maxillary skin thickening and edema.  3.  Nasopharyngeal phlegm and frothy debris extends into the ethmoid and maxillary sinus.  4.  Bilateral mastoid air cell fluid can be seen with mastoiditis.  5.  Right thyroid nodule measuring 1.9 cm. Recommend nonemergent thyroid ultrasound.  6.  Prominent  pituitary gland may represent pituitary adenoma.    US both arms: neg for DVT  US both legs: negative for DVT  Reepat  UE US: negative fto DVT, moderate UE edema      Cardiology: Follow up ECHO    IMPRESSION & PLAN     1. Sepsis multifactorial; severe swelling of the tongue with laceration, in the setting of trismus/spasm from ALS, no evidence of abscess, polymicrobial marco isolated from midline catheter tip; MRSA and Morganella, possible VAP vs polymicrobial UTI vs Staphylococcus epidermidis bacteremia, sensitivities pending  Repeat blood cultures 8/26 - 27: 5/7 bottles - Staphylococcus epidermidis sensitive to vancomycin, final report to follow   MRSA nasal swab detected   Respiratory culture 8/19 pansensitive Pseudomonas aeruginosa  Respiratory culture 8/26  Pseudomonas aeruginosa, sensitive to cefepime, SANAM <4  Urine culture 8/18 Pseudomonas  sensitive and Providencia stuartii both sensitive to Cefepime, s/p Zerbaxa 4 days and Cefepime 4 days, completed treatment    Repeat UA negative   CRP >38-->7.64, trending down    Procalcitonin 2.52-->0.39   PICC line removed 8/29    2. Quadriplegic, ALS, debility, bed-bound  5. H/o MDRO    Recommendations:  KUB  Discussed with Hospitalist, consider transfer for Neurology evaluation for further management of neurogenic jaw spasms   Remove Port-A-Cath, please send tip for culture   Procalcitonin ordered  Repeat blood cultures   Echo to rule out vegetations, patient with prior history of Staph epidermidis bacteremia in June  Continue Vancomycin IV, keep level 15-20 for Staphylococcus epidermidis bacteremia  Continue Cefepime 2 g IV q.8 for Pseudomonas aeruginosa  pneumonia  Follow cultures   Aspiration precautions  Contact isolation     D/w nursing, Dr Blanc      Medical Decision Making during this encounter was  [_] Low Complexity  [_] Moderate Complexity  [xxx ] High Complexity

## 2022-08-30 NOTE — CARE UPDATE
08/30/22 1707   Patient Assessment/Suction   Level of Consciousness (AVPU) alert   Respiratory Effort Normal;Unlabored   Expansion/Accessory Muscles/Retractions expansion symmetric   All Lung Fields Breath Sounds coarse   Rhythm/Pattern, Respiratory assisted mechanically   Cough Frequency with stimulation   Cough Type assisted   Suction Method tracheal;oral   Suction Pressure (mmHg) -120 mmHg   $ Suction Charges Inline Suction Procedure Stat Charge   Secretions Amount moderate   Secretions Color clear;white   Secretions Characteristics thick   PRE-TX-O2   O2 Device (Oxygen Therapy) ventilator        Surgical Airway 02/21/22 1145 Shiley Cuffed   Placement Date/Time: 02/21/22 1145   Present Prior to Hospital Arrival?: Yes  Type: Tracheostomy  Brand: Shiley  Airway Device Size: 8.0  Style: Cuffed   Cuff Pressure   (mlt)   Cuff Inflation? Inflated   Status Secured   Site Assessment Dry;Clean   Site Care Cleansed;Dried;Dressing applied   Inner Cannula Care Changed/new   Ties Assessment Changed;Clean;Dry;Intact;Secure   Airway Safety   Ambu bag with the patient? Yes, Adult Ambu   Is mask with the patient? Yes, Adult Mask   Suction set is at the bedside? Yes   Extra trach at bedside? Yes   Vent Select   Conventional Vent Y   Charged w/in last 24h YES   Preset Conventional Ventilator Settings   Vent ID 4   Vent Type    Humidity HME   Conventional Ventilator Alarms   Alarms On Y   Ve High Alarm 20 L/min   Ve Low Alarm 3 L/min   Vt High Alarm 1200 mL   Vt Low Alarm 250 mL   Apnea Volume (mL) 500 mL   Education   $ Education Ventilator Oxygen;Trach Care;15 min   Respiratory Evaluation   $ Care Plan Tech Time 15 min

## 2022-08-30 NOTE — ASSESSMENT & PLAN NOTE
D/w pt and wife.  Will remove port a bedside tonight    Having received informed consent L chest was prepped and draped.  Time out performed. A valeriano infiltrated with 6 cc's of 1% lidocaine with epi.  I then make an incision over old scar and carry down onto the port.  POrt dissected around the fascia.  The entire port and catheter are removed intact without event.  Catheter tip cut and sent to pathology.  WOund closed in two layers with 3 0 vicryl and 4 0 monocryl  No immediate complications    Will sign off

## 2022-08-31 ENCOUNTER — HOSPITAL ENCOUNTER (INPATIENT)
Facility: HOSPITAL | Age: 67
LOS: 13 days | Discharge: HOME | DRG: 056 | End: 2022-09-13
Attending: PSYCHIATRY & NEUROLOGY | Admitting: INTERNAL MEDICINE
Payer: MEDICARE

## 2022-08-31 VITALS
BODY MASS INDEX: 27.17 KG/M2 | WEIGHT: 179.25 LBS | RESPIRATION RATE: 24 BRPM | OXYGEN SATURATION: 100 % | SYSTOLIC BLOOD PRESSURE: 103 MMHG | HEIGHT: 68 IN | TEMPERATURE: 99 F | HEART RATE: 108 BPM | DIASTOLIC BLOOD PRESSURE: 54 MMHG

## 2022-08-31 DIAGNOSIS — R25.2 TRISMUS: ICD-10-CM

## 2022-08-31 DIAGNOSIS — G12.21 ALS (AMYOTROPHIC LATERAL SCLEROSIS): ICD-10-CM

## 2022-08-31 DIAGNOSIS — L89.153 PRESSURE INJURY OF SACRAL REGION, STAGE 3: Primary | ICD-10-CM

## 2022-08-31 LAB
ANION GAP SERPL CALC-SCNC: 6 MMOL/L (ref 8–16)
APTT BLDCRRT: 28.7 SEC (ref 21–32)
BACTERIA #/AREA URNS AUTO: ABNORMAL /HPF
BASOPHILS # BLD AUTO: 0.06 K/UL (ref 0–0.2)
BASOPHILS # BLD AUTO: 0.09 K/UL (ref 0–0.2)
BASOPHILS NFR BLD: 0.4 % (ref 0–1.9)
BASOPHILS NFR BLD: 0.6 % (ref 0–1.9)
BILIRUB UR QL STRIP: NEGATIVE
BSA FOR ECHO PROCEDURE: 1.9 M2
BUN SERPL-MCNC: 29 MG/DL (ref 8–23)
CALCIUM SERPL-MCNC: 8.2 MG/DL (ref 8.7–10.5)
CHLORIDE SERPL-SCNC: 104 MMOL/L (ref 95–110)
CLARITY UR REFRACT.AUTO: CLEAR
CO2 SERPL-SCNC: 25 MMOL/L (ref 23–29)
COLOR UR AUTO: YELLOW
CREAT SERPL-MCNC: 0.4 MG/DL (ref 0.5–1.4)
CRP SERPL-MCNC: 7.87 MG/DL
DIFFERENTIAL METHOD: ABNORMAL
DIFFERENTIAL METHOD: ABNORMAL
EJECTION FRACTION: 65 %
EOSINOPHIL # BLD AUTO: 0.4 K/UL (ref 0–0.5)
EOSINOPHIL # BLD AUTO: 0.4 K/UL (ref 0–0.5)
EOSINOPHIL NFR BLD: 2.5 % (ref 0–8)
EOSINOPHIL NFR BLD: 2.7 % (ref 0–8)
ERYTHROCYTE [DISTWIDTH] IN BLOOD BY AUTOMATED COUNT: 19.3 % (ref 11.5–14.5)
ERYTHROCYTE [DISTWIDTH] IN BLOOD BY AUTOMATED COUNT: 19.5 % (ref 11.5–14.5)
EST. GFR  (NO RACE VARIABLE): >60 ML/MIN/1.73 M^2
GLUCOSE SERPL-MCNC: 119 MG/DL (ref 70–110)
GLUCOSE UR QL STRIP: NEGATIVE
HCT VFR BLD AUTO: 24.8 % (ref 40–54)
HCT VFR BLD AUTO: 27.2 % (ref 40–54)
HGB BLD-MCNC: 7.6 G/DL (ref 14–18)
HGB BLD-MCNC: 8 G/DL (ref 14–18)
HGB UR QL STRIP: ABNORMAL
HYALINE CASTS UR QL AUTO: 0 /LPF
IMM GRANULOCYTES # BLD AUTO: 0.14 K/UL (ref 0–0.04)
IMM GRANULOCYTES # BLD AUTO: 0.16 K/UL (ref 0–0.04)
IMM GRANULOCYTES NFR BLD AUTO: 0.9 % (ref 0–0.5)
IMM GRANULOCYTES NFR BLD AUTO: 1 % (ref 0–0.5)
INR PPP: 1.3 (ref 0.8–1.2)
KETONES UR QL STRIP: NEGATIVE
LEUKOCYTE ESTERASE UR QL STRIP: ABNORMAL
LYMPHOCYTES # BLD AUTO: 1.3 K/UL (ref 1–4.8)
LYMPHOCYTES # BLD AUTO: 1.5 K/UL (ref 1–4.8)
LYMPHOCYTES NFR BLD: 8.2 % (ref 18–48)
LYMPHOCYTES NFR BLD: 9.8 % (ref 18–48)
MAGNESIUM SERPL-MCNC: 2.1 MG/DL (ref 1.6–2.6)
MCH RBC QN AUTO: 25.2 PG (ref 27–31)
MCH RBC QN AUTO: 25.9 PG (ref 27–31)
MCHC RBC AUTO-ENTMCNC: 29.4 G/DL (ref 32–36)
MCHC RBC AUTO-ENTMCNC: 30.6 G/DL (ref 32–36)
MCV RBC AUTO: 85 FL (ref 82–98)
MCV RBC AUTO: 86 FL (ref 82–98)
MICROSCOPIC COMMENT: ABNORMAL
MONOCYTES # BLD AUTO: 0.7 K/UL (ref 0.3–1)
MONOCYTES # BLD AUTO: 0.7 K/UL (ref 0.3–1)
MONOCYTES NFR BLD: 4.3 % (ref 4–15)
MONOCYTES NFR BLD: 4.3 % (ref 4–15)
NEUTROPHILS # BLD AUTO: 12.5 K/UL (ref 1.8–7.7)
NEUTROPHILS # BLD AUTO: 13.6 K/UL (ref 1.8–7.7)
NEUTROPHILS NFR BLD: 81.7 % (ref 38–73)
NEUTROPHILS NFR BLD: 83.6 % (ref 38–73)
NITRITE UR QL STRIP: NEGATIVE
NRBC BLD-RTO: 0 /100 WBC
NRBC BLD-RTO: 0 /100 WBC
PH UR STRIP: 7 [PH] (ref 5–8)
PLATELET # BLD AUTO: 391 K/UL (ref 150–450)
PLATELET # BLD AUTO: 400 K/UL (ref 150–450)
PMV BLD AUTO: 10 FL (ref 9.2–12.9)
PMV BLD AUTO: 9.9 FL (ref 9.2–12.9)
POCT GLUCOSE: 127 MG/DL (ref 70–110)
POTASSIUM SERPL-SCNC: 4.4 MMOL/L (ref 3.5–5.1)
PROT UR QL STRIP: ABNORMAL
PROTHROMBIN TIME: 13 SEC (ref 9–12.5)
RBC # BLD AUTO: 2.93 M/UL (ref 4.6–6.2)
RBC # BLD AUTO: 3.17 M/UL (ref 4.6–6.2)
RBC #/AREA URNS AUTO: 7 /HPF (ref 0–4)
SODIUM SERPL-SCNC: 135 MMOL/L (ref 136–145)
SP GR UR STRIP: 1.01 (ref 1–1.03)
URN SPEC COLLECT METH UR: ABNORMAL
VANCOMYCIN SERPL-MCNC: 18 UG/ML
WBC # BLD AUTO: 15.31 K/UL (ref 3.9–12.7)
WBC # BLD AUTO: 16.29 K/UL (ref 3.9–12.7)
WBC #/AREA URNS AUTO: 47 /HPF (ref 0–5)

## 2022-08-31 PROCEDURE — 87086 URINE CULTURE/COLONY COUNT: CPT

## 2022-08-31 PROCEDURE — 63600175 PHARM REV CODE 636 W HCPCS: Performed by: STUDENT IN AN ORGANIZED HEALTH CARE EDUCATION/TRAINING PROGRAM

## 2022-08-31 PROCEDURE — 94002 VENT MGMT INPAT INIT DAY: CPT

## 2022-08-31 PROCEDURE — 25000003 PHARM REV CODE 250: Performed by: STUDENT IN AN ORGANIZED HEALTH CARE EDUCATION/TRAINING PROGRAM

## 2022-08-31 PROCEDURE — 99900026 HC AIRWAY MAINTENANCE (STAT)

## 2022-08-31 PROCEDURE — 87205 SMEAR GRAM STAIN: CPT

## 2022-08-31 PROCEDURE — 94761 N-INVAS EAR/PLS OXIMETRY MLT: CPT

## 2022-08-31 PROCEDURE — 27000221 HC OXYGEN, UP TO 24 HOURS

## 2022-08-31 PROCEDURE — 85025 COMPLETE CBC W/AUTO DIFF WBC: CPT | Performed by: INTERNAL MEDICINE

## 2022-08-31 PROCEDURE — 85610 PROTHROMBIN TIME: CPT

## 2022-08-31 PROCEDURE — 80048 BASIC METABOLIC PNL TOTAL CA: CPT | Performed by: INTERNAL MEDICINE

## 2022-08-31 PROCEDURE — 81001 URINALYSIS AUTO W/SCOPE: CPT

## 2022-08-31 PROCEDURE — 25000003 PHARM REV CODE 250: Performed by: NURSE PRACTITIONER

## 2022-08-31 PROCEDURE — 83735 ASSAY OF MAGNESIUM: CPT | Performed by: INTERNAL MEDICINE

## 2022-08-31 PROCEDURE — 99233 SBSQ HOSP IP/OBS HIGH 50: CPT | Mod: ,,, | Performed by: INTERNAL MEDICINE

## 2022-08-31 PROCEDURE — 20000000 HC ICU ROOM

## 2022-08-31 PROCEDURE — 99233 PR SUBSEQUENT HOSPITAL CARE,LEVL III: ICD-10-PCS | Mod: ,,, | Performed by: INTERNAL MEDICINE

## 2022-08-31 PROCEDURE — 87186 SC STD MICRODIL/AGAR DIL: CPT | Mod: 59

## 2022-08-31 PROCEDURE — 25000003 PHARM REV CODE 250: Performed by: PSYCHIATRY & NEUROLOGY

## 2022-08-31 PROCEDURE — 87070 CULTURE OTHR SPECIMN AEROBIC: CPT

## 2022-08-31 PROCEDURE — 86140 C-REACTIVE PROTEIN: CPT | Performed by: INTERNAL MEDICINE

## 2022-08-31 PROCEDURE — 85025 COMPLETE CBC W/AUTO DIFF WBC: CPT | Mod: 91

## 2022-08-31 PROCEDURE — 63600175 PHARM REV CODE 636 W HCPCS: Performed by: INTERNAL MEDICINE

## 2022-08-31 PROCEDURE — 63600175 PHARM REV CODE 636 W HCPCS

## 2022-08-31 PROCEDURE — 25000003 PHARM REV CODE 250

## 2022-08-31 PROCEDURE — 63700000 PHARM REV CODE 250 ALT 637 W/O HCPCS

## 2022-08-31 PROCEDURE — 51702 INSERT TEMP BLADDER CATH: CPT

## 2022-08-31 PROCEDURE — 87040 BLOOD CULTURE FOR BACTERIA: CPT

## 2022-08-31 PROCEDURE — 80202 ASSAY OF VANCOMYCIN: CPT | Performed by: INTERNAL MEDICINE

## 2022-08-31 PROCEDURE — 85730 THROMBOPLASTIN TIME PARTIAL: CPT

## 2022-08-31 PROCEDURE — 87077 CULTURE AEROBIC IDENTIFY: CPT | Mod: 59

## 2022-08-31 PROCEDURE — 25000003 PHARM REV CODE 250: Performed by: INTERNAL MEDICINE

## 2022-08-31 PROCEDURE — 99900035 HC TECH TIME PER 15 MIN (STAT)

## 2022-08-31 PROCEDURE — A4216 STERILE WATER/SALINE, 10 ML: HCPCS | Performed by: INTERNAL MEDICINE

## 2022-08-31 PROCEDURE — 63600175 PHARM REV CODE 636 W HCPCS: Performed by: PSYCHIATRY & NEUROLOGY

## 2022-08-31 RX ORDER — BACLOFEN 10 MG/1
10 TABLET ORAL 3 TIMES DAILY
Status: DISCONTINUED | OUTPATIENT
Start: 2022-08-31 | End: 2022-09-08

## 2022-08-31 RX ORDER — ONDANSETRON 2 MG/ML
4 INJECTION INTRAMUSCULAR; INTRAVENOUS EVERY 6 HOURS PRN
Status: DISCONTINUED | OUTPATIENT
Start: 2022-08-31 | End: 2022-09-13 | Stop reason: HOSPADM

## 2022-08-31 RX ORDER — METOPROLOL TARTRATE 50 MG/1
50 TABLET ORAL DAILY
Status: DISCONTINUED | OUTPATIENT
Start: 2022-08-31 | End: 2022-09-01

## 2022-08-31 RX ORDER — OXYCODONE HYDROCHLORIDE 5 MG/1
5 TABLET ORAL EVERY 4 HOURS PRN
Status: DISCONTINUED | OUTPATIENT
Start: 2022-09-01 | End: 2022-09-04

## 2022-08-31 RX ORDER — DIAZEPAM 5 MG/1
5 TABLET ORAL EVERY 6 HOURS PRN
Status: CANCELLED | OUTPATIENT
Start: 2022-08-31

## 2022-08-31 RX ORDER — MUPIROCIN 20 MG/G
OINTMENT TOPICAL 3 TIMES DAILY
Status: DISCONTINUED | OUTPATIENT
Start: 2022-08-31 | End: 2022-09-13 | Stop reason: HOSPADM

## 2022-08-31 RX ORDER — METOPROLOL TARTRATE 50 MG/1
50 TABLET ORAL DAILY
Status: DISCONTINUED | OUTPATIENT
Start: 2022-08-31 | End: 2022-08-31

## 2022-08-31 RX ORDER — HEPARIN SODIUM 5000 [USP'U]/ML
5000 INJECTION, SOLUTION INTRAVENOUS; SUBCUTANEOUS EVERY 8 HOURS
Status: DISCONTINUED | OUTPATIENT
Start: 2022-08-31 | End: 2022-08-31

## 2022-08-31 RX ORDER — LABETALOL HCL 20 MG/4 ML
10 SYRINGE (ML) INTRAVENOUS EVERY 4 HOURS PRN
Status: DISCONTINUED | OUTPATIENT
Start: 2022-08-31 | End: 2022-09-13 | Stop reason: HOSPADM

## 2022-08-31 RX ORDER — RILUZOLE 50 MG/1
50 TABLET, FILM COATED ORAL EVERY 12 HOURS
Status: DISCONTINUED | OUTPATIENT
Start: 2022-08-31 | End: 2022-09-13 | Stop reason: HOSPADM

## 2022-08-31 RX ORDER — PANTOPRAZOLE SODIUM 40 MG/1
40 FOR SUSPENSION ORAL DAILY
Status: DISCONTINUED | OUTPATIENT
Start: 2022-08-31 | End: 2022-08-31

## 2022-08-31 RX ORDER — TRAZODONE HYDROCHLORIDE 50 MG/1
50 TABLET ORAL NIGHTLY
Status: DISCONTINUED | OUTPATIENT
Start: 2022-08-31 | End: 2022-09-13 | Stop reason: HOSPADM

## 2022-08-31 RX ORDER — HYDROMORPHONE HYDROCHLORIDE 1 MG/ML
1 INJECTION, SOLUTION INTRAMUSCULAR; INTRAVENOUS; SUBCUTANEOUS ONCE
Status: COMPLETED | OUTPATIENT
Start: 2022-08-31 | End: 2022-08-31

## 2022-08-31 RX ORDER — MUPIROCIN 20 MG/G
OINTMENT TOPICAL 2 TIMES DAILY
Status: DISCONTINUED | OUTPATIENT
Start: 2022-08-31 | End: 2022-08-31

## 2022-08-31 RX ORDER — INSULIN ASPART 100 [IU]/ML
0-5 INJECTION, SOLUTION INTRAVENOUS; SUBCUTANEOUS EVERY 6 HOURS PRN
Status: DISCONTINUED | OUTPATIENT
Start: 2022-08-31 | End: 2022-08-31

## 2022-08-31 RX ORDER — POLYETHYLENE GLYCOL 3350 17 G/17G
17 POWDER, FOR SOLUTION ORAL 2 TIMES DAILY
Status: DISCONTINUED | OUTPATIENT
Start: 2022-08-31 | End: 2022-09-01

## 2022-08-31 RX ORDER — SCOLOPAMINE TRANSDERMAL SYSTEM 1 MG/1
1 PATCH, EXTENDED RELEASE TRANSDERMAL
Status: DISCONTINUED | OUTPATIENT
Start: 2022-08-31 | End: 2022-09-13 | Stop reason: HOSPADM

## 2022-08-31 RX ORDER — GLUCAGON 1 MG
1 KIT INJECTION
Status: DISCONTINUED | OUTPATIENT
Start: 2022-08-31 | End: 2022-08-31

## 2022-08-31 RX ORDER — OXYCODONE HYDROCHLORIDE 5 MG/1
5 TABLET ORAL EVERY 6 HOURS PRN
Status: DISCONTINUED | OUTPATIENT
Start: 2022-08-31 | End: 2022-08-31

## 2022-08-31 RX ORDER — ACETAMINOPHEN 325 MG/1
650 TABLET ORAL EVERY 6 HOURS PRN
Status: DISCONTINUED | OUTPATIENT
Start: 2022-08-31 | End: 2022-09-07

## 2022-08-31 RX ORDER — HYDRALAZINE HYDROCHLORIDE 20 MG/ML
10 INJECTION INTRAMUSCULAR; INTRAVENOUS EVERY 4 HOURS PRN
Status: DISCONTINUED | OUTPATIENT
Start: 2022-08-31 | End: 2022-09-13 | Stop reason: HOSPADM

## 2022-08-31 RX ORDER — FAMOTIDINE 20 MG/1
20 TABLET, FILM COATED ORAL 2 TIMES DAILY
Status: DISCONTINUED | OUTPATIENT
Start: 2022-08-31 | End: 2022-09-01

## 2022-08-31 RX ORDER — MORPHINE SULFATE 2 MG/ML
2 INJECTION, SOLUTION INTRAMUSCULAR; INTRAVENOUS
Status: DISCONTINUED | OUTPATIENT
Start: 2022-09-01 | End: 2022-09-02

## 2022-08-31 RX ORDER — NYSTATIN 100000 [USP'U]/ML
500000 SUSPENSION ORAL 4 TIMES DAILY
Status: DISCONTINUED | OUTPATIENT
Start: 2022-08-31 | End: 2022-09-12

## 2022-08-31 RX ORDER — HEPARIN SODIUM,PORCINE/D5W 25000/250
0-40 INTRAVENOUS SOLUTION INTRAVENOUS CONTINUOUS
Status: DISCONTINUED | OUTPATIENT
Start: 2022-08-31 | End: 2022-09-10

## 2022-08-31 RX ORDER — SODIUM CHLORIDE 0.9 % (FLUSH) 0.9 %
10 SYRINGE (ML) INJECTION
Status: DISCONTINUED | OUTPATIENT
Start: 2022-08-31 | End: 2022-09-13 | Stop reason: HOSPADM

## 2022-08-31 RX ORDER — CEFEPIME HYDROCHLORIDE 1 G/50ML
2 INJECTION, SOLUTION INTRAVENOUS
Status: DISCONTINUED | OUTPATIENT
Start: 2022-08-31 | End: 2022-09-06

## 2022-08-31 RX ORDER — BUSPIRONE HYDROCHLORIDE 5 MG/1
5 TABLET ORAL 2 TIMES DAILY
Status: DISCONTINUED | OUTPATIENT
Start: 2022-08-31 | End: 2022-09-13 | Stop reason: HOSPADM

## 2022-08-31 RX ADMIN — HYDROMORPHONE HYDROCHLORIDE 1 MG: 1 INJECTION, SOLUTION INTRAMUSCULAR; INTRAVENOUS; SUBCUTANEOUS at 03:08

## 2022-08-31 RX ADMIN — TRAZODONE HYDROCHLORIDE 50 MG: 50 TABLET ORAL at 08:08

## 2022-08-31 RX ADMIN — NYSTATIN 500000 UNITS: 500000 SUSPENSION ORAL at 08:08

## 2022-08-31 RX ADMIN — VANCOMYCIN HYDROCHLORIDE 1250 MG: 1.25 INJECTION, POWDER, LYOPHILIZED, FOR SOLUTION INTRAVENOUS at 05:08

## 2022-08-31 RX ADMIN — RILUZOLE 50 MG: 50 TABLET ORAL at 08:08

## 2022-08-31 RX ADMIN — HYDROMORPHONE HYDROCHLORIDE 1 MG: 1 INJECTION, SOLUTION INTRAMUSCULAR; INTRAVENOUS; SUBCUTANEOUS at 09:08

## 2022-08-31 RX ADMIN — FAMOTIDINE 20 MG: 20 TABLET ORAL at 08:08

## 2022-08-31 RX ADMIN — SCOPALAMINE 1 PATCH: 1 PATCH, EXTENDED RELEASE TRANSDERMAL at 10:08

## 2022-08-31 RX ADMIN — MUPIROCIN: 20 OINTMENT TOPICAL at 09:08

## 2022-08-31 RX ADMIN — HEPARIN SODIUM 12 UNITS/KG/HR: 5000 INJECTION INTRAVENOUS; SUBCUTANEOUS at 05:08

## 2022-08-31 RX ADMIN — SODIUM CHLORIDE, PRESERVATIVE FREE 10 ML: 5 INJECTION INTRAVENOUS at 06:08

## 2022-08-31 RX ADMIN — SODIUM CHLORIDE, PRESERVATIVE FREE 10 ML: 5 INJECTION INTRAVENOUS at 12:08

## 2022-08-31 RX ADMIN — MORPHINE SULFATE 4 MG: 4 INJECTION, SOLUTION INTRAMUSCULAR; INTRAVENOUS at 06:08

## 2022-08-31 RX ADMIN — MORPHINE SULFATE 4 MG: 4 INJECTION, SOLUTION INTRAMUSCULAR; INTRAVENOUS at 12:08

## 2022-08-31 RX ADMIN — HEPARIN SODIUM 5000 UNITS: 5000 INJECTION INTRAVENOUS; SUBCUTANEOUS at 01:08

## 2022-08-31 RX ADMIN — BUSPIRONE HYDROCHLORIDE 5 MG: 5 TABLET ORAL at 08:08

## 2022-08-31 RX ADMIN — METOPROLOL TARTRATE 50 MG: 50 TABLET, FILM COATED ORAL at 01:08

## 2022-08-31 RX ADMIN — CEFEPIME 2 G: 2 INJECTION, POWDER, FOR SOLUTION INTRAVENOUS at 03:08

## 2022-08-31 RX ADMIN — BACLOFEN 10 MG: 10 TABLET ORAL at 08:08

## 2022-08-31 RX ADMIN — CEFEPIME HYDROCHLORIDE 2 G: 2 INJECTION, SOLUTION INTRAVENOUS at 05:08

## 2022-08-31 RX ADMIN — OXYCODONE 5 MG: 5 TABLET ORAL at 08:08

## 2022-08-31 RX ADMIN — HYDROMORPHONE HYDROCHLORIDE 1 MG: 1 INJECTION, SOLUTION INTRAMUSCULAR; INTRAVENOUS; SUBCUTANEOUS at 11:08

## 2022-08-31 RX ADMIN — POLYETHYLENE GLYCOL 3350 17 G: 17 POWDER, FOR SOLUTION ORAL at 01:08

## 2022-08-31 NOTE — DISCHARGE SUMMARY
[facility]  Discharge Summary      Patient Name: Wei Dominique  MRN: [id number]  Patient Class: IP- Inpatient  Admission Date: 8/18/2022  Hospital Length of Stay: 12 days  Discharge Date and Time:  08/31/2022 11:05 AM  Attending Physician: No att. providers found   Discharging Provider: Anai Blanc MD  Primary Care Provider: Primary Doctor Jessica      HPI:   No notes on file    * No surgery found *      Hospital Course:   8/29/2022  Mr Dominique continues to have trismus but definitely tracked me with his eyes. Dr Lion is attempting to arrange Botox therapy for the patient    8/30/2022  Case discussed with Dr Link. PICC line removed due to staph epi sepsis sensitive to vancomycin. The port is no longer functioning and we have consulted Dr Estevez to remove it.  Dr Parada ENT feels that he will require a neurologist familiar with ALS, trismus and Botox to manage this patient    8/31/2022  Mr Dominique is being transferred to [facility] main [facility] neuro critical care. He is awake and alert this AM and has his Computer for communication. I have informed his wife of his acceptance/transfer  ROS: pt could express no complaints at present  PE: in no acute distress ill appearing HEENT tongue visible with injury KHANH EOM intact moist mucus membranes Neck supple no use of accessory muscles tracheostomy in place Lungs occasional basilar crackles without wheezes or rhonchi Heart S 1 S 2 RRR no murmur Abdomen Distended nontender BS + Ext without CC or E contractures Quadriplegia Skin no acute finding Neuro pt tracks the visitor anf blinks yes or no         Goals of Care Treatment Preferences:  Code Status: Full Code       LaPOST: Yes           Consults:   Consults (From admission, onward)        Status Ordering Provider     Inpatient consult to PICC Line Nurse  Once        Provider:  (Not yet assigned)    ANAI Benson     Inpatient consult to General Surgery  Once         Provider:  Jad Estevez III, MD    Completed ANAI PEÑALOZA     Inpatient consult to PICC Line Nurse  Once        Provider:  (Not yet assigned)    Acknowledged AFSHAN ARMSTRONG     Inpatient consult to ENT  Once        Provider:  Omer Kennedy MD    Acknowledged DHRUV SMITH     Inpatient consult to ENT  Once        Provider:  Omer Kennedy MD    Acknowledged DHRUV SMITH     Pharmacy to dose Vancomycin consult  Once        Provider:  (Not yet assigned)   See Hyperspace for full Linked Orders Report.    Acknowledged DANGELO HERNÁNDEZ     Inpatient consult to Neurology  Once        Provider:  Peter Paez MD    Completed SARAH SOSA     Inpatient consult to ENT  Once        Provider:  Omer Kennedy MD    Acknowledged MICKI MCCARTNEY F.     Inpatient consult to Registered Dietitian/Nutritionist  Once        Provider:  (Not yet assigned)    Completed KHALIDONTA SWEET F.     Inpatient consult to ENT  Once        Provider:  Fam Foster MD    Completed KHALIDONTA SWEET F.     Inpatient consult to PICC Line Nurse  Once        Provider:  (Not yet assigned)    Acknowledged KHCHRISTINE RAMEYHAMMAD F.     Inpatient consult to Infectious Diseases  Once        Provider:  Tavia Jones MD    Completed KHALIQ SWEET F.     Inpatient consult to Pulmonology  Once        Provider:  Cindy Dudley MD    Completed KHALIDONTA SWEET F.     Inpatient consult to Hospitalist  Once        Provider:  (Not yet assigned)    Acknowledged STEVIE ZAPATA          No new Assessment & Plan notes have been filed under this hospital service since the last note was generated.  Service: Hospital Medicine    Final Active Diagnoses:    Diagnosis Date Noted POA    PRINCIPAL PROBLEM:  Catheter-associated urinary tract infection [T83.511A, N39.0] 06/09/2022 Yes    Bacteremia [R78.81] 08/30/2022 Yes    Masseter muscle spasm [M62.838] 08/22/2022 Yes    Open wound of  tongue due to bite [S01.552A] 08/20/2022 Yes    Trismus [R25.2] 08/20/2022 Yes    Fever [R50.9]  Yes    Facial cellulitis [L03.211]  Yes    Tracheostomy in place [Z93.0] 05/23/2022 Not Applicable    Chronic respiratory failure with hypoxia and hypercapnia [J96.11, J96.12] 05/23/2022 Yes    Dependent on ventilator [Z99.11] 03/21/2022 Not Applicable    S/P percutaneous endoscopic gastrostomy (PEG) tube placement [Z93.1] 10/13/2020 Not Applicable    ALS (amyotrophic lateral sclerosis) [G12.21] 12/09/2018 Yes    Edema [R60.9] 12/06/2018 Yes    Quadriparesis [G82.50] 02/06/2018 Yes      Problems Resolved During this Admission:    Diagnosis Date Noted Date Resolved POA    Facial abscess [L02.01]  08/22/2022 Yes       Discharged Condition: good    Disposition: Another Health Care Inst*    Follow Up:   Follow-up Information     [facility] Follow up.    Why: Transfer to neuro critical care  Contact information:  [address] [facility] [id number]                     Patient Instructions:      Tube Feedings/Formulas   Order Comments: Select Formula: Vital High Protein  Formula Rate (mL/hr): 65  Feeding Volume (mL): 1560  Route: Gastrostomy  Container for Formula Delivery Ready to Hang Liter  Additives/Modulars: Chauncey   ProTGold  Chauncey Frequency 2 times daily  ProTGold Frequency 2 times daily  Additive/Modular amount: 1  Additive Modular Units packets  Free water flush volume 30 ml  Free water flush frequency Every 4 hours    Order comments: When current bag of Jevity 1.5 is finished, Initiate Vital HP at 50 mL/Hour, Advance by 5 mL/hour every 4-6 hours as tolerated to a goal of 65 mL/hour, Give Chauncey BID for wound healing, Give ProTGold 2 packets daily to better meet protein needs, FWF: 30 ml Q 4 hrs CHAUNCEY and ProTGold are not optional  Modified from: Tube Feedings/Formulas [facility]; Vital High Protein; 65; 1,560; Gastrostomy; Ready to Hang Liter; Chauncey, ProTGold; 2 times daily; 2  times daily; 1; packets; 30 ml; Every 4 hours [[id number]]     Order Specific Question Answer Comments   Select Adult Formula: Other    Route: Gastrostomy      Activity as tolerated       Significant Diagnostic Studies: Labs:   BMP:   Recent Labs   Lab 08/30/22 0314 08/31/22 0351   * 119*   * 135*   K 4.7 4.4    104   CO2 26 25   BUN 26* 29*   CREATININE 0.4* 0.4*   CALCIUM 8.2* 8.2*   MG 2.3 2.1   , CMP   Recent Labs   Lab 08/30/22 0314 08/31/22 0351   * 135*   K 4.7 4.4    104   CO2 26 25   * 119*   BUN 26* 29*   CREATININE 0.4* 0.4*   CALCIUM 8.2* 8.2*   ANIONGAP 5* 6*   , CBC   Recent Labs   Lab 08/30/22 0314 08/31/22 0351   WBC 14.82* 16.29*   HGB 8.3* 8.0*   HCT 28.7* 27.2*    400   , Troponin No results for input(s): TROPONINI in the last 168 hours. and All labs within the past 24 hours have been reviewed  Microbiology:   Blood Culture   Lab Results   Component Value Date    LABBLOO No Growth to date 08/30/2022    and Urine Culture    Lab Results   Component Value Date    LABURIN (A) 08/18/2022     PSEUDOMONAS AERUGINOSA   >100,000 cfu/ml  Known  patient      LABURIN PROVIDENCIA STUARTII  > 100,000 cfu/ml   (A) 08/18/2022       Pending Diagnostic Studies:     Procedure Component Value Units Date/Time    Echo [[id number]] Resulted: 08/28/22 1407    Order Status: Sent Lab Status: In process Updated: 08/28/22 1407     BSA 1.9 m2          Medications:  Reconciled Home Medications:      Medication List      START taking these medications    cefepime in dextrose 5 % 2 gram/50 mL Pgbk  Commonly known as: MAXIPIME  Inject 50 mLs (2 g total) into the vein every 8 (eight) hours.     nystatin 100,000 unit/mL suspension  Commonly known as: MYCOSTATIN  Take 5 mLs (500,000 Units total) by mouth 4 (four) times daily. for 10 days     * sodium chloride 0.9% injection  Commonly known as: NORMAL SALINE FLUSH  Inject 10 mLs into the vein as needed.     * sodium chloride 0.9%  injection  Commonly known as: NORMAL SALINE FLUSH  Inject 10 mLs into the vein every 6 (six) hours.         * This list has 2 medication(s) that are the same as other medications prescribed for you. Read the directions carefully, and ask your doctor or other care provider to review them with you.            CHANGE how you take these medications    baclofen 10 MG tablet  Commonly known as: LIORESAL  TAKE 1 TABLET THREE TIMES PER DAY  What changed: See the new instructions.     glycopyrrolate 1 mg Tab  Commonly known as: ROBINUL  START BY TAKING 1 TABLET DAILY. IF TOLERATING WELL, YOU CAN INCREASE IT UP TO 2 OR EVEN 3 TIMES DAILY.  What changed: See the new instructions.     * pantoprazole 40 mg injection  Commonly known as: PROTONIX  Inject 40 mg into the vein once daily.  What changed: You were already taking a medication with the same name, and this prescription was added. Make sure you understand how and when to take each.     * pantoprazole 40 mg suspension  Commonly known as: PROTONIX  40 mg by FEEDING TUBE route 2 (two) times a day.  What changed: Another medication with the same name was added. Make sure you understand how and when to take each.     riluzole 50 mg Tab tablet  TAKE 1 TABLET ON AN EMPTY STOMACH EVERY 12 HRS  What changed:   · how much to take  · how to take this         * This list has 2 medication(s) that are the same as other medications prescribed for you. Read the directions carefully, and ask your doctor or other care provider to review them with you.            CONTINUE taking these medications    albuterol-ipratropium 2.5 mg-0.5 mg/3 mL nebulizer solution  Commonly known as: DUO-NEB  Take 3 mLs by nebulization every 4 (four) hours. Rescue     apixaban 2.5 mg Tab  Commonly known as: ELIQUIS  1 tablet (2.5 mg total) by Per G Tube route 2 (two) times daily.     busPIRone 5 MG Tab  Commonly known as: BUSPAR  1 tablet (5 mg total) by Per G Tube route 2 (two) times daily.     lansoprazole 30 MG  capsule  Commonly known as: PREVACID  Take 30 mg by mouth once daily.     metoprolol tartrate 50 MG tablet  Commonly known as: LOPRESSOR  Take 50 mg by mouth once daily.     mupirocin 2 % ointment  Commonly known as: BACTROBAN  SMARTSI Application Topical 2-3 Times Daily     ondansetron 4 MG tablet  Commonly known as: ZOFRAN  4 mg by Per G Tube route every 6 (six) hours as needed for Nausea.     polyethylene glycol 17 gram Pwpk  Commonly known as: GLYCOLAX  17 g by Per G Tube route 2 (two) times daily.     traZODone 50 MG tablet  Commonly known as: DESYREL  50 mg by Per G Tube route every evening.        STOP taking these medications    albuterol 90 mcg/actuation inhaler  Commonly known as: PROVENTIL/VENTOLIN HFA     ALPRAZolam 0.5 MG tablet  Commonly known as: XANAX     chlorhexidine 0.12 % solution  Commonly known as: PERIDEX     enoxaparin 60 mg/0.6 mL Syrg  Commonly known as: LOVENOX     furosemide 40 MG tablet  Commonly known as: LASIX     glycerin adult suppository     AYSE 7-7-1.5 gram Pwpk  Generic drug: arginine-glutamine-calcium HMB     melatonin 3 mg Tbdl     warfarin 2.5 MG tablet  Commonly known as: COUMADIN            Indwelling Lines/Drains at time of discharge:   Lines/Drains/Airways     Peripherally Inserted Central Catheter Line  Duration           PICC Triple Lumen 22 0910 left basilic 1 day          Drain  Duration                Gastrostomy/Enterostomy LUQ feeding -- days         Rectal Tube 22 0000 13 days         Urethral Catheter 22 1805 Latex;Straight-tip 16 Fr. 11 days          Airway  Duration                Surgical Airway Shiley Cuffed -- days         Surgical Airway 22 1145 Shiley Cuffed 190 days                Time spent on the discharge of patient: 44 minutes         Lobito Blanc MD  Department of Hospital Medicine  Critical access hospital

## 2022-08-31 NOTE — PROGRESS NOTES
Pharmacokinetic Assessment: IV Vancomycin    Vancomycin serum concentration assessment(s):    - Random level is therapeutic at 18 mcg/mL  - Goal trough 15-20 mcg/mL for complicated bacteremia  - Renal function stable, no documented UOP at this time  - ALS, bed bound & trach dependent. Expect limited muscle mass, therefore CrCl won't be a great indicator of renal function  -  Will dose conservatively, previously supratherapeutic at OSH on q12h regimen. Start 1250 mg q24h today  - Collect trough on 9/2 at 1600, or sooner if clinically indicated    Drug levels (last 3 results):  Recent Labs   Lab Result Units 08/28/22  2133 08/29/22  0336 08/29/22  1652 08/30/22  0314 08/31/22  0351   Vancomycin, Random ug/mL  --    < > 31.6 32.1 18.0   Vancomycin-Trough ug/mL 36.0*  --   --   --   --     < > = values in this interval not displayed.       Pharmacy will continue to follow and monitor vancomycin.    Please contact pharmacy at extension [phone] for questions regarding this assessment.      Corinne Bautista, PharmD, BCCCP  Neurocritical Care Clinical Pharmacist  Ext. [phone]           Patient brief summary:  Wei Dominique is a 67 y.o. male initiated on antimicrobial therapy with IV Vancomycin for treatment of bacteremia      Drug Allergies:   Review of patient's allergies indicates:  No Known Allergies      Renal Function:   Estimated Creatinine Clearance: 175.8 mL/min (A) (based on SCr of 0.4 mg/dL (L)).,     Dialysis Method (if applicable):  N/A

## 2022-08-31 NOTE — NURSING
Pt's communication computer was obtained by  is now at bedside, nurse attempting to set up for pt use.

## 2022-08-31 NOTE — CONSULTS
Inpatient consult to Physical Medicine Rehab  Consult performed by: Deepthi Mccloud NP  Consult ordered by: Lilliam Alicea PA-C  Reason for consult: Assess rehab needs    Reviewed patient history and current admission.  Rehab team following.  Full consult to follow once closer to medical stability and able to participate with therapy.    DONOVAN Singleton, FNP-C  Physical Medicine & Rehabilitation   08/31/2022

## 2022-08-31 NOTE — PROGRESS NOTES
Pulmonary/Critical Care  Progress Note      Patient name: Wei Dominique  MRN: [id number]  Date: 08/31/2022    Admit Date: 8/18/2022  Consult Requested By: Lobito Blanc MD    Reason for Consult: Respiratory failure, trach    HPI:    8/19/2022 - P with h/o ALS with trach and vent dependence sent to ER for increased swelling.  Now admitted for evaluation and I was asked to see pt for ventilator management.  He is not able to provide any history.  Chart has been reviewed.  He is febrile, tachycardic (though better).  Cultures have been ordered and are pending.  Maxillofacial CT noted.  CXR shows no new infiltrates.  No ABG done yet.    8/22/2022 - Events of the weekend noted, no response to me this AM.  Dr Jones's note from yesterday seen and pt does not want comfort measures.  Mulitple + cultures (ID following for antibiotics).    8/23/2022 - Stable overnight, no new issues reported.  He is more awake and responsive.  Waiting on neuro to see for masseter BOTOX injections.  Dr Jones's note seen.  Stable on ventilator.     8/24/2022 - Stable overnight, no new issues reported.  Neuro note seen but they do not do Botox injections and have recommended OMFS consult (I do not believe that they come here) - ? Do we have to consider transfer to [facility] for evaluation/treatment per OMFS.  Respiratory status is stable.    8/25/2022 - Stable overnight, had BOTOX injection per ENT.  No new issues reported.  Low grade fever.    8/26/2022 - Stable overnight, no new issues reported.  He has some increased movement and ability to open mouth this AM.  Temp up a little yesterday.  H/H down a little but no active bleeding reported.  CXR with RLL atelectasis/infiltratre and possible effusion    8/27 the patient is running a temp.  He has MRSA on a catheter tip.  And is MRSA positive in his nares.  He is able to open his mouth.  He is having back pain.    8/28 the patient's trismus has returned.  It was necessary to paralyze  him after sedating him, and reinsert bilateral bite blocks between his teeth to protect his tongue.    8/29/2022 - Events of the weekend noted, no new issues reported.  Reviewed literature and trismus is described with ALS and botox for treatment as well.  ENT to resee pt and ? If he will be redosed.  ANother thought from nursing would be the possibility of tetanus (there is a reported cephalic form) though this seems less likely.  Antibiotics should have treated any possible infection. ? If there would be any role for human tetanus nimmune globulin.    8/30/2022 - Stable overnight, no new issues reported.  I am told that arrangements are being made for another dose of BOTOX per ENT.  D/W pt and he indicates that he is current on tetanus vaccine.  Catheter tip culture pending.  CXR noted.  ECHO report pending    8/31/2022 - ENT recommended transfer to where there is a neurologist familiar with ALS and BOTOX administration for trismus.  Dr Blanc initiated the transfer process andhe is to be transferred to [facility] today.  Stable on ventilator and no other new problems reported.  Was seen by surgery for possible port removal (could be done at [facility]).  ECHO report still pending.    Review of Systems    Review of Systems   Reason unable to perform ROS: Sedated to replace bite blocks.     Past Medical History    Past Medical History:   Diagnosis Date    ALS (amyotrophic lateral sclerosis)     ALS (amyotrophic lateral sclerosis)     Dependent on ventilator 3/21/2022    Erectile dysfunction     Gait disturbance     Hyperlipidemia     Hypertension     Iron deficiency anemia     Motor neuron disease     Polycystic kidney disease     Quadriparesis 2/6/2018    Tremor        Past Surgical History    Past Surgical History:   Procedure Laterality Date    GASTROSTOMY      PEG    PORTACATH PLACEMENT      TRACHEOSTOMY         Medications (scheduled):      apixaban  2.5 mg Oral BID    baclofen  10 mg Per G Tube TID     balsam peru-castor oiL   Topical (Top) Daily    busPIRone  5 mg Per G Tube BID    ceFEPime (MAXIPIME) IVPB  2 g Intravenous Q8H    metoprolol tartrate  12.5 mg Per G Tube BID    nystatin  500,000 Units Mouth/Throat QID    onabotulinumtoxina  100 Units Intramuscular Once    pantoprazole  40 mg Intravenous Daily    polyethylene glycol  17 g Oral Daily    riluzole  50 mg Oral Q12H    scopolamine  1 patch Transdermal Q3 Days    senna-docusate 8.6-50 mg  1 tablet Oral BID    sodium chloride 0.9%  10 mL Intravenous Q6H    traZODone  50 mg Per G Tube QHS       Medications (infusions):           Medications (prn):     sodium chloride, acetaminophen, acetaminophen, albuterol-ipratropium, aluminum-magnesium hydroxide-simethicone, calcium chloride IVPB, calcium chloride IVPB, calcium chloride IVPB, dextrose 10%, dextrose 10%, glucagon (human recombinant), glucose, glucose, HYDROcodone-acetaminophen, HYDROmorphone, magnesium sulfate IVPB, magnesium sulfate IVPB, magnesium sulfate IVPB, magnesium sulfate IVPB, melatonin, morphine, naloxone, ondansetron, potassium chloride in water, potassium chloride in water, potassium chloride in water, potassium chloride in water, prochlorperazine, simethicone, sodium chloride 0.9%, Flushing PICC Protocol **AND** sodium chloride 0.9% **AND** sodium chloride 0.9%, sodium phosphate IVPB, sodium phosphate IVPB, sodium phosphate IVPB, sodium phosphate IVPB, sodium phosphate IVPB, Pharmacy to dose Vancomycin consult **AND** vancomycin - pharmacy to dose, white petrolatum    Family History: No family history on file.    Social History: Tobacco:   Social History     Tobacco Use   Smoking Status Not on file   Smokeless Tobacco Not on file                                EtOH:   Social History     Substance and Sexual Activity   Alcohol Use None                                Drugs:   Social History     Substance and Sexual Activity   Drug Use Never       Physical Exam    Vital signs:  Temp:  [98 °F  "(36.7 °C)-99.1 °F (37.3 °C)]   Pulse:  []   Resp:  [16-31]   BP: ()/(50-93)   SpO2:  [95 %-100 %]     Intake/Output:     Intake/Output Summary (Last 24 hours) at 8/31/2022 0844  Last data filed at 8/31/2022 0701  Gross per 24 hour   Intake 2810 ml   Output 2950 ml   Net -140 ml          BMI: Estimated body mass index is 27.25 kg/m² as calculated from the following:    Height as of this encounter: 5' 8" (1.727 m).    Weight as of this encounter: 81.3 kg (179 lb 3.7 oz).    Physical Exam  Vitals and nursing note reviewed.   Constitutional:       General: He is not in acute distress.     Appearance: Normal appearance. He is ill-appearing (chronically). He is not toxic-appearing or diaphoretic.   HENT:      Head: Normocephalic and atraumatic.      Comments: Tongue remains edematous     Right Ear: External ear normal.      Left Ear: External ear normal.      Nose: Nose normal.      Mouth/Throat:      Mouth: Mucous membranes are moist.      Pharynx: Oropharynx is clear. No oropharyngeal exudate.      Comments: Bite blocks bilaterally  Eyes:      General: No scleral icterus.        Right eye: No discharge.         Left eye: No discharge.      Extraocular Movements: Extraocular movements intact.      Conjunctiva/sclera: Conjunctivae normal.      Pupils: Pupils are equal, round, and reactive to light.   Neck:      Vascular: No carotid bruit.      Comments: trach  Cardiovascular:      Rate and Rhythm: Normal rate and regular rhythm.      Pulses: Normal pulses.      Heart sounds: Normal heart sounds. No murmur heard.    No friction rub. No gallop.   Pulmonary:      Effort: Pulmonary effort is normal. No respiratory distress.      Breath sounds: Normal breath sounds. No stridor. No wheezing, rhonchi or rales.   Chest:      Chest wall: No tenderness.   Abdominal:      General: Bowel sounds are normal. There is no distension.      Tenderness: There is no abdominal tenderness. There is no guarding.      Comments: Peg " tube in place   Musculoskeletal:         General: Swelling (Arms are very edematous) present. Normal range of motion.      Cervical back: Normal range of motion and neck supple. No rigidity or tenderness.      Right lower leg: No edema.      Left lower leg: No edema.      Comments: + edema in UE bilaterally   Lymphadenopathy:      Cervical: No cervical adenopathy.   Skin:     General: Skin is warm and dry.      Capillary Refill: Capillary refill takes less than 2 seconds.      Coloration: Skin is not jaundiced.      Findings: No bruising.      Comments: Some redness at prior midline site  Some heel breakdown   Neurological:      Mental Status: He is alert.      Comments: Completely paralyzed except for his eyes.  Trismus has returned   Psychiatric:      Comments: Not able to assess       Laboratory    Recent Labs   Lab 08/31/22  0351   WBC 16.29*   RBC 3.17*   HGB 8.0*   HCT 27.2*      MCV 86   MCH 25.2*   MCHC 29.4*         Recent Labs   Lab 08/31/22  0351   CALCIUM 8.2*   *   K 4.4   CO2 25      BUN 29*   CREATININE 0.4*           Microbiology:       Microbiology Results (last 7 days)       Procedure Component Value Units Date/Time    IV catheter culture [[id number]] Collected: 08/30/22 1647    Order Status: Completed Specimen: Catheter Tip, Implanted Updated: 08/31/22 0757     Aerobic Culture - Cath tip No growth    IV catheter culture [[id number]] Collected: 08/29/22 1355    Order Status: Completed Specimen: Catheter Tip, PICC Updated: 08/31/22 0757     Aerobic Culture - Cath tip No growth    Blood culture [[id number]] Collected: 08/30/22 1030    Order Status: Completed Specimen: Blood from Line, PICC Left Basilic Updated: 08/30/22 1717     Blood Culture, Routine No Growth to date    Blood culture [[id number]] Collected: 08/28/22 1412    Order Status: Completed Specimen: Blood from Peripheral, Left Wrist Updated: 08/30/22 1432     Blood Culture, Routine No Growth to date      No Growth to date       No Growth to date    Culture, Respiratory with Gram Stain [[id number]]  (Abnormal)  (Susceptibility) Collected: 08/26/22 1313    Order Status: Completed Specimen: Respiratory from Endotracheal Aspirate Updated: 08/29/22 0859     Respiratory Culture No normal respiratory marco      PSEUDOMONAS AERUGINOSA   Moderate  Known CRPA patient       Gram Stain (Respiratory) <10 epithelial cells per low power field.     Gram Stain (Respiratory) Moderate WBC's     Gram Stain (Respiratory) Few Gram negative rods     Gram Stain (Respiratory) Few Gram positive rods    Blood culture [[id number]] Collected: 08/27/22 0945    Order Status: Completed Specimen: Blood from Line, PICC Right Brachial Updated: 08/29/22 0713     Blood Culture, Routine Gram stain aer bottle: Gram positive cocci      Positive results previously called    Narrative:      Collection has been rescheduled by [email or web address] at 08/27/2022 09:13 Reason:   Nurse Draw  Per Dr. King  Collection has been rescheduled by [email or web address] at 08/27/2022 09:16 Reason:   Nurse Draw  Collection has been rescheduled by [email or web address] at 08/27/2022 09:13 Reason:   Nurse Draw  Per Dr. King  Collection has been rescheduled by [email or web address] at 08/27/2022 09:16 Reason:   Nurse Draw    Blood culture [[id number]]  (Abnormal) Collected: 08/26/22 1300    Order Status: Completed Specimen: Blood from Line, PICC Right Brachial Updated: 08/28/22 1021     Blood Culture, Routine Gram stain luisa bottle: Gram positive cocci in clusters resembling Staph      Results called to and read back by: enmanuel Mclean MICU3 08/27/2022      22:23 fgf      COAGULASE-NEGATIVE STAPHYLOCOCCUS SPECIES  For susceptibility see order #[id number]      Narrative:      From port  Collection has been rescheduled by RE1 at 08/26/2022 12:23 Reason:   Nurse Draw  Collection has been rescheduled by RE1 at 08/26/2022 12:23 Reason:   Nurse Draw    Blood culture [[id number]]  (Abnormal) Collected: 08/26/22 0957    Order Status: Completed Specimen: Blood Updated:  08/28/22 1021     Blood Culture, Routine Gram stain aer bottle: Gram positive cocci      Positive results previously called      COAGULASE-NEGATIVE STAPHYLOCOCCUS SPECIES  For susceptibility see order #[id number]      Blood culture [[id number]]  (Abnormal) Collected: 08/26/22 0957    Order Status: Completed Specimen: Blood Updated: 08/28/22 1018     Blood Culture, Routine Gram stain aer bottle: Gram positive cocci in clusters resembling Staph      Results called to and read back by: Porfirio Lerma MICU3 08/27/2022  06:29      fgf      Gram stain luisa bottle: Gram positive cocci      Positive results previously called      COAGULASE-NEGATIVE STAPHYLOCOCCUS SPECIES  Identification and susceptibility pending      Blood culture [[id number]]     Order Status: Canceled Specimen: Blood     Blood culture [[id number]] Collected: 08/19/22 1234    Order Status: Completed Specimen: Blood from Peripheral, Upper Arm, Right Updated: 08/24/22 1432     Blood Culture, Routine No growth after 5 days.            Radiology    X-Ray Chest AP Portable  Chest single view    CLINICAL DATA: PICC line placement    FINDINGS: AP view is compared to August 29.    Right-sided PICC line has been removed. Left-sided PICC line is newly identified, with tip at the superior vena cava.    Tracheostomy tube and left-sided Port-A-Cath are unchanged in position. The left lung is clear. Hazy increased density in the mid and lower lung zone on the right is consistent with combination of airspace disease and pleural fluid accumulation, stable.    IMPRESSION:  1. Left-sided PICC line appears appropriately positioned.  2. No other significant changes.    Electronically signed by:  Ramon Powell MD  8/30/2022 10:40 AM CDT Workstation: [id number]  X-Ray KUB  KUB    CLINICAL DATA: Abdominal pain    FINDINGS: Supine view the abdomen demonstrates a normal bowel gas pattern. Left upper quadrant gastrostomy tube is noted. No mass, suspicious calcification, or free air  is demonstrated. Arthritic changes are noted at the bilateral hips and sacroiliac joints.    IMPRESSION:  1. No acute intra-abdominal abnormalities are identified.  2. Arthritic changes at the bilateral hips and sacroiliac joints.    Electronically signed by:  Ramon Powell MD  8/30/2022 9:29 AM CDT Workstation: [id number]        Ventilator Information    Vent Mode: A/C  Oxygen Concentration (%):  [28] 28  Resp Rate Total:  [16 br/min-28 br/min] 17 br/min  Vt Set:  [500 mL] 500 mL  PEEP/CPAP:  [5 cmH20] 5 cmH20  Mean Airway Pressure:  [10 cmR36-31 cmH20] 10 cmH20         No results for input(s): PH, PCO2, PO2, HCO3, POCSATURATED, BE in the last 72 hours.      Impression    Active Hospital Problems    Diagnosis  POA    *Catheter-associated urinary tract infection [T83.511A, N39.0]  Yes    Bacteremia [R78.81]  Yes    Masseter muscle spasm [M62.838]  Yes    Open wound of tongue due to bite [S01.552A]  Yes    Trismus [R25.2]  Yes    Fever [R50.9]  Yes    Facial cellulitis [L03.211]  Yes    Tracheostomy in place [Z93.0]  Not Applicable    Chronic respiratory failure with hypoxia and hypercapnia [J96.11, J96.12]  Yes    Dependent on ventilator [Z99.11]  Not Applicable    S/P percutaneous endoscopic gastrostomy (PEG) tube placement [Z93.1]  Not Applicable    ALS (amyotrophic lateral sclerosis) [G12.21]  Yes    Edema [R60.9]  Yes    Quadriparesis [G82.50]  Yes      Resolved Hospital Problems    Diagnosis Date Resolved POA    Facial abscess [L02.01] 08/22/2022 Yes       Plan    Continue vent - he is vent dependent  Contact isolation  Antibiotics per ID  Wound care  PEG feedings  Plan for transfer for further care/management of his trismus  Change portacath when able      Sy Kennedy MD  [facility] Pulmonary/Critical Care  08/31/2022

## 2022-08-31 NOTE — PLAN OF CARE
[facility] Care Plan    POC reviewed with Wei ASHLEY Dominique and family at 1400. Pt unable to verbalize understanding. Questions and concerns addressed. No acute events today. Pt progressing toward goals. Will continue to monitor. See below and flowsheets for full assessment and VS info.   - Dilaudid given x 1  - heparin titrated to nomogram  - arvizu inserted        Is this a stroke patient? no    Neuro:  Sukumar Coma Scale  Best Eye Response: 4-->(E4) spontaneous  Best Motor Response: 5-->(M5) localizes pain  Best Verbal Response: 5-->(V5) oriented  Sheridan Coma Scale Score: 14  Assessment Qualifiers: other (see comments) (Trach to vent)  Pupil PERRLA: yes     24 hr Temp:  [97.4 °F (36.3 °C)-99.1 °F (37.3 °C)]     CV:   Rhythm: sinus tachycardia  BP goals:   SBP < 160  MAP > 65    Resp:   O2 Device (Oxygen Therapy): ventilator  Vent Mode: A/C  Set Rate: 16 BPM  Oxygen Concentration (%): 30  Vt Set: 500 mL  PEEP/CPAP: 5 cmH20    Plan: trach in place    GI/:     Diet/Nutrition Received: NPO  Last Bowel Movement: 08/31/22  Voiding Characteristics: urethral catheter (bladder)    Intake/Output Summary (Last 24 hours) at 8/31/2022 1812  Last data filed at 8/31/2022 1802  Gross per 24 hour   Intake --   Output 545 ml   Net -545 ml          Labs/Accuchecks:  Recent Labs   Lab 08/31/22  1656   WBC 15.31*   RBC 2.93*   HGB 7.6*   HCT 24.8*         Recent Labs   Lab 08/29/22  0336 08/30/22  0314 08/31/22  0351   *  134*   < > 135*   K 4.2  4.2   < > 4.4   CO2 25  25   < > 25     105   < > 104   BUN 21  21   < > 29*   CREATININE 0.4*  0.4*   < > 0.4*   ALKPHOS 74  --   --    ALT 14  --   --    AST 17  --   --    BILITOT 0.6  --   --     < > = values in this interval not displayed.      Recent Labs   Lab 08/31/22  1656   INR 1.3*   APTT 28.7    No results for input(s): CPK, CPKMB, TROPONINI, MB in the last 168 hours.    Electrolytes: N/A - electrolytes WDL  Accuchecks: Q6H    Gtts:   heparin (porcine) in D5W  12 Units/kg/hr (08/31/22 1719)       LDA/Wounds:  Lines/Drains/Airways       Peripherally Inserted Central Catheter Line  Duration             PICC Triple Lumen 08/30/22 0910 left basilic 1 day              Drain  Duration                  Gastrostomy/Enterostomy LUQ feeding -- days         Rectal Tube 08/18/22 0000 13 days         Urethral Catheter 08/31/22 1100 <1 day              Airway  Duration                  Surgical Airway Shiley Cuffed -- days         Surgical Airway 05/23/22 1145 Portex Cuffed 100 days                  Wounds: Yes  Wound care consulted: Yes

## 2022-08-31 NOTE — NURSING TRANSFER
Nursing Transfer Note      8/31/2022     Reason patient is being transferred: Higher level of care, ALS & trismus treatment    Transfer To: [facility] ICU Room 9072    Report Called To:  Andrea RN/receiving nurse at [facility] at 0830    Transfer via EMS    Transfer with trach to vent AC rate 16, , P5, 28% O2, cardiac monitoring, EMS stretcher    Transported by [facility]    Medicines sent: Chloraseptic spray    Personal Belongings Sent:  Pt's personal communication computer with wall , and yanirae    Any special needs or follow-up needed: Contact/Enhanced Isolation    Chart send with patient: Yes    Notified: spouse Eze called and informed of transfer, room number and given nursing unit phone number

## 2022-08-31 NOTE — RESPIRATORY THERAPY
Patient arrived via EMS on transport ventilation.  Placed patient on MV set on documented settings.  Will continue to monitor.

## 2022-08-31 NOTE — PLAN OF CARE
[facility] Neuro Critical Care  Initial Discharge Assessment       Primary Care Provider: Primary Doctor No    Admission Diagnosis: Facial abscess    Admission Date: 8/31/2022  Expected Discharge Date: 9/7/2022    Discharge Barriers Identified: None    Payor: MEDICARE / Plan: MEDICARE PART A & B / Product Type: Government /     Extended Emergency Contact Information  Primary Emergency Contact: Eze Dominique  Mobile Phone: [phone]  Relation: Spouse  Preferred language: English  Secondary Emergency Contact: Deepali Dominique  Mobile Phone: [phone]  Relation: Sister    Discharge Plan A: Return to nursing home  Discharge Plan B: Return to Nursing Home      [facility] - [place], [place] - [address]  [address]  [facility] [address]  Phone: [phone] Fax: [phone]      Transferred from:  [facility] via [facility]     Past Medical History:   Diagnosis Date    ALS (amyotrophic lateral sclerosis)     ALS (amyotrophic lateral sclerosis)     Dependent on ventilator 3/21/2022    Erectile dysfunction     Gait disturbance     Hyperlipidemia     Hypertension     Iron deficiency anemia     Motor neuron disease     Polycystic kidney disease     Quadriparesis 2/6/2018    Tremor          CM met with patient in room for Discharge Planning Assessment.  Patient is unable to answer questions.  Phone call to Eze Dominique (wife)  [phone] .   Per wife, the patient is a resident at [facility]. Per wife, the patient is bed-bound and vent dependent at baseline and total care for all ADLS. Patient has a peg tube for feeding.   Patient will have assistance from nursing facility staff upon discharge. Per wife, she would like the patient to return to [facility] upon discharge.  All questions addressed.  CM will follow for needs.    Initial Assessment (most recent)       Adult Discharge Assessment - 08/31/22 1531          Discharge Assessment    Assessment Type  Discharge Planning Assessment     Confirmed/corrected address, phone number and insurance Yes     Confirmed Demographics Correct on Facesheet     Source of Information family     If unable to respond/provide information was family/caregiver contacted? Yes     Contact Name/Number Eze Dominique (wife) [phone]     Communicated VENESSA with patient/caregiver Date not available/Unable to determine     Reason For Admission Bacteremia     Lives With facility resident     Fac[facility] From[facility]     Do you expect to return to your current living situation? Yes     Do you have help at home or someone to help you manage your care at home? Yes     Who are your caregiver(s) and their phone number(s)? facility staff     Prior to hospitilization cognitive status: Unable to Assess     Current cognitive status: Unable to Assess     Walking or Climbing Stairs Difficulty ambulation difficulty, dependent;stair climbing difficulty, dependent;transferring difficulty, dependent     Mobility Management bedbound/Vent     Dressing/Bathing Difficulty bathing difficulty, dependent;dressing difficulty, dependent     Dressing/Bathing Management facility staff     Equipment Currently Used at Home ventilator     Readmission within 30 days? No     Patient currently being followed by outpatient case management? No     Do you currently have service(s) that help you manage your care at home? No     Do you take prescription medications? Yes     Do you have prescription coverage? Yes     Coverage Medicare/Medicaid     Do you have any problems affording any of your prescribed medications? No     Is the patient taking medications as prescribed? yes     Who is going to help you get home at discharge? Health plan transport     How do you get to doctors appointments? health plan transportation     Are you on dialysis? No     Do you take coumadin? No     Discharge Plan A Return to nursing home      Discharge Plan B Return to Nursing Home     DME Needed Upon Discharge  none     Name(s) and Number(s) Eze Dominique (wife)  [phone]     Discharge Barriers Identified None        Relationship/Environment    Name(s) of Who Lives With Patient facility resident- Talia Marc, RN, CCRN-K, [facility]  Neuro-Critical Care   X [id number]

## 2022-08-31 NOTE — PLAN OF CARE
ICU Attending    Briefly 55 yo AAM with ALS. At OSH with prolonged course complicated by bacteremia, PNA, upper extremity DVTs, facial swelling and trismus. Received Abx, PICC was changed there prior to transfer, and received BOTOX injections to help with his Trismus.    Here plan will be:  -start heparin gtt for DVTs and prior PE  -echo  -respiratory cultures  -blood cultures  -continue abx  -Consult ID  -change arvizu and send Uar  -thyroid ultrasound for lesions on CT  -ENT consult for facial mass on CT  -wound care consult for wounds POA  -send CRP, lactate, and procalcitonin  -start tube feeds    Jayden Trevino MD MPH  NeuroCritical Care & Vascular Neurology

## 2022-08-31 NOTE — PLAN OF CARE
08/31/22 0821   Final Note   Assessment Type Final Discharge Note   Anticipated Discharge Disposition Short Term   Patient transferred to [facility]

## 2022-09-01 PROBLEM — I82.90 OCCLUSIVE THROMBUS: Status: ACTIVE | Noted: 2022-09-01

## 2022-09-01 PROBLEM — I82.621 ACUTE DEEP VEIN THROMBOSIS (DVT) OF BRACHIAL VEIN OF RIGHT UPPER EXTREMITY: Status: ACTIVE | Noted: 2022-09-01

## 2022-09-01 PROBLEM — G12.21 ALS (AMYOTROPHIC LATERAL SCLEROSIS): Chronic | Status: ACTIVE | Noted: 2018-12-09

## 2022-09-01 LAB
ABO + RH BLD: NORMAL
ALBUMIN SERPL BCP-MCNC: 1.4 G/DL (ref 3.5–5.2)
ALP SERPL-CCNC: 77 U/L (ref 55–135)
ALT SERPL W/O P-5'-P-CCNC: 11 U/L (ref 10–44)
ANION GAP SERPL CALC-SCNC: 4 MMOL/L (ref 8–16)
APTT BLDCRRT: 38.2 SEC (ref 21–32)
APTT BLDCRRT: 40.7 SEC (ref 21–32)
APTT BLDCRRT: 41.4 SEC (ref 21–32)
APTT BLDCRRT: 41.5 SEC (ref 21–32)
ASCENDING AORTA: 3.29 CM
AST SERPL-CCNC: 16 U/L (ref 10–40)
AV INDEX (PROSTH): 0.91
AV MEAN GRADIENT: 2 MMHG
AV PEAK GRADIENT: 3 MMHG
AV VALVE AREA: 2.82 CM2
AV VELOCITY RATIO: 0.69
BACTERIA BLD CULT: ABNORMAL
BACTERIA UR CULT: NO GROWTH
BASOPHILS # BLD AUTO: 0.08 K/UL (ref 0–0.2)
BASOPHILS NFR BLD: 0.6 % (ref 0–1.9)
BILIRUB SERPL-MCNC: 0.4 MG/DL (ref 0.1–1)
BLD GP AB SCN CELLS X3 SERPL QL: NORMAL
BSA FOR ECHO PROCEDURE: 1.92 M2
BUN SERPL-MCNC: 20 MG/DL (ref 8–23)
CALCIUM SERPL-MCNC: 8.4 MG/DL (ref 8.7–10.5)
CHLORIDE SERPL-SCNC: 108 MMOL/L (ref 95–110)
CO2 SERPL-SCNC: 22 MMOL/L (ref 23–29)
CREAT SERPL-MCNC: 0.4 MG/DL (ref 0.5–1.4)
CRP SERPL-MCNC: 59.5 MG/L (ref 0–8.2)
CV ECHO LV RWT: 0.54 CM
DIFFERENTIAL METHOD: ABNORMAL
DOP CALC AO PEAK VEL: 0.8 M/S
DOP CALC AO VTI: 13.74 CM
DOP CALC LVOT AREA: 3.1 CM2
DOP CALC LVOT DIAMETER: 1.99 CM
DOP CALC LVOT PEAK VEL: 0.55 M/S
DOP CALC LVOT STROKE VOLUME: 38.7 CM3
DOP CALCLVOT PEAK VEL VTI: 12.45 CM
ECHO LV POSTERIOR WALL: 1.02 CM (ref 0.6–1.1)
EJECTION FRACTION: 65 %
EOSINOPHIL # BLD AUTO: 0.5 K/UL (ref 0–0.5)
EOSINOPHIL NFR BLD: 3.5 % (ref 0–8)
ERYTHROCYTE [DISTWIDTH] IN BLOOD BY AUTOMATED COUNT: 19.9 % (ref 11.5–14.5)
EST. GFR  (NO RACE VARIABLE): >60 ML/MIN/1.73 M^2
FRACTIONAL SHORTENING: 36 % (ref 28–44)
GLUCOSE SERPL-MCNC: 88 MG/DL (ref 70–110)
HCT VFR BLD AUTO: 23.2 % (ref 40–54)
HGB BLD-MCNC: 7 G/DL (ref 14–18)
IMM GRANULOCYTES # BLD AUTO: 0.13 K/UL (ref 0–0.04)
IMM GRANULOCYTES NFR BLD AUTO: 1 % (ref 0–0.5)
INTERVENTRICULAR SEPTUM: 1.09 CM (ref 0.6–1.1)
LA MAJOR: 3.63 CM
LA MINOR: 3.33 CM
LA WIDTH: 2.31 CM
LACTATE SERPL-SCNC: 0.7 MMOL/L (ref 0.5–2.2)
LEFT ATRIUM SIZE: 2.99 CM
LEFT ATRIUM VOLUME INDEX: 10.7 ML/M2
LEFT ATRIUM VOLUME: 20.39 CM3
LEFT INTERNAL DIMENSION IN SYSTOLE: 2.42 CM (ref 2.1–4)
LEFT VENTRICLE DIASTOLIC VOLUME INDEX: 32.7 ML/M2
LEFT VENTRICLE DIASTOLIC VOLUME: 62.46 ML
LEFT VENTRICLE MASS INDEX: 67 G/M2
LEFT VENTRICLE SYSTOLIC VOLUME INDEX: 10.7 ML/M2
LEFT VENTRICLE SYSTOLIC VOLUME: 20.52 ML
LEFT VENTRICULAR INTERNAL DIMENSION IN DIASTOLE: 3.81 CM (ref 3.5–6)
LEFT VENTRICULAR MASS: 127.2 G
LYMPHOCYTES # BLD AUTO: 1.8 K/UL (ref 1–4.8)
LYMPHOCYTES NFR BLD: 13 % (ref 18–48)
MAGNESIUM SERPL-MCNC: 1.9 MG/DL (ref 1.6–2.6)
MCH RBC QN AUTO: 25.8 PG (ref 27–31)
MCHC RBC AUTO-ENTMCNC: 30.2 G/DL (ref 32–36)
MCV RBC AUTO: 86 FL (ref 82–98)
MONOCYTES # BLD AUTO: 0.7 K/UL (ref 0.3–1)
MONOCYTES NFR BLD: 4.8 % (ref 4–15)
NEUTROPHILS # BLD AUTO: 10.5 K/UL (ref 1.8–7.7)
NEUTROPHILS NFR BLD: 77.1 % (ref 38–73)
NRBC BLD-RTO: 0 /100 WBC
PHOSPHATE SERPL-MCNC: 2.3 MG/DL (ref 2.7–4.5)
PISA TR MAX VEL: 2.41 M/S
PLATELET # BLD AUTO: 376 K/UL (ref 150–450)
PMV BLD AUTO: 10.3 FL (ref 9.2–12.9)
POCT GLUCOSE: 100 MG/DL (ref 70–110)
POTASSIUM SERPL-SCNC: 4.2 MMOL/L (ref 3.5–5.1)
PROCALCITONIN SERPL IA-MCNC: 0.54 NG/ML
PROT SERPL-MCNC: 7 G/DL (ref 6–8.4)
RA MAJOR: 3.34 CM
RA WIDTH: 2.4 CM
RBC # BLD AUTO: 2.71 M/UL (ref 4.6–6.2)
RIGHT VENTRICULAR END-DIASTOLIC DIMENSION: 2.72 CM
SINUS: 3.21 CM
SODIUM SERPL-SCNC: 134 MMOL/L (ref 136–145)
STJ: 3.35 CM
TR MAX PG: 23 MMHG
WBC # BLD AUTO: 13.59 K/UL (ref 3.9–12.7)

## 2022-09-01 PROCEDURE — 27000207 HC ISOLATION

## 2022-09-01 PROCEDURE — 99900035 HC TECH TIME PER 15 MIN (STAT)

## 2022-09-01 PROCEDURE — 63600175 PHARM REV CODE 636 W HCPCS: Performed by: NURSE PRACTITIONER

## 2022-09-01 PROCEDURE — 99291 PR CRITICAL CARE, E/M 30-74 MINUTES: ICD-10-PCS | Mod: GC,,, | Performed by: INTERNAL MEDICINE

## 2022-09-01 PROCEDURE — 27200966 HC CLOSED SUCTION SYSTEM

## 2022-09-01 PROCEDURE — 99223 PR INITIAL HOSPITAL CARE,LEVL III: ICD-10-PCS | Mod: ,,, | Performed by: INTERNAL MEDICINE

## 2022-09-01 PROCEDURE — 84100 ASSAY OF PHOSPHORUS: CPT

## 2022-09-01 PROCEDURE — 86901 BLOOD TYPING SEROLOGIC RH(D): CPT | Performed by: NURSE PRACTITIONER

## 2022-09-01 PROCEDURE — 85730 THROMBOPLASTIN TIME PARTIAL: CPT | Mod: 91

## 2022-09-01 PROCEDURE — 83605 ASSAY OF LACTIC ACID: CPT

## 2022-09-01 PROCEDURE — 25000003 PHARM REV CODE 250: Performed by: PSYCHIATRY & NEUROLOGY

## 2022-09-01 PROCEDURE — 99292 PR CRITICAL CARE, ADDL 30 MIN: ICD-10-PCS | Mod: GC,,, | Performed by: INTERNAL MEDICINE

## 2022-09-01 PROCEDURE — 25000003 PHARM REV CODE 250: Performed by: INTERNAL MEDICINE

## 2022-09-01 PROCEDURE — 84145 PROCALCITONIN (PCT): CPT

## 2022-09-01 PROCEDURE — 85730 THROMBOPLASTIN TIME PARTIAL: CPT | Mod: 91 | Performed by: INTERNAL MEDICINE

## 2022-09-01 PROCEDURE — 63700000 PHARM REV CODE 250 ALT 637 W/O HCPCS

## 2022-09-01 PROCEDURE — 85730 THROMBOPLASTIN TIME PARTIAL: CPT | Mod: 91 | Performed by: PSYCHIATRY & NEUROLOGY

## 2022-09-01 PROCEDURE — 27000221 HC OXYGEN, UP TO 24 HOURS

## 2022-09-01 PROCEDURE — 83735 ASSAY OF MAGNESIUM: CPT

## 2022-09-01 PROCEDURE — 80053 COMPREHEN METABOLIC PANEL: CPT

## 2022-09-01 PROCEDURE — 85730 THROMBOPLASTIN TIME PARTIAL: CPT | Performed by: PSYCHIATRY & NEUROLOGY

## 2022-09-01 PROCEDURE — 94003 VENT MGMT INPAT SUBQ DAY: CPT

## 2022-09-01 PROCEDURE — 25000003 PHARM REV CODE 250

## 2022-09-01 PROCEDURE — 99223 1ST HOSP IP/OBS HIGH 75: CPT | Mod: ,,, | Performed by: INTERNAL MEDICINE

## 2022-09-01 PROCEDURE — 63600175 PHARM REV CODE 636 W HCPCS

## 2022-09-01 PROCEDURE — 99900026 HC AIRWAY MAINTENANCE (STAT)

## 2022-09-01 PROCEDURE — 20000000 HC ICU ROOM

## 2022-09-01 PROCEDURE — 99291 CRITICAL CARE FIRST HOUR: CPT | Mod: GC,,, | Performed by: INTERNAL MEDICINE

## 2022-09-01 PROCEDURE — 94761 N-INVAS EAR/PLS OXIMETRY MLT: CPT

## 2022-09-01 PROCEDURE — 99292 CRITICAL CARE ADDL 30 MIN: CPT | Mod: GC,,, | Performed by: INTERNAL MEDICINE

## 2022-09-01 PROCEDURE — 63600175 PHARM REV CODE 636 W HCPCS: Performed by: PSYCHIATRY & NEUROLOGY

## 2022-09-01 PROCEDURE — 25000003 PHARM REV CODE 250: Performed by: NURSE PRACTITIONER

## 2022-09-01 PROCEDURE — 85025 COMPLETE CBC W/AUTO DIFF WBC: CPT

## 2022-09-01 PROCEDURE — 86140 C-REACTIVE PROTEIN: CPT

## 2022-09-01 PROCEDURE — 25000242 PHARM REV CODE 250 ALT 637 W/ HCPCS

## 2022-09-01 RX ORDER — DIAZEPAM 5 MG/1
5 TABLET ORAL ONCE
Status: COMPLETED | OUTPATIENT
Start: 2022-09-01 | End: 2022-09-01

## 2022-09-01 RX ORDER — METHOCARBAMOL 500 MG/1
500 TABLET, FILM COATED ORAL 4 TIMES DAILY
Status: DISCONTINUED | OUTPATIENT
Start: 2022-09-01 | End: 2022-09-07

## 2022-09-01 RX ORDER — METOPROLOL TARTRATE 25 MG/1
25 TABLET, FILM COATED ORAL 2 TIMES DAILY
Status: DISCONTINUED | OUTPATIENT
Start: 2022-09-01 | End: 2022-09-04

## 2022-09-01 RX ORDER — FAMOTIDINE 20 MG/1
20 TABLET, FILM COATED ORAL 2 TIMES DAILY
Status: DISCONTINUED | OUTPATIENT
Start: 2022-09-01 | End: 2022-09-13 | Stop reason: HOSPADM

## 2022-09-01 RX ADMIN — BUSPIRONE HYDROCHLORIDE 5 MG: 5 TABLET ORAL at 10:09

## 2022-09-01 RX ADMIN — OXYCODONE 5 MG: 5 TABLET ORAL at 12:09

## 2022-09-01 RX ADMIN — NYSTATIN 500000 UNITS: 500000 SUSPENSION ORAL at 11:09

## 2022-09-01 RX ADMIN — OXYCODONE 5 MG: 5 TABLET ORAL at 10:09

## 2022-09-01 RX ADMIN — MUPIROCIN: 20 OINTMENT TOPICAL at 09:09

## 2022-09-01 RX ADMIN — NYSTATIN 500000 UNITS: 500000 SUSPENSION ORAL at 04:09

## 2022-09-01 RX ADMIN — METHOCARBAMOL 500 MG: 500 TABLET ORAL at 10:09

## 2022-09-01 RX ADMIN — FAMOTIDINE 20 MG: 20 TABLET ORAL at 10:09

## 2022-09-01 RX ADMIN — MORPHINE SULFATE 2 MG: 2 INJECTION, SOLUTION INTRAMUSCULAR; INTRAVENOUS at 07:09

## 2022-09-01 RX ADMIN — MORPHINE SULFATE 2 MG: 2 INJECTION, SOLUTION INTRAMUSCULAR; INTRAVENOUS at 01:09

## 2022-09-01 RX ADMIN — VANCOMYCIN HYDROCHLORIDE 1250 MG: 1.25 INJECTION, POWDER, LYOPHILIZED, FOR SOLUTION INTRAVENOUS at 04:09

## 2022-09-01 RX ADMIN — BACLOFEN 10 MG: 10 TABLET ORAL at 09:09

## 2022-09-01 RX ADMIN — MUPIROCIN: 20 OINTMENT TOPICAL at 11:09

## 2022-09-01 RX ADMIN — DIAZEPAM 5 MG: 5 TABLET ORAL at 09:09

## 2022-09-01 RX ADMIN — OXYCODONE 5 MG: 5 TABLET ORAL at 04:09

## 2022-09-01 RX ADMIN — ACETAMINOPHEN 650 MG: 325 TABLET ORAL at 04:09

## 2022-09-01 RX ADMIN — RILUZOLE 50 MG: 50 TABLET ORAL at 09:09

## 2022-09-01 RX ADMIN — MUPIROCIN: 20 OINTMENT TOPICAL at 04:09

## 2022-09-01 RX ADMIN — CEFEPIME HYDROCHLORIDE 2 G: 2 INJECTION, SOLUTION INTRAVENOUS at 10:09

## 2022-09-01 RX ADMIN — TRAZODONE HYDROCHLORIDE 50 MG: 50 TABLET ORAL at 09:09

## 2022-09-01 RX ADMIN — BACLOFEN 10 MG: 10 TABLET ORAL at 10:09

## 2022-09-01 RX ADMIN — NYSTATIN 500000 UNITS: 500000 SUSPENSION ORAL at 09:09

## 2022-09-01 RX ADMIN — METHOCARBAMOL 500 MG: 500 TABLET ORAL at 12:09

## 2022-09-01 RX ADMIN — BUSPIRONE HYDROCHLORIDE 5 MG: 5 TABLET ORAL at 09:09

## 2022-09-01 RX ADMIN — PSYLLIUM HUSK 1 PACKET: 3.4 POWDER ORAL at 10:09

## 2022-09-01 RX ADMIN — METHOCARBAMOL 500 MG: 500 TABLET ORAL at 09:09

## 2022-09-01 RX ADMIN — BACLOFEN 10 MG: 10 TABLET ORAL at 04:09

## 2022-09-01 RX ADMIN — FAMOTIDINE 20 MG: 20 TABLET ORAL at 09:09

## 2022-09-01 RX ADMIN — HEPARIN SODIUM 13 UNITS/KG/HR: 5000 INJECTION INTRAVENOUS; SUBCUTANEOUS at 10:09

## 2022-09-01 RX ADMIN — NYSTATIN 500000 UNITS: 500000 SUSPENSION ORAL at 12:09

## 2022-09-01 RX ADMIN — METHOCARBAMOL 500 MG: 500 TABLET ORAL at 04:09

## 2022-09-01 RX ADMIN — CEFEPIME HYDROCHLORIDE 2 G: 2 INJECTION, SOLUTION INTRAVENOUS at 06:09

## 2022-09-01 RX ADMIN — CEFEPIME HYDROCHLORIDE 2 G: 2 INJECTION, SOLUTION INTRAVENOUS at 12:09

## 2022-09-01 RX ADMIN — RILUZOLE 50 MG: 50 TABLET ORAL at 10:09

## 2022-09-01 RX ADMIN — MORPHINE SULFATE 2 MG: 2 INJECTION, SOLUTION INTRAMUSCULAR; INTRAVENOUS at 06:09

## 2022-09-01 RX ADMIN — MORPHINE SULFATE 2 MG: 2 INJECTION, SOLUTION INTRAMUSCULAR; INTRAVENOUS at 02:09

## 2022-09-01 RX ADMIN — METOPROLOL TARTRATE 25 MG: 25 TABLET, FILM COATED ORAL at 09:09

## 2022-09-01 NOTE — SUBJECTIVE & OBJECTIVE
Medications:  Continuous Infusions:   heparin (porcine) in D5W 13 Units/kg/hr (09/01/22 1405)     Scheduled Meds:   baclofen  10 mg Per G Tube TID    busPIRone  5 mg Per G Tube BID    ceFEPime (MAXIPIME) IVPB  2 g Intravenous Q8H    famotidine  20 mg Per G Tube BID    methocarbamoL  500 mg Per G Tube QID    metoprolol tartrate  25 mg Per G Tube BID    mupirocin   Nasal TID    nystatin  500,000 Units Oral QID    psyllium husk (aspartame)  1 packet Per G Tube Daily    riluzole  50 mg Per G Tube Q12H    scopolamine  1 patch Transdermal Q3 Days    traZODone  50 mg Per G Tube QHS    vancomycin (VANCOCIN) IVPB  1,250 mg Intravenous Q24H     PRN Meds:acetaminophen, dextrose 10%, dextrose 10%, hydrALAZINE, labetalol, morphine, ondansetron, oxyCODONE, sodium chloride 0.9%, Pharmacy to dose Vancomycin consult **AND** vancomycin - pharmacy to dose     No current facility-administered medications on file prior to encounter.     Current Outpatient Medications on File Prior to Encounter   Medication Sig    albuterol-ipratropium (DUO-NEB) 2.5 mg-0.5 mg/3 mL nebulizer solution Take 3 mLs by nebulization every 4 (four) hours. Rescue    apixaban (ELIQUIS) 2.5 mg Tab 1 tablet (2.5 mg total) by Per G Tube route 2 (two) times daily.    baclofen (LIORESAL) 10 MG tablet TAKE 1 TABLET THREE TIMES PER DAY (Patient taking differently: 10 mg by Per G Tube route 3 (three) times daily.)    busPIRone (BUSPAR) 5 MG Tab 1 tablet (5 mg total) by Per G Tube route 2 (two) times daily.    cefepime in dextrose 5 % (MAXIPIME) 2 gram/50 mL PgBk Inject 50 mLs (2 g total) into the vein every 8 (eight) hours.    glycopyrrolate (ROBINUL) 1 mg Tab START BY TAKING 1 TABLET DAILY. IF TOLERATING WELL, YOU CAN INCREASE IT UP TO 2 OR EVEN 3 TIMES DAILY. (Patient taking differently: 1 mg by Per G Tube route 3 (three) times daily.)    lansoprazole (PREVACID) 30 MG capsule Take 30 mg by mouth once daily.    metoprolol tartrate (LOPRESSOR) 50 MG tablet Take 50 mg by  mouth once daily.    mupirocin (BACTROBAN) 2 % ointment SMARTSI Application Topical 2-3 Times Daily    nystatin (MYCOSTATIN) 100,000 unit/mL suspension Take 5 mLs (500,000 Units total) by mouth 4 (four) times daily. for 10 days    ondansetron (ZOFRAN) 4 MG tablet 4 mg by Per G Tube route every 6 (six) hours as needed for Nausea.    pantoprazole (PROTONIX) 40 mg injection Inject 40 mg into the vein once daily.    pantoprazole (PROTONIX) 40 mg suspension 40 mg by FEEDING TUBE route 2 (two) times a day.    polyethylene glycol (GLYCOLAX) 17 gram PwPk 17 g by Per G Tube route 2 (two) times daily.    riluzole 50 mg Tab tablet TAKE 1 TABLET ON AN EMPTY STOMACH EVERY 12 HRS (Patient taking differently: 50 mg by Per G Tube route. TAKE 1 TABLET ON AN EMPTY STOMACH EVERY 12 HRS)    sodium chloride 0.9% (NORMAL SALINE FLUSH) injection Inject 10 mLs into the vein every 6 (six) hours.    sodium chloride 0.9% (NORMAL SALINE FLUSH) injection Inject 10 mLs into the vein as needed.    traZODone (DESYREL) 50 MG tablet 50 mg by Per G Tube route every evening.       Review of patient's allergies indicates:  No Known Allergies    Past Medical History:   Diagnosis Date    ALS (amyotrophic lateral sclerosis)     ALS (amyotrophic lateral sclerosis)     Dependent on ventilator 3/21/2022    Erectile dysfunction     Gait disturbance     Hyperlipidemia     Hypertension     Iron deficiency anemia     Motor neuron disease     Polycystic kidney disease     Quadriparesis 2018    Tremor      Past Surgical History:   Procedure Laterality Date    GASTROSTOMY      PEG    PORTACATH PLACEMENT      TRACHEOSTOMY       Family History    None       Tobacco Use    Smoking status: Not on file    Smokeless tobacco: Not on file   Substance and Sexual Activity    Alcohol use: Not on file    Drug use: Never    Sexual activity: Not Currently     Review of Systems   Unable to perform ROS: Patient nonverbal   Objective:     Vital Signs (Most Recent):  Temp:  98.9 °F (37.2 °C) (09/01/22 1105)  Pulse: 108 (09/01/22 1405)  Resp: (!) 26 (09/01/22 1405)  BP: 110/66 (09/01/22 1405)  SpO2: 100 % (09/01/22 1405)   Vital Signs (24h Range):  Temp:  [98.2 °F (36.8 °C)-98.9 °F (37.2 °C)] 98.9 °F (37.2 °C)  Pulse:  [] 108  Resp:  [16-26] 26  SpO2:  [100 %] 100 %  BP: ()/(51-74) 110/66     Weight: 77 kg (169 lb 12.1 oz)  Body mass index is 25.81 kg/m².    Date 09/01/22 0700 - 09/02/22 0659   Shift 0835-6055 [id number] 24 Hour Total   INTAKE   I.V.(mL/kg) 67.7(0.9)   67.7(0.9)   NG/GT 50   50   IV Piggyback 49.5   49.5   Shift Total(mL/kg) 167.2(2.2)   167.2(2.2)   OUTPUT   Urine(mL/kg/hr) 290(0.5)   290   Shift Total(mL/kg) 290(3.8)   290(3.8)   Weight (kg) 77 77 77 77       Physical Exam  Constitutional:       General: He is not in acute distress.     Appearance: He is obese. He is not ill-appearing, toxic-appearing or diaphoretic.   HENT:      Head: Normocephalic and atraumatic.      Right Ear: External ear normal.      Left Ear: External ear normal.      Nose: Nose normal.      Mouth/Throat:      Comments: Bilateral bite blocks in place  Tongue prolapsed out of oral cavity, edematous  Soft tissue swelling along the R mandible anteriorly, consistent with partially bitten tongue tissue  Copious saliva and secretions  Unable to assess OC further  Eyes:      Extraocular Movements: Extraocular movements intact.   Pulmonary:      Comments: On vent  Vent Mode: A/C  Oxygen Concentration (%):  [30] 30  Resp Rate Total:  [16 br/min-27 br/min] 24 br/min  Vt Set:  [500 mL] 500 mL  PEEP/CPAP:  [5 cmH20] 5 cmH20  Mean Airway Pressure:  [9.3 saD21-53 cmH20] 10 cmH20    Musculoskeletal:      Cervical back: Neck supple. No rigidity or tenderness.   Lymphadenopathy:      Cervical: No cervical adenopathy.       Significant Labs:  BMP:   Recent Labs   Lab 09/01/22  0019   GLU 88      CO2 22*   BUN 20   CREATININE 0.4*   CALCIUM 8.4*   MG 1.9     CBC:   Recent Labs   Lab  09/01/22  0019   WBC 13.59*   RBC 2.71*   HGB 7.0*   HCT 23.2*      MCV 86   MCH 25.8*   MCHC 30.2*       Significant Diagnostics:  CT: I have reviewed all pertinent results/findings within the past 24 hours and my personal findings are:  prior CT w possible R mandibular collection, most likely displaced tongue tissue

## 2022-09-01 NOTE — ASSESSMENT & PLAN NOTE
Briefly 57 yo AAM with ALS. At OSH with prolonged course complicated by bacteremia, PNA, upper extremity DVTs, facial swelling and trismus. Received Abx, PICC was changed there prior to transfer, and received BOTOX injections to help with his Trismus.     -ENT consult for facial mass on CT & trismus   -ID following: infectious workup & Abx  -Heparin gtt for DVTs and prior PE  -Echo  -tube feeds

## 2022-09-01 NOTE — CONSULTS
[facility]  Otorhinolaryngology-Head & Neck Surgery  Consult Note    Patient Name: Wei Dominique  MRN: [id number]  Code Status: Full Code  Admission Date: 8/31/2022  Hospital Length of Stay: 1 days  Attending Physician: Juaquin Cyr MD  Primary Care Provider: Primary Doctor No    Patient information was obtained from patient, ER records and primary team.     Inpatient consult to ENT  Consult performed by: Corey Gallardo MD  Consult ordered by: Mack Mccartney MD        Subjective:     Chief Complaint/Reason for Admission: trismus    History of Present Illness: Wei Dominique is a 57 yo AAM with ALS and severe neurogenic trismus. At OSH with prolonged course complicated by bacteremia, PNA, upper extremity DVTs, facial swelling and trismus. Received Abx. Was seen by ENT at OSH, performed bilateral masseter injections 8/24/22 with no relief. Pt subsequently bit down on his tongue requiring paralysis and bite block placement. Also concern for possible rim-enhancing lesion of the R mandible - deemed to be part of bitten tongue. Pt is trach-dependent, non-verbal, and communicates with shaking of his head and blinking his eyes in various patterns. Pt acknowledges diffuse pain to his jaw. States he did not get any benefit from Botox injections previously.       Medications:  Continuous Infusions:   heparin (porcine) in D5W 13 Units/kg/hr (09/01/22 1405)     Scheduled Meds:   baclofen  10 mg Per G Tube TID    busPIRone  5 mg Per G Tube BID    ceFEPime (MAXIPIME) IVPB  2 g Intravenous Q8H    famotidine  20 mg Per G Tube BID    methocarbamoL  500 mg Per G Tube QID    metoprolol tartrate  25 mg Per G Tube BID    mupirocin   Nasal TID    nystatin  500,000 Units Oral QID    psyllium husk (aspartame)  1 packet Per G Tube Daily    riluzole  50 mg Per G Tube Q12H    scopolamine  1 patch Transdermal Q3 Days    traZODone  50 mg Per G Tube QHS    vancomycin (VANCOCIN) IVPB  1,250 mg  Intravenous Q24H     PRN Meds:acetaminophen, dextrose 10%, dextrose 10%, hydrALAZINE, labetalol, morphine, ondansetron, oxyCODONE, sodium chloride 0.9%, Pharmacy to dose Vancomycin consult **AND** vancomycin - pharmacy to dose     No current facility-administered medications on file prior to encounter.     Current Outpatient Medications on File Prior to Encounter   Medication Sig    albuterol-ipratropium (DUO-NEB) 2.5 mg-0.5 mg/3 mL nebulizer solution Take 3 mLs by nebulization every 4 (four) hours. Rescue    apixaban (ELIQUIS) 2.5 mg Tab 1 tablet (2.5 mg total) by Per G Tube route 2 (two) times daily.    baclofen (LIORESAL) 10 MG tablet TAKE 1 TABLET THREE TIMES PER DAY (Patient taking differently: 10 mg by Per G Tube route 3 (three) times daily.)    busPIRone (BUSPAR) 5 MG Tab 1 tablet (5 mg total) by Per G Tube route 2 (two) times daily.    cefepime in dextrose 5 % (MAXIPIME) 2 gram/50 mL PgBk Inject 50 mLs (2 g total) into the vein every 8 (eight) hours.    glycopyrrolate (ROBINUL) 1 mg Tab START BY TAKING 1 TABLET DAILY. IF TOLERATING WELL, YOU CAN INCREASE IT UP TO 2 OR EVEN 3 TIMES DAILY. (Patient taking differently: 1 mg by Per G Tube route 3 (three) times daily.)    lansoprazole (PREVACID) 30 MG capsule Take 30 mg by mouth once daily.    metoprolol tartrate (LOPRESSOR) 50 MG tablet Take 50 mg by mouth once daily.    mupirocin (BACTROBAN) 2 % ointment SMARTSI Application Topical 2-3 Times Daily    nystatin (MYCOSTATIN) 100,000 unit/mL suspension Take 5 mLs (500,000 Units total) by mouth 4 (four) times daily. for 10 days    ondansetron (ZOFRAN) 4 MG tablet 4 mg by Per G Tube route every 6 (six) hours as needed for Nausea.    pantoprazole (PROTONIX) 40 mg injection Inject 40 mg into the vein once daily.    pantoprazole (PROTONIX) 40 mg suspension 40 mg by FEEDING TUBE route 2 (two) times a day.    polyethylene glycol (GLYCOLAX) 17 gram PwPk 17 g by Per G Tube route 2 (two) times daily.     riluzole 50 mg Tab tablet TAKE 1 TABLET ON AN EMPTY STOMACH EVERY 12 HRS (Patient taking differently: 50 mg by Per G Tube route. TAKE 1 TABLET ON AN EMPTY STOMACH EVERY 12 HRS)    sodium chloride 0.9% (NORMAL SALINE FLUSH) injection Inject 10 mLs into the vein every 6 (six) hours.    sodium chloride 0.9% (NORMAL SALINE FLUSH) injection Inject 10 mLs into the vein as needed.    traZODone (DESYREL) 50 MG tablet 50 mg by Per G Tube route every evening.       Review of patient's allergies indicates:  No Known Allergies    Past Medical History:   Diagnosis Date    ALS (amyotrophic lateral sclerosis)     ALS (amyotrophic lateral sclerosis)     Dependent on ventilator 3/21/2022    Erectile dysfunction     Gait disturbance     Hyperlipidemia     Hypertension     Iron deficiency anemia     Motor neuron disease     Polycystic kidney disease     Quadriparesis 2/6/2018    Tremor      Past Surgical History:   Procedure Laterality Date    GASTROSTOMY      PEG    PORTACATH PLACEMENT      TRACHEOSTOMY       Family History    None       Tobacco Use    Smoking status: Not on file    Smokeless tobacco: Not on file   Substance and Sexual Activity    Alcohol use: Not on file    Drug use: Never    Sexual activity: Not Currently     Review of Systems   Unable to perform ROS: Patient nonverbal   Objective:     Vital Signs (Most Recent):  Temp: 98.9 °F (37.2 °C) (09/01/22 1105)  Pulse: 108 (09/01/22 1405)  Resp: (!) 26 (09/01/22 1405)  BP: 110/66 (09/01/22 1405)  SpO2: 100 % (09/01/22 1405)   Vital Signs (24h Range):  Temp:  [98.2 °F (36.8 °C)-98.9 °F (37.2 °C)] 98.9 °F (37.2 °C)  Pulse:  [] 108  Resp:  [16-26] 26  SpO2:  [100 %] 100 %  BP: ()/(51-74) 110/66     Weight: 77 kg (169 lb 12.1 oz)  Body mass index is 25.81 kg/m².    Date 09/01/22 0700 - 09/02/22 0659   Shift 4693-8015 9482-6576 [id number] 24 Hour Total   INTAKE   I.V.(mL/kg) 67.7(0.9)   67.7(0.9)   NG/GT 50   50   IV Piggyback 49.5   49.5    Shift Total(mL/kg) 167.2(2.2)   167.2(2.2)   OUTPUT   Urine(mL/kg/hr) 290(0.5)   290   Shift Total(mL/kg) 290(3.8)   290(3.8)   Weight (kg) 77 77 77 77       Physical Exam  Constitutional:       General: He is not in acute distress.     Appearance: He is obese. He is not ill-appearing, toxic-appearing or diaphoretic.   HENT:      Head: Normocephalic and atraumatic.      Right Ear: External ear normal.      Left Ear: External ear normal.      Nose: Nose normal.      Mouth/Throat:      Comments: Bilateral bite blocks in place  Tongue prolapsed out of oral cavity, edematous  Soft tissue swelling along the R mandible anteriorly, consistent with partially bitten tongue tissue  Copious saliva and secretions  Unable to assess OC further  Eyes:      Extraocular Movements: Extraocular movements intact.   Pulmonary:      Comments: On vent  Vent Mode: A/C  Oxygen Concentration (%):  [30] 30  Resp Rate Total:  [16 br/min-27 br/min] 24 br/min  Vt Set:  [500 mL] 500 mL  PEEP/CPAP:  [5 cmH20] 5 cmH20  Mean Airway Pressure:  [9.3 heB55-04 cmH20] 10 cmH20    Musculoskeletal:      Cervical back: Neck supple. No rigidity or tenderness.   Lymphadenopathy:      Cervical: No cervical adenopathy.       Significant Labs:  BMP:   Recent Labs   Lab 09/01/22  0019   GLU 88      CO2 22*   BUN 20   CREATININE 0.4*   CALCIUM 8.4*   MG 1.9     CBC:   Recent Labs   Lab 09/01/22  0019   WBC 13.59*   RBC 2.71*   HGB 7.0*   HCT 23.2*      MCV 86   MCH 25.8*   MCHC 30.2*       Significant Diagnostics:  CT: I have reviewed all pertinent results/findings within the past 24 hours and my personal findings are:  prior CT w possible R mandibular collection, most likely displaced tongue tissue       Assessment/Plan:     * Trismus  Severe neurogenic trismus in patient w ALS, unsuccessfully injected w Botox at OSH on 8/24/22\    - No acute ENT intervention at this time  - Botox injection for trismus currently not performed by any ENT at [facility],  consider consultation to hospital neurology if this is to be investigated further  - Unfortunately, no further interventions available to help treat neurogenic trismus outside of botox and muscle relaxers  - No evidence of mass or abscess of the head and neck based on physical exam and imaging review  - Please page w questions or concerns      VTE Risk Mitigation (From admission, onward)         Ordered     heparin 25,000 units in dextrose 5% 250 ml (100 units/mL) infusion MINIMAL INTENSITY nomogram - [facility]  Continuous        Question Answer Comment   Heparin Infusion Adjustment (DO NOT MODIFY ANSWER) [email or web address] MINIMAL  INTENSITY nomogram for [facility] [email or web address]    Begin at (in units/kg/hr) 12        08/31/22 1635     IP VTE HIGH RISK PATIENT  Once         08/31/22 1143     Place sequential compression device  Until discontinued         08/31/22 1143                Thank you for your consult. I will sign off. Please contact us if you have any additional questions.    Corey Gallardo MD  Otorhinolaryngology-Head & Neck Surgery  [facility]

## 2022-09-01 NOTE — CONSULTS
[facility]  Infectious Disease  Consult Note    Patient Name: Wei Dominique  MRN: [id number]  Admission Date: 8/31/2022  Hospital Length of Stay: 1 days  Attending Physician: Juaquin Cyr MD  Primary Care Provider: Primary Doctor No     Isolation Status: Contact    Patient information was obtained from past medical records and ER records.      Inpatient consult to Infectious Diseases  Consult performed by: Zain Caruso MD  Consult ordered by: Lilliam Alicea PA-C        Assessment/Plan:     Bacteremia  67-year-old male with ALS s/p trach'd and peg'd, vent-dependent initially presented to OSH with facial swelling with concerns for facial cellulitis with neurogenic trismus s/p botox injections, transferred here for further evaluation regarding this.    Has multiple possible sources for initial sepsis.  No abscess seen on ENT exam  8/18/22 urine Cx with pseudomonas and Providencia.   8/18/22 midline catheter tip with MRSA and morganella (suspect colonized tip)  8/19/22 and 8/26/22 RCx with Pseudomonas, latter .  Remains sensitive to cefepime and pip/tazo.  Vent settings appear stable, though various CXR imaging with consolidations  8/26/22-8/27/22 Staph capitis bacteremia resistance to oxacillin.  8/30/22 PICC tip cx negative.  Noted multiple skin wounds.  8/31/22 RCx with GNR    Recommendations:  -Staph capitis bacteremia and pseudomonas pneumonia appear clinically-significant  -Expecting 2 weeks of vancomycin and 7 days of cefepime  -+ cultures from all other sources would be covered adequately with the above regimen  -Repeat Bcx NGTD, follow-up TTE results  -Will follow-up repeat RCx results  -ENT reconsulted.  Would also repeat CT max/face        Thank you for your consult. I will follow-up with patient. Please contact us if you have any additional questions.    Zain Caruso MD  Infectious Disease  [facility]    Subjective:     Principal Problem: Trismus    HPI:  "67-year-old male with ALS, trach'd and PEG'd, vent-depentence, HTN, PE on eliquis,  pseudomonas initially presented to [facility] on 8/18/22 complaining of facial swelling, presented febrile 102.4 and with leukocytosis 21.  CT max/face showed right maxillary and anterior maxillary soft tissue edema and thickening with right perimandibular ovoid infection.  ENT was able to examine the patient under sedation and paralytics, no abscesses seen on their exam, bite block placed.  Bilateral masseter botox injections were also later given on 8/24/22 and 8/30/22.  Infectious Disease Dr. Burnham was consulted and saw the patient during hospitalization at stay at [place].  Abx and w/u as below.  Initial vancomycin and meropenem changed to Zerbaxa and clindamycin.  8/18/22 urine culture grew both Pseudomonas and providencia.  8/18/22 Midline catheter tip grew MRSA and morganella.  Blood cultures have not grown these organisms.  8/19/22 respiratory culture grew Pseudomonas.  8/22/22 Antibiotics changed to cefepime  8/25/22-8/26/22 ID reconsulted for Tmax 100.4, soft Bps.  Noted purulent secretions from and around the ET tube.  CXR with opacities  8/26/22 Cefepime changed to meropenem  8/27/22 started vancomycin  8/26/22 respiratory culture grew Pseudomonas, now .  8/26/22-8/27/22 blood cultures positive for Staph capitis, oxacillin-resistant.  8/29/22 meropenem changed to cefepime based on pseudomonas  susceptibilities  8/30/22 PICC line tip culture NGTD  8/30/22 Nonfunctional portacath removed  [place] [facility] [place], neurologic trismus  8/31/22 Rcx GNR    Consult: "Antibiotic recommendations. OSH cultures + for MRSA, ESBL, MDRO, . Repeat cultures pending."      Past Medical History:   Diagnosis Date    ALS (amyotrophic lateral sclerosis)     ALS (amyotrophic lateral sclerosis)     Dependent on ventilator 3/21/2022    Erectile dysfunction     Gait disturbance     " Hyperlipidemia     Hypertension     Iron deficiency anemia     Motor neuron disease     Polycystic kidney disease     Quadriparesis 2018    Tremor        Past Surgical History:   Procedure Laterality Date    GASTROSTOMY      PEG    PORTACATH PLACEMENT      TRACHEOSTOMY         Review of patient's allergies indicates:  No Known Allergies    Medications:  Medications Prior to Admission   Medication Sig    albuterol-ipratropium (DUO-NEB) 2.5 mg-0.5 mg/3 mL nebulizer solution Take 3 mLs by nebulization every 4 (four) hours. Rescue    apixaban (ELIQUIS) 2.5 mg Tab 1 tablet (2.5 mg total) by Per G Tube route 2 (two) times daily.    baclofen (LIORESAL) 10 MG tablet TAKE 1 TABLET THREE TIMES PER DAY (Patient taking differently: 10 mg by Per G Tube route 3 (three) times daily.)    busPIRone (BUSPAR) 5 MG Tab 1 tablet (5 mg total) by Per G Tube route 2 (two) times daily.    cefepime in dextrose 5 % (MAXIPIME) 2 gram/50 mL PgBk Inject 50 mLs (2 g total) into the vein every 8 (eight) hours.    glycopyrrolate (ROBINUL) 1 mg Tab START BY TAKING 1 TABLET DAILY. IF TOLERATING WELL, YOU CAN INCREASE IT UP TO 2 OR EVEN 3 TIMES DAILY. (Patient taking differently: 1 mg by Per G Tube route 3 (three) times daily.)    lansoprazole (PREVACID) 30 MG capsule Take 30 mg by mouth once daily.    metoprolol tartrate (LOPRESSOR) 50 MG tablet Take 50 mg by mouth once daily.    mupirocin (BACTROBAN) 2 % ointment SMARTSI Application Topical 2-3 Times Daily    nystatin (MYCOSTATIN) 100,000 unit/mL suspension Take 5 mLs (500,000 Units total) by mouth 4 (four) times daily. for 10 days    ondansetron (ZOFRAN) 4 MG tablet 4 mg by Per G Tube route every 6 (six) hours as needed for Nausea.    pantoprazole (PROTONIX) 40 mg injection Inject 40 mg into the vein once daily.    pantoprazole (PROTONIX) 40 mg suspension 40 mg by FEEDING TUBE route 2 (two) times a day.    polyethylene glycol (GLYCOLAX) 17 gram PwPk 17 g by Per G Tube  route 2 (two) times daily.    riluzole 50 mg Tab tablet TAKE 1 TABLET ON AN EMPTY STOMACH EVERY 12 HRS (Patient taking differently: 50 mg by Per G Tube route. TAKE 1 TABLET ON AN EMPTY STOMACH EVERY 12 HRS)    sodium chloride 0.9% (NORMAL SALINE FLUSH) injection Inject 10 mLs into the vein every 6 (six) hours.    sodium chloride 0.9% (NORMAL SALINE FLUSH) injection Inject 10 mLs into the vein as needed.    traZODone (DESYREL) 50 MG tablet 50 mg by Per G Tube route every evening.     Antibiotics (From admission, onward)      Start     Stop Route Frequency Ordered    08/31/22 2100  mupirocin 2 % ointment         -- Nasl 3 times daily 08/31/22 1818    08/31/22 1706  vancomycin - pharmacy to dose  (vancomycin IVPB)        See Hyperspace for full Linked Orders Report.    -- IV pharmacy to manage frequency 08/31/22 1607    08/31/22 1700  cefepime in dextrose 5 % IVPB 2 g         -- IV Every 8 hours (non-standard times) 08/31/22 1607    08/31/22 1700  vancomycin 1.25 g in dextrose 5% 250 mL IVPB (ready to mix)         -- IV Every 24 hours (non-standard times) 08/31/22 1614          Antifungals (From admission, onward)      Start     Stop Route Frequency Ordered    08/31/22 2100  nystatin 100,000 unit/mL suspension 500,000 Units         -- Oral 4 times daily 08/31/22 1818          Antivirals (From admission, onward)      None             Immunization History   Administered Date(s) Administered    Influenza - Quadrivalent - PF *Preferred* (6 months and older) 09/19/2019, 10/12/2020       Family History    None       Social History     Socioeconomic History    Marital status:    Substance and Sexual Activity    Drug use: Never    Sexual activity: Not Currently     Review of Systems   Unable to perform ROS: Patient nonverbal   Objective:     Vital Signs (Most Recent):  Temp: 98.9 °F (37.2 °C) (09/01/22 1105)  Pulse: 102 (09/01/22 1544)  Resp: 19 (09/01/22 1628)  BP: 110/66 (09/01/22 1405)  SpO2: 100 % (09/01/22  1544) Vital Signs (24h Range):  Temp:  [98.5 °F (36.9 °C)-98.9 °F (37.2 °C)] 98.9 °F (37.2 °C)  Pulse:  [] 102  Resp:  [16-26] 19  SpO2:  [100 %] 100 %  BP: ()/(51-74) 110/66     Weight: 77 kg (169 lb 12.1 oz)  Body mass index is 25.81 kg/m².    Estimated Creatinine Clearance: 175.8 mL/min (A) (based on SCr of 0.4 mg/dL (L)).    Physical Exam  Vitals reviewed.   Constitutional:       General: He is not in acute distress.     Appearance: He is ill-appearing. He is not diaphoretic.   HENT:      Head: Normocephalic.      Right Ear: External ear normal.      Left Ear: External ear normal.      Nose: Nose normal.      Mouth/Throat:      Comments: Bite block in place; siginificant drooling/secretions  Eyes:      General: No scleral icterus.        Right eye: No discharge.         Left eye: No discharge.      Conjunctiva/sclera: Conjunctivae normal.   Neck:      Comments: Trach in place  Cardiovascular:      Rate and Rhythm: Tachycardia present.   Pulmonary:      Comments: Vent Mode: A/C  Oxygen Concentration (%):  [30] 30  Resp Rate Total:  [16 br/min-27 br/min] 16 br/min  Vt Set:  [500 mL] 500 mL  PEEP/CPAP:  [5 cmH20] 5 cmH20  Mean Airway Pressure:  [9.3 wmM14-48 cmH20] 10 cmH20   Abdominal:      General: Abdomen is flat.      Palpations: Abdomen is soft.   Musculoskeletal:      Right lower leg: No edema.      Left lower leg: No edema.   Skin:     General: Skin is warm and dry.      Comments: Wound photos reviewed   Neurological:      Mental Status: He is alert.      Comments: Blinks, nods head       Significant Labs: All pertinent labs within the past 24 hours have been reviewed.    Significant Imaging: I have reviewed all pertinent imaging results/findings within the past 24 hours.

## 2022-09-01 NOTE — CONSULTS
"[facility]  Adult Nutrition  Consult Note    SUMMARY     Recommendations    1. Advance TF as tolerated, Impact Peptide 1.5 @ goal rate of 50 ml/hr to provide 1800 kcals, 113 g pro, and 924 ml free water.   - Add Beneprotein BID x 14 days to promote wound healing. Beneprotein + TF provides 1850 kcals and 125 g pro- this meets 100% kcal and protein needs.     2. RD following.    Goals: Will meet % EEN/EPN by next RD visit.  Nutrition Goal Status: new  Communication of RD Recs:  (POC)    Assessment and Plan    Nutrition Problem  Inadequate enternal nutrition infusion    Related to (etiology):   Low infusion rate     Signs and Symptoms (as evidenced by):   Current TF regimen meets < 73% EEN/EPN    Interventions/Recommendations (treatment strategy):  Collaboration of nutrition care with other providers     Nutrition Diagnosis Status:   New     Reason for Assessment    Reason For Assessment: consult  Diagnosis:  (Trismus)  Relevant Medical History: HTN, ALS, quadriparesis, trach/vent/ PEG dependence  Interdisciplinary Rounds: did not attend  General Information Comments: Pt trach/vent/PEG dependent. Unable to speak with pt d/t being nonverbal. #. Noted current TF regimen of Isosource 1.5 @ 10 ml/hr- RD to provide TF rec's. Noted multiple altered skin integrities and wound- RD to add beneprotein to TF regimen. Will complete NFPE at next RD visit. LBM 9/1.  Nutrition Discharge Planning: PEG TF regimen    Nutrition Risk Screen    Nutrition Risk Screen: large or nonhealing wound, burn or pressure injury, tube feeding or parenteral nutrition    Nutrition/Diet History    Food Allergies: NKFA  Factors Affecting Nutritional Intake: NPO, on mechanical ventilation, chewing difficulties/inability to chew food (PEG dependent)    Anthropometrics    Temp: 98.9 °F (37.2 °C)  Height: 5' 8" (172.7 cm)  Height (inches): 68 in  Weight Method: Bed Scale  Weight: 77 kg (169 lb 12.1 oz)  Weight (lb): 169.76 " lb  Ideal Body Weight (IBW), Male: 154 lb  % Ideal Body Weight, Male (lb): 110.23 %  BMI (Calculated): 25.8  BMI Grade: 25 - 29.9 - overweight    Lab/Procedures/Meds    Pertinent Labs Reviewed: reviewed  Pertinent Labs Comments: Albumin 1.4, Sodium 134, Ca 8.4, Creatinine 0.4, Phos 2.3, Hgb 7.0, Hct 23.2, CRP 59.5  Pertinent Medications Reviewed: reviewed  Pertinent Medications Comments: famotidine, hepari, metoprolol tartrate, nystatin, vancomycin    Estimated/Assessed Needs    Weight Used For Calorie Calculations: 77 kg (169 lb 12.1 oz)  Energy Calorie Requirements (kcal): 6960-4988 kcals  Energy Need Method: Kcal/kg (24-28 g/kg)  Protein Requirements: 116-154 g (1.5-2.0 g/kg)  Weight Used For Protein Calculations: 77 kg (169 lb 12.1 oz)  Fluid Requirements (mL): 1 ml or fluid per MD  Estimated Fluid Requirement Method: RDA Method  RDA Method (mL): 1848     Nutrition Prescription Ordered    Current Diet Order: NPO  Current Nutrition Support Formula Ordered: Impact Peptide 1.5  Current Nutrition Support Rate Ordered: 10 (ml)  Current Nutrition Support Frequency Ordered: ml/hr    Evaluation of Received Nutrient/Fluid Intake    Enteral Calories (kcal): 360  Enteral Protein (gm): 22.5  Enteral (Free Water) Fluid (mL): 185  % Kcal Needs: 20%  % Protein Needs: 19%  I/O: -551 ml since admit  Energy Calories Required: not meeting needs  Protein Required: not meeting needs  Fluid Required: not meeting needs  Tolerance: tolerating  % Intake of Estimated Energy Needs: 20%  % Meal Intake: NPO    Nutrition Risk    Level of Risk/Frequency of Follow-up:  (1 time/week)     Monitor and Evaluation    Food and Nutrient Intake: enteral nutrition intake  Food and Nutrient Adminstration: enteral and parenteral nutrition administration  Knowledge/Beliefs/Attitudes: food and nutrition knowledge/skill, beliefs and attitudes  Physical Activity and Function: nutrition-related ADLs and IADLs  Anthropometric Measurements: height/length,  weight, weight change, body mass index  Biochemical Data, Medical Tests and Procedures: electrolyte and renal panel, gastrointestinal profile, glucose/endocrine profile, inflammatory profile, lipid profile  Nutrition-Focused Physical Findings: overall appearance     Nutrition Follow-Up    RD Follow-up?: Yes    Keturah ARREDONDO

## 2022-09-01 NOTE — SUBJECTIVE & OBJECTIVE
Past Medical History:   Diagnosis Date    ALS (amyotrophic lateral sclerosis)     ALS (amyotrophic lateral sclerosis)     Dependent on ventilator 3/21/2022    Erectile dysfunction     Gait disturbance     Hyperlipidemia     Hypertension     Iron deficiency anemia     Motor neuron disease     Polycystic kidney disease     Quadriparesis 2018    Tremor      Past Surgical History:   Procedure Laterality Date    GASTROSTOMY      PEG    PORTACATH PLACEMENT      TRACHEOSTOMY        No current facility-administered medications on file prior to encounter.     Current Outpatient Medications on File Prior to Encounter   Medication Sig Dispense Refill    albuterol-ipratropium (DUO-NEB) 2.5 mg-0.5 mg/3 mL nebulizer solution Take 3 mLs by nebulization every 4 (four) hours. Rescue      apixaban (ELIQUIS) 2.5 mg Tab 1 tablet (2.5 mg total) by Per G Tube route 2 (two) times daily. 60 tablet 11    baclofen (LIORESAL) 10 MG tablet TAKE 1 TABLET THREE TIMES PER DAY (Patient taking differently: 10 mg by Per G Tube route 3 (three) times daily.) 270 tablet 0    busPIRone (BUSPAR) 5 MG Tab 1 tablet (5 mg total) by Per G Tube route 2 (two) times daily. 60 tablet 11    cefepime in dextrose 5 % (MAXIPIME) 2 gram/50 mL PgBk Inject 50 mLs (2 g total) into the vein every 8 (eight) hours.      glycopyrrolate (ROBINUL) 1 mg Tab START BY TAKING 1 TABLET DAILY. IF TOLERATING WELL, YOU CAN INCREASE IT UP TO 2 OR EVEN 3 TIMES DAILY. (Patient taking differently: 1 mg by Per G Tube route 3 (three) times daily.) 270 tablet 1    lansoprazole (PREVACID) 30 MG capsule Take 30 mg by mouth once daily.      metoprolol tartrate (LOPRESSOR) 50 MG tablet Take 50 mg by mouth once daily.      mupirocin (BACTROBAN) 2 % ointment SMARTSI Application Topical 2-3 Times Daily      nystatin (MYCOSTATIN) 100,000 unit/mL suspension Take 5 mLs (500,000 Units total) by mouth 4 (four) times daily. for 10 days 200 mL 0    ondansetron (ZOFRAN) 4 MG tablet 4 mg by Per G  Tube route every 6 (six) hours as needed for Nausea.      pantoprazole (PROTONIX) 40 mg injection Inject 40 mg into the vein once daily. 30 each 11    pantoprazole (PROTONIX) 40 mg suspension 40 mg by FEEDING TUBE route 2 (two) times a day.      polyethylene glycol (GLYCOLAX) 17 gram PwPk 17 g by Per G Tube route 2 (two) times daily. 60 each 11    riluzole 50 mg Tab tablet TAKE 1 TABLET ON AN EMPTY STOMACH EVERY 12 HRS (Patient taking differently: 50 mg by Per G Tube route. TAKE 1 TABLET ON AN EMPTY STOMACH EVERY 12 HRS) 180 tablet 3    sodium chloride 0.9% (NORMAL SALINE FLUSH) injection Inject 10 mLs into the vein every 6 (six) hours.      sodium chloride 0.9% (NORMAL SALINE FLUSH) injection Inject 10 mLs into the vein as needed.      traZODone (DESYREL) 50 MG tablet 50 mg by Per G Tube route every evening.        Allergies: Patient has no known allergies.    History reviewed. No pertinent family history.  Social History     Substance Use Topics    Drug use: Never     Review of Systems  Unable to assess 2/2 unable to speak  Objective:     Vitals:    Temp: 98.9 °F (37.2 °C)  Pulse: 100  Rhythm: normal sinus rhythm  BP: 115/60  MAP (mmHg): 83  Resp: (!) 22  SpO2: 100 %  Oxygen Concentration (%): 30  O2 Device (Oxygen Therapy): ventilator  Vent Mode: A/C  Set Rate: 16 BPM  Vt Set: 500 mL  PEEP/CPAP: 5 cmH20  Peak Airway Pressure: 23 cmH2O  Mean Airway Pressure: 10 cmH20  Plateau Pressure: 0 cmH20    Temp  Min: 98.2 °F (36.8 °C)  Max: 98.9 °F (37.2 °C)  Pulse  Min: 84  Max: 112  BP  Min: 90/55  Max: 128/67  MAP (mmHg)  Min: 66  Max: 95  Resp  Min: 16  Max: 26  SpO2  Min: 100 %  Max: 100 %  Oxygen Concentration (%)  Min: 30  Max: 30    08/31 0701 - 09/01 0700  In: 679 [I.V.:112.7]  Out: 1230 [Urine:1230]   Unmeasured Output  Stool Occurrence: 1       Physical Exam  --sedation: none  --GCS: L3Ah2O0 Tracheostomy   --Mental Status: Alert, follows some commands   --Pupils 3-->2mm, PERRL.   --brainstem: intact  --Motor: no  movement in extremities  --sensory: blinks acknowledging touch throughout   --Reflexes: not tested  --Gait: deferred      Today I personally reviewed pertinent medications, lines/drains/airways, imaging, cardiology results, laboratory results, microbiology results,

## 2022-09-01 NOTE — HPI
Wei Dominique is a 57 yo AAM with ALS and severe neurogenic trismus. At OSH with prolonged course complicated by bacteremia, PNA, upper extremity DVTs, facial swelling and trismus. Received Abx. Was seen by ENT at OSH, performed bilateral masseter injections 8/24/22 with no relief. Pt subsequently bit down on his tongue requiring paralysis and bite block placement. Also concern for possible rim-enhancing lesion of the R mandible - deemed to be part of bitten tongue. Pt is trach-dependent, non-verbal, and communicates with shaking of his head and blinking his eyes in various patterns. Pt acknowledges diffuse pain to his jaw. States he did not get any benefit from Botox injections previously.

## 2022-09-01 NOTE — SUBJECTIVE & OBJECTIVE
Past Medical History:   Diagnosis Date    ALS (amyotrophic lateral sclerosis)     ALS (amyotrophic lateral sclerosis)     Dependent on ventilator 3/21/2022    Erectile dysfunction     Gait disturbance     Hyperlipidemia     Hypertension     Iron deficiency anemia     Motor neuron disease     Polycystic kidney disease     Quadriparesis 2018    Tremor        Past Surgical History:   Procedure Laterality Date    GASTROSTOMY      PEG    PORTACATH PLACEMENT      TRACHEOSTOMY         Review of patient's allergies indicates:  No Known Allergies    Medications:  Medications Prior to Admission   Medication Sig    albuterol-ipratropium (DUO-NEB) 2.5 mg-0.5 mg/3 mL nebulizer solution Take 3 mLs by nebulization every 4 (four) hours. Rescue    apixaban (ELIQUIS) 2.5 mg Tab 1 tablet (2.5 mg total) by Per G Tube route 2 (two) times daily.    baclofen (LIORESAL) 10 MG tablet TAKE 1 TABLET THREE TIMES PER DAY (Patient taking differently: 10 mg by Per G Tube route 3 (three) times daily.)    busPIRone (BUSPAR) 5 MG Tab 1 tablet (5 mg total) by Per G Tube route 2 (two) times daily.    cefepime in dextrose 5 % (MAXIPIME) 2 gram/50 mL PgBk Inject 50 mLs (2 g total) into the vein every 8 (eight) hours.    glycopyrrolate (ROBINUL) 1 mg Tab START BY TAKING 1 TABLET DAILY. IF TOLERATING WELL, YOU CAN INCREASE IT UP TO 2 OR EVEN 3 TIMES DAILY. (Patient taking differently: 1 mg by Per G Tube route 3 (three) times daily.)    lansoprazole (PREVACID) 30 MG capsule Take 30 mg by mouth once daily.    metoprolol tartrate (LOPRESSOR) 50 MG tablet Take 50 mg by mouth once daily.    mupirocin (BACTROBAN) 2 % ointment SMARTSI Application Topical 2-3 Times Daily    nystatin (MYCOSTATIN) 100,000 unit/mL suspension Take 5 mLs (500,000 Units total) by mouth 4 (four) times daily. for 10 days    ondansetron (ZOFRAN) 4 MG tablet 4 mg by Per G Tube route every 6 (six) hours as needed for Nausea.    pantoprazole (PROTONIX) 40 mg injection Inject 40 mg  into the vein once daily.    pantoprazole (PROTONIX) 40 mg suspension 40 mg by FEEDING TUBE route 2 (two) times a day.    polyethylene glycol (GLYCOLAX) 17 gram PwPk 17 g by Per G Tube route 2 (two) times daily.    riluzole 50 mg Tab tablet TAKE 1 TABLET ON AN EMPTY STOMACH EVERY 12 HRS (Patient taking differently: 50 mg by Per G Tube route. TAKE 1 TABLET ON AN EMPTY STOMACH EVERY 12 HRS)    sodium chloride 0.9% (NORMAL SALINE FLUSH) injection Inject 10 mLs into the vein every 6 (six) hours.    sodium chloride 0.9% (NORMAL SALINE FLUSH) injection Inject 10 mLs into the vein as needed.    traZODone (DESYREL) 50 MG tablet 50 mg by Per G Tube route every evening.     Antibiotics (From admission, onward)      Start     Stop Route Frequency Ordered    08/31/22 2100  mupirocin 2 % ointment         -- Nasl 3 times daily 08/31/22 1818    08/31/22 1706  vancomycin - pharmacy to dose  (vancomycin IVPB)        See Hyperspace for full Linked Orders Report.    -- IV pharmacy to manage frequency 08/31/22 1607    08/31/22 1700  cefepime in dextrose 5 % IVPB 2 g         -- IV Every 8 hours (non-standard times) 08/31/22 1607    08/31/22 1700  vancomycin 1.25 g in dextrose 5% 250 mL IVPB (ready to mix)         -- IV Every 24 hours (non-standard times) 08/31/22 1614          Antifungals (From admission, onward)      Start     Stop Route Frequency Ordered    08/31/22 2100  nystatin 100,000 unit/mL suspension 500,000 Units         -- Oral 4 times daily 08/31/22 1818          Antivirals (From admission, onward)      None             Immunization History   Administered Date(s) Administered    Influenza - Quadrivalent - PF *Preferred* (6 months and older) 09/19/2019, 10/12/2020       Family History    None       Social History     Socioeconomic History    Marital status:    Substance and Sexual Activity    Drug use: Never    Sexual activity: Not Currently     Review of Systems   Unable to perform ROS: Patient nonverbal   Objective:      Vital Signs (Most Recent):  Temp: 98.9 °F (37.2 °C) (09/01/22 1105)  Pulse: 102 (09/01/22 1544)  Resp: 19 (09/01/22 1628)  BP: 110/66 (09/01/22 1405)  SpO2: 100 % (09/01/22 1544) Vital Signs (24h Range):  Temp:  [98.5 °F (36.9 °C)-98.9 °F (37.2 °C)] 98.9 °F (37.2 °C)  Pulse:  [] 102  Resp:  [16-26] 19  SpO2:  [100 %] 100 %  BP: ()/(51-74) 110/66     Weight: 77 kg (169 lb 12.1 oz)  Body mass index is 25.81 kg/m².    Estimated Creatinine Clearance: 175.8 mL/min (A) (based on SCr of 0.4 mg/dL (L)).    Physical Exam  Vitals reviewed.   Constitutional:       General: He is not in acute distress.     Appearance: He is ill-appearing. He is not diaphoretic.   HENT:      Head: Normocephalic.      Right Ear: External ear normal.      Left Ear: External ear normal.      Nose: Nose normal.      Mouth/Throat:      Comments: Bite block in place; siginificant drooling/secretions  Eyes:      General: No scleral icterus.        Right eye: No discharge.         Left eye: No discharge.      Conjunctiva/sclera: Conjunctivae normal.   Neck:      Comments: Trach in place  Cardiovascular:      Rate and Rhythm: Tachycardia present.   Pulmonary:      Comments: Vent Mode: A/C  Oxygen Concentration (%):  [30] 30  Resp Rate Total:  [16 br/min-27 br/min] 16 br/min  Vt Set:  [500 mL] 500 mL  PEEP/CPAP:  [5 cmH20] 5 cmH20  Mean Airway Pressure:  [9.3 onL56-51 cmH20] 10 cmH20   Abdominal:      General: Abdomen is flat.      Palpations: Abdomen is soft.   Musculoskeletal:      Right lower leg: No edema.      Left lower leg: No edema.   Skin:     General: Skin is warm and dry.      Comments: Wound photos reviewed   Neurological:      Mental Status: He is alert.      Comments: Blinks, nods head       Significant Labs: All pertinent labs within the past 24 hours have been reviewed.    Significant Imaging: I have reviewed all pertinent imaging results/findings within the past 24 hours.

## 2022-09-01 NOTE — ASSESSMENT & PLAN NOTE
67-year-old male with ALS s/p trach'd and peg'd, vent-dependent initially presented to OSH with facial swelling with concerns for facial cellulitis with neurogenic trismus s/p botox injections, transferred here for further evaluation regarding this.    Has multiple possible sources for initial sepsis.  No abscess seen on ENT exam  8/18/22 urine Cx with pseudomonas and Providencia.   8/18/22 midline catheter tip with MRSA and morganella (suspect colonized tip)  8/19/22 and 8/26/22 RCx with Pseudomonas, latter .  Remains sensitive to cefepime and pip/tazo.  Vent settings appear stable, though various CXR imaging with consolidations  8/26/22-8/27/22 Staph capitis bacteremia resistance to oxacillin.  8/30/22 PICC tip cx negative.  Noted multiple skin wounds.  8/31/22 RCx with GNR    Recommendations:  -Staph capitis bacteremia and pseudomonas pneumonia appear clinically-significant  -Expecting 2 weeks of vancomycin and 7 days of cefepime  -+ cultures from all other sources would be covered adequately with the above regimen  -Repeat Bcx NGTD, follow-up TTE results  -Will follow-up repeat RCx results  -ENT reconsulted.  Would also repeat CT max/face

## 2022-09-01 NOTE — HPI
Wei Dominique is a 55 yo AAM with ALS. At OSH with prolonged course complicated by bacteremia, PNA, upper extremity DVTs, facial swelling and trismus. Received Abx, PICC was changed there prior to transfer, and received BOTOX injections to help with his Trismus.

## 2022-09-01 NOTE — ASSESSMENT & PLAN NOTE
Severe neurogenic trismus in patient w ALS, unsuccessfully injected w Botox at OSH on 8/24/22\    - No acute ENT intervention at this time  - Botox injection for trismus currently not performed by any ENT at [facility], consider consultation to hospital neurology if this is to be investigated further  - Unfortunately, no further interventions available to help treat neurogenic trismus outside of botox and muscle relaxers  - Please page w questions or concerns

## 2022-09-01 NOTE — PLAN OF CARE
[facility] Care Plan    POC reviewed with Wei Dominique and family at 0300. Pt nods/ blink appropriately to education. Questions and concerns addressed. No acute events overnight. Pt progressing toward goals. Will continue to monitor. See below and flowsheets for full assessment and VS info.     -heparin gtt  -arvizu, flexi  -tube feed @10          Is this a stroke patient? no    Neuro:  [place] Coma Scale  Best Eye Response: 4-->(E4) spontaneous  Best Motor Response: 5-->(M5) localizes pain  Best Verbal Response: 5-->(V5) oriented  [place] Coma Scale Score: 14  Assessment Qualifiers: other (see comments) (pt on trach and ventilator)  Pupil PERRLA: yes     24hr Temp:  [97.4 °F (36.3 °C)-99.1 °F (37.3 °C)]     CV:   Rhythm: normal sinus rhythm  BP goals:   SBP < 160  MAP > 65    Resp:   O2 Device (Oxygen Therapy): ventilator  Vent Mode: A/C  Set Rate: 16 BPM  Oxygen Concentration (%): 30  Vt Set: 500 mL  PEEP/CPAP: 5 cmH20    Plan: N/A    GI/:     Diet/Nutrition Received: tube feeding  Last Bowel Movement: 08/31/22  Voiding Characteristics: urethral catheter (bladder)    Intake/Output Summary (Last 24 hours) at 9/1/2022 0338  Last data filed at 9/1/2022 0310  Gross per 24 hour   Intake 619.39 ml   Output 1105 ml   Net -485.61 ml     Unmeasured Output  Stool Occurrence: 1    Labs/Accuchecks:  Recent Labs   Lab 09/01/22  0019   WBC 13.59*   RBC 2.71*   HGB 7.0*   HCT 23.2*         Recent Labs   Lab 09/01/22  0019   *   K 4.2   CO2 22*      BUN 20   CREATININE 0.4*   ALKPHOS 77   ALT 11   AST 16   BILITOT 0.4      Recent Labs   Lab 08/31/22  1656 09/01/22  0019   INR 1.3*  --    APTT 28.7 41.5*    No results for input(s): CPK, CPKMB, TROPONINI, MB in the last 168 hours.    Electrolytes: No replacement orders  Accuchecks: Q6H    Gtts:   heparin (porcine) in D5W 12 Units/kg/hr (09/01/22 0310)       LDA/Wounds:  Lines/Drains/Airways       Peripherally Inserted Central Catheter Line  Duration              PICC Triple Lumen 08/30/22 0910 left basilic 1 day              Drain  Duration                  Gastrostomy/Enterostomy LUQ feeding -- days         Rectal Tube 08/18/22 0000 14 days         Urethral Catheter 08/31/22 1100 <1 day              Airway  Duration                  Surgical Airway Shiley Cuffed -- days         Surgical Airway 05/23/22 1145 Portex Cuffed 100 days                  Wounds: Yes  Wound care consulted: Yes

## 2022-09-01 NOTE — HPI
"67-year-old male with ALS, trach'd and PEG'd, vent-depentence, HTN, PE on eliquis,  pseudomonas initially presented to [facility] on 8/18/22 complaining of facial swelling, presented febrile 102.4 and with leukocytosis 21.  CT max/face showed right maxillary and anterior maxillary soft tissue edema and thickening with right perimandibular ovoid infection.  ENT was able to examine the patient under sedation and paralytics, no abscesses seen on their exam, bite block placed.  Bilateral masseter botox injections were also later given on 8/24/22 and 8/30/22.  Infectious Disease Dr. Burnham was consulted and saw the patient during hospitalization at stay at [facility].  Abx and w/u as below.  Initial vancomycin and meropenem changed to Zerbaxa and clindamycin.  8/18/22 urine culture grew both Pseudomonas and providencia.  8/18/22 Midline catheter tip grew MRSA and morganella.  Blood cultures have not grown these organisms.  8/19/22 respiratory culture grew Pseudomonas.  8/22/22 Antibiotics changed to cefepime  8/25/22-8/26/22 ID reconsulted for Tmax 100.4, soft Bps.  Noted purulent secretions from and around the ET tube.  CXR with opacities  8/26/22 Cefepime changed to meropenem  8/27/22 started vancomycin  8/26/22 respiratory culture grew Pseudomonas, now .  8/26/22-8/27/22 blood cultures positive for Staph capitis, oxacillin-resistant.  8/29/22 meropenem changed to cefepime based on pseudomonas  susceptibilities  8/30/22 PICC line tip culture NGTD  8/30/22 Nonfunctional portacath removed  8/30/22 Transferred to [facility] for neurology evaluation for ALS, neurologic trismus  8/31/22 Rcx GNR    Consult: "Antibiotic recommendations. OSH cultures + for MRSA, ESBL, MDRO, . Repeat cultures pending."  "

## 2022-09-01 NOTE — PLAN OF CARE
[facility] Care Plan    POC reviewed with Wei Dominique and wife on phone at 1745. Pt non verbal but indicated understanding. Wife verbalized understanding. Questions and concerns addressed. No acute events today. Pt progressing toward goals. Will continue to monitor. See below and flowsheets for full assessment and VS info.       Advancing TF to goal of 50, tolerating well  Medicated with PRN meds for pain/comfort      Is this a stroke patient? no    Neuro:  [place]umar Coma Scale  Best Eye Response: 4-->(E4) spontaneous  Best Motor Response: 5-->(M5) localizes pain  Best Verbal Response: 5-->(V5) oriented  [place] Coma Scale Score: 14  Assessment Qualifiers: other (see comments) (trach)  Pupil PERRLA: yes     24 hr Temp:  [98.2 °F (36.8 °C)-98.9 °F (37.2 °C)]     CV:   Rhythm: normal sinus rhythm  BP goals:   SBP < 160  MAP > 65    Resp:   O2 Device (Oxygen Therapy): ventilator  Vent Mode: A/C  Set Rate: 16 BPM  Oxygen Concentration (%): 30  Vt Set: 500 mL  PEEP/CPAP: 5 cmH20    Plan: trach    GI/:     Diet/Nutrition Received: NPO, tube feeding  Last Bowel Movement: 09/01/22  Voiding Characteristics: urethral catheter (bladder)    Intake/Output Summary (Last 24 hours) at 9/1/2022 1838  Last data filed at 9/1/2022 1805  Gross per 24 hour   Intake 1316.13 ml   Output 1380 ml   Net -63.87 ml     Unmeasured Output  Stool Occurrence: 1    Labs/Accuchecks:  Recent Labs   Lab 09/01/22  0019   WBC 13.59*   RBC 2.71*   HGB 7.0*   HCT 23.2*         Recent Labs   Lab 09/01/22  0019   *   K 4.2   CO2 22*      BUN 20   CREATININE 0.4*   ALKPHOS 77   ALT 11   AST 16   BILITOT 0.4      Recent Labs   Lab 08/31/22  1656 09/01/22  0019 09/01/22  1410   INR 1.3*  --   --    APTT 28.7   < > 41.4*    < > = values in this interval not displayed.    No results for input(s): CPK, CPKMB, TROPONINI, MB in the last 168 hours.    Electrolytes: N/A - electrolytes WDL  Accuchecks: none    Gtts:   heparin (porcine) in D5W 13  Units/kg/hr (09/01/22 1805)       LDA/Wounds:  Lines/Drains/Airways       Peripherally Inserted Central Catheter Line  Duration             PICC Triple Lumen 08/30/22 0910 left basilic 2 days              Drain  Duration                  Gastrostomy/Enterostomy LUQ feeding -- days         Rectal Tube 08/18/22 0000 14 days         Urethral Catheter 08/31/22 1100 1 day              Airway  Duration                  Surgical Airway Shiley Cuffed -- days         Surgical Airway 05/23/22 1145 Portex Cuffed 101 days                  Wounds: Yes  Wound care consulted: Yes

## 2022-09-01 NOTE — PLAN OF CARE
Recommendations     1. Advance TF as tolerated, Impact Peptide 1.5 @ goal rate of 50 ml/hr to provide 1800 kcals, 113 g pro, and 924 ml free water.   - Add Beneprotein BID x 14 days to promote wound healing. Beneprotein + TF provides 1850 kcals and 125 g pro- this meets 100% kcal and protein needs.      2. RD following.     Goals: Will meet % EEN/EPN by next RD visit.  Nutrition Goal Status: new  Communication of RD Recs:  (POC)    Keturah Munguia PL-LDN

## 2022-09-02 LAB
ALBUMIN SERPL BCP-MCNC: 1.4 G/DL (ref 3.5–5.2)
ALLENS TEST: ABNORMAL
ALP SERPL-CCNC: 74 U/L (ref 55–135)
ALT SERPL W/O P-5'-P-CCNC: 11 U/L (ref 10–44)
ANION GAP SERPL CALC-SCNC: 4 MMOL/L (ref 8–16)
APTT BLDCRRT: 41.9 SEC (ref 21–32)
AST SERPL-CCNC: 20 U/L (ref 10–40)
BACTERIA BLD CULT: NORMAL
BACTERIA CATH TIP CULT: NO GROWTH
BASOPHILS # BLD AUTO: 0.08 K/UL (ref 0–0.2)
BASOPHILS NFR BLD: 0.7 % (ref 0–1.9)
BILIRUB SERPL-MCNC: 0.4 MG/DL (ref 0.1–1)
BUN SERPL-MCNC: 22 MG/DL (ref 8–23)
CALCIUM SERPL-MCNC: 8.5 MG/DL (ref 8.7–10.5)
CHLORIDE SERPL-SCNC: 108 MMOL/L (ref 95–110)
CO2 SERPL-SCNC: 23 MMOL/L (ref 23–29)
CREAT SERPL-MCNC: 0.5 MG/DL (ref 0.5–1.4)
CRP SERPL-MCNC: 47.3 MG/L (ref 0–8.2)
DELSYS: ABNORMAL
DIFFERENTIAL METHOD: ABNORMAL
EOSINOPHIL # BLD AUTO: 0.4 K/UL (ref 0–0.5)
EOSINOPHIL NFR BLD: 3.3 % (ref 0–8)
ERYTHROCYTE [DISTWIDTH] IN BLOOD BY AUTOMATED COUNT: 19.8 % (ref 11.5–14.5)
ERYTHROCYTE [SEDIMENTATION RATE] IN BLOOD BY WESTERGREN METHOD: 16 MM/H
EST. GFR  (NO RACE VARIABLE): >60 ML/MIN/1.73 M^2
FIO2: 30
GLUCOSE SERPL-MCNC: 98 MG/DL (ref 70–110)
HCO3 UR-SCNC: 23 MMOL/L (ref 24–28)
HCT VFR BLD AUTO: 22.5 % (ref 40–54)
HGB BLD-MCNC: 7 G/DL (ref 14–18)
IMM GRANULOCYTES # BLD AUTO: 0.13 K/UL (ref 0–0.04)
IMM GRANULOCYTES NFR BLD AUTO: 1.1 % (ref 0–0.5)
LYMPHOCYTES # BLD AUTO: 1.6 K/UL (ref 1–4.8)
LYMPHOCYTES NFR BLD: 13.5 % (ref 18–48)
MAGNESIUM SERPL-MCNC: 1.9 MG/DL (ref 1.6–2.6)
MCH RBC QN AUTO: 26 PG (ref 27–31)
MCHC RBC AUTO-ENTMCNC: 31.1 G/DL (ref 32–36)
MCV RBC AUTO: 84 FL (ref 82–98)
MODE: ABNORMAL
MONOCYTES # BLD AUTO: 0.7 K/UL (ref 0.3–1)
MONOCYTES NFR BLD: 6.2 % (ref 4–15)
NEUTROPHILS # BLD AUTO: 8.6 K/UL (ref 1.8–7.7)
NEUTROPHILS NFR BLD: 75.2 % (ref 38–73)
NRBC BLD-RTO: 0 /100 WBC
PCO2 BLDA: 31.1 MMHG (ref 35–45)
PEEP: 5
PH SMN: 7.48 [PH] (ref 7.35–7.45)
PHOSPHATE SERPL-MCNC: 2.6 MG/DL (ref 2.7–4.5)
PLATELET # BLD AUTO: 380 K/UL (ref 150–450)
PMV BLD AUTO: 9.9 FL (ref 9.2–12.9)
PO2 BLDA: 137 MMHG (ref 80–100)
POC BE: 0 MMOL/L
POC SATURATED O2: 99 % (ref 95–100)
POC TCO2: 24 MMOL/L (ref 23–27)
POTASSIUM SERPL-SCNC: 3.8 MMOL/L (ref 3.5–5.1)
PROT SERPL-MCNC: 7.1 G/DL (ref 6–8.4)
RBC # BLD AUTO: 2.69 M/UL (ref 4.6–6.2)
SAMPLE: ABNORMAL
SITE: ABNORMAL
SODIUM SERPL-SCNC: 135 MMOL/L (ref 136–145)
VANCOMYCIN TROUGH SERPL-MCNC: 22.8 UG/ML (ref 10–22)
VT: 500
WBC # BLD AUTO: 11.49 K/UL (ref 3.9–12.7)

## 2022-09-02 PROCEDURE — 99291 PR CRITICAL CARE, E/M 30-74 MINUTES: ICD-10-PCS | Mod: 25,GC,, | Performed by: INTERNAL MEDICINE

## 2022-09-02 PROCEDURE — 86140 C-REACTIVE PROTEIN: CPT

## 2022-09-02 PROCEDURE — 25000003 PHARM REV CODE 250: Performed by: NURSE PRACTITIONER

## 2022-09-02 PROCEDURE — 63600175 PHARM REV CODE 636 W HCPCS: Performed by: NURSE PRACTITIONER

## 2022-09-02 PROCEDURE — 36620 ARTERIAL LINE: ICD-10-PCS | Mod: ,,, | Performed by: NURSE PRACTITIONER

## 2022-09-02 PROCEDURE — 84100 ASSAY OF PHOSPHORUS: CPT

## 2022-09-02 PROCEDURE — 85730 THROMBOPLASTIN TIME PARTIAL: CPT

## 2022-09-02 PROCEDURE — 25000003 PHARM REV CODE 250

## 2022-09-02 PROCEDURE — 27000207 HC ISOLATION

## 2022-09-02 PROCEDURE — 63600175 PHARM REV CODE 636 W HCPCS: Performed by: PSYCHIATRY & NEUROLOGY

## 2022-09-02 PROCEDURE — 94761 N-INVAS EAR/PLS OXIMETRY MLT: CPT

## 2022-09-02 PROCEDURE — 99291 CRITICAL CARE FIRST HOUR: CPT | Mod: 25,GC,, | Performed by: INTERNAL MEDICINE

## 2022-09-02 PROCEDURE — 83735 ASSAY OF MAGNESIUM: CPT

## 2022-09-02 PROCEDURE — 20000000 HC ICU ROOM

## 2022-09-02 PROCEDURE — 76937 US GUIDE VASCULAR ACCESS: CPT

## 2022-09-02 PROCEDURE — 99900035 HC TECH TIME PER 15 MIN (STAT)

## 2022-09-02 PROCEDURE — 63700000 PHARM REV CODE 250 ALT 637 W/O HCPCS

## 2022-09-02 PROCEDURE — 82803 BLOOD GASES ANY COMBINATION: CPT

## 2022-09-02 PROCEDURE — 36410 VNPNXR 3YR/> PHY/QHP DX/THER: CPT

## 2022-09-02 PROCEDURE — C1751 CATH, INF, PER/CENT/MIDLINE: HCPCS

## 2022-09-02 PROCEDURE — 85025 COMPLETE CBC W/AUTO DIFF WBC: CPT

## 2022-09-02 PROCEDURE — 27000221 HC OXYGEN, UP TO 24 HOURS

## 2022-09-02 PROCEDURE — 25000242 PHARM REV CODE 250 ALT 637 W/ HCPCS

## 2022-09-02 PROCEDURE — 99900026 HC AIRWAY MAINTENANCE (STAT)

## 2022-09-02 PROCEDURE — 99233 PR SUBSEQUENT HOSPITAL CARE,LEVL III: ICD-10-PCS | Mod: ,,, | Performed by: INTERNAL MEDICINE

## 2022-09-02 PROCEDURE — 25000003 PHARM REV CODE 250: Performed by: INTERNAL MEDICINE

## 2022-09-02 PROCEDURE — 36620 INSERTION CATHETER ARTERY: CPT | Mod: ,,, | Performed by: NURSE PRACTITIONER

## 2022-09-02 PROCEDURE — 80053 COMPREHEN METABOLIC PANEL: CPT

## 2022-09-02 PROCEDURE — 25000003 PHARM REV CODE 250: Performed by: PSYCHIATRY & NEUROLOGY

## 2022-09-02 PROCEDURE — 80202 ASSAY OF VANCOMYCIN: CPT | Performed by: PSYCHIATRY & NEUROLOGY

## 2022-09-02 PROCEDURE — 99233 SBSQ HOSP IP/OBS HIGH 50: CPT | Mod: ,,, | Performed by: INTERNAL MEDICINE

## 2022-09-02 PROCEDURE — 94003 VENT MGMT INPAT SUBQ DAY: CPT

## 2022-09-02 PROCEDURE — 63600175 PHARM REV CODE 636 W HCPCS

## 2022-09-02 PROCEDURE — 36600 WITHDRAWAL OF ARTERIAL BLOOD: CPT

## 2022-09-02 RX ORDER — FENTANYL CITRATE 50 UG/ML
50 INJECTION, SOLUTION INTRAMUSCULAR; INTRAVENOUS ONCE
Status: DISCONTINUED | OUTPATIENT
Start: 2022-09-02 | End: 2022-09-03

## 2022-09-02 RX ORDER — NOREPINEPHRINE BITARTRATE/D5W 4MG/250ML
PLASTIC BAG, INJECTION (ML) INTRAVENOUS
Status: COMPLETED
Start: 2022-09-02 | End: 2022-09-02

## 2022-09-02 RX ORDER — FENTANYL CITRATE 50 UG/ML
50 INJECTION, SOLUTION INTRAMUSCULAR; INTRAVENOUS
Status: DISCONTINUED | OUTPATIENT
Start: 2022-09-02 | End: 2022-09-13 | Stop reason: HOSPADM

## 2022-09-02 RX ORDER — VANCOMYCIN HCL IN 5 % DEXTROSE 1G/250ML
1000 PLASTIC BAG, INJECTION (ML) INTRAVENOUS
Status: DISCONTINUED | OUTPATIENT
Start: 2022-09-03 | End: 2022-09-05

## 2022-09-02 RX ORDER — FENTANYL CITRATE 50 UG/ML
INJECTION, SOLUTION INTRAMUSCULAR; INTRAVENOUS
Status: COMPLETED
Start: 2022-09-02 | End: 2022-09-02

## 2022-09-02 RX ORDER — BISACODYL 10 MG
10 SUPPOSITORY, RECTAL RECTAL DAILY PRN
Status: DISCONTINUED | OUTPATIENT
Start: 2022-09-02 | End: 2022-09-13 | Stop reason: HOSPADM

## 2022-09-02 RX ORDER — AMOXICILLIN 250 MG
1 CAPSULE ORAL 2 TIMES DAILY
Status: DISCONTINUED | OUTPATIENT
Start: 2022-09-02 | End: 2022-09-13 | Stop reason: HOSPADM

## 2022-09-02 RX ORDER — POLYETHYLENE GLYCOL 3350 17 G/17G
17 POWDER, FOR SOLUTION ORAL DAILY
Status: DISCONTINUED | OUTPATIENT
Start: 2022-09-02 | End: 2022-09-13 | Stop reason: HOSPADM

## 2022-09-02 RX ORDER — NOREPINEPHRINE BITARTRATE/D5W 4MG/250ML
0-3 PLASTIC BAG, INJECTION (ML) INTRAVENOUS CONTINUOUS
Status: DISCONTINUED | OUTPATIENT
Start: 2022-09-02 | End: 2022-09-08

## 2022-09-02 RX ORDER — PHENYLEPHRINE HCL IN 0.9% NACL 1 MG/10 ML
SYRINGE (ML) INTRAVENOUS
Status: COMPLETED
Start: 2022-09-02 | End: 2022-09-02

## 2022-09-02 RX ADMIN — FENTANYL CITRATE 100 MCG: 0.05 INJECTION, SOLUTION INTRAMUSCULAR; INTRAVENOUS at 05:09

## 2022-09-02 RX ADMIN — RILUZOLE 50 MG: 50 TABLET ORAL at 08:09

## 2022-09-02 RX ADMIN — NYSTATIN 500000 UNITS: 500000 SUSPENSION ORAL at 01:09

## 2022-09-02 RX ADMIN — METHOCARBAMOL 500 MG: 500 TABLET ORAL at 08:09

## 2022-09-02 RX ADMIN — METOPROLOL TARTRATE 25 MG: 25 TABLET, FILM COATED ORAL at 08:09

## 2022-09-02 RX ADMIN — METHOCARBAMOL 500 MG: 500 TABLET ORAL at 04:09

## 2022-09-02 RX ADMIN — MORPHINE SULFATE 2 MG: 2 INJECTION, SOLUTION INTRAMUSCULAR; INTRAVENOUS at 12:09

## 2022-09-02 RX ADMIN — FAMOTIDINE 20 MG: 20 TABLET ORAL at 08:09

## 2022-09-02 RX ADMIN — FENTANYL CITRATE 50 MCG: 0.05 INJECTION, SOLUTION INTRAMUSCULAR; INTRAVENOUS at 10:09

## 2022-09-02 RX ADMIN — METHOCARBAMOL 500 MG: 500 TABLET ORAL at 01:09

## 2022-09-02 RX ADMIN — BACLOFEN 10 MG: 10 TABLET ORAL at 08:09

## 2022-09-02 RX ADMIN — NOREPINEPHRINE BITARTRATE 0.02 MCG/KG/MIN: 4 INJECTION, SOLUTION INTRAVENOUS at 05:09

## 2022-09-02 RX ADMIN — BUSPIRONE HYDROCHLORIDE 5 MG: 5 TABLET ORAL at 08:09

## 2022-09-02 RX ADMIN — NYSTATIN 500000 UNITS: 500000 SUSPENSION ORAL at 08:09

## 2022-09-02 RX ADMIN — POLYETHYLENE GLYCOL 3350 17 G: 17 POWDER, FOR SOLUTION ORAL at 06:09

## 2022-09-02 RX ADMIN — BACLOFEN 10 MG: 10 TABLET ORAL at 04:09

## 2022-09-02 RX ADMIN — CEFEPIME HYDROCHLORIDE 2 G: 2 INJECTION, SOLUTION INTRAVENOUS at 12:09

## 2022-09-02 RX ADMIN — MUPIROCIN: 20 OINTMENT TOPICAL at 08:09

## 2022-09-02 RX ADMIN — MORPHINE SULFATE 2 MG: 2 INJECTION, SOLUTION INTRAMUSCULAR; INTRAVENOUS at 06:09

## 2022-09-02 RX ADMIN — CEFEPIME HYDROCHLORIDE 2 G: 2 INJECTION, SOLUTION INTRAVENOUS at 08:09

## 2022-09-02 RX ADMIN — OXYCODONE 5 MG: 5 TABLET ORAL at 12:09

## 2022-09-02 RX ADMIN — SODIUM CHLORIDE 1000 ML: 0.9 INJECTION, SOLUTION INTRAVENOUS at 02:09

## 2022-09-02 RX ADMIN — VANCOMYCIN HYDROCHLORIDE 1250 MG: 1.25 INJECTION, POWDER, LYOPHILIZED, FOR SOLUTION INTRAVENOUS at 06:09

## 2022-09-02 RX ADMIN — Medication 1 MG: at 02:09

## 2022-09-02 RX ADMIN — OXYCODONE 5 MG: 5 TABLET ORAL at 08:09

## 2022-09-02 RX ADMIN — PSYLLIUM HUSK 1 PACKET: 3.4 POWDER ORAL at 08:09

## 2022-09-02 RX ADMIN — MORPHINE SULFATE 2 MG: 2 INJECTION, SOLUTION INTRAMUSCULAR; INTRAVENOUS at 01:09

## 2022-09-02 RX ADMIN — CEFEPIME HYDROCHLORIDE 2 G: 2 INJECTION, SOLUTION INTRAVENOUS at 05:09

## 2022-09-02 RX ADMIN — TRAZODONE HYDROCHLORIDE 50 MG: 50 TABLET ORAL at 08:09

## 2022-09-02 RX ADMIN — FENTANYL CITRATE 50 MCG: 0.05 INJECTION, SOLUTION INTRAMUSCULAR; INTRAVENOUS at 07:09

## 2022-09-02 RX ADMIN — MORPHINE SULFATE 2 MG: 2 INJECTION, SOLUTION INTRAMUSCULAR; INTRAVENOUS at 02:09

## 2022-09-02 RX ADMIN — MUPIROCIN: 20 OINTMENT TOPICAL at 04:09

## 2022-09-02 RX ADMIN — SENNOSIDES AND DOCUSATE SODIUM 1 TABLET: 50; 8.6 TABLET ORAL at 08:09

## 2022-09-02 RX ADMIN — NYSTATIN 500000 UNITS: 500000 SUSPENSION ORAL at 06:09

## 2022-09-02 NOTE — CONSULTS
"Single lumen 20G x 10Cm midline placed left cephalic vein. Max dwell date 10/1/22, Lot# [id number].  Needle advanced into the vessel under real time ultrasound guidance.  Image recorded and saved.     18G 1-3/4" placed to LAC using US guidance.  "

## 2022-09-02 NOTE — ASSESSMENT & PLAN NOTE
67-year-old male with ALS s/p trach'd and peg'd, vent-dependent initially presented to OSH with facial swelling with concerns for facial cellulitis with neurogenic trismus s/p botox injections, transferred here for further evaluation regarding neurogenic trismus.    Has multiple possible sources for initial sepsis.  No abscess seen on ENT exam.  Rim-enhancing mandibular lesion possible portion of tongue patient had bit down on.  8/18/22 urine Cx with pseudomonas and Providencia.   8/18/22 midline catheter tip with MRSA and morganella (suspect colonized tip)  8/19/22 and 8/26/22 RCx with Pseudomonas, latter .  Remains sensitive to cefepime and pip/tazo.  Vent settings appear stable, though various CXR imaging with consolidations  8/26/22-8/27/22 Staph capitis bacteremia resistance to oxacillin.  8/30/22 PICC tip cx negative.  Noted multiple skin wounds.  8/31/22 RCx with GNR    Recommendations:  -Staph capitis bacteremia and pseudomonas pneumonia appear clinically-significant  -Expecting 2 weeks of vancomycin for 8/26-8/27/22 Staph capitis bacteremia.  Start date from day of PICC line removal 8/30/22 endint 8/13/22  -Repeat Bcx NGTD  -Expecting 7 days of cefepime, pending latest RCx results  -Positive cultures from all other sources would be covered adequately with the above regimen

## 2022-09-02 NOTE — PROGRESS NOTES
09/02/22 0900   WOCN Assessment   WOCN Total Time (mins) 45   Visit Date 09/02/22   Visit Time 0900   Consult Type New   WOCN Speciality Wound   Intervention assessed;changed;applied;chart review;coordination of care;orders        Altered Skin Integrity 08/27/22 1600 Right lateral Ear Ulceration Partial thickness tissue loss. Shallow open ulcer with a red or pink wound bed, without slough. Intact or Open/Ruptured Serum-filled blister.   Date First Assessed/Time First Assessed: 08/27/22 1600   Altered Skin Integrity Present on Admission: suspected hospital acquired  Side: Right  Orientation: lateral  Location: Ear  Is this injury device related?: No  Primary Wound Type: Ulceration  De...   Wound Image    Dressing Appearance Open to air   Drainage Amount Scant   Red (%), Wound Tissue Color 100 %   Wound Length (cm) 1.5 cm   Wound Width (cm) 0.5 cm   Wound Depth (cm) 0.1 cm   Wound Volume (cm^3) 0.075 cm^3   Wound Surface Area (cm^2) 0.75 cm^2        Altered Skin Integrity 09/02/22 0900 Right Finger, fourth   Date First Assessed/Time First Assessed: 09/02/22 0900   Side: Right  Location: Finger, fourth   Wound Image    Drainage Amount None   Red (%), Wound Tissue Color 100 %   Wound Length (cm) 1 cm   Wound Width (cm) 1.5 cm   Wound Depth (cm) 0.1 cm   Wound Volume (cm^3) 0.15 cm^3   Wound Surface Area (cm^2) 1.5 cm^2        Altered Skin Integrity 09/02/22 0900 Right anterior Toe, first   Date First Assessed/Time First Assessed: 09/02/22 0900   Side: Right  Orientation: anterior  Location: Toe, first   Wound Image    Drainage Amount Small   Red (%), Wound Tissue Color 100 %   Wound Length (cm) 2 cm   Wound Width (cm) 0.2 cm   Wound Depth (cm) 0.1 cm   Wound Volume (cm^3) 0.04 cm^3   Wound Surface Area (cm^2) 0.4 cm^2        Altered Skin Integrity 09/02/22 0900 Sacral spine   Date First Assessed/Time First Assessed: 09/02/22 0900   Location: Sacral spine   Wound Image    Drainage Amount Small   Red (%), Wound Tissue  Color 95 %   Yellow (%), Wound Tissue Color 5 %   Wound Length (cm) 17 cm   Wound Width (cm) 15 cm   Wound Depth (cm) 0.1 cm   Wound Volume (cm^3) 25.5 cm^3   Wound Surface Area (cm^2) 255 cm^2        Altered Skin Integrity 09/02/22 0900 Left Heel   Date First Assessed/Time First Assessed: 09/02/22 0900   Side: Left  Location: Heel   Wound Image         Altered Skin Integrity 09/02/22 0900 Right posterior Ischial tuberosity   Date First Assessed/Time First Assessed: 09/02/22 0900   Side: Right  Orientation: posterior  Location: Ischial tuberosity   Wound Image    Red (%), Wound Tissue Color 100 %   Wound Length (cm) 5 cm   Wound Width (cm) 6.5 cm   Wound Depth (cm) 0.1 cm   Wound Volume (cm^3) 3.25 cm^3   Wound Surface Area (cm^2) 32.5 cm^2   Patient wound care consult for sacral ,left heel ,right ring finger, right great toenail and peg site. Patient sacral wound and all wound sites are moisture and fungal associated. The sacral area and all wounds are cleanse with soap and water. The sacral area cover with sacral border. Monitor right ring finger that has dry flaky skin. Apply heel foam dressing to bi-lateral heel there is no opening skin to the left heel.apply antifungal ointment to right great toe cover with gauze.

## 2022-09-02 NOTE — PROGRESS NOTES
[facility]  Neurocritical Care  Progress Note    Admit Date: 8/31/2022  Service Date: 09/02/2022  Length of Stay: 2    Subjective:     Chief Complaint: Trismus    History of Present Illness: Wei Dominique is a 57 yo AAM with ALS. At OSH with prolonged course complicated by bacteremia, PNA, upper extremity DVTs, facial swelling and trismus. Received Abx, PICC was changed there prior to transfer, and received BOTOX injections to help with his Trismus.    Hospital Course:   09/01/2022 NAEON; ENT evaluation; appreciate ID recs  09/02/2022 NAEON; Appreciate ID& ENT help; pressors & heparin drip    Review of Systems  Unable to assess 2/2 unable to speak  Objective:      Vitals:  Vitals:    09/02/22 1317 09/02/22 1401 09/02/22 1501 09/02/22 1510   BP:  (!) 133/47 (!) 99/53 (!) 109/55   BP Location:  Left leg Left leg    Patient Position:  Lying Lying    Pulse:  76 87 93   Resp: 20 16 (!) 24 (!) 23   Temp:   98.4 °F (36.9 °C)    TempSrc:   Axillary    SpO2:  100% 100% 100%   Weight:       Height:                 Physical Exam  --sedation: none  --GCS: [id number] Tracheostomy   --Mental Status: Alert, follows some commands   --Pupils 3-->2mm, PERRL.   --brainstem: intact  --Motor: no movement in extremities  --sensory: blinks acknowledging touch throughout   --Reflexes: not tested  --Gait: deferred        Today I personally reviewed pertinent medications, lines/drains/airways, imaging, cardiology results, laboratory results, microbiology results,        Assessment/Plan:      Neuro  ALS (amyotrophic lateral sclerosis)  See trismus      Hematology  Occlusive thrombus  See Trismus      Other  * Trismus  Briefly 57 yo AAM with ALS. At OSH with prolonged course complicated by bacteremia, PNA, upper extremity DVTs, facial swelling and trismus. Received Abx, PICC was changed there prior to transfer, and received BOTOX injections to help with his Trismus.     -ENT consult appreciate recs  -ID following:  infectious workup & Abx  -Heparin gtt for DVTs and prior PE  - muscle relaxants & PRN pain meds  -Echo  -tube feeds       The patient is being Prophylaxed for:  Venous Thromboembolism with: Chemical  Stress Ulcer with: Not Applicable   Ventilator Pneumonia with: chlorhexidine oral care    Activity Orders            Turn patient starting at 08/31 1200    Elevate HOB starting at 08/31 1131    Diet NPO: NPO starting at 08/31 1131          Full Code    Mack Mccartney MD  Neurocritical Care  [facility]

## 2022-09-02 NOTE — PLAN OF CARE
[facility] Care Plan    POC reviewed with Wei RAMIREZ Trip and family at 1400. Questions and concerns addressed. No acute events today. Pt progressing toward goals. Will continue to monitor. See below and flowsheets for full assessment and VS info.     - Morphine given x 1 for pain.   - Fentanyl given x 1 for pain.   - Flexi removed.   - See previous note for < MAP  - Heparin gtt continued.   - Levo gtt started.   - PICC removed.   - Midline placed; PIV x 2 placed.   - Neuro checks Q4hr.         Is this a stroke patient? no    Neuro:  Sukumar Coma Scale  Best Eye Response: 4-->(E4) spontaneous  Best Motor Response: 5-->(M5) localizes pain  Best Verbal Response: 5-->(V5) oriented  Sukumar Coma Scale Score: 14  Assessment Qualifiers: patient not sedated/intubated, no eye obstruction present  Pupil PERRLA: yes     24 hr Temp:  [98 °F (36.7 °C)-98.7 °F (37.1 °C)]     CV:   Rhythm: normal sinus rhythm  BP goals:   SBP < 160  MAP > 65    Resp:   O2 Device (Oxygen Therapy): ventilator  Vent Mode: A/C  Set Rate: 16 BPM  Oxygen Concentration (%): 30  Vt Set: 500 mL  PEEP/CPAP: 5 cmH20    Plan: trach in place    GI/:     Diet/Nutrition Received: NPO, tube feeding  Last Bowel Movement: 09/02/22  Voiding Characteristics: ureteral catheter    Intake/Output Summary (Last 24 hours) at 9/2/2022 1833  Last data filed at 9/2/2022 1801  Gross per 24 hour   Intake 1978.38 ml   Output 1465 ml   Net 513.38 ml     Unmeasured Output  Stool Occurrence: 1    Labs/Accuchecks:  Recent Labs   Lab 09/02/22  0255   WBC 11.49   RBC 2.69*   HGB 7.0*   HCT 22.5*         Recent Labs   Lab 09/02/22  0255   *   K 3.8   CO2 23      BUN 22   CREATININE 0.5   ALKPHOS 74   ALT 11   AST 20   BILITOT 0.4      Recent Labs   Lab 08/31/22  1656 09/01/22  0019 09/02/22  0255   INR 1.3*  --   --    APTT 28.7   < > 41.9*    < > = values in this interval not displayed.    No results for input(s): CPK, CPKMB, TROPONINI, MB in the last 168  hours.    Electrolytes: No replacement orders  Accuchecks: none    Gtts:   heparin (porcine) in D5W 13 Units/kg/hr (09/02/22 1801)    NORepinephrine bitartrate-D5W 0.02 mcg/kg/min (09/02/22 1801)       LDA/Wounds:  Lines/Drains/Airways       Peripherally Inserted Central Catheter Line  Duration             PICC Triple Lumen 08/30/22 0910 left basilic 3 days              Drain  Duration                  Gastrostomy/Enterostomy LUQ feeding -- days         Rectal Tube 08/18/22 0000 15 days         Urethral Catheter 08/31/22 1100 2 days              Airway  Duration                  Surgical Airway Shiley Cuffed -- days         Surgical Airway 05/23/22 1145 Portex Cuffed 102 days              Peripheral Intravenous Line  Duration                  Midline Catheter Insertion/Assessment  - Single Lumen 09/02/22 1200 Left cephalic vein (lateral side of arm) 20g x 10cm <1 day         Peripheral IV - Single Lumen 09/02/22 1200 18 G;1 3/4 in Left Antecubital <1 day                  Wounds: Yes  Wound care consulted: Yes

## 2022-09-02 NOTE — PROCEDURES
"Wei Dominique is a 67 y.o. male patient.    Temp: 98.4 °F (36.9 °C) (22 1501)  Pulse: 93 (22 1510)  Resp: (!) 23 (22 1510)  BP: (!) 109/55 (22 1510)  SpO2: 100 % (22 1510)  Weight: 77 kg (169 lb 12.1 oz) (22 1105)  Height: 5' 8" (172.7 cm) (22 1105)       Arterial Line    Date/Time: 2022 5:20 PM  Location procedure was performed: [facility] NEURO [facility]  Performed by: Alicia Moreno NP  Authorized by: Alicia Moreno NP   Consent Done: Yes  Consent: Written consent obtained.  Risks and benefits: risks, benefits and alternatives were discussed  Consent given by: spouse  Required items: required blood products, implants, devices, and special equipment available  Patient identity confirmed:  and MRN  Preparation: Patient was prepped and draped in the usual sterile fashion.  Indications: hemodynamic monitoring  Location: left radial  Enrike's test normal: yes  Needle gauge: 20  Number of attempts: 4  Technical procedures used: US guidance  Complications: No  Specimens: No  Implants: No  Post-procedure: dressing applied  Post-procedure CMS: normal and unchanged  Patient tolerance: Patient tolerated the procedure well with no immediate complications        2022    "

## 2022-09-02 NOTE — PROGRESS NOTES
[facility]  Infectious Disease  Progress Note    Patient Name: Wei Dominique  MRN: [id number]  Admission Date: 8/31/2022  Length of Stay: 2 days  Attending Physician: Juaquin Cyr MD  Primary Care Provider: Primary Doctor No    Isolation Status: Contact  Assessment/Plan:      Bacteremia  67-year-old male with ALS s/p trach'd and peg'd, vent-dependent initially presented to OSH with facial swelling with concerns for facial cellulitis with neurogenic trismus s/p botox injections, transferred here for further evaluation regarding neurogenic trismus.    Has multiple possible sources for initial sepsis.  No abscess seen on ENT exam.  Rim-enhancing mandibular lesion possible portion of tongue patient had bit down on.  8/18/22 urine Cx with pseudomonas and Providencia.   8/18/22 midline catheter tip with MRSA and morganella (suspect colonized tip)  8/19/22 and 8/26/22 RCx with Pseudomonas, latter .  Remains sensitive to cefepime and pip/tazo.  Vent settings appear stable, though various CXR imaging with consolidations  8/26/22-8/27/22 Staph capitis bacteremia resistance to oxacillin.  8/30/22 PICC tip cx negative.  Noted multiple skin wounds.  8/31/22 RCx with GNR    Recommendations:  -Staph capitis bacteremia and pseudomonas pneumonia appear clinically-significant  -Expecting 2 weeks of vancomycin for 8/26-8/27/22 Staph capitis bacteremia.  Start date from day of PICC line removal 8/30/22.  End date 8/13//22.  -Repeat Bcx NGTD  -Expecting 7 days of cefepime, pending latest RCx results  -Positive cultures from all other sources would be covered adequately with the above regimen        Thank you for your consult. I will follow-up with patient. Please contact us if you have any additional questions.    Zain Caruso MD  Infectious Disease  [facility]    Subjective:     Principal Problem:Trismus    HPI: 67-year-old male with ALS, trach'd and PEG'd, vent-depentence, HTN, PE on  "eliquis, MALINDA pseudomonas initially presented to [facility] on 8/18/22 complaining of facial swelling, presented febrile 102.4 and with leukocytosis 21.  CT max/face showed right maxillary and anterior maxillary soft tissue edema and thickening with right perimandibular ovoid infection.  ENT was able to examine the patient under sedation and paralytics, no abscesses seen on their exam, bite block placed.  Bilateral masseter botox injections were also later given on 8/24/22 and 8/30/22.  Infectious Disease Dr. Burnham was consulted and saw the patient during hospitalization at stay at [place].  Abx and w/u as below.  Initial vancomycin and meropenem changed to Zerbaxa and clindamycin.  8/18/22 urine culture grew both Pseudomonas and providencia.  8/18/22 Midline catheter tip grew MRSA and morganella.  Blood cultures have not grown these organisms.  8/19/22 respiratory culture grew Pseudomonas.  8/22/22 Antibiotics changed to cefepime  8/25/22-8/26/22 ID reconsulted for Tmax 100.4, soft Bps.  Noted purulent secretions from and around the ET tube.  CXR with opacities  8/26/22 Cefepime changed to meropenem  8/27/22 started vancomycin  8/26/22 respiratory culture grew Pseudomonas, now .  8/26/22-8/27/22 blood cultures positive for Staph capitis, oxacillin-resistant.  8/29/22 meropenem changed to cefepime based on pseudomonas  susceptibilities  8/30/22 PICC line tip culture NGTD  8/30/22 Nonfunctional portacath removed  8/30/22 Transferred to [facility] for neurology evaluation for ALS, neurologic trismus  8/31/22 Rcx GNR    Consult: "Antibiotic recommendations. OSH cultures + for MRSA, ESBL, MDRO, . Repeat cultures pending."    Interval History: Afebrile.    Review of Systems   Unable to perform ROS: Patient nonverbal   Objective:     Vital Signs (Most Recent):  Temp: 98.4 °F (36.9 °C) (09/02/22 1501)  Pulse: 93 (09/02/22 1510)  Resp: (!) 23 (09/02/22 1510)  BP: (!) 109/55 (09/02/22 1510)  SpO2: 100 % (09/02/22 " 1510)   Vital Signs (24h Range):  Temp:  [98 °F (36.7 °C)-98.7 °F (37.1 °C)] 98.4 °F (36.9 °C)  Pulse:  [] 93  Resp:  [16-28] 23  SpO2:  [100 %] 100 %  BP: ()/(47-72) 109/55     Weight: 77 kg (169 lb 12.1 oz)  Body mass index is 25.81 kg/m².    Estimated Creatinine Clearance: 140.6 mL/min (based on SCr of 0.5 mg/dL).    Physical Exam  Vitals reviewed.   Constitutional:       General: He is not in acute distress.     Appearance: He is ill-appearing. He is not diaphoretic.   HENT:      Head: Normocephalic.      Right Ear: External ear normal.      Left Ear: External ear normal.      Nose: Nose normal.      Mouth/Throat:      Comments: Bite block in place  Eyes:      General: No scleral icterus.        Right eye: No discharge.         Left eye: No discharge.      Conjunctiva/sclera: Conjunctivae normal.   Neck:      Comments: Trach in place  Cardiovascular:      Rate and Rhythm: Tachycardia present.   Pulmonary:      Comments: Vent Mode: A/C  Oxygen Concentration (%):  [30] 30  Resp Rate Total:  [16 br/min-31 br/min] 25 br/min  Vt Set:  [500 mL] 500 mL  PEEP/CPAP:  [5 cmH20] 5 cmH20  Mean Airway Pressure:  [11 qxG66-75 cmH20] 14 cmH20   Abdominal:      General: Abdomen is flat.      Palpations: Abdomen is soft.   Skin:     General: Skin is warm and dry.      Comments: Wound photos reviewed   Neurological:      Mental Status: He is alert.      Comments: Blinks, nods head       Significant Labs: All pertinent labs within the past 24 hours have been reviewed.    Significant Imaging: I have reviewed all pertinent imaging results/findings within the past 24 hours.

## 2022-09-02 NOTE — SUBJECTIVE & OBJECTIVE
Interval History: Afebrile.    Review of Systems   Unable to perform ROS: Patient nonverbal   Objective:     Vital Signs (Most Recent):  Temp: 98.4 °F (36.9 °C) (09/02/22 1501)  Pulse: 93 (09/02/22 1510)  Resp: (!) 23 (09/02/22 1510)  BP: (!) 109/55 (09/02/22 1510)  SpO2: 100 % (09/02/22 1510)   Vital Signs (24h Range):  Temp:  [98 °F (36.7 °C)-98.7 °F (37.1 °C)] 98.4 °F (36.9 °C)  Pulse:  [] 93  Resp:  [16-28] 23  SpO2:  [100 %] 100 %  BP: ()/(47-72) 109/55     Weight: 77 kg (169 lb 12.1 oz)  Body mass index is 25.81 kg/m².    Estimated Creatinine Clearance: 140.6 mL/min (based on SCr of 0.5 mg/dL).    Physical Exam  Vitals reviewed.   Constitutional:       General: He is not in acute distress.     Appearance: He is ill-appearing. He is not diaphoretic.   HENT:      Head: Normocephalic.      Right Ear: External ear normal.      Left Ear: External ear normal.      Nose: Nose normal.      Mouth/Throat:      Comments: Bite block in place  Eyes:      General: No scleral icterus.        Right eye: No discharge.         Left eye: No discharge.      Conjunctiva/sclera: Conjunctivae normal.   Neck:      Comments: Trach in place  Cardiovascular:      Rate and Rhythm: Tachycardia present.   Pulmonary:      Comments: Vent Mode: A/C  Oxygen Concentration (%):  [30] 30  Resp Rate Total:  [16 br/min-31 br/min] 25 br/min  Vt Set:  [500 mL] 500 mL  PEEP/CPAP:  [5 cmH20] 5 cmH20  Mean Airway Pressure:  [11 sjI57-05 cmH20] 14 cmH20   Abdominal:      General: Abdomen is flat.      Palpations: Abdomen is soft.   Skin:     General: Skin is warm and dry.      Comments: Wound photos reviewed   Neurological:      Mental Status: He is alert.      Comments: Blinks, nods head       Significant Labs: All pertinent labs within the past 24 hours have been reviewed.    Significant Imaging: I have reviewed all pertinent imaging results/findings within the past 24 hours.

## 2022-09-02 NOTE — NURSING
1430: Pt MAP < 65.  TUSHAR Vargas notified and to bedside.  Per TUSHAR Vargas, 1L NS bolus given and 2 luma bumps given.  No change in BP.  Levo gtt started.  Pt MAP increased to > 65.      Per TUSHAR Vargas: Heparin gtt discontinued until A line placement for concerns of bleeding.  Tube feeds also held for increasing abdominal tenderness.  Pt grimaces when pressure is applied to abdomen. KUB ordered. Flexi to be removed.        1715: A line placed.  50mcg of Fentanyl given for discomfort and pain.  Pt tolerated procedure well. WCTM.

## 2022-09-02 NOTE — PLAN OF CARE
[facility] Care Plan    POC reviewed with Wei Dominique and family at 0300. Pt verbalized understanding. Questions and concerns addressed. No acute events overnight. Pt progressing toward goals. Will continue to monitor. See below and flowsheets for full assessment and VS info.     Wound care completed  Oxycodone given x1  Morphine given x3  Copious oral secretions  TF at goal      Is this a stroke patient? no    Neuro:  Sukumar Coma Scale  Best Eye Response: 3-->(E3) to speech  Best Motor Response: 6-->(M6) obeys commands  Best Verbal Response: 5-->(V5) oriented  Sukumar Coma Scale Score: 14  Assessment Qualifiers: patient not sedated/intubated  Pupil PERRLA: yes     24hr Temp:  [98.2 °F (36.8 °C)-98.9 °F (37.2 °C)]     CV:   Rhythm: normal sinus rhythm  BP goals:   SBP < 160  MAP > 65    Resp:   O2 Device (Oxygen Therapy): ventilator  Vent Mode: A/C  Set Rate: 16 BPM  Oxygen Concentration (%): 30  Vt Set: 500 mL  PEEP/CPAP: 5 cmH20    Plan: trach in place    GI/:     Diet/Nutrition Received: tube feeding  Last Bowel Movement: 09/02/22  Voiding Characteristics: urethral catheter (bladder)    Intake/Output Summary (Last 24 hours) at 9/2/2022 0606  Last data filed at 9/2/2022 0505  Gross per 24 hour   Intake 1358.07 ml   Output 1150 ml   Net 208.07 ml     Unmeasured Output  Stool Occurrence: 1    Labs/Accuchecks:  Recent Labs   Lab 09/02/22  0255   WBC 11.49   RBC 2.69*   HGB 7.0*   HCT 22.5*         Recent Labs   Lab 09/02/22  0255   *   K 3.8   CO2 23      BUN 22   CREATININE 0.5   ALKPHOS 74   ALT 11   AST 20   BILITOT 0.4      Recent Labs   Lab 08/31/22  1656 09/01/22  0019 09/02/22  0255   INR 1.3*  --   --    APTT 28.7   < > 41.9*    < > = values in this interval not displayed.    No results for input(s): CPK, CPKMB, TROPONINI, MB in the last 168 hours.    Electrolytes: No replacement orders  Accuchecks: none    Gtts:   heparin (porcine) in D5W 13 Units/kg/hr (09/02/22 5689)        LDA/Wounds:  Lines/Drains/Airways       Peripherally Inserted Central Catheter Line  Duration             PICC Triple Lumen 08/30/22 0910 left basilic 2 days              Drain  Duration                  Gastrostomy/Enterostomy LUQ feeding -- days         Rectal Tube 08/18/22 0000 15 days         Urethral Catheter 08/31/22 1100 1 day              Airway  Duration                  Surgical Airway Shiley Cuffed -- days         Surgical Airway 05/23/22 1145 Portex Cuffed 101 days                  Wounds: Yes  Wound care consulted: Yes

## 2022-09-03 LAB
ALBUMIN SERPL BCP-MCNC: 1.5 G/DL (ref 3.5–5.2)
ALLENS TEST: ABNORMAL
ALP SERPL-CCNC: 80 U/L (ref 55–135)
ALT SERPL W/O P-5'-P-CCNC: 9 U/L (ref 10–44)
ANION GAP SERPL CALC-SCNC: 6 MMOL/L (ref 8–16)
APTT BLDCRRT: 38.2 SEC (ref 21–32)
APTT BLDCRRT: 39.8 SEC (ref 21–32)
AST SERPL-CCNC: 15 U/L (ref 10–40)
BASOPHILS # BLD AUTO: 0.06 K/UL (ref 0–0.2)
BASOPHILS NFR BLD: 0.4 % (ref 0–1.9)
BILIRUB SERPL-MCNC: 0.4 MG/DL (ref 0.1–1)
BUN SERPL-MCNC: 23 MG/DL (ref 8–23)
CALCIUM SERPL-MCNC: 8.5 MG/DL (ref 8.7–10.5)
CHLORIDE SERPL-SCNC: 109 MMOL/L (ref 95–110)
CO2 SERPL-SCNC: 20 MMOL/L (ref 23–29)
CREAT SERPL-MCNC: 0.5 MG/DL (ref 0.5–1.4)
CRP SERPL-MCNC: 36.7 MG/L (ref 0–8.2)
DELSYS: ABNORMAL
DIFFERENTIAL METHOD: ABNORMAL
EOSINOPHIL # BLD AUTO: 0.5 K/UL (ref 0–0.5)
EOSINOPHIL NFR BLD: 3.4 % (ref 0–8)
ERYTHROCYTE [DISTWIDTH] IN BLOOD BY AUTOMATED COUNT: 19.7 % (ref 11.5–14.5)
ERYTHROCYTE [SEDIMENTATION RATE] IN BLOOD BY WESTERGREN METHOD: 16 MM/H
EST. GFR  (NO RACE VARIABLE): >60 ML/MIN/1.73 M^2
FIO2: 30
GLUCOSE SERPL-MCNC: 133 MG/DL (ref 70–110)
HCO3 UR-SCNC: 22 MMOL/L (ref 24–28)
HCT VFR BLD AUTO: 25.8 % (ref 40–54)
HGB BLD-MCNC: 8 G/DL (ref 14–18)
IMM GRANULOCYTES # BLD AUTO: 0.11 K/UL (ref 0–0.04)
IMM GRANULOCYTES NFR BLD AUTO: 0.8 % (ref 0–0.5)
LYMPHOCYTES # BLD AUTO: 1.4 K/UL (ref 1–4.8)
LYMPHOCYTES NFR BLD: 9.9 % (ref 18–48)
MAGNESIUM SERPL-MCNC: 1.7 MG/DL (ref 1.6–2.6)
MAGNESIUM SERPL-MCNC: 1.8 MG/DL (ref 1.6–2.6)
MCH RBC QN AUTO: 25.6 PG (ref 27–31)
MCHC RBC AUTO-ENTMCNC: 31 G/DL (ref 32–36)
MCV RBC AUTO: 83 FL (ref 82–98)
MIN VOL: 5.3
MODE: ABNORMAL
MONOCYTES # BLD AUTO: 0.6 K/UL (ref 0.3–1)
MONOCYTES NFR BLD: 4.3 % (ref 4–15)
NEUTROPHILS # BLD AUTO: 11.5 K/UL (ref 1.8–7.7)
NEUTROPHILS NFR BLD: 81.2 % (ref 38–73)
NRBC BLD-RTO: 0 /100 WBC
PCO2 BLDA: 28.1 MMHG (ref 35–45)
PEEP: 5
PH SMN: 7.5 [PH] (ref 7.35–7.45)
PHOSPHATE SERPL-MCNC: 2 MG/DL (ref 2.7–4.5)
PIP: 30
PLATELET # BLD AUTO: 415 K/UL (ref 150–450)
PMV BLD AUTO: 10.2 FL (ref 9.2–12.9)
PO2 BLDA: 117 MMHG (ref 80–100)
POC BE: -1 MMOL/L
POC SATURATED O2: 99 % (ref 95–100)
POC TCO2: 23 MMOL/L (ref 23–27)
POTASSIUM SERPL-SCNC: 3.4 MMOL/L (ref 3.5–5.1)
POTASSIUM SERPL-SCNC: 5 MMOL/L (ref 3.5–5.1)
PROT SERPL-MCNC: 7.4 G/DL (ref 6–8.4)
RBC # BLD AUTO: 3.12 M/UL (ref 4.6–6.2)
SAMPLE: ABNORMAL
SITE: ABNORMAL
SODIUM SERPL-SCNC: 135 MMOL/L (ref 136–145)
SP02: 100
VT: 500
WBC # BLD AUTO: 14.1 K/UL (ref 3.9–12.7)

## 2022-09-03 PROCEDURE — 63600175 PHARM REV CODE 636 W HCPCS: Performed by: PSYCHIATRY & NEUROLOGY

## 2022-09-03 PROCEDURE — 94640 AIRWAY INHALATION TREATMENT: CPT

## 2022-09-03 PROCEDURE — 85025 COMPLETE CBC W/AUTO DIFF WBC: CPT

## 2022-09-03 PROCEDURE — 84100 ASSAY OF PHOSPHORUS: CPT | Performed by: INTERNAL MEDICINE

## 2022-09-03 PROCEDURE — 27000207 HC ISOLATION

## 2022-09-03 PROCEDURE — 25000003 PHARM REV CODE 250: Performed by: PSYCHIATRY & NEUROLOGY

## 2022-09-03 PROCEDURE — 20000000 HC ICU ROOM

## 2022-09-03 PROCEDURE — 25000003 PHARM REV CODE 250: Performed by: NURSE PRACTITIONER

## 2022-09-03 PROCEDURE — 27000221 HC OXYGEN, UP TO 24 HOURS

## 2022-09-03 PROCEDURE — 63600175 PHARM REV CODE 636 W HCPCS: Performed by: PHYSICIAN ASSISTANT

## 2022-09-03 PROCEDURE — 80053 COMPREHEN METABOLIC PANEL: CPT

## 2022-09-03 PROCEDURE — 85730 THROMBOPLASTIN TIME PARTIAL: CPT

## 2022-09-03 PROCEDURE — 82803 BLOOD GASES ANY COMBINATION: CPT

## 2022-09-03 PROCEDURE — 25000003 PHARM REV CODE 250

## 2022-09-03 PROCEDURE — 25000003 PHARM REV CODE 250: Performed by: PHYSICIAN ASSISTANT

## 2022-09-03 PROCEDURE — 99233 PR SUBSEQUENT HOSPITAL CARE,LEVL III: ICD-10-PCS | Mod: ,,, | Performed by: INTERNAL MEDICINE

## 2022-09-03 PROCEDURE — 99233 SBSQ HOSP IP/OBS HIGH 50: CPT | Mod: ,,, | Performed by: INTERNAL MEDICINE

## 2022-09-03 PROCEDURE — 83735 ASSAY OF MAGNESIUM: CPT | Performed by: INTERNAL MEDICINE

## 2022-09-03 PROCEDURE — 25000242 PHARM REV CODE 250 ALT 637 W/ HCPCS: Performed by: PSYCHIATRY & NEUROLOGY

## 2022-09-03 PROCEDURE — 99900026 HC AIRWAY MAINTENANCE (STAT)

## 2022-09-03 PROCEDURE — 37799 UNLISTED PX VASCULAR SURGERY: CPT

## 2022-09-03 PROCEDURE — 85730 THROMBOPLASTIN TIME PARTIAL: CPT | Mod: 91 | Performed by: INTERNAL MEDICINE

## 2022-09-03 PROCEDURE — 94003 VENT MGMT INPAT SUBQ DAY: CPT

## 2022-09-03 PROCEDURE — 99291 PR CRITICAL CARE, E/M 30-74 MINUTES: ICD-10-PCS | Mod: GC,,, | Performed by: INTERNAL MEDICINE

## 2022-09-03 PROCEDURE — 94761 N-INVAS EAR/PLS OXIMETRY MLT: CPT

## 2022-09-03 PROCEDURE — 63700000 PHARM REV CODE 250 ALT 637 W/O HCPCS

## 2022-09-03 PROCEDURE — 63600175 PHARM REV CODE 636 W HCPCS: Performed by: NURSE PRACTITIONER

## 2022-09-03 PROCEDURE — 84132 ASSAY OF SERUM POTASSIUM: CPT | Performed by: INTERNAL MEDICINE

## 2022-09-03 PROCEDURE — 25000003 PHARM REV CODE 250: Performed by: INTERNAL MEDICINE

## 2022-09-03 PROCEDURE — 63600175 PHARM REV CODE 636 W HCPCS: Performed by: INTERNAL MEDICINE

## 2022-09-03 PROCEDURE — 99291 CRITICAL CARE FIRST HOUR: CPT | Mod: GC,,, | Performed by: INTERNAL MEDICINE

## 2022-09-03 PROCEDURE — 86140 C-REACTIVE PROTEIN: CPT

## 2022-09-03 PROCEDURE — 99900035 HC TECH TIME PER 15 MIN (STAT)

## 2022-09-03 RX ORDER — SODIUM,POTASSIUM PHOSPHATES 280-250MG
2 POWDER IN PACKET (EA) ORAL
Status: DISCONTINUED | OUTPATIENT
Start: 2022-09-03 | End: 2022-09-13 | Stop reason: HOSPADM

## 2022-09-03 RX ORDER — LANOLIN ALCOHOL/MO/W.PET/CERES
800 CREAM (GRAM) TOPICAL
Status: DISCONTINUED | OUTPATIENT
Start: 2022-09-03 | End: 2022-09-05

## 2022-09-03 RX ORDER — MIDAZOLAM HYDROCHLORIDE 1 MG/ML
4 INJECTION INTRAMUSCULAR; INTRAVENOUS ONCE
Status: COMPLETED | OUTPATIENT
Start: 2022-09-03 | End: 2022-09-03

## 2022-09-03 RX ORDER — FENTANYL CITRATE 50 UG/ML
50 INJECTION, SOLUTION INTRAMUSCULAR; INTRAVENOUS ONCE
Status: COMPLETED | OUTPATIENT
Start: 2022-09-03 | End: 2022-09-03

## 2022-09-03 RX ORDER — MIDAZOLAM HYDROCHLORIDE 1 MG/ML
2 INJECTION INTRAMUSCULAR; INTRAVENOUS EVERY 4 HOURS PRN
Status: DISCONTINUED | OUTPATIENT
Start: 2022-09-03 | End: 2022-09-04

## 2022-09-03 RX ORDER — SODIUM,POTASSIUM PHOSPHATES 280-250MG
2 POWDER IN PACKET (EA) ORAL
Status: DISCONTINUED | OUTPATIENT
Start: 2022-09-03 | End: 2022-09-05

## 2022-09-03 RX ORDER — ACETYLCYSTEINE 100 MG/ML
4 SOLUTION ORAL; RESPIRATORY (INHALATION)
Status: DISCONTINUED | OUTPATIENT
Start: 2022-09-03 | End: 2022-09-06

## 2022-09-03 RX ADMIN — RILUZOLE 50 MG: 50 TABLET ORAL at 08:09

## 2022-09-03 RX ADMIN — FAMOTIDINE 20 MG: 20 TABLET ORAL at 08:09

## 2022-09-03 RX ADMIN — OXYCODONE 5 MG: 5 TABLET ORAL at 03:09

## 2022-09-03 RX ADMIN — METOPROLOL TARTRATE 25 MG: 25 TABLET, FILM COATED ORAL at 08:09

## 2022-09-03 RX ADMIN — POTASSIUM BICARBONATE 35 MEQ: 391 TABLET, EFFERVESCENT ORAL at 03:09

## 2022-09-03 RX ADMIN — METHOCARBAMOL 500 MG: 500 TABLET ORAL at 01:09

## 2022-09-03 RX ADMIN — SENNOSIDES AND DOCUSATE SODIUM 1 TABLET: 50; 8.6 TABLET ORAL at 08:09

## 2022-09-03 RX ADMIN — FENTANYL CITRATE 50 MCG: 0.05 INJECTION, SOLUTION INTRAMUSCULAR; INTRAVENOUS at 01:09

## 2022-09-03 RX ADMIN — MIDAZOLAM HYDROCHLORIDE 4 MG: 1 INJECTION, SOLUTION INTRAMUSCULAR; INTRAVENOUS at 06:09

## 2022-09-03 RX ADMIN — BACLOFEN 10 MG: 10 TABLET ORAL at 08:09

## 2022-09-03 RX ADMIN — NYSTATIN 500000 UNITS: 500000 SUSPENSION ORAL at 01:09

## 2022-09-03 RX ADMIN — MUPIROCIN: 20 OINTMENT TOPICAL at 08:09

## 2022-09-03 RX ADMIN — ACETYLCYSTEINE 4 ML: 100 INHALANT RESPIRATORY (INHALATION) at 08:09

## 2022-09-03 RX ADMIN — TRAZODONE HYDROCHLORIDE 50 MG: 50 TABLET ORAL at 08:09

## 2022-09-03 RX ADMIN — SCOPALAMINE 1 PATCH: 1 PATCH, EXTENDED RELEASE TRANSDERMAL at 11:09

## 2022-09-03 RX ADMIN — CEFEPIME HYDROCHLORIDE 2 G: 2 INJECTION, SOLUTION INTRAVENOUS at 01:09

## 2022-09-03 RX ADMIN — MIDAZOLAM 2 MG: 1 INJECTION INTRAMUSCULAR; INTRAVENOUS at 11:09

## 2022-09-03 RX ADMIN — OXYCODONE 5 MG: 5 TABLET ORAL at 08:09

## 2022-09-03 RX ADMIN — NYSTATIN 500000 UNITS: 500000 SUSPENSION ORAL at 08:09

## 2022-09-03 RX ADMIN — POTASSIUM & SODIUM PHOSPHATES POWDER PACK 280-160-250 MG 2 PACKET: 280-160-250 PACK at 08:09

## 2022-09-03 RX ADMIN — CEFEPIME HYDROCHLORIDE 2 G: 2 INJECTION, SOLUTION INTRAVENOUS at 05:09

## 2022-09-03 RX ADMIN — SODIUM CHLORIDE 1000 ML: 0.9 INJECTION, SOLUTION INTRAVENOUS at 09:09

## 2022-09-03 RX ADMIN — MUPIROCIN: 20 OINTMENT TOPICAL at 03:09

## 2022-09-03 RX ADMIN — POTASSIUM BICARBONATE 35 MEQ: 391 TABLET, EFFERVESCENT ORAL at 05:09

## 2022-09-03 RX ADMIN — Medication 800 MG: at 08:09

## 2022-09-03 RX ADMIN — OXYCODONE 5 MG: 5 TABLET ORAL at 04:09

## 2022-09-03 RX ADMIN — METHOCARBAMOL 500 MG: 500 TABLET ORAL at 08:09

## 2022-09-03 RX ADMIN — METHOCARBAMOL 500 MG: 500 TABLET ORAL at 05:09

## 2022-09-03 RX ADMIN — VANCOMYCIN HYDROCHLORIDE 1000 MG: 1 INJECTION, POWDER, LYOPHILIZED, FOR SOLUTION INTRAVENOUS at 07:09

## 2022-09-03 RX ADMIN — NYSTATIN 500000 UNITS: 500000 SUSPENSION ORAL at 05:09

## 2022-09-03 RX ADMIN — CEFEPIME HYDROCHLORIDE 2 G: 2 INJECTION, SOLUTION INTRAVENOUS at 08:09

## 2022-09-03 RX ADMIN — BUSPIRONE HYDROCHLORIDE 5 MG: 5 TABLET ORAL at 08:09

## 2022-09-03 RX ADMIN — Medication 800 MG: at 04:09

## 2022-09-03 RX ADMIN — FENTANYL CITRATE 50 MCG: 0.05 INJECTION, SOLUTION INTRAMUSCULAR; INTRAVENOUS at 04:09

## 2022-09-03 RX ADMIN — BACLOFEN 10 MG: 10 TABLET ORAL at 03:09

## 2022-09-03 RX ADMIN — POLYETHYLENE GLYCOL 3350 17 G: 17 POWDER, FOR SOLUTION ORAL at 08:09

## 2022-09-03 RX ADMIN — FENTANYL CITRATE 50 MCG: 0.05 INJECTION, SOLUTION INTRAMUSCULAR; INTRAVENOUS at 08:09

## 2022-09-03 RX ADMIN — POTASSIUM & SODIUM PHOSPHATES POWDER PACK 280-160-250 MG 2 PACKET: 280-160-250 PACK at 11:09

## 2022-09-03 RX ADMIN — POTASSIUM & SODIUM PHOSPHATES POWDER PACK 280-160-250 MG 2 PACKET: 280-160-250 PACK at 04:09

## 2022-09-03 NOTE — PROGRESS NOTES
Pharmacokinetic Assessment Follow Up: IV Vancomycin    Vancomycin serum concentration assessment(s):    The trough level was drawn correctly and can be used to guide therapy at this time. The measurement is above the desired definitive target range of 15 to 20 mcg/mL.  - The trough level was drawn ~25 hours after previous dose and resulted at 22.8.    Vancomycin Regimen Plan:    Change regimen to Vancomycin 1000 mg IV every 24 hours with next serum trough concentration measured at 1700 prior to 3rd dose on 9/5.  - Given that level is a little elevated above baseline, will opt to decrease dose rather than change to pulse dosing.   - Scr appears stable and at baseline. UOP is steady.   - Low threshold to pulse dose if Scr increases or UOP decreases.  - Please draw random level sooner than scheduled trough if renal function changes significantly.     Drug levels (last 3 results):  Recent Labs   Lab Result Units 08/31/22  0351 09/02/22  1758   Vancomycin, Random ug/mL 18.0  --    Vancomycin-Trough ug/mL  --  22.8*       Pharmacy will continue to follow and monitor vancomycin.    Please contact pharmacy at extension [phone] for questions regarding this assessment.    Thank you for the consult,   Shirley Beltran       Patient brief summary:  Wei Dominique is a 67 y.o. male initiated on antimicrobial therapy with IV Vancomycin for treatment of bacteremia    Actual Body Weight:   77 kg    Renal Function:   Estimated Creatinine Clearance: 140.6 mL/min (based on SCr of 0.5 mg/dL).     Dialysis Method (if applicable):  N/A

## 2022-09-03 NOTE — RESPIRATORY THERAPY
Spoke with Dr Mccartney in detail about the pain pt is in due to swollen tongue and the make shift bite blocks in either corner of pt mouth. He is going to speak to attending and surgery to possibly find a way to fix the issue of the tongue/teeth damage and pain that pt is in.

## 2022-09-03 NOTE — PLAN OF CARE
[facility] Care Plan    POC reviewed with Wei Dominique and family at 1400. Pt showed understanding. Questions and concerns addressed. No acute events today. Pt progressing toward goals. Will continue to monitor. See below and flowsheets for full assessment and VS info.     - Mag, phos, and potassium replaced.   - Levo gtt continued.   - 1L bolus x 1.   - Fentanyl x 2.   - Oxycodone x 1.       Is this a stroke patient? no    Neuro:  Sukumar Coma Scale  Best Eye Response: 3-->(E3) to speech  Best Motor Response: 6-->(M6) obeys commands  Best Verbal Response: 5-->(V5) oriented  Sukumar Coma Scale Score: 14  Assessment Qualifiers: patient not sedated/intubated, no eye obstruction present  Pupil PERRLA: yes     24 hr Temp:  [97.8 °F (36.6 °C)-98.6 °F (37 °C)]     CV:   Rhythm: normal sinus rhythm  BP goals:   SBP < 160  MAP > 65    Resp:   O2 Device (Oxygen Therapy): ventilator  Vent Mode: A/C  Set Rate: 16 BPM  Oxygen Concentration (%): 30  Vt Set: 500 mL  PEEP/CPAP: 5 cmH20    Plan: trach in place    GI/:     Diet/Nutrition Received: tube feeding, NPO  Last Bowel Movement: 09/02/22  Voiding Characteristics: ureteral catheter    Intake/Output Summary (Last 24 hours) at 9/3/2022 1650  Last data filed at 9/3/2022 1601  Gross per 24 hour   Intake 2926.52 ml   Output 2100 ml   Net 826.52 ml     Unmeasured Output  Stool Occurrence: 0    Labs/Accuchecks:  Recent Labs   Lab 09/03/22  0123   WBC 14.10*   RBC 3.12*   HGB 8.0*   HCT 25.8*         Recent Labs   Lab 09/03/22  0123 09/03/22  1115   *  --    K 3.4* 5.0   CO2 20*  --      --    BUN 23  --    CREATININE 0.5  --    ALKPHOS 80  --    ALT 9*  --    AST 15  --    BILITOT 0.4  --       Recent Labs   Lab 08/31/22  1656 09/01/22  0019 09/03/22  1115   INR 1.3*  --   --    APTT 28.7   < > 39.8*    < > = values in this interval not displayed.    No results for input(s): CPK, CPKMB, TROPONINI, MB in the last 168 hours.    Electrolytes: Electrolytes  replaced  Accuchecks: none    Gtts:   heparin (porcine) in D5W 14.0669 Units/kg/hr (09/03/22 1601)    NORepinephrine bitartrate-D5W 0.02 mcg/kg/min (09/02/22 1905)       LDA/Wounds:  Lines/Drains/Airways       Drain  Duration                  Gastrostomy/Enterostomy LUQ feeding -- days         Urethral Catheter 08/31/22 1100 3 days              Airway  Duration                  Surgical Airway Shiley Cuffed -- days         Surgical Airway 05/23/22 1145 Portex Cuffed 103 days              Peripheral Intravenous Line  Duration                  Midline Catheter Insertion/Assessment  - Single Lumen 09/02/22 1200 Left cephalic vein (lateral side of arm) 20g x 10cm 1 day         Peripheral IV - Single Lumen 09/02/22 1200 18 G;1 3/4 in Left Antecubital 1 day                  Wounds: Yes  Wound care consulted: Yes

## 2022-09-03 NOTE — PROGRESS NOTES
[facility]  Neurocritical Care  Progress Note    Admit Date: 8/31/2022  Service Date: 09/03/2022  Length of Stay: 3    Subjective:     Chief Complaint: Trismus    History of Present Illness: Wei Dominique is a 55 yo AAM with ALS. At OSH with prolonged course complicated by bacteremia, PNA, upper extremity DVTs, facial swelling and trismus. Received Abx, PICC was changed there prior to transfer, and received BOTOX injections to help with his Trismus.    Hospital Course:   09/01/2022 NAEON; ENT evaluation; appreciate ID recs  09/02/2022 NAEON; Appreciate ID& ENT help; pressors & heparin drip  09/03/2022 back on pressors; continue muscle relaxants and pain meds      Review of Systems  Unable to assess 2/2 unable to speak  Objective:      Vitals:  Vitals:    09/03/22 0701 09/03/22 0801 09/03/22 0829 09/03/22 0901   BP: (!) 123/56 (!) 121/58 108/61 (!) 127/55   BP Location: Left leg Left leg  Left leg   Patient Position: Lying Lying  Lying   Pulse: 109 79 80 76   Resp: (!) 22 16 20 18   Temp: 98.6 °F (37 °C)      TempSrc: Axillary      SpO2: 100% 100%  100%   Weight:       Height:                 Physical Exam  --sedation: none  --GCS: [id number] Tracheostomy   --Mental Status: Alert, follows some commands   --Pupils 3-->2mm, PERRL.   --brainstem: intact  --Motor: no movement in extremities  --sensory: blinks acknowledging touch throughout   --Reflexes: not tested  --Gait: deferred        Today I personally reviewed pertinent medications, lines/drains/airways, imaging, cardiology results, laboratory results, microbiology results,        Assessment/Plan:      Neuro  ALS (amyotrophic lateral sclerosis)  See trismus      Hematology  Occlusive thrombus  See Trismus      Other  * Trismus  Briefly 55 yo AAM with ALS. At OSH with prolonged course complicated by bacteremia, PNA, upper extremity DVTs, facial swelling and trismus. Received Abx, PICC was changed there prior to transfer, and received BOTOX  injections to help with his Trismus.     -ENT consult appreciate recs  -ID following: infectious workup & Abx  -Heparin gtt for DVTs and prior PE  - muscle relaxants & PRN pain meds  -Echo  -tube feeds       The patient is being Prophylaxed for:  Venous Thromboembolism with: Chemical  Stress Ulcer with: Not Applicable   Ventilator Pneumonia with: chlorhexidine oral care    Activity Orders            Turn patient starting at 08/31 1200    Elevate HOB starting at 08/31 1131    Diet NPO: NPO starting at 08/31 1131          Full Code    Mack Mccartney MD  Neurocritical Care  [facility]

## 2022-09-03 NOTE — PLAN OF CARE
[facility] Care Plan    POC reviewed with Wei Dominique at 0300. No acute events overnight. Pt progressing toward goals. Will continue to monitor. See below and flowsheets for full assessment and VS info.     Fentanyl and oxy given overnight to manage pain  Copious oral secretions cont  Heparin gtt increased to 14 u/kg/hr      Is this a stroke patient? no    Neuro:  [place] Coma Scale  Best Eye Response: 3-->(E3) to speech  Best Motor Response: 6-->(M6) obeys commands  Best Verbal Response: 5-->(V5) oriented  [place] Coma Scale Score: 14  Assessment Qualifiers: patient not sedated/intubated  Pupil PERRLA: yes     24hr Temp:  [97.8 °F (36.6 °C)-98.6 °F (37 °C)]     CV:   Rhythm: normal sinus rhythm  BP goals:   SBP < 160  MAP > 65    Resp:   O2 Device (Oxygen Therapy): ventilator  Vent Mode: A/C  Set Rate: 16 BPM  Oxygen Concentration (%): 30  Vt Set: 500 mL  PEEP/CPAP: 5 cmH20    Plan: trach in place    GI/:     Diet/Nutrition Received: tube feeding  Last Bowel Movement: 09/02/22  Voiding Characteristics: urethral catheter (bladder)    Intake/Output Summary (Last 24 hours) at 9/3/2022 0456  Last data filed at 9/3/2022 0405  Gross per 24 hour   Intake 2334.84 ml   Output 1560 ml   Net 774.84 ml     Unmeasured Output  Stool Occurrence: 0    Labs/Accuchecks:  Recent Labs   Lab 09/03/22  0123   WBC 14.10*   RBC 3.12*   HGB 8.0*   HCT 25.8*         Recent Labs   Lab 09/03/22  0123   *   K 3.4*   CO2 20*      BUN 23   CREATININE 0.5   ALKPHOS 80   ALT 9*   AST 15   BILITOT 0.4      Recent Labs   Lab 08/31/22  1656 09/01/22  0019 09/03/22  0123   INR 1.3*  --   --    APTT 28.7   < > 38.2*    < > = values in this interval not displayed.    No results for input(s): CPK, CPKMB, TROPONINI, MB in the last 168 hours.    Electrolytes: Electrolytes replaced  Accuchecks: none    Gtts:   heparin (porcine) in D5W 14 Units/kg/hr (09/03/22 2641)    NORepinephrine bitartrate-D5W 0.02 mcg/kg/min (09/02/22 8199)        LDA/Wounds:  Lines/Drains/Airways       Drain  Duration                  Gastrostomy/Enterostomy LUQ feeding -- days         Urethral Catheter 08/31/22 1100 2 days              Airway  Duration                  Surgical Airway Shiley Cuffed -- days         Surgical Airway 05/23/22 1145 Portex Cuffed 102 days              Peripheral Intravenous Line  Duration                  Midline Catheter Insertion/Assessment  - Single Lumen 09/02/22 1200 Left cephalic vein (lateral side of arm) 20g x 10cm <1 day         Peripheral IV - Single Lumen 09/02/22 1200 18 G;1 3/4 in Left Antecubital <1 day                  Wounds: Yes  Wound care consulted: Yes

## 2022-09-04 PROBLEM — K13.70 ORAL LESION: Status: ACTIVE | Noted: 2022-09-04

## 2022-09-04 LAB
ALBUMIN SERPL BCP-MCNC: 1.6 G/DL (ref 3.5–5.2)
ALLENS TEST: ABNORMAL
ALP SERPL-CCNC: 80 U/L (ref 55–135)
ALT SERPL W/O P-5'-P-CCNC: 12 U/L (ref 10–44)
ANION GAP SERPL CALC-SCNC: 6 MMOL/L (ref 8–16)
APTT BLDCRRT: 36.8 SEC (ref 21–32)
APTT BLDCRRT: 38 SEC (ref 21–32)
APTT BLDCRRT: 41.2 SEC (ref 21–32)
APTT BLDCRRT: 41.4 SEC (ref 21–32)
AST SERPL-CCNC: 16 U/L (ref 10–40)
BACTERIA BLD CULT: NORMAL
BACTERIA CATH TIP CULT: NO GROWTH
BASOPHILS # BLD AUTO: 0.06 K/UL (ref 0–0.2)
BASOPHILS NFR BLD: 0.4 % (ref 0–1.9)
BILIRUB SERPL-MCNC: 0.4 MG/DL (ref 0.1–1)
BUN SERPL-MCNC: 24 MG/DL (ref 8–23)
CALCIUM SERPL-MCNC: 8.8 MG/DL (ref 8.7–10.5)
CHLORIDE SERPL-SCNC: 109 MMOL/L (ref 95–110)
CO2 SERPL-SCNC: 18 MMOL/L (ref 23–29)
CREAT SERPL-MCNC: 0.5 MG/DL (ref 0.5–1.4)
CRP SERPL-MCNC: 39.4 MG/L (ref 0–8.2)
DELSYS: ABNORMAL
DIFFERENTIAL METHOD: ABNORMAL
EOSINOPHIL # BLD AUTO: 0.5 K/UL (ref 0–0.5)
EOSINOPHIL NFR BLD: 3.2 % (ref 0–8)
ERYTHROCYTE [DISTWIDTH] IN BLOOD BY AUTOMATED COUNT: 20.3 % (ref 11.5–14.5)
ERYTHROCYTE [SEDIMENTATION RATE] IN BLOOD BY WESTERGREN METHOD: 14 MM/H
ERYTHROCYTE [SEDIMENTATION RATE] IN BLOOD BY WESTERGREN METHOD: 16 MM/H
ERYTHROCYTE [SEDIMENTATION RATE] IN BLOOD BY WESTERGREN METHOD: 20 MM/H
EST. GFR  (NO RACE VARIABLE): >60 ML/MIN/1.73 M^2
FIO2: 30
GLUCOSE SERPL-MCNC: 122 MG/DL (ref 70–110)
HCO3 UR-SCNC: 20.7 MMOL/L (ref 24–28)
HCO3 UR-SCNC: 21.9 MMOL/L (ref 24–28)
HCO3 UR-SCNC: 21.9 MMOL/L (ref 24–28)
HCT VFR BLD AUTO: 27.5 % (ref 40–54)
HGB BLD-MCNC: 8.3 G/DL (ref 14–18)
IMM GRANULOCYTES # BLD AUTO: 0.17 K/UL (ref 0–0.04)
IMM GRANULOCYTES NFR BLD AUTO: 1 % (ref 0–0.5)
LYMPHOCYTES # BLD AUTO: 1.3 K/UL (ref 1–4.8)
LYMPHOCYTES NFR BLD: 7.9 % (ref 18–48)
MAGNESIUM SERPL-MCNC: 1.6 MG/DL (ref 1.6–2.6)
MAGNESIUM SERPL-MCNC: 1.8 MG/DL (ref 1.6–2.6)
MCH RBC QN AUTO: 25.7 PG (ref 27–31)
MCHC RBC AUTO-ENTMCNC: 30.2 G/DL (ref 32–36)
MCV RBC AUTO: 85 FL (ref 82–98)
MIN VOL: 13.1
MIN VOL: 8.2
MODE: ABNORMAL
MONOCYTES # BLD AUTO: 0.6 K/UL (ref 0.3–1)
MONOCYTES NFR BLD: 3.6 % (ref 4–15)
NEUTROPHILS # BLD AUTO: 13.9 K/UL (ref 1.8–7.7)
NEUTROPHILS NFR BLD: 83.9 % (ref 38–73)
NRBC BLD-RTO: 0 /100 WBC
PCO2 BLDA: 29 MMHG (ref 35–45)
PCO2 BLDA: 33.9 MMHG (ref 35–45)
PCO2 BLDA: 36.4 MMHG (ref 35–45)
PEEP: 5
PEEP: 5
PH SMN: 7.39 [PH] (ref 7.35–7.45)
PH SMN: 7.42 [PH] (ref 7.35–7.45)
PH SMN: 7.46 [PH] (ref 7.35–7.45)
PHOSPHATE SERPL-MCNC: 2.1 MG/DL (ref 2.7–4.5)
PIP: 18
PIP: 18
PLATELET # BLD AUTO: 424 K/UL (ref 150–450)
PMV BLD AUTO: 10.7 FL (ref 9.2–12.9)
PO2 BLDA: 110 MMHG (ref 80–100)
PO2 BLDA: 120 MMHG (ref 80–100)
PO2 BLDA: 142 MMHG (ref 80–100)
POC BE: -3 MMOL/L
POC SATURATED O2: 99 % (ref 95–100)
POC TCO2: 22 MMOL/L (ref 23–27)
POC TCO2: 23 MMOL/L (ref 23–27)
POC TCO2: 23 MMOL/L (ref 23–27)
POTASSIUM SERPL-SCNC: 4.2 MMOL/L (ref 3.5–5.1)
PROT SERPL-MCNC: 7.7 G/DL (ref 6–8.4)
RBC # BLD AUTO: 3.23 M/UL (ref 4.6–6.2)
SAMPLE: ABNORMAL
SITE: ABNORMAL
SODIUM SERPL-SCNC: 133 MMOL/L (ref 136–145)
SP02: 100
VT: 450
VT: 500
WBC # BLD AUTO: 16.6 K/UL (ref 3.9–12.7)

## 2022-09-04 PROCEDURE — 27000221 HC OXYGEN, UP TO 24 HOURS

## 2022-09-04 PROCEDURE — 63600175 PHARM REV CODE 636 W HCPCS: Performed by: PHYSICIAN ASSISTANT

## 2022-09-04 PROCEDURE — 80053 COMPREHEN METABOLIC PANEL: CPT

## 2022-09-04 PROCEDURE — 85730 THROMBOPLASTIN TIME PARTIAL: CPT | Mod: 91 | Performed by: INTERNAL MEDICINE

## 2022-09-04 PROCEDURE — 82803 BLOOD GASES ANY COMBINATION: CPT

## 2022-09-04 PROCEDURE — 25000003 PHARM REV CODE 250: Performed by: PHYSICIAN ASSISTANT

## 2022-09-04 PROCEDURE — 63600175 PHARM REV CODE 636 W HCPCS: Performed by: NURSE PRACTITIONER

## 2022-09-04 PROCEDURE — 63700000 PHARM REV CODE 250 ALT 637 W/O HCPCS

## 2022-09-04 PROCEDURE — 25000242 PHARM REV CODE 250 ALT 637 W/ HCPCS

## 2022-09-04 PROCEDURE — 25000242 PHARM REV CODE 250 ALT 637 W/ HCPCS: Performed by: PSYCHIATRY & NEUROLOGY

## 2022-09-04 PROCEDURE — 99291 PR CRITICAL CARE, E/M 30-74 MINUTES: ICD-10-PCS | Mod: GC,,, | Performed by: INTERNAL MEDICINE

## 2022-09-04 PROCEDURE — 86140 C-REACTIVE PROTEIN: CPT

## 2022-09-04 PROCEDURE — 94003 VENT MGMT INPAT SUBQ DAY: CPT

## 2022-09-04 PROCEDURE — 27000207 HC ISOLATION

## 2022-09-04 PROCEDURE — 84100 ASSAY OF PHOSPHORUS: CPT | Performed by: INTERNAL MEDICINE

## 2022-09-04 PROCEDURE — 25000003 PHARM REV CODE 250: Performed by: INTERNAL MEDICINE

## 2022-09-04 PROCEDURE — 83735 ASSAY OF MAGNESIUM: CPT | Mod: 91 | Performed by: INTERNAL MEDICINE

## 2022-09-04 PROCEDURE — 25000003 PHARM REV CODE 250: Performed by: PSYCHIATRY & NEUROLOGY

## 2022-09-04 PROCEDURE — 25000003 PHARM REV CODE 250

## 2022-09-04 PROCEDURE — 85025 COMPLETE CBC W/AUTO DIFF WBC: CPT

## 2022-09-04 PROCEDURE — 99900026 HC AIRWAY MAINTENANCE (STAT)

## 2022-09-04 PROCEDURE — 63600175 PHARM REV CODE 636 W HCPCS

## 2022-09-04 PROCEDURE — 63600175 PHARM REV CODE 636 W HCPCS: Performed by: PSYCHIATRY & NEUROLOGY

## 2022-09-04 PROCEDURE — 94640 AIRWAY INHALATION TREATMENT: CPT

## 2022-09-04 PROCEDURE — 25000003 PHARM REV CODE 250: Performed by: NURSE PRACTITIONER

## 2022-09-04 PROCEDURE — 37799 UNLISTED PX VASCULAR SURGERY: CPT

## 2022-09-04 PROCEDURE — 99233 PR SUBSEQUENT HOSPITAL CARE,LEVL III: ICD-10-PCS | Mod: GC,,, | Performed by: INTERNAL MEDICINE

## 2022-09-04 PROCEDURE — 63600175 PHARM REV CODE 636 W HCPCS: Performed by: INTERNAL MEDICINE

## 2022-09-04 PROCEDURE — 99291 CRITICAL CARE FIRST HOUR: CPT | Mod: GC,,, | Performed by: INTERNAL MEDICINE

## 2022-09-04 PROCEDURE — 20000000 HC ICU ROOM

## 2022-09-04 PROCEDURE — 85730 THROMBOPLASTIN TIME PARTIAL: CPT

## 2022-09-04 PROCEDURE — 99900035 HC TECH TIME PER 15 MIN (STAT)

## 2022-09-04 PROCEDURE — 99233 SBSQ HOSP IP/OBS HIGH 50: CPT | Mod: GC,,, | Performed by: INTERNAL MEDICINE

## 2022-09-04 PROCEDURE — 94761 N-INVAS EAR/PLS OXIMETRY MLT: CPT

## 2022-09-04 RX ORDER — MIDAZOLAM HYDROCHLORIDE 1 MG/ML
1 INJECTION INTRAMUSCULAR; INTRAVENOUS
Status: DISCONTINUED | OUTPATIENT
Start: 2022-09-04 | End: 2022-09-10

## 2022-09-04 RX ORDER — IPRATROPIUM BROMIDE AND ALBUTEROL SULFATE 2.5; .5 MG/3ML; MG/3ML
SOLUTION RESPIRATORY (INHALATION)
Status: COMPLETED
Start: 2022-09-04 | End: 2022-09-04

## 2022-09-04 RX ORDER — OXYCODONE HYDROCHLORIDE 5 MG/1
5 TABLET ORAL EVERY 6 HOURS PRN
Status: DISCONTINUED | OUTPATIENT
Start: 2022-09-04 | End: 2022-09-08

## 2022-09-04 RX ADMIN — NYSTATIN 500000 UNITS: 500000 SUSPENSION ORAL at 05:09

## 2022-09-04 RX ADMIN — SODIUM CHLORIDE 500 ML: 0.9 INJECTION, SOLUTION INTRAVENOUS at 11:09

## 2022-09-04 RX ADMIN — MIDAZOLAM 1 MG: 1 INJECTION INTRAMUSCULAR; INTRAVENOUS at 08:09

## 2022-09-04 RX ADMIN — BUSPIRONE HYDROCHLORIDE 5 MG: 5 TABLET ORAL at 08:09

## 2022-09-04 RX ADMIN — CEFEPIME HYDROCHLORIDE 2 G: 2 INJECTION, SOLUTION INTRAVENOUS at 12:09

## 2022-09-04 RX ADMIN — FENTANYL CITRATE 50 MCG: 0.05 INJECTION, SOLUTION INTRAMUSCULAR; INTRAVENOUS at 12:09

## 2022-09-04 RX ADMIN — Medication 800 MG: at 09:09

## 2022-09-04 RX ADMIN — IPRATROPIUM BROMIDE AND ALBUTEROL SULFATE 3 ML: 2.5; .5 SOLUTION RESPIRATORY (INHALATION) at 02:09

## 2022-09-04 RX ADMIN — POTASSIUM & SODIUM PHOSPHATES POWDER PACK 280-160-250 MG 2 PACKET: 280-160-250 PACK at 08:09

## 2022-09-04 RX ADMIN — SENNOSIDES AND DOCUSATE SODIUM 1 TABLET: 50; 8.6 TABLET ORAL at 08:09

## 2022-09-04 RX ADMIN — POTASSIUM & SODIUM PHOSPHATES POWDER PACK 280-160-250 MG 2 PACKET: 280-160-250 PACK at 05:09

## 2022-09-04 RX ADMIN — POTASSIUM & SODIUM PHOSPHATES POWDER PACK 280-160-250 MG 2 PACKET: 280-160-250 PACK at 01:09

## 2022-09-04 RX ADMIN — RILUZOLE 50 MG: 50 TABLET ORAL at 08:09

## 2022-09-04 RX ADMIN — HEPARIN SODIUM 15.07 UNITS/KG/HR: 5000 INJECTION INTRAVENOUS; SUBCUTANEOUS at 12:09

## 2022-09-04 RX ADMIN — ACETYLCYSTEINE 4 ML: 100 INHALANT RESPIRATORY (INHALATION) at 02:09

## 2022-09-04 RX ADMIN — ACETYLCYSTEINE 4 ML: 100 INHALANT RESPIRATORY (INHALATION) at 07:09

## 2022-09-04 RX ADMIN — NYSTATIN 500000 UNITS: 500000 SUSPENSION ORAL at 08:09

## 2022-09-04 RX ADMIN — BACLOFEN 10 MG: 10 TABLET ORAL at 08:09

## 2022-09-04 RX ADMIN — OXYCODONE 5 MG: 5 TABLET ORAL at 05:09

## 2022-09-04 RX ADMIN — MUPIROCIN: 20 OINTMENT TOPICAL at 03:09

## 2022-09-04 RX ADMIN — BACLOFEN 10 MG: 10 TABLET ORAL at 03:09

## 2022-09-04 RX ADMIN — METHOCARBAMOL 500 MG: 500 TABLET ORAL at 08:09

## 2022-09-04 RX ADMIN — Medication 800 MG: at 05:09

## 2022-09-04 RX ADMIN — IPRATROPIUM BROMIDE AND ALBUTEROL SULFATE 3 ML: 2.5; .5 SOLUTION RESPIRATORY (INHALATION) at 07:09

## 2022-09-04 RX ADMIN — CEFEPIME HYDROCHLORIDE 2 G: 2 INJECTION, SOLUTION INTRAVENOUS at 08:09

## 2022-09-04 RX ADMIN — NOREPINEPHRINE BITARTRATE 0.02 MCG/KG/MIN: 4 INJECTION, SOLUTION INTRAVENOUS at 07:09

## 2022-09-04 RX ADMIN — POLYETHYLENE GLYCOL 3350 17 G: 17 POWDER, FOR SOLUTION ORAL at 08:09

## 2022-09-04 RX ADMIN — FAMOTIDINE 20 MG: 20 TABLET ORAL at 08:09

## 2022-09-04 RX ADMIN — METHOCARBAMOL 500 MG: 500 TABLET ORAL at 01:09

## 2022-09-04 RX ADMIN — TRAZODONE HYDROCHLORIDE 50 MG: 50 TABLET ORAL at 08:09

## 2022-09-04 RX ADMIN — MUPIROCIN: 20 OINTMENT TOPICAL at 08:09

## 2022-09-04 RX ADMIN — METHOCARBAMOL 500 MG: 500 TABLET ORAL at 05:09

## 2022-09-04 RX ADMIN — NYSTATIN 500000 UNITS: 500000 SUSPENSION ORAL at 01:09

## 2022-09-04 RX ADMIN — OXYCODONE 5 MG: 5 TABLET ORAL at 06:09

## 2022-09-04 RX ADMIN — VANCOMYCIN HYDROCHLORIDE 1000 MG: 1 INJECTION, POWDER, LYOPHILIZED, FOR SOLUTION INTRAVENOUS at 06:09

## 2022-09-04 RX ADMIN — CEFEPIME HYDROCHLORIDE 2 G: 2 INJECTION, SOLUTION INTRAVENOUS at 05:09

## 2022-09-04 NOTE — ASSESSMENT & PLAN NOTE
8/19/22 and 8/26/22 RCx with Pseudomonas, latter .  8/31/22 repeat RCx with pseudomonas and another GNR.  Vent settings appear stable, suspect colonization.    Recommendations:  -Continue cefepime for now pending further recommendations from ENT regarding nasal/oral fluid, right mandible/upper lip fluid collections.

## 2022-09-04 NOTE — ASSESSMENT & PLAN NOTE
Repeat CT max/face with similar appearing fluid collection adjacent to right mandible 3.4x1.7cm, fluid collection upper lip, mucosal thickening of the ethmoid air cells and sphenoid sinuses, with mastoid air cells partially filled with fluid.  Previous ENT examination negative for abscess.    Recommendations:  -Continue cefepime for now pending further ENT evaluation.

## 2022-09-04 NOTE — SUBJECTIVE & OBJECTIVE
Interval History: Repeat CT still showing rim-enhancing lesion near right mandible.  Increased mucoid oral/trach secretions since yesterday.    Review of Systems   Unable to perform ROS: Patient nonverbal   Objective:     Vital Signs (Most Recent):  Temp: 99.3 °F (37.4 °C) (09/04/22 1101)  Pulse: 104 (09/04/22 1101)  Resp: 18 (09/04/22 1101)  BP: (!) 122/58 (09/04/22 1101)  SpO2: 100 % (09/04/22 1101)   Vital Signs (24h Range):  Temp:  [97.7 °F (36.5 °C)-99.3 °F (37.4 °C)] 99.3 °F (37.4 °C)  Pulse:  [] 104  Resp:  [14-27] 18  SpO2:  [95 %-100 %] 100 %  BP: ()/(43-95) 122/58  Arterial Line BP: ()/(57-85) 92/58     Weight: 77 kg (169 lb 12.1 oz)  Body mass index is 25.81 kg/m².    Estimated Creatinine Clearance: 140.6 mL/min (based on SCr of 0.5 mg/dL).    Physical Exam  Vitals reviewed.   Constitutional:       General: He is not in acute distress.     Appearance: He is ill-appearing. He is not diaphoretic.   HENT:      Head: Normocephalic.      Right Ear: External ear normal.      Left Ear: External ear normal.      Nose: Nose normal.      Mouth/Throat:      Comments: Bite block in place.  Right lower mandible gingiva/mucosa firm, hypertrophied  Eyes:      General: No scleral icterus.        Right eye: No discharge.         Left eye: No discharge.      Conjunctiva/sclera: Conjunctivae normal.   Neck:      Comments: Trach in place  Cardiovascular:      Rate and Rhythm: Tachycardia present.   Pulmonary:      Comments: Vent Mode: A/C  Oxygen Concentration (%):  [30] 30  Resp Rate Total:  [16 br/min-32 br/min] 17 br/min  Vt Set:  [400 mL-500 mL] (P) 400 mL  PEEP/CPAP:  [5 cmH20] 5 cmH20  Pressure Support:  [5 cmH20] 5 cmH20  Mean Airway Pressure:  [8.9 kxH34-77 cmH20] 11 cmH20   Abdominal:      General: Abdomen is flat.      Palpations: Abdomen is soft.   Musculoskeletal:      Right lower leg: Edema present.      Left lower leg: Edema present.      Comments: LUE line c/d/i   Skin:     General: Skin is  warm and dry.      Comments: Wound photos reviewed   Neurological:      Mental Status: He is alert.      Comments: Blinks, nods head       Significant Labs: All pertinent labs within the past 24 hours have been reviewed.    Significant Imaging: I have reviewed all pertinent imaging results/findings within the past 24 hours.

## 2022-09-04 NOTE — SUBJECTIVE & OBJECTIVE
Interval History:     No adverse events.    Review of Systems   Unable to perform ROS: Patient nonverbal   Objective:     Vital Signs (Most Recent):  Temp: 98.3 °F (36.8 °C) (09/03/22 1501)  Pulse: 104 (09/03/22 2012)  Resp: (!) 21 (09/03/22 2012)  BP: (!) 145/70 (09/03/22 1801)  SpO2: 98 % (09/03/22 2012)   Vital Signs (24h Range):  Temp:  [98 °F (36.7 °C)-98.6 °F (37 °C)] 98.3 °F (36.8 °C)  Pulse:  [] 104  Resp:  [16-28] 21  SpO2:  [95 %-100 %] 98 %  BP: ()/(43-92) 145/70  Arterial Line BP: ()/(52-85) 145/70     Weight: 77 kg (169 lb 12.1 oz)  Body mass index is 25.81 kg/m².    Estimated Creatinine Clearance: 140.6 mL/min (based on SCr of 0.5 mg/dL).    Physical Exam  Vitals reviewed.   Constitutional:       General: He is not in acute distress.     Appearance: He is ill-appearing. He is not diaphoretic.   HENT:      Head: Normocephalic.      Right Ear: External ear normal.      Left Ear: External ear normal.      Nose: Nose normal.      Mouth/Throat:      Comments: Bite block in place  Eyes:      General: No scleral icterus.        Right eye: No discharge.         Left eye: No discharge.      Conjunctiva/sclera: Conjunctivae normal.   Neck:      Comments: Trach in place  Cardiovascular:      Rate and Rhythm: Tachycardia present.   Pulmonary:      Comments: Vent Mode: A/C  Oxygen Concentration (%):  [30] 30  Resp Rate Total:  [16 br/min-31 br/min] 25 br/min  Vt Set:  [500 mL] 500 mL  PEEP/CPAP:  [5 cmH20] 5 cmH20  Mean Airway Pressure:  [11 glU87-86 cmH20] 14 cmH20   Abdominal:      General: Abdomen is flat.      Palpations: Abdomen is soft.   Skin:     General: Skin is warm and dry.      Comments: Wound photos reviewed   Neurological:      Mental Status: He is alert.      Comments: Blinks, nods head       Significant Labs:   Microbiology Results (last 7 days)       Procedure Component Value Units Date/Time    Blood culture [[id number]] Collected: 08/31/22 1650    Order Status: Completed Specimen:  Blood from Peripheral, Upper Arm, Right Updated: 09/03/22 1812     Blood Culture, Routine No Growth to date      No Growth to date      No Growth to date      No Growth to date    Blood culture [[id number]] Collected: 08/31/22 1648    Order Status: Completed Specimen: Blood from Radial Arterial Stick, Right Updated: 09/03/22 1812     Blood Culture, Routine No Growth to date      No Growth to date      No Growth to date      No Growth to date    Culture, Respiratory with Gram Stain [[id number]]  (Abnormal) Collected: 08/31/22 1536    Order Status: Completed Specimen: Respiratory from Tracheal Aspirate Updated: 09/03/22 1343     Respiratory Culture No S aureus isolated.      GRAM NEGATIVE AUTUMN  Few  Identification and susceptibility pending        PSEUDOMONAS AERUGINOSA  Few  Susceptibility pending       Gram Stain (Respiratory) No WBC's     Gram Stain (Respiratory) No organisms seen    Urine culture [[id number]] Collected: 08/31/22 1217    Order Status: Completed Specimen: Urine Updated: 09/01/22 1407     Urine Culture, Routine No growth    Narrative:      Specimen Source->Urine            Significant Imaging: I have reviewed all pertinent imaging results/findings within the past 24 hours.

## 2022-09-04 NOTE — PROGRESS NOTES
[facility]  Neurocritical Care  Progress Note    Admit Date: 8/31/2022  Service Date: 09/04/2022  Length of Stay: 4    Subjective:     Chief Complaint: Trismus    History of Present Illness: Wei Dominique is a 57 yo AAM with ALS. At OSH with prolonged course complicated by bacteremia, PNA, upper extremity DVTs, facial swelling and trismus. Received Abx, PICC was changed there prior to transfer, and received BOTOX injections to help with his Trismus.    Hospital Course:   09/01/2022 NAEON; ENT evaluation; appreciate ID recs  09/02/2022 NAEON; Appreciate ID& ENT help; pressors & heparin drip  09/03/2022 back on pressors; continue muscle relaxants and pain meds  09/04/2022 Endorsed pain unable to visualize & examine oral cavity given significant trismus, CT max/face repeated, appreciate ID recs; ENT contacted for possible infectious source causing persistent trismus and pain, pending recs      Review of Systems  Unable to assess 2/2 unable to speak  Objective:      Vitals:  Vitals:    09/04/22 0901 09/04/22 0925 09/04/22 1001 09/04/22 1101   BP: 112/65  (!) 144/70 (!) 122/58   BP Location: Left leg  Left leg Left leg   Patient Position: Lying  Lying Lying   Pulse: 95 100 98 104   Resp: 18 19 19 18   Temp:    99.3 °F (37.4 °C)   TempSrc:    Axillary   SpO2: 100% 100% 100% 100%   Weight:       Height:                 Physical Exam  --sedation: none  --GCS: [id number] Tracheostomy   --Mental Status: Alert, follows some commands   --Pupils 3-->2mm, PERRL.   --brainstem: intact  --Motor: no movement in extremities  --sensory: blinks acknowledging touch throughout   --Reflexes: not tested  --Gait: deferred        Today I personally reviewed pertinent medications, lines/drains/airways, imaging, cardiology results, laboratory results, microbiology results,        Assessment/Plan:      Neuro  ALS (amyotrophic lateral sclerosis)  See trismus      Hematology  Occlusive thrombus  See Trismus      Other  *  Trismus  Briefly 57 yo AAM with ALS. At OSH with prolonged course complicated by bacteremia, PNA, upper extremity DVTs, facial swelling and trismus. Received Abx, PICC was changed there prior to transfer, and received BOTOX injections to help with his Trismus.     -ENT re-consult, given persistent trismus & pain, infectious source in oral/pharyngeal cavity? pending recs  -ID following, appreciate recs: infectious workup & Abx  -Heparin gtt for DVTs and prior PE  -muscle relaxants & PRN pain meds  -Echo  -tube feeds       The patient is being Prophylaxed for:  Venous Thromboembolism with: Chemical  Stress Ulcer with: Not Applicable   Ventilator Pneumonia with: chlorhexidine oral care    Activity Orders            Turn patient starting at 08/31 1200    Elevate HOB starting at 08/31 1131    Diet NPO: NPO starting at 08/31 1131          Full Code    Mack Mccartney MD  Neurocritical Care  [facility]

## 2022-09-04 NOTE — PROGRESS NOTES
[facility] Neuro Critical Care  Infectious Disease  Progress Note    Patient Name: Wei Dominique  MRN: [id number]  Admission Date: 8/31/2022  Length of Stay: 3 days  Attending Physician: Juaquin Cyr MD  Primary Care Provider: Primary Doctor No    Isolation Status: Contact  Assessment/Plan:      Bacteremia  67-year-old male with ALS s/p trach'd and peg'd, vent-dependent initially presented to OSH with facial swelling with concerns for facial cellulitis with neurogenic trismus s/p botox injections, transferred here for further evaluation regarding neurogenic trismus.  Patient now with Staph capitis bacteremia.  TTE is negative.    Plan    1. Vancomycin IV.      2. Anticipate 2 week course.    Chronic respiratory failure with hypoxia and hypercapnia  67 year old with ALS and ventilator dependency.  Known to have  pseudomonas colonization.  Respiratory cultures are now positive for Pseudomonas and another GNR.  Unsure if these represent true pathogens or bacterial colonization.  Patient is currently on cefepime.  Will continue for now.        Anticipated Disposition: TBD    Thank you for your consult. I will follow-up with patient. Please contact us if you have any additional questions.    Joaquin Restrepo MD  Infectious Disease  [facility] Neuro Critical Care    Subjective:     Principal Problem:Trismus    HPI: 67-year-old male with ALS, trach'd and PEG'd, vent-depentence, HTN, PE on eliquis,  pseudomonas initially presented to [facility] on 8/18/22 complaining of facial swelling, presented febrile 102.4 and with leukocytosis 21.  CT max/face showed right maxillary and anterior maxillary soft tissue edema and thickening with right perimandibular ovoid infection.  ENT was able to examine the patient under sedation and paralytics, no abscesses seen on their exam, bite block placed.  Bilateral masseter botox injections were also later given on 8/24/22 and 8/30/22.  Infectious Disease Dr. Burnham was  "consulted and saw the patient during hospitalization at stay at [place].  Abx and w/u as below.  Initial vancomycin and meropenem changed to Zerbaxa and clindamycin.  8/18/22 urine culture grew both Pseudomonas and providencia.  8/18/22 Midline catheter tip grew MRSA and morganella.  Blood cultures have not grown these organisms.  8/19/22 respiratory culture grew Pseudomonas.  8/22/22 Antibiotics changed to cefepime  8/25/22-8/26/22 ID reconsulted for Tmax 100.4, soft Bps.  Noted purulent secretions from and around the ET tube.  CXR with opacities  8/26/22 Cefepime changed to meropenem  8/27/22 started vancomycin  8/26/22 respiratory culture grew Pseudomonas, now .  8/26/22-8/27/22 blood cultures positive for Staph capitis, oxacillin-resistant.  8/29/22 meropenem changed to cefepime based on pseudomonas  susceptibilities  8/30/22 PICC line tip culture NGTD  8/30/22 Nonfunctional portacath removed  8/30/22 Transferred to [facility] for neurology evaluation for ALS, neurologic trismus  8/31/22 Rcx GNR    Consult: "Antibiotic recommendations. OSH cultures + for MRSA, ESBL, MDRO, . Repeat cultures pending."    Interval History:     No adverse events.    Review of Systems   Unable to perform ROS: Patient nonverbal   Objective:     Vital Signs (Most Recent):  Temp: 98.3 °F (36.8 °C) (09/03/22 1501)  Pulse: 104 (09/03/22 2012)  Resp: (!) 21 (09/03/22 2012)  BP: (!) 145/70 (09/03/22 1801)  SpO2: 98 % (09/03/22 2012)   Vital Signs (24h Range):  Temp:  [98 °F (36.7 °C)-98.6 °F (37 °C)] 98.3 °F (36.8 °C)  Pulse:  [] 104  Resp:  [16-28] 21  SpO2:  [95 %-100 %] 98 %  BP: ()/(43-92) 145/70  Arterial Line BP: ()/(52-85) 145/70     Weight: 77 kg (169 lb 12.1 oz)  Body mass index is 25.81 kg/m².    Estimated Creatinine Clearance: 140.6 mL/min (based on SCr of 0.5 mg/dL).    Physical Exam  Vitals reviewed.   Constitutional:       General: He is not in acute distress.     Appearance: He is ill-appearing. He is not " diaphoretic.   HENT:      Head: Normocephalic.      Right Ear: External ear normal.      Left Ear: External ear normal.      Nose: Nose normal.      Mouth/Throat:      Comments: Bite block in place  Eyes:      General: No scleral icterus.        Right eye: No discharge.         Left eye: No discharge.      Conjunctiva/sclera: Conjunctivae normal.   Neck:      Comments: Trach in place  Cardiovascular:      Rate and Rhythm: Tachycardia present.   Pulmonary:      Comments: Vent Mode: A/C  Oxygen Concentration (%):  [30] 30  Resp Rate Total:  [16 br/min-31 br/min] 25 br/min  Vt Set:  [500 mL] 500 mL  PEEP/CPAP:  [5 cmH20] 5 cmH20  Mean Airway Pressure:  [11 vsB84-03 cmH20] 14 cmH20   Abdominal:      General: Abdomen is flat.      Palpations: Abdomen is soft.   Skin:     General: Skin is warm and dry.      Comments: Wound photos reviewed   Neurological:      Mental Status: He is alert.      Comments: Blinks, nods head       Significant Labs:   Microbiology Results (last 7 days)       Procedure Component Value Units Date/Time    Blood culture [[id number]] Collected: 08/31/22 1650    Order Status: Completed Specimen: Blood from Peripheral, Upper Arm, Right Updated: 09/03/22 1812     Blood Culture, Routine No Growth to date      No Growth to date      No Growth to date      No Growth to date    Blood culture [[id number]] Collected: 08/31/22 1648    Order Status: Completed Specimen: Blood from Radial Arterial Stick, Right Updated: 09/03/22 1812     Blood Culture, Routine No Growth to date      No Growth to date      No Growth to date      No Growth to date    Culture, Respiratory with Gram Stain [[id number]]  (Abnormal) Collected: 08/31/22 1536    Order Status: Completed Specimen: Respiratory from Tracheal Aspirate Updated: 09/03/22 1343     Respiratory Culture No S aureus isolated.      GRAM NEGATIVE AUTUMN  Few  Identification and susceptibility pending        PSEUDOMONAS AERUGINOSA  Few  Susceptibility pending       Gram Stain  (Respiratory) No WBC's     Gram Stain (Respiratory) No organisms seen    Urine culture [[id number]] Collected: 08/31/22 1217    Order Status: Completed Specimen: Urine Updated: 09/01/22 1407     Urine Culture, Routine No growth    Narrative:      Specimen Source->Urine            Significant Imaging: I have reviewed all pertinent imaging results/findings within the past 24 hours.

## 2022-09-04 NOTE — NURSING
Pt transported to CT with RN, RT, PCT.  Pt tolerated the scan.  Continuous cardiac monitoring and ambu bag brought.  Pt transported to [facility].

## 2022-09-04 NOTE — ASSESSMENT & PLAN NOTE
67 year old with ALS and ventilator dependency.  Known to have  pseudomonas colonization.  Respiratory cultures are now positive for Pseudomonas and another GNR.  Unsure if these represent true pathogens or bacterial colonization.  Patient is currently on cefepime.  Will continue for now.

## 2022-09-04 NOTE — PLAN OF CARE
[facility] Care Plan    POC reviewed with Wei Dominique and family at 1400. Pt verbalized understanding. Questions and concerns addressed. No acute events today. Pt progressing toward goals. Will continue to monitor. See below and flowsheets for full assessment and VS info.     - Levo gtt continued for < MAPs.   - Pt slept due to restless night.   - ENT consulted.   - Hutch[place] removed.   - mag and phose replaced.   - Heparin gtt continued.  Titrated based on past aPTT.       Is this a stroke patient? no    Neuro:  [place] Coma Scale  Best Eye Response: 4-->(E4) spontaneous  Best Motor Response: 6-->(M6) obeys commands  Best Verbal Response: 5-->(V5) oriented  [place] Coma Scale Score: 15  Assessment Qualifiers: patient not sedated/intubated, no eye obstruction present  Pupil PERRLA: yes     24 hr Temp:  [97.7 °F (36.5 °C)-99.3 °F (37.4 °C)]     CV:   Rhythm: normal sinus rhythm  BP goals:   SBP < 160  MAP > 65    Resp:   O2 Device (Oxygen Therapy): ventilator  Vent Mode: A/C  Set Rate: 14 BPM  Oxygen Concentration (%): 30  Vt Set: 400 mL  PEEP/CPAP: 5 cmH20  Pressure Support: 5 cmH20    Plan: trach in place    GI/:     Diet/Nutrition Received: NPO, tube feeding  Last Bowel Movement: 09/02/22  Voiding Characteristics: ureteral catheter    Intake/Output Summary (Last 24 hours) at 9/4/2022 1601  Last data filed at 9/4/2022 1501  Gross per 24 hour   Intake 1735.33 ml   Output 2450 ml   Net -714.67 ml     Unmeasured Output  Stool Occurrence: 0    Labs/Accuchecks:  Recent Labs   Lab 09/04/22 0226   WBC 16.60*   RBC 3.23*   HGB 8.3*   HCT 27.5*         Recent Labs   Lab 09/04/22 0226   *   K 4.2   CO2 18*      BUN 24*   CREATININE 0.5   ALKPHOS 80   ALT 12   AST 16   BILITOT 0.4      Recent Labs   Lab 08/31/22  1656 09/01/22  0019 09/04/22  0833   INR 1.3*  --   --    APTT 28.7   < > 41.2*    < > = values in this interval not displayed.    No results for input(s): CPK, CPKMB, TROPONINI, MB in the last 168  hours.    Electrolytes: Electrolytes replaced  Accuchecks: none    Gtts:   heparin (porcine) in D5W 15.07 Units/kg/hr (09/04/22 1501)    NORepinephrine bitartrate-D5W 0.1 mcg/kg/min (09/04/22 1501)       LDA/Wounds:  Lines/Drains/Airways       Drain  Duration                  Gastrostomy/Enterostomy LUQ feeding -- days         Urethral Catheter 08/31/22 1100 4 days              Airway  Duration                  Surgical Airway Shiley Cuffed -- days         Surgical Airway 05/23/22 1145 Portex Cuffed 104 days              Arterial Line  Duration             Arterial Line 09/02/22 1800 Left Radial 1 day              Peripheral Intravenous Line  Duration                  Midline Catheter Insertion/Assessment  - Single Lumen 09/02/22 1200 Left cephalic vein (lateral side of arm) 20g x 10cm 2 days         Peripheral IV - Single Lumen 09/02/22 1200 18 G;1 3/4 in Left Antecubital 2 days         Peripheral IV - Single Lumen 09/03/22 1900 20 G Anterior;Left Foot <1 day                  Wounds: Yes  Wound care consulted: Yes

## 2022-09-04 NOTE — SUBJECTIVE & OBJECTIVE
"Interval History:   NAEON.   Called by NCC fellow yesterday due to concern for possible abscess formation secondary to patient's complaints of pain yesterday.   CT maxface repeated with similar appearing "rim enhancing" collection adjacent to R mandible and in upper lip. Likely represents displaced oral tongue tissue and edema secondary to significant trismus.    Medications:  Continuous Infusions:   heparin (porcine) in D5W 15.07 Units/kg/hr (09/04/22 1701)    NORepinephrine bitartrate-D5W 0.12 mcg/kg/min (09/04/22 1701)     Scheduled Meds:   acetylcysteine 100 mg/ml (10%)  4 mL Nebulization TID WAKE    baclofen  10 mg Per G Tube TID    busPIRone  5 mg Per G Tube BID    ceFEPime (MAXIPIME) IVPB  2 g Intravenous Q8H    famotidine  20 mg Per G Tube BID    methocarbamoL  500 mg Per G Tube QID    mupirocin   Nasal TID    nystatin  500,000 Units Oral QID    polyethylene glycol  17 g Per G Tube Daily    riluzole  50 mg Per G Tube Q12H    scopolamine  1 patch Transdermal Q3 Days    senna-docusate 8.6-50 mg  1 tablet Per G Tube BID    traZODone  50 mg Per G Tube QHS    vancomycin (VANCOCIN) IVPB  1,000 mg Intravenous Q24H     PRN Meds:acetaminophen, bisacodyL, dextrose 10%, dextrose 10%, fentaNYL, hydrALAZINE, labetalol, magnesium oxide, magnesium oxide, midazolam, ondansetron, oxyCODONE, potassium bicarbonate, potassium bicarbonate, potassium bicarbonate, potassium, sodium phosphates, potassium, sodium phosphates, potassium, sodium phosphates, sodium chloride 0.9%, Pharmacy to dose Vancomycin consult **AND** vancomycin - pharmacy to dose     Review of patient's allergies indicates:  No Known Allergies  Objective:     Vital Signs (24h Range):  Temp:  [97.7 °F (36.5 °C)-99.3 °F (37.4 °C)] 99 °F (37.2 °C)  Pulse:  [] 92  Resp:  [14-27] 21  SpO2:  [95 %-100 %] 99 %  BP: ()/(37-95) 90/43  Arterial Line BP: ()/(57-80) 119/64     Date 09/04/22 0700 - 09/05/22 0659   Shift 5450-5960 4039-1194 9386-3148 24 Hour " Total   INTAKE   I.V.(mL/kg) 107.2(1.4) 32.5(0.4)  139.7(1.8)   NG/ 150  600   IV Piggyback 48.8   48.8   Shift Total(mL/kg) 606(7.9) 182.5(2.4)  788.5(10.2)   OUTPUT   Shift Total(mL/kg)       Weight (kg) 77 77 77 77     Lines/Drains/Airways       Drain  Duration                  Gastrostomy/Enterostomy LUQ feeding -- days         Urethral Catheter 08/31/22 1100 4 days              Airway  Duration                  Surgical Airway Shiley Cuffed -- days         Surgical Airway 05/23/22 1145 Portex Cuffed 104 days              Arterial Line  Duration             Arterial Line 09/02/22 1800 Left Radial 1 day              Peripheral Intravenous Line  Duration                  Midline Catheter Insertion/Assessment  - Single Lumen 09/02/22 1200 Left cephalic vein (lateral side of arm) 20g x 10cm 2 days         Peripheral IV - Single Lumen 09/02/22 1200 18 G;1 3/4 in Left Antecubital 2 days         Peripheral IV - Single Lumen 09/03/22 1900 20 G Anterior;Left Foot <1 day                    Physical Exam  NAD, nonverbal secondary to ALS   EOMI, blinks to respond to questions   Bilateral bite blocks in place   Tongue prolapsed out of oral cavity, grossly edematous   Soft tissue swelling along R mandible anteriorly, consistent with partially bitten tongue tissue   Thick, mucoid secretions coating dorsal tongue  Copious saliva and secretions  Unable to further assess OC secondary to trismus   Grimace with attempted examination of OC beyond tongue   Tracheostomy in place   On ventilator       Significant Labs:  CBC:   Recent Labs   Lab 09/04/22  0226   WBC 16.60*   RBC 3.23*   HGB 8.3*   HCT 27.5*      MCV 85   MCH 25.7*   MCHC 30.2*       Significant Diagnostics:  CT: I have reviewed all pertinent results/findings within the past 24 hours and my personal findings are:  CT similar to prior CT maxfacce with possible R mandibular collection, likely representative of displaced tongue tissue

## 2022-09-04 NOTE — PROGRESS NOTES
[facility]  Infectious Disease  Progress Note    Patient Name: Wei Dominique  MRN: [id number]  Admission Date: 8/31/2022  Length of Stay: 4 days  Attending Physician: Juaquin Cyr MD  Primary Care Provider: Primary Doctor No    Isolation Status: Contact  Assessment/Plan:      Oral lesion  Repeat CT max/face with similar appearing fluid collection adjacent to right mandible 3.4x1.7cm, fluid collection upper lip, mucosal thickening of the ethmoid air cells and sphenoid sinuses, with mastoid air cells partially filled with fluid.  Previous ENT examination negative for abscess.    Recommendations:  -Continue cefepime for now pending further ENT evaluation.    Bacteremia  67-year-old male with ALS s/p trach'd and peg'd, vent-dependent initially presented to OSH with facial swelling with concerns for facial cellulitis with neurogenic trismus s/p botox injections, transferred here for further evaluation regarding neurogenic trismus.     From OSH: 8/26/22-8/27/22 Staph capitis bacteremia resistant to oxacillin.  8/30/22 PICC tip cx negative.  Noted multiple skin wounds.  Repeat BCx's here negative.  LUE midline was placed 9/2/22.     Recommendations:  -2 weeks of vancomycin ending 9/13/22.    Chronic respiratory failure with hypoxia and hypercapnia  8/19/22 and 8/26/22 RCx with Pseudomonas, latter .  8/31/22 repeat RCx with pseudomonas and another GNR.  Vent settings appear stable, appears to be colonization.    Recommendations:  -Continue cefepime for now as above    Thank you for your consult. I will follow-up with patient. Please contact us if you have any additional questions.    Zain Caruso MD  Infectious Disease  [facility]    Subjective:     Principal Problem:Trismus    HPI: 67-year-old male with ALS, trach'd and PEG'd, vent-depentence, HTN, PE on eliquis,  pseudomonas initially presented to [facility] on 8/18/22 complaining of facial swelling, presented  "febrile 102.4 and with leukocytosis 21.  CT max/face showed right maxillary and anterior maxillary soft tissue edema and thickening with right perimandibular ovoid infection.  ENT was able to examine the patient under sedation and paralytics, no abscesses seen on their exam, bite block placed.  Bilateral masseter botox injections were also later given on 8/24/22 and 8/30/22.  Infectious Disease Dr. Burnham was consulted and saw the patient during hospitalization at stay at [place].  Abx and w/u as below.  Initial vancomycin and meropenem changed to Zerbaxa and clindamycin.  8/18/22 urine culture grew both Pseudomonas and providencia.  8/18/22 Midline catheter tip grew MRSA and morganella.  Blood cultures have not grown these organisms.  8/19/22 respiratory culture grew Pseudomonas.  8/22/22 Antibiotics changed to cefepime  8/25/22-8/26/22 ID reconsulted for Tmax 100.4, soft Bps.  Noted purulent secretions from and around the ET tube.  CXR with opacities  8/26/22 Cefepime changed to meropenem  8/27/22 started vancomycin  8/26/22 respiratory culture grew Pseudomonas, now .  8/26/22-8/27/22 blood cultures positive for Staph capitis, oxacillin-resistant.  8/29/22 meropenem changed to cefepime based on pseudomonas  susceptibilities  8/30/22 PICC line tip culture NGTD  8/30/22 Nonfunctional portacath removed  8/30/22 Transferred to [facility] for neurology evaluation for ALS, neurologic trismus  8/31/22 Rcx GNR    Consult: "Antibiotic recommendations. OSH cultures + for MRSA, ESBL, MDRO, . Repeat cultures pending."    Interval History: Repeat CT still showing rim-enhancing lesion near right mandible.  Increased mucoid oral/trach secretions since yesterday.    Review of Systems   Unable to perform ROS: Patient nonverbal   Objective:     Vital Signs (Most Recent):  Temp: 99.3 °F (37.4 °C) (09/04/22 1101)  Pulse: 104 (09/04/22 1101)  Resp: 18 (09/04/22 1101)  BP: (!) 122/58 (09/04/22 1101)  SpO2: 100 % (09/04/22 1101)   " Vital Signs (24h Range):  Temp:  [97.7 °F (36.5 °C)-99.3 °F (37.4 °C)] 99.3 °F (37.4 °C)  Pulse:  [] 104  Resp:  [14-27] 18  SpO2:  [95 %-100 %] 100 %  BP: ()/(43-95) 122/58  Arterial Line BP: ()/(57-85) 92/58     Weight: 77 kg (169 lb 12.1 oz)  Body mass index is 25.81 kg/m².    Estimated Creatinine Clearance: 140.6 mL/min (based on SCr of 0.5 mg/dL).    Physical Exam  Vitals reviewed.   Constitutional:       General: He is not in acute distress.     Appearance: He is ill-appearing. He is not diaphoretic.   HENT:      Head: Normocephalic.      Right Ear: External ear normal.      Left Ear: External ear normal.      Nose: Nose normal.      Mouth/Throat:      Comments: Bite block in place.  Right lower mandible gingiva/mucosa firm, hypertrophied  Eyes:      General: No scleral icterus.        Right eye: No discharge.         Left eye: No discharge.      Conjunctiva/sclera: Conjunctivae normal.   Neck:      Comments: Trach in place  Cardiovascular:      Rate and Rhythm: Tachycardia present.   Pulmonary:      Comments: Vent Mode: A/C  Oxygen Concentration (%):  [30] 30  Resp Rate Total:  [16 br/min-32 br/min] 17 br/min  Vt Set:  [400 mL-500 mL] (P) 400 mL  PEEP/CPAP:  [5 cmH20] 5 cmH20  Pressure Support:  [5 cmH20] 5 cmH20  Mean Airway Pressure:  [8.9 qvE72-22 cmH20] 11 cmH20   Abdominal:      General: Abdomen is flat.      Palpations: Abdomen is soft.   Musculoskeletal:      Right lower leg: Edema present.      Left lower leg: Edema present.      Comments: LUE line c/d/i   Skin:     General: Skin is warm and dry.      Comments: Wound photos reviewed   Neurological:      Mental Status: He is alert.      Comments: Blinks, nods head       Significant Labs: All pertinent labs within the past 24 hours have been reviewed.    Significant Imaging: I have reviewed all pertinent imaging results/findings within the past 24 hours.

## 2022-09-04 NOTE — ASSESSMENT & PLAN NOTE
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

## 2022-09-04 NOTE — ASSESSMENT & PLAN NOTE
67-year-old male with ALS s/p trach'd and peg'd, vent-dependent initially presented to OSH with facial swelling with concerns for facial cellulitis with neurogenic trismus s/p botox injections, transferred here for further evaluation regarding neurogenic trismus.     From OSH: 8/26/22-8/27/22 Staph capitis bacteremia resistant to oxacillin.  8/30/22 PICC tip cx negative.  Noted multiple skin wounds.  Repeat BCx's here negative.  LUE midline was placed 9/2/22.     Recommendations:  -2 weeks of vancomycin ending 9/13/22.

## 2022-09-04 NOTE — PROGRESS NOTES
"[facility]  Otorhinolaryngology-Head & Neck Surgery  Progress Note    Subjective:     Post-Op Info:  * No surgery found *      Hospital Day: 5     Interval History:   NAEON.   Called by NCC fellow yesterday due to concern for possible abscess formation secondary to patient's complaints of pain yesterday.   CT maxface repeated with similar appearing "rim enhancing" collection adjacent to R mandible and in upper lip. Likely represents displaced oral tongue tissue and edema secondary to significant trismus.    Medications:  Continuous Infusions:   heparin (porcine) in D5W 15.07 Units/kg/hr (09/04/22 1701)    NORepinephrine bitartrate-D5W 0.12 mcg/kg/min (09/04/22 1701)     Scheduled Meds:   acetylcysteine 100 mg/ml (10%)  4 mL Nebulization TID WAKE    baclofen  10 mg Per G Tube TID    busPIRone  5 mg Per G Tube BID    ceFEPime (MAXIPIME) IVPB  2 g Intravenous Q8H    famotidine  20 mg Per G Tube BID    methocarbamoL  500 mg Per G Tube QID    mupirocin   Nasal TID    nystatin  500,000 Units Oral QID    polyethylene glycol  17 g Per G Tube Daily    riluzole  50 mg Per G Tube Q12H    scopolamine  1 patch Transdermal Q3 Days    senna-docusate 8.6-50 mg  1 tablet Per G Tube BID    traZODone  50 mg Per G Tube QHS    vancomycin (VANCOCIN) IVPB  1,000 mg Intravenous Q24H     PRN Meds:acetaminophen, bisacodyL, dextrose 10%, dextrose 10%, fentaNYL, hydrALAZINE, labetalol, magnesium oxide, magnesium oxide, midazolam, ondansetron, oxyCODONE, potassium bicarbonate, potassium bicarbonate, potassium bicarbonate, potassium, sodium phosphates, potassium, sodium phosphates, potassium, sodium phosphates, sodium chloride 0.9%, Pharmacy to dose Vancomycin consult **AND** vancomycin - pharmacy to dose     Review of patient's allergies indicates:  No Known Allergies  Objective:     Vital Signs (24h Range):  Temp:  [97.7 °F (36.5 °C)-99.3 °F (37.4 °C)] 99 °F (37.2 °C)  Pulse:  [] 92  Resp:  [14-27] " 21  SpO2:  [95 %-100 %] 99 %  BP: ()/(37-95) 90/43  Arterial Line BP: ()/(57-80) 119/64     Date 09/04/22 0700 - 09/05/22 0659   Shift 5433-6253 3957-7291 4129-1578 24 Hour Total   INTAKE   I.V.(mL/kg) 107.2(1.4) 32.5(0.4)  139.7(1.8)   NG/ 150  600   IV Piggyback 48.8   48.8   Shift Total(mL/kg) 606(7.9) 182.5(2.4)  788.5(10.2)   OUTPUT   Shift Total(mL/kg)       Weight (kg) 77 77 77 77     Lines/Drains/Airways       Drain  Duration                  Gastrostomy/Enterostomy LUQ feeding -- days         Urethral Catheter 08/31/22 1100 4 days              Airway  Duration                  Surgical Airway Shiley Cuffed -- days         Surgical Airway 05/23/22 1145 Portex Cuffed 104 days              Arterial Line  Duration             Arterial Line 09/02/22 1800 Left Radial 1 day              Peripheral Intravenous Line  Duration                  Midline Catheter Insertion/Assessment  - Single Lumen 09/02/22 1200 Left cephalic vein (lateral side of arm) 20g x 10cm 2 days         Peripheral IV - Single Lumen 09/02/22 1200 18 G;1 3/4 in Left Antecubital 2 days         Peripheral IV - Single Lumen 09/03/22 1900 20 G Anterior;Left Foot <1 day                    Physical Exam  NAD, nonverbal secondary to ALS   EOMI, blinks to respond to questions   Bilateral bite blocks in place   Tongue prolapsed out of oral cavity, grossly edematous   Soft tissue swelling along R mandible anteriorly, consistent with partially bitten tongue tissue   Thick, mucoid secretions coating dorsal tongue  Copious saliva and secretions  Unable to further assess OC secondary to trismus   Grimace with attempted examination of OC beyond tongue   Tracheostomy in place   On ventilator       Significant Labs:  CBC:   Recent Labs   Lab 09/04/22  0226   WBC 16.60*   RBC 3.23*   HGB 8.3*   HCT 27.5*      MCV 85   MCH 25.7*   MCHC 30.2*       Significant Diagnostics:  CT: I have reviewed all pertinent results/findings within the past  24 hours and my personal findings are:  CT similar to prior CT maxfacce with possible R mandibular collection, likely representative of displaced tongue tissue    Assessment/Plan:     * Trismus  Severe neurogenic trismus in patient w ALS, unsuccessfully injected w Botox at OSH on 8/24/22    - No acute ENT intervention at this time  - Botox injection for trismus currently not performed by any ENT at [facility], consider consultation to hospital neurology if this is to be investigated further  - Recommend consulting anesthesia for possible V3 nerve block   - Please page w questions or concerns        Florence Molina MD  Otorhinolaryngology-Head & Neck Surgery  [facility]

## 2022-09-04 NOTE — ASSESSMENT & PLAN NOTE
67-year-old male with ALS s/p trach'd and peg'd, vent-dependent initially presented to OSH with facial swelling with concerns for facial cellulitis with neurogenic trismus s/p botox injections, transferred here for further evaluation regarding neurogenic trismus.  Patient now with Staph capitis bacteremia.  TTE is negative.    Plan    1. Vancomycin IV.      2. Anticipate 2 week course.

## 2022-09-05 LAB
ABO + RH BLD: NORMAL
ALBUMIN SERPL BCP-MCNC: 1.6 G/DL (ref 3.5–5.2)
ALLENS TEST: ABNORMAL
ALP SERPL-CCNC: 82 U/L (ref 55–135)
ALT SERPL W/O P-5'-P-CCNC: 11 U/L (ref 10–44)
ANION GAP SERPL CALC-SCNC: 4 MMOL/L (ref 8–16)
APTT BLDCRRT: 46.4 SEC (ref 21–32)
AST SERPL-CCNC: 15 U/L (ref 10–40)
BACTERIA BLD CULT: NORMAL
BACTERIA BLD CULT: NORMAL
BASOPHILS # BLD AUTO: 0.07 K/UL (ref 0–0.2)
BASOPHILS NFR BLD: 0.4 % (ref 0–1.9)
BILIRUB SERPL-MCNC: 0.3 MG/DL (ref 0.1–1)
BLD GP AB SCN CELLS X3 SERPL QL: NORMAL
BUN SERPL-MCNC: 26 MG/DL (ref 8–23)
CALCIUM SERPL-MCNC: 8.7 MG/DL (ref 8.7–10.5)
CHLORIDE SERPL-SCNC: 107 MMOL/L (ref 95–110)
CO2 SERPL-SCNC: 19 MMOL/L (ref 23–29)
CREAT SERPL-MCNC: 0.5 MG/DL (ref 0.5–1.4)
CRP SERPL-MCNC: 76.5 MG/L (ref 0–8.2)
DELSYS: ABNORMAL
DIFFERENTIAL METHOD: ABNORMAL
EOSINOPHIL # BLD AUTO: 0.7 K/UL (ref 0–0.5)
EOSINOPHIL NFR BLD: 4.2 % (ref 0–8)
ERYTHROCYTE [DISTWIDTH] IN BLOOD BY AUTOMATED COUNT: 21 % (ref 11.5–14.5)
ERYTHROCYTE [SEDIMENTATION RATE] IN BLOOD BY WESTERGREN METHOD: 14 MM/H
EST. GFR  (NO RACE VARIABLE): >60 ML/MIN/1.73 M^2
FIO2: 30
GLUCOSE SERPL-MCNC: 128 MG/DL (ref 70–110)
HCO3 UR-SCNC: 25.1 MMOL/L (ref 24–28)
HCT VFR BLD AUTO: 25.7 % (ref 40–54)
HGB BLD-MCNC: 8.1 G/DL (ref 14–18)
IMM GRANULOCYTES # BLD AUTO: 0.23 K/UL (ref 0–0.04)
IMM GRANULOCYTES NFR BLD AUTO: 1.3 % (ref 0–0.5)
LYMPHOCYTES # BLD AUTO: 2.1 K/UL (ref 1–4.8)
LYMPHOCYTES NFR BLD: 11.8 % (ref 18–48)
MAGNESIUM SERPL-MCNC: 1.6 MG/DL (ref 1.6–2.6)
MAGNESIUM SERPL-MCNC: 1.7 MG/DL (ref 1.6–2.6)
MCH RBC QN AUTO: 26.5 PG (ref 27–31)
MCHC RBC AUTO-ENTMCNC: 31.5 G/DL (ref 32–36)
MCV RBC AUTO: 84 FL (ref 82–98)
MODE: ABNORMAL
MONOCYTES # BLD AUTO: 0.9 K/UL (ref 0.3–1)
MONOCYTES NFR BLD: 4.9 % (ref 4–15)
NEUTROPHILS # BLD AUTO: 13.5 K/UL (ref 1.8–7.7)
NEUTROPHILS NFR BLD: 77.4 % (ref 38–73)
NRBC BLD-RTO: 0 /100 WBC
PCO2 BLDA: 37.7 MMHG (ref 35–45)
PEEP: 5
PH SMN: 7.43 [PH] (ref 7.35–7.45)
PHOSPHATE SERPL-MCNC: 2.4 MG/DL (ref 2.7–4.5)
PHOSPHATE SERPL-MCNC: 2.5 MG/DL (ref 2.7–4.5)
PLATELET # BLD AUTO: 415 K/UL (ref 150–450)
PMV BLD AUTO: 10.3 FL (ref 9.2–12.9)
PO2 BLDA: 126 MMHG (ref 80–100)
POC BE: 1 MMOL/L
POC SATURATED O2: 99 % (ref 95–100)
POC TCO2: 26 MMOL/L (ref 23–27)
POCT GLUCOSE: 141 MG/DL (ref 70–110)
POTASSIUM SERPL-SCNC: 4.1 MMOL/L (ref 3.5–5.1)
PROT SERPL-MCNC: 7.8 G/DL (ref 6–8.4)
RBC # BLD AUTO: 3.06 M/UL (ref 4.6–6.2)
SAMPLE: ABNORMAL
SITE: ABNORMAL
SODIUM SERPL-SCNC: 130 MMOL/L (ref 136–145)
VANCOMYCIN TROUGH SERPL-MCNC: 22.9 UG/ML (ref 10–22)
VT: 400
WBC # BLD AUTO: 17.47 K/UL (ref 3.9–12.7)

## 2022-09-05 PROCEDURE — 63600175 PHARM REV CODE 636 W HCPCS: Performed by: PHYSICIAN ASSISTANT

## 2022-09-05 PROCEDURE — 86140 C-REACTIVE PROTEIN: CPT

## 2022-09-05 PROCEDURE — 99900026 HC AIRWAY MAINTENANCE (STAT)

## 2022-09-05 PROCEDURE — 86901 BLOOD TYPING SEROLOGIC RH(D): CPT | Performed by: INTERNAL MEDICINE

## 2022-09-05 PROCEDURE — 37799 UNLISTED PX VASCULAR SURGERY: CPT

## 2022-09-05 PROCEDURE — 63600175 PHARM REV CODE 636 W HCPCS: Performed by: NURSE PRACTITIONER

## 2022-09-05 PROCEDURE — 99291 CRITICAL CARE FIRST HOUR: CPT | Mod: GC,,, | Performed by: PSYCHIATRY & NEUROLOGY

## 2022-09-05 PROCEDURE — 99233 SBSQ HOSP IP/OBS HIGH 50: CPT | Mod: GC,,, | Performed by: INTERNAL MEDICINE

## 2022-09-05 PROCEDURE — 63600175 PHARM REV CODE 636 W HCPCS: Performed by: PSYCHIATRY & NEUROLOGY

## 2022-09-05 PROCEDURE — 83735 ASSAY OF MAGNESIUM: CPT | Mod: 91 | Performed by: INTERNAL MEDICINE

## 2022-09-05 PROCEDURE — 25000003 PHARM REV CODE 250: Performed by: PSYCHIATRY & NEUROLOGY

## 2022-09-05 PROCEDURE — 20000000 HC ICU ROOM

## 2022-09-05 PROCEDURE — 85025 COMPLETE CBC W/AUTO DIFF WBC: CPT

## 2022-09-05 PROCEDURE — 25000003 PHARM REV CODE 250: Performed by: NURSE PRACTITIONER

## 2022-09-05 PROCEDURE — 82803 BLOOD GASES ANY COMBINATION: CPT

## 2022-09-05 PROCEDURE — 27000207 HC ISOLATION

## 2022-09-05 PROCEDURE — 82800 BLOOD PH: CPT

## 2022-09-05 PROCEDURE — 63600175 PHARM REV CODE 636 W HCPCS

## 2022-09-05 PROCEDURE — 25000003 PHARM REV CODE 250

## 2022-09-05 PROCEDURE — 99233 PR SUBSEQUENT HOSPITAL CARE,LEVL III: ICD-10-PCS | Mod: GC,,, | Performed by: INTERNAL MEDICINE

## 2022-09-05 PROCEDURE — 99900035 HC TECH TIME PER 15 MIN (STAT)

## 2022-09-05 PROCEDURE — 94003 VENT MGMT INPAT SUBQ DAY: CPT

## 2022-09-05 PROCEDURE — 63700000 PHARM REV CODE 250 ALT 637 W/O HCPCS

## 2022-09-05 PROCEDURE — 25000242 PHARM REV CODE 250 ALT 637 W/ HCPCS

## 2022-09-05 PROCEDURE — 80202 ASSAY OF VANCOMYCIN: CPT | Performed by: INTERNAL MEDICINE

## 2022-09-05 PROCEDURE — 85730 THROMBOPLASTIN TIME PARTIAL: CPT

## 2022-09-05 PROCEDURE — 99291 PR CRITICAL CARE, E/M 30-74 MINUTES: ICD-10-PCS | Mod: GC,,, | Performed by: PSYCHIATRY & NEUROLOGY

## 2022-09-05 PROCEDURE — 94640 AIRWAY INHALATION TREATMENT: CPT

## 2022-09-05 PROCEDURE — 94761 N-INVAS EAR/PLS OXIMETRY MLT: CPT

## 2022-09-05 PROCEDURE — 25000242 PHARM REV CODE 250 ALT 637 W/ HCPCS: Performed by: PSYCHIATRY & NEUROLOGY

## 2022-09-05 PROCEDURE — 27000221 HC OXYGEN, UP TO 24 HOURS

## 2022-09-05 PROCEDURE — 25000003 PHARM REV CODE 250: Performed by: PHYSICIAN ASSISTANT

## 2022-09-05 PROCEDURE — 84100 ASSAY OF PHOSPHORUS: CPT | Mod: 91 | Performed by: INTERNAL MEDICINE

## 2022-09-05 PROCEDURE — 80053 COMPREHEN METABOLIC PANEL: CPT

## 2022-09-05 RX ORDER — METOPROLOL TARTRATE 25 MG/1
12.5 TABLET ORAL EVERY 6 HOURS
Status: DISCONTINUED | OUTPATIENT
Start: 2022-09-05 | End: 2022-09-13 | Stop reason: HOSPADM

## 2022-09-05 RX ORDER — IPRATROPIUM BROMIDE AND ALBUTEROL SULFATE 2.5; .5 MG/3ML; MG/3ML
3 SOLUTION RESPIRATORY (INHALATION)
Status: DISCONTINUED | OUTPATIENT
Start: 2022-09-05 | End: 2022-09-13 | Stop reason: HOSPADM

## 2022-09-05 RX ORDER — MAGNESIUM SULFATE HEPTAHYDRATE 40 MG/ML
2 INJECTION, SOLUTION INTRAVENOUS
Status: DISCONTINUED | OUTPATIENT
Start: 2022-09-05 | End: 2022-09-13 | Stop reason: HOSPADM

## 2022-09-05 RX ORDER — MAGNESIUM SULFATE HEPTAHYDRATE 40 MG/ML
4 INJECTION, SOLUTION INTRAVENOUS
Status: DISCONTINUED | OUTPATIENT
Start: 2022-09-05 | End: 2022-09-13 | Stop reason: HOSPADM

## 2022-09-05 RX ORDER — MIDODRINE HYDROCHLORIDE 5 MG/1
5 TABLET ORAL EVERY 8 HOURS
Status: DISCONTINUED | OUTPATIENT
Start: 2022-09-05 | End: 2022-09-07

## 2022-09-05 RX ORDER — IPRATROPIUM BROMIDE AND ALBUTEROL SULFATE 2.5; .5 MG/3ML; MG/3ML
3 SOLUTION RESPIRATORY (INHALATION) EVERY 6 HOURS PRN
Status: DISCONTINUED | OUTPATIENT
Start: 2022-09-05 | End: 2022-09-05

## 2022-09-05 RX ORDER — IPRATROPIUM BROMIDE AND ALBUTEROL SULFATE 2.5; .5 MG/3ML; MG/3ML
SOLUTION RESPIRATORY (INHALATION)
Status: COMPLETED
Start: 2022-09-05 | End: 2022-09-05

## 2022-09-05 RX ADMIN — NYSTATIN 500000 UNITS: 500000 SUSPENSION ORAL at 09:09

## 2022-09-05 RX ADMIN — POLYETHYLENE GLYCOL 3350 17 G: 17 POWDER, FOR SOLUTION ORAL at 10:09

## 2022-09-05 RX ADMIN — FAMOTIDINE 20 MG: 20 TABLET ORAL at 09:09

## 2022-09-05 RX ADMIN — RILUZOLE 50 MG: 50 TABLET ORAL at 08:09

## 2022-09-05 RX ADMIN — ACETYLCYSTEINE 4 ML: 100 INHALANT RESPIRATORY (INHALATION) at 07:09

## 2022-09-05 RX ADMIN — TRAZODONE HYDROCHLORIDE 50 MG: 50 TABLET ORAL at 09:09

## 2022-09-05 RX ADMIN — SODIUM CHLORIDE 250 ML: 0.9 INJECTION, SOLUTION INTRAVENOUS at 05:09

## 2022-09-05 RX ADMIN — RILUZOLE 50 MG: 50 TABLET ORAL at 09:09

## 2022-09-05 RX ADMIN — IPRATROPIUM BROMIDE AND ALBUTEROL SULFATE 3 ML: 2.5; .5 SOLUTION RESPIRATORY (INHALATION) at 07:09

## 2022-09-05 RX ADMIN — FENTANYL CITRATE 50 MCG: 0.05 INJECTION, SOLUTION INTRAMUSCULAR; INTRAVENOUS at 11:09

## 2022-09-05 RX ADMIN — FENTANYL CITRATE 50 MCG: 0.05 INJECTION, SOLUTION INTRAMUSCULAR; INTRAVENOUS at 03:09

## 2022-09-05 RX ADMIN — BUSPIRONE HYDROCHLORIDE 5 MG: 5 TABLET ORAL at 09:09

## 2022-09-05 RX ADMIN — MIDAZOLAM 1 MG: 1 INJECTION INTRAMUSCULAR; INTRAVENOUS at 01:09

## 2022-09-05 RX ADMIN — POTASSIUM & SODIUM PHOSPHATES POWDER PACK 280-160-250 MG 2 PACKET: 280-160-250 PACK at 10:09

## 2022-09-05 RX ADMIN — NOREPINEPHRINE BITARTRATE 0.06 MCG/KG/MIN: 4 INJECTION, SOLUTION INTRAVENOUS at 06:09

## 2022-09-05 RX ADMIN — CEFEPIME HYDROCHLORIDE 2 G: 2 INJECTION, SOLUTION INTRAVENOUS at 12:09

## 2022-09-05 RX ADMIN — HEPARIN SODIUM 16 UNITS/KG/HR: 5000 INJECTION INTRAVENOUS; SUBCUTANEOUS at 11:09

## 2022-09-05 RX ADMIN — MIDAZOLAM 1 MG: 1 INJECTION INTRAMUSCULAR; INTRAVENOUS at 04:09

## 2022-09-05 RX ADMIN — FENTANYL CITRATE 50 MCG: 0.05 INJECTION, SOLUTION INTRAMUSCULAR; INTRAVENOUS at 12:09

## 2022-09-05 RX ADMIN — Medication 800 MG: at 10:09

## 2022-09-05 RX ADMIN — METHOCARBAMOL 500 MG: 500 TABLET ORAL at 02:09

## 2022-09-05 RX ADMIN — METOPROLOL TARTRATE 12.5 MG: 25 TABLET, FILM COATED ORAL at 07:09

## 2022-09-05 RX ADMIN — SENNOSIDES AND DOCUSATE SODIUM 1 TABLET: 50; 8.6 TABLET ORAL at 09:09

## 2022-09-05 RX ADMIN — NYSTATIN 500000 UNITS: 500000 SUSPENSION ORAL at 08:09

## 2022-09-05 RX ADMIN — OXYCODONE 5 MG: 5 TABLET ORAL at 05:09

## 2022-09-05 RX ADMIN — POTASSIUM & SODIUM PHOSPHATES POWDER PACK 280-160-250 MG 2 PACKET: 280-160-250 PACK at 05:09

## 2022-09-05 RX ADMIN — IPRATROPIUM BROMIDE AND ALBUTEROL SULFATE 3 ML: 2.5; .5 SOLUTION RESPIRATORY (INHALATION) at 01:09

## 2022-09-05 RX ADMIN — ACETYLCYSTEINE 4 ML: 100 INHALANT RESPIRATORY (INHALATION) at 01:09

## 2022-09-05 RX ADMIN — MUPIROCIN 1 G: 20 OINTMENT TOPICAL at 08:09

## 2022-09-05 RX ADMIN — METHOCARBAMOL 500 MG: 500 TABLET ORAL at 05:09

## 2022-09-05 RX ADMIN — IPRATROPIUM BROMIDE AND ALBUTEROL SULFATE 3 ML: 2.5; .5 SOLUTION RESPIRATORY (INHALATION) at 08:09

## 2022-09-05 RX ADMIN — Medication 800 MG: at 05:09

## 2022-09-05 RX ADMIN — MIDAZOLAM 1 MG: 1 INJECTION INTRAMUSCULAR; INTRAVENOUS at 07:09

## 2022-09-05 RX ADMIN — BACLOFEN 10 MG: 10 TABLET ORAL at 09:09

## 2022-09-05 RX ADMIN — METHOCARBAMOL 500 MG: 500 TABLET ORAL at 08:09

## 2022-09-05 RX ADMIN — BACLOFEN 10 MG: 10 TABLET ORAL at 08:09

## 2022-09-05 RX ADMIN — CEFEPIME HYDROCHLORIDE 2 G: 2 INJECTION, SOLUTION INTRAVENOUS at 08:09

## 2022-09-05 RX ADMIN — FENTANYL CITRATE 50 MCG: 0.05 INJECTION, SOLUTION INTRAMUSCULAR; INTRAVENOUS at 06:09

## 2022-09-05 RX ADMIN — CEFEPIME HYDROCHLORIDE 2 G: 2 INJECTION, SOLUTION INTRAVENOUS at 05:09

## 2022-09-05 RX ADMIN — OXYCODONE 5 MG: 5 TABLET ORAL at 02:09

## 2022-09-05 RX ADMIN — FAMOTIDINE 20 MG: 20 TABLET ORAL at 08:09

## 2022-09-05 RX ADMIN — MIDODRINE HYDROCHLORIDE 5 MG: 5 TABLET ORAL at 09:09

## 2022-09-05 RX ADMIN — BACLOFEN 10 MG: 10 TABLET ORAL at 02:09

## 2022-09-05 RX ADMIN — BUSPIRONE HYDROCHLORIDE 5 MG: 5 TABLET ORAL at 08:09

## 2022-09-05 RX ADMIN — MIDAZOLAM 1 MG: 1 INJECTION INTRAMUSCULAR; INTRAVENOUS at 02:09

## 2022-09-05 RX ADMIN — SENNOSIDES AND DOCUSATE SODIUM 1 TABLET: 50; 8.6 TABLET ORAL at 10:09

## 2022-09-05 RX ADMIN — METHOCARBAMOL 500 MG: 500 TABLET ORAL at 09:09

## 2022-09-05 RX ADMIN — MUPIROCIN 1 G: 20 OINTMENT TOPICAL at 03:09

## 2022-09-05 RX ADMIN — MUPIROCIN: 20 OINTMENT TOPICAL at 09:09

## 2022-09-05 RX ADMIN — NYSTATIN 500000 UNITS: 500000 SUSPENSION ORAL at 02:09

## 2022-09-05 RX ADMIN — OXYCODONE 5 MG: 5 TABLET ORAL at 09:09

## 2022-09-05 RX ADMIN — NYSTATIN 500000 UNITS: 500000 SUSPENSION ORAL at 05:09

## 2022-09-05 RX ADMIN — MIDODRINE HYDROCHLORIDE 5 MG: 5 TABLET ORAL at 02:09

## 2022-09-05 RX ADMIN — ACETYLCYSTEINE 4 ML: 100 INHALANT RESPIRATORY (INHALATION) at 08:09

## 2022-09-05 NOTE — PROGRESS NOTES
Dr Duffy and team at bedside aware that levo is weaned off and pt is still tachycardic, afebrile, orders noted

## 2022-09-05 NOTE — ASSESSMENT & PLAN NOTE
8/19/22 and 8/26/22 RCx with Pseudomonas, latter .  8/31/22 repeat RCx with pseudomonas and another GNR pending speciation.  Vent settings appear stable, suspect colonization.    Recommendations:  -Discontinue cefepime

## 2022-09-05 NOTE — PROGRESS NOTES
[facility]  Infectious Disease  Progress Note    Patient Name: Wei Dominique  MRN: [id number]  Admission Date: 8/31/2022  Length of Stay: 5 days  Attending Physician: Valdo Duffy MD  Primary Care Provider: Primary Doctor No    Isolation Status: Contact  Assessment/Plan:      Bacteremia  67-year-old male with ALS s/p trach'd and peg'd, vent-dependent initially presented to OSH with facial swelling with concerns for facial cellulitis with neurogenic trismus s/p botox injections, transferred here for further evaluation regarding neurogenic trismus.    Multiple positive cultures and abx treatment at OSH outlined in HPI below.     8/26/22-8/27/22 Staph capitis bacteremia resistant to oxacillin.  8/30/22 PICC tip cx negative.  Noted multiple skin wounds.  Repeat BCx's here negative.  LUE midline was placed 9/2/22.     Recommendations:  -2 weeks of vancomycin starting from day of PICC line removal at OSH.  End date 9/13/22.  -OPAT note below    Oral lesion  Repeat CT max/face with similar appearing fluid collection adjacent to right mandible 3.4x1.7cm, fluid collection upper lip, mucosal thickening of the ethmoid air cells and sphenoid sinuses, with mastoid air cells partially filled with fluid.    Recommendations:  -Appreciate ENT recommendations; CT lesions appear to be hypertrophic tongue that patient is biting on    Chronic respiratory failure with hypoxia and hypercapnia  8/19/22 and 8/26/22 RCx with Pseudomonas, latter .  8/31/22 repeat RCx with pseudomonas and another GNR pending speciation.  Vent settings appear stable, suspect colonization.    Recommendations:  -Discontinue cefepime      Outpatient Antibiotic Therapy Plan:    Please send referral to [facility].    1) Infection: Staph capitis bacteremia    2) Discharge Antibiotics:    Intravenous antibiotics:   Vancomycin    3) Therapy Duration:  2 weeks    Estimated end date of IV antibiotics:  9/13/22    4) Outpatient Weekly Labs:    Order the following labs to be drawn on Mondays:    CBC   CMP    CRP   Vancomycin trough. Target 15-20    If discharged on vancomycin IV, order the following additional labs to be drawn on Thursdays:   CMP    Vancomycin trough. Target 15-20    If vancomycin trough is not at target (15-20) prior to discharge, schedule vancomycin trough to be drawn before their fourth outpatient dose.    5) Fax Lab Results to Infectious Diseases Provider: Dr. Zain Caruso    [facility] Fax Number: [phone]    6) Outpatient Infectious Diseases Follow-up     Follow-up appointment will be arranged by the ID clinic and will be found in the patient's appointments tab.     Prior to discharge, please ensure the patient's follow-up has been scheduled.     If there is still no follow-up scheduled prior to discharge, please send an EPIC message to Birgit Butt in Infectious Diseases.      Thank you for your consult. I will sign off. Please contact us if you have any additional questions.    Zain Caruso MD  Infectious Disease  [facility] - Neuro Critical Care    Subjective:     Principal Problem:Trismus    HPI: 67-year-old male with ALS, trach'd and PEG'd, vent-depentence, HTN, PE on eliquis,  pseudomonas initially presented to [facility] on 8/18/22 complaining of facial swelling, presented febrile 102.4 and with leukocytosis 21.  CT max/face showed right maxillary and anterior maxillary soft tissue edema and thickening with right perimandibular ovoid infection.  ENT was able to examine the patient under sedation and paralytics, no abscesses seen on their exam, bite block placed.  Bilateral masseter botox injections were also later given on 8/24/22 and 8/30/22.  Infectious Disease Dr. Burnham was consulted and saw the patient during hospitalization at stay at [place].  Abx and w/u as below.  Initial vancomycin and meropenem changed to Zerbaxa and clindamycin.  8/18/22 urine  "culture grew both Pseudomonas and providencia.  8/18/22 Midline catheter tip grew MRSA and morganella.  Blood cultures have not grown these organisms.  8/19/22 respiratory culture grew Pseudomonas.  8/22/22 Antibiotics changed to cefepime  8/25/22-8/26/22 ID reconsulted for Tmax 100.4, soft Bps.  Noted purulent secretions from and around the ET tube.  CXR with opacities  8/26/22 Cefepime changed to meropenem  8/27/22 started vancomycin  8/26/22 respiratory culture grew Pseudomonas, now .  8/26/22-8/27/22 blood cultures positive for Staph capitis, oxacillin-resistant.  8/29/22 meropenem changed to cefepime based on pseudomonas  susceptibilities  8/30/22 PICC line tip culture NGTD  8/30/22 Nonfunctional portacath removed  8/30/22 Transferred to [facility] for neurology evaluation for ALS, neurologic trismus  8/31/22 Rcx GNR    Consult: "Antibiotic recommendations. OSH cultures + for MRSA, ESBL, MDRO, . Repeat cultures pending."    Interval History: Afebrile.  Per RN secretions improved from yesterday.    Review of Systems   Unable to perform ROS: Patient nonverbal   Objective:     Vital Signs (Most Recent):  Temp: 98.8 °F (37.1 °C) (09/05/22 1200)  Pulse: (!) 120 (09/05/22 1330)  Resp: 19 (09/05/22 1330)  BP: (!) 158/74 (09/05/22 1300)  SpO2: 100 % (09/05/22 1330)   Vital Signs (24h Range):  Temp:  [96.4 °F (35.8 °C)-99.2 °F (37.3 °C)] 98.8 °F (37.1 °C)  Pulse:  [] 120  Resp:  [17-25] 19  SpO2:  [99 %-100 %] 100 %  BP: ()/(37-87) 158/74  Arterial Line BP: ()/(53-73) 109/67     Weight: 77 kg (169 lb 12.1 oz)  Body mass index is 25.81 kg/m².    Estimated Creatinine Clearance: 140.6 mL/min (based on SCr of 0.5 mg/dL).    Physical Exam  Vitals reviewed.   Constitutional:       General: He is not in acute distress.     Appearance: He is ill-appearing. He is not diaphoretic.   HENT:      Head: Normocephalic.      Right Ear: External ear normal.      Left Ear: External ear normal.      Nose: Nose normal. "      Mouth/Throat:      Comments: Bite block in place.  Hypertrophied tongue bulging through teeth at region of right lower mandible  Eyes:      General: No scleral icterus.        Right eye: No discharge.         Left eye: No discharge.      Conjunctiva/sclera: Conjunctivae normal.   Neck:      Comments: Trach in place  Cardiovascular:      Rate and Rhythm: Tachycardia present.   Pulmonary:      Comments: Vent Mode: A/C  Oxygen Concentration (%):  [30] 30  Resp Rate Total:  [15 br/min-26 br/min] 19 br/min  Vt Set:  [400 mL] 400 mL  PEEP/CPAP:  [5 cmH20] 5 cmH20  Mean Airway Pressure:  [9.7 taI99-24 cmH20] 10 cmH20   Abdominal:      General: Abdomen is flat.      Palpations: Abdomen is soft.   Musculoskeletal:      Right lower leg: Edema present.      Left lower leg: Edema present.      Comments: LUE line c/d/i   Skin:     General: Skin is warm and dry.      Comments: Wound photos reviewed   Neurological:      Mental Status: He is alert.      Comments: Blinks, nods head       Significant Labs: All pertinent labs within the past 24 hours have been reviewed.    Significant Imaging: I have reviewed all pertinent imaging results/findings within the past 24 hours.

## 2022-09-05 NOTE — PLAN OF CARE
[facility] Care Plan    POC reviewed with Wei Dominique and family at 1400. Pt  blinks eyes to indicate understanding. Questions and concerns addressed. No acute events today. Weaned off levo, remains tachy, plan for nerve block tomorrow with anesthesia . Will continue to monitor. See below and flowsheets for full assessment and VS info. Wife updated via phone.             Is this a stroke patient? no    Neuro:  [place] Coma Scale  Best Eye Response: 4-->(E4) spontaneous  Best Motor Response: 1-->(M1) none (obeys blinking commands)  Best Verbal Response: 1-->(V1) none  [place] Coma Scale Score: 6  Assessment Qualifiers: other (see comments) (trach on vent)  Pupil PERRLA: yes     24 hr Temp:  [96.4 °F (35.8 °C)-99.2 °F (37.3 °C)]     CV:   Rhythm: sinus tachycardia  BP goals:   SBP < 160  MAP > 65    Resp:   O2 Device (Oxygen Therapy): ventilator  Vent Mode: A/C  Set Rate: 14 BPM  Oxygen Concentration (%): 30  Vt Set: 400 mL  PEEP/CPAP: 5 cmH20  Pressure Support: 5 cmH20    Plan: trach in place    GI/:     Diet/Nutrition Received: tube feeding  Last Bowel Movement: 09/05/22  Voiding Characteristics: external catheter    Intake/Output Summary (Last 24 hours) at 9/5/2022 1837  Last data filed at 9/5/2022 1800  Gross per 24 hour   Intake 3636.37 ml   Output 2900 ml   Net 736.37 ml     Unmeasured Output  Urine Occurrence: 1  Stool Occurrence: 1  Pad Count: 2    Labs/Accuchecks:  Recent Labs   Lab 09/05/22 0237   WBC 17.47*   RBC 3.06*   HGB 8.1*   HCT 25.7*         Recent Labs   Lab 09/05/22 0237   *   K 4.1   CO2 19*      BUN 26*   CREATININE 0.5   ALKPHOS 82   ALT 11   AST 15   BILITOT 0.3      Recent Labs   Lab 08/31/22  1656 09/01/22  0019 09/05/22 0237   INR 1.3*  --   --    APTT 28.7   < > 46.4*    < > = values in this interval not displayed.    No results for input(s): CPK, CPKMB, TROPONINI, MB in the last 168 hours.    Electrolytes: Electrolytes replaced  Accuchecks: none    Gtts:   heparin  (porcine) in D5W 16.017 Units/kg/hr (09/05/22 1800)    NORepinephrine bitartrate-D5W Stopped (09/05/22 1700)       LDA/Wounds:  Lines/Drains/Airways       Drain  Duration                  Gastrostomy/Enterostomy LUQ feeding -- days    Male External Urinary Catheter 09/04/22 1815 Large 1 day              Airway  Duration                  Surgical Airway Shiley Cuffed -- days         Surgical Airway 05/23/22 1145 Portex Cuffed 105 days              Arterial Line  Duration             Arterial Line 09/02/22 1800 Left Radial 3 days              Peripheral Intravenous Line  Duration                  Midline Catheter Insertion/Assessment  - Single Lumen 09/02/22 1200 Left cephalic vein (lateral side of arm) 20g x 10cm 3 days         Peripheral IV - Single Lumen 09/02/22 1200 18 G;1 3/4 in Left Antecubital 3 days         Peripheral IV - Single Lumen 09/03/22 1900 20 G Anterior;Left Foot 1 day                  Wounds: Yes  Wound care consulted: No

## 2022-09-05 NOTE — PLAN OF CARE
[facility] Care Plan    POC reviewed with Wei Dominique and family at 0300. Questions and concerns addressed. No acute events overnight. Pt progressing toward goals. Will continue to monitor. See below and flowsheets for full assessment and VS info.     Wound care and bath completed. Linens changed.  Oxy, fentanyl and versed rotated for pain management.  Phos and mag replaced.  UO 1.1L overnight.  Plan is for anesthesia to do masseter nerve block at the bedside.       Is this a stroke patient? no    Neuro:  [place] Coma Scale  Best Eye Response: 4-->(E4) spontaneous  Best Motor Response: 6-->(M6) obeys commands  Best Verbal Response: 5-->(V5) oriented  [place] Coma Scale Score: 15  Assessment Qualifiers: patient not sedated/intubated  Pupil PERRLA: yes     24hr Temp:  [96.4 °F (35.8 °C)-99.3 °F (37.4 °C)]     CV:   Rhythm: normal sinus rhythm  BP goals:   SBP < 160  MAP > 65    Resp:   O2 Device (Oxygen Therapy): ventilator  Vent Mode: A/C  Set Rate: 14 BPM  Oxygen Concentration (%): 30  Vt Set: 400 mL  PEEP/CPAP: 5 cmH20  Pressure Support: 5 cmH20    Plan: trach in place    GI/:     Diet/Nutrition Received: tube feeding  Last Bowel Movement: 09/04/22  Voiding Characteristics: external catheter    Intake/Output Summary (Last 24 hours) at 9/5/2022 0558  Last data filed at 9/5/2022 0505  Gross per 24 hour   Intake 2309.62 ml   Output 1700 ml   Net 609.62 ml     Unmeasured Output  Urine Occurrence: 1  Stool Occurrence: 0  Pad Count: 1    Labs/Accuchecks:  Recent Labs   Lab 09/05/22 0237   WBC 17.47*   RBC 3.06*   HGB 8.1*   HCT 25.7*         Recent Labs   Lab 09/05/22 0237   *   K 4.1   CO2 19*      BUN 26*   CREATININE 0.5   ALKPHOS 82   ALT 11   AST 15   BILITOT 0.3      Recent Labs   Lab 08/31/22  1656 09/01/22  0019 09/05/22 0237   INR 1.3*  --   --    APTT 28.7   < > 46.4*    < > = values in this interval not displayed.    No results for input(s): CPK, CPKMB, TROPONINI, MB in the last 168  hours.    Electrolytes: Electrolytes replaced  Accuchecks: none    Gtts:   heparin (porcine) in D5W 16.07 Units/kg/hr (09/05/22 0505)    NORepinephrine bitartrate-D5W 0.12 mcg/kg/min (09/04/22 1701)       LDA/Wounds:  Lines/Drains/Airways       Drain  Duration                  Gastrostomy/Enterostomy LUQ feeding -- days    Male External Urinary Catheter 09/04/22 1815 Large <1 day              Airway  Duration                  Surgical Airway Shiley Cuffed -- days         Surgical Airway 05/23/22 1145 Portex Cuffed 104 days              Arterial Line  Duration             Arterial Line 09/02/22 1800 Left Radial 2 days              Peripheral Intravenous Line  Duration                  Midline Catheter Insertion/Assessment  - Single Lumen 09/02/22 1200 Left cephalic vein (lateral side of arm) 20g x 10cm 2 days         Peripheral IV - Single Lumen 09/02/22 1200 18 G;1 3/4 in Left Antecubital 2 days         Peripheral IV - Single Lumen 09/03/22 1900 20 G Anterior;Left Foot 1 day                  Wounds: Yes  Wound care consulted: Yes

## 2022-09-05 NOTE — ASSESSMENT & PLAN NOTE
67-year-old male with ALS s/p trach'd and peg'd, vent-dependent initially presented to OSH with facial swelling with concerns for facial cellulitis with neurogenic trismus s/p botox injections, transferred here for further evaluation regarding neurogenic trismus.     From OSH: 8/26/22-8/27/22 Staph capitis bacteremia resistant to oxacillin.  8/30/22 PICC tip cx negative.  Noted multiple skin wounds.  Repeat BCx's here negative.  LUE midline was placed 9/2/22.     Recommendations:  -2 weeks of vancomycin ending 9/13/22.  -OPAT note below

## 2022-09-05 NOTE — SUBJECTIVE & OBJECTIVE
Interval History: Afebrile.  Per RN secretions improved from yesterday.    Review of Systems   Unable to perform ROS: Patient nonverbal   Objective:     Vital Signs (Most Recent):  Temp: 98.8 °F (37.1 °C) (09/05/22 1200)  Pulse: (!) 120 (09/05/22 1330)  Resp: 19 (09/05/22 1330)  BP: (!) 158/74 (09/05/22 1300)  SpO2: 100 % (09/05/22 1330)   Vital Signs (24h Range):  Temp:  [96.4 °F (35.8 °C)-99.2 °F (37.3 °C)] 98.8 °F (37.1 °C)  Pulse:  [] 120  Resp:  [17-25] 19  SpO2:  [99 %-100 %] 100 %  BP: ()/(37-87) 158/74  Arterial Line BP: ()/(53-73) 109/67     Weight: 77 kg (169 lb 12.1 oz)  Body mass index is 25.81 kg/m².    Estimated Creatinine Clearance: 140.6 mL/min (based on SCr of 0.5 mg/dL).    Physical Exam  Vitals reviewed.   Constitutional:       General: He is not in acute distress.     Appearance: He is ill-appearing. He is not diaphoretic.   HENT:      Head: Normocephalic.      Right Ear: External ear normal.      Left Ear: External ear normal.      Nose: Nose normal.      Mouth/Throat:      Comments: Bite block in place.  Hypertrophied tongue bulging through teeth at region of right lower mandible  Eyes:      General: No scleral icterus.        Right eye: No discharge.         Left eye: No discharge.      Conjunctiva/sclera: Conjunctivae normal.   Neck:      Comments: Trach in place  Cardiovascular:      Rate and Rhythm: Tachycardia present.   Pulmonary:      Comments: Vent Mode: A/C  Oxygen Concentration (%):  [30] 30  Resp Rate Total:  [15 br/min-26 br/min] 19 br/min  Vt Set:  [400 mL] 400 mL  PEEP/CPAP:  [5 cmH20] 5 cmH20  Mean Airway Pressure:  [9.7 dhP09-90 cmH20] 10 cmH20   Abdominal:      General: Abdomen is flat.      Palpations: Abdomen is soft.   Musculoskeletal:      Right lower leg: Edema present.      Left lower leg: Edema present.      Comments: LEESAE line c/d/i   Skin:     General: Skin is warm and dry.      Comments: Wound photos reviewed   Neurological:      Mental Status: He is  alert.      Comments: Blinks, nods head       Significant Labs: All pertinent labs within the past 24 hours have been reviewed.    Significant Imaging: I have reviewed all pertinent imaging results/findings within the past 24 hours.

## 2022-09-05 NOTE — PROGRESS NOTES
Heidi NP notified that after 250ml bolus pts HR remains ST in 140's BP stable off levo x 1.5hrs MAP's in 90's.WCTCM.

## 2022-09-05 NOTE — ASSESSMENT & PLAN NOTE
Repeat CT max/face with similar appearing fluid collection adjacent to right mandible 3.4x1.7cm, fluid collection upper lip, mucosal thickening of the ethmoid air cells and sphenoid sinuses, with mastoid air cells partially filled with fluid.    Recommendations:  -Appreciate ENT recommendations; CT lesions appear to be hypertrophic tongue that patient is biting on

## 2022-09-06 ENCOUNTER — ANESTHESIA (OUTPATIENT)
Dept: NEUROLOGY | Facility: HOSPITAL | Age: 67
DRG: 056 | End: 2022-09-06
Payer: MEDICARE

## 2022-09-06 ENCOUNTER — ANESTHESIA EVENT (OUTPATIENT)
Dept: NEUROLOGY | Facility: HOSPITAL | Age: 67
DRG: 056 | End: 2022-09-06
Payer: MEDICARE

## 2022-09-06 PROBLEM — E87.1 HYPONATREMIA: Status: ACTIVE | Noted: 2022-09-06

## 2022-09-06 LAB
ALBUMIN SERPL BCP-MCNC: 1.7 G/DL (ref 3.5–5.2)
ALLENS TEST: ABNORMAL
ALP SERPL-CCNC: 85 U/L (ref 55–135)
ALT SERPL W/O P-5'-P-CCNC: 10 U/L (ref 10–44)
ANION GAP SERPL CALC-SCNC: 3 MMOL/L (ref 8–16)
APTT BLDCRRT: 44.1 SEC (ref 21–32)
AST SERPL-CCNC: 14 U/L (ref 10–40)
BACTERIA SPEC AEROBE CULT: ABNORMAL
BASOPHILS # BLD AUTO: 0.11 K/UL (ref 0–0.2)
BASOPHILS NFR BLD: 0.5 % (ref 0–1.9)
BILIRUB SERPL-MCNC: 0.3 MG/DL (ref 0.1–1)
BUN SERPL-MCNC: 32 MG/DL (ref 8–23)
CALCIUM SERPL-MCNC: 9 MG/DL (ref 8.7–10.5)
CHLORIDE SERPL-SCNC: 106 MMOL/L (ref 95–110)
CO2 SERPL-SCNC: 23 MMOL/L (ref 23–29)
CREAT SERPL-MCNC: 0.5 MG/DL (ref 0.5–1.4)
CRP SERPL-MCNC: 85 MG/L (ref 0–8.2)
DELSYS: ABNORMAL
DIFFERENTIAL METHOD: ABNORMAL
EOSINOPHIL # BLD AUTO: 0.9 K/UL (ref 0–0.5)
EOSINOPHIL NFR BLD: 4.6 % (ref 0–8)
ERYTHROCYTE [DISTWIDTH] IN BLOOD BY AUTOMATED COUNT: 21.2 % (ref 11.5–14.5)
ERYTHROCYTE [SEDIMENTATION RATE] IN BLOOD BY WESTERGREN METHOD: 14 MM/H
EST. GFR  (NO RACE VARIABLE): >60 ML/MIN/1.73 M^2
FIO2: 30
GLUCOSE SERPL-MCNC: 165 MG/DL (ref 70–110)
GRAM STN SPEC: ABNORMAL
GRAM STN SPEC: ABNORMAL
HCO3 UR-SCNC: 23.2 MMOL/L (ref 24–28)
HCT VFR BLD AUTO: 25.9 % (ref 40–54)
HGB BLD-MCNC: 7.7 G/DL (ref 14–18)
IMM GRANULOCYTES # BLD AUTO: 0.35 K/UL (ref 0–0.04)
IMM GRANULOCYTES NFR BLD AUTO: 1.7 % (ref 0–0.5)
LYMPHOCYTES # BLD AUTO: 2.4 K/UL (ref 1–4.8)
LYMPHOCYTES NFR BLD: 11.4 % (ref 18–48)
MAGNESIUM SERPL-MCNC: 1.6 MG/DL (ref 1.6–2.6)
MCH RBC QN AUTO: 25.7 PG (ref 27–31)
MCHC RBC AUTO-ENTMCNC: 29.7 G/DL (ref 32–36)
MCV RBC AUTO: 86 FL (ref 82–98)
MODE: ABNORMAL
MONOCYTES # BLD AUTO: 1.2 K/UL (ref 0.3–1)
MONOCYTES NFR BLD: 5.6 % (ref 4–15)
NEUTROPHILS # BLD AUTO: 15.7 K/UL (ref 1.8–7.7)
NEUTROPHILS NFR BLD: 76.2 % (ref 38–73)
NRBC BLD-RTO: 0 /100 WBC
PCO2 BLDA: 37.3 MMHG (ref 35–45)
PEEP: 5
PH SMN: 7.4 [PH] (ref 7.35–7.45)
PHOSPHATE SERPL-MCNC: 2 MG/DL (ref 2.7–4.5)
PLATELET # BLD AUTO: 458 K/UL (ref 150–450)
PMV BLD AUTO: 10.3 FL (ref 9.2–12.9)
PO2 BLDA: 107 MMHG (ref 80–100)
POC BE: -2 MMOL/L
POC SATURATED O2: 98 % (ref 95–100)
POC TCO2: 24 MMOL/L (ref 23–27)
POCT GLUCOSE: 130 MG/DL (ref 70–110)
POCT GLUCOSE: 152 MG/DL (ref 70–110)
POTASSIUM SERPL-SCNC: 4.3 MMOL/L (ref 3.5–5.1)
PROT SERPL-MCNC: 7.9 G/DL (ref 6–8.4)
RBC # BLD AUTO: 3 M/UL (ref 4.6–6.2)
SAMPLE: ABNORMAL
SITE: ABNORMAL
SODIUM SERPL-SCNC: 132 MMOL/L (ref 136–145)
VT: 400
WBC # BLD AUTO: 20.65 K/UL (ref 3.9–12.7)

## 2022-09-06 PROCEDURE — 99291 PR CRITICAL CARE, E/M 30-74 MINUTES: ICD-10-PCS | Mod: GC,,, | Performed by: PSYCHIATRY & NEUROLOGY

## 2022-09-06 PROCEDURE — P9045 ALBUMIN (HUMAN), 5%, 250 ML: HCPCS | Mod: JG

## 2022-09-06 PROCEDURE — 94640 AIRWAY INHALATION TREATMENT: CPT

## 2022-09-06 PROCEDURE — 99900026 HC AIRWAY MAINTENANCE (STAT)

## 2022-09-06 PROCEDURE — 99231 PR SUBSEQUENT HOSPITAL CARE,LEVL I: ICD-10-PCS | Mod: ,,, | Performed by: ANESTHESIOLOGY

## 2022-09-06 PROCEDURE — 63600175 PHARM REV CODE 636 W HCPCS

## 2022-09-06 PROCEDURE — 63600175 PHARM REV CODE 636 W HCPCS: Mod: JG

## 2022-09-06 PROCEDURE — 85025 COMPLETE CBC W/AUTO DIFF WBC: CPT

## 2022-09-06 PROCEDURE — 84100 ASSAY OF PHOSPHORUS: CPT | Performed by: INTERNAL MEDICINE

## 2022-09-06 PROCEDURE — 25000003 PHARM REV CODE 250

## 2022-09-06 PROCEDURE — 25000003 PHARM REV CODE 250: Performed by: NURSE PRACTITIONER

## 2022-09-06 PROCEDURE — 99291 CRITICAL CARE FIRST HOUR: CPT | Mod: GC,,, | Performed by: PSYCHIATRY & NEUROLOGY

## 2022-09-06 PROCEDURE — 25000242 PHARM REV CODE 250 ALT 637 W/ HCPCS: Performed by: PSYCHIATRY & NEUROLOGY

## 2022-09-06 PROCEDURE — 82803 BLOOD GASES ANY COMBINATION: CPT

## 2022-09-06 PROCEDURE — 63600175 PHARM REV CODE 636 W HCPCS: Performed by: PSYCHIATRY & NEUROLOGY

## 2022-09-06 PROCEDURE — 99231 SBSQ HOSP IP/OBS SF/LOW 25: CPT | Mod: ,,, | Performed by: ANESTHESIOLOGY

## 2022-09-06 PROCEDURE — 63600175 PHARM REV CODE 636 W HCPCS: Performed by: NURSE PRACTITIONER

## 2022-09-06 PROCEDURE — 80053 COMPREHEN METABOLIC PANEL: CPT

## 2022-09-06 PROCEDURE — 25000003 PHARM REV CODE 250: Performed by: PSYCHIATRY & NEUROLOGY

## 2022-09-06 PROCEDURE — 94003 VENT MGMT INPAT SUBQ DAY: CPT

## 2022-09-06 PROCEDURE — 27000221 HC OXYGEN, UP TO 24 HOURS

## 2022-09-06 PROCEDURE — 94761 N-INVAS EAR/PLS OXIMETRY MLT: CPT

## 2022-09-06 PROCEDURE — 85730 THROMBOPLASTIN TIME PARTIAL: CPT

## 2022-09-06 PROCEDURE — 27000207 HC ISOLATION

## 2022-09-06 PROCEDURE — 20000000 HC ICU ROOM

## 2022-09-06 PROCEDURE — 63700000 PHARM REV CODE 250 ALT 637 W/O HCPCS

## 2022-09-06 PROCEDURE — 86140 C-REACTIVE PROTEIN: CPT

## 2022-09-06 PROCEDURE — 83735 ASSAY OF MAGNESIUM: CPT | Performed by: INTERNAL MEDICINE

## 2022-09-06 PROCEDURE — 63600175 PHARM REV CODE 636 W HCPCS: Performed by: PHYSICIAN ASSISTANT

## 2022-09-06 PROCEDURE — 99900035 HC TECH TIME PER 15 MIN (STAT)

## 2022-09-06 PROCEDURE — 25000003 PHARM REV CODE 250: Performed by: PHYSICIAN ASSISTANT

## 2022-09-06 PROCEDURE — 37799 UNLISTED PX VASCULAR SURGERY: CPT

## 2022-09-06 RX ORDER — ALBUMIN HUMAN 50 G/1000ML
12.5 SOLUTION INTRAVENOUS ONCE
Status: COMPLETED | OUTPATIENT
Start: 2022-09-06 | End: 2022-09-06

## 2022-09-06 RX ADMIN — BUSPIRONE HYDROCHLORIDE 5 MG: 5 TABLET ORAL at 08:09

## 2022-09-06 RX ADMIN — METOPROLOL TARTRATE 12.5 MG: 25 TABLET, FILM COATED ORAL at 05:09

## 2022-09-06 RX ADMIN — METHOCARBAMOL 500 MG: 500 TABLET ORAL at 08:09

## 2022-09-06 RX ADMIN — SENNOSIDES AND DOCUSATE SODIUM 1 TABLET: 50; 8.6 TABLET ORAL at 08:09

## 2022-09-06 RX ADMIN — FENTANYL CITRATE 50 MCG: 0.05 INJECTION, SOLUTION INTRAMUSCULAR; INTRAVENOUS at 12:09

## 2022-09-06 RX ADMIN — MIDODRINE HYDROCHLORIDE 5 MG: 5 TABLET ORAL at 02:09

## 2022-09-06 RX ADMIN — RILUZOLE 50 MG: 50 TABLET ORAL at 12:09

## 2022-09-06 RX ADMIN — FAMOTIDINE 20 MG: 20 TABLET ORAL at 08:09

## 2022-09-06 RX ADMIN — ACETYLCYSTEINE 4 ML: 100 INHALANT RESPIRATORY (INHALATION) at 08:09

## 2022-09-06 RX ADMIN — METHOCARBAMOL 500 MG: 500 TABLET ORAL at 05:09

## 2022-09-06 RX ADMIN — NOREPINEPHRINE BITARTRATE 0.02 MCG/KG/MIN: 4 INJECTION, SOLUTION INTRAVENOUS at 09:09

## 2022-09-06 RX ADMIN — IPRATROPIUM BROMIDE AND ALBUTEROL SULFATE 3 ML: 2.5; .5 SOLUTION RESPIRATORY (INHALATION) at 07:09

## 2022-09-06 RX ADMIN — METOPROLOL TARTRATE 12.5 MG: 25 TABLET, FILM COATED ORAL at 12:09

## 2022-09-06 RX ADMIN — HEPARIN SODIUM 16 UNITS/KG/HR: 5000 INJECTION INTRAVENOUS; SUBCUTANEOUS at 11:09

## 2022-09-06 RX ADMIN — MIDODRINE HYDROCHLORIDE 5 MG: 5 TABLET ORAL at 05:09

## 2022-09-06 RX ADMIN — BACLOFEN 10 MG: 10 TABLET ORAL at 08:09

## 2022-09-06 RX ADMIN — MIDODRINE HYDROCHLORIDE 5 MG: 5 TABLET ORAL at 09:09

## 2022-09-06 RX ADMIN — BACLOFEN 10 MG: 10 TABLET ORAL at 03:09

## 2022-09-06 RX ADMIN — FENTANYL CITRATE 50 MCG: 0.05 INJECTION, SOLUTION INTRAMUSCULAR; INTRAVENOUS at 05:09

## 2022-09-06 RX ADMIN — MUPIROCIN: 20 OINTMENT TOPICAL at 09:09

## 2022-09-06 RX ADMIN — FENTANYL CITRATE 50 MCG: 0.05 INJECTION, SOLUTION INTRAMUSCULAR; INTRAVENOUS at 07:09

## 2022-09-06 RX ADMIN — NYSTATIN 500000 UNITS: 500000 SUSPENSION ORAL at 08:09

## 2022-09-06 RX ADMIN — POTASSIUM & SODIUM PHOSPHATES POWDER PACK 280-160-250 MG 2 PACKET: 280-160-250 PACK at 05:09

## 2022-09-06 RX ADMIN — MUPIROCIN: 20 OINTMENT TOPICAL at 03:09

## 2022-09-06 RX ADMIN — ALBUMIN (HUMAN) 12.5 G: 12.5 SOLUTION INTRAVENOUS at 02:09

## 2022-09-06 RX ADMIN — OXYCODONE 5 MG: 5 TABLET ORAL at 08:09

## 2022-09-06 RX ADMIN — METHOCARBAMOL 500 MG: 500 TABLET ORAL at 12:09

## 2022-09-06 RX ADMIN — MAGNESIUM SULFATE HEPTAHYDRATE 2 G: 40 INJECTION, SOLUTION INTRAVENOUS at 05:09

## 2022-09-06 RX ADMIN — POLYETHYLENE GLYCOL 3350 17 G: 17 POWDER, FOR SOLUTION ORAL at 08:09

## 2022-09-06 RX ADMIN — NYSTATIN 500000 UNITS: 500000 SUSPENSION ORAL at 05:09

## 2022-09-06 RX ADMIN — POTASSIUM & SODIUM PHOSPHATES POWDER PACK 280-160-250 MG 2 PACKET: 280-160-250 PACK at 08:09

## 2022-09-06 RX ADMIN — MUPIROCIN: 20 OINTMENT TOPICAL at 08:09

## 2022-09-06 RX ADMIN — SCOPALAMINE 1 PATCH: 1 PATCH, EXTENDED RELEASE TRANSDERMAL at 09:09

## 2022-09-06 RX ADMIN — CEFEPIME HYDROCHLORIDE 2 G: 2 INJECTION, SOLUTION INTRAVENOUS at 02:09

## 2022-09-06 RX ADMIN — METOPROLOL TARTRATE 12.5 MG: 25 TABLET, FILM COATED ORAL at 01:09

## 2022-09-06 RX ADMIN — TRAZODONE HYDROCHLORIDE 50 MG: 50 TABLET ORAL at 08:09

## 2022-09-06 RX ADMIN — RILUZOLE 50 MG: 50 TABLET ORAL at 08:09

## 2022-09-06 RX ADMIN — IPRATROPIUM BROMIDE AND ALBUTEROL SULFATE 3 ML: 2.5; .5 SOLUTION RESPIRATORY (INHALATION) at 01:09

## 2022-09-06 RX ADMIN — NYSTATIN 500000 UNITS: 500000 SUSPENSION ORAL at 01:09

## 2022-09-06 NOTE — PROGRESS NOTES
[facility]  Neurocritical Care  Progress Note    Admit Date: 8/31/2022  Service Date: 09/06/2022  Length of Stay: 6    Subjective:     Chief Complaint: Trismus    History of Present Illness: Wei Dominique is a 55 yo AAM with ALS. At OSH with prolonged course complicated by bacteremia, PNA, upper extremity DVTs, facial swelling and trismus. Received Abx, PICC was changed there prior to transfer, and received BOTOX injections to help with his Trismus.      Hospital Course: 09/05/2022: No acute events overnight. ENT recommended anesthesia consult for possible V3 nerve block. Started midodrine 5mg q8h. ID does not recommend HAYDEN at this time.   09/06/2022: No acute events overnight. Consulted acute pain team for better management of refractory pain. DC cefepime per ID recs and will continue vancomycin with end date of 9/13. Patient continues to be hyponatremic likely secondary to dehydration so ordered one time dose of albumin 5%.           Review of Systems   Unable to perform ROS: Patient nonverbal     Objective:     Vitals:  Temp: 98.4 °F (36.9 °C)  Pulse: 107  Rhythm: (P) normal sinus rhythm  BP: 133/68  MAP (mmHg): 98  Resp: 17  SpO2: 100 %  Oxygen Concentration (%): 30  O2 Device (Oxygen Therapy): ventilator  Vent Mode: A/C  Set Rate: 14 BPM  Vt Set: 400 mL  PEEP/CPAP: 5 cmH20  Peak Airway Pressure: 20 cmH2O  Mean Airway Pressure: 14 cmH20  Plateau Pressure: 28 cmH20    Temp  Min: 98.2 °F (36.8 °C)  Max: 99.3 °F (37.4 °C)  Pulse  Min: 80  Max: 145  BP  Min: 133/68  Max: 175/75  MAP (mmHg)  Min: 96  Max: 117  Resp  Min: 15  Max: 34  SpO2  Min: 97 %  Max: 100 %  Oxygen Concentration (%)  Min: 30  Max: 30    09/05 0701 - 09/06 0700  In: 4804.3 [I.V.:1559.6]  Out: 2650 [Urine:2650]   Unmeasured Output  Urine Occurrence: 1  Stool Occurrence: 0  Pad Count: 2       Physical Exam  Vitals and nursing note reviewed.   Constitutional:       General: He is not in acute distress.     Appearance: He is  ill-appearing. He is not diaphoretic.   HENT:      Head: Normocephalic.      Right Ear: External ear normal.      Left Ear: External ear normal.      Nose: Nose normal.      Mouth/Throat:      Comments: Bite block in place. Hypertrophied tongue bulging through teeth at region of right lower mandible  Eyes:      General: No scleral icterus.        Right eye: No discharge.         Left eye: No discharge.      Conjunctiva/sclera: Conjunctivae normal.      Pupils: Pupils are equal, round, and reactive to light.   Neck:      Comments: Trach in place  Cardiovascular:      Rate and Rhythm: Tachycardia present.   Abdominal:      General: Abdomen is flat.      Palpations: Abdomen is soft.   Musculoskeletal:         General: No tenderness.   Skin:     General: Skin is warm.   Neurological:      Mental Status: He is alert.      Comments: Blinks, nods head       Medications:  Continuousheparin (porcine) in D5W, Last Rate: 16 Units/kg/hr (09/06/22 1128)  NORepinephrine bitartrate-D5W, Last Rate: 0.02 mcg/kg/min (09/06/22 0505)    Scheduledalbumin human 5%, 12.5 g, Once  albuterol-ipratropium, 3 mL, TID WAKE  baclofen, 10 mg, TID  busPIRone, 5 mg, BID  famotidine, 20 mg, BID  methocarbamoL, 500 mg, QID  metoprolol tartrate, 12.5 mg, Q6H  midodrine, 5 mg, Q8H  mupirocin, , TID  nystatin, 500,000 Units, QID  polyethylene glycol, 17 g, Daily  riluzole, 50 mg, Q12H  scopolamine, 1 patch, Q3 Days  senna-docusate 8.6-50 mg, 1 tablet, BID  traZODone, 50 mg, QHS    PRNacetaminophen, 650 mg, Q6H PRN  bisacodyL, 10 mg, Daily PRN  dextrose 10%, 12.5 g, PRN  dextrose 10%, 25 g, PRN  fentaNYL, 50 mcg, Q1H PRN  hydrALAZINE, 10 mg, Q4H PRN  labetalol, 10 mg, Q4H PRN  magnesium sulfate IVPB, 2 g, PRN  magnesium sulfate IVPB, 4 g, PRN  midazolam, 1 mg, Q2H PRN  ondansetron, 4 mg, Q6H PRN  oxyCODONE, 5 mg, Q6H PRN  potassium bicarbonate, 35 mEq, PRN  potassium bicarbonate, 50 mEq, PRN  potassium bicarbonate, 60 mEq, PRN  potassium, sodium  phosphates, 2 packet, PRN  potassium, sodium phosphates, 2 packet, PRN  sodium chloride 0.9%, 10 mL, PRN  vancomycin - pharmacy to dose, , pharmacy to manage frequency      Today I personally reviewed pertinent medications, lines/drains/airways, imaging, laboratory results, notably:    Diet  Diet NPO  Diet NPO          Assessment/Plan:     Neuro  ALS (amyotrophic lateral sclerosis)  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 He is s/p trach and peg. Multiple discussions with patient and patient's wife about GOC. He would like continued treatment.     - Continue riluzole  - Consulted acute pain for better management of his refractory pain.  - Continue scheduled methocarbamol   - PRN acetaminophen, fentanyl, oxycodone    Renal/  Hyponatremia  -Likely secondary to dehydration so gave one time dose of albumin 5%  -Continue to monitor with daily CMP  -Continue neuro checks. No current acute decline in mentation.    Hematology  Occlusive thrombus  U/S showed RUE DVT.     -Continue heparin gtt     Other  * Trismus  Briefly 55 yo AAM with ALS. At OSH with prolonged course complicated by bacteremia, PNA, upper extremity DVTs, facial swelling and trismus. Received Abx, PICC was changed there prior to transfer, and received BOTOX injections to help with his Trismus. Wheelchair bound 2020     -ENT consult for facial mass on CT & trismus. Low suspicion for abscess. Recommended that anesthesia be consulted for V3 nerve block.  -ID following: Continue vancomycin with end date of 9/13. Dc cefepime.  -Echo  -tube feeds  -continue midodrine 5mg q8h        The patient is being Prophylaxed for:  Venous Thromboembolism with: Chemical  Stress Ulcer with: H2B  Ventilator Pneumonia with: chlorhexidine oral care    Activity Orders          Turn patient starting at 08/31 1200    Elevate HOB starting at 08/31 1131    Diet NPO: NPO starting at 08/31 1131        Full Code    Alicia Tan,   Neurocritical Care  [facility]

## 2022-09-06 NOTE — HOSPITAL COURSE
09/05/2022: No acute events overnight. ENT recommended anesthesia consult for possible V3 nerve block. Started midodrine 5mg q8h. ID does not recommend HAYDEN at this time.   09/06/2022: No acute events overnight. Consulted acute pain team for better management of refractory pain. DC cefepime per ID recs and will continue vancomycin with end date of 9/13. Patient continues to be hyponatremic likely secondary to dehydration so ordered one time dose of albumin 5%.   09/07/2022: Overnight blood pressure was low so he was started on levo. Noticed new wound on anterior aspect of left lower extremity. Wound care team following. Adjusted pain regimen per anesthesia recs. Patient continues to improve neurologically. He is making purposeful movements and follows commands intermittently. Continue spontaneous breathing trials.  09/08/2022: New a-line placed overnight. Increased midodrine to 15mg q8h and dc levo. Spoke to pain anesthesia and was recommended that patient receive repeat botox injection for pain management at a higher dosage than previously injected. Continue with spontaneous breathing trials.   09/09/2022: Patient's blood pressure dropped overnight with MAP down to 55. He was restarted on levo. Plan to increase midodrine to 20mg at night and maintain 15mg dosage during the day. Also started pseudoephedrine. Patient received botox injections into his temporalis muscle. He tolerated the procedure well with no complications.   9/10/2022: BP more consistently within appropriate range, patient continues to have zero symptoms even when mild hypotension is noted on monitor. A-line positional and removed.  No signs/symptoms of adverse effects from Botox injections.  Remains afebrile, complete antibiotic course.  Heparin converted to Eliquis. Begin discharge planning.  09/11/2022: Blood pressure controlled on oral medications. Atropine ordered for increased secretions. Trach appeared secured with no signs of leak this  morning. Continue eliquis and monitor for adverse effects from Botox injections.   09/12/2022: LARRY. No adverse effects noted from Botox injections. Patient is still complaining of jaw pain. Discussed with patient that it will take several weeks before therapeutic effects would be noticed from the injections. Added valium to his multimodal pain regimen. Continue discharge planning.  09/13/2022: LARRY. Today is his last day on vancomycin. Consulted ID about high CRP and they noted that they are comfortable with patient being discharged. Continues to be stable for LTAC.

## 2022-09-06 NOTE — PLAN OF CARE
[facility] Care Plan    POC reviewed with Wei Dominique at 0300. No acute events overnight. Pt progressing toward goals. Will continue to monitor. See below and flowsheets for full assessment and VS info.     Pt slept throughout night  Levo gtt turned on and off to maintain MAPs>65  Heparin gtt cont  Mag and phos replaced  Metoprolol QID initiated. Pt heart rate continues to improve. WCTM      Is this a stroke patient? no    Neuro:  [place] Coma Scale  Best Eye Response: 3-->(E3) to speech  Best Motor Response: 6-->(M6) obeys commands  Best Verbal Response: 5-->(V5) oriented  [place] Coma Scale Score: 14  Assessment Qualifiers: patient not sedated/intubated  Pupil PERRLA: yes     24hr Temp:  [97.9 °F (36.6 °C)-99.3 °F (37.4 °C)]     CV:   Rhythm: normal sinus rhythm  BP goals:   SBP < 160  MAP > 65    Resp:   O2 Device (Oxygen Therapy): ventilator  Vent Mode: A/C  Set Rate: 14 BPM  Oxygen Concentration (%): 30  Vt Set: 400 mL  PEEP/CPAP: 5 cmH20  Pressure Support: 5 cmH20    Plan: trach in place    GI/:     Diet/Nutrition Received: tube feeding  Last Bowel Movement: 09/05/22  Voiding Characteristics: external catheter    Intake/Output Summary (Last 24 hours) at 9/6/2022 0555  Last data filed at 9/6/2022 0505  Gross per 24 hour   Intake 4804.28 ml   Output 2650 ml   Net 2154.28 ml     Unmeasured Output  Urine Occurrence: 1  Stool Occurrence: 0  Pad Count: 2    Labs/Accuchecks:  Recent Labs   Lab 09/06/22  0358   WBC 20.65*   RBC 3.00*   HGB 7.7*   HCT 25.9*   *      Recent Labs   Lab 09/06/22  0358   *   K 4.3   CO2 23      BUN 32*   CREATININE 0.5   ALKPHOS 85   ALT 10   AST 14   BILITOT 0.3      Recent Labs   Lab 08/31/22  1656 09/01/22  0019 09/06/22  0358   INR 1.3*  --   --    APTT 28.7   < > 44.1*    < > = values in this interval not displayed.    No results for input(s): CPK, CPKMB, TROPONINI, MB in the last 168 hours.    Electrolytes: Electrolytes replaced  Accuchecks: none    Gtts:    heparin (porcine) in D5W 16 Units/kg/hr (09/06/22 0505)    NORepinephrine bitartrate-D5W 0.02 mcg/kg/min (09/06/22 0505)       LDA/Wounds:  Lines/Drains/Airways       Drain  Duration                  Gastrostomy/Enterostomy LUQ feeding -- days    Male External Urinary Catheter 09/04/22 1815 Large 1 day              Airway  Duration                  Surgical Airway Shiley Cuffed -- days         Surgical Airway 05/23/22 1145 Portex Cuffed 105 days              Arterial Line  Duration             Arterial Line 09/02/22 1800 Left Radial 3 days              Peripheral Intravenous Line  Duration                  Midline Catheter Insertion/Assessment  - Single Lumen 09/02/22 1200 Left cephalic vein (lateral side of arm) 20g x 10cm 3 days         Peripheral IV - Single Lumen 09/02/22 1200 18 G;1 3/4 in Left Antecubital 3 days                  Wounds: Yes  Wound care consulted: Yes

## 2022-09-06 NOTE — SUBJECTIVE & OBJECTIVE
Review of Systems   Unable to perform ROS: Patient nonverbal     Objective:     Vitals:  Temp: 98.8 °F (37.1 °C)  Pulse: (!) 145  BP: (!) 169/81  MAP (mmHg): 117  Resp: (!) 22  SpO2: 100 %  Oxygen Concentration (%): 30  O2 Device (Oxygen Therapy): ventilator  Vent Mode: A/C  Set Rate: 14 BPM  Vt Set: 400 mL  PEEP/CPAP: 5 cmH20  Peak Airway Pressure: 16 cmH2O  Mean Airway Pressure: 11 cmH20  Plateau Pressure: 28 cmH20    Temp  Min: 96.4 °F (35.8 °C)  Max: 99.2 °F (37.3 °C)  Pulse  Min: 80  Max: 145  BP  Min: 115/71  Max: 169/81  MAP (mmHg)  Min: 87  Max: 118  Resp  Min: 17  Max: 26  SpO2  Min: 99 %  Max: 100 %  Oxygen Concentration (%)  Min: 30  Max: 30    09/04 0701 - 09/05 0700  In: 2309.6 [I.V.:278.9]  Out: 1700 [Urine:1100]   Unmeasured Output  Urine Occurrence: 1  Stool Occurrence: 1  Pad Count: 2       Physical Exam  Vitals and nursing note reviewed.   Constitutional:       General: He is not in acute distress.     Appearance: He is ill-appearing. He is not diaphoretic.   HENT:      Head: Normocephalic.      Right Ear: External ear normal.      Left Ear: External ear normal.      Nose: Nose normal.      Mouth/Throat:      Comments: Bite block in place. Hypertrophied tongue bulging through teeth at region of right lower mandible  Eyes:      General: No scleral icterus.        Right eye: No discharge.         Left eye: No discharge.      Conjunctiva/sclera: Conjunctivae normal.      Pupils: Pupils are equal, round, and reactive to light.   Neck:      Comments: Trach in place  Cardiovascular:      Rate and Rhythm: Tachycardia present.   Abdominal:      General: Abdomen is flat.      Palpations: Abdomen is soft.   Musculoskeletal:      Right lower leg: Edema present.      Left lower leg: Edema present.   Skin:     General: Skin is warm.   Neurological:      Mental Status: He is alert.      Comments: Blinks, nods head       Medications:  Continuousheparin (porcine) in D5W, Last Rate: 16.017 Units/kg/hr (09/05/22  1800)  NORepinephrine bitartrate-D5W, Last Rate: Stopped (09/05/22 1700)    Scheduledacetylcysteine 100 mg/ml (10%), 4 mL, TID WAKE  albuterol-ipratropium, 3 mL, TID WAKE  baclofen, 10 mg, TID  busPIRone, 5 mg, BID  ceFEPime (MAXIPIME) IVPB, 2 g, Q8H  famotidine, 20 mg, BID  methocarbamoL, 500 mg, QID  midodrine, 5 mg, Q8H  mupirocin, , TID  nystatin, 500,000 Units, QID  polyethylene glycol, 17 g, Daily  riluzole, 50 mg, Q12H  scopolamine, 1 patch, Q3 Days  senna-docusate 8.6-50 mg, 1 tablet, BID  traZODone, 50 mg, QHS    PRNacetaminophen, 650 mg, Q6H PRN  bisacodyL, 10 mg, Daily PRN  dextrose 10%, 12.5 g, PRN  dextrose 10%, 25 g, PRN  fentaNYL, 50 mcg, Q1H PRN  hydrALAZINE, 10 mg, Q4H PRN  labetalol, 10 mg, Q4H PRN  magnesium oxide, 800 mg, PRN  magnesium oxide, 800 mg, PRN  midazolam, 1 mg, Q2H PRN  ondansetron, 4 mg, Q6H PRN  oxyCODONE, 5 mg, Q6H PRN  potassium bicarbonate, 35 mEq, PRN  potassium bicarbonate, 50 mEq, PRN  potassium bicarbonate, 60 mEq, PRN  potassium, sodium phosphates, 2 packet, PRN  potassium, sodium phosphates, 2 packet, PRN  potassium, sodium phosphates, 2 packet, PRN  sodium chloride 0.9%, 10 mL, PRN  vancomycin - pharmacy to dose, , pharmacy to manage frequency      Today I personally reviewed pertinent medications, lines/drains/airways, imaging, laboratory results, notably:    Diet  Diet NPO  Diet NPO

## 2022-09-06 NOTE — PROGRESS NOTES
[facility]  Neurocritical Care  Progress Note    Admit Date: 8/31/2022  Service Date: 09/05/2022  Length of Stay: 5    Subjective:     Chief Complaint: Trismus    History of Present Illness: Wei Dominique is a 57 yo AAM with ALS. At OSH with prolonged course complicated by bacteremia, PNA, upper extremity DVTs, facial swelling and trismus. Received Abx, PICC was changed there prior to transfer, and received BOTOX injections to help with his Trismus.      Hospital Course: 09/05/2022: No acute events overnight. ENT recommended anesthesia consult for possible V3 nerve block. Started midodrine 5mg q8h. ID does not recommend HAYDEN at this time.           Review of Systems   Unable to perform ROS: Patient nonverbal     Objective:     Vitals:  Temp: 98.8 °F (37.1 °C)  Pulse: (!) 145  BP: (!) 169/81  MAP (mmHg): 117  Resp: (!) 22  SpO2: 100 %  Oxygen Concentration (%): 30  O2 Device (Oxygen Therapy): ventilator  Vent Mode: A/C  Set Rate: 14 BPM  Vt Set: 400 mL  PEEP/CPAP: 5 cmH20  Peak Airway Pressure: 16 cmH2O  Mean Airway Pressure: 11 cmH20  Plateau Pressure: 28 cmH20    Temp  Min: 96.4 °F (35.8 °C)  Max: 99.2 °F (37.3 °C)  Pulse  Min: 80  Max: 145  BP  Min: 115/71  Max: 169/81  MAP (mmHg)  Min: 87  Max: 118  Resp  Min: 17  Max: 26  SpO2  Min: 99 %  Max: 100 %  Oxygen Concentration (%)  Min: 30  Max: 30    09/04 0701 - 09/05 0700  In: 2309.6 [I.V.:278.9]  Out: 1700 [Urine:1100]   Unmeasured Output  Urine Occurrence: 1  Stool Occurrence: 1  Pad Count: 2       Physical Exam  Vitals and nursing note reviewed.   Constitutional:       General: He is not in acute distress.     Appearance: He is ill-appearing. He is not diaphoretic.   HENT:      Head: Normocephalic.      Right Ear: External ear normal.      Left Ear: External ear normal.      Nose: Nose normal.      Mouth/Throat:      Comments: Bite block in place. Hypertrophied tongue bulging through teeth at region of right lower mandible  Eyes:       General: No scleral icterus.        Right eye: No discharge.         Left eye: No discharge.      Conjunctiva/sclera: Conjunctivae normal.      Pupils: Pupils are equal, round, and reactive to light.   Neck:      Comments: Trach in place  Cardiovascular:      Rate and Rhythm: Tachycardia present.   Abdominal:      General: Abdomen is flat.      Palpations: Abdomen is soft.   Musculoskeletal:      Right lower leg: Edema present.      Left lower leg: Edema present.   Skin:     General: Skin is warm.   Neurological:      Mental Status: He is alert.      Comments: Blinks, nods head       Medications:  Continuousheparin (porcine) in D5W, Last Rate: 16.017 Units/kg/hr (09/05/22 1800)  NORepinephrine bitartrate-D5W, Last Rate: Stopped (09/05/22 1700)    Scheduledacetylcysteine 100 mg/ml (10%), 4 mL, TID WAKE  albuterol-ipratropium, 3 mL, TID WAKE  baclofen, 10 mg, TID  busPIRone, 5 mg, BID  ceFEPime (MAXIPIME) IVPB, 2 g, Q8H  famotidine, 20 mg, BID  methocarbamoL, 500 mg, QID  midodrine, 5 mg, Q8H  mupirocin, , TID  nystatin, 500,000 Units, QID  polyethylene glycol, 17 g, Daily  riluzole, 50 mg, Q12H  scopolamine, 1 patch, Q3 Days  senna-docusate 8.6-50 mg, 1 tablet, BID  traZODone, 50 mg, QHS    PRNacetaminophen, 650 mg, Q6H PRN  bisacodyL, 10 mg, Daily PRN  dextrose 10%, 12.5 g, PRN  dextrose 10%, 25 g, PRN  fentaNYL, 50 mcg, Q1H PRN  hydrALAZINE, 10 mg, Q4H PRN  labetalol, 10 mg, Q4H PRN  magnesium oxide, 800 mg, PRN  magnesium oxide, 800 mg, PRN  midazolam, 1 mg, Q2H PRN  ondansetron, 4 mg, Q6H PRN  oxyCODONE, 5 mg, Q6H PRN  potassium bicarbonate, 35 mEq, PRN  potassium bicarbonate, 50 mEq, PRN  potassium bicarbonate, 60 mEq, PRN  potassium, sodium phosphates, 2 packet, PRN  potassium, sodium phosphates, 2 packet, PRN  potassium, sodium phosphates, 2 packet, PRN  sodium chloride 0.9%, 10 mL, PRN  vancomycin - pharmacy to dose, , pharmacy to manage frequency      Today I personally reviewed pertinent medications,  lines/drains/airways, imaging, laboratory results, notably:    Diet  Diet NPO  Diet NPO          Assessment/Plan:     Neuro  ALS (amyotrophic lateral sclerosis)  See trismus     Hematology  Occlusive thrombus  See Trismus     Other  * Trismus  Briefly 55 yo AAM with ALS. At OSH with prolonged course complicated by bacteremia, PNA, upper extremity DVTs, facial swelling and trismus. Received Abx, PICC was changed there prior to transfer, and received BOTOX injections to help with his Trismus. Wheelchair bound 2020     -ENT consult for facial mass on CT & trismus. Low suspicion for abscess. Recommended that anesthesia be consulted for V3 nerve block.  -ID following: infectious workup & Abx  -Heparin gtt for DVTs and prior PE  -Echo  -tube feeds  -started midodrine 5mg q8h          The patient is being Prophylaxed for:  Venous Thromboembolism with: Chemical  Stress Ulcer with: Not Applicable   Ventilator Pneumonia with: chlorhexidine oral care    Activity Orders            Turn patient starting at 08/31 1200    Elevate HOB starting at 08/31 1131    Diet NPO: NPO starting at 08/31 1131          Full Code    Alicia Tan DO  Neurocritical Care  [facility]

## 2022-09-06 NOTE — PLAN OF CARE
[facility] Care Plan    POC reviewed with Wie Dominique and family at 1400. Spouse verbalized understanding. Questions and concerns addressed. No acute events today. Pt progressing toward goals. Will continue to monitor. See below and flowsheets for full assessment and VS info.             Is this a stroke patient? no    Neuro:  S[place] Coma Scale  Best Eye Response: 3-->(E3) to speech  Best Motor Response: 6-->(M6) obeys commands  Best Verbal Response: 5-->(V5) oriented  [place] Coma Scale Score: 14  Assessment Qualifiers: patient not sedated/intubated  Pupil PERRLA: yes     24 hr Temp:  [98.2 °F (36.8 °C)-99.3 °F (37.4 °C)]     CV:   Rhythm: normal sinus rhythm  BP goals:   SBP < 160  MAP > 65    Resp:   O2 Device (Oxygen Therapy): ventilator  Vent Mode: A/C  Set Rate: 14 BPM  Oxygen Concentration (%): 30  Vt Set: 400 mL  PEEP/CPAP: 5 cmH20  Pressure Support: 5 cmH20    Plan: N/A    GI/:     Diet/Nutrition Received: tube feeding  Last Bowel Movement: 09/06/22  Voiding Characteristics: external catheter    Intake/Output Summary (Last 24 hours) at 9/6/2022 1823  Last data filed at 9/6/2022 1700  Gross per 24 hour   Intake 3390.7 ml   Output 1450 ml   Net 1940.7 ml     Unmeasured Output  Urine Occurrence: 1  Stool Occurrence: 1  Pad Count: 2    Labs/Accuchecks:  Recent Labs   Lab 09/06/22  0358   WBC 20.65*   RBC 3.00*   HGB 7.7*   HCT 25.9*   *      Recent Labs   Lab 09/06/22  0358   *   K 4.3   CO2 23      BUN 32*   CREATININE 0.5   ALKPHOS 85   ALT 10   AST 14   BILITOT 0.3      Recent Labs   Lab 08/31/22  1656 09/01/22  0019 09/06/22  0358   INR 1.3*  --   --    APTT 28.7   < > 44.1*    < > = values in this interval not displayed.    No results for input(s): CPK, CPKMB, TROPONINI, MB in the last 168 hours.    Electrolytes: Electrolytes replaced  Accuchecks: Q6H    Gtts:   heparin (porcine) in D5W 16 Units/kg/hr (09/06/22 1166)    NORepinephrine bitartrate-D5W 0.02 mcg/kg/min (09/06/22 3327)        LDA/Wounds:  Lines/Drains/Airways       Drain  Duration                  Gastrostomy/Enterostomy LUQ feeding -- days    Male External Urinary Catheter 09/04/22 1815 Large 2 days              Airway  Duration                  Surgical Airway Shiley Cuffed -- days         Surgical Airway 05/23/22 1145 Portex Cuffed 106 days              Arterial Line  Duration             Arterial Line 09/02/22 1800 Left Radial 4 days              Peripheral Intravenous Line  Duration                  Midline Catheter Insertion/Assessment  - Single Lumen 09/02/22 1200 Left cephalic vein (lateral side of arm) 20g x 10cm 4 days         Peripheral IV - Single Lumen 09/02/22 1200 18 G;1 3/4 in Left Antecubital 4 days                  Wounds: Yes  Wound care consulted: Yes

## 2022-09-06 NOTE — ASSESSMENT & PLAN NOTE
Briefly 55 yo AAM with ALS. At OSH with prolonged course complicated by bacteremia, PNA, upper extremity DVTs, facial swelling and trismus. Received Abx, PICC was changed there prior to transfer, and received BOTOX injections to help with his Trismus. Wheelchair bound 2020     -ENT consult for facial mass on CT & trismus. Low suspicion for abscess. Recommended that anesthesia be consulted for V3 nerve block.  -ID following: Continue vancomycin with end date of 9/13. Dc cefepime.  -Echo  -tube feeds  -continue midodrine 5mg q8h

## 2022-09-06 NOTE — PROGRESS NOTES
Pharmacokinetic Assessment Follow Up: IV Vancomycin    Vancomycin serum concentration assessment(s):    The trough level was drawn correctly and can be used to guide therapy at this time. The measurement is above the desired definitive target range of 15 to 20 mcg/mL.  - The trough level was drawn ~23 hours after previous dose and resulted at 22.9.    Vancomycin Regimen Plan:    Change regimen to Vancomycin 750 mg IV every 24 hours with next serum trough concentration measured at 2100 prior to 3rd dose on 9/7.  - Mr. Dominique's renal function appears to remain stable. SCR unchanged. UOP looks decreased, but Hutchinson was removed yesterday. There are 3 pad changes documented, so I expect UOP is similar to past couple days.   - Will hold off on pulse dosing for now given that level is not excessively supratherapeutic and that renal function appears stable.  - Please draw random level sooner than scheduled trough if renal function changes significantly.    Drug levels (last 3 results):  Recent Labs   Lab Result Units 09/05/22  1714   Vancomycin-Trough ug/mL 22.9*       Pharmacy will continue to follow and monitor vancomycin.    Please contact pharmacy at extension [phone] for questions regarding this assessment.    Thank you for the consult,   Shirley Beltran       Patient brief summary:  Wei Dominique is a 67 y.o. male initiated on antimicrobial therapy with IV Vancomycin for treatment of bacteremia    Actual Body Weight:   77 kg    Renal Function:   Estimated Creatinine Clearance: 140.6 mL/min (based on SCr of 0.5 mg/dL).     Dialysis Method (if applicable):  N/A

## 2022-09-06 NOTE — ASSESSMENT & PLAN NOTE
-Likely secondary to dehydration so gave one time dose of albumin 5%  -Continue to monitor with daily CMP  -Continue neuro checks. No current acute decline in mentation.

## 2022-09-06 NOTE — PLAN OF CARE
[facility]  Discharge Reassessment    Primary Care Provider: Primary Doctor No    Expected Discharge Date: 9/14/2022    Patient trach to vent.  Not medically stable for discharge.      Reassessment (most recent)       Discharge Reassessment - 09/06/22 1659          Discharge Reassessment    Assessment Type Discharge Planning Reassessment     Did the patient's condition or plan change since previous assessment? No     Communicated VENESSA with patient/caregiver Date not available/Unable to determine     Discharge Plan A Return to nursing home     Discharge Plan B Return to Nursing Home     DME Needed Upon Discharge  none     Discharge Barriers Identified None     Why the patient remains in the hospital Requires continued medical care                   Nidia Marc RN, CCRN-K, [facility]  Neuro-Critical Care   [phone]

## 2022-09-06 NOTE — SUBJECTIVE & OBJECTIVE
Review of Systems   Unable to perform ROS: Patient nonverbal     Objective:     Vitals:  Temp: 98.4 °F (36.9 °C)  Pulse: 107  Rhythm: (P) normal sinus rhythm  BP: 133/68  MAP (mmHg): 98  Resp: 17  SpO2: 100 %  Oxygen Concentration (%): 30  O2 Device (Oxygen Therapy): ventilator  Vent Mode: A/C  Set Rate: 14 BPM  Vt Set: 400 mL  PEEP/CPAP: 5 cmH20  Peak Airway Pressure: 20 cmH2O  Mean Airway Pressure: 14 cmH20  Plateau Pressure: 28 cmH20    Temp  Min: 98.2 °F (36.8 °C)  Max: 99.3 °F (37.4 °C)  Pulse  Min: 80  Max: 145  BP  Min: 133/68  Max: 175/75  MAP (mmHg)  Min: 96  Max: 117  Resp  Min: 15  Max: 34  SpO2  Min: 97 %  Max: 100 %  Oxygen Concentration (%)  Min: 30  Max: 30    09/05 0701 - 09/06 0700  In: 4804.3 [I.V.:1559.6]  Out: 2650 [Urine:2650]   Unmeasured Output  Urine Occurrence: 1  Stool Occurrence: 0  Pad Count: 2       Physical Exam  Vitals and nursing note reviewed.   Constitutional:       General: He is not in acute distress.     Appearance: He is ill-appearing. He is not diaphoretic.   HENT:      Head: Normocephalic.      Right Ear: External ear normal.      Left Ear: External ear normal.      Nose: Nose normal.      Mouth/Throat:      Comments: Bite block in place. Hypertrophied tongue bulging through teeth at region of right lower mandible  Eyes:      General: No scleral icterus.        Right eye: No discharge.         Left eye: No discharge.      Conjunctiva/sclera: Conjunctivae normal.      Pupils: Pupils are equal, round, and reactive to light.   Neck:      Comments: Trach in place  Cardiovascular:      Rate and Rhythm: Tachycardia present.   Abdominal:      General: Abdomen is flat.      Palpations: Abdomen is soft.   Musculoskeletal:         General: No tenderness.   Skin:     General: Skin is warm.   Neurological:      Mental Status: He is alert.      Comments: Blinks, nods head       Medications:  Continuousheparin (porcine) in D5W, Last Rate: 16 Units/kg/hr (09/06/22 1128)  NORepinephrine  bitartrate-D5W, Last Rate: 0.02 mcg/kg/min (09/06/22 0265)    Scheduledalbumin human 5%, 12.5 g, Once  albuterol-ipratropium, 3 mL, TID WAKE  baclofen, 10 mg, TID  busPIRone, 5 mg, BID  famotidine, 20 mg, BID  methocarbamoL, 500 mg, QID  metoprolol tartrate, 12.5 mg, Q6H  midodrine, 5 mg, Q8H  mupirocin, , TID  nystatin, 500,000 Units, QID  polyethylene glycol, 17 g, Daily  riluzole, 50 mg, Q12H  scopolamine, 1 patch, Q3 Days  senna-docusate 8.6-50 mg, 1 tablet, BID  traZODone, 50 mg, QHS    PRNacetaminophen, 650 mg, Q6H PRN  bisacodyL, 10 mg, Daily PRN  dextrose 10%, 12.5 g, PRN  dextrose 10%, 25 g, PRN  fentaNYL, 50 mcg, Q1H PRN  hydrALAZINE, 10 mg, Q4H PRN  labetalol, 10 mg, Q4H PRN  magnesium sulfate IVPB, 2 g, PRN  magnesium sulfate IVPB, 4 g, PRN  midazolam, 1 mg, Q2H PRN  ondansetron, 4 mg, Q6H PRN  oxyCODONE, 5 mg, Q6H PRN  potassium bicarbonate, 35 mEq, PRN  potassium bicarbonate, 50 mEq, PRN  potassium bicarbonate, 60 mEq, PRN  potassium, sodium phosphates, 2 packet, PRN  potassium, sodium phosphates, 2 packet, PRN  sodium chloride 0.9%, 10 mL, PRN  vancomycin - pharmacy to dose, , pharmacy to manage frequency      Today I personally reviewed pertinent medications, lines/drains/airways, imaging, laboratory results, notably:    Diet  Diet NPO  Diet NPO

## 2022-09-06 NOTE — ASSESSMENT & PLAN NOTE
Briefly 55 yo AAM with ALS. At OSH with prolonged course complicated by bacteremia, PNA, upper extremity DVTs, facial swelling and trismus. Received Abx, PICC was changed there prior to transfer, and received BOTOX injections to help with his Trismus. Wheelchair bound 2020     -ENT consult for facial mass on CT & trismus. Low suspicion for abscess. Recommended that anesthesia be consulted for V3 nerve block.  -ID following: infectious workup & Abx  -Heparin gtt for DVTs and prior PE  -Echo  -tube feeds  -started midodrine 5mg q8h

## 2022-09-06 NOTE — ASSESSMENT & PLAN NOTE
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 He is s/p trach and peg. Multiple discussions with patient and patient's wife about GOC. He would like continued treatment.     - Continue riluzole  - Consulted acute pain for better management of his refractory pain.  - Continue scheduled methocarbamol   - PRN acetaminophen, fentanyl, oxycodone

## 2022-09-06 NOTE — ANESTHESIA POST-OP PAIN MANAGEMENT
Acute Pain Service Progress Note    Consult  Anesthesiology Pain Service      SUBJECTIVE:     Chief Complaint/Reason for Consult: Trismus and pain regimen recommendations     Surgical Procedure: * No procedures listed *    Post Op Day: N/A    History of Present Illness:  Patient is a 67 y.o. male with a past medical history significant for ALS with prolonged course complicated by bacteremia, PNA, upper extremity DVTs, and facial swelling who presents with trismus. Received Abx, PICC was changed there prior to transfer, and received BOTOX injections to help with his Trismus. Wheelchair bound 2020. Patient intermittently on pressors in [facility].     APS consulted for V3 nerve block and pain regimen recs.       Home Pain Medications:  Baclofen 10mg TID        In-patient Scheduled Medications   acetylcysteine 100 mg/ml (10%)  4 mL Nebulization TID WAKE    albuterol-ipratropium  3 mL Nebulization TID WAKE    baclofen  10 mg Per G Tube TID    busPIRone  5 mg Per G Tube BID    famotidine  20 mg Per G Tube BID    methocarbamoL  500 mg Per G Tube QID    metoprolol tartrate  12.5 mg Per G Tube Q6H    midodrine  5 mg Per G Tube Q8H    mupirocin   Nasal TID    nystatin  500,000 Units Oral QID    polyethylene glycol  17 g Per G Tube Daily    riluzole  50 mg Per G Tube Q12H    scopolamine  1 patch Transdermal Q3 Days    senna-docusate 8.6-50 mg  1 tablet Per G Tube BID    traZODone  50 mg Per G Tube QHS       In-Patient PRN Medications  acetaminophen, bisacodyL, dextrose 10%, dextrose 10%, fentaNYL, hydrALAZINE, labetalol, magnesium sulfate IVPB, magnesium sulfate IVPB, midazolam, ondansetron, oxyCODONE, potassium bicarbonate, potassium bicarbonate, potassium bicarbonate, potassium, sodium phosphates, potassium, sodium phosphates, sodium chloride 0.9%, Pharmacy to dose Vancomycin consult **AND** vancomycin - pharmacy to dose      Review of patient's allergies indicates:  No Known Allergies     Past Medical History:    Diagnosis Date    ALS (amyotrophic lateral sclerosis)     ALS (amyotrophic lateral sclerosis)     Dependent on ventilator 3/21/2022    Erectile dysfunction     Gait disturbance     Hyperlipidemia     Hypertension     Iron deficiency anemia     Motor neuron disease     Polycystic kidney disease     Quadriparesis 2/6/2018    Tremor      Past Surgical History:   Procedure Laterality Date    GASTROSTOMY      PEG    PORTACATH PLACEMENT      TRACHEOSTOMY       History reviewed. No pertinent family history.  Social History     Substance Use Topics    Drug use: Never           OBJECTIVE:     Current Vital Signs  Temp: 36.8 °C (98.2 °F) (09/06/22 0700)  Pulse: (!) 124 (09/06/22 0900)  Resp: 17 (09/06/22 0900)  BP: (!) 146/67 (09/06/22 0900)  SpO2: 99 % (09/06/22 0900)    Vital Signs Range (Last 24H):  Temp:  [36.8 °C (98.2 °F)-37.4 °C (99.3 °F)]   Pulse:  []   Resp:  [16-34]   BP: (135-175)/(67-86)   SpO2:  [97 %-100 %]   Arterial Line BP: ()/(46-79)       Physical Exam:  Patient is unable to report.    Laboratory:  CBC:   Recent Labs     09/05/22 0237 09/06/22  0358   WBC 17.47* 20.65*   RBC 3.06* 3.00*   HGB 8.1* 7.7*   HCT 25.7* 25.9*    458*   MCV 84 86   MCH 26.5* 25.7*   MCHC 31.5* 29.7*       CMP:   Recent Labs     09/05/22  0237 09/05/22  1714 09/06/22  0358   *  --  132*   K 4.1  --  4.3   CO2 19*  --  23     --  106   BUN 26*  --  32*   CREATININE 0.5  --  0.5   *  --  165*   MG 1.7 1.6 1.6   PHOS 2.5* 2.4* 2.0*   CALCIUM 8.7  --  9.0   ALBUMIN 1.6*  --  1.7*   PROT 7.8  --  7.9   ALKPHOS 82  --  85   ALT 11  --  10   AST 15  --  14   BILITOT 0.3  --  0.3           ASSESSMENT/PLAN:     Active Hospital Problems    Diagnosis  POA    *Trismus [R25.2]  Yes    Oral lesion [K13.70]  Yes    Occlusive thrombus [I82.90]  Yes    Chronic respiratory failure with hypoxia and hypercapnia [J96.11, J96.12]  Yes    ALS (amyotrophic lateral sclerosis) [G12.21]  Yes      Chronic      Resolved Hospital Problems   No resolved problems to display.         Plan:  1) Acute pain service does not preform V3 nerve block per staff. Recommend follow up with chronic pain at [facility] who may be able to do V3 nerve block or further BOTOX injections.  2) Adjust pain regimen as follows:  Schedule:  - Tylenol 1000mg q8h per G tube  - Robaxin 1000mg QID per G tube  - Gabapentin 300mg TID per G tube    PRN:  - Oxycodone 10mg q4h PRN (per G tube) for severe pain  - Oxycodone 5mg q4H PRN (per G tube) for moderate pain    3) Discontinue baclofen  4) Wean Versed per primary team    Thank you for your consult. We will sign off. Please call [phone] with any questions.    Zain Martinez MD  [facility]  Department of Anesthesiology  Acute Pain Service/PS

## 2022-09-07 LAB
ALBUMIN SERPL BCP-MCNC: 1.9 G/DL (ref 3.5–5.2)
ALLENS TEST: ABNORMAL
ALP SERPL-CCNC: 81 U/L (ref 55–135)
ALT SERPL W/O P-5'-P-CCNC: 8 U/L (ref 10–44)
ANION GAP SERPL CALC-SCNC: 10 MMOL/L (ref 8–16)
APTT BLDCRRT: 39.4 SEC (ref 21–32)
AST SERPL-CCNC: 14 U/L (ref 10–40)
BASOPHILS # BLD AUTO: 0.1 K/UL (ref 0–0.2)
BASOPHILS NFR BLD: 0.6 % (ref 0–1.9)
BILIRUB SERPL-MCNC: 0.3 MG/DL (ref 0.1–1)
BUN SERPL-MCNC: 33 MG/DL (ref 8–23)
CALCIUM SERPL-MCNC: 9.2 MG/DL (ref 8.7–10.5)
CHLORIDE SERPL-SCNC: 102 MMOL/L (ref 95–110)
CO2 SERPL-SCNC: 22 MMOL/L (ref 23–29)
CREAT SERPL-MCNC: 0.5 MG/DL (ref 0.5–1.4)
CRP SERPL-MCNC: 85.3 MG/L (ref 0–8.2)
DELSYS: ABNORMAL
DIFFERENTIAL METHOD: ABNORMAL
EOSINOPHIL # BLD AUTO: 1.1 K/UL (ref 0–0.5)
EOSINOPHIL NFR BLD: 6.4 % (ref 0–8)
ERYTHROCYTE [DISTWIDTH] IN BLOOD BY AUTOMATED COUNT: 21.5 % (ref 11.5–14.5)
ERYTHROCYTE [SEDIMENTATION RATE] IN BLOOD BY WESTERGREN METHOD: 14 MM/H
EST. GFR  (NO RACE VARIABLE): >60 ML/MIN/1.73 M^2
FIO2: 30
GLUCOSE SERPL-MCNC: 155 MG/DL (ref 70–110)
HCO3 UR-SCNC: 25.4 MMOL/L (ref 24–28)
HCT VFR BLD AUTO: 23.9 % (ref 40–54)
HGB BLD-MCNC: 7.5 G/DL (ref 14–18)
IMM GRANULOCYTES # BLD AUTO: 0.43 K/UL (ref 0–0.04)
IMM GRANULOCYTES NFR BLD AUTO: 2.6 % (ref 0–0.5)
LYMPHOCYTES # BLD AUTO: 2 K/UL (ref 1–4.8)
LYMPHOCYTES NFR BLD: 12 % (ref 18–48)
MAGNESIUM SERPL-MCNC: 1.8 MG/DL (ref 1.6–2.6)
MCH RBC QN AUTO: 26.2 PG (ref 27–31)
MCHC RBC AUTO-ENTMCNC: 31.4 G/DL (ref 32–36)
MCV RBC AUTO: 84 FL (ref 82–98)
MODE: ABNORMAL
MONOCYTES # BLD AUTO: 1.1 K/UL (ref 0.3–1)
MONOCYTES NFR BLD: 6.7 % (ref 4–15)
NEUTROPHILS # BLD AUTO: 11.8 K/UL (ref 1.8–7.7)
NEUTROPHILS NFR BLD: 71.7 % (ref 38–73)
NRBC BLD-RTO: 0 /100 WBC
PCO2 BLDA: 40.9 MMHG (ref 35–45)
PEEP: 5
PH SMN: 7.4 [PH] (ref 7.35–7.45)
PHOSPHATE SERPL-MCNC: 2.5 MG/DL (ref 2.7–4.5)
PLATELET # BLD AUTO: 471 K/UL (ref 150–450)
PMV BLD AUTO: 10 FL (ref 9.2–12.9)
PO2 BLDA: 125 MMHG (ref 80–100)
POC BE: 1 MMOL/L
POC SATURATED O2: 99 % (ref 95–100)
POC TCO2: 27 MMOL/L (ref 23–27)
POTASSIUM SERPL-SCNC: 4.1 MMOL/L (ref 3.5–5.1)
PROT SERPL-MCNC: 7.7 G/DL (ref 6–8.4)
RBC # BLD AUTO: 2.86 M/UL (ref 4.6–6.2)
SAMPLE: ABNORMAL
SITE: ABNORMAL
SODIUM SERPL-SCNC: 134 MMOL/L (ref 136–145)
SP02: 100
VANCOMYCIN SERPL-MCNC: 19.9 UG/ML
VT: 400
WBC # BLD AUTO: 16.48 K/UL (ref 3.9–12.7)

## 2022-09-07 PROCEDURE — 25000242 PHARM REV CODE 250 ALT 637 W/ HCPCS: Performed by: PSYCHIATRY & NEUROLOGY

## 2022-09-07 PROCEDURE — 85025 COMPLETE CBC W/AUTO DIFF WBC: CPT

## 2022-09-07 PROCEDURE — 99900035 HC TECH TIME PER 15 MIN (STAT)

## 2022-09-07 PROCEDURE — 27000221 HC OXYGEN, UP TO 24 HOURS

## 2022-09-07 PROCEDURE — 20000000 HC ICU ROOM

## 2022-09-07 PROCEDURE — 25000003 PHARM REV CODE 250: Performed by: NURSE PRACTITIONER

## 2022-09-07 PROCEDURE — 63600175 PHARM REV CODE 636 W HCPCS: Performed by: PHYSICIAN ASSISTANT

## 2022-09-07 PROCEDURE — 27000207 HC ISOLATION

## 2022-09-07 PROCEDURE — 80202 ASSAY OF VANCOMYCIN: CPT | Performed by: PSYCHIATRY & NEUROLOGY

## 2022-09-07 PROCEDURE — 99900026 HC AIRWAY MAINTENANCE (STAT)

## 2022-09-07 PROCEDURE — 99291 CRITICAL CARE FIRST HOUR: CPT | Mod: GC,,, | Performed by: PSYCHIATRY & NEUROLOGY

## 2022-09-07 PROCEDURE — 63600175 PHARM REV CODE 636 W HCPCS

## 2022-09-07 PROCEDURE — 37799 UNLISTED PX VASCULAR SURGERY: CPT

## 2022-09-07 PROCEDURE — 84100 ASSAY OF PHOSPHORUS: CPT | Performed by: INTERNAL MEDICINE

## 2022-09-07 PROCEDURE — 94640 AIRWAY INHALATION TREATMENT: CPT

## 2022-09-07 PROCEDURE — 25000003 PHARM REV CODE 250: Performed by: PSYCHIATRY & NEUROLOGY

## 2022-09-07 PROCEDURE — 80053 COMPREHEN METABOLIC PANEL: CPT

## 2022-09-07 PROCEDURE — 82803 BLOOD GASES ANY COMBINATION: CPT

## 2022-09-07 PROCEDURE — 27200966 HC CLOSED SUCTION SYSTEM

## 2022-09-07 PROCEDURE — 99291 PR CRITICAL CARE, E/M 30-74 MINUTES: ICD-10-PCS | Mod: GC,,, | Performed by: PSYCHIATRY & NEUROLOGY

## 2022-09-07 PROCEDURE — 86140 C-REACTIVE PROTEIN: CPT

## 2022-09-07 PROCEDURE — 83735 ASSAY OF MAGNESIUM: CPT | Performed by: INTERNAL MEDICINE

## 2022-09-07 PROCEDURE — 63600175 PHARM REV CODE 636 W HCPCS: Performed by: NURSE PRACTITIONER

## 2022-09-07 PROCEDURE — 94761 N-INVAS EAR/PLS OXIMETRY MLT: CPT

## 2022-09-07 PROCEDURE — 85730 THROMBOPLASTIN TIME PARTIAL: CPT

## 2022-09-07 PROCEDURE — 25000003 PHARM REV CODE 250: Performed by: PHYSICIAN ASSISTANT

## 2022-09-07 PROCEDURE — P9045 ALBUMIN (HUMAN), 5%, 250 ML: HCPCS | Mod: JG

## 2022-09-07 PROCEDURE — 94003 VENT MGMT INPAT SUBQ DAY: CPT

## 2022-09-07 PROCEDURE — 63700000 PHARM REV CODE 250 ALT 637 W/O HCPCS

## 2022-09-07 PROCEDURE — 25000003 PHARM REV CODE 250

## 2022-09-07 PROCEDURE — 63600175 PHARM REV CODE 636 W HCPCS: Mod: JG

## 2022-09-07 RX ORDER — ACETAMINOPHEN 500 MG
1000 TABLET ORAL EVERY 8 HOURS
Status: DISCONTINUED | OUTPATIENT
Start: 2022-09-07 | End: 2022-09-13 | Stop reason: HOSPADM

## 2022-09-07 RX ORDER — ALBUMIN HUMAN 50 G/1000ML
25 SOLUTION INTRAVENOUS ONCE
Status: COMPLETED | OUTPATIENT
Start: 2022-09-07 | End: 2022-09-07

## 2022-09-07 RX ORDER — METHOCARBAMOL 500 MG/1
1000 TABLET, FILM COATED ORAL 4 TIMES DAILY
Status: DISCONTINUED | OUTPATIENT
Start: 2022-09-07 | End: 2022-09-08

## 2022-09-07 RX ORDER — GABAPENTIN 300 MG/1
300 CAPSULE ORAL 3 TIMES DAILY
Status: DISCONTINUED | OUTPATIENT
Start: 2022-09-07 | End: 2022-09-07

## 2022-09-07 RX ORDER — GABAPENTIN 300 MG/1
300 CAPSULE ORAL 3 TIMES DAILY
Status: DISCONTINUED | OUTPATIENT
Start: 2022-09-07 | End: 2022-09-08

## 2022-09-07 RX ORDER — MIDODRINE HYDROCHLORIDE 5 MG/1
10 TABLET ORAL EVERY 8 HOURS
Status: DISCONTINUED | OUTPATIENT
Start: 2022-09-07 | End: 2022-09-08

## 2022-09-07 RX ADMIN — MIDODRINE HYDROCHLORIDE 10 MG: 5 TABLET ORAL at 09:09

## 2022-09-07 RX ADMIN — METOPROLOL TARTRATE 12.5 MG: 25 TABLET, FILM COATED ORAL at 12:09

## 2022-09-07 RX ADMIN — ALBUMIN (HUMAN) 25 G: 12.5 SOLUTION INTRAVENOUS at 02:09

## 2022-09-07 RX ADMIN — RILUZOLE 50 MG: 50 TABLET ORAL at 08:09

## 2022-09-07 RX ADMIN — NYSTATIN 500000 UNITS: 500000 SUSPENSION ORAL at 08:09

## 2022-09-07 RX ADMIN — HEPARIN SODIUM 16 UNITS/KG/HR: 5000 INJECTION INTRAVENOUS; SUBCUTANEOUS at 10:09

## 2022-09-07 RX ADMIN — METHOCARBAMOL 1000 MG: 500 TABLET ORAL at 01:09

## 2022-09-07 RX ADMIN — MIDODRINE HYDROCHLORIDE 5 MG: 5 TABLET ORAL at 05:09

## 2022-09-07 RX ADMIN — OXYCODONE 5 MG: 5 TABLET ORAL at 03:09

## 2022-09-07 RX ADMIN — ACETAMINOPHEN 1000 MG: 500 TABLET ORAL at 02:09

## 2022-09-07 RX ADMIN — BUSPIRONE HYDROCHLORIDE 5 MG: 5 TABLET ORAL at 09:09

## 2022-09-07 RX ADMIN — NOREPINEPHRINE BITARTRATE 0.05 MCG/KG/MIN: 4 INJECTION, SOLUTION INTRAVENOUS at 03:09

## 2022-09-07 RX ADMIN — GABAPENTIN 300 MG: 300 CAPSULE ORAL at 08:09

## 2022-09-07 RX ADMIN — BUSPIRONE HYDROCHLORIDE 5 MG: 5 TABLET ORAL at 08:09

## 2022-09-07 RX ADMIN — MIDODRINE HYDROCHLORIDE 10 MG: 5 TABLET ORAL at 02:09

## 2022-09-07 RX ADMIN — MAGNESIUM SULFATE HEPTAHYDRATE 2 G: 40 INJECTION, SOLUTION INTRAVENOUS at 03:09

## 2022-09-07 RX ADMIN — GABAPENTIN 300 MG: 300 CAPSULE ORAL at 03:09

## 2022-09-07 RX ADMIN — FENTANYL CITRATE 50 MCG: 0.05 INJECTION, SOLUTION INTRAMUSCULAR; INTRAVENOUS at 10:09

## 2022-09-07 RX ADMIN — BACLOFEN 10 MG: 10 TABLET ORAL at 09:09

## 2022-09-07 RX ADMIN — POTASSIUM & SODIUM PHOSPHATES POWDER PACK 280-160-250 MG 2 PACKET: 280-160-250 PACK at 08:09

## 2022-09-07 RX ADMIN — ACETAMINOPHEN 1000 MG: 500 TABLET ORAL at 09:09

## 2022-09-07 RX ADMIN — SENNOSIDES AND DOCUSATE SODIUM 1 TABLET: 50; 8.6 TABLET ORAL at 08:09

## 2022-09-07 RX ADMIN — SENNOSIDES AND DOCUSATE SODIUM 1 TABLET: 50; 8.6 TABLET ORAL at 09:09

## 2022-09-07 RX ADMIN — POTASSIUM & SODIUM PHOSPHATES POWDER PACK 280-160-250 MG 2 PACKET: 280-160-250 PACK at 03:09

## 2022-09-07 RX ADMIN — FAMOTIDINE 20 MG: 20 TABLET ORAL at 09:09

## 2022-09-07 RX ADMIN — TRAZODONE HYDROCHLORIDE 50 MG: 50 TABLET ORAL at 08:09

## 2022-09-07 RX ADMIN — BACLOFEN 10 MG: 10 TABLET ORAL at 03:09

## 2022-09-07 RX ADMIN — NOREPINEPHRINE BITARTRATE 0.14 MCG/KG/MIN: 4 INJECTION, SOLUTION INTRAVENOUS at 12:09

## 2022-09-07 RX ADMIN — IPRATROPIUM BROMIDE AND ALBUTEROL SULFATE 3 ML: 2.5; .5 SOLUTION RESPIRATORY (INHALATION) at 07:09

## 2022-09-07 RX ADMIN — METOPROLOL TARTRATE 12.5 MG: 25 TABLET, FILM COATED ORAL at 05:09

## 2022-09-07 RX ADMIN — METOPROLOL TARTRATE 12.5 MG: 25 TABLET, FILM COATED ORAL at 06:09

## 2022-09-07 RX ADMIN — METHOCARBAMOL 1000 MG: 500 TABLET ORAL at 05:09

## 2022-09-07 RX ADMIN — FAMOTIDINE 20 MG: 20 TABLET ORAL at 08:09

## 2022-09-07 RX ADMIN — METHOCARBAMOL 500 MG: 500 TABLET ORAL at 09:09

## 2022-09-07 RX ADMIN — IPRATROPIUM BROMIDE AND ALBUTEROL SULFATE 3 ML: 2.5; .5 SOLUTION RESPIRATORY (INHALATION) at 03:09

## 2022-09-07 RX ADMIN — MUPIROCIN: 20 OINTMENT TOPICAL at 09:09

## 2022-09-07 RX ADMIN — BACLOFEN 10 MG: 10 TABLET ORAL at 08:09

## 2022-09-07 RX ADMIN — METHOCARBAMOL 1000 MG: 500 TABLET ORAL at 08:09

## 2022-09-07 RX ADMIN — MUPIROCIN: 20 OINTMENT TOPICAL at 03:09

## 2022-09-07 RX ADMIN — MUPIROCIN: 20 OINTMENT TOPICAL at 08:09

## 2022-09-07 RX ADMIN — FENTANYL CITRATE 50 MCG: 0.05 INJECTION, SOLUTION INTRAMUSCULAR; INTRAVENOUS at 05:09

## 2022-09-07 RX ADMIN — IPRATROPIUM BROMIDE AND ALBUTEROL SULFATE 3 ML: 2.5; .5 SOLUTION RESPIRATORY (INHALATION) at 08:09

## 2022-09-07 NOTE — SUBJECTIVE & OBJECTIVE
Review of Systems   Unable to perform ROS: Patient nonverbal     Objective:     Vitals:  Temp: 98.2 °F (36.8 °C)  Pulse: 89  Rhythm: (P) normal sinus rhythm  BP: (!) 158/67  MAP (mmHg): 97  Resp: 15  SpO2: 100 %  Oxygen Concentration (%): 30  O2 Device (Oxygen Therapy): ventilator  Vent Mode: A/C  Set Rate: 14 BPM  Vt Set: 400 mL  PEEP/CPAP: 5 cmH20  Peak Airway Pressure: 25 cmH2O  Mean Airway Pressure: 11 cmH20  Plateau Pressure: 28 cmH20    Temp  Min: 97.6 °F (36.4 °C)  Max: 98.6 °F (37 °C)  Pulse  Min: 71  Max: 121  BP  Min: 131/58  Max: 176/75  MAP (mmHg)  Min: 83  Max: 112  Resp  Min: 14  Max: 26  SpO2  Min: 90 %  Max: 100 %  Oxygen Concentration (%)  Min: 30  Max: 30    09/06 0701 - 09/07 0700  In: 4020 [I.V.:1385]  Out: 2250 [Urine:2250]   Unmeasured Output  Urine Occurrence: 1  Stool Occurrence: 1  Pad Count: 1       Physical Exam  Vitals and nursing note reviewed.   Constitutional:       General: He is not in acute distress.     Appearance: He is not diaphoretic.   HENT:      Head: Normocephalic.      Right Ear: External ear normal.      Left Ear: External ear normal.      Nose: Nose normal.      Mouth/Throat:      Comments: Bite block in place. Hypertrophied tongue bulging through teeth at region of right lower mandible  Eyes:      General: No scleral icterus.        Right eye: No discharge.         Left eye: No discharge.      Conjunctiva/sclera: Conjunctivae normal.      Pupils: Pupils are equal, round, and reactive to light.   Neck:      Comments: Trach in place  Cardiovascular:      Rate and Rhythm: Normal rate.   Pulmonary:      Comments: On ventilator  Abdominal:      General: Abdomen is flat.      Palpations: Abdomen is soft.   Musculoskeletal:         General: No tenderness.   Skin:     General: Skin is warm.      Comments: Wound to anterior aspect of left lower extremity.    Neurological:      Mental Status: He is alert.      Motor: Weakness present.      Comments: Blinks, nods head    E3 V:  not testable M6.  nonverbal. Following commands intermittently  Conversational hearing intact.            Medications:  Continuousheparin (porcine) in D5W, Last Rate: 16.017 Units/kg/hr (09/07/22 1300)  NORepinephrine bitartrate-D5W, Last Rate: 0.04 mcg/kg/min (09/07/22 1300)    Scheduledacetaminophen, 1,000 mg, Q8H  albuterol-ipratropium, 3 mL, TID WAKE  baclofen, 10 mg, TID  busPIRone, 5 mg, BID  famotidine, 20 mg, BID  gabapentin, 300 mg, TID  methocarbamoL, 1,000 mg, QID  metoprolol tartrate, 12.5 mg, Q6H  midodrine, 10 mg, Q8H  mupirocin, , TID  nystatin, 500,000 Units, QID  polyethylene glycol, 17 g, Daily  riluzole, 50 mg, Q12H  scopolamine, 1 patch, Q3 Days  senna-docusate 8.6-50 mg, 1 tablet, BID  traZODone, 50 mg, QHS    PRNbisacodyL, 10 mg, Daily PRN  dextrose 10%, 12.5 g, PRN  dextrose 10%, 25 g, PRN  fentaNYL, 50 mcg, Q1H PRN  hydrALAZINE, 10 mg, Q4H PRN  labetalol, 10 mg, Q4H PRN  magnesium sulfate IVPB, 2 g, PRN  magnesium sulfate IVPB, 4 g, PRN  midazolam, 1 mg, Q2H PRN  ondansetron, 4 mg, Q6H PRN  oxyCODONE, 5 mg, Q6H PRN  potassium bicarbonate, 35 mEq, PRN  potassium bicarbonate, 50 mEq, PRN  potassium bicarbonate, 60 mEq, PRN  potassium, sodium phosphates, 2 packet, PRN  potassium, sodium phosphates, 2 packet, PRN  sodium chloride 0.9%, 10 mL, PRN  vancomycin - pharmacy to dose, , pharmacy to manage frequency      Today I personally reviewed pertinent medications, lines/drains/airways, imaging, laboratory results, notably:    Diet  Diet NPO  Diet NPO

## 2022-09-07 NOTE — PROGRESS NOTES
Pharmacokinetic Assessment: IV Vancomycin    Vancomycin serum concentration assessment(s):    - Random level is therapeutic at 19.9 mcg/mL  - Goal trough 15-20 mcg/mL  - Renal function stable w/ good UOP (1.2 cc/kg/hr)  - Pt bed bound d/t ALS. Scr is not the best indicator of renal function d/t limited muscle mass  -  Pt didn't tolerate scheduled q24h regimen. Will complete remaining course by pulse dosing as needed  - Hold pulse dose today   - Collect random level on 9/8 w/ AM labs  - Redose based on level and renal function  - Last day of abx 9/13    Drug levels (last 3 results):  Recent Labs   Lab Result Units 09/05/22  1714 09/07/22  0016   Vancomycin, Random ug/mL  --  19.9   Vancomycin-Trough ug/mL 22.9*  --        Pharmacy will continue to follow and monitor vancomycin.    Please contact pharmacy at extension [phone] for questions regarding this assessment.      Thank you for the consult,     Corinne Bautista, PharmD, BCCCP  Neurocritical Care Clinical Pharmacist  Ext. [phone]         Patient brief summary:  Wei Dominique is a 67 y.o. male initiated on antimicrobial therapy with IV Vancomycin for treatment of bacteremia      Drug Allergies:   Review of patient's allergies indicates:  No Known Allergies      Renal Function:   Estimated Creatinine Clearance: 140.6 mL/min (based on SCr of 0.5 mg/dL).,     Dialysis Method (if applicable):  N/A

## 2022-09-07 NOTE — ASSESSMENT & PLAN NOTE
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 He is s/p trach and peg. Multiple discussions with patient and patient's wife about GOC. He would like continued treatment.     - Continue riluzole  - Consulted acute pain for better management of his refractory pain. Adjusted pain regimen per their recs  - PRN acetaminophen, fentanyl, oxycodone

## 2022-09-07 NOTE — PROGRESS NOTES
[facility]  Neurocritical Care  Progress Note    Admit Date: 8/31/2022  Service Date: 09/07/2022  Length of Stay: 7    Subjective:     Chief Complaint: Trismus    History of Present Illness: Wei Dominique is a 55 yo AAM with ALS. At OSH with prolonged course complicated by bacteremia, PNA, upper extremity DVTs, facial swelling and trismus. Received Abx, PICC was changed there prior to transfer, and received BOTOX injections to help with his Trismus.      Hospital Course: 09/05/2022: No acute events overnight. ENT recommended anesthesia consult for possible V3 nerve block. Started midodrine 5mg q8h. ID does not recommend HAYDEN at this time.   09/06/2022: No acute events overnight. Consulted acute pain team for better management of refractory pain. DC cefepime per ID recs and will continue vancomycin with end date of 9/13. Patient continues to be hyponatremic likely secondary to dehydration so ordered one time dose of albumin 5%.   09/07/2022: Overnight blood pressure was low so he was started on levo. Noticed new wound on anterior aspect of left lower extremity. Wound care team following. Adjusted pain regimen per anesthesia recs. Patient continues to improve neurologically. He is making purposeful movements and follows commands intermittently. Continue spontaneous breathing trials.          Review of Systems   Unable to perform ROS: Patient nonverbal     Objective:     Vitals:  Temp: 98.2 °F (36.8 °C)  Pulse: 89  Rhythm: (P) normal sinus rhythm  BP: (!) 158/67  MAP (mmHg): 97  Resp: 15  SpO2: 100 %  Oxygen Concentration (%): 30  O2 Device (Oxygen Therapy): ventilator  Vent Mode: A/C  Set Rate: 14 BPM  Vt Set: 400 mL  PEEP/CPAP: 5 cmH20  Peak Airway Pressure: 25 cmH2O  Mean Airway Pressure: 11 cmH20  Plateau Pressure: 28 cmH20    Temp  Min: 97.6 °F (36.4 °C)  Max: 98.6 °F (37 °C)  Pulse  Min: 71  Max: 121  BP  Min: 131/58  Max: 176/75  MAP (mmHg)  Min: 83  Max: 112  Resp  Min: 14  Max: 26  SpO2   Min: 90 %  Max: 100 %  Oxygen Concentration (%)  Min: 30  Max: 30    09/06 0701 - 09/07 0700  In: 4020 [I.V.:1385]  Out: 2250 [Urine:2250]   Unmeasured Output  Urine Occurrence: 1  Stool Occurrence: 1  Pad Count: 1       Physical Exam  Vitals and nursing note reviewed.   Constitutional:       General: He is not in acute distress.     Appearance: He is not diaphoretic.   HENT:      Head: Normocephalic.      Right Ear: External ear normal.      Left Ear: External ear normal.      Nose: Nose normal.      Mouth/Throat:      Comments: Bite block in place. Hypertrophied tongue bulging through teeth at region of right lower mandible  Eyes:      General: No scleral icterus.        Right eye: No discharge.         Left eye: No discharge.      Conjunctiva/sclera: Conjunctivae normal.      Pupils: Pupils are equal, round, and reactive to light.   Neck:      Comments: Trach in place  Cardiovascular:      Rate and Rhythm: Normal rate.   Pulmonary:      Comments: On ventilator  Abdominal:      General: Abdomen is flat.      Palpations: Abdomen is soft.   Musculoskeletal:         General: No tenderness.   Skin:     General: Skin is warm.      Comments: Wound to anterior aspect of left lower extremity.    Neurological:      Mental Status: He is alert.      Motor: Weakness present.      Comments: Blinks, nods head    E3 V: not testable M6.  nonverbal. Following commands intermittently  Conversational hearing intact.            Medications:  Continuousheparin (porcine) in D5W, Last Rate: 16.017 Units/kg/hr (09/07/22 1300)  NORepinephrine bitartrate-D5W, Last Rate: 0.04 mcg/kg/min (09/07/22 1300)    Scheduledacetaminophen, 1,000 mg, Q8H  albuterol-ipratropium, 3 mL, TID WAKE  baclofen, 10 mg, TID  busPIRone, 5 mg, BID  famotidine, 20 mg, BID  gabapentin, 300 mg, TID  methocarbamoL, 1,000 mg, QID  metoprolol tartrate, 12.5 mg, Q6H  midodrine, 10 mg, Q8H  mupirocin, , TID  nystatin, 500,000 Units, QID  polyethylene glycol, 17 g,  Daily  riluzole, 50 mg, Q12H  scopolamine, 1 patch, Q3 Days  senna-docusate 8.6-50 mg, 1 tablet, BID  traZODone, 50 mg, QHS    PRNbisacodyL, 10 mg, Daily PRN  dextrose 10%, 12.5 g, PRN  dextrose 10%, 25 g, PRN  fentaNYL, 50 mcg, Q1H PRN  hydrALAZINE, 10 mg, Q4H PRN  labetalol, 10 mg, Q4H PRN  magnesium sulfate IVPB, 2 g, PRN  magnesium sulfate IVPB, 4 g, PRN  midazolam, 1 mg, Q2H PRN  ondansetron, 4 mg, Q6H PRN  oxyCODONE, 5 mg, Q6H PRN  potassium bicarbonate, 35 mEq, PRN  potassium bicarbonate, 50 mEq, PRN  potassium bicarbonate, 60 mEq, PRN  potassium, sodium phosphates, 2 packet, PRN  potassium, sodium phosphates, 2 packet, PRN  sodium chloride 0.9%, 10 mL, PRN  vancomycin - pharmacy to dose, , pharmacy to manage frequency      Today I personally reviewed pertinent medications, lines/drains/airways, imaging, laboratory results, notably:    Diet  Diet NPO  Diet NPO          Assessment/Plan:     Neuro  ALS (amyotrophic lateral sclerosis)  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 He is s/p trach and peg. Multiple discussions with patient and patient's wife about GOC. He would like continued treatment.     - Continue riluzole  - Consulted acute pain for better management of his refractory pain. Adjusted pain regimen per their recs  - PRN acetaminophen, fentanyl, oxycodone      Renal/  Hyponatremia  -9/6: Likely secondary to dehydration so gave one time dose of albumin 5%  -Continue to monitor with daily CMP  -Continue neuro checks. No current acute decline in mentation.    Hematology  Occlusive thrombus  U/S showed RUE DVT.     -Continue heparin gtt     Other  * Trismus  Briefly 55 yo AAM with ALS. At OSH with prolonged course complicated by bacteremia, PNA, upper extremity DVTs, facial swelling and trismus. Received Abx, PICC was changed there prior to transfer, and received BOTOX injections to help with his Trismus. Wheelchair bound 2020     -ENT consult for facial mass on CT & trismus. Low suspicion for abscess. Recommended  that anesthesia be consulted for V3 nerve block. Will attempt to contact [facility] for patient transfer to perform V3 nerve block since that service is not offered at [facility]  -ID following: Continue vancomycin with end date of 9/13. Dc cefepime.  -Echo  -tube feeds  -continue midodrine 5mg q8h  -9/7: overnight patient's blood pressure was low so started levo        The patient is being Prophylaxed for:  Venous Thromboembolism with: Chemical  Stress Ulcer with: H2B  Ventilator Pneumonia with: chlorhexidine oral care    Activity Orders          Turn patient starting at 08/31 1200    Elevate HOB starting at 08/31 1131    Diet NPO: NPO starting at 08/31 1131        Full Code    Alicia Tan, DO  Neurocritical Care  [facility]

## 2022-09-07 NOTE — ASSESSMENT & PLAN NOTE
-9/6: Likely secondary to dehydration so gave one time dose of albumin 5%  -Continue to monitor with daily CMP  -Continue neuro checks. No current acute decline in mentation.

## 2022-09-07 NOTE — PLAN OF CARE
[facility] Care Plan    POC reviewed with Wei Dominique and family at 1400. Pt unable to verbalize understanding due to trach. Questions and concerns addressed. No acute events today. Pt progressing toward goals. Will continue to monitor. See below and flowsheets for full assessment and VS info.             Is this a stroke patient? no    Neuro:  Sukumar Coma Scale  Best Eye Response: 3-->(E3) to speech  Best Motor Response: 6-->(M6) obeys commands  Best Verbal Response: 5-->(V5) oriented  Sukumar Coma Scale Score: 14  Assessment Qualifiers: patient not sedated/intubated  Pupil PERRLA: yes     24 hr Temp:  [97.6 °F (36.4 °C)-98.6 °F (37 °C)]     CV:   Rhythm: normal sinus rhythm  BP goals:   SBP < 160  MAP > 65    Resp:   O2 Device (Oxygen Therapy): ventilator  Vent Mode: A/C  Set Rate: 14 BPM  Oxygen Concentration (%): 30  Vt Set: 400 mL  PEEP/CPAP: 5 cmH20  Pressure Support: 5 cmH20    Plan: N/A    GI/:     Diet/Nutrition Received: tube feeding  Last Bowel Movement: 09/07/22  Voiding Characteristics: external catheter    Intake/Output Summary (Last 24 hours) at 9/7/2022 1702  Last data filed at 9/7/2022 1600  Gross per 24 hour   Intake 3646.32 ml   Output 1350 ml   Net 2296.32 ml     Unmeasured Output  Urine Occurrence: 1  Stool Occurrence: 1  Pad Count: 1    Labs/Accuchecks:  Recent Labs   Lab 09/07/22  0016   WBC 16.48*   RBC 2.86*   HGB 7.5*   HCT 23.9*   *      Recent Labs   Lab 09/07/22  0016   *   K 4.1   CO2 22*      BUN 33*   CREATININE 0.5   ALKPHOS 81   ALT 8*   AST 14   BILITOT 0.3      Recent Labs   Lab 09/07/22  0016   APTT 39.4*    No results for input(s): CPK, CPKMB, TROPONINI, MB in the last 168 hours.    Electrolytes: Electrolytes replaced  Accuchecks: none    Gtts:   heparin (porcine) in D5W 16.017 Units/kg/hr (09/07/22 1600)    NORepinephrine bitartrate-D5W 0.05 mcg/kg/min (09/07/22 1508)       LDA/Wounds:  Lines/Drains/Airways       Drain  Duration                   Gastrostomy/Enterostomy LUQ feeding -- days    Male External Urinary Catheter 09/04/22 1815 Large 2 days              Airway  Duration                  Surgical Airway Shiley Cuffed -- days         Surgical Airway 05/23/22 1145 Portex Cuffed 107 days              Arterial Line  Duration             Arterial Line 09/02/22 1800 Left Radial 4 days              Peripheral Intravenous Line  Duration                  Midline Catheter Insertion/Assessment  - Single Lumen 09/02/22 1200 Left cephalic vein (lateral side of arm) 20g x 10cm 5 days         Peripheral IV - Single Lumen 09/02/22 1200 18 G;1 3/4 in Left Antecubital 5 days         Peripheral IV - Single Lumen 09/07/22 1618 20 G;1 3/4 in Right;Anterior Upper Arm <1 day                  Wounds: Yes  Wound care consulted: Yes

## 2022-09-07 NOTE — PROGRESS NOTES
09/07/22 0900   WOCN Assessment   WOCN Total Time (mins) 15   Visit Date 09/07/22   Visit Time 0900   Consult Type New   WOCN Speciality Wound   Intervention assessed;changed;applied;chart review;coordination of care;orders   Skin Interventions   Pressure Reduction Techniques frequent weight shift encouraged;weight shift assistance provided        Altered Skin Integrity 09/07/22 0900 Left anterior;lower Leg   Date First Assessed/Time First Assessed: 09/07/22 0900   Side: Left  Orientation: anterior;lower  Location: Leg   Wound Image    Drainage Amount Small   Red (%), Wound Tissue Color 100 %   Wound Length (cm) 23 cm   Wound Width (cm) 5 cm   Wound Depth (cm) 0.1 cm   Wound Volume (cm^3) 11.5 cm^3   Wound Surface Area (cm^2) 115 cm^2   Patient consult for wound care to left lower extremity, show that this entire darken area on the leg has blistered. Cleanse left leg with normal saline apply xeroform cover with dry gauze wrap with kerlix two times a week and prn.

## 2022-09-07 NOTE — ASSESSMENT & PLAN NOTE
Briefly 55 yo AAM with ALS. At OSH with prolonged course complicated by bacteremia, PNA, upper extremity DVTs, facial swelling and trismus. Received Abx, PICC was changed there prior to transfer, and received BOTOX injections to help with his Trismus. Wheelchair bound 2020     -ENT consult for facial mass on CT & trismus. Low suspicion for abscess. Recommended that anesthesia be consulted for V3 nerve block. Will attempt to contact [facility] for patient transfer to perform V3 nerve block since that service is not offered at [facility]  -ID following: Continue vancomycin with end date of 9/13. Dc cefepime.  -Echo  -tube feeds  -continue midodrine 5mg q8h  -9/7: overnight patient's blood pressure was low so started levo

## 2022-09-07 NOTE — PLAN OF CARE
[facility] Care Plan    POC reviewed with Wei Dominique at 0300. Pt unable to verbalize understanding. No acute events overnight. Pt progressing toward goals. Will continue to monitor. See below and flowsheets for full assessment and VS info.     Bath completed. Wound care completed. Linens changed.  Albumin given.  Heparin gtt cont. Levo gtt turned on and off through out the night to maintain MAPs>65.  Computer troubleshooting overnight. Pt communicating through blinks that it is not functioning to it's full abilities.   Plan to transfer to [facility] for a nerve block.      Is this a stroke patient? no    Neuro:  Sukumar Coma Scale  Best Eye Response: 3-->(E3) to speech  Best Motor Response: 6-->(M6) obeys commands  Best Verbal Response: 5-->(V5) oriented  Sukumar Coma Scale Score: 14  Assessment Qualifiers: patient not sedated/intubated  Pupil PERRLA: yes     24hr Temp:  [97.6 °F (36.4 °C)-98.6 °F (37 °C)]     CV:   Rhythm: normal sinus rhythm  BP goals:   SBP < 160  MAP > 65    Resp:   O2 Device (Oxygen Therapy): ventilator  Vent Mode: A/C  Set Rate: 14 BPM  Oxygen Concentration (%): 30  Vt Set: 400 mL  PEEP/CPAP: 5 cmH20  Pressure Support: 5 cmH20    Plan: trach in place    GI/:     Diet/Nutrition Received: tube feeding  Last Bowel Movement: 09/07/22  Voiding Characteristics: external catheter    Intake/Output Summary (Last 24 hours) at 9/7/2022 0645  Last data filed at 9/7/2022 0605  Gross per 24 hour   Intake 4019.95 ml   Output 2250 ml   Net 1769.95 ml     Unmeasured Output  Urine Occurrence: 1  Stool Occurrence: 1  Pad Count: 1    Labs/Accuchecks:  Recent Labs   Lab 09/07/22  0016   WBC 16.48*   RBC 2.86*   HGB 7.5*   HCT 23.9*   *      Recent Labs   Lab 09/07/22  0016   *   K 4.1   CO2 22*      BUN 33*   CREATININE 0.5   ALKPHOS 81   ALT 8*   AST 14   BILITOT 0.3      Recent Labs   Lab 08/31/22  1656 09/01/22  0019 09/07/22  0016   INR 1.3*  --   --    APTT 28.7   < > 39.4*    < > = values  in this interval not displayed.    No results for input(s): CPK, CPKMB, TROPONINI, MB in the last 168 hours.    Electrolytes: Electrolytes replaced  Accuchecks: none    Gtts:   heparin (porcine) in D5W 16 Units/kg/hr (09/07/22 0505)    NORepinephrine bitartrate-D5W 0.1 mcg/kg/min (09/07/22 0305)       LDA/Wounds:  Lines/Drains/Airways       Drain  Duration                  Gastrostomy/Enterostomy LUQ feeding -- days    Male External Urinary Catheter 09/04/22 1815 Large 2 days              Airway  Duration                  Surgical Airway Shiley Cuffed -- days         Surgical Airway 05/23/22 1145 Portex Cuffed 106 days              Arterial Line  Duration             Arterial Line 09/02/22 1800 Left Radial 4 days              Peripheral Intravenous Line  Duration                  Midline Catheter Insertion/Assessment  - Single Lumen 09/02/22 1200 Left cephalic vein (lateral side of arm) 20g x 10cm 4 days         Peripheral IV - Single Lumen 09/02/22 1200 18 G;1 3/4 in Left Antecubital 4 days                  Wounds: Yes  Wound care consulted: Yes

## 2022-09-08 ENCOUNTER — TELEPHONE (OUTPATIENT)
Dept: INFECTIOUS DISEASES | Facility: CLINIC | Age: 67
End: 2022-09-08
Payer: MEDICARE

## 2022-09-08 LAB
ALBUMIN SERPL BCP-MCNC: 2.2 G/DL (ref 3.5–5.2)
ALLENS TEST: ABNORMAL
ALP SERPL-CCNC: 75 U/L (ref 55–135)
ALT SERPL W/O P-5'-P-CCNC: 6 U/L (ref 10–44)
ANION GAP SERPL CALC-SCNC: 5 MMOL/L (ref 8–16)
APTT BLDCRRT: 41.1 SEC (ref 21–32)
AST SERPL-CCNC: 12 U/L (ref 10–40)
BASOPHILS # BLD AUTO: 0.07 K/UL (ref 0–0.2)
BASOPHILS NFR BLD: 0.6 % (ref 0–1.9)
BILIRUB SERPL-MCNC: 0.2 MG/DL (ref 0.1–1)
BUN SERPL-MCNC: 34 MG/DL (ref 8–23)
CALCIUM SERPL-MCNC: 9.6 MG/DL (ref 8.7–10.5)
CHLORIDE SERPL-SCNC: 106 MMOL/L (ref 95–110)
CO2 SERPL-SCNC: 25 MMOL/L (ref 23–29)
CREAT SERPL-MCNC: 0.5 MG/DL (ref 0.5–1.4)
CRP SERPL-MCNC: 75.3 MG/L (ref 0–8.2)
DELSYS: ABNORMAL
DIFFERENTIAL METHOD: ABNORMAL
EOSINOPHIL # BLD AUTO: 1.1 K/UL (ref 0–0.5)
EOSINOPHIL NFR BLD: 9.3 % (ref 0–8)
ERYTHROCYTE [DISTWIDTH] IN BLOOD BY AUTOMATED COUNT: 21.1 % (ref 11.5–14.5)
ERYTHROCYTE [SEDIMENTATION RATE] IN BLOOD BY WESTERGREN METHOD: 14 MM/H
EST. GFR  (NO RACE VARIABLE): >60 ML/MIN/1.73 M^2
FIO2: 30
GLUCOSE SERPL-MCNC: 141 MG/DL (ref 70–110)
HCO3 UR-SCNC: 26.8 MMOL/L (ref 24–28)
HCT VFR BLD AUTO: 25.2 % (ref 40–54)
HGB BLD-MCNC: 7.6 G/DL (ref 14–18)
IMM GRANULOCYTES # BLD AUTO: 0.47 K/UL (ref 0–0.04)
IMM GRANULOCYTES NFR BLD AUTO: 4 % (ref 0–0.5)
LYMPHOCYTES # BLD AUTO: 1.8 K/UL (ref 1–4.8)
LYMPHOCYTES NFR BLD: 15.6 % (ref 18–48)
MAGNESIUM SERPL-MCNC: 2 MG/DL (ref 1.6–2.6)
MCH RBC QN AUTO: 26.2 PG (ref 27–31)
MCHC RBC AUTO-ENTMCNC: 30.2 G/DL (ref 32–36)
MCV RBC AUTO: 87 FL (ref 82–98)
MODE: ABNORMAL
MONOCYTES # BLD AUTO: 1 K/UL (ref 0.3–1)
MONOCYTES NFR BLD: 8.6 % (ref 4–15)
NEUTROPHILS # BLD AUTO: 7.2 K/UL (ref 1.8–7.7)
NEUTROPHILS NFR BLD: 61.9 % (ref 38–73)
NRBC BLD-RTO: 0 /100 WBC
PCO2 BLDA: 44.9 MMHG (ref 35–45)
PEEP: 5
PH SMN: 7.38 [PH] (ref 7.35–7.45)
PHOSPHATE SERPL-MCNC: 3.1 MG/DL (ref 2.7–4.5)
PLATELET # BLD AUTO: 411 K/UL (ref 150–450)
PMV BLD AUTO: 10.1 FL (ref 9.2–12.9)
PO2 BLDA: 119 MMHG (ref 80–100)
POC BE: 2 MMOL/L
POC SATURATED O2: 99 % (ref 95–100)
POC TCO2: 28 MMOL/L (ref 23–27)
POTASSIUM SERPL-SCNC: 4.3 MMOL/L (ref 3.5–5.1)
PROT SERPL-MCNC: 7.9 G/DL (ref 6–8.4)
RBC # BLD AUTO: 2.9 M/UL (ref 4.6–6.2)
SAMPLE: ABNORMAL
SITE: ABNORMAL
SODIUM SERPL-SCNC: 136 MMOL/L (ref 136–145)
VANCOMYCIN SERPL-MCNC: 11.7 UG/ML
VT: 400
WBC # BLD AUTO: 11.7 K/UL (ref 3.9–12.7)

## 2022-09-08 PROCEDURE — 85025 COMPLETE CBC W/AUTO DIFF WBC: CPT

## 2022-09-08 PROCEDURE — 20000000 HC ICU ROOM

## 2022-09-08 PROCEDURE — 99291 CRITICAL CARE FIRST HOUR: CPT | Mod: 25,GC,, | Performed by: PSYCHIATRY & NEUROLOGY

## 2022-09-08 PROCEDURE — 94640 AIRWAY INHALATION TREATMENT: CPT

## 2022-09-08 PROCEDURE — 94003 VENT MGMT INPAT SUBQ DAY: CPT

## 2022-09-08 PROCEDURE — 84100 ASSAY OF PHOSPHORUS: CPT | Performed by: INTERNAL MEDICINE

## 2022-09-08 PROCEDURE — 25000003 PHARM REV CODE 250

## 2022-09-08 PROCEDURE — 94761 N-INVAS EAR/PLS OXIMETRY MLT: CPT

## 2022-09-08 PROCEDURE — 82803 BLOOD GASES ANY COMBINATION: CPT

## 2022-09-08 PROCEDURE — 80053 COMPREHEN METABOLIC PANEL: CPT

## 2022-09-08 PROCEDURE — 99900035 HC TECH TIME PER 15 MIN (STAT)

## 2022-09-08 PROCEDURE — 99900026 HC AIRWAY MAINTENANCE (STAT)

## 2022-09-08 PROCEDURE — 86140 C-REACTIVE PROTEIN: CPT

## 2022-09-08 PROCEDURE — 82800 BLOOD PH: CPT

## 2022-09-08 PROCEDURE — 63600175 PHARM REV CODE 636 W HCPCS: Performed by: NURSE PRACTITIONER

## 2022-09-08 PROCEDURE — 27000221 HC OXYGEN, UP TO 24 HOURS

## 2022-09-08 PROCEDURE — 36620 ARTERIAL LINE: ICD-10-PCS | Mod: ,,,

## 2022-09-08 PROCEDURE — 63600175 PHARM REV CODE 636 W HCPCS: Performed by: PSYCHIATRY & NEUROLOGY

## 2022-09-08 PROCEDURE — 36620 INSERTION CATHETER ARTERY: CPT | Mod: ,,,

## 2022-09-08 PROCEDURE — 25000003 PHARM REV CODE 250: Performed by: PSYCHIATRY & NEUROLOGY

## 2022-09-08 PROCEDURE — 63600175 PHARM REV CODE 636 W HCPCS

## 2022-09-08 PROCEDURE — 27000207 HC ISOLATION

## 2022-09-08 PROCEDURE — 37799 UNLISTED PX VASCULAR SURGERY: CPT

## 2022-09-08 PROCEDURE — 27200966 HC CLOSED SUCTION SYSTEM

## 2022-09-08 PROCEDURE — 80202 ASSAY OF VANCOMYCIN: CPT | Performed by: PSYCHIATRY & NEUROLOGY

## 2022-09-08 PROCEDURE — 25000003 PHARM REV CODE 250: Performed by: NURSE PRACTITIONER

## 2022-09-08 PROCEDURE — 25000242 PHARM REV CODE 250 ALT 637 W/ HCPCS: Performed by: PSYCHIATRY & NEUROLOGY

## 2022-09-08 PROCEDURE — 63700000 PHARM REV CODE 250 ALT 637 W/O HCPCS

## 2022-09-08 PROCEDURE — 83735 ASSAY OF MAGNESIUM: CPT | Performed by: INTERNAL MEDICINE

## 2022-09-08 PROCEDURE — 85730 THROMBOPLASTIN TIME PARTIAL: CPT

## 2022-09-08 PROCEDURE — 99291 PR CRITICAL CARE, E/M 30-74 MINUTES: ICD-10-PCS | Mod: 25,GC,, | Performed by: PSYCHIATRY & NEUROLOGY

## 2022-09-08 RX ORDER — NOREPINEPHRINE BITARTRATE/D5W 4MG/250ML
0-3 PLASTIC BAG, INJECTION (ML) INTRAVENOUS CONTINUOUS
Status: DISCONTINUED | OUTPATIENT
Start: 2022-09-08 | End: 2022-09-09

## 2022-09-08 RX ORDER — MIDODRINE HYDROCHLORIDE 5 MG/1
15 TABLET ORAL EVERY 8 HOURS
Status: DISCONTINUED | OUTPATIENT
Start: 2022-09-08 | End: 2022-09-09

## 2022-09-08 RX ORDER — METHOCARBAMOL 500 MG/1
1000 TABLET, FILM COATED ORAL EVERY 6 HOURS
Status: DISCONTINUED | OUTPATIENT
Start: 2022-09-08 | End: 2022-09-13 | Stop reason: HOSPADM

## 2022-09-08 RX ORDER — GABAPENTIN 300 MG/1
300 CAPSULE ORAL EVERY 8 HOURS
Status: DISCONTINUED | OUTPATIENT
Start: 2022-09-08 | End: 2022-09-13 | Stop reason: HOSPADM

## 2022-09-08 RX ORDER — LIDOCAINE HYDROCHLORIDE 10 MG/ML
1 INJECTION INFILTRATION; PERINEURAL ONCE
Status: COMPLETED | OUTPATIENT
Start: 2022-09-08 | End: 2022-09-08

## 2022-09-08 RX ORDER — OXYCODONE HYDROCHLORIDE 5 MG/1
5 TABLET ORAL EVERY 4 HOURS PRN
Status: DISCONTINUED | OUTPATIENT
Start: 2022-09-08 | End: 2022-09-13 | Stop reason: HOSPADM

## 2022-09-08 RX ORDER — MIDODRINE HYDROCHLORIDE 5 MG/1
5 TABLET ORAL ONCE
Status: COMPLETED | OUTPATIENT
Start: 2022-09-08 | End: 2022-09-08

## 2022-09-08 RX ORDER — OXYCODONE HYDROCHLORIDE 10 MG/1
10 TABLET ORAL EVERY 4 HOURS PRN
Status: DISCONTINUED | OUTPATIENT
Start: 2022-09-08 | End: 2022-09-13 | Stop reason: HOSPADM

## 2022-09-08 RX ADMIN — METOPROLOL TARTRATE 12.5 MG: 25 TABLET, FILM COATED ORAL at 05:09

## 2022-09-08 RX ADMIN — HEPARIN SODIUM 16 UNITS/KG/HR: 5000 INJECTION INTRAVENOUS; SUBCUTANEOUS at 07:09

## 2022-09-08 RX ADMIN — GABAPENTIN 300 MG: 300 CAPSULE ORAL at 09:09

## 2022-09-08 RX ADMIN — METOPROLOL TARTRATE 12.5 MG: 25 TABLET, FILM COATED ORAL at 11:09

## 2022-09-08 RX ADMIN — RILUZOLE 50 MG: 50 TABLET ORAL at 08:09

## 2022-09-08 RX ADMIN — METHOCARBAMOL 1000 MG: 500 TABLET ORAL at 11:09

## 2022-09-08 RX ADMIN — MIDODRINE HYDROCHLORIDE 5 MG: 5 TABLET ORAL at 11:09

## 2022-09-08 RX ADMIN — ACETAMINOPHEN 1000 MG: 500 TABLET ORAL at 05:09

## 2022-09-08 RX ADMIN — NYSTATIN 500000 UNITS: 500000 SUSPENSION ORAL at 09:09

## 2022-09-08 RX ADMIN — MUPIROCIN: 20 OINTMENT TOPICAL at 02:09

## 2022-09-08 RX ADMIN — BUSPIRONE HYDROCHLORIDE 5 MG: 5 TABLET ORAL at 09:09

## 2022-09-08 RX ADMIN — MIDODRINE HYDROCHLORIDE 15 MG: 5 TABLET ORAL at 02:09

## 2022-09-08 RX ADMIN — METHOCARBAMOL 1000 MG: 500 TABLET ORAL at 08:09

## 2022-09-08 RX ADMIN — IPRATROPIUM BROMIDE AND ALBUTEROL SULFATE 3 ML: 2.5; .5 SOLUTION RESPIRATORY (INHALATION) at 07:09

## 2022-09-08 RX ADMIN — POLYETHYLENE GLYCOL 3350 17 G: 17 POWDER, FOR SOLUTION ORAL at 08:09

## 2022-09-08 RX ADMIN — NYSTATIN 500000 UNITS: 500000 SUSPENSION ORAL at 01:09

## 2022-09-08 RX ADMIN — IPRATROPIUM BROMIDE AND ALBUTEROL SULFATE 3 ML: 2.5; .5 SOLUTION RESPIRATORY (INHALATION) at 02:09

## 2022-09-08 RX ADMIN — FENTANYL CITRATE 50 MCG: 0.05 INJECTION, SOLUTION INTRAMUSCULAR; INTRAVENOUS at 04:09

## 2022-09-08 RX ADMIN — MUPIROCIN: 20 OINTMENT TOPICAL at 09:09

## 2022-09-08 RX ADMIN — MIDODRINE HYDROCHLORIDE 15 MG: 5 TABLET ORAL at 09:09

## 2022-09-08 RX ADMIN — FAMOTIDINE 20 MG: 20 TABLET ORAL at 09:09

## 2022-09-08 RX ADMIN — ACETAMINOPHEN 1000 MG: 500 TABLET ORAL at 02:09

## 2022-09-08 RX ADMIN — NYSTATIN 500000 UNITS: 500000 SUSPENSION ORAL at 05:09

## 2022-09-08 RX ADMIN — GABAPENTIN 300 MG: 300 CAPSULE ORAL at 08:09

## 2022-09-08 RX ADMIN — MIDODRINE HYDROCHLORIDE 10 MG: 5 TABLET ORAL at 05:09

## 2022-09-08 RX ADMIN — FENTANYL CITRATE 50 MCG: 0.05 INJECTION, SOLUTION INTRAMUSCULAR; INTRAVENOUS at 07:09

## 2022-09-08 RX ADMIN — LIDOCAINE HYDROCHLORIDE 1 ML: 10 INJECTION, SOLUTION INFILTRATION; PERINEURAL at 04:09

## 2022-09-08 RX ADMIN — VANCOMYCIN HYDROCHLORIDE 750 MG: 750 INJECTION, POWDER, LYOPHILIZED, FOR SOLUTION INTRAVENOUS at 08:09

## 2022-09-08 RX ADMIN — TRAZODONE HYDROCHLORIDE 50 MG: 50 TABLET ORAL at 09:09

## 2022-09-08 RX ADMIN — METHOCARBAMOL 1000 MG: 500 TABLET ORAL at 05:09

## 2022-09-08 RX ADMIN — ACETAMINOPHEN 1000 MG: 500 TABLET ORAL at 09:09

## 2022-09-08 RX ADMIN — BUSPIRONE HYDROCHLORIDE 5 MG: 5 TABLET ORAL at 08:09

## 2022-09-08 RX ADMIN — FENTANYL CITRATE 50 MCG: 0.05 INJECTION, SOLUTION INTRAMUSCULAR; INTRAVENOUS at 05:09

## 2022-09-08 RX ADMIN — MUPIROCIN: 20 OINTMENT TOPICAL at 08:09

## 2022-09-08 RX ADMIN — SENNOSIDES AND DOCUSATE SODIUM 1 TABLET: 50; 8.6 TABLET ORAL at 08:09

## 2022-09-08 RX ADMIN — METOPROLOL TARTRATE 12.5 MG: 25 TABLET, FILM COATED ORAL at 12:09

## 2022-09-08 RX ADMIN — RILUZOLE 50 MG: 50 TABLET ORAL at 09:09

## 2022-09-08 RX ADMIN — FENTANYL CITRATE 50 MCG: 0.05 INJECTION, SOLUTION INTRAMUSCULAR; INTRAVENOUS at 02:09

## 2022-09-08 RX ADMIN — NOREPINEPHRINE BITARTRATE 0.02 MCG/KG/MIN: 4 INJECTION, SOLUTION INTRAVENOUS at 09:09

## 2022-09-08 RX ADMIN — FAMOTIDINE 20 MG: 20 TABLET ORAL at 08:09

## 2022-09-08 RX ADMIN — SENNOSIDES AND DOCUSATE SODIUM 1 TABLET: 50; 8.6 TABLET ORAL at 09:09

## 2022-09-08 RX ADMIN — GABAPENTIN 300 MG: 300 CAPSULE ORAL at 02:09

## 2022-09-08 NOTE — PROGRESS NOTES
[facility]  Neurocritical Care  Progress Note    Admit Date: 8/31/2022  Service Date: 09/08/2022  Length of Stay: 8    Subjective:     Chief Complaint: Trismus    History of Present Illness: Wei Dominique is a 57 yo AAM with ALS. At OSH with prolonged course complicated by bacteremia, PNA, upper extremity DVTs, facial swelling and trismus. Received Abx, PICC was changed there prior to transfer, and received BOTOX injections to help with his Trismus.      Hospital Course: 09/05/2022: No acute events overnight. ENT recommended anesthesia consult for possible V3 nerve block. Started midodrine 5mg q8h. ID does not recommend HAYDEN at this time.   09/06/2022: No acute events overnight. Consulted acute pain team for better management of refractory pain. DC cefepime per ID recs and will continue vancomycin with end date of 9/13. Patient continues to be hyponatremic likely secondary to dehydration so ordered one time dose of albumin 5%.   09/07/2022: Overnight blood pressure was low so he was started on levo. Noticed new wound on anterior aspect of left lower extremity. Wound care team following. Adjusted pain regimen per anesthesia recs. Patient continues to improve neurologically. He is making purposeful movements and follows commands intermittently. Continue spontaneous breathing trials.  09/08/2022: New a-line placed overnight. Increased midodrine to 15mg q8h and dc levo. Spoke to pain anesthesia and was recommended that patient receive repeat botox injection for pain management at a higher dosage than previously injected. Continue with spontaneous breathing trials.           Review of Systems   Unable to perform ROS: Patient nonverbal     Objective:     Vitals:  Temp: 98 °F (36.7 °C)  Pulse: (!) 118  Rhythm: normal sinus rhythm  BP: 127/63  MAP (mmHg): 89  Resp: 11  SpO2: 100 %  Oxygen Concentration (%): 30  O2 Device (Oxygen Therapy): ventilator  Vent Mode: A/C  Set Rate: 14 BPM  Vt Set: 400  mL  PEEP/CPAP: 5 cmH20  Peak Airway Pressure: 31 cmH2O  Mean Airway Pressure: 12 cmH20  Plateau Pressure: 28 cmH20    Temp  Min: 97.5 °F (36.4 °C)  Max: 98.5 °F (36.9 °C)  Pulse  Min: 10  Max: 118  BP  Min: 107/61  Max: 206/100  MAP (mmHg)  Min: 73  Max: 137  Resp  Min: 5  Max: 26  SpO2  Min: 99 %  Max: 100 %  Oxygen Concentration (%)  Min: 30  Max: 30    09/07 0701 - 09/08 0700  In: 2059.8 [I.V.:529.8]  Out: 2630 [Urine:2630]   Unmeasured Output  Urine Occurrence: 1  Stool Occurrence: 0  Pad Count: 1       Physical Exam  Vitals and nursing note reviewed.   Constitutional:       General: He is not in acute distress.     Appearance: He is not diaphoretic.   HENT:      Head: Normocephalic.      Right Ear: External ear normal.      Left Ear: External ear normal.      Nose: Nose normal.      Mouth/Throat:      Comments: Bite block in place. Hypertrophied tongue bulging through teeth at region of right lower mandible  Eyes:      General: No scleral icterus.        Right eye: No discharge.         Left eye: No discharge.      Conjunctiva/sclera: Conjunctivae normal.      Pupils: Pupils are equal, round, and reactive to light.   Neck:      Comments: Trach in place  Cardiovascular:      Rate and Rhythm: Normal rate.   Pulmonary:      Comments: On ventilator  Abdominal:      General: Abdomen is flat.      Palpations: Abdomen is soft.   Musculoskeletal:         General: No tenderness.   Skin:     General: Skin is warm.      Comments: Wound to anterior aspect of left lower extremity.    Neurological:      Mental Status: He is alert.      Motor: Weakness present.      Comments: Blinks, nods head    E3 V: not testable M6.  nonverbal. Following commands intermittently  Conversational hearing intact.              Medications:  Continuousheparin (porcine) in D5W, Last Rate: 16 Units/kg/hr (09/08/22 1301)    Scheduledacetaminophen, 1,000 mg, Q8H  albuterol-ipratropium, 3 mL, TID WAKE  busPIRone, 5 mg, BID  famotidine, 20 mg,  BID  gabapentin, 300 mg, Q8H  methocarbamoL, 1,000 mg, Q6H  metoprolol tartrate, 12.5 mg, Q6H  midodrine, 15 mg, Q8H  mupirocin, , TID  nystatin, 500,000 Units, QID  polyethylene glycol, 17 g, Daily  riluzole, 50 mg, Q12H  scopolamine, 1 patch, Q3 Days  senna-docusate 8.6-50 mg, 1 tablet, BID  traZODone, 50 mg, QHS    PRNbisacodyL, 10 mg, Daily PRN  dextrose 10%, 12.5 g, PRN  dextrose 10%, 25 g, PRN  fentaNYL, 50 mcg, Q1H PRN  hydrALAZINE, 10 mg, Q4H PRN  labetalol, 10 mg, Q4H PRN  magnesium sulfate IVPB, 2 g, PRN  magnesium sulfate IVPB, 4 g, PRN  midazolam, 1 mg, Q2H PRN  ondansetron, 4 mg, Q6H PRN  oxyCODONE, 5 mg, Q4H PRN  oxyCODONE, 10 mg, Q4H PRN  potassium bicarbonate, 35 mEq, PRN  potassium bicarbonate, 50 mEq, PRN  potassium bicarbonate, 60 mEq, PRN  potassium, sodium phosphates, 2 packet, PRN  potassium, sodium phosphates, 2 packet, PRN  sodium chloride 0.9%, 10 mL, PRN  vancomycin - pharmacy to dose, , pharmacy to manage frequency      Today I personally reviewed pertinent medications, lines/drains/airways, imaging, laboratory results, notably:    Diet  Diet NPO  Diet NPO          Assessment/Plan:     Neuro  ALS (amyotrophic lateral sclerosis)  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 He is s/p trach and peg. Multiple discussions with patient and patient's wife about GOC. He would like continued treatment.     - Continue riluzole  - Consulted acute pain for better management of his refractory pain. Adjusted pain regimen per their recs  - PRN acetaminophen, fentanyl, oxycodone      Renal/  Hyponatremia  -9/6: Likely secondary to dehydration so gave one time dose of albumin 5%  -Continue to monitor with daily CMP  -Continue neuro checks. No current acute decline in mentation.    Hematology  Occlusive thrombus  U/S showed RUE DVT.     -Continue heparin gtt     Other  * Trismus  Briefly 55 yo AAM with ALS. At OSH with prolonged course complicated by bacteremia, PNA, upper extremity DVTs, facial swelling and trismus.  Received Abx, PICC was changed there prior to transfer, and received BOTOX injections to help with his Trismus. Wheelchair bound 2020     -ENT consult for facial mass on CT & trismus. Low suspicion for abscess. Recommended that anesthesia be consulted for V3 nerve block. Spoke to pain anesthesia and was advised to repeat botox injection at a higher dosage than previously injected (50cc). Will reach out to appropriate specialist for procedure.   -ID following: Continue vancomycin with end date of 9/13. Dc cefepime.  -Echo  -tube feeds  -9/7: overnight patient's blood pressure was low so started levo  -9/8: Increased midodrine from 5mg q8h to 15mg q8h for better blood pressure control. Dc levo          The patient is being Prophylaxed for:  Venous Thromboembolism with: Chemical  Stress Ulcer with: H2B  Ventilator Pneumonia with: chlorhexidine oral care    Activity Orders          Turn patient starting at 08/31 1200    Elevate HOB starting at 08/31 1131    Diet NPO: NPO starting at 08/31 1131        Full Code    Alicia Tan DO  Neurocritical Care  [facility]

## 2022-09-08 NOTE — ASSESSMENT & PLAN NOTE
Briefly 57 yo AAM with ALS. At OSH with prolonged course complicated by bacteremia, PNA, upper extremity DVTs, facial swelling and trismus. Received Abx, PICC was changed there prior to transfer, and received BOTOX injections to help with his Trismus. Wheelchair bound 2020     -ENT consult for facial mass on CT & trismus. Low suspicion for abscess. Recommended that anesthesia be consulted for V3 nerve block. Spoke to pain anesthesia and was advised to repeat botox injection at a higher dosage than previously injected (50cc). Will reach out to appropriate specialist for procedure.   -ID following: Continue vancomycin with end date of 9/13. Dc cefepime.  -Echo  -tube feeds  -9/7: overnight patient's blood pressure was low so started levo  -9/8: Increased midodrine from 5mg q8h to 15mg q8h for better blood pressure control. Dc levo

## 2022-09-08 NOTE — PHYSICIAN QUERY
PT Name: Wei Dominique  MR #: [id number]     DOCUMENTATION CLARIFICATION     CDS/:  Eze Swan RN, CDS                   Contact Information:  [email or web address]    This form is a permanent document in the medical record.     Query Date: September 8, 2022    By submitting this query, we are merely seeking further clarification of documentation.  Please utilize your independent clinical judgment when addressing the question(s) below.    The Medical Record contains the following:   Indicators   Supporting Clinical Findings Location in Medical Record    Non-blanchable erythema/redness      Ulcer/Injury/Skin Breakdown      Deep Tissue Injury     X Wound care consult Patient sacral wound and all wound sites are moisture and fungal associated. The sacral area and all wounds are cleanse with soap and water. The sacral area cover with sacral border.       WC PN 9/2   X Acute/Chronic Illness Bacteremia   Pseudomonas PNA  PICC w/ bacteremia -dc'd   Sacral decubitus (wound care)  Facial/toungue swelling (protrusion requiring bite block)  S/p botox     ALS  Occlusive thrombus  Trismus   NCC PN attestation 9/4    Medication/Treatment     X Other Wounds noted: L frontal foot scabbed, R big toe nail bed, L heel wound, back/sucrum/buttocks/posterior thigh wounds, Sacral stage 2      Sacral decubitus (wound care) Care Update RN 8/31      NCC PN 9/4                 The clinical guidelines noted are only a system guideline. It does not replace the providers clinical judgment.    Per the National Pressure Injury Advisory Panel:   A pressure injury is localized damage to the skin and underlying soft tissue usually over a bony prominence or related to a medical or other device. The injury can present as intact skin or an open ulcer and may be painful. The injury occurs as a result of intense and/or prolonged pressure or pressure in combination with shear. The tolerance of soft tissue for pressure and shear may  also be affected by microclimate, nutrition, perfusion, co-morbidities and condition of the soft tissue.       Stage 1 Pressure Injury:  Intact skin with a localized area of non-blanchable erythema, which may appear differently in darkly pigmented skin. Color changes do not include purple or maroon discoloration; these may indicate deep tissue pressure injury.    Stage 2 Pressure Injury:  Partial-thickness loss of skin with exposed dermis. The wound bed is viable, pink or red, moist, and may also present as an intact or ruptured serum-filled blister.    Stage 3 Pressure Injury:  Full-thickness loss of skin, in which adipose (fat) is visible in the ulcer and granulation tissue and epibole (rolled wound edges) are often present. Slough and/or eschar may be visible. Undermining and tunneling may occur.    Stage 4 Pressure Injury:  Full-thickness skin and tissue loss with exposed or directly palpable fascia, muscle, tendon, ligament, cartilage or bone in the ulcer. Slough and/or eschar may be visible. Epibole (rolled edges), undermining and/or tunneling often occur.    Unstageable Pressure Injury:  Full-thickness skin and tissue loss in which the extent of tissue damage within the ulcer cannot be confirmed because it is obscured by slough or eschar. If slough or eschar is removed, a Stage 3 or Stage 4 pressure injury will be revealed.    Deep Tissue Pressure Injury:  Intact or non-intact skin with localized area of persistent non-blanchable deep red, maroon, purple discoloration or epidermal separation revealing a dark wound bed or blood filled blister. This injury results from intense and/or prolonged pressure and shear forces at the bone-muscle interface. The wound may evolve rapidly to reveal the actual extent of tissue injury, or may resolve without tissue loss. If necrotic tissue, subcutaneous tissue, granulation tissue, fascia, muscle or other underlying structures are visible, this indicates a full thickness  pressure injury (Unstageable, Stage 3 or Stage 4). Do not use DTPI to describe vascular, traumatic, neuropathic, or dermatologic conditions.   Medical Device Related Pressure Injury: This describes an etiology. Medical device related pressure injuries result from the use of devices designed and applied for diagnostic or therapeutic purposes. The resultant pressure injury generally conforms to the pattern or shape of the device. The injury should be staged using the staging system.    Mucosal Membrane Pressure Injury: Mucosal membrane pressure injury is found on mucous membranes with a history of a medical device in use at the location of the injury. Due to the anatomy of the tissue these ulcers cannot be staged.       Provider, please provide the integumentary diagnosis related to the documentation of Sacrum:     [   ] Pressure Injury/Decubitus Ulcer, Stage 1     [   ] Pressure Injury/Decubitus Ulcer, Stage 2     [   ] Moisture associated dermatitis     [ x  ] Other Integumentary Diagnosis (please specify):_Stage 3 Pressure injury_____________     [  ] Clinically Undetermined         Please document in your progress notes daily for the duration of treatment until resolved and include in your discharge summary.    Reference:    CHIP Crouch., WING Kirk., Goldberg, M., ASHLEY Malagon., ASHLEY De La Rosa., & NASIM Law. (2016). Revised National Pressure Ulcer Advisory Panel Pressure Injury Staging System: Revised Pressure Injury Staging System. J Wound Ostomy Continence Nurs, 43(6), 585-597. doi:10.1097/won.2404424622712635    Form No.38222

## 2022-09-08 NOTE — PLAN OF CARE
Recommendations     Continue current TF regimen of Impact Peptide 1.5 @ goal rate of 50 ml/hr to provide 1800 kcals, 113 g pro, and 924 ml free water.     - Continue Beneprotein BID x 14 days (until 9/14) to promote wound healing. Beneprotein + TF provides 1850 kcals and 125 g pro- this meets 100% kcal and protein needs.      2.  RD following.     Goals: Will meet  EEN/EPN by next RD visit.  Nutrition Goal Status: goal met  Communication of RD Recs: (POC)    Keturah Munguia PL-LDN

## 2022-09-08 NOTE — SUBJECTIVE & OBJECTIVE
Review of Systems   Unable to perform ROS: Patient nonverbal     Objective:     Vitals:  Temp: 98 °F (36.7 °C)  Pulse: (!) 118  Rhythm: normal sinus rhythm  BP: 127/63  MAP (mmHg): 89  Resp: 11  SpO2: 100 %  Oxygen Concentration (%): 30  O2 Device (Oxygen Therapy): ventilator  Vent Mode: A/C  Set Rate: 14 BPM  Vt Set: 400 mL  PEEP/CPAP: 5 cmH20  Peak Airway Pressure: 31 cmH2O  Mean Airway Pressure: 12 cmH20  Plateau Pressure: 28 cmH20    Temp  Min: 97.5 °F (36.4 °C)  Max: 98.5 °F (36.9 °C)  Pulse  Min: 10  Max: 118  BP  Min: 107/61  Max: 206/100  MAP (mmHg)  Min: 73  Max: 137  Resp  Min: 5  Max: 26  SpO2  Min: 99 %  Max: 100 %  Oxygen Concentration (%)  Min: 30  Max: 30    09/07 0701 - 09/08 0700  In: 2059.8 [I.V.:529.8]  Out: 2630 [Urine:2630]   Unmeasured Output  Urine Occurrence: 1  Stool Occurrence: 0  Pad Count: 1       Physical Exam  Vitals and nursing note reviewed.   Constitutional:       General: He is not in acute distress.     Appearance: He is not diaphoretic.   HENT:      Head: Normocephalic.      Right Ear: External ear normal.      Left Ear: External ear normal.      Nose: Nose normal.      Mouth/Throat:      Comments: Bite block in place. Hypertrophied tongue bulging through teeth at region of right lower mandible  Eyes:      General: No scleral icterus.        Right eye: No discharge.         Left eye: No discharge.      Conjunctiva/sclera: Conjunctivae normal.      Pupils: Pupils are equal, round, and reactive to light.   Neck:      Comments: Trach in place  Cardiovascular:      Rate and Rhythm: Normal rate.   Pulmonary:      Comments: On ventilator  Abdominal:      General: Abdomen is flat.      Palpations: Abdomen is soft.   Musculoskeletal:         General: No tenderness.   Skin:     General: Skin is warm.      Comments: Wound to anterior aspect of left lower extremity.    Neurological:      Mental Status: He is alert.      Motor: Weakness present.      Comments: Blinks, nods head    E3 V:  not testable M6.  nonverbal. Following commands intermittently  Conversational hearing intact.              Medications:  Continuousheparin (porcine) in D5W, Last Rate: 16 Units/kg/hr (09/08/22 1301)    Scheduledacetaminophen, 1,000 mg, Q8H  albuterol-ipratropium, 3 mL, TID WAKE  busPIRone, 5 mg, BID  famotidine, 20 mg, BID  gabapentin, 300 mg, Q8H  methocarbamoL, 1,000 mg, Q6H  metoprolol tartrate, 12.5 mg, Q6H  midodrine, 15 mg, Q8H  mupirocin, , TID  nystatin, 500,000 Units, QID  polyethylene glycol, 17 g, Daily  riluzole, 50 mg, Q12H  scopolamine, 1 patch, Q3 Days  senna-docusate 8.6-50 mg, 1 tablet, BID  traZODone, 50 mg, QHS    PRNbisacodyL, 10 mg, Daily PRN  dextrose 10%, 12.5 g, PRN  dextrose 10%, 25 g, PRN  fentaNYL, 50 mcg, Q1H PRN  hydrALAZINE, 10 mg, Q4H PRN  labetalol, 10 mg, Q4H PRN  magnesium sulfate IVPB, 2 g, PRN  magnesium sulfate IVPB, 4 g, PRN  midazolam, 1 mg, Q2H PRN  ondansetron, 4 mg, Q6H PRN  oxyCODONE, 5 mg, Q4H PRN  oxyCODONE, 10 mg, Q4H PRN  potassium bicarbonate, 35 mEq, PRN  potassium bicarbonate, 50 mEq, PRN  potassium bicarbonate, 60 mEq, PRN  potassium, sodium phosphates, 2 packet, PRN  potassium, sodium phosphates, 2 packet, PRN  sodium chloride 0.9%, 10 mL, PRN  vancomycin - pharmacy to dose, , pharmacy to manage frequency      Today I personally reviewed pertinent medications, lines/drains/airways, imaging, laboratory results, notably:    Diet  Diet NPO  Diet NPO

## 2022-09-08 NOTE — PLAN OF CARE
[facility]  Discharge Reassessment    Primary Care Provider: Primary Doctor No    Expected Discharge Date: 9/14/2022    Per MD, repeat botox injection.      Patient not medically ready for discharge.     Reassessment (most recent)       Discharge Reassessment - 09/08/22 1431          Discharge Reassessment    Assessment Type Discharge Planning Reassessment     Communicated VENESSA with patient/caregiver Date not available/Unable to determine     Discharge Plan A Return to nursing home     Discharge Plan B Return to Nursing Home     DME Needed Upon Discharge  none     Discharge Barriers Identified None     Why the patient remains in the hospital Requires continued medical care                   Nidia Marc, RN, CCRN-K, [facility]  Neuro-Critical Care   [phone]

## 2022-09-08 NOTE — PROGRESS NOTES
"[facility] Critical [facility]  Adult Nutrition  Progress Note    SUMMARY       Recommendations    Continue current TF regimen of Impact Peptide 1.5 @ goal rate of 50 ml/hr to provide 1800 kcals, 113 g pro, and 924 ml free water.     - Continue Beneprotein BID x 14 days (until 9/14) to promote wound healing. Beneprotein + TF provides 1850 kcals and 125 g pro- this meets 100% kcal and protein needs.     2.  RD following.    Goals: Will meet  EEN/EPN by next RD visit.  Nutrition Goal Status: goal met  Communication of RD Recs: (POC)    Assessment and Plan    Nutrition Problem  Inadequate enternal nutrition infusion     Related to (etiology):   Low infusion rate      Signs and Symptoms (as evidenced by):   Current TF regimen meets < 73% EEN/EPN     Interventions/Recommendations (treatment strategy):  Collaboration of nutrition care with other providers      Nutrition Diagnosis Status:   Resolved      Reason for Assessment    Reason For Assessment: RD follow-up  Diagnosis:  (Trismus)  Relevant Medical History: HTN, ALS, quadriparesis, trach/vent/ PEG dependence  Interdisciplinary Rounds: did not attend  General Information Comments: Pt trach/vent/PEG dependent. Unable to speak with pt d/t being nonverbal. #. Noted current TF regimen of Impact 1.5 @ goal rate of 50 ml/hr. Noted multiple altered skin integrities and wound- continue adding beneprotein to TF regimen. No wt changes since last RD visit. NFPE not warranted 2/2 appearing well-nourished at this time. LBM 9/8.  Nutrition Discharge Planning: PEG TF regimen    Nutrition Risk Screen    Nutrition Risk Screen: large or nonhealing wound, burn or pressure injury    Nutrition/Diet History    Food Allergies: NKFA  Factors Affecting Nutritional Intake: NPO, on mechanical ventilation, chewing difficulties/inability to chew food (PEG dependent)    Anthropometrics    Temp: 98 °F (36.7 °C)  Height: 5' 8" (172.7 cm)  Height (inches): 68 in  Weight Method: Bed " Scale  Weight: 77 kg (169 lb 12.1 oz)  Weight (lb): 169.76 lb  Ideal Body Weight (IBW), Male: 154 lb  % Ideal Body Weight, Male (lb): 110.23 %  BMI (Calculated): 25.8  BMI Grade: 25 - 29.9 - overweight     Lab/Procedures/Meds    Pertinent Labs Reviewed: reviewed  Pertinent Labs Comments: Glucose 141, Albumin 2.2, BUN 34, Hgb 7.6, Hct 25.2, CRP 75.3  Pertinent Medications Reviewed: reviewed  Pertinent Medications Comments: famotidine, gabapentin, heparin, metoprolol tartrate, nystatin, senna-docusate, heparin    Estimated/Assessed Needs    Weight Used For Calorie Calculations: 77 kg (169 lb 12.1 oz)  Energy Calorie Requirements (kcal): 9516-7499 kcals  Energy Need Method: Kcal/kg (24-28 g/kg)  Protein Requirements: 116-154 g (1.5-2.0 g/kg)  Weight Used For Protein Calculations: 77 kg (169 lb 12.1 oz)  Fluid Requirements (mL): 1 ml or fluid per MD  Estimated Fluid Requirement Method: RDA Method  RDA Method (mL): 1848     Nutrition Prescription Ordered    Current Diet Order: NPO  Current Nutrition Support Formula Ordered: Impact Peptide 1.5  Current Nutrition Support Rate Ordered: 50 (ml)  Current Nutrition Support Frequency Ordered: ml/hr  Oral Nutrition Supplement: Beneprotein BID    Evaluation of Received Nutrient/Fluid Intake    Enteral Calories (kcal): 1800  Enteral Protein (gm): 113  Enteral (Free Water) Fluid (mL): 917  Other Calories (kcal): 50  Total Calories (kcal): 1850  % Kcal Needs: 100%  Other Protein (gm): 12  Total Protein (gm): 125  % Protein Needs: 100%  I/O: +3.8 L since admit  Energy Calories Required: meeting needs  Protein Required: meeting needs  Fluid Required: meeting needs  Tolerance: tolerating  % Intake of Estimated Energy Needs: 100%  % Meal Intake: NPO    Nutrition Risk    Level of Risk/Frequency of Follow-up:  (1 time/week)     Monitor and Evaluation    Food and Nutrient Intake: enteral nutrition intake  Food and Nutrient Adminstration: enteral and parenteral nutrition  administration  Knowledge/Beliefs/Attitudes: food and nutrition knowledge/skill, beliefs and attitudes  Physical Activity and Function: nutrition-related ADLs and IADLs  Anthropometric Measurements: height/length, weight, weight change, body mass index  Biochemical Data, Medical Tests and Procedures: electrolyte and renal panel, gastrointestinal profile, glucose/endocrine profile, inflammatory profile, lipid profile  Nutrition-Focused Physical Findings: overall appearance     Nutrition Follow-Up    RD Follow-up?: Yes    Keturah ARREDONDO

## 2022-09-08 NOTE — PROCEDURES
"Wei Dominique is a 67 y.o. male patient.    Temp: 97.5 °F (36.4 °C) (22 2305)  Pulse: 103 (22)  Resp: 15 (22)  BP: 131/68 (22)  SpO2: 100 % (22)  Weight: 77 kg (169 lb 12.1 oz) (22 1105)  Height: 5' 8" (172.7 cm) (22 1613)       Arterial Line    Date/Time: 2022 4:48 AM  Location procedure was performed: [facility] NEURO [facility]ITICAL [facility]  Performed by: [place] PA-C  Authorized by: [place] PA-C   Assisting provider: Deepthi Bloom PA-C  Consent Done: Yes  Consent: Verbal consent obtained. Written consent obtained.  Risks and benefits: risks, benefits and alternatives were discussed  Consent given by: patient (via speech computer)  Patient understanding: patient states understanding of the procedure being performed  Patient consent: the patient's understanding of the procedure matches consent given  Procedure consent: procedure consent matches procedure scheduled  Site marked: the operative site was marked  Imaging studies: imaging studies available  Patient identity confirmed: , MRN, name and provided demographic data  Time out: Immediately prior to procedure a "time out" was called to verify the correct patient, procedure, equipment, support staff and site/side marked as required.  Preparation: Patient was prepped and draped in the usual sterile fashion.  Indications: multiple ABGs and hemodynamic monitoring  Location: right radial    Anesthesia:  Local Anesthetic: lidocaine 1% without epinephrine    Patient sedated: no  Enrike's test normal: yes  Needle gauge: 20  Seldinger technique: Seldinger technique used  Number of attempts: 1  Complications: No  Specimens: No  Implants: No  Post-procedure: dressing applied  Post-procedure CMS: normal and unchanged  Patient tolerance: Patient tolerated the procedure well with no immediate complications        2022    "

## 2022-09-08 NOTE — TELEPHONE ENCOUNTER
MD Birgit Gayle MA  Actually, he has ALS and is trach;d and vent-dependent in a nursing home.  I dont think he can get transported.  Just have him finish the antibiotics as planned without ID clinic follow-up.  Dr lozoya agrees thanks           Previous Messages     ----- Message -----   From: Birgit Butt MA   Sent: 9/6/2022   5:20 PM CDT   To: Zain Caruso MD   Subject: RE: Inpatient Notes                               Ok to extend IV abx until 9/27?   ----- Message -----   From: Zain Caruso MD   Sent: 9/6/2022   3:56 PM CDT   To: Birgit Butt MA   Subject: RE: Inpatient Notes                               That's ok   ----- Message -----   From: Birgit Butt MA   Sent: 9/6/2022   3:47 PM CDT   To: Zain Caruso MD   Subject: RE: Inpatient Notes                               First available appointment will be on 9/27/22   Please advice   ----- Message -----   From: Zain Caruso MD   Sent: 9/5/2022   2:44 PM CDT   To: Birgit Butt MA   Subject: Inpatient Notes                                   Bacteremia   67-year-old male with ALS s/p trach'd and peg'd, vent-dependent initially presented to OSH with facial swelling with concerns for facial cellulitis with neurogenic trismus s/p botox injections, transferred here for further evaluation regarding neurogenic trismus.       Multiple positive cultures and abx treatment at OSH outlined in HPI below.       8/26/22-8/27/22 Staph capitis bacteremia resistant to oxacillin.  8/30/22 PICC tip cx negative.  Noted multiple skin wounds.  Repeat BCx's here negative.  LUE midline was placed 9/2/22.       Recommendations:   -2 weeks of vancomycin starting from day of PICC line removal at OSH.  End date 9/13/22.   -OPAT note below       Oral lesion   Repeat CT max/face with similar appearing fluid collection adjacent to right mandible 3.4x1.7cm, fluid collection upper lip, mucosal thickening of the ethmoid air cells and sphenoid sinuses, with mastoid air cells  partially filled with fluid.       Recommendations:   -Appreciate ENT recommendations; CT lesions appear to be hypertrophic tongue that patient is biting on       Chronic respiratory failure with hypoxia and hypercapnia   8/19/22 and 8/26/22 RCx with Pseudomonas, latter .  8/31/22 repeat RCx with pseudomonas and another GNR pending speciation.  Vent settings appear stable, suspect colonization.       Recommendations:   -Discontinue cefepime           Outpatient Antibiotic Therapy Plan:       Please send referral to [facility].       1) Infection: Staph capitis bacteremia       2) Discharge Antibiotics:       Intravenous antibiotics:   · Vancomycin       3) Therapy Duration:  2 weeks       Estimated end date of IV antibiotics: 9/13/22       4) Outpatient Weekly Labs:       Order the following labs to be drawn on Mondays:   · CBC   · CMP   · CRP   · Vancomycin trough. Target 15-20       If discharged on vancomycin IV, order the following additional labs to be drawn on Thursdays:   · CMP   · Vancomycin trough. Target 15-20       If vancomycin trough is not at target (15-20) prior to discharge, schedule vancomycin trough to be drawn before their fourth outpatient dose.       5) Fax Lab Results to Infectious Diseases Provider: Dr. Zain Caruso

## 2022-09-08 NOTE — PLAN OF CARE
[facility] Care Plan    POC reviewed with Wei Dominique and family at 1400. Pt verbalized understanding. Questions and concerns addressed. No acute events today. Pt progressing toward goals. Will continue to monitor. See below and flowsheets for full assessment and VS info.     - ongoing discussion of botox injections.   - bath given and linen changed.   - BM x 1  - per shirley, NP: take pressures from a line, not BP cuff.          Is this a stroke patient? no    Neuro:  [place] Coma Scale  Best Eye Response: 4-->(E4) spontaneous  Best Motor Response: 6-->(M6) obeys commands  Best Verbal Response: 1-->(V1) none  [place] Coma Scale Score: 11  Assessment Qualifiers: no eye obstruction present, patient not sedated/intubated  Pupil PERRLA: yes     24 hr Temp:  [97.5 °F (36.4 °C)-98.1 °F (36.7 °C)]     CV:   Rhythm: normal sinus rhythm  BP goals:   SBP < 160  MAP > 65    Resp:   O2 Device (Oxygen Therapy): ventilator  Vent Mode: A/C  Set Rate: 14 BPM  Oxygen Concentration (%): 30  Vt Set: 400 mL  PEEP/CPAP: 5 cmH20  Pressure Support: 5 cmH20    Plan: trach in place    GI/:     Diet/Nutrition Received: NPO, tube feeding  Last Bowel Movement: 09/08/22  Voiding Characteristics: external catheter    Intake/Output Summary (Last 24 hours) at 9/8/2022 1755  Last data filed at 9/8/2022 1701  Gross per 24 hour   Intake 1802.48 ml   Output 3130 ml   Net -1327.52 ml     Unmeasured Output  Urine Occurrence: 1  Stool Occurrence: 0  Pad Count: 1    Labs/Accuchecks:  Recent Labs   Lab 09/08/22  0244   WBC 11.70   RBC 2.90*   HGB 7.6*   HCT 25.2*         Recent Labs   Lab 09/08/22  0244      K 4.3   CO2 25      BUN 34*   CREATININE 0.5   ALKPHOS 75   ALT 6*   AST 12   BILITOT 0.2      Recent Labs   Lab 09/08/22  0244   APTT 41.1*    No results for input(s): CPK, CPKMB, TROPONINI, MB in the last 168 hours.    Electrolytes: N/A - electrolytes WDL  Accuchecks: none    Gtts:   heparin (porcine) in D5W 16 Units/kg/hr  (09/08/22 1701)       LDA/Wounds:  Lines/Drains/Airways       Drain  Duration                  Gastrostomy/Enterostomy LUQ feeding -- days    Male External Urinary Catheter 09/04/22 1815 Large 3 days              Airway  Duration                  Surgical Airway Shiley Cuffed -- days         Surgical Airway 05/23/22 1145 Portex Cuffed 108 days              Arterial Line  Duration             Arterial Line 09/08/22 0430 Right Radial <1 day              Peripheral Intravenous Line  Duration                  Midline Catheter Insertion/Assessment  - Single Lumen 09/02/22 1200 Left cephalic vein (lateral side of arm) 20g x 10cm 6 days         Peripheral IV - Single Lumen 09/02/22 1200 18 G;1 3/4 in Left Antecubital 6 days         Peripheral IV - Single Lumen 09/07/22 1618 20 G;1 3/4 in Right;Anterior Upper Arm 1 day                  Wounds: Yes  Wound care consulted: Yes

## 2022-09-09 LAB
ALBUMIN SERPL BCP-MCNC: 2.2 G/DL (ref 3.5–5.2)
ALP SERPL-CCNC: 76 U/L (ref 55–135)
ALT SERPL W/O P-5'-P-CCNC: 5 U/L (ref 10–44)
ANION GAP SERPL CALC-SCNC: 7 MMOL/L (ref 8–16)
APTT BLDCRRT: 30.5 SEC (ref 21–32)
APTT BLDCRRT: 37 SEC (ref 21–32)
APTT BLDCRRT: 41.6 SEC (ref 21–32)
AST SERPL-CCNC: 13 U/L (ref 10–40)
BASOPHILS # BLD AUTO: 0.11 K/UL (ref 0–0.2)
BASOPHILS NFR BLD: 0.9 % (ref 0–1.9)
BILIRUB SERPL-MCNC: 0.2 MG/DL (ref 0.1–1)
BUN SERPL-MCNC: 36 MG/DL (ref 8–23)
CALCIUM SERPL-MCNC: 9.7 MG/DL (ref 8.7–10.5)
CHLORIDE SERPL-SCNC: 103 MMOL/L (ref 95–110)
CO2 SERPL-SCNC: 23 MMOL/L (ref 23–29)
CREAT SERPL-MCNC: 0.5 MG/DL (ref 0.5–1.4)
CRP SERPL-MCNC: 51.1 MG/L (ref 0–8.2)
DIFFERENTIAL METHOD: ABNORMAL
EOSINOPHIL # BLD AUTO: 1.3 K/UL (ref 0–0.5)
EOSINOPHIL NFR BLD: 10 % (ref 0–8)
ERYTHROCYTE [DISTWIDTH] IN BLOOD BY AUTOMATED COUNT: 21.1 % (ref 11.5–14.5)
EST. GFR  (NO RACE VARIABLE): >60 ML/MIN/1.73 M^2
GLUCOSE SERPL-MCNC: 137 MG/DL (ref 70–110)
HCT VFR BLD AUTO: 27.2 % (ref 40–54)
HGB BLD-MCNC: 7.9 G/DL (ref 14–18)
IMM GRANULOCYTES # BLD AUTO: 0.62 K/UL (ref 0–0.04)
IMM GRANULOCYTES NFR BLD AUTO: 4.9 % (ref 0–0.5)
LYMPHOCYTES # BLD AUTO: 2.5 K/UL (ref 1–4.8)
LYMPHOCYTES NFR BLD: 19.5 % (ref 18–48)
MAGNESIUM SERPL-MCNC: 1.8 MG/DL (ref 1.6–2.6)
MCH RBC QN AUTO: 25.6 PG (ref 27–31)
MCHC RBC AUTO-ENTMCNC: 29 G/DL (ref 32–36)
MCV RBC AUTO: 88 FL (ref 82–98)
MONOCYTES # BLD AUTO: 0.9 K/UL (ref 0.3–1)
MONOCYTES NFR BLD: 7.4 % (ref 4–15)
NEUTROPHILS # BLD AUTO: 7.3 K/UL (ref 1.8–7.7)
NEUTROPHILS NFR BLD: 57.3 % (ref 38–73)
NRBC BLD-RTO: 0 /100 WBC
PHOSPHATE SERPL-MCNC: 2.7 MG/DL (ref 2.7–4.5)
PLATELET # BLD AUTO: 505 K/UL (ref 150–450)
PMV BLD AUTO: 10.1 FL (ref 9.2–12.9)
POTASSIUM SERPL-SCNC: 4.4 MMOL/L (ref 3.5–5.1)
PROT SERPL-MCNC: 8.1 G/DL (ref 6–8.4)
RBC # BLD AUTO: 3.09 M/UL (ref 4.6–6.2)
SODIUM SERPL-SCNC: 133 MMOL/L (ref 136–145)
VANCOMYCIN SERPL-MCNC: 16.1 UG/ML
WBC # BLD AUTO: 12.65 K/UL (ref 3.9–12.7)

## 2022-09-09 PROCEDURE — 94640 AIRWAY INHALATION TREATMENT: CPT

## 2022-09-09 PROCEDURE — 94761 N-INVAS EAR/PLS OXIMETRY MLT: CPT

## 2022-09-09 PROCEDURE — 80053 COMPREHEN METABOLIC PANEL: CPT

## 2022-09-09 PROCEDURE — 27000207 HC ISOLATION

## 2022-09-09 PROCEDURE — 25000003 PHARM REV CODE 250

## 2022-09-09 PROCEDURE — 25000003 PHARM REV CODE 250: Performed by: PHYSICIAN ASSISTANT

## 2022-09-09 PROCEDURE — 80202 ASSAY OF VANCOMYCIN: CPT | Performed by: PSYCHIATRY & NEUROLOGY

## 2022-09-09 PROCEDURE — 20000000 HC ICU ROOM

## 2022-09-09 PROCEDURE — 25000242 PHARM REV CODE 250 ALT 637 W/ HCPCS: Performed by: PSYCHIATRY & NEUROLOGY

## 2022-09-09 PROCEDURE — 99900035 HC TECH TIME PER 15 MIN (STAT)

## 2022-09-09 PROCEDURE — 99900026 HC AIRWAY MAINTENANCE (STAT)

## 2022-09-09 PROCEDURE — 85730 THROMBOPLASTIN TIME PARTIAL: CPT

## 2022-09-09 PROCEDURE — 83735 ASSAY OF MAGNESIUM: CPT | Performed by: INTERNAL MEDICINE

## 2022-09-09 PROCEDURE — 63600175 PHARM REV CODE 636 W HCPCS: Mod: JG | Performed by: PSYCHIATRY & NEUROLOGY

## 2022-09-09 PROCEDURE — 64612 DESTROY NERVE FACE MUSCLE: CPT | Mod: 50,,, | Performed by: PSYCHIATRY & NEUROLOGY

## 2022-09-09 PROCEDURE — 25000003 PHARM REV CODE 250: Performed by: NURSE PRACTITIONER

## 2022-09-09 PROCEDURE — 99291 PR CRITICAL CARE, E/M 30-74 MINUTES: ICD-10-PCS | Mod: GC,,, | Performed by: PSYCHIATRY & NEUROLOGY

## 2022-09-09 PROCEDURE — 63600175 PHARM REV CODE 636 W HCPCS: Performed by: PHYSICIAN ASSISTANT

## 2022-09-09 PROCEDURE — 85730 THROMBOPLASTIN TIME PARTIAL: CPT | Mod: 91 | Performed by: NURSE PRACTITIONER

## 2022-09-09 PROCEDURE — 27200966 HC CLOSED SUCTION SYSTEM

## 2022-09-09 PROCEDURE — 25000003 PHARM REV CODE 250: Performed by: PSYCHIATRY & NEUROLOGY

## 2022-09-09 PROCEDURE — 85730 THROMBOPLASTIN TIME PARTIAL: CPT | Mod: 91 | Performed by: PSYCHIATRY & NEUROLOGY

## 2022-09-09 PROCEDURE — 99291 CRITICAL CARE FIRST HOUR: CPT | Mod: GC,,, | Performed by: PSYCHIATRY & NEUROLOGY

## 2022-09-09 PROCEDURE — 94003 VENT MGMT INPAT SUBQ DAY: CPT

## 2022-09-09 PROCEDURE — 85025 COMPLETE CBC W/AUTO DIFF WBC: CPT

## 2022-09-09 PROCEDURE — 64612 PR DEST,NERVE,FACIAL: ICD-10-PCS | Mod: 50,,, | Performed by: PSYCHIATRY & NEUROLOGY

## 2022-09-09 PROCEDURE — 86140 C-REACTIVE PROTEIN: CPT

## 2022-09-09 PROCEDURE — 84100 ASSAY OF PHOSPHORUS: CPT | Performed by: INTERNAL MEDICINE

## 2022-09-09 PROCEDURE — 63700000 PHARM REV CODE 250 ALT 637 W/O HCPCS

## 2022-09-09 PROCEDURE — 27000221 HC OXYGEN, UP TO 24 HOURS

## 2022-09-09 PROCEDURE — 63600175 PHARM REV CODE 636 W HCPCS

## 2022-09-09 RX ORDER — MIDODRINE HYDROCHLORIDE 5 MG/1
15 TABLET ORAL 2 TIMES DAILY
Status: DISCONTINUED | OUTPATIENT
Start: 2022-09-09 | End: 2022-09-13 | Stop reason: HOSPADM

## 2022-09-09 RX ORDER — MIDODRINE HYDROCHLORIDE 5 MG/1
20 TABLET ORAL NIGHTLY
Status: DISCONTINUED | OUTPATIENT
Start: 2022-09-09 | End: 2022-09-13 | Stop reason: HOSPADM

## 2022-09-09 RX ORDER — NOREPINEPHRINE BITARTRATE/D5W 4MG/250ML
0-.2 PLASTIC BAG, INJECTION (ML) INTRAVENOUS CONTINUOUS
Status: DISCONTINUED | OUTPATIENT
Start: 2022-09-09 | End: 2022-09-11

## 2022-09-09 RX ORDER — PSEUDOEPHEDRINE HCL 30 MG
30 TABLET ORAL 2 TIMES DAILY
Status: DISCONTINUED | OUTPATIENT
Start: 2022-09-09 | End: 2022-09-11

## 2022-09-09 RX ADMIN — NYSTATIN 500000 UNITS: 500000 SUSPENSION ORAL at 01:09

## 2022-09-09 RX ADMIN — METOPROLOL TARTRATE 12.5 MG: 25 TABLET, FILM COATED ORAL at 12:09

## 2022-09-09 RX ADMIN — SCOPALAMINE 1 PATCH: 1 PATCH, EXTENDED RELEASE TRANSDERMAL at 09:09

## 2022-09-09 RX ADMIN — FAMOTIDINE 20 MG: 20 TABLET ORAL at 09:09

## 2022-09-09 RX ADMIN — TRAZODONE HYDROCHLORIDE 50 MG: 50 TABLET ORAL at 09:09

## 2022-09-09 RX ADMIN — HEPARIN SODIUM 17 UNITS/KG/HR: 5000 INJECTION INTRAVENOUS; SUBCUTANEOUS at 11:09

## 2022-09-09 RX ADMIN — METHOCARBAMOL 1000 MG: 500 TABLET ORAL at 12:09

## 2022-09-09 RX ADMIN — METHOCARBAMOL 1000 MG: 500 TABLET ORAL at 06:09

## 2022-09-09 RX ADMIN — METOPROLOL TARTRATE 12.5 MG: 25 TABLET, FILM COATED ORAL at 05:09

## 2022-09-09 RX ADMIN — ACETAMINOPHEN 1000 MG: 500 TABLET ORAL at 05:09

## 2022-09-09 RX ADMIN — IPRATROPIUM BROMIDE AND ALBUTEROL SULFATE 3 ML: 2.5; .5 SOLUTION RESPIRATORY (INHALATION) at 01:09

## 2022-09-09 RX ADMIN — MUPIROCIN: 20 OINTMENT TOPICAL at 09:09

## 2022-09-09 RX ADMIN — ONABOTULINUMTOXINA 100 UNITS: 100 INJECTION, POWDER, LYOPHILIZED, FOR SOLUTION INTRADERMAL; INTRAMUSCULAR at 04:09

## 2022-09-09 RX ADMIN — SENNOSIDES AND DOCUSATE SODIUM 1 TABLET: 50; 8.6 TABLET ORAL at 09:09

## 2022-09-09 RX ADMIN — METHOCARBAMOL 1000 MG: 500 TABLET ORAL at 11:09

## 2022-09-09 RX ADMIN — MUPIROCIN: 20 OINTMENT TOPICAL at 03:09

## 2022-09-09 RX ADMIN — POLYETHYLENE GLYCOL 3350 17 G: 17 POWDER, FOR SOLUTION ORAL at 09:09

## 2022-09-09 RX ADMIN — BUSPIRONE HYDROCHLORIDE 5 MG: 5 TABLET ORAL at 09:09

## 2022-09-09 RX ADMIN — NOREPINEPHRINE BITARTRATE 0.08 MCG/KG/MIN: 4 INJECTION, SOLUTION INTRAVENOUS at 09:09

## 2022-09-09 RX ADMIN — MIDODRINE HYDROCHLORIDE 15 MG: 5 TABLET ORAL at 05:09

## 2022-09-09 RX ADMIN — GABAPENTIN 300 MG: 300 CAPSULE ORAL at 05:09

## 2022-09-09 RX ADMIN — POTASSIUM & SODIUM PHOSPHATES POWDER PACK 280-160-250 MG 2 PACKET: 280-160-250 PACK at 05:09

## 2022-09-09 RX ADMIN — NOREPINEPHRINE BITARTRATE 0.14 MCG/KG/MIN: 4 INJECTION, SOLUTION INTRAVENOUS at 12:09

## 2022-09-09 RX ADMIN — GABAPENTIN 300 MG: 300 CAPSULE ORAL at 09:09

## 2022-09-09 RX ADMIN — METOPROLOL TARTRATE 12.5 MG: 25 TABLET, FILM COATED ORAL at 11:09

## 2022-09-09 RX ADMIN — MIDODRINE HYDROCHLORIDE 20 MG: 5 TABLET ORAL at 09:09

## 2022-09-09 RX ADMIN — RILUZOLE 50 MG: 50 TABLET ORAL at 09:09

## 2022-09-09 RX ADMIN — IPRATROPIUM BROMIDE AND ALBUTEROL SULFATE 3 ML: 2.5; .5 SOLUTION RESPIRATORY (INHALATION) at 08:09

## 2022-09-09 RX ADMIN — MAGNESIUM SULFATE HEPTAHYDRATE 2 G: 40 INJECTION, SOLUTION INTRAVENOUS at 05:09

## 2022-09-09 RX ADMIN — ACETAMINOPHEN 1000 MG: 500 TABLET ORAL at 01:09

## 2022-09-09 RX ADMIN — GABAPENTIN 300 MG: 300 CAPSULE ORAL at 01:09

## 2022-09-09 RX ADMIN — METHOCARBAMOL 1000 MG: 500 TABLET ORAL at 05:09

## 2022-09-09 RX ADMIN — MIDODRINE HYDROCHLORIDE 15 MG: 5 TABLET ORAL at 01:09

## 2022-09-09 RX ADMIN — VANCOMYCIN HYDROCHLORIDE 500 MG: 500 INJECTION, POWDER, LYOPHILIZED, FOR SOLUTION INTRAVENOUS at 09:09

## 2022-09-09 RX ADMIN — NYSTATIN 500000 UNITS: 500000 SUSPENSION ORAL at 09:09

## 2022-09-09 RX ADMIN — METOPROLOL TARTRATE 12.5 MG: 25 TABLET, FILM COATED ORAL at 06:09

## 2022-09-09 RX ADMIN — ACETAMINOPHEN 1000 MG: 500 TABLET ORAL at 09:09

## 2022-09-09 RX ADMIN — NOREPINEPHRINE BITARTRATE 0.12 MCG/KG/MIN: 4 INJECTION, SOLUTION INTRAVENOUS at 05:09

## 2022-09-09 RX ADMIN — POTASSIUM & SODIUM PHOSPHATES POWDER PACK 280-160-250 MG 2 PACKET: 280-160-250 PACK at 09:09

## 2022-09-09 RX ADMIN — PSEUDOEPHEDRINE HCL 30 MG: 30 TABLET, FILM COATED ORAL at 06:09

## 2022-09-09 RX ADMIN — PSEUDOEPHEDRINE HCL 30 MG: 30 TABLET, FILM COATED ORAL at 11:09

## 2022-09-09 RX ADMIN — IPRATROPIUM BROMIDE AND ALBUTEROL SULFATE 3 ML: 2.5; .5 SOLUTION RESPIRATORY (INHALATION) at 07:09

## 2022-09-09 NOTE — SUBJECTIVE & OBJECTIVE
Review of Systems   Unable to perform ROS: Intubated     Objective:     Vitals:  Temp: 98.9 °F (37.2 °C)  Pulse: 78  Rhythm: normal sinus rhythm  BP: 115/63  MAP (mmHg): 76  Resp: 15  SpO2: 100 %  Oxygen Concentration (%): 30  O2 Device (Oxygen Therapy): ventilator  Vent Mode: A/C  Set Rate: 14 BPM  Vt Set: 400 mL  PEEP/CPAP: 5 cmH20  Peak Airway Pressure: 22 cmH2O  Mean Airway Pressure: 11 cmH20  Plateau Pressure: 28 cmH20    Temp  Min: 98 °F (36.7 °C)  Max: 99.7 °F (37.6 °C)  Pulse  Min: 67  Max: 114  BP  Min: 78/40  Max: 128/70  MAP (mmHg)  Min: 54  Max: 87  Resp  Min: 14  Max: 24  SpO2  Min: 100 %  Max: 100 %  Oxygen Concentration (%)  Min: 30  Max: 30    09/08 0701 - 09/09 0700  In: 1985.5 [I.V.:717.4]  Out: 1175 [Urine:1175]   Unmeasured Output  Urine Occurrence: 1  Stool Occurrence: 0  Pad Count: 1       Physical Exam  Vitals and nursing note reviewed.   Constitutional:       General: He is not in acute distress.     Appearance: He is not diaphoretic.   HENT:      Head: Normocephalic.      Right Ear: External ear normal.      Left Ear: External ear normal.      Nose: Nose normal.      Mouth/Throat:      Comments: Bite block in place. Hypertrophied tongue bulging through teeth at region of right lower mandible  Eyes:      General: No scleral icterus.        Right eye: No discharge.         Left eye: No discharge.      Conjunctiva/sclera: Conjunctivae normal.      Pupils: Pupils are equal, round, and reactive to light.   Neck:      Comments: Trach in place  Cardiovascular:      Rate and Rhythm: Normal rate.   Pulmonary:      Comments: On ventilator  Abdominal:      General: Abdomen is flat.      Palpations: Abdomen is soft.   Musculoskeletal:         General: No tenderness.   Skin:     General: Skin is warm.      Comments: Wound to anterior aspect of left lower extremity.    Neurological:      Mental Status: He is alert.      Motor: Weakness present.      Comments: Blinks, nods head    E3 V: not testable  M6.  nonverbal. Following commands intermittently  Conversational hearing intact.              Medications:  Continuousheparin (porcine) in D5W, Last Rate: Stopped (09/09/22 1432)  NORepinephrine bitartrate-D5W, Last Rate: 0.055 mcg/kg/min (09/09/22 1600)    Scheduledacetaminophen, 1,000 mg, Q8H  albuterol-ipratropium, 3 mL, TID WAKE  busPIRone, 5 mg, BID  famotidine, 20 mg, BID  gabapentin, 300 mg, Q8H  methocarbamoL, 1,000 mg, Q6H  metoprolol tartrate, 12.5 mg, Q6H  midodrine, 15 mg, BID   And  midodrine, 20 mg, QHS  mupirocin, , TID  nystatin, 500,000 Units, QID  polyethylene glycol, 17 g, Daily  pseudoephedrine, 30 mg, BID  riluzole, 50 mg, Q12H  scopolamine, 1 patch, Q3 Days  senna-docusate 8.6-50 mg, 1 tablet, BID  traZODone, 50 mg, QHS    PRNbisacodyL, 10 mg, Daily PRN  dextrose 10%, 12.5 g, PRN  dextrose 10%, 25 g, PRN  fentaNYL, 50 mcg, Q1H PRN  hydrALAZINE, 10 mg, Q4H PRN  labetalol, 10 mg, Q4H PRN  magnesium sulfate IVPB, 2 g, PRN  magnesium sulfate IVPB, 4 g, PRN  midazolam, 1 mg, Q2H PRN  ondansetron, 4 mg, Q6H PRN  oxyCODONE, 5 mg, Q4H PRN  oxyCODONE, 10 mg, Q4H PRN  potassium bicarbonate, 35 mEq, PRN  potassium bicarbonate, 50 mEq, PRN  potassium bicarbonate, 60 mEq, PRN  potassium, sodium phosphates, 2 packet, PRN  potassium, sodium phosphates, 2 packet, PRN  sodium chloride 0.9%, 10 mL, PRN  vancomycin - pharmacy to dose, , pharmacy to manage frequency      Today I personally reviewed pertinent medications, lines/drains/airways, imaging, laboratory results, notably:    Diet  Diet NPO  Diet NPO

## 2022-09-09 NOTE — PROGRESS NOTES
[facility]  Neurocritical Care  Progress Note    Admit Date: 8/31/2022  Service Date: 09/09/2022  Length of Stay: 9    Subjective:     Chief Complaint: Trismus    History of Present Illness: Wei Dominique is a 57 yo AAM with ALS. At OSH with prolonged course complicated by bacteremia, PNA, upper extremity DVTs, facial swelling and trismus. Received Abx, PICC was changed there prior to transfer, and received BOTOX injections to help with his Trismus.      Hospital Course: 09/05/2022: No acute events overnight. ENT recommended anesthesia consult for possible V3 nerve block. Started midodrine 5mg q8h. ID does not recommend HAYDEN at this time.   09/06/2022: No acute events overnight. Consulted acute pain team for better management of refractory pain. DC cefepime per ID recs and will continue vancomycin with end date of 9/13. Patient continues to be hyponatremic likely secondary to dehydration so ordered one time dose of albumin 5%.   09/07/2022: Overnight blood pressure was low so he was started on levo. Noticed new wound on anterior aspect of left lower extremity. Wound care team following. Adjusted pain regimen per anesthesia recs. Patient continues to improve neurologically. He is making purposeful movements and follows commands intermittently. Continue spontaneous breathing trials.  09/08/2022: New a-line placed overnight. Increased midodrine to 15mg q8h and dc levo. Spoke to pain anesthesia and was recommended that patient receive repeat botox injection for pain management at a higher dosage than previously injected. Continue with spontaneous breathing trials.   09/09/2022: Patient's blood pressure dropped overnight with MAP down to 55. He was restarted on levo. Plan to increase midodrine to 20mg at night and maintain 15mg dosage during the day. Also started pseudoephedrine. Patient received botox injections into his temporalis muscle. He tolerated the procedure well with no complications.            Review of Systems   Unable to perform ROS: Intubated     Objective:     Vitals:  Temp: 98.9 °F (37.2 °C)  Pulse: 78  Rhythm: normal sinus rhythm  BP: 115/63  MAP (mmHg): 76  Resp: 15  SpO2: 100 %  Oxygen Concentration (%): 30  O2 Device (Oxygen Therapy): ventilator  Vent Mode: A/C  Set Rate: 14 BPM  Vt Set: 400 mL  PEEP/CPAP: 5 cmH20  Peak Airway Pressure: 22 cmH2O  Mean Airway Pressure: 11 cmH20  Plateau Pressure: 28 cmH20    Temp  Min: 98 °F (36.7 °C)  Max: 99.7 °F (37.6 °C)  Pulse  Min: 67  Max: 114  BP  Min: 78/40  Max: 128/70  MAP (mmHg)  Min: 54  Max: 87  Resp  Min: 14  Max: 24  SpO2  Min: 100 %  Max: 100 %  Oxygen Concentration (%)  Min: 30  Max: 30    09/08 0701 - 09/09 0700  In: 1985.5 [I.V.:717.4]  Out: 1175 [Urine:1175]   Unmeasured Output  Urine Occurrence: 1  Stool Occurrence: 0  Pad Count: 1       Physical Exam  Vitals and nursing note reviewed.   Constitutional:       General: He is not in acute distress.     Appearance: He is not diaphoretic.   HENT:      Head: Normocephalic.      Right Ear: External ear normal.      Left Ear: External ear normal.      Nose: Nose normal.      Mouth/Throat:      Comments: Bite block in place. Hypertrophied tongue bulging through teeth at region of right lower mandible  Eyes:      General: No scleral icterus.        Right eye: No discharge.         Left eye: No discharge.      Conjunctiva/sclera: Conjunctivae normal.      Pupils: Pupils are equal, round, and reactive to light.   Neck:      Comments: Trach in place  Cardiovascular:      Rate and Rhythm: Normal rate.   Pulmonary:      Comments: On ventilator  Abdominal:      General: Abdomen is flat.      Palpations: Abdomen is soft.   Musculoskeletal:         General: No tenderness.   Skin:     General: Skin is warm.      Comments: Wound to anterior aspect of left lower extremity.    Neurological:      Mental Status: He is alert.      Motor: Weakness present.      Comments: Blinks, nods head    E3 V: not  testable M6.  nonverbal. Following commands intermittently  Conversational hearing intact.              Medications:  Continuousheparin (porcine) in D5W, Last Rate: Stopped (09/09/22 1432)  NORepinephrine bitartrate-D5W, Last Rate: 0.055 mcg/kg/min (09/09/22 1600)    Scheduledacetaminophen, 1,000 mg, Q8H  albuterol-ipratropium, 3 mL, TID WAKE  busPIRone, 5 mg, BID  famotidine, 20 mg, BID  gabapentin, 300 mg, Q8H  methocarbamoL, 1,000 mg, Q6H  metoprolol tartrate, 12.5 mg, Q6H  midodrine, 15 mg, BID   And  midodrine, 20 mg, QHS  mupirocin, , TID  nystatin, 500,000 Units, QID  polyethylene glycol, 17 g, Daily  pseudoephedrine, 30 mg, BID  riluzole, 50 mg, Q12H  scopolamine, 1 patch, Q3 Days  senna-docusate 8.6-50 mg, 1 tablet, BID  traZODone, 50 mg, QHS    PRNbisacodyL, 10 mg, Daily PRN  dextrose 10%, 12.5 g, PRN  dextrose 10%, 25 g, PRN  fentaNYL, 50 mcg, Q1H PRN  hydrALAZINE, 10 mg, Q4H PRN  labetalol, 10 mg, Q4H PRN  magnesium sulfate IVPB, 2 g, PRN  magnesium sulfate IVPB, 4 g, PRN  midazolam, 1 mg, Q2H PRN  ondansetron, 4 mg, Q6H PRN  oxyCODONE, 5 mg, Q4H PRN  oxyCODONE, 10 mg, Q4H PRN  potassium bicarbonate, 35 mEq, PRN  potassium bicarbonate, 50 mEq, PRN  potassium bicarbonate, 60 mEq, PRN  potassium, sodium phosphates, 2 packet, PRN  potassium, sodium phosphates, 2 packet, PRN  sodium chloride 0.9%, 10 mL, PRN  vancomycin - pharmacy to dose, , pharmacy to manage frequency      Today I personally reviewed pertinent medications, lines/drains/airways, imaging, laboratory results, notably:    Diet  Diet NPO  Diet NPO          Assessment/Plan:     Neuro  ALS (amyotrophic lateral sclerosis)  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 He is s/p trach and peg. Multiple discussions with patient and patient's wife about GOC. He would like continued treatment.     - Continue riluzole  - Consulted acute pain for better management of his refractory pain. Adjusted pain regimen per their recs  - PRN acetaminophen, fentanyl,  oxycodone      Renal/  Hyponatremia  -9/6: Likely secondary to dehydration so gave one time dose of albumin 5%  -Continue to monitor with daily CMP  -Continue neuro checks. No current acute decline in mentation.    Hematology  Occlusive thrombus  U/S showed RUE DVT.     -Continue heparin gtt     Other  * Trismus  Briefly 57 yo AAM with ALS. At OSH with prolonged course complicated by bacteremia, PNA, upper extremity DVTs, facial swelling and trismus. Received Abx, PICC was changed there prior to transfer, and received BOTOX injections to help with his Trismus. Wheelchair bound 2020     -ENT consult for facial mass on CT & trismus. Low suspicion for abscess. Recommended that anesthesia be consulted for V3 nerve block. Spoke to pain anesthesia and was advised to repeat botox injection at a higher dosage than previously injected (50cc) for better and long term pain relief. Reached out to Dr. Terry and he was able to perform botox injection with no complications (9/9).  -ID following: Continue vancomycin with end date of 9/13. Dc cefepime.  -Echo  -tube feeds  -9/7: overnight patient's blood pressure was low so started levo  -9/8: Increased midodrine from 5mg q8h to 15mg q8h for better blood pressure control. Dc levo  -9/9: Increased midodrine to 20mg nightly. He was restarted on levo overnight due to low blood pressures and MAP. Started pseudoephedrine.           The patient is being Prophylaxed for:  Venous Thromboembolism with: Mechanical or Chemical  Stress Ulcer with: H2B  Ventilator Pneumonia with: chlorhexidine oral care    Activity Orders          Turn patient starting at 08/31 1200    Elevate HOB starting at 08/31 1131    Diet NPO: NPO starting at 08/31 1131        Full Code    Alicia Tan DO  Neurocritical Care  [facility]

## 2022-09-09 NOTE — PROGRESS NOTES
Pharmacokinetic Assessment: IV Vancomycin    Vancomycin serum concentration assessment(s):    - Random level is therapeutic at 16.1 mcg/mL  - Goal trough 15-20 mcg/mL  - Renal function stable w/ good UOP   - Pt bed bound d/t ALS. Scr is not the best indicator of renal function d/t limited muscle mass  -  Pt didn't tolerate scheduled q24h regimen. Will complete remaining course by pulse dosing as needed  - Give 500 mg x1 now  - Collect random level on 9/10 w/ AM labs  - Redose based on level and renal function  - Last day of abx 9/13    Drug levels (last 3 results):  Recent Labs   Lab Result Units 09/07/22  0016 09/08/22  0244 09/09/22  0300   Vancomycin, Random ug/mL 19.9 11.7 16.1       Pharmacy will continue to follow and monitor vancomycin.  Please contact pharmacy at extension [phone] for questions regarding this assessment.      Thank you for the consult,     Cynthia Maza, PharmD, [facility]  Neurocritical Care Pharmacist  [id number]           Patient brief summary:  Wei Dominique is a 67 y.o. male initiated on antimicrobial therapy with IV Vancomycin for treatment of bacteremia      Drug Allergies:   Review of patient's allergies indicates:  No Known Allergies      Renal Function:   Estimated Creatinine Clearance: 140.6 mL/min (based on SCr of 0.5 mg/dL).,     Dialysis Method (if applicable):  N/A

## 2022-09-09 NOTE — PLAN OF CARE
Problem: Infection Progression (Sepsis/Septic Shock)  Goal: Absence of Infection Signs and Symptoms  Outcome: Ongoing, Progressing     Problem: Nutrition Impaired (Sepsis/Septic Shock)  Goal: Optimal Nutrition Intake  Outcome: Ongoing, Progressing   [facility] Care Plan    POC reviewed with Wei Dominique and family at 0300. Pt verbalized understanding. Questions and concerns addressed. No acute events overnight. Pt progressing toward goals. Will continue to monitor. See below and flowsheets for full assessment and VS info.   - Levo restarted to maintain MAP >65  - continue heparin gtt @17      Is this a stroke patient? no    Neuro:  Sukumar Coma Scale  Best Eye Response: 4-->(E4) spontaneous  Best Motor Response: 6-->(M6) obeys commands  Best Verbal Response: 1-->(V1) none  Sukumar Coma Scale Score: 11  Assessment Qualifiers: patient not sedated/intubated, no eye obstruction present  Pupil PERRLA: yes     24hr Temp:  [98 °F (36.7 °C)-98.1 °F (36.7 °C)]     CV:   Rhythm: normal sinus rhythm  BP goals:   SBP < 160  MAP > 65    Resp:   O2 Device (Oxygen Therapy): ventilator  Vent Mode: A/C  Set Rate: 14 BPM  Oxygen Concentration (%): 30  Vt Set: 400 mL  PEEP/CPAP: 5 cmH20  Pressure Support: 5 cmH20    Plan: N/A    GI/:     Diet/Nutrition Received: NPO, tube feeding  Last Bowel Movement: 09/09/22  Voiding Characteristics: external catheter    Intake/Output Summary (Last 24 hours) at 9/9/2022 0549  Last data filed at 9/9/2022 0505  Gross per 24 hour   Intake 1504.99 ml   Output 1450 ml   Net 54.99 ml     Unmeasured Output  Urine Occurrence: 1  Stool Occurrence: 0  Pad Count: 1    Labs/Accuchecks:  Recent Labs   Lab 09/09/22  0300   WBC 12.65   RBC 3.09*   HGB 7.9*   HCT 27.2*   *      Recent Labs   Lab 09/09/22 0300   *   K 4.4   CO2 23      BUN 36*   CREATININE 0.5   ALKPHOS 76   ALT 5*   AST 13   BILITOT 0.2      Recent Labs   Lab 09/09/22 0300   APTT 37.0*    No results for input(s): CPK, CPKMB,  TROPONINI, MB in the last 168 hours.    Electrolytes: Electrolytes replaced  Accuchecks: none    Gtts:   heparin (porcine) in D5W 16 Units/kg/hr (09/08/22 1801)    NORepinephrine bitartrate-D5W 0.12 mcg/kg/min (09/09/22 0526)       LDA/Wounds:  Lines/Drains/Airways       Drain  Duration                  Gastrostomy/Enterostomy LUQ feeding -- days    Male External Urinary Catheter 09/04/22 1815 Large 4 days              Airway  Duration                  Surgical Airway Shiley Cuffed -- days         Surgical Airway 05/23/22 1145 Portex Cuffed 108 days              Arterial Line  Duration             Arterial Line 09/08/22 0430 Right Radial 1 day              Peripheral Intravenous Line  Duration                  Midline Catheter Insertion/Assessment  - Single Lumen 09/02/22 1200 Left cephalic vein (lateral side of arm) 20g x 10cm 6 days         Peripheral IV - Single Lumen 09/02/22 1200 18 G;1 3/4 in Left Antecubital 6 days         Peripheral IV - Single Lumen 09/07/22 1618 20 G;1 3/4 in Right;Anterior Upper Arm 1 day                  Wounds: Yes  Wound care consulted: Yes

## 2022-09-09 NOTE — ASSESSMENT & PLAN NOTE
Briefly 55 yo AAM with ALS. At OSH with prolonged course complicated by bacteremia, PNA, upper extremity DVTs, facial swelling and trismus. Received Abx, PICC was changed there prior to transfer, and received BOTOX injections to help with his Trismus. Wheelchair bound 2020     -ENT consult for facial mass on CT & trismus. Low suspicion for abscess. Recommended that anesthesia be consulted for V3 nerve block. Spoke to pain anesthesia and was advised to repeat botox injection at a higher dosage than previously injected (50cc). Reached out to Dr. Terry and he was able to perform botox injection with no complications (9/9).  -ID following: Continue vancomycin with end date of 9/13. Dc cefepime.  -Echo  -tube feeds  -9/7: overnight patient's blood pressure was low so started levo  -9/8: Increased midodrine from 5mg q8h to 15mg q8h for better blood pressure control. Dc levo  -9/9: Increased midodrine to 20mg nightly. He was restarted on levo overnight due to low blood pressures and MAP. Started pseudoephedrine.

## 2022-09-09 NOTE — PLAN OF CARE
[facility] Care Plan    POC reviewed with Wei Dominique at 1500. Pt blinks eyes appropriately to indicate understanding. No acute events today. Pt progressing toward goals. Will continue to monitor. See below and flowsheets for full assessment and VS info.             Is this a stroke patient? no    Neuro:  Sukumar Coma Scale  Best Eye Response: 4-->(E4) spontaneous  Best Motor Response: 6-->(M6) obeys commands  Best Verbal Response: 1-->(V1) none  Sukumar Coma Scale Score: 11  Assessment Qualifiers: patient not sedated/intubated  Pupil PERRLA: yes     24 hr Temp:  [98 °F (36.7 °C)-99.7 °F (37.6 °C)]     CV:   Rhythm: normal sinus rhythm  BP goals:   SBP < 160  MAP >  60    Resp:   O2 Device (Oxygen Therapy): ventilator  Vent Mode: A/C  Set Rate: 14 BPM  Oxygen Concentration (%): 30  Vt Set: 400 mL  PEEP/CPAP: 5 cmH20  Pressure Support: 5 cmH20    Plan: wean to extubate    GI/:     Diet/Nutrition Received: NPO, tube feeding  Last Bowel Movement: 09/09/22  Voiding Characteristics: external catheter    Intake/Output Summary (Last 24 hours) at 9/9/2022 1748  Last data filed at 9/9/2022 1600  Gross per 24 hour   Intake 2575.05 ml   Output 1825 ml   Net 750.05 ml     Unmeasured Output  Urine Occurrence: 1  Stool Occurrence: 0  Pad Count: 1    Labs/Accuchecks:  Recent Labs   Lab 09/09/22  0300   WBC 12.65   RBC 3.09*   HGB 7.9*   HCT 27.2*   *      Recent Labs   Lab 09/09/22  0300   *   K 4.4   CO2 23      BUN 36*   CREATININE 0.5   ALKPHOS 76   ALT 5*   AST 13   BILITOT 0.2      Recent Labs   Lab 09/09/22  1634   APTT 30.5    No results for input(s): CPK, CPKMB, TROPONINI, MB in the last 168 hours.    Electrolytes: Electrolytes replaced  Accuchecks: none    Gtts:   heparin (porcine) in D5W Stopped (09/09/22 1432)    NORepinephrine bitartrate-D5W 0.055 mcg/kg/min (09/09/22 1600)       LDA/Wounds:  Lines/Drains/Airways       Drain  Duration                  Gastrostomy/Enterostomy LUQ feeding -- days     Male External Urinary Catheter 09/04/22 1815 Large 4 days              Airway  Duration                  Surgical Airway Shiley Cuffed -- days         Surgical Airway 05/23/22 1145 Portex Cuffed 109 days              Arterial Line  Duration             Arterial Line 09/08/22 0430 Right Radial 1 day              Peripheral Intravenous Line  Duration                  Midline Catheter Insertion/Assessment  - Single Lumen 09/02/22 1200 Left cephalic vein (lateral side of arm) 20g x 10cm 7 days         Peripheral IV - Single Lumen 09/02/22 1200 18 G;1 3/4 in Left Antecubital 7 days         Peripheral IV - Single Lumen 09/07/22 1618 20 G;1 3/4 in Right;Anterior Upper Arm 2 days                  Wounds: Yes  Wound care consulted: Yes

## 2022-09-09 NOTE — PROCEDURES
BOTOX was performed as a palliative therapy for trismus given that the patient failed more conservative treatments    Botulinum Toxin Injection Procedure   Pre-operative diagnosis: Trismus 2/2 to ALS   Post-operative diagnosis: Same   Procedure: Chemical neurolysis   After risks and benefits were explained including bleeding, infection, worsening of pain, damage to the areas being injected, weakness of muscles, loss of muscle control, dysphagia if injecting the head or neck, facial droop if injecting the facial area, painful injection, allergic or other reaction to the medications being injected, and the failure of the procedure to help the problem, a signed consent was obtained by speaking to the patient's wife on the phone. Time out was called with confirmation of name and date of birth as well as the treatment site with a witness in the room.  The patient was placed in a comfortable area and the sites to be treated were identified.  Next, a 30 gauge needle was used to inject the medication in the area to be treated.   Area(s) injected:   Total Botox used: 60 Units   Botox wastage: 40 Units   Injection sites:   Temporalis muscle bilaterally (a total of 50 units divided into 10 sites)   Complications: none     The patient tolerated the procedure well and did not experience any complications.     Lot: [id number], x2 vials  Exp: 2024/08    Problem List Items Addressed This Visit          Neuro    ALS (amyotrophic lateral sclerosis) (Chronic)       Other    * (Principal) Trismus    Relevant Orders    Echo (Completed)

## 2022-09-10 LAB
ALBUMIN SERPL BCP-MCNC: 2.1 G/DL (ref 3.5–5.2)
ALP SERPL-CCNC: 66 U/L (ref 55–135)
ALT SERPL W/O P-5'-P-CCNC: 7 U/L (ref 10–44)
ANION GAP SERPL CALC-SCNC: 4 MMOL/L (ref 8–16)
ANISOCYTOSIS BLD QL SMEAR: SLIGHT
APTT BLDCRRT: 42.7 SEC (ref 21–32)
APTT BLDCRRT: 46.7 SEC (ref 21–32)
APTT BLDCRRT: 47 SEC (ref 21–32)
AST SERPL-CCNC: 18 U/L (ref 10–40)
BASO STIPL BLD QL SMEAR: ABNORMAL
BASOPHILS NFR BLD: 0 % (ref 0–1.9)
BILIRUB SERPL-MCNC: 0.2 MG/DL (ref 0.1–1)
BUN SERPL-MCNC: 37 MG/DL (ref 8–23)
CALCIUM SERPL-MCNC: 9.5 MG/DL (ref 8.7–10.5)
CHLORIDE SERPL-SCNC: 102 MMOL/L (ref 95–110)
CO2 SERPL-SCNC: 26 MMOL/L (ref 23–29)
CREAT SERPL-MCNC: 0.5 MG/DL (ref 0.5–1.4)
CRP SERPL-MCNC: 31 MG/L (ref 0–8.2)
DIFFERENTIAL METHOD: ABNORMAL
EOSINOPHIL NFR BLD: 16 % (ref 0–8)
ERYTHROCYTE [DISTWIDTH] IN BLOOD BY AUTOMATED COUNT: 20.9 % (ref 11.5–14.5)
EST. GFR  (NO RACE VARIABLE): >60 ML/MIN/1.73 M^2
GLUCOSE SERPL-MCNC: 119 MG/DL (ref 70–110)
HCT VFR BLD AUTO: 24.3 % (ref 40–54)
HGB BLD-MCNC: 7.5 G/DL (ref 14–18)
HYPOCHROMIA BLD QL SMEAR: ABNORMAL
IMM GRANULOCYTES # BLD AUTO: ABNORMAL K/UL (ref 0–0.04)
IMM GRANULOCYTES NFR BLD AUTO: ABNORMAL % (ref 0–0.5)
LYMPHOCYTES NFR BLD: 22 % (ref 18–48)
MAGNESIUM SERPL-MCNC: 2 MG/DL (ref 1.6–2.6)
MCH RBC QN AUTO: 25.8 PG (ref 27–31)
MCHC RBC AUTO-ENTMCNC: 30.9 G/DL (ref 32–36)
MCV RBC AUTO: 84 FL (ref 82–98)
METAMYELOCYTES NFR BLD MANUAL: 1.3 %
MONOCYTES NFR BLD: 10 % (ref 4–15)
NEUTROPHILS NFR BLD: 50 % (ref 38–73)
NEUTS BAND NFR BLD MANUAL: 0.7 %
NRBC BLD-RTO: 0 /100 WBC
PHOSPHATE SERPL-MCNC: 2.7 MG/DL (ref 2.7–4.5)
PLATELET # BLD AUTO: 462 K/UL (ref 150–450)
PLATELET BLD QL SMEAR: ABNORMAL
PMV BLD AUTO: 9.5 FL (ref 9.2–12.9)
POLYCHROMASIA BLD QL SMEAR: ABNORMAL
POTASSIUM SERPL-SCNC: 4.6 MMOL/L (ref 3.5–5.1)
PROT SERPL-MCNC: 7.9 G/DL (ref 6–8.4)
RBC # BLD AUTO: 2.91 M/UL (ref 4.6–6.2)
SODIUM SERPL-SCNC: 132 MMOL/L (ref 136–145)
VANCOMYCIN SERPL-MCNC: 14.3 UG/ML
WBC # BLD AUTO: 12.87 K/UL (ref 3.9–12.7)

## 2022-09-10 PROCEDURE — 20000000 HC ICU ROOM

## 2022-09-10 PROCEDURE — 85730 THROMBOPLASTIN TIME PARTIAL: CPT

## 2022-09-10 PROCEDURE — 27000221 HC OXYGEN, UP TO 24 HOURS

## 2022-09-10 PROCEDURE — 25000003 PHARM REV CODE 250: Performed by: PSYCHIATRY & NEUROLOGY

## 2022-09-10 PROCEDURE — 99900026 HC AIRWAY MAINTENANCE (STAT)

## 2022-09-10 PROCEDURE — 63700000 PHARM REV CODE 250 ALT 637 W/O HCPCS

## 2022-09-10 PROCEDURE — 85027 COMPLETE CBC AUTOMATED: CPT

## 2022-09-10 PROCEDURE — 27000207 HC ISOLATION

## 2022-09-10 PROCEDURE — 80202 ASSAY OF VANCOMYCIN: CPT | Performed by: PSYCHIATRY & NEUROLOGY

## 2022-09-10 PROCEDURE — 85730 THROMBOPLASTIN TIME PARTIAL: CPT | Mod: 91 | Performed by: PSYCHIATRY & NEUROLOGY

## 2022-09-10 PROCEDURE — 63600175 PHARM REV CODE 636 W HCPCS: Performed by: PSYCHIATRY & NEUROLOGY

## 2022-09-10 PROCEDURE — 25000003 PHARM REV CODE 250: Performed by: NURSE PRACTITIONER

## 2022-09-10 PROCEDURE — 80053 COMPREHEN METABOLIC PANEL: CPT

## 2022-09-10 PROCEDURE — 84100 ASSAY OF PHOSPHORUS: CPT | Performed by: INTERNAL MEDICINE

## 2022-09-10 PROCEDURE — 99291 PR CRITICAL CARE, E/M 30-74 MINUTES: ICD-10-PCS | Mod: ,,, | Performed by: PSYCHIATRY & NEUROLOGY

## 2022-09-10 PROCEDURE — 99291 CRITICAL CARE FIRST HOUR: CPT | Mod: ,,, | Performed by: PSYCHIATRY & NEUROLOGY

## 2022-09-10 PROCEDURE — 99900035 HC TECH TIME PER 15 MIN (STAT)

## 2022-09-10 PROCEDURE — 25000003 PHARM REV CODE 250

## 2022-09-10 PROCEDURE — 63600175 PHARM REV CODE 636 W HCPCS: Performed by: NURSE PRACTITIONER

## 2022-09-10 PROCEDURE — 94761 N-INVAS EAR/PLS OXIMETRY MLT: CPT

## 2022-09-10 PROCEDURE — 27200966 HC CLOSED SUCTION SYSTEM

## 2022-09-10 PROCEDURE — 85007 BL SMEAR W/DIFF WBC COUNT: CPT

## 2022-09-10 PROCEDURE — 83735 ASSAY OF MAGNESIUM: CPT | Performed by: INTERNAL MEDICINE

## 2022-09-10 PROCEDURE — 25000242 PHARM REV CODE 250 ALT 637 W/ HCPCS: Performed by: PSYCHIATRY & NEUROLOGY

## 2022-09-10 PROCEDURE — 94003 VENT MGMT INPAT SUBQ DAY: CPT

## 2022-09-10 PROCEDURE — 86140 C-REACTIVE PROTEIN: CPT

## 2022-09-10 PROCEDURE — 94640 AIRWAY INHALATION TREATMENT: CPT

## 2022-09-10 RX ADMIN — ACETAMINOPHEN 1000 MG: 500 TABLET ORAL at 05:09

## 2022-09-10 RX ADMIN — METOPROLOL TARTRATE 12.5 MG: 25 TABLET, FILM COATED ORAL at 05:09

## 2022-09-10 RX ADMIN — SENNOSIDES AND DOCUSATE SODIUM 1 TABLET: 50; 8.6 TABLET ORAL at 09:09

## 2022-09-10 RX ADMIN — FENTANYL CITRATE 50 MCG: 0.05 INJECTION, SOLUTION INTRAMUSCULAR; INTRAVENOUS at 10:09

## 2022-09-10 RX ADMIN — FAMOTIDINE 20 MG: 20 TABLET ORAL at 09:09

## 2022-09-10 RX ADMIN — TRAZODONE HYDROCHLORIDE 50 MG: 50 TABLET ORAL at 09:09

## 2022-09-10 RX ADMIN — PSEUDOEPHEDRINE HCL 30 MG: 30 TABLET, FILM COATED ORAL at 05:09

## 2022-09-10 RX ADMIN — POLYETHYLENE GLYCOL 3350 17 G: 17 POWDER, FOR SOLUTION ORAL at 09:09

## 2022-09-10 RX ADMIN — BUSPIRONE HYDROCHLORIDE 5 MG: 5 TABLET ORAL at 09:09

## 2022-09-10 RX ADMIN — IPRATROPIUM BROMIDE AND ALBUTEROL SULFATE 3 ML: 2.5; .5 SOLUTION RESPIRATORY (INHALATION) at 07:09

## 2022-09-10 RX ADMIN — MUPIROCIN: 20 OINTMENT TOPICAL at 09:09

## 2022-09-10 RX ADMIN — RILUZOLE 50 MG: 50 TABLET ORAL at 09:09

## 2022-09-10 RX ADMIN — GABAPENTIN 300 MG: 300 CAPSULE ORAL at 01:09

## 2022-09-10 RX ADMIN — NYSTATIN 500000 UNITS: 500000 SUSPENSION ORAL at 09:09

## 2022-09-10 RX ADMIN — NYSTATIN 500000 UNITS: 500000 SUSPENSION ORAL at 05:09

## 2022-09-10 RX ADMIN — APIXABAN 2.5 MG: 2.5 TABLET, FILM COATED ORAL at 11:09

## 2022-09-10 RX ADMIN — MIDODRINE HYDROCHLORIDE 15 MG: 5 TABLET ORAL at 05:09

## 2022-09-10 RX ADMIN — METHOCARBAMOL 1000 MG: 500 TABLET ORAL at 11:09

## 2022-09-10 RX ADMIN — NYSTATIN 500000 UNITS: 500000 SUSPENSION ORAL at 01:09

## 2022-09-10 RX ADMIN — IPRATROPIUM BROMIDE AND ALBUTEROL SULFATE 3 ML: 2.5; .5 SOLUTION RESPIRATORY (INHALATION) at 01:09

## 2022-09-10 RX ADMIN — ACETAMINOPHEN 1000 MG: 500 TABLET ORAL at 01:09

## 2022-09-10 RX ADMIN — OXYCODONE 5 MG: 5 TABLET ORAL at 09:09

## 2022-09-10 RX ADMIN — METHOCARBAMOL 1000 MG: 500 TABLET ORAL at 05:09

## 2022-09-10 RX ADMIN — MIDODRINE HYDROCHLORIDE 15 MG: 5 TABLET ORAL at 01:09

## 2022-09-10 RX ADMIN — ACETAMINOPHEN 1000 MG: 500 TABLET ORAL at 09:09

## 2022-09-10 RX ADMIN — OXYCODONE HYDROCHLORIDE 10 MG: 10 TABLET ORAL at 07:09

## 2022-09-10 RX ADMIN — OXYCODONE 5 MG: 5 TABLET ORAL at 05:09

## 2022-09-10 RX ADMIN — METOPROLOL TARTRATE 12.5 MG: 25 TABLET, FILM COATED ORAL at 11:09

## 2022-09-10 RX ADMIN — VANCOMYCIN HYDROCHLORIDE 750 MG: 750 INJECTION, POWDER, LYOPHILIZED, FOR SOLUTION INTRAVENOUS at 09:09

## 2022-09-10 RX ADMIN — GABAPENTIN 300 MG: 300 CAPSULE ORAL at 05:09

## 2022-09-10 RX ADMIN — MUPIROCIN: 20 OINTMENT TOPICAL at 03:09

## 2022-09-10 RX ADMIN — APIXABAN 2.5 MG: 2.5 TABLET, FILM COATED ORAL at 09:09

## 2022-09-10 RX ADMIN — MIDODRINE HYDROCHLORIDE 20 MG: 5 TABLET ORAL at 09:09

## 2022-09-10 RX ADMIN — GABAPENTIN 300 MG: 300 CAPSULE ORAL at 09:09

## 2022-09-10 NOTE — PROGRESS NOTES
[facility]  Neurocritical Care  Progress Note    Admit Date: 8/31/2022  Service Date: 09/10/2022  Length of Stay: 10    Subjective:     Chief Complaint: Trismus    History of Present Illness: Wei Dominique is a 55 yo AAM with ALS. At OSH with prolonged course complicated by bacteremia, PNA, upper extremity DVTs, facial swelling and trismus. Received Abx, PICC was changed there prior to transfer, and received BOTOX injections to help with his Trismus.      Hospital Course: 09/05/2022: No acute events overnight. ENT recommended anesthesia consult for possible V3 nerve block. Started midodrine 5mg q8h. ID does not recommend HAYDEN at this time.   09/06/2022: No acute events overnight. Consulted acute pain team for better management of refractory pain. DC cefepime per ID recs and will continue vancomycin with end date of 9/13. Patient continues to be hyponatremic likely secondary to dehydration so ordered one time dose of albumin 5%.   09/07/2022: Overnight blood pressure was low so he was started on levo. Noticed new wound on anterior aspect of left lower extremity. Wound care team following. Adjusted pain regimen per anesthesia recs. Patient continues to improve neurologically. He is making purposeful movements and follows commands intermittently. Continue spontaneous breathing trials.  09/08/2022: New a-line placed overnight. Increased midodrine to 15mg q8h and dc levo. Spoke to pain anesthesia and was recommended that patient receive repeat botox injection for pain management at a higher dosage than previously injected. Continue with spontaneous breathing trials.   09/09/2022: Patient's blood pressure dropped overnight with MAP down to 55. He was restarted on levo. Plan to increase midodrine to 20mg at night and maintain 15mg dosage during the day. Also started pseudoephedrine. Patient received botox injections into his temporalis muscle. He tolerated the procedure well with no complications.    9/10/2022: BP more consistently within appropriate range, patient continues to have zero symptoms even when mild hypotension is noted on monitor. A-line positional and removed.  No signs/symptoms of adverse effects from Botox injections.  Remains afebrile, complete antibiotic course.  Heparin converted to Eliquis. Begin discharge planning.        Objective:     Vitals:  Temp: 97.4 °F (36.3 °C)  Pulse: 92  Rhythm: normal sinus rhythm  BP: 111/76  MAP (mmHg): 87  Resp: 16  SpO2: 100 %  Oxygen Concentration (%): 30  O2 Device (Oxygen Therapy): ventilator  Vent Mode: A/C  Set Rate: 14 BPM  Vt Set: 400 mL  PEEP/CPAP: 5 cmH20  Peak Airway Pressure: 17 cmH2O  Mean Airway Pressure: 9.6 cmH20  Plateau Pressure: 28 cmH20    Temp  Min: 97.4 °F (36.3 °C)  Max: 99.2 °F (37.3 °C)  Pulse  Min: 70  Max: 104  BP  Min: 84/45  Max: 155/71  MAP (mmHg)  Min: 61  Max: 114  Resp  Min: 14  Max: 32  SpO2  Min: 99 %  Max: 100 %  Oxygen Concentration (%)  Min: 30  Max: 30    09/09 0701 - 09/10 0700  In: 2197.6 [I.V.:516.3]  Out: 2275 [Urine:2275]   Unmeasured Output  Urine Occurrence: 1  Stool Occurrence: 0  Pad Count: 1       Physical Exam  Vitals and nursing note reviewed.   Constitutional:       General: He is not in acute distress.     Appearance: He is not diaphoretic.   HENT:      Head: Normocephalic.      Right Ear: External ear normal.      Left Ear: External ear normal.      Nose: Nose normal.      Mouth/Throat:      Comments: Bite block in place. Hypertrophied tongue bulging through teeth at region of right lower mandible  Eyes:      General: No scleral icterus.        Right eye: No discharge.         Left eye: No discharge.      Conjunctiva/sclera: Conjunctivae normal.      Pupils: Pupils are equal, round, and reactive to light.   Neck:      Comments: Trach in place  Cardiovascular:      Rate and Rhythm: Normal rate.   Pulmonary:      Comments: On ventilator  Abdominal:      General: Abdomen is flat.      Palpations: Abdomen is  soft.   Musculoskeletal:         General: No tenderness.   Skin:     General: Skin is warm.      Comments: Wound to anterior aspect of left lower extremity.    Neurological:      Mental Status: He is alert.      Motor: Weakness present.      Comments: Blinks, nods head     E3 V: not testable M6.  nonverbal. Following commands appropriately    Medications:  ContinuousNORepinephrine bitartrate-D5W, Last Rate: Stopped (09/10/22 1216)    Scheduledacetaminophen, 1,000 mg, Q8H  albuterol-ipratropium, 3 mL, TID WAKE  apixaban, 2.5 mg, BID  busPIRone, 5 mg, BID  famotidine, 20 mg, BID  gabapentin, 300 mg, Q8H  methocarbamoL, 1,000 mg, Q6H  metoprolol tartrate, 12.5 mg, Q6H  midodrine, 15 mg, BID   And  midodrine, 20 mg, QHS  mupirocin, , TID  nystatin, 500,000 Units, QID  polyethylene glycol, 17 g, Daily  pseudoephedrine, 30 mg, BID  riluzole, 50 mg, Q12H  scopolamine, 1 patch, Q3 Days  senna-docusate 8.6-50 mg, 1 tablet, BID  traZODone, 50 mg, QHS    PRNbisacodyL, 10 mg, Daily PRN  dextrose 10%, 12.5 g, PRN  dextrose 10%, 25 g, PRN  fentaNYL, 50 mcg, Q1H PRN  hydrALAZINE, 10 mg, Q4H PRN  labetalol, 10 mg, Q4H PRN  magnesium sulfate IVPB, 2 g, PRN  magnesium sulfate IVPB, 4 g, PRN  ondansetron, 4 mg, Q6H PRN  oxyCODONE, 5 mg, Q4H PRN  oxyCODONE, 10 mg, Q4H PRN  potassium bicarbonate, 35 mEq, PRN  potassium bicarbonate, 50 mEq, PRN  potassium bicarbonate, 60 mEq, PRN  potassium, sodium phosphates, 2 packet, PRN  potassium, sodium phosphates, 2 packet, PRN  sodium chloride 0.9%, 10 mL, PRN  vancomycin - pharmacy to dose, , pharmacy to manage frequency      Today I personally reviewed pertinent medications, lines/drains/airways, imaging, cardiology results, laboratory results, microbiology results,    Diet  Diet NPO  Diet NPO    Tube feeding      Assessment/Plan:     Neuro  ALS (amyotrophic lateral sclerosis)  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 He is s/p trach and peg. Multiple discussions with patient and patient's wife about GOC.  He would like continued treatment.     - Continue riluzole  - Consulted acute pain for better management of his refractory pain. Adjusted pain regimen per their recs  - Neurology consult service (Dr Terry) administered botulinum injections to bilateral masseters  - PRN acetaminophen, fentanyl, oxycodone      Pulmonary  Chronic respiratory failure with hypoxia and hypercapnia   Vent Mode: A/C  Oxygen Concentration (%):  [30] 30  Resp Rate Total:  [14 br/min-23 br/min] 16 br/min  Vt Set:  [400 mL] 400 mL  PEEP/CPAP:  [5 cmH20] 5 cmH20  Mean Airway Pressure:  [9.6 hbR33-66 cmH20] 9.6 cmH20.     Chronic neuromuscular failure secondary to ALS and diaphragmatic + upper airway failure.  On chronic vent settings, no signs of decompensation.    Renal/  Hyponatremia  -9/6: Likely secondary to dehydration so gave one time dose of albumin 5%  -Continue to monitor with daily CMP  -Continue neuro checks. No current acute decline in mentation.    Hematology  Occlusive thrombus  U/S showed RUE DVT.     - heparin gtt on admission    9/10 converted to [facility] for discharge planning    Other  * Trismus  Briefly 57 yo AAM with ALS. At OSH with prolonged course complicated by bacteremia, PNA, upper extremity DVTs, facial swelling and trismus. Received Abx, PICC was changed there prior to transfer, and received BOTOX injections to help with his Trismus. Wheelchair bound since 2020     -ENT consult for facial mass on CT & trismus. Low suspicion for abscess. Recommended that anesthesia be consulted for V3 nerve block. Spoke to pain anesthesia and was advised to repeat botox injection at a higher dosage than previously injected (50cc). Reached out to Dr. Terry and he was able to perform botox injection with no complications (9/9).  -ID following: Continue vancomycin with end date of 9/13.   -Echo unremarkable  -tube feeds  -9/7: overnight patient's blood pressure was low so started levo  -9/8: Increased midodrine from 5mg q8h to 15mg q8h  for better blood pressure control. Dc levo  -9/9: Increased midodrine to 20mg nightly. He was restarted on levo overnight due to low blood pressures and MAP. Started pseudoephedrine.   9/10: Minimal asymptomatic lower pressures in sleep, remove A-line as positional and causing pain.  Now that procedure complete, plan for return closer to home ([place])          The patient is being Prophylaxed for:  Venous Thromboembolism with: Chemical  Stress Ulcer with: None  Ventilator Pneumonia with: chlorhexidine oral care    Activity Orders          Turn patient starting at 08/31 1200    Elevate HOB starting at 08/31 1131    Diet NPO: NPO starting at 08/31 1131        Full Code     Dispo: ICU due to ventilator needs.  Plan for LTAC as soon as accepting hospital is found, ideally closer to home of [place], [place] to be closer to family.    Critical condition in that Patient has a condition that poses threat to life and bodily function: See associated documentation.     35 minutes of Critical care time was spent personally by me on the following activities: development of treatment plan with patient or surrogate and bedside caregivers, discussions with consultants, evaluation of patient's response to treatment, examination of patient, ordering and performing treatments and interventions, ordering and review of laboratory studies, ordering and review of radiographic studies, pulse oximetry, antibiotic titration if applicable, vasopressor titration if applicable, re-evaluation of patient's condition. This critical care time did not overlap with that of any other provider or involve time for any procedures. There is high probability for acute neurological change leading to clinical and possibly life-threatening deterioration requiring highest level of physician preparedness for urgent intervention.      Valdo Duffy, DO  Neurocritical Care  [facility]

## 2022-09-10 NOTE — ASSESSMENT & PLAN NOTE
U/S showed RUE DVT.     - heparin gtt on admission    9/10 converted to [facility] for discharge planning

## 2022-09-10 NOTE — SUBJECTIVE & OBJECTIVE
Objective:     Vitals:  Temp: 97.4 °F (36.3 °C)  Pulse: 92  Rhythm: normal sinus rhythm  BP: 111/76  MAP (mmHg): 87  Resp: 16  SpO2: 100 %  Oxygen Concentration (%): 30  O2 Device (Oxygen Therapy): ventilator  Vent Mode: A/C  Set Rate: 14 BPM  Vt Set: 400 mL  PEEP/CPAP: 5 cmH20  Peak Airway Pressure: 17 cmH2O  Mean Airway Pressure: 9.6 cmH20  Plateau Pressure: 28 cmH20    Temp  Min: 97.4 °F (36.3 °C)  Max: 99.2 °F (37.3 °C)  Pulse  Min: 70  Max: 104  BP  Min: 84/45  Max: 155/71  MAP (mmHg)  Min: 61  Max: 114  Resp  Min: 14  Max: 32  SpO2  Min: 99 %  Max: 100 %  Oxygen Concentration (%)  Min: 30  Max: 30    09/09 0701 - 09/10 0700  In: 2197.6 [I.V.:516.3]  Out: 2275 [Urine:2275]   Unmeasured Output  Urine Occurrence: 1  Stool Occurrence: 0  Pad Count: 1       Physical Exam  Vitals and nursing note reviewed.   Constitutional:       General: He is not in acute distress.     Appearance: He is not diaphoretic.   HENT:      Head: Normocephalic.      Right Ear: External ear normal.      Left Ear: External ear normal.      Nose: Nose normal.      Mouth/Throat:      Comments: Bite block in place. Hypertrophied tongue bulging through teeth at region of right lower mandible  Eyes:      General: No scleral icterus.        Right eye: No discharge.         Left eye: No discharge.      Conjunctiva/sclera: Conjunctivae normal.      Pupils: Pupils are equal, round, and reactive to light.   Neck:      Comments: Trach in place  Cardiovascular:      Rate and Rhythm: Normal rate.   Pulmonary:      Comments: On ventilator  Abdominal:      General: Abdomen is flat.      Palpations: Abdomen is soft.   Musculoskeletal:         General: No tenderness.   Skin:     General: Skin is warm.      Comments: Wound to anterior aspect of left lower extremity.    Neurological:      Mental Status: He is alert.      Motor: Weakness present.      Comments: Blinks, nods head     E3 V: not testable M6.  nonverbal. Following commands  appropriately    Medications:  ContinuousNORepinephrine bitartrate-D5W, Last Rate: Stopped (09/10/22 1216)    Scheduledacetaminophen, 1,000 mg, Q8H  albuterol-ipratropium, 3 mL, TID WAKE  apixaban, 2.5 mg, BID  busPIRone, 5 mg, BID  famotidine, 20 mg, BID  gabapentin, 300 mg, Q8H  methocarbamoL, 1,000 mg, Q6H  metoprolol tartrate, 12.5 mg, Q6H  midodrine, 15 mg, BID   And  midodrine, 20 mg, QHS  mupirocin, , TID  nystatin, 500,000 Units, QID  polyethylene glycol, 17 g, Daily  pseudoephedrine, 30 mg, BID  riluzole, 50 mg, Q12H  scopolamine, 1 patch, Q3 Days  senna-docusate 8.6-50 mg, 1 tablet, BID  traZODone, 50 mg, QHS    PRNbisacodyL, 10 mg, Daily PRN  dextrose 10%, 12.5 g, PRN  dextrose 10%, 25 g, PRN  fentaNYL, 50 mcg, Q1H PRN  hydrALAZINE, 10 mg, Q4H PRN  labetalol, 10 mg, Q4H PRN  magnesium sulfate IVPB, 2 g, PRN  magnesium sulfate IVPB, 4 g, PRN  ondansetron, 4 mg, Q6H PRN  oxyCODONE, 5 mg, Q4H PRN  oxyCODONE, 10 mg, Q4H PRN  potassium bicarbonate, 35 mEq, PRN  potassium bicarbonate, 50 mEq, PRN  potassium bicarbonate, 60 mEq, PRN  potassium, sodium phosphates, 2 packet, PRN  potassium, sodium phosphates, 2 packet, PRN  sodium chloride 0.9%, 10 mL, PRN  vancomycin - pharmacy to dose, , pharmacy to manage frequency      Today I personally reviewed pertinent medications, lines/drains/airways, imaging, cardiology results, laboratory results, microbiology results,    Diet  Diet NPO  Diet NPO    Tube feeding

## 2022-09-10 NOTE — ASSESSMENT & PLAN NOTE
Vent Mode: A/C  Oxygen Concentration (%):  [30] 30  Resp Rate Total:  [14 br/min-23 br/min] 16 br/min  Vt Set:  [400 mL] 400 mL  PEEP/CPAP:  [5 cmH20] 5 cmH20  Mean Airway Pressure:  [9.6 kaX51-68 cmH20] 9.6 cmH20.     Chronic neuromuscular failure secondary to ALS and diaphragmatic + upper airway failure.  On chronic vent settings, no signs of decompensation.

## 2022-09-10 NOTE — PLAN OF CARE
[facility] Care Plan    POC reviewed with Wei Dominique at 1500. Pt blinked eyes appropriately to demonstrate understanding. No acute events today. Pt progressing toward goals. Will continue to monitor. See below and flowsheets for full assessment and VS info.     -Heparin drip and Levo are off.  -A-line removed      Is this a stroke patient? no    Neuro:  Marlow Coma Scale  Best Eye Response: 4-->(E4) spontaneous  Best Motor Response: 6-->(M6) obeys commands  Best Verbal Response: 1-->(V1) none  Sukumar Coma Scale Score: 11  Assessment Qualifiers: patient not sedated/intubated  Pupil PERRLA: yes     24 hr Temp:  [97.4 °F (36.3 °C)-99.2 °F (37.3 °C)]     CV:   Rhythm: normal sinus rhythm  BP goals:   SBP < 160  MAP >  60    Resp:   O2 Device (Oxygen Therapy): ventilator  Vent Mode: A/C  Set Rate: 14 BPM  Oxygen Concentration (%): 30  Vt Set: 400 mL  PEEP/CPAP: 5 cmH20  Pressure Support: 5 cmH20    Plan: wean to extubate    GI/:     Diet/Nutrition Received: NPO, tube feeding  Last Bowel Movement: 09/10/22  Voiding Characteristics: external catheter    Intake/Output Summary (Last 24 hours) at 9/10/2022 1636  Last data filed at 9/10/2022 1601  Gross per 24 hour   Intake 2070.16 ml   Output 1400 ml   Net 670.16 ml     Unmeasured Output  Urine Occurrence: 1  Stool Occurrence: 0  Pad Count: 1    Labs/Accuchecks:  Recent Labs   Lab 09/10/22  0219   WBC 12.87*   RBC 2.91*   HGB 7.5*   HCT 24.3*   *      Recent Labs   Lab 09/10/22  0219   *   K 4.6   CO2 26      BUN 37*   CREATININE 0.5   ALKPHOS 66   ALT 7*   AST 18   BILITOT 0.2      Recent Labs   Lab 09/10/22  1050   APTT 47.0*    No results for input(s): CPK, CPKMB, TROPONINI, MB in the last 168 hours.    Electrolytes: N/A - electrolytes WDL  Accuchecks: none    Gtts:   NORepinephrine bitartrate-D5W Stopped (09/10/22 1216)       LDA/Wounds:  Lines/Drains/Airways       Drain  Duration                  Gastrostomy/Enterostomy LUQ feeding -- days    Male  External Urinary Catheter 09/04/22 1815 Large 5 days              Airway  Duration                  Surgical Airway Shiley Cuffed -- days         Surgical Airway 05/23/22 1145 Portex Cuffed 110 days              Peripheral Intravenous Line  Duration                  Midline Catheter Insertion/Assessment  - Single Lumen 09/02/22 1200 Left cephalic vein (lateral side of arm) 20g x 10cm 8 days         Peripheral IV - Single Lumen 09/02/22 1200 18 G;1 3/4 in Left Antecubital 8 days         Peripheral IV - Single Lumen 09/07/22 1618 20 G;1 3/4 in Right;Anterior Upper Arm 3 days                  Wounds: Yes  Wound care consulted: Yes

## 2022-09-10 NOTE — ASSESSMENT & PLAN NOTE
Briefly 55 yo AAM with ALS. At OSH with prolonged course complicated by bacteremia, PNA, upper extremity DVTs, facial swelling and trismus. Received Abx, PICC was changed there prior to transfer, and received BOTOX injections to help with his Trismus. Wheelchair bound since 2020     -ENT consult for facial mass on CT & trismus. Low suspicion for abscess. Recommended that anesthesia be consulted for V3 nerve block. Spoke to pain anesthesia and was advised to repeat botox injection at a higher dosage than previously injected (50cc). Reached out to Dr. Terry and he was able to perform botox injection with no complications (9/9).  -ID following: Continue vancomycin with end date of 9/13.   -Echo unremarkable  -tube feeds  -9/7: overnight patient's blood pressure was low so started levo  -9/8: Increased midodrine from 5mg q8h to 15mg q8h for better blood pressure control. Dc levo  -9/9: Increased midodrine to 20mg nightly. He was restarted on levo overnight due to low blood pressures and MAP. Started pseudoephedrine.   9/10: Minimal asymptomatic lower pressures in sleep, remove A-line as positional and causing pain.  Now that procedure complete, plan for return closer to home ([place])

## 2022-09-10 NOTE — ASSESSMENT & PLAN NOTE
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 He is s/p trach and peg. Multiple discussions with patient and patient's wife about GOC. He would like continued treatment.     - Continue riluzole  - Consulted acute pain for better management of his refractory pain. Adjusted pain regimen per their recs  - Neurology consult service (Dr Terry) administered botulinum injections to bilateral masseters  - PRN acetaminophen, fentanyl, oxycodone

## 2022-09-10 NOTE — PROGRESS NOTES
Pharmacokinetic Assessment Follow Up: IV Vancomycin    Vancomycin serum concentration assessment(s):    Vancomycin random level resulted at 14.3 mcg/mL. Goal level is 15 to 20 mcg/mL.     Drug levels (last 3 results):  Recent Labs   Lab Result Units 09/08/22  0244 09/09/22  0300 09/10/22  0219   Vancomycin, Random ug/mL 11.7 16.1 14.3     Vancomycin Regimen Plan:    Administer vancomycin  mg and redose when the random level is less than 20 mcg/mL. Next level to be drawn with morning labs on 9/11.     Pharmacy will continue to follow and monitor vancomycin.    Please contact pharmacy at extension [phone] for questions regarding this assessment.    Thank you for the consult,   Anna Yates, PharmD, BCCCP             Patient brief summary:  Wei Dominique is a 67 y.o. male initiated on antimicrobial therapy with IV vancomycin for treatment of bacteremia    Drug Allergies:   Review of patient's allergies indicates:  No Known Allergies    Actual Body Weight:   77 kg     Renal Function:   Estimated Creatinine Clearance: 140.6 mL/min (based on SCr of 0.5 mg/dL).    Dialysis Method (if applicable):  N/A    CBC (last 72 hours):  Recent Labs   Lab Result Units 09/08/22  0244 09/09/22  0300 09/10/22  0219   WBC K/uL 11.70 12.65 12.87*   Hemoglobin g/dL 7.6* 7.9* 7.5*   Hematocrit % 25.2* 27.2* 24.3*   Platelets K/uL 411 505* 462*   Gran % % 61.9 57.3 50.0   Lymph % % 15.6* 19.5 22.0   Mono % % 8.6 7.4 10.0   Eosinophil % % 9.3* 10.0* 16.0*   Basophil % % 0.6 0.9 0.0   Differential Method  Automated Automated Manual       Metabolic Panel (last 72 hours):  Recent Labs   Lab Result Units 09/08/22  0244 09/09/22  0300 09/10/22  0219   Sodium mmol/L 136 133* 132*   Potassium mmol/L 4.3 4.4 4.6   Chloride mmol/L 106 103 102   CO2 mmol/L 25 23 26   Glucose mg/dL 141* 137* 119*   BUN mg/dL 34* 36* 37*   Creatinine mg/dL 0.5 0.5 0.5   Albumin g/dL 2.2* 2.2* 2.1*   Total Bilirubin mg/dL 0.2 0.2 0.2   Alkaline Phosphatase U/L  75 76 66   AST U/L 12 13 18   ALT U/L 6* 5* 7*   Magnesium mg/dL 2.0 1.8 2.0   Phosphorus mg/dL 3.1 2.7 2.7       Vancomycin Administrations:  vancomycin given in the last 96 hours                     vancomycin 500 mg in dextrose 5 % 100 mL IVPB (ready to mix system) (mg) 500 mg New Bag 09/09/22 0911    vancomycin 750 mg in dextrose 5 % 250 mL IVPB (ready to mix system) (mg) 750 mg New Bag 09/08/22 0815                    Microbiologic Results:  Microbiology Results (last 7 days)       Procedure Component Value Units Date/Time    Culture, Respiratory with Gram Stain [[id number]]  (Abnormal)  (Susceptibility) Collected: 08/31/22 1536    Order Status: Completed Specimen: Respiratory from Tracheal Aspirate Updated: 09/06/22 0741     Respiratory Culture No S aureus isolated.      ACINETOBACTER BAUMANNII   Few        PSEUDOMONAS AERUGINOSA   Few       Gram Stain (Respiratory) No WBC's     Gram Stain (Respiratory) No organisms seen    Blood culture [[id number]] Collected: 08/31/22 1650    Order Status: Completed Specimen: Blood from Peripheral, Upper Arm, Right Updated: 09/05/22 1812     Blood Culture, Routine No growth after 5 days.    Blood culture [[id number]] Collected: 08/31/22 1648    Order Status: Completed Specimen: Blood from Radial Arterial Stick, Right Updated: 09/05/22 1812     Blood Culture, Routine No growth after 5 days.

## 2022-09-10 NOTE — PLAN OF CARE
Problem: Impaired Wound Healing  Goal: Optimal Wound Healing  Outcome: Ongoing, Progressing     Problem: Fall Injury Risk  Goal: Absence of Fall and Fall-Related Injury  Outcome: Ongoing, Progressing   [facility] Care Plan    POC reviewed with Wei ASHLEY Dominique and family at 0300. Pt verbalized understanding. Questions and concerns addressed. No acute events overnight. Pt progressing toward goals. Will continue to monitor. See below and flowsheets for full assessment and VS info.           Is this a stroke patient? no    Neuro:  [place] Coma Scale  Best Eye Response: 4-->(E4) spontaneous  Best Motor Response: 6-->(M6) obeys commands  Best Verbal Response: 1-->(V1) none  [place] Coma Scale Score: 11  Assessment Qualifiers: patient not sedated/intubated, no eye obstruction present  Pupil PERRLA: yes     24hr Temp:  [98.6 °F (37 °C)-99.7 °F (37.6 °C)]     CV:   Rhythm: normal sinus rhythm  BP goals:   SBP < 160  MAP >  60    Resp:   O2 Device (Oxygen Therapy): ventilator  Vent Mode: A/C  Set Rate: 14 BPM  Oxygen Concentration (%): 30  Vt Set: 400 mL  PEEP/CPAP: 5 cmH20  Pressure Support: 5 cmH20    Plan: N/A    GI/:     Diet/Nutrition Received: NPO, tube feeding  Last Bowel Movement: 09/09/22  Voiding Characteristics: external catheter    Intake/Output Summary (Last 24 hours) at 9/10/2022 0523  Last data filed at 9/10/2022 0305  Gross per 24 hour   Intake 2558.04 ml   Output 2150 ml   Net 408.04 ml     Unmeasured Output  Urine Occurrence: 1  Stool Occurrence: 0  Pad Count: 1    Labs/Accuchecks:  Recent Labs   Lab 09/10/22  0219   WBC 12.87*   RBC 2.91*   HGB 7.5*   HCT 24.3*   *      Recent Labs   Lab 09/10/22  0219   *   K 4.6   CO2 26      BUN 37*   CREATININE 0.5   ALKPHOS 66   ALT 7*   AST 18   BILITOT 0.2      Recent Labs   Lab 09/09/22  2346   APTT 42.7*    No results for input(s): CPK, CPKMB, TROPONINI, MB in the last 168 hours.    Electrolytes: N/A - electrolytes WDL  Accuchecks: none    Gtts:    heparin (porcine) in D5W 17 Units/kg/hr (09/09/22 1801)    NORepinephrine bitartrate-D5W 0.06 mcg/kg/min (09/09/22 2225)       LDA/Wounds:  Lines/Drains/Airways       Drain  Duration                  Gastrostomy/Enterostomy LUQ feeding -- days    Male External Urinary Catheter 09/04/22 1815 Large 5 days              Airway  Duration                  Surgical Airway Shiley Cuffed -- days         Surgical Airway 05/23/22 1145 Portex Cuffed 109 days              Arterial Line  Duration             Arterial Line 09/08/22 0430 Right Radial 2 days              Peripheral Intravenous Line  Duration                  Midline Catheter Insertion/Assessment  - Single Lumen 09/02/22 1200 Left cephalic vein (lateral side of arm) 20g x 10cm 7 days         Peripheral IV - Single Lumen 09/02/22 1200 18 G;1 3/4 in Left Antecubital 7 days         Peripheral IV - Single Lumen 09/07/22 1618 20 G;1 3/4 in Right;Anterior Upper Arm 2 days                  Wounds: Yes  Wound care consulted: Yes

## 2022-09-11 LAB
ALBUMIN SERPL BCP-MCNC: 2.3 G/DL (ref 3.5–5.2)
ALP SERPL-CCNC: 73 U/L (ref 55–135)
ALT SERPL W/O P-5'-P-CCNC: 6 U/L (ref 10–44)
ANION GAP SERPL CALC-SCNC: 7 MMOL/L (ref 8–16)
ANISOCYTOSIS BLD QL SMEAR: SLIGHT
AST SERPL-CCNC: 14 U/L (ref 10–40)
BASOPHILS # BLD AUTO: ABNORMAL K/UL (ref 0–0.2)
BASOPHILS NFR BLD: 0 % (ref 0–1.9)
BILIRUB SERPL-MCNC: 0.2 MG/DL (ref 0.1–1)
BUN SERPL-MCNC: 40 MG/DL (ref 8–23)
CALCIUM SERPL-MCNC: 9.8 MG/DL (ref 8.7–10.5)
CHLORIDE SERPL-SCNC: 98 MMOL/L (ref 95–110)
CO2 SERPL-SCNC: 24 MMOL/L (ref 23–29)
CREAT SERPL-MCNC: 0.6 MG/DL (ref 0.5–1.4)
CRP SERPL-MCNC: 23.7 MG/L (ref 0–8.2)
DIFFERENTIAL METHOD: ABNORMAL
EOSINOPHIL # BLD AUTO: ABNORMAL K/UL (ref 0–0.5)
EOSINOPHIL NFR BLD: 10 % (ref 0–8)
ERYTHROCYTE [DISTWIDTH] IN BLOOD BY AUTOMATED COUNT: 21.1 % (ref 11.5–14.5)
EST. GFR  (NO RACE VARIABLE): >60 ML/MIN/1.73 M^2
GLUCOSE SERPL-MCNC: 107 MG/DL (ref 70–110)
HCT VFR BLD AUTO: 27.9 % (ref 40–54)
HGB BLD-MCNC: 8.2 G/DL (ref 14–18)
HYPOCHROMIA BLD QL SMEAR: ABNORMAL
IMM GRANULOCYTES # BLD AUTO: ABNORMAL K/UL (ref 0–0.04)
IMM GRANULOCYTES NFR BLD AUTO: ABNORMAL % (ref 0–0.5)
LYMPHOCYTES # BLD AUTO: ABNORMAL K/UL (ref 1–4.8)
LYMPHOCYTES NFR BLD: 17 % (ref 18–48)
MAGNESIUM SERPL-MCNC: 1.8 MG/DL (ref 1.6–2.6)
MCH RBC QN AUTO: 25.5 PG (ref 27–31)
MCHC RBC AUTO-ENTMCNC: 29.4 G/DL (ref 32–36)
MCV RBC AUTO: 87 FL (ref 82–98)
METAMYELOCYTES NFR BLD MANUAL: 3 %
MONOCYTES # BLD AUTO: ABNORMAL K/UL (ref 0.3–1)
MONOCYTES NFR BLD: 8 % (ref 4–15)
MYELOCYTES NFR BLD MANUAL: 1 %
NEUTROPHILS NFR BLD: 58 % (ref 38–73)
NEUTS BAND NFR BLD MANUAL: 3 %
NRBC BLD-RTO: 0 /100 WBC
PHOSPHATE SERPL-MCNC: 3.1 MG/DL (ref 2.7–4.5)
PLATELET # BLD AUTO: 400 K/UL (ref 150–450)
PLATELET BLD QL SMEAR: ABNORMAL
PMV BLD AUTO: 9.4 FL (ref 9.2–12.9)
POLYCHROMASIA BLD QL SMEAR: ABNORMAL
POTASSIUM SERPL-SCNC: 4.8 MMOL/L (ref 3.5–5.1)
PROT SERPL-MCNC: 8.3 G/DL (ref 6–8.4)
RBC # BLD AUTO: 3.21 M/UL (ref 4.6–6.2)
SODIUM SERPL-SCNC: 129 MMOL/L (ref 136–145)
VANCOMYCIN SERPL-MCNC: 16.9 UG/ML
WBC # BLD AUTO: 11.21 K/UL (ref 3.9–12.7)

## 2022-09-11 PROCEDURE — 27200966 HC CLOSED SUCTION SYSTEM

## 2022-09-11 PROCEDURE — 25000003 PHARM REV CODE 250: Performed by: PSYCHIATRY & NEUROLOGY

## 2022-09-11 PROCEDURE — 99291 PR CRITICAL CARE, E/M 30-74 MINUTES: ICD-10-PCS | Mod: GC,,, | Performed by: PSYCHIATRY & NEUROLOGY

## 2022-09-11 PROCEDURE — 25000003 PHARM REV CODE 250

## 2022-09-11 PROCEDURE — 99900035 HC TECH TIME PER 15 MIN (STAT)

## 2022-09-11 PROCEDURE — 94003 VENT MGMT INPAT SUBQ DAY: CPT

## 2022-09-11 PROCEDURE — 94640 AIRWAY INHALATION TREATMENT: CPT

## 2022-09-11 PROCEDURE — 25000242 PHARM REV CODE 250 ALT 637 W/ HCPCS: Performed by: PSYCHIATRY & NEUROLOGY

## 2022-09-11 PROCEDURE — 94761 N-INVAS EAR/PLS OXIMETRY MLT: CPT

## 2022-09-11 PROCEDURE — 84100 ASSAY OF PHOSPHORUS: CPT | Performed by: INTERNAL MEDICINE

## 2022-09-11 PROCEDURE — 63600175 PHARM REV CODE 636 W HCPCS: Performed by: NURSE PRACTITIONER

## 2022-09-11 PROCEDURE — 85027 COMPLETE CBC AUTOMATED: CPT

## 2022-09-11 PROCEDURE — 63700000 PHARM REV CODE 250 ALT 637 W/O HCPCS

## 2022-09-11 PROCEDURE — 27000207 HC ISOLATION

## 2022-09-11 PROCEDURE — 63600175 PHARM REV CODE 636 W HCPCS: Performed by: PHYSICIAN ASSISTANT

## 2022-09-11 PROCEDURE — 63600175 PHARM REV CODE 636 W HCPCS: Performed by: PSYCHIATRY & NEUROLOGY

## 2022-09-11 PROCEDURE — 99900026 HC AIRWAY MAINTENANCE (STAT)

## 2022-09-11 PROCEDURE — 27000221 HC OXYGEN, UP TO 24 HOURS

## 2022-09-11 PROCEDURE — 99291 CRITICAL CARE FIRST HOUR: CPT | Mod: GC,,, | Performed by: PSYCHIATRY & NEUROLOGY

## 2022-09-11 PROCEDURE — 86140 C-REACTIVE PROTEIN: CPT

## 2022-09-11 PROCEDURE — 25000003 PHARM REV CODE 250: Performed by: NURSE PRACTITIONER

## 2022-09-11 PROCEDURE — 80202 ASSAY OF VANCOMYCIN: CPT | Performed by: PSYCHIATRY & NEUROLOGY

## 2022-09-11 PROCEDURE — 80053 COMPREHEN METABOLIC PANEL: CPT

## 2022-09-11 PROCEDURE — 83735 ASSAY OF MAGNESIUM: CPT | Performed by: INTERNAL MEDICINE

## 2022-09-11 PROCEDURE — 20000000 HC ICU ROOM

## 2022-09-11 PROCEDURE — 85007 BL SMEAR W/DIFF WBC COUNT: CPT

## 2022-09-11 RX ORDER — ATROPINE SULFATE 10 MG/ML
1 SOLUTION/ DROPS OPHTHALMIC 3 TIMES DAILY
Status: DISCONTINUED | OUTPATIENT
Start: 2022-09-11 | End: 2022-09-11

## 2022-09-11 RX ADMIN — TRAZODONE HYDROCHLORIDE 50 MG: 50 TABLET ORAL at 09:09

## 2022-09-11 RX ADMIN — GABAPENTIN 300 MG: 300 CAPSULE ORAL at 05:09

## 2022-09-11 RX ADMIN — POLYETHYLENE GLYCOL 3350 17 G: 17 POWDER, FOR SOLUTION ORAL at 08:09

## 2022-09-11 RX ADMIN — ACETAMINOPHEN 1000 MG: 500 TABLET ORAL at 05:09

## 2022-09-11 RX ADMIN — GABAPENTIN 300 MG: 300 CAPSULE ORAL at 09:09

## 2022-09-11 RX ADMIN — IPRATROPIUM BROMIDE AND ALBUTEROL SULFATE 3 ML: 2.5; .5 SOLUTION RESPIRATORY (INHALATION) at 08:09

## 2022-09-11 RX ADMIN — GABAPENTIN 300 MG: 300 CAPSULE ORAL at 02:09

## 2022-09-11 RX ADMIN — SENNOSIDES AND DOCUSATE SODIUM 1 TABLET: 50; 8.6 TABLET ORAL at 09:09

## 2022-09-11 RX ADMIN — METHOCARBAMOL 1000 MG: 500 TABLET ORAL at 12:09

## 2022-09-11 RX ADMIN — SODIUM CHLORIDE TAB 1 GM 1 G: 1 TAB at 09:09

## 2022-09-11 RX ADMIN — METHOCARBAMOL 1000 MG: 500 TABLET ORAL at 05:09

## 2022-09-11 RX ADMIN — PSEUDOEPHEDRINE HCL 30 MG: 30 TABLET, FILM COATED ORAL at 12:09

## 2022-09-11 RX ADMIN — METOPROLOL TARTRATE 12.5 MG: 25 TABLET, FILM COATED ORAL at 11:09

## 2022-09-11 RX ADMIN — MAGNESIUM SULFATE HEPTAHYDRATE 2 G: 40 INJECTION, SOLUTION INTRAVENOUS at 11:09

## 2022-09-11 RX ADMIN — NYSTATIN 500000 UNITS: 500000 SUSPENSION ORAL at 01:09

## 2022-09-11 RX ADMIN — FENTANYL CITRATE 50 MCG: 0.05 INJECTION, SOLUTION INTRAMUSCULAR; INTRAVENOUS at 11:09

## 2022-09-11 RX ADMIN — APIXABAN 2.5 MG: 2.5 TABLET, FILM COATED ORAL at 08:09

## 2022-09-11 RX ADMIN — IPRATROPIUM BROMIDE AND ALBUTEROL SULFATE 3 ML: 2.5; .5 SOLUTION RESPIRATORY (INHALATION) at 01:09

## 2022-09-11 RX ADMIN — RILUZOLE 50 MG: 50 TABLET ORAL at 08:09

## 2022-09-11 RX ADMIN — NYSTATIN 500000 UNITS: 500000 SUSPENSION ORAL at 08:09

## 2022-09-11 RX ADMIN — MUPIROCIN: 20 OINTMENT TOPICAL at 08:09

## 2022-09-11 RX ADMIN — MUPIROCIN: 20 OINTMENT TOPICAL at 09:09

## 2022-09-11 RX ADMIN — SENNOSIDES AND DOCUSATE SODIUM 1 TABLET: 50; 8.6 TABLET ORAL at 08:09

## 2022-09-11 RX ADMIN — MIDODRINE HYDROCHLORIDE 15 MG: 5 TABLET ORAL at 02:09

## 2022-09-11 RX ADMIN — FAMOTIDINE 20 MG: 20 TABLET ORAL at 08:09

## 2022-09-11 RX ADMIN — BUSPIRONE HYDROCHLORIDE 5 MG: 5 TABLET ORAL at 09:09

## 2022-09-11 RX ADMIN — IPRATROPIUM BROMIDE AND ALBUTEROL SULFATE 3 ML: 2.5; .5 SOLUTION RESPIRATORY (INHALATION) at 07:09

## 2022-09-11 RX ADMIN — MUPIROCIN: 20 OINTMENT TOPICAL at 02:09

## 2022-09-11 RX ADMIN — APIXABAN 2.5 MG: 2.5 TABLET, FILM COATED ORAL at 09:09

## 2022-09-11 RX ADMIN — OXYCODONE HYDROCHLORIDE 10 MG: 10 TABLET ORAL at 08:09

## 2022-09-11 RX ADMIN — ACETAMINOPHEN 1000 MG: 500 TABLET ORAL at 02:09

## 2022-09-11 RX ADMIN — OXYCODONE HYDROCHLORIDE 10 MG: 10 TABLET ORAL at 03:09

## 2022-09-11 RX ADMIN — FENTANYL CITRATE 50 MCG: 0.05 INJECTION, SOLUTION INTRAMUSCULAR; INTRAVENOUS at 07:09

## 2022-09-11 RX ADMIN — OXYCODONE HYDROCHLORIDE 10 MG: 10 TABLET ORAL at 05:09

## 2022-09-11 RX ADMIN — RILUZOLE 50 MG: 50 TABLET ORAL at 09:09

## 2022-09-11 RX ADMIN — NYSTATIN 500000 UNITS: 500000 SUSPENSION ORAL at 09:09

## 2022-09-11 RX ADMIN — METOPROLOL TARTRATE 12.5 MG: 25 TABLET, FILM COATED ORAL at 05:09

## 2022-09-11 RX ADMIN — METOPROLOL TARTRATE 12.5 MG: 25 TABLET, FILM COATED ORAL at 12:09

## 2022-09-11 RX ADMIN — METHOCARBAMOL 1000 MG: 500 TABLET ORAL at 11:09

## 2022-09-11 RX ADMIN — FAMOTIDINE 20 MG: 20 TABLET ORAL at 09:09

## 2022-09-11 RX ADMIN — SODIUM CHLORIDE TAB 1 GM 1 G: 1 TAB at 11:09

## 2022-09-11 RX ADMIN — MIDODRINE HYDROCHLORIDE 20 MG: 5 TABLET ORAL at 09:09

## 2022-09-11 RX ADMIN — MIDODRINE HYDROCHLORIDE 15 MG: 5 TABLET ORAL at 05:09

## 2022-09-11 RX ADMIN — VANCOMYCIN HYDROCHLORIDE 750 MG: 750 INJECTION, POWDER, LYOPHILIZED, FOR SOLUTION INTRAVENOUS at 08:09

## 2022-09-11 RX ADMIN — ACETAMINOPHEN 1000 MG: 500 TABLET ORAL at 09:09

## 2022-09-11 RX ADMIN — NYSTATIN 500000 UNITS: 500000 SUSPENSION ORAL at 05:09

## 2022-09-11 RX ADMIN — SODIUM CHLORIDE TAB 1 GM 1 G: 1 TAB at 02:09

## 2022-09-11 RX ADMIN — BUSPIRONE HYDROCHLORIDE 5 MG: 5 TABLET ORAL at 08:09

## 2022-09-11 NOTE — PROGRESS NOTES
Pharmacokinetic Assessment Follow Up: IV Vancomycin    Vancomycin serum concentration assessment(s):    Vancomycin random level resulted at 16.9 mcg/mL. Goal level is 15 to 20 mcg/mL.     Drug levels (last 3 results):  Recent Labs   Lab Result Units 09/09/22  0300 09/10/22  0219 09/11/22  0423   Vancomycin, Random ug/mL 16.1 14.3 16.9     Vancomycin Regimen Plan:    Administer vancomycin  mg and redose when the random level is less than 20 mcg/mL. Next level to be drawn with morning labs on 9/12.     Pharmacy will continue to follow and monitor vancomycin.    Please contact pharmacy at extension [phone] for questions regarding this assessment.    Thank you for the consult,   Anna Yates, PharmD, BCCCP             Patient brief summary:  Wei Dominique is a 67 y.o. male initiated on antimicrobial therapy with IV vancomycin for treatment of bacteremia    Drug Allergies:   Review of patient's allergies indicates:  No Known Allergies    Actual Body Weight:   77 kg     Renal Function:   Estimated Creatinine Clearance: 117.2 mL/min (based on SCr of 0.6 mg/dL).    Dialysis Method (if applicable):  N/A    CBC (last 72 hours):  Recent Labs   Lab Result Units 09/09/22  0300 09/10/22  0219 09/11/22  0423   WBC K/uL 12.65 12.87* 11.21   Hemoglobin g/dL 7.9* 7.5* 8.2*   Hematocrit % 27.2* 24.3* 27.9*   Platelets K/uL 505* 462* 400   Gran % % 57.3 50.0 58.0   Lymph % % 19.5 22.0 17.0*   Mono % % 7.4 10.0 8.0   Eosinophil % % 10.0* 16.0* 10.0*   Basophil % % 0.9 0.0 0.0   Differential Method  Automated Manual Manual         Metabolic Panel (last 72 hours):  Recent Labs   Lab Result Units 09/09/22  0300 09/10/22  0219 09/11/22  0423   Sodium mmol/L 133* 132* 129*   Potassium mmol/L 4.4 4.6 4.8   Chloride mmol/L 103 102 98   CO2 mmol/L 23 26 24   Glucose mg/dL 137* 119* 107   BUN mg/dL 36* 37* 40*   Creatinine mg/dL 0.5 0.5 0.6   Albumin g/dL 2.2* 2.1* 2.3*   Total Bilirubin mg/dL 0.2 0.2 0.2   Alkaline Phosphatase U/L 76  66 73   AST U/L 13 18 14   ALT U/L 5* 7* 6*   Magnesium mg/dL 1.8 2.0 1.8   Phosphorus mg/dL 2.7 2.7 3.1         Vancomycin Administrations:  vancomycin given in the last 96 hours                     vancomycin 500 mg in dextrose 5 % 100 mL IVPB (ready to mix system) (mg) 500 mg New Bag 09/09/22 0911    vancomycin 750 mg in dextrose 5 % 250 mL IVPB (ready to mix system) (mg) 750 mg New Bag 09/08/22 0815                    Microbiologic Results:  Microbiology Results (last 7 days)       Procedure Component Value Units Date/Time    Culture, Respiratory with Gram Stain [[id number]]  (Abnormal)  (Susceptibility) Collected: 08/31/22 1536    Order Status: Completed Specimen: Respiratory from Tracheal Aspirate Updated: 09/06/22 0741     Respiratory Culture No S aureus isolated.      ACINETOBACTER BAUMANNII   Few        PSEUDOMONAS AERUGINOSA   Few       Gram Stain (Respiratory) No WBC's     Gram Stain (Respiratory) No organisms seen    Blood culture [[id number]] Collected: 08/31/22 1650    Order Status: Completed Specimen: Blood from Peripheral, Upper Arm, Right Updated: 09/05/22 1812     Blood Culture, Routine No growth after 5 days.    Blood culture [[id number]] Collected: 08/31/22 1648    Order Status: Completed Specimen: Blood from Radial Arterial Stick, Right Updated: 09/05/22 1812     Blood Culture, Routine No growth after 5 days.

## 2022-09-11 NOTE — PROGRESS NOTES
[facility]  Neurocritical Care  Progress Note    Admit Date: 8/31/2022  Service Date: 09/11/2022  Length of Stay: 11    Subjective:     Chief Complaint: Trismus    History of Present Illness: Wei Dominique is a 55 yo AAM with ALS. At OSH with prolonged course complicated by bacteremia, PNA, upper extremity DVTs, facial swelling and trismus. Received Abx, PICC was changed there prior to transfer, and received BOTOX injections to help with his Trismus.      Hospital Course: 09/05/2022: No acute events overnight. ENT recommended anesthesia consult for possible V3 nerve block. Started midodrine 5mg q8h. ID does not recommend HAYDEN at this time.   09/06/2022: No acute events overnight. Consulted acute pain team for better management of refractory pain. DC cefepime per ID recs and will continue vancomycin with end date of 9/13. Patient continues to be hyponatremic likely secondary to dehydration so ordered one time dose of albumin 5%.   09/07/2022: Overnight blood pressure was low so he was started on levo. Noticed new wound on anterior aspect of left lower extremity. Wound care team following. Adjusted pain regimen per anesthesia recs. Patient continues to improve neurologically. He is making purposeful movements and follows commands intermittently. Continue spontaneous breathing trials.  09/08/2022: New a-line placed overnight. Increased midodrine to 15mg q8h and dc levo. Spoke to pain anesthesia and was recommended that patient receive repeat botox injection for pain management at a higher dosage than previously injected. Continue with spontaneous breathing trials.   09/09/2022: Patient's blood pressure dropped overnight with MAP down to 55. He was restarted on levo. Plan to increase midodrine to 20mg at night and maintain 15mg dosage during the day. Also started pseudoephedrine. Patient received botox injections into his temporalis muscle. He tolerated the procedure well with no complications.    9/10/2022: BP more consistently within appropriate range, patient continues to have zero symptoms even when mild hypotension is noted on monitor. A-line positional and removed.  No signs/symptoms of adverse effects from Botox injections.  Remains afebrile, complete antibiotic course.  Heparin converted to Eliquis. Begin discharge planning.  09/11/2022: Blood pressure controlled on enteric medications. Atropine ordered for increased secretions. Trach appeared secured with no signs of leak this morning. Continue eliquis and monitor for adverse effects from Botox injections.           Review of Systems   Unable to perform ROS: Intubated     Objective:     Vitals:  Temp: 98.7 °F (37.1 °C)  Pulse: 100  Rhythm: normal sinus rhythm, sinus tachycardia  BP: (!) 147/68  MAP (mmHg): 98  Resp: 15  SpO2: 100 %  Oxygen Concentration (%): 30  O2 Device (Oxygen Therapy): ventilator  Vent Mode: A/C  Set Rate: 14 BPM  Vt Set: 400 mL  PEEP/CPAP: 5 cmH20  Peak Airway Pressure: 26 cmH2O  Mean Airway Pressure: 11 cmH20  Plateau Pressure: 28 cmH20    Temp  Min: 97.3 °F (36.3 °C)  Max: 98.7 °F (37.1 °C)  Pulse  Min: 85  Max: 122  BP  Min: 89/66  Max: 164/72  MAP (mmHg)  Min: 72  Max: 113  Resp  Min: 14  Max: 28  SpO2  Min: 98 %  Max: 100 %  Oxygen Concentration (%)  Min: 30  Max: 30    09/10 0701 - 09/11 0700  In: 2025.7 [I.V.:441.7]  Out: 1200 [Urine:1200]   Unmeasured Output  Urine Occurrence: 1  Stool Occurrence: 0  Pad Count: 1       Physical Exam  Vitals and nursing note reviewed.   Constitutional:       General: He is not in acute distress.     Appearance: He is not diaphoretic.   HENT:      Head: Normocephalic.      Right Ear: External ear normal.      Left Ear: External ear normal.      Nose: Nose normal.      Mouth/Throat:      Comments: Bite block in place. Hypertrophied tongue bulging through teeth at region of right lower mandible  Eyes:      General: No scleral icterus.        Right eye: No discharge.         Left eye: No  discharge.      Conjunctiva/sclera: Conjunctivae normal.      Pupils: Pupils are equal, round, and reactive to light.   Neck:      Comments: Trach in place  Cardiovascular:      Rate and Rhythm: Normal rate.   Pulmonary:      Comments: On ventilator  Abdominal:      General: Abdomen is flat.      Palpations: Abdomen is soft.   Musculoskeletal:         General: No tenderness.   Skin:     General: Skin is warm.      Comments: Wound to anterior aspect of left lower extremity.    Neurological:      Mental Status: He is alert.      Motor: Weakness present.      Comments: Blinks, nods head    E3 V: not testable M6.  nonverbal. Following commands intermittently  Conversational hearing intact.            Medications:  Continuous Scheduledacetaminophen, 1,000 mg, Q8H  albuterol-ipratropium, 3 mL, TID WAKE  apixaban, 2.5 mg, BID  busPIRone, 5 mg, BID  famotidine, 20 mg, BID  gabapentin, 300 mg, Q8H  methocarbamoL, 1,000 mg, Q6H  metoprolol tartrate, 12.5 mg, Q6H  midodrine, 15 mg, BID   And  midodrine, 20 mg, QHS  mupirocin, , TID  nystatin, 500,000 Units, QID  polyethylene glycol, 17 g, Daily  riluzole, 50 mg, Q12H  scopolamine, 1 patch, Q3 Days  senna-docusate 8.6-50 mg, 1 tablet, BID  sodium chloride, 1 g, TID  traZODone, 50 mg, QHS    PRNbisacodyL, 10 mg, Daily PRN  dextrose 10%, 12.5 g, PRN  dextrose 10%, 25 g, PRN  fentaNYL, 50 mcg, Q1H PRN  hydrALAZINE, 10 mg, Q4H PRN  labetalol, 10 mg, Q4H PRN  magnesium sulfate IVPB, 2 g, PRN  magnesium sulfate IVPB, 4 g, PRN  ondansetron, 4 mg, Q6H PRN  oxyCODONE, 5 mg, Q4H PRN  oxyCODONE, 10 mg, Q4H PRN  potassium bicarbonate, 35 mEq, PRN  potassium bicarbonate, 50 mEq, PRN  potassium bicarbonate, 60 mEq, PRN  potassium, sodium phosphates, 2 packet, PRN  potassium, sodium phosphates, 2 packet, PRN  sodium chloride 0.9%, 10 mL, PRN  vancomycin - pharmacy to dose, , pharmacy to manage frequency      Today I personally reviewed pertinent medications, lines/drains/airways, imaging,  laboratory results, notably:    Diet  Diet NPO  Diet NPO          Assessment/Plan:     Neuro  ALS (amyotrophic lateral sclerosis)  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 He is s/p trach and peg. Multiple discussions with patient and patient's wife about GOC. He would like continued treatment.     - Continue riluzole  - Consulted acute pain for better management of his refractory pain. Adjusted pain regimen per their recs  - Neurology consult service (Dr Terry) administered botulinum injections to bilateral masseters  - PRN acetaminophen, fentanyl, oxycodone      Pulmonary  Chronic respiratory failure with hypoxia and hypercapnia   Vent Mode: A/C  Oxygen Concentration (%):  [30] 30  Resp Rate Total:  [14 br/min-21 br/min] 17 br/min  Vt Set:  [400 mL] 400 mL  PEEP/CPAP:  [5 cmH20] 5 cmH20  Mean Airway Pressure:  [9.6 azJ77-31 cmH20] 11 cmH20.     Chronic neuromuscular failure secondary to ALS and diaphragmatic + upper airway failure.  On chronic vent settings, no signs of decompensation.    Renal/  Hyponatremia  -9/6: Likely secondary to dehydration so gave one time dose of albumin 5%  -Continue to monitor with daily CMP  -Continue neuro checks. No current acute decline in mentation.    Hematology  Occlusive thrombus  U/S showed RUE DVT.     - heparin gtt on admission    [facility]    Other  * Trismus  Briefly 57 yo AAM with ALS. At OSH with prolonged course complicated by bacteremia, PNA, upper extremity DVTs, facial swelling and trismus. Received Abx, PICC was changed there prior to transfer, and received BOTOX injections to help with his Trismus. Wheelchair bound since 2020     -ENT consult for facial mass on CT & trismus. Low suspicion for abscess. Recommended that anesthesia be consulted for V3 nerve block. Spoke to pain anesthesia and was advised to repeat botox injection at a higher dosage than previously injected (50cc). Reached out to Dr. Terry and he was able to perform botox  injection with no complications (9/9).  -ID following: Continue vancomycin with end date of 9/13.   -Echo unremarkable  -tube feeds  -9/7: overnight patient's blood pressure was low so started levo  -9/8: Increased midodrine from 5mg q8h to 15mg q8h for better blood pressure control. Dc levo  -9/9: Increased midodrine to 20mg nightly. He was restarted on levo overnight due to low blood pressures and MAP. Started pseudoephedrine.   9/10: Minimal asymptomatic lower pressures in sleep, remove A-line as positional and causing pain.  Now that procedure complete, plan for return closer to home ([place])  9/11: Blood pressure more consistently stable. Atropine ordered for increased secretions. Will continue discharge planning.           The patient is being Prophylaxed for:  Venous Thromboembolism with: Chemical  Stress Ulcer with: None  Ventilator Pneumonia with: chlorhexidine oral care    Activity Orders            Turn patient starting at 08/31 1200    Elevate HOB starting at 08/31 1131    Diet NPO: NPO starting at 08/31 1131          Full Code    Alicia Tan DO  Neurocritical Care  [facility]

## 2022-09-11 NOTE — PLAN OF CARE
[facility] Care Plan    POC reviewed with Wei Dominique at 1500. Pt blinks eyes appropriately to show understanding. No acute events today. Pt progressing toward goals. Will continue to monitor. See below and flowsheets for full assessment and VS info.             Is this a stroke patient? no    Neuro:  [place] Coma Scale  Best Eye Response: 4-->(E4) spontaneous  Best Motor Response: 6-->(M6) obeys commands  Best Verbal Response: 1-->(V1) none  [place] Coma Scale Score: 11  Assessment Qualifiers: patient not sedated/intubated  Pupil PERRLA: yes     24 hr Temp:  [97.3 °F (36.3 °C)-99 °F (37.2 °C)]     CV:   Rhythm: normal sinus rhythm, sinus tachycardia  BP goals:   SBP < 160  MAP >  60    Resp:   O2 Device (Oxygen Therapy): ventilator  Vent Mode: A/C  Set Rate: 14 BPM  Oxygen Concentration (%): 30  Vt Set: 400 mL  PEEP/CPAP: 5 cmH20  Pressure Support: 5 cmH20    Plan: trach in place    GI/:     Diet/Nutrition Received: NPO, tube feeding  Last Bowel Movement: 09/11/22  Voiding Characteristics: external catheter    Intake/Output Summary (Last 24 hours) at 9/11/2022 1811  Last data filed at 9/11/2022 1801  Gross per 24 hour   Intake 1757.68 ml   Output 1895 ml   Net -137.32 ml     Unmeasured Output  Urine Occurrence: 1  Stool Occurrence: 0  Pad Count: 1    Labs/Accuchecks:  Recent Labs   Lab 09/11/22  0423   WBC 11.21   RBC 3.21*   HGB 8.2*   HCT 27.9*         Recent Labs   Lab 09/11/22  0423   *   K 4.8   CO2 24   CL 98   BUN 40*   CREATININE 0.6   ALKPHOS 73   ALT 6*   AST 14   BILITOT 0.2      Recent Labs   Lab 09/10/22  1050   APTT 47.0*    No results for input(s): CPK, CPKMB, TROPONINI, MB in the last 168 hours.    Electrolytes: Electrolytes replaced  Accuchecks: none    Gtts:      LDA/Wounds:  Lines/Drains/Airways       Drain  Duration                  Gastrostomy/Enterostomy LUQ feeding -- days    Male External Urinary Catheter 09/04/22 1815 Large 6 days              Airway  Duration                   Surgical Airway Shiley Cuffed -- days         Surgical Airway 05/23/22 1145 Portex Cuffed 111 days              Peripheral Intravenous Line  Duration                  Midline Catheter Insertion/Assessment  - Single Lumen 09/02/22 1200 Left cephalic vein (lateral side of arm) 20g x 10cm 9 days         Peripheral IV - Single Lumen 09/02/22 1200 18 G;1 3/4 in Left Antecubital 9 days         Peripheral IV - Single Lumen 09/07/22 1618 20 G;1 3/4 in Right;Anterior Upper Arm 4 days                  Wounds: Yes  Wound care consulted: Yes

## 2022-09-11 NOTE — ASSESSMENT & PLAN NOTE
U/S showed RUE DVT.     - heparin gtt on admission    9/10 converted to [facility] for discharge planning

## 2022-09-11 NOTE — SUBJECTIVE & OBJECTIVE
Review of Systems   Unable to perform ROS: Intubated     Objective:     Vitals:  Temp: 98.7 °F (37.1 °C)  Pulse: 100  Rhythm: normal sinus rhythm, sinus tachycardia  BP: (!) 147/68  MAP (mmHg): 98  Resp: 15  SpO2: 100 %  Oxygen Concentration (%): 30  O2 Device (Oxygen Therapy): ventilator  Vent Mode: A/C  Set Rate: 14 BPM  Vt Set: 400 mL  PEEP/CPAP: 5 cmH20  Peak Airway Pressure: 26 cmH2O  Mean Airway Pressure: 11 cmH20  Plateau Pressure: 28 cmH20    Temp  Min: 97.3 °F (36.3 °C)  Max: 98.7 °F (37.1 °C)  Pulse  Min: 85  Max: 122  BP  Min: 89/66  Max: 164/72  MAP (mmHg)  Min: 72  Max: 113  Resp  Min: 14  Max: 28  SpO2  Min: 98 %  Max: 100 %  Oxygen Concentration (%)  Min: 30  Max: 30    09/10 0701 - 09/11 0700  In: 2025.7 [I.V.:441.7]  Out: 1200 [Urine:1200]   Unmeasured Output  Urine Occurrence: 1  Stool Occurrence: 0  Pad Count: 1       Physical Exam  Vitals and nursing note reviewed.   Constitutional:       General: He is not in acute distress.     Appearance: He is not diaphoretic.   HENT:      Head: Normocephalic.      Right Ear: External ear normal.      Left Ear: External ear normal.      Nose: Nose normal.      Mouth/Throat:      Comments: Bite block in place. Hypertrophied tongue bulging through teeth at region of right lower mandible  Eyes:      General: No scleral icterus.        Right eye: No discharge.         Left eye: No discharge.      Conjunctiva/sclera: Conjunctivae normal.      Pupils: Pupils are equal, round, and reactive to light.   Neck:      Comments: Trach in place  Cardiovascular:      Rate and Rhythm: Normal rate.   Pulmonary:      Comments: On ventilator  Abdominal:      General: Abdomen is flat.      Palpations: Abdomen is soft.   Musculoskeletal:         General: No tenderness.   Skin:     General: Skin is warm.      Comments: Wound to anterior aspect of left lower extremity.    Neurological:      Mental Status: He is alert.      Motor: Weakness present.      Comments: Blinks, nods  head    E3 V: not testable M6.  nonverbal. Following commands intermittently  Conversational hearing intact.            Medications:  Continuous Scheduledacetaminophen, 1,000 mg, Q8H  albuterol-ipratropium, 3 mL, TID WAKE  apixaban, 2.5 mg, BID  busPIRone, 5 mg, BID  famotidine, 20 mg, BID  gabapentin, 300 mg, Q8H  methocarbamoL, 1,000 mg, Q6H  metoprolol tartrate, 12.5 mg, Q6H  midodrine, 15 mg, BID   And  midodrine, 20 mg, QHS  mupirocin, , TID  nystatin, 500,000 Units, QID  polyethylene glycol, 17 g, Daily  riluzole, 50 mg, Q12H  scopolamine, 1 patch, Q3 Days  senna-docusate 8.6-50 mg, 1 tablet, BID  sodium chloride, 1 g, TID  traZODone, 50 mg, QHS    PRNbisacodyL, 10 mg, Daily PRN  dextrose 10%, 12.5 g, PRN  dextrose 10%, 25 g, PRN  fentaNYL, 50 mcg, Q1H PRN  hydrALAZINE, 10 mg, Q4H PRN  labetalol, 10 mg, Q4H PRN  magnesium sulfate IVPB, 2 g, PRN  magnesium sulfate IVPB, 4 g, PRN  ondansetron, 4 mg, Q6H PRN  oxyCODONE, 5 mg, Q4H PRN  oxyCODONE, 10 mg, Q4H PRN  potassium bicarbonate, 35 mEq, PRN  potassium bicarbonate, 50 mEq, PRN  potassium bicarbonate, 60 mEq, PRN  potassium, sodium phosphates, 2 packet, PRN  potassium, sodium phosphates, 2 packet, PRN  sodium chloride 0.9%, 10 mL, PRN  vancomycin - pharmacy to dose, , pharmacy to manage frequency      Today I personally reviewed pertinent medications, lines/drains/airways, imaging, laboratory results, notably:    Diet  Diet NPO  Diet NPO

## 2022-09-11 NOTE — ASSESSMENT & PLAN NOTE
Vent Mode: A/C  Oxygen Concentration (%):  [30] 30  Resp Rate Total:  [14 br/min-21 br/min] 17 br/min  Vt Set:  [400 mL] 400 mL  PEEP/CPAP:  [5 cmH20] 5 cmH20  Mean Airway Pressure:  [9.6 qhW80-84 cmH20] 11 cmH20.     Chronic neuromuscular failure secondary to ALS and diaphragmatic + upper airway failure.  On chronic vent settings, no signs of decompensation.

## 2022-09-11 NOTE — PLAN OF CARE
Problem: Communication Impairment (Stroke, Hemorrhagic)  Goal: Effective Communication Skills  Outcome: Ongoing, Progressing     Problem: Skin and Tissue Injury (Artificial Airway)  Goal: Absence of Device-Related Skin or Tissue Injury  Outcome: Ongoing, Progressing   [facility] Care Plan    POC reviewed with Wei ASHLEY Dominique and family at 0300. Pt verbalized understanding. Questions and concerns addressed. No acute events overnight. Pt progressing toward goals. Will continue to monitor. See below and flowsheets for full assessment and VS info.           Is this a stroke patient? no    Neuro:  [place] Coma Scale  Best Eye Response: 4-->(E4) spontaneous  Best Motor Response: 6-->(M6) obeys commands  Best Verbal Response: 1-->(V1) none  [place] Coma Scale Score: 11  Assessment Qualifiers: patient not sedated/intubated, no eye obstruction present  Pupil PERRLA: yes     24hr Temp:  [97.3 °F (36.3 °C)-98.5 °F (36.9 °C)]     CV:   Rhythm: normal sinus rhythm  BP goals:   SBP < 160  MAP >  60    Resp:   O2 Device (Oxygen Therapy): ventilator  Vent Mode: A/C  Set Rate: 14 BPM  Oxygen Concentration (%): 30  Vt Set: 400 mL  PEEP/CPAP: 5 cmH20  Pressure Support: 5 cmH20    Plan:  LTAC placement    GI/:     Diet/Nutrition Received: NPO, tube feeding  Last Bowel Movement: 09/09/22  Voiding Characteristics: external catheter    Intake/Output Summary (Last 24 hours) at 9/11/2022 0532  Last data filed at 9/11/2022 0505  Gross per 24 hour   Intake 2075.71 ml   Output 1400 ml   Net 675.71 ml     Unmeasured Output  Urine Occurrence: 1  Stool Occurrence: 0  Pad Count: 1    Labs/Accuchecks:  Recent Labs   Lab 09/11/22  0423   WBC 11.21   RBC 3.21*   HGB 8.2*   HCT 27.9*         Recent Labs   Lab 09/11/22  0423   *   K 4.8   CO2 24   CL 98   BUN 40*   CREATININE 0.6   ALKPHOS 73   ALT 6*   AST 14   BILITOT 0.2      Recent Labs   Lab 09/10/22  1050   APTT 47.0*    No results for input(s): CPK, CPKMB, TROPONINI, MB in the last  168 hours.    Electrolytes: N/A - electrolytes WDL  Accuchecks: none    Gtts:   NORepinephrine bitartrate-D5W Stopped (09/10/22 1216)       LDA/Wounds:  Lines/Drains/Airways       Drain  Duration                  Gastrostomy/Enterostomy LUQ feeding -- days    Male External Urinary Catheter 09/04/22 1815 Large 6 days              Airway  Duration                  Surgical Airway Shiley Cuffed -- days         Surgical Airway 05/23/22 1145 Portex Cuffed 110 days              Peripheral Intravenous Line  Duration                  Midline Catheter Insertion/Assessment  - Single Lumen 09/02/22 1200 Left cephalic vein (lateral side of arm) 20g x 10cm 8 days         Peripheral IV - Single Lumen 09/02/22 1200 18 G;1 3/4 in Left Antecubital 8 days         Peripheral IV - Single Lumen 09/07/22 1618 20 G;1 3/4 in Right;Anterior Upper Arm 3 days                  Wounds: Yes  Wound care consulted: Yes

## 2022-09-12 PROBLEM — L89.153 PRESSURE INJURY OF SACRAL REGION, STAGE 3: Status: ACTIVE | Noted: 2022-09-12

## 2022-09-12 LAB
ALBUMIN SERPL BCP-MCNC: 2.3 G/DL (ref 3.5–5.2)
ALP SERPL-CCNC: 71 U/L (ref 55–135)
ALT SERPL W/O P-5'-P-CCNC: 6 U/L (ref 10–44)
ANION GAP SERPL CALC-SCNC: 6 MMOL/L (ref 8–16)
AST SERPL-CCNC: 16 U/L (ref 10–40)
BASOPHILS # BLD AUTO: 0.1 K/UL (ref 0–0.2)
BASOPHILS NFR BLD: 0.8 % (ref 0–1.9)
BILIRUB SERPL-MCNC: 0.2 MG/DL (ref 0.1–1)
BUN SERPL-MCNC: 48 MG/DL (ref 8–23)
CALCIUM SERPL-MCNC: 9.9 MG/DL (ref 8.7–10.5)
CHLORIDE SERPL-SCNC: 102 MMOL/L (ref 95–110)
CO2 SERPL-SCNC: 25 MMOL/L (ref 23–29)
CREAT SERPL-MCNC: 0.6 MG/DL (ref 0.5–1.4)
CRP SERPL-MCNC: 39.6 MG/L (ref 0–8.2)
DIFFERENTIAL METHOD: ABNORMAL
EOSINOPHIL # BLD AUTO: 1.1 K/UL (ref 0–0.5)
EOSINOPHIL NFR BLD: 8.7 % (ref 0–8)
ERYTHROCYTE [DISTWIDTH] IN BLOOD BY AUTOMATED COUNT: 21.2 % (ref 11.5–14.5)
EST. GFR  (NO RACE VARIABLE): >60 ML/MIN/1.73 M^2
GLUCOSE SERPL-MCNC: 97 MG/DL (ref 70–110)
HCT VFR BLD AUTO: 27.2 % (ref 40–54)
HGB BLD-MCNC: 8 G/DL (ref 14–18)
IMM GRANULOCYTES # BLD AUTO: 0.48 K/UL (ref 0–0.04)
IMM GRANULOCYTES NFR BLD AUTO: 3.8 % (ref 0–0.5)
LYMPHOCYTES # BLD AUTO: 1.7 K/UL (ref 1–4.8)
LYMPHOCYTES NFR BLD: 13.3 % (ref 18–48)
MCH RBC QN AUTO: 25.6 PG (ref 27–31)
MCHC RBC AUTO-ENTMCNC: 29.4 G/DL (ref 32–36)
MCV RBC AUTO: 87 FL (ref 82–98)
MONOCYTES # BLD AUTO: 1.1 K/UL (ref 0.3–1)
MONOCYTES NFR BLD: 8.5 % (ref 4–15)
NEUTROPHILS # BLD AUTO: 8.2 K/UL (ref 1.8–7.7)
NEUTROPHILS NFR BLD: 64.9 % (ref 38–73)
NRBC BLD-RTO: 1 /100 WBC
PLATELET # BLD AUTO: 393 K/UL (ref 150–450)
PMV BLD AUTO: 9.8 FL (ref 9.2–12.9)
POTASSIUM SERPL-SCNC: 4.9 MMOL/L (ref 3.5–5.1)
PROT SERPL-MCNC: 8.2 G/DL (ref 6–8.4)
RBC # BLD AUTO: 3.12 M/UL (ref 4.6–6.2)
SARS-COV-2 RNA RESP QL NAA+PROBE: NOT DETECTED
SODIUM SERPL-SCNC: 133 MMOL/L (ref 136–145)
VANCOMYCIN SERPL-MCNC: 18.9 UG/ML
WBC # BLD AUTO: 12.66 K/UL (ref 3.9–12.7)

## 2022-09-12 PROCEDURE — 25000003 PHARM REV CODE 250: Performed by: NURSE PRACTITIONER

## 2022-09-12 PROCEDURE — 99233 PR SUBSEQUENT HOSPITAL CARE,LEVL III: ICD-10-PCS | Mod: 24,,, | Performed by: PSYCHIATRY & NEUROLOGY

## 2022-09-12 PROCEDURE — 85025 COMPLETE CBC W/AUTO DIFF WBC: CPT

## 2022-09-12 PROCEDURE — 94761 N-INVAS EAR/PLS OXIMETRY MLT: CPT

## 2022-09-12 PROCEDURE — 99900035 HC TECH TIME PER 15 MIN (STAT)

## 2022-09-12 PROCEDURE — 80202 ASSAY OF VANCOMYCIN: CPT | Performed by: PSYCHIATRY & NEUROLOGY

## 2022-09-12 PROCEDURE — 80053 COMPREHEN METABOLIC PANEL: CPT

## 2022-09-12 PROCEDURE — 99223 PR INITIAL HOSPITAL CARE,LEVL III: ICD-10-PCS | Mod: ,,, | Performed by: NURSE PRACTITIONER

## 2022-09-12 PROCEDURE — 25000003 PHARM REV CODE 250: Performed by: PSYCHIATRY & NEUROLOGY

## 2022-09-12 PROCEDURE — 86140 C-REACTIVE PROTEIN: CPT

## 2022-09-12 PROCEDURE — 94003 VENT MGMT INPAT SUBQ DAY: CPT

## 2022-09-12 PROCEDURE — 27000221 HC OXYGEN, UP TO 24 HOURS

## 2022-09-12 PROCEDURE — 27000207 HC ISOLATION

## 2022-09-12 PROCEDURE — 99223 1ST HOSP IP/OBS HIGH 75: CPT | Mod: ,,, | Performed by: NURSE PRACTITIONER

## 2022-09-12 PROCEDURE — 99900026 HC AIRWAY MAINTENANCE (STAT)

## 2022-09-12 PROCEDURE — U0005 INFEC AGEN DETEC AMPLI PROBE: HCPCS | Performed by: PSYCHIATRY & NEUROLOGY

## 2022-09-12 PROCEDURE — 63600175 PHARM REV CODE 636 W HCPCS: Performed by: NURSE PRACTITIONER

## 2022-09-12 PROCEDURE — 63600175 PHARM REV CODE 636 W HCPCS: Performed by: PSYCHIATRY & NEUROLOGY

## 2022-09-12 PROCEDURE — 94640 AIRWAY INHALATION TREATMENT: CPT

## 2022-09-12 PROCEDURE — 25000003 PHARM REV CODE 250

## 2022-09-12 PROCEDURE — 27200966 HC CLOSED SUCTION SYSTEM

## 2022-09-12 PROCEDURE — 63700000 PHARM REV CODE 250 ALT 637 W/O HCPCS

## 2022-09-12 PROCEDURE — 20000000 HC ICU ROOM

## 2022-09-12 PROCEDURE — 99233 SBSQ HOSP IP/OBS HIGH 50: CPT | Mod: 24,,, | Performed by: PSYCHIATRY & NEUROLOGY

## 2022-09-12 PROCEDURE — U0003 INFECTIOUS AGENT DETECTION BY NUCLEIC ACID (DNA OR RNA); SEVERE ACUTE RESPIRATORY SYNDROME CORONAVIRUS 2 (SARS-COV-2) (CORONAVIRUS DISEASE [COVID-19]), AMPLIFIED PROBE TECHNIQUE, MAKING USE OF HIGH THROUGHPUT TECHNOLOGIES AS DESCRIBED BY CMS-2020-01-R: HCPCS | Performed by: PSYCHIATRY & NEUROLOGY

## 2022-09-12 PROCEDURE — 25000242 PHARM REV CODE 250 ALT 637 W/ HCPCS: Performed by: PSYCHIATRY & NEUROLOGY

## 2022-09-12 RX ORDER — OXYCODONE HYDROCHLORIDE 10 MG/1
10 TABLET ORAL EVERY 4 HOURS PRN
Qty: 42 TABLET | Refills: 0 | Status: SHIPPED | OUTPATIENT
Start: 2022-09-12 | End: 2022-09-13 | Stop reason: SDUPTHER

## 2022-09-12 RX ORDER — DIAZEPAM 10 MG/2ML
10 INJECTION INTRAMUSCULAR ONCE
Status: DISCONTINUED | OUTPATIENT
Start: 2022-09-12 | End: 2022-09-12

## 2022-09-12 RX ORDER — ONDANSETRON 2 MG/ML
4 INJECTION INTRAMUSCULAR; INTRAVENOUS EVERY 6 HOURS PRN
Qty: 120 EACH | Refills: 3 | Status: SHIPPED | OUTPATIENT
Start: 2022-09-12

## 2022-09-12 RX ORDER — FAMOTIDINE 20 MG/1
20 TABLET, FILM COATED ORAL 2 TIMES DAILY
Qty: 60 TABLET | Refills: 11 | Status: SHIPPED | OUTPATIENT
Start: 2022-09-12 | End: 2023-09-12

## 2022-09-12 RX ORDER — SCOLOPAMINE TRANSDERMAL SYSTEM 1 MG/1
1 PATCH, EXTENDED RELEASE TRANSDERMAL
Qty: 8 PATCH | Refills: 3 | Status: SHIPPED | OUTPATIENT
Start: 2022-09-12

## 2022-09-12 RX ORDER — GABAPENTIN 300 MG/1
300 CAPSULE ORAL EVERY 8 HOURS
Qty: 90 CAPSULE | Refills: 11 | Status: SHIPPED | OUTPATIENT
Start: 2022-09-12 | End: 2023-09-12

## 2022-09-12 RX ORDER — ACETAMINOPHEN 500 MG
1000 TABLET ORAL EVERY 8 HOURS
Qty: 90 TABLET | Refills: 3 | Status: SHIPPED | OUTPATIENT
Start: 2022-09-12

## 2022-09-12 RX ORDER — DIAZEPAM 5 MG/1
10 TABLET ORAL ONCE
Status: COMPLETED | OUTPATIENT
Start: 2022-09-12 | End: 2022-09-12

## 2022-09-12 RX ORDER — TIZANIDINE 2 MG/1
2 TABLET ORAL EVERY 8 HOURS PRN
Qty: 30 TABLET | Refills: 0 | Status: SHIPPED | OUTPATIENT
Start: 2022-09-12 | End: 2022-09-22

## 2022-09-12 RX ORDER — METOPROLOL TARTRATE 25 MG/1
12.5 TABLET, FILM COATED ORAL EVERY 6 HOURS
Qty: 60 TABLET | Refills: 11 | Status: SHIPPED | OUTPATIENT
Start: 2022-09-13 | End: 2023-09-13

## 2022-09-12 RX ADMIN — MUPIROCIN: 20 OINTMENT TOPICAL at 09:09

## 2022-09-12 RX ADMIN — IPRATROPIUM BROMIDE AND ALBUTEROL SULFATE 3 ML: 2.5; .5 SOLUTION RESPIRATORY (INHALATION) at 01:09

## 2022-09-12 RX ADMIN — METOPROLOL TARTRATE 12.5 MG: 25 TABLET, FILM COATED ORAL at 11:09

## 2022-09-12 RX ADMIN — MUPIROCIN: 20 OINTMENT TOPICAL at 03:09

## 2022-09-12 RX ADMIN — METHOCARBAMOL 1000 MG: 500 TABLET ORAL at 05:09

## 2022-09-12 RX ADMIN — FAMOTIDINE 20 MG: 20 TABLET ORAL at 09:09

## 2022-09-12 RX ADMIN — SODIUM CHLORIDE TAB 1 GM 1 G: 1 TAB at 03:09

## 2022-09-12 RX ADMIN — LABETALOL HYDROCHLORIDE 10 MG: 5 INJECTION, SOLUTION INTRAVENOUS at 11:09

## 2022-09-12 RX ADMIN — MIDODRINE HYDROCHLORIDE 15 MG: 5 TABLET ORAL at 01:09

## 2022-09-12 RX ADMIN — GABAPENTIN 300 MG: 300 CAPSULE ORAL at 10:09

## 2022-09-12 RX ADMIN — FENTANYL CITRATE 50 MCG: 0.05 INJECTION, SOLUTION INTRAMUSCULAR; INTRAVENOUS at 03:09

## 2022-09-12 RX ADMIN — RILUZOLE 50 MG: 50 TABLET ORAL at 08:09

## 2022-09-12 RX ADMIN — SENNOSIDES AND DOCUSATE SODIUM 1 TABLET: 50; 8.6 TABLET ORAL at 09:09

## 2022-09-12 RX ADMIN — METOPROLOL TARTRATE 12.5 MG: 25 TABLET, FILM COATED ORAL at 05:09

## 2022-09-12 RX ADMIN — APIXABAN 2.5 MG: 2.5 TABLET, FILM COATED ORAL at 08:09

## 2022-09-12 RX ADMIN — SODIUM CHLORIDE TAB 1 GM 1 G: 1 TAB at 09:09

## 2022-09-12 RX ADMIN — RILUZOLE 50 MG: 50 TABLET ORAL at 09:09

## 2022-09-12 RX ADMIN — FAMOTIDINE 20 MG: 20 TABLET ORAL at 08:09

## 2022-09-12 RX ADMIN — ACETAMINOPHEN 1000 MG: 500 TABLET ORAL at 05:09

## 2022-09-12 RX ADMIN — BUSPIRONE HYDROCHLORIDE 5 MG: 5 TABLET ORAL at 08:09

## 2022-09-12 RX ADMIN — TRAZODONE HYDROCHLORIDE 50 MG: 50 TABLET ORAL at 09:09

## 2022-09-12 RX ADMIN — NYSTATIN 500000 UNITS: 500000 SUSPENSION ORAL at 08:09

## 2022-09-12 RX ADMIN — MIDODRINE HYDROCHLORIDE 20 MG: 5 TABLET ORAL at 09:09

## 2022-09-12 RX ADMIN — MUPIROCIN: 20 OINTMENT TOPICAL at 08:09

## 2022-09-12 RX ADMIN — FENTANYL CITRATE 50 MCG: 0.05 INJECTION, SOLUTION INTRAMUSCULAR; INTRAVENOUS at 08:09

## 2022-09-12 RX ADMIN — VANCOMYCIN HYDROCHLORIDE 500 MG: 500 INJECTION, POWDER, LYOPHILIZED, FOR SOLUTION INTRAVENOUS at 09:09

## 2022-09-12 RX ADMIN — ACETAMINOPHEN 1000 MG: 500 TABLET ORAL at 09:09

## 2022-09-12 RX ADMIN — SCOPALAMINE 1 PATCH: 1 PATCH, EXTENDED RELEASE TRANSDERMAL at 09:09

## 2022-09-12 RX ADMIN — SODIUM CHLORIDE TAB 1 GM 1 G: 1 TAB at 08:09

## 2022-09-12 RX ADMIN — GABAPENTIN 300 MG: 300 CAPSULE ORAL at 01:09

## 2022-09-12 RX ADMIN — DIAZEPAM 10 MG: 5 TABLET ORAL at 11:09

## 2022-09-12 RX ADMIN — IPRATROPIUM BROMIDE AND ALBUTEROL SULFATE 3 ML: 2.5; .5 SOLUTION RESPIRATORY (INHALATION) at 07:09

## 2022-09-12 RX ADMIN — METHOCARBAMOL 1000 MG: 500 TABLET ORAL at 11:09

## 2022-09-12 RX ADMIN — BUSPIRONE HYDROCHLORIDE 5 MG: 5 TABLET ORAL at 09:09

## 2022-09-12 RX ADMIN — NYSTATIN 500000 UNITS: 500000 SUSPENSION ORAL at 01:09

## 2022-09-12 RX ADMIN — GABAPENTIN 300 MG: 300 CAPSULE ORAL at 05:09

## 2022-09-12 RX ADMIN — APIXABAN 5 MG: 5 TABLET, FILM COATED ORAL at 09:09

## 2022-09-12 RX ADMIN — FENTANYL CITRATE 50 MCG: 0.05 INJECTION, SOLUTION INTRAMUSCULAR; INTRAVENOUS at 12:09

## 2022-09-12 RX ADMIN — MIDODRINE HYDROCHLORIDE 15 MG: 5 TABLET ORAL at 05:09

## 2022-09-12 NOTE — HPI
Wei Dominique is a 56 year old male with ALS. At OSH with prolonged course complicated by bacteremia, PNA, upper extremity DVTs, facial swelling and trismus. Received Abx, PICC was changed there prior to transfer, and received BOTOX injections to help with his Trismus. Pt transferred to [facility] for further management. Skin integrity ECHO consulted for evaluation of skin breakdown.

## 2022-09-12 NOTE — PLAN OF CARE
[facility] Care Plan    POC reviewed with Wei Dominique and family at 1400. Pt unable to verbalize understanding due to his ALS. Questions and concerns addressed. No acute events today. Pt progressing toward goals. Will continue to monitor. See below and flowsheets for full assessment and VS info.     - Labatolol given x1   - Fentanyl PRN given x2  - Bath given/linen changed   - Wound care completed as ordered   - Communication device at bedside used when necessary   - TF continued at goal       Is this a stroke patient? no    Neuro:  [place] Coma Scale  Best Eye Response: 4-->(E4) spontaneous  Best Motor Response: 6-->(M6) obeys commands  Best Verbal Response: 1-->(V1) none  [place] Coma Scale Score: 11  Assessment Qualifiers: no eye obstruction present (Trach Present)  Pupil PERRLA: yes     24 hr Temp:  [98.2 °F (36.8 °C)-99.3 °F (37.4 °C)]     CV:   Rhythm: normal sinus rhythm  BP goals:   SBP < 160  MAP > 65    Resp    O2 Device (Oxygen Therapy): ventilator  Vent Mode: A/C  Set Rate: 14 BPM  Oxygen Concentration (%): 30  Vt Set: 400 mL  PEEP/CPAP: 5 cmH20  Pressure Support: 5 cmH20    Plan: N/A    GI/:     Diet/Nutrition Received: NPO, tube feeding  Last Bowel Movement: 09/12/22  Voiding Characteristics: external catheter    Intake/Output Summary (Last 24 hours) at 9/12/2022 1654  Last data filed at 9/12/2022 1605  Gross per 24 hour   Intake 2006.51 ml   Output 1300 ml   Net 706.51 ml     Unmeasured Output  Urine Occurrence: 1  Stool Occurrence: 0  Pad Count: 1    Labs/Accuchecks:  Recent Labs   Lab 09/12/22  0319   WBC 12.66   RBC 3.12*   HGB 8.0*   HCT 27.2*         Recent Labs   Lab 09/12/22  0319   *   K 4.9   CO2 25      BUN 48*   CREATININE 0.6   ALKPHOS 71   ALT 6*   AST 16   BILITOT 0.2      Recent Labs   Lab 09/10/22  1050   APTT 47.0*    No results for input(s): CPK, CPKMB, TROPONINI, MB in the last 168 hours.    Electrolytes: N/A - electrolytes WDL  Accuchecks:  none    Gtts:      LDA/Wounds:  Lines/Drains/Airways       Drain  Duration                  Gastrostomy/Enterostomy LUQ feeding -- days    Male External Urinary Catheter 09/04/22 1815 Large 7 days              Airway  Duration                  Surgical Airway Shiley Cuffed -- days         Surgical Airway 05/23/22 1145 Portex Cuffed 112 days              Peripheral Intravenous Line  Duration                  Midline Catheter Insertion/Assessment  - Single Lumen 09/02/22 1200 Left cephalic vein (lateral side of arm) 20g x 10cm 10 days         Peripheral IV - Single Lumen 09/02/22 1200 18 G;1 3/4 in Left Antecubital 10 days                  Wounds: Yes  Wound care consulted: Yes

## 2022-09-12 NOTE — ASSESSMENT & PLAN NOTE
Vent Mode: A/C  Oxygen Concentration (%):  [30] 30  Resp Rate Total:  [14 br/min-23 br/min] 19 br/min  Vt Set:  [400 mL] 400 mL  PEEP/CPAP:  [5 cmH20] 5 cmH20  Mean Airway Pressure:  [9.3 ccO08-82 cmH20] 10 cmH20.     Chronic neuromuscular failure secondary to ALS and diaphragmatic + upper airway failure.  On chronic vent settings, no signs of decompensation.

## 2022-09-12 NOTE — ASSESSMENT & PLAN NOTE
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 He is s/p trach and peg. Multiple discussions with patient and patient's wife about GOC. He would like continued treatment.     - Continue riluzole  - Consulted acute pain for better management of his refractory pain. Adjusted pain regimen per their recs  - Neurology consult service (Dr Terry) administered botulinum injections to bilateral masseters (9/9)  - PRN acetaminophen, fentanyl, oxycodone

## 2022-09-12 NOTE — PLAN OF CARE
NURSING HOME ORDERS    09/13/2022  [facility]  [facility]  [address]  [place] [facility] [place] [facility] [address]  Dept: [phone]  Loc: [phone]     Admit to Nursing Home:      Diagnoses:  Active Hospital Problems    Diagnosis  POA    *Trismus [R25.2]  Yes    Pressure injury of sacral region, stage 3 [L89.153]  Yes    Hyponatremia [E87.1]  No    Oral lesion [K13.70]  Yes    Occlusive thrombus [I82.90]  Yes    Chronic respiratory failure with hypoxia and hypercapnia [J96.11, J96.12]  Yes    ALS (amyotrophic lateral sclerosis) [G12.21]  Yes     Chronic      Resolved Hospital Problems   No resolved problems to display.       Patient is homebound due to:  Trismus/ALS    Allergies:Review of patient's allergies indicates:  No Known Allergies    Vitals:  Routine    Diet:  tube feeding    Activities:   Activity as tolerated    Goals of Care Treatment Preferences:  Code Status: Full Code       LaPOST: Yes           Labs:  CBC, CMP    Nursing Precautions:  Fall and Pressure ulcer prevention    Consults:   Wound Care     Miscellaneous Care: Routine Skin for Bedridden Patients:  Apply moisture barrier cream to all               Medications: Discontinue all previous medication orders, if any. See new list below.     Medication List        START taking these medications      acetaminophen 500 MG tablet  Commonly known as: TYLENOL  2 tablets (1,000 mg total) by Per G Tube route every 8 (eight) hours.     famotidine 20 MG tablet  Commonly known as: PEPCID  1 tablet (20 mg total) by Per G Tube route 2 (two) times daily.     gabapentin 300 MG capsule  Commonly known as: NEURONTIN  1 capsule (300 mg total) by Per G Tube route every 8 (eight) hours.     ondansetron 4 mg/2 mL Soln  Inject 4 mg into the vein every 6 (six) hours as needed.     oxyCODONE 10 mg Tab immediate release tablet  Commonly known as: ROXICODONE  1 tablet (10 mg total) by Per G Tube route every 4 (four) hours as needed for  Pain.     scopolamine 1.3-1.5 mg (1 mg over 3 days)  Commonly known as: TRANSDERM-SCOP  Place 1 patch onto the skin Every 3 (three) days.     tiZANidine 2 MG tablet  Commonly known as: ZANAFLEX  Take 1 tablet (2 mg total) by mouth every 8 (eight) hours as needed (pain).            CHANGE how you take these medications      * apixaban 2.5 mg Tab  Commonly known as: ELIQUIS  1 tablet (2.5 mg total) by Per G Tube route 2 (two) times daily.  What changed: Another medication with the same name was added. Make sure you understand how and when to take each.     * apixaban 5 mg Tab  Commonly known as: ELIQUIS  1 tablet (5 mg total) by Per G Tube route 2 (two) times daily.  What changed: You were already taking a medication with the same name, and this prescription was added. Make sure you understand how and when to take each.     baclofen 10 MG tablet  Commonly known as: LIORESAL  TAKE 1 TABLET THREE TIMES PER DAY  What changed: See the new instructions.     glycopyrrolate 1 mg Tab  Commonly known as: ROBINUL  START BY TAKING 1 TABLET DAILY. IF TOLERATING WELL, YOU CAN INCREASE IT UP TO 2 OR EVEN 3 TIMES DAILY.  What changed: See the new instructions.     metoprolol tartrate 25 MG tablet  Commonly known as: LOPRESSOR  0.5 tablets (12.5 mg total) by Per G Tube route every 6 (six) hours.  What changed:   medication strength  how much to take  how to take this  when to take this     riluzole 50 mg Tab tablet  TAKE 1 TABLET ON AN EMPTY STOMACH EVERY 12 HRS  What changed:   how much to take  how to take this           * This list has 2 medication(s) that are the same as other medications prescribed for you. Read the directions carefully, and ask your doctor or other care provider to review them with you.                CONTINUE taking these medications      albuterol-ipratropium 2.5 mg-0.5 mg/3 mL nebulizer solution  Commonly known as: DUO-NEB  Take 3 mLs by nebulization every 4 (four) hours. Rescue     busPIRone 5 MG  Tab  Commonly known as: BUSPAR  1 tablet (5 mg total) by Per G Tube route 2 (two) times daily.     lansoprazole 30 MG capsule  Commonly known as: PREVACID  Take 30 mg by mouth once daily.     mupirocin 2 % ointment  Commonly known as: BACTROBAN  SMARTSI Application Topical 2-3 Times Daily     ondansetron 4 MG tablet  Commonly known as: ZOFRAN  4 mg by Per G Tube route every 6 (six) hours as needed for Nausea.     * pantoprazole 40 mg injection  Commonly known as: PROTONIX  Inject 40 mg into the vein once daily.     * pantoprazole 40 mg suspension  Commonly known as: PROTONIX  40 mg by FEEDING TUBE route 2 (two) times a day.     polyethylene glycol 17 gram Pwpk  Commonly known as: GLYCOLAX  17 g by Per G Tube route 2 (two) times daily.     * sodium chloride 0.9% injection  Commonly known as: NORMAL SALINE FLUSH  Inject 10 mLs into the vein as needed.     * sodium chloride 0.9% injection  Commonly known as: NORMAL SALINE FLUSH  Inject 10 mLs into the vein every 6 (six) hours.     traZODone 50 MG tablet  Commonly known as: DESYREL  50 mg by Per G Tube route every evening.           * This list has 4 medication(s) that are the same as other medications prescribed for you. Read the directions carefully, and ask your doctor or other care provider to review them with you.                ASK your doctor about these medications      cefepime in dextrose 5 % 2 gram/50 mL Pgbk  Commonly known as: MAXIPIME  Inject 50 mLs (2 g total) into the vein every 8 (eight) hours.     nystatin 100,000 unit/mL suspension  Commonly known as: MYCOSTATIN  Take 5 mLs (500,000 Units total) by mouth 4 (four) times daily. for 10 days  Ask about: Should I take this medication?                Immunizations Administered as of 2022       No immunizations on file.                 Some patients may experience side effects after vaccination.  These may include fever, headache, muscle or joint aches.  Most symptoms resolve with 24-48 hours and do  not require urgent medical evaluation unless they persist for more than 72 hours or symptoms are concerning for an unrelated medical condition.          _________________________________  Alicia Tan DO  09/13/2022

## 2022-09-12 NOTE — ASSESSMENT & PLAN NOTE
- consult received for evaluation of skin breakdown.  - pt transferred from OSH with wounds POA.  - midline and right side sacral wound with yellow slough present. Stage 3 pressure injury of sacrum.  - surrounding healing partial thickness tissue loss likely from shearing and friction.  - continue Triad bid/prn.  - RN at bedside and assisted with turning.  - Irene surface with waffle overlay intact.   - wedge for offloading. Sacral foam in place.  - condom cath in place for incontinence.  - pt is tolerating TFs.  - strict q2h turning.  - nursing to maintain pressure injury prevention measures and continue wound care per orders.

## 2022-09-12 NOTE — CONSULTS
[facility]  Skin Integrity ECHO  Consult Note    Patient Name: Wei Dominique  MRN: [id number]  Admission Date: 8/31/2022  Hospital Length of Stay: 12 days  Attending Physician: Luke Hays MD  Primary Care Provider: Primary Doctor No     Consults  Subjective:     History of Present Illness:  Wei Dominique is a 56 year old male with ALS. At OSH with prolonged course complicated by bacteremia, PNA, upper extremity DVTs, facial swelling and trismus. Received Abx, PICC was changed there prior to transfer, and received BOTOX injections to help with his Trismus. Pt transferred to [facility] for further management. Skin integrity ECHO consulted for evaluation of skin breakdown.      Scheduled Meds:   acetaminophen  1,000 mg Per G Tube Q8H    albuterol-ipratropium  3 mL Nebulization TID WAKE    apixaban  5 mg Per G Tube BID    busPIRone  5 mg Per G Tube BID    famotidine  20 mg Per G Tube BID    gabapentin  300 mg Per G Tube Q8H    methocarbamoL  1,000 mg Per G Tube Q6H    metoprolol tartrate  12.5 mg Per G Tube Q6H    midodrine  15 mg Per G Tube BID    And    midodrine  20 mg Per G Tube QHS    mupirocin   Nasal TID    nystatin  500,000 Units Oral QID    polyethylene glycol  17 g Per G Tube Daily    riluzole  50 mg Per G Tube Q12H    scopolamine  1 patch Transdermal Q3 Days    senna-docusate 8.6-50 mg  1 tablet Per G Tube BID    sodium chloride  1 g Per G Tube TID    traZODone  50 mg Per G Tube QHS     Continuous Infusions:  PRN Meds:bisacodyL, dextrose 10%, dextrose 10%, fentaNYL, hydrALAZINE, labetalol, magnesium sulfate IVPB, magnesium sulfate IVPB, ondansetron, oxyCODONE, oxyCODONE, potassium bicarbonate, potassium bicarbonate, potassium bicarbonate, potassium, sodium phosphates, potassium, sodium phosphates, sodium chloride 0.9%, Pharmacy to dose Vancomycin consult **AND** vancomycin - pharmacy to dose    Review of patient's allergies indicates:  No Known Allergies     Past Medical  History:   Diagnosis Date    ALS (amyotrophic lateral sclerosis)     ALS (amyotrophic lateral sclerosis)     Dependent on ventilator 3/21/2022    Erectile dysfunction     Gait disturbance     Hyperlipidemia     Hypertension     Iron deficiency anemia     Motor neuron disease     Polycystic kidney disease     Quadriparesis 2/6/2018    Tremor      Past Surgical History:   Procedure Laterality Date    GASTROSTOMY      PEG    PORTACATH PLACEMENT      TRACHEOSTOMY         Family History    None       Tobacco Use    Smoking status: Not on file    Smokeless tobacco: Not on file   Substance and Sexual Activity    Alcohol use: Not on file    Drug use: Never    Sexual activity: Not Currently     Review of Systems   Skin:  Positive for wound.     Objective:     Vital Signs (Most Recent):  Temp: 99.2 °F (37.3 °C) (09/12/22 1105)  Pulse: (!) 112 (09/12/22 1300)  Resp: 18 (09/12/22 1300)  BP: (!) 169/72 (09/12/22 1300)  SpO2: 100 % (09/12/22 1300)   Vital Signs (24h Range):  Temp:  [98.2 °F (36.8 °C)-99.3 °F (37.4 °C)] 99.2 °F (37.3 °C)  Pulse:  [] 112  Resp:  [14-25] 18  SpO2:  [99 %-100 %] 100 %  BP: (124-189)/(54-77) 169/72     Weight: 77 kg (169 lb 12.1 oz)  Body mass index is 25.81 kg/m².  Physical Exam  Constitutional:       Appearance: Normal appearance.   Skin:     General: Skin is warm and dry.      Findings: Lesion present.   Neurological:      Mental Status: He is alert.       Laboratory:  All pertinent labs reviewed within the last 24 hours.    Diagnostic Results:  None        Assessment/Plan:          ECHO Skin Integrity Evaluation    Skin Integrity ECHO evaluation of patient as part of the comprehensive skin care team.   He has been admitted for 12 days. Skin injury was noted on 9/2/22. POA yes.    Sacrum/buttocks            Pressure injury of sacral region, stage 3  - consult received for evaluation of skin breakdown.  - pt transferred from OSH with wounds POA.  - midline and right side  sacral wound with yellow slough present. Stage 3 pressure injury of sacrum.  - surrounding healing partial thickness tissue loss likely from shearing and friction.  - continue Triad bid/prn.  - RN at bedside and assisted with turning.  - Irene surface with waffle overlay intact.   - wedge for offloading. Sacral foam in place.  - condom cath in place for incontinence.  - pt is tolerating TFs.  - strict q2h turning.  - nursing to maintain pressure injury prevention measures and continue wound care per orders.        Thank you for your consult. I will follow-up with patient. Please contact us if you have any additional questions.       Michelle Das NP  Skin Integrity ECHO  [facility]

## 2022-09-12 NOTE — ASSESSMENT & PLAN NOTE
Briefly 57 yo AAM with ALS. At OSH with prolonged course complicated by bacteremia, PNA, upper extremity DVTs, facial swelling and trismus. Received Abx, PICC was changed there prior to transfer, and received BOTOX injections to help with his Trismus. Wheelchair bound since 2020     -ENT consult for facial mass on CT & trismus. Low suspicion for abscess. Recommended that anesthesia be consulted for V3 nerve block. Spoke to pain anesthesia and was advised to repeat botox injection at a higher dosage than previously injected (50cc). Reached out to Dr. Terry and he was able to perform botox injection with no complications (9/9).  -ID following: Continue vancomycin with end date of 9/13.   -Echo unremarkable  -tube feeds  -9/7: overnight patient's blood pressure was low so started levo  -9/8: Increased midodrine from 5mg q8h to 15mg q8h for better blood pressure control. Dc levo  -9/9: Increased midodrine to 20mg nightly. He was restarted on levo overnight due to low blood pressures and MAP. Started pseudoephedrine.   9/10: Minimal asymptomatic lower pressures in sleep, remove A-line as positional and causing pain.  Now that procedure complete, plan for return closer to home ([place] [place])  9/11: Blood pressure more consistently stable. Atropine ordered for increased secretions. Will continue discharge planning.   9/12: Ordered valium for pain management. Will continue discharge planning to LTAC

## 2022-09-12 NOTE — PLAN OF CARE
TATE advised by MD team that Pt is ready to return to his NH.     Pt is from [facility] in [place]. Sent clinicals via [facility] and left message with  re: Pt ready to return. Per front, they will let Kitty in admissions know to call this SW asap to discuss the return.     TATE received call from Kitty with [facility] ([facility]) regarding this Pt. They can accept back but need orders and now need to make sure the room he returns to is private as Pt is contact isolation. Advised MD team of need for NH orders.     4:39 PM  SW advised by MD team they need a bit more time to place orders. TATE advised that if Pt is on new meds, it may be tomorrow before the Pt can go. Contacted Kitty with [facility] ([phone]) and she reported if there are new meds, then it would have to wait, plus he is new contact isolation so they need a private room. She also requested covid. SW to have covering SW follow up in the AM.     Kitty Shah, ABRIL  Neurocritical Care   [facility]  [id number]

## 2022-09-12 NOTE — PROGRESS NOTES
[facility]  Neurocritical Care  Progress Note    Admit Date: 8/31/2022  Service Date: 09/12/2022  Length of Stay: 12    Subjective:     Chief Complaint: Trismus    History of Present Illness: Wei Dominique is a 55 yo AAM with ALS. At OSH with prolonged course complicated by bacteremia, PNA, upper extremity DVTs, facial swelling and trismus. Received Abx, PICC was changed there prior to transfer, and received BOTOX injections to help with his Trismus.      Hospital Course: 09/05/2022: No acute events overnight. ENT recommended anesthesia consult for possible V3 nerve block. Started midodrine 5mg q8h. ID does not recommend HAYDEN at this time.   09/06/2022: No acute events overnight. Consulted acute pain team for better management of refractory pain. DC cefepime per ID recs and will continue vancomycin with end date of 9/13. Patient continues to be hyponatremic likely secondary to dehydration so ordered one time dose of albumin 5%.   09/07/2022: Overnight blood pressure was low so he was started on levo. Noticed new wound on anterior aspect of left lower extremity. Wound care team following. Adjusted pain regimen per anesthesia recs. Patient continues to improve neurologically. He is making purposeful movements and follows commands intermittently. Continue spontaneous breathing trials.  09/08/2022: New a-line placed overnight. Increased midodrine to 15mg q8h and dc levo. Spoke to pain anesthesia and was recommended that patient receive repeat botox injection for pain management at a higher dosage than previously injected. Continue with spontaneous breathing trials.   09/09/2022: Patient's blood pressure dropped overnight with MAP down to 55. He was restarted on levo. Plan to increase midodrine to 20mg at night and maintain 15mg dosage during the day. Also started pseudoephedrine. Patient received botox injections into his temporalis muscle. He tolerated the procedure well with no complications.    9/10/2022: BP more consistently within appropriate range, patient continues to have zero symptoms even when mild hypotension is noted on monitor. A-line positional and removed.  No signs/symptoms of adverse effects from Botox injections.  Remains afebrile, complete antibiotic course.  Heparin converted to Eliquis. Begin discharge planning.  09/11/2022: Blood pressure controlled on oral medications. Atropine ordered for increased secretions. Trach appeared secured with no signs of leak this morning. Continue eliquis and monitor for adverse effects from Botox injections.   09/12/2022: NAEON. No adverse effects noted from Botox injections. Patient is still complaining of jaw pain. Discussed with patient that it will take several weeks before therapeutic effects would be noticed from the injections. Added valium to his multimodal pain regimen. Continue discharge planning.           Review of Systems   Unable to perform ROS: Patient nonverbal     Objective:     Vitals:  Temp: 99.2 °F (37.3 °C)  Pulse: (!) 112  Rhythm: normal sinus rhythm  BP: (!) 169/72  MAP (mmHg): 104  Resp: 18  SpO2: 100 %  Oxygen Concentration (%): 30  O2 Device (Oxygen Therapy): ventilator  Vent Mode: A/C  Set Rate: 14 BPM  Vt Set: 400 mL  PEEP/CPAP: 5 cmH20  Peak Airway Pressure: 17 cmH2O  Mean Airway Pressure: 10 cmH20  Plateau Pressure: 28 cmH20    Temp  Min: 98.2 °F (36.8 °C)  Max: 99.3 °F (37.4 °C)  Pulse  Min: 91  Max: 119  BP  Min: 124/54  Max: 189/77  MAP (mmHg)  Min: 78  Max: 110  Resp  Min: 14  Max: 25  SpO2  Min: 99 %  Max: 100 %  Oxygen Concentration (%)  Min: 30  Max: 30    09/11 0701 - 09/12 0700  In: 1844.8 [I.V.:49.8]  Out: 1700 [Urine:1700]   Unmeasured Output  Urine Occurrence: 1  Stool Occurrence: 0  Pad Count: 1       Physical Exam  Vitals and nursing note reviewed.   Constitutional:       General: He is not in acute distress.     Appearance: He is not diaphoretic.   HENT:      Head: Normocephalic.      Right Ear: External ear  normal.      Left Ear: External ear normal.      Nose: Nose normal.      Mouth/Throat:      Comments: Bite block in place. Hypertrophied tongue bulging through teeth at region of right lower mandible  Eyes:      General: No scleral icterus.        Right eye: No discharge.         Left eye: No discharge.      Conjunctiva/sclera: Conjunctivae normal.      Pupils: Pupils are equal, round, and reactive to light.   Neck:      Comments: Trach in place  Cardiovascular:      Rate and Rhythm: Normal rate.   Pulmonary:      Comments: On ventilator  Abdominal:      General: Abdomen is flat.      Palpations: Abdomen is soft.   Musculoskeletal:         General: No tenderness.   Skin:     General: Skin is warm.      Comments: Wound to anterior aspect of left lower extremity.    Neurological:      Mental Status: He is alert.      Motor: Weakness present.      Comments: Blinks, nods head    E3 V: not testable M6.  nonverbal. Following commands intermittently  Conversational hearing intact.            Medications:  Continuous Scheduledacetaminophen, 1,000 mg, Q8H  albuterol-ipratropium, 3 mL, TID WAKE  apixaban, 5 mg, BID  busPIRone, 5 mg, BID  famotidine, 20 mg, BID  gabapentin, 300 mg, Q8H  methocarbamoL, 1,000 mg, Q6H  metoprolol tartrate, 12.5 mg, Q6H  midodrine, 15 mg, BID   And  midodrine, 20 mg, QHS  mupirocin, , TID  nystatin, 500,000 Units, QID  polyethylene glycol, 17 g, Daily  riluzole, 50 mg, Q12H  scopolamine, 1 patch, Q3 Days  senna-docusate 8.6-50 mg, 1 tablet, BID  sodium chloride, 1 g, TID  traZODone, 50 mg, QHS    PRNbisacodyL, 10 mg, Daily PRN  dextrose 10%, 12.5 g, PRN  dextrose 10%, 25 g, PRN  fentaNYL, 50 mcg, Q1H PRN  hydrALAZINE, 10 mg, Q4H PRN  labetalol, 10 mg, Q4H PRN  magnesium sulfate IVPB, 2 g, PRN  magnesium sulfate IVPB, 4 g, PRN  ondansetron, 4 mg, Q6H PRN  oxyCODONE, 5 mg, Q4H PRN  oxyCODONE, 10 mg, Q4H PRN  potassium bicarbonate, 35 mEq, PRN  potassium bicarbonate, 50 mEq, PRN  potassium  bicarbonate, 60 mEq, PRN  potassium, sodium phosphates, 2 packet, PRN  potassium, sodium phosphates, 2 packet, PRN  sodium chloride 0.9%, 10 mL, PRN  vancomycin - pharmacy to dose, , pharmacy to manage frequency      Today I personally reviewed pertinent medications, lines/drains/airways, imaging, laboratory results, notably:    Diet  Diet NPO  Diet NPO          Assessment/Plan:     Neuro  ALS (amyotrophic lateral sclerosis)  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 He is s/p trach and peg. Multiple discussions with patient and patient's wife about GOC. He would like continued treatment.     - Continue riluzole  - Consulted acute pain for better management of his refractory pain. Adjusted pain regimen per their recs  - Neurology consult service (Dr Terry) administered botulinum injections to bilateral masseters (9/9)  - PRN acetaminophen, fentanyl, oxycodone      Pulmonary  Chronic respiratory failure with hypoxia and hypercapnia   Vent Mode: A/C  Oxygen Concentration (%):  [30] 30  Resp Rate Total:  [14 br/min-23 br/min] 19 br/min  Vt Set:  [400 mL] 400 mL  PEEP/CPAP:  [5 cmH20] 5 cmH20  Mean Airway Pressure:  [9.3 vsZ01-21 cmH20] 10 cmH20.     Chronic neuromuscular failure secondary to ALS and diaphragmatic + upper airway failure.  On chronic vent settings, no signs of decompensation.    Renal/  Hyponatremia  -9/6: Likely secondary to dehydration so gave one time dose of albumin 5%  -Continue to monitor with daily CMP  -Continue neuro checks. No current acute decline in mentation.    Hematology  Occlusive thrombus  U/S showed RUE DVT.     - heparin gtt on admission    9/10 converted to [facility] for discharge planning    Other  * Trismus  Briefly 57 yo AAM with ALS. At OSH with prolonged course complicated by bacteremia, PNA, upper extremity DVTs, facial swelling and trismus. Received Abx, PICC was changed there prior to transfer, and received BOTOX injections to help with his Trismus. Wheelchair bound since 2020     -ENT  consult for facial mass on CT & trismus. Low suspicion for abscess. Recommended that anesthesia be consulted for V3 nerve block. Spoke to pain anesthesia and was advised to repeat botox injection at a higher dosage than previously injected (50cc). Reached out to Dr. Terry and he was able to perform botox injection with no complications (9/9).  -ID following: Continue vancomycin with end date of 9/13.   -Echo unremarkable  -tube feeds  -9/7: overnight patient's blood pressure was low so started levo  -9/8: Increased midodrine from 5mg q8h to 15mg q8h for better blood pressure control. Dc levo  -9/9: Increased midodrine to 20mg nightly. He was restarted on levo overnight due to low blood pressures and MAP. Started pseudoephedrine.   9/10: Minimal asymptomatic lower pressures in sleep, remove A-line as positional and causing pain.  Now that procedure complete, plan for return closer to home ([place]rip [place])  9/11: Blood pressure more consistently stable. Atropine ordered for increased secretions. Will continue discharge planning.   9/12: Ordered valium for pain management. Will continue discharge planning to LTAC          The patient is being Prophylaxed for:  Venous Thromboembolism with: Mechanical or Chemical  Stress Ulcer with: None  Ventilator Pneumonia with: chlorhexidine oral care    Activity Orders          Turn patient starting at 08/31 1200    Elevate HOB starting at 08/31 1131    Diet NPO: NPO starting at 08/31 1131        Full Code    Alicia Tan, DO  Neurocritical Care  [facility]

## 2022-09-12 NOTE — PLAN OF CARE
[facility] Neuro Critical [facility]  Discharge Reassessment    Primary Care Provider: Primary Doctor No    Expected Discharge Date: 9/13/2022    Per MD, patient medically ready to return to [facility] ([facility]  Sw sent updates.  Patient to transfer back tomorrow.     Reassessment (most recent)       Discharge Reassessment - 09/12/22 1701          Discharge Reassessment    Assessment Type Discharge Planning Reassessment     Did the patient's condition or plan change since previous assessment? No     Communicated VENESSA with patient/caregiver Yes     Discharge Plan A Return to nursing home     Discharge Plan B Return to Nursing Home     DME Needed Upon Discharge  none     Discharge Barriers Identified None     Why the patient remains in the hospital Requires continued medical care                   Nidia Marc RN, CCRN-K, [facility]  Neuro-Critical Care   [phone]

## 2022-09-12 NOTE — ASSESSMENT & PLAN NOTE
U/S showed RUE DVT.     - heparin gtt on admission    9/10 converted to [facility] for discharge planning

## 2022-09-12 NOTE — SUBJECTIVE & OBJECTIVE
Scheduled Meds:   acetaminophen  1,000 mg Per G Tube Q8H    albuterol-ipratropium  3 mL Nebulization TID WAKE    apixaban  5 mg Per G Tube BID    busPIRone  5 mg Per G Tube BID    famotidine  20 mg Per G Tube BID    gabapentin  300 mg Per G Tube Q8H    methocarbamoL  1,000 mg Per G Tube Q6H    metoprolol tartrate  12.5 mg Per G Tube Q6H    midodrine  15 mg Per G Tube BID    And    midodrine  20 mg Per G Tube QHS    mupirocin   Nasal TID    nystatin  500,000 Units Oral QID    polyethylene glycol  17 g Per G Tube Daily    riluzole  50 mg Per G Tube Q12H    scopolamine  1 patch Transdermal Q3 Days    senna-docusate 8.6-50 mg  1 tablet Per G Tube BID    sodium chloride  1 g Per G Tube TID    traZODone  50 mg Per G Tube QHS     Continuous Infusions:  PRN Meds:bisacodyL, dextrose 10%, dextrose 10%, fentaNYL, hydrALAZINE, labetalol, magnesium sulfate IVPB, magnesium sulfate IVPB, ondansetron, oxyCODONE, oxyCODONE, potassium bicarbonate, potassium bicarbonate, potassium bicarbonate, potassium, sodium phosphates, potassium, sodium phosphates, sodium chloride 0.9%, Pharmacy to dose Vancomycin consult **AND** vancomycin - pharmacy to dose    Review of patient's allergies indicates:  No Known Allergies     Past Medical History:   Diagnosis Date    ALS (amyotrophic lateral sclerosis)     ALS (amyotrophic lateral sclerosis)     Dependent on ventilator 3/21/2022    Erectile dysfunction     Gait disturbance     Hyperlipidemia     Hypertension     Iron deficiency anemia     Motor neuron disease     Polycystic kidney disease     Quadriparesis 2/6/2018    Tremor      Past Surgical History:   Procedure Laterality Date    GASTROSTOMY      PEG    PORTACATH PLACEMENT      TRACHEOSTOMY         Family History    None       Tobacco Use    Smoking status: Not on file    Smokeless tobacco: Not on file   Substance and Sexual Activity    Alcohol use: Not on file    Drug use: Never    Sexual activity: Not Currently     Review of Systems   Skin:   Positive for wound.     Objective:     Vital Signs (Most Recent):  Temp: 99.2 °F (37.3 °C) (09/12/22 1105)  Pulse: (!) 112 (09/12/22 1300)  Resp: 18 (09/12/22 1300)  BP: (!) 169/72 (09/12/22 1300)  SpO2: 100 % (09/12/22 1300)   Vital Signs (24h Range):  Temp:  [98.2 °F (36.8 °C)-99.3 °F (37.4 °C)] 99.2 °F (37.3 °C)  Pulse:  [] 112  Resp:  [14-25] 18  SpO2:  [99 %-100 %] 100 %  BP: (124-189)/(54-77) 169/72     Weight: 77 kg (169 lb 12.1 oz)  Body mass index is 25.81 kg/m².  Physical Exam  Constitutional:       Appearance: Normal appearance.   Skin:     General: Skin is warm and dry.      Findings: Lesion present.   Neurological:      Mental Status: He is alert.       Laboratory:  All pertinent labs reviewed within the last 24 hours.    Diagnostic Results:  None

## 2022-09-12 NOTE — PLAN OF CARE
Problem: Adjustment to Illness (Sepsis/Septic Shock)  Goal: Optimal Coping  Outcome: Ongoing, Progressing     Problem: Bleeding (Sepsis/Septic Shock)  Goal: Absence of Bleeding  Outcome: Ongoing, Progressing   [facility] Care Plan    POC reviewed with Wei Dominique and family at 0300. Pt nods head or blink eyes to questions appropriately. Questions and concerns addressed with wife via phone. No acute events overnight. Pt progressing toward goals. Will continue to monitor. See below and flowsheets for full assessment and VS info.           Is this a stroke patient? no    Neuro:  Sukumar Coma Scale  Best Eye Response: 4-->(E4) spontaneous  Best Motor Response: 6-->(M6) obeys commands  Best Verbal Response: 1-->(V1) none  [place] Coma Scale Score: 11  Assessment Qualifiers: patient not sedated/intubated, no eye obstruction present  Pupil PERRLA: yes     24hr Temp:  [98.2 °F (36.8 °C)-99.2 °F (37.3 °C)]     CV:   Rhythm: normal sinus rhythm  BP goals:   SBP < 160  MAP >  60    Resp:   O2 Device (Oxygen Therapy): ventilator  Vent Mode: A/C  Set Rate: 14 BPM  Oxygen Concentration (%): 30  Vt Set: 400 mL  PEEP/CPAP: 5 cmH20  Pressure Support: 5 cmH20    Plan: N/A    GI/:     Diet/Nutrition Received: NPO, tube feeding  Last Bowel Movement: 09/11/22  Voiding Characteristics: external catheter    Intake/Output Summary (Last 24 hours) at 9/12/2022 0425  Last data filed at 9/12/2022 0305  Gross per 24 hour   Intake 1707.68 ml   Output 1550 ml   Net 157.68 ml     Unmeasured Output  Urine Occurrence: 1  Stool Occurrence: 0  Pad Count: 1    Labs/Accuchecks:  Recent Labs   Lab 09/12/22  0319   WBC 12.66   RBC 3.12*   HGB 8.0*   HCT 27.2*         Recent Labs   Lab 09/12/22  0319   *   K 4.9   CO2 25      BUN 48*   CREATININE 0.6   ALKPHOS 71   ALT 6*   AST 16   BILITOT 0.2      Recent Labs   Lab 09/10/22  1050   APTT 47.0*    No results for input(s): CPK, CPKMB, TROPONINI, MB in the last 168  hours.    Electrolytes: N/A - electrolytes WDL  Accuchecks: none    Gtts:      LDA/Wounds:  Lines/Drains/Airways       Drain  Duration                  Gastrostomy/Enterostomy LUQ feeding -- days    Male External Urinary Catheter 09/04/22 1815 Large 7 days              Airway  Duration                  Surgical Airway Shiley Cuffed -- days         Surgical Airway 05/23/22 1145 Portex Cuffed 111 days              Peripheral Intravenous Line  Duration                  Midline Catheter Insertion/Assessment  - Single Lumen 09/02/22 1200 Left cephalic vein (lateral side of arm) 20g x 10cm 9 days         Peripheral IV - Single Lumen 09/02/22 1200 18 G;1 3/4 in Left Antecubital 9 days         Peripheral IV - Single Lumen 09/07/22 1618 20 G;1 3/4 in Right;Anterior Upper Arm 4 days                  Wounds: Yes  Wound care consulted: Yes

## 2022-09-12 NOTE — PROGRESS NOTES
Pharmacokinetic Assessment: IV Vancomycin    Vancomycin serum concentration assessment(s):    - Random level is therapeutic at 18.9 mcg/mL  - Goal trough 15-20 mcg/mL  - Renal function stable w/ good UOP   - Pt bed bound d/t ALS. Scr is not the best indicator of renal function d/t limited muscle mass  -  Pt didn't tolerate scheduled q24h regimen. Will complete remaining course by pulse dosing as needed  - Give 500 mg x1 now  - Collect random level on 9/13 w/ AM labs  - Redose based on level and renal function  - Last day of abx 9/13    Drug levels (last 3 results):  Recent Labs   Lab Result Units 09/10/22  0219 09/11/22  0423 09/12/22  0319   Vancomycin, Random ug/mL 14.3 16.9 18.9       Pharmacy will continue to follow and monitor vancomycin.  Please contact pharmacy at extension [phone] for questions regarding this assessment.      Thank you for the consult,     Cynthia Maza, PharmD, [facility]  Neurocritical Care Pharmacist  [phone]           Patient brief summary:  Wei Dominique is a 67 y.o. male initiated on antimicrobial therapy with IV Vancomycin for treatment of bacteremia      Drug Allergies:   Review of patient's allergies indicates:  No Known Allergies      Renal Function:   Estimated Creatinine Clearance: 117.2 mL/min (based on SCr of 0.6 mg/dL).,     Dialysis Method (if applicable):  N/A

## 2022-09-12 NOTE — SUBJECTIVE & OBJECTIVE
Review of Systems   Unable to perform ROS: Patient nonverbal     Objective:     Vitals:  Temp: 99.2 °F (37.3 °C)  Pulse: (!) 112  Rhythm: normal sinus rhythm  BP: (!) 169/72  MAP (mmHg): 104  Resp: 18  SpO2: 100 %  Oxygen Concentration (%): 30  O2 Device (Oxygen Therapy): ventilator  Vent Mode: A/C  Set Rate: 14 BPM  Vt Set: 400 mL  PEEP/CPAP: 5 cmH20  Peak Airway Pressure: 17 cmH2O  Mean Airway Pressure: 10 cmH20  Plateau Pressure: 28 cmH20    Temp  Min: 98.2 °F (36.8 °C)  Max: 99.3 °F (37.4 °C)  Pulse  Min: 91  Max: 119  BP  Min: 124/54  Max: 189/77  MAP (mmHg)  Min: 78  Max: 110  Resp  Min: 14  Max: 25  SpO2  Min: 99 %  Max: 100 %  Oxygen Concentration (%)  Min: 30  Max: 30    09/11 0701 - 09/12 0700  In: 1844.8 [I.V.:49.8]  Out: 1700 [Urine:1700]   Unmeasured Output  Urine Occurrence: 1  Stool Occurrence: 0  Pad Count: 1       Physical Exam  Vitals and nursing note reviewed.   Constitutional:       General: He is not in acute distress.     Appearance: He is not diaphoretic.   HENT:      Head: Normocephalic.      Right Ear: External ear normal.      Left Ear: External ear normal.      Nose: Nose normal.      Mouth/Throat:      Comments: Bite block in place. Hypertrophied tongue bulging through teeth at region of right lower mandible  Eyes:      General: No scleral icterus.        Right eye: No discharge.         Left eye: No discharge.      Conjunctiva/sclera: Conjunctivae normal.      Pupils: Pupils are equal, round, and reactive to light.   Neck:      Comments: Trach in place  Cardiovascular:      Rate and Rhythm: Normal rate.   Pulmonary:      Comments: On ventilator  Abdominal:      General: Abdomen is flat.      Palpations: Abdomen is soft.   Musculoskeletal:         General: No tenderness.   Skin:     General: Skin is warm.      Comments: Wound to anterior aspect of left lower extremity.    Neurological:      Mental Status: He is alert.      Motor: Weakness present.      Comments: Blinks, nods  head    E3 V: not testable M6.  nonverbal. Following commands intermittently  Conversational hearing intact.            Medications:  Continuous Scheduledacetaminophen, 1,000 mg, Q8H  albuterol-ipratropium, 3 mL, TID WAKE  apixaban, 5 mg, BID  busPIRone, 5 mg, BID  famotidine, 20 mg, BID  gabapentin, 300 mg, Q8H  methocarbamoL, 1,000 mg, Q6H  metoprolol tartrate, 12.5 mg, Q6H  midodrine, 15 mg, BID   And  midodrine, 20 mg, QHS  mupirocin, , TID  nystatin, 500,000 Units, QID  polyethylene glycol, 17 g, Daily  riluzole, 50 mg, Q12H  scopolamine, 1 patch, Q3 Days  senna-docusate 8.6-50 mg, 1 tablet, BID  sodium chloride, 1 g, TID  traZODone, 50 mg, QHS    PRNbisacodyL, 10 mg, Daily PRN  dextrose 10%, 12.5 g, PRN  dextrose 10%, 25 g, PRN  fentaNYL, 50 mcg, Q1H PRN  hydrALAZINE, 10 mg, Q4H PRN  labetalol, 10 mg, Q4H PRN  magnesium sulfate IVPB, 2 g, PRN  magnesium sulfate IVPB, 4 g, PRN  ondansetron, 4 mg, Q6H PRN  oxyCODONE, 5 mg, Q4H PRN  oxyCODONE, 10 mg, Q4H PRN  potassium bicarbonate, 35 mEq, PRN  potassium bicarbonate, 50 mEq, PRN  potassium bicarbonate, 60 mEq, PRN  potassium, sodium phosphates, 2 packet, PRN  potassium, sodium phosphates, 2 packet, PRN  sodium chloride 0.9%, 10 mL, PRN  vancomycin - pharmacy to dose, , pharmacy to manage frequency      Today I personally reviewed pertinent medications, lines/drains/airways, imaging, laboratory results, notably:    Diet  Diet NPO  Diet NPO

## 2022-09-13 VITALS
TEMPERATURE: 99 F | RESPIRATION RATE: 16 BRPM | WEIGHT: 169.75 LBS | BODY MASS INDEX: 25.73 KG/M2 | SYSTOLIC BLOOD PRESSURE: 135 MMHG | DIASTOLIC BLOOD PRESSURE: 58 MMHG | OXYGEN SATURATION: 96 % | HEIGHT: 68 IN | HEART RATE: 99 BPM

## 2022-09-13 LAB
ABO + RH BLD: NORMAL
ALBUMIN SERPL BCP-MCNC: 2.3 G/DL (ref 3.5–5.2)
ALP SERPL-CCNC: 66 U/L (ref 55–135)
ALT SERPL W/O P-5'-P-CCNC: 5 U/L (ref 10–44)
ANION GAP SERPL CALC-SCNC: 8 MMOL/L (ref 8–16)
AST SERPL-CCNC: 16 U/L (ref 10–40)
BASOPHILS # BLD AUTO: 0.05 K/UL (ref 0–0.2)
BASOPHILS NFR BLD: 0.3 % (ref 0–1.9)
BILIRUB SERPL-MCNC: 0.3 MG/DL (ref 0.1–1)
BLD GP AB SCN CELLS X3 SERPL QL: NORMAL
BUN SERPL-MCNC: 48 MG/DL (ref 8–23)
CALCIUM SERPL-MCNC: 9.4 MG/DL (ref 8.7–10.5)
CHLORIDE SERPL-SCNC: 104 MMOL/L (ref 95–110)
CO2 SERPL-SCNC: 25 MMOL/L (ref 23–29)
CREAT SERPL-MCNC: 0.5 MG/DL (ref 0.5–1.4)
CRP SERPL-MCNC: 146.5 MG/L (ref 0–8.2)
DIFFERENTIAL METHOD: ABNORMAL
EOSINOPHIL # BLD AUTO: 0.6 K/UL (ref 0–0.5)
EOSINOPHIL NFR BLD: 3.9 % (ref 0–8)
ERYTHROCYTE [DISTWIDTH] IN BLOOD BY AUTOMATED COUNT: 21.3 % (ref 11.5–14.5)
ERYTHROCYTE [DISTWIDTH] IN BLOOD BY AUTOMATED COUNT: 21.8 % (ref 11.5–14.5)
EST. GFR  (NO RACE VARIABLE): >60 ML/MIN/1.73 M^2
GLUCOSE SERPL-MCNC: 119 MG/DL (ref 70–110)
HCT VFR BLD AUTO: 22.1 % (ref 40–54)
HCT VFR BLD AUTO: 26.1 % (ref 40–54)
HGB BLD-MCNC: 6.8 G/DL (ref 14–18)
HGB BLD-MCNC: 7.8 G/DL (ref 14–18)
IMM GRANULOCYTES # BLD AUTO: 0.45 K/UL (ref 0–0.04)
IMM GRANULOCYTES NFR BLD AUTO: 2.9 % (ref 0–0.5)
LYMPHOCYTES # BLD AUTO: 1.4 K/UL (ref 1–4.8)
LYMPHOCYTES NFR BLD: 9.1 % (ref 18–48)
MCH RBC QN AUTO: 26.1 PG (ref 27–31)
MCH RBC QN AUTO: 26.3 PG (ref 27–31)
MCHC RBC AUTO-ENTMCNC: 29.9 G/DL (ref 32–36)
MCHC RBC AUTO-ENTMCNC: 30.8 G/DL (ref 32–36)
MCV RBC AUTO: 85 FL (ref 82–98)
MCV RBC AUTO: 87 FL (ref 82–98)
MONOCYTES # BLD AUTO: 1.5 K/UL (ref 0.3–1)
MONOCYTES NFR BLD: 10 % (ref 4–15)
NEUTROPHILS # BLD AUTO: 11.3 K/UL (ref 1.8–7.7)
NEUTROPHILS NFR BLD: 73.8 % (ref 38–73)
NRBC BLD-RTO: 0 /100 WBC
PLATELET # BLD AUTO: 281 K/UL (ref 150–450)
PLATELET # BLD AUTO: 334 K/UL (ref 150–450)
PMV BLD AUTO: 9.5 FL (ref 9.2–12.9)
PMV BLD AUTO: 9.7 FL (ref 9.2–12.9)
POTASSIUM SERPL-SCNC: 4.8 MMOL/L (ref 3.5–5.1)
PROT SERPL-MCNC: 7.7 G/DL (ref 6–8.4)
RBC # BLD AUTO: 2.59 M/UL (ref 4.6–6.2)
RBC # BLD AUTO: 2.99 M/UL (ref 4.6–6.2)
SODIUM SERPL-SCNC: 137 MMOL/L (ref 136–145)
VANCOMYCIN SERPL-MCNC: 15.7 UG/ML
WBC # BLD AUTO: 15.31 K/UL (ref 3.9–12.7)
WBC # BLD AUTO: 9.91 K/UL (ref 3.9–12.7)

## 2022-09-13 PROCEDURE — 63700000 PHARM REV CODE 250 ALT 637 W/O HCPCS

## 2022-09-13 PROCEDURE — 80202 ASSAY OF VANCOMYCIN: CPT | Performed by: PSYCHIATRY & NEUROLOGY

## 2022-09-13 PROCEDURE — 94640 AIRWAY INHALATION TREATMENT: CPT

## 2022-09-13 PROCEDURE — 25000242 PHARM REV CODE 250 ALT 637 W/ HCPCS: Performed by: PSYCHIATRY & NEUROLOGY

## 2022-09-13 PROCEDURE — 25000003 PHARM REV CODE 250

## 2022-09-13 PROCEDURE — 99900026 HC AIRWAY MAINTENANCE (STAT)

## 2022-09-13 PROCEDURE — 86140 C-REACTIVE PROTEIN: CPT

## 2022-09-13 PROCEDURE — 85025 COMPLETE CBC W/AUTO DIFF WBC: CPT

## 2022-09-13 PROCEDURE — 99238 PR HOSPITAL DISCHARGE DAY,<30 MIN: ICD-10-PCS | Mod: 24,,, | Performed by: PSYCHIATRY & NEUROLOGY

## 2022-09-13 PROCEDURE — 25000003 PHARM REV CODE 250: Performed by: PSYCHIATRY & NEUROLOGY

## 2022-09-13 PROCEDURE — 63600175 PHARM REV CODE 636 W HCPCS: Performed by: NURSE PRACTITIONER

## 2022-09-13 PROCEDURE — 99238 HOSP IP/OBS DSCHRG MGMT 30/<: CPT | Mod: 24,,, | Performed by: PSYCHIATRY & NEUROLOGY

## 2022-09-13 PROCEDURE — 94761 N-INVAS EAR/PLS OXIMETRY MLT: CPT

## 2022-09-13 PROCEDURE — 94003 VENT MGMT INPAT SUBQ DAY: CPT

## 2022-09-13 PROCEDURE — 80053 COMPREHEN METABOLIC PANEL: CPT

## 2022-09-13 PROCEDURE — 25000003 PHARM REV CODE 250: Performed by: NURSE PRACTITIONER

## 2022-09-13 PROCEDURE — 86901 BLOOD TYPING SEROLOGIC RH(D): CPT | Performed by: PSYCHIATRY & NEUROLOGY

## 2022-09-13 PROCEDURE — 63600175 PHARM REV CODE 636 W HCPCS: Performed by: PSYCHIATRY & NEUROLOGY

## 2022-09-13 PROCEDURE — 27000221 HC OXYGEN, UP TO 24 HOURS

## 2022-09-13 PROCEDURE — 27200966 HC CLOSED SUCTION SYSTEM

## 2022-09-13 PROCEDURE — 85027 COMPLETE CBC AUTOMATED: CPT

## 2022-09-13 PROCEDURE — 99900035 HC TECH TIME PER 15 MIN (STAT)

## 2022-09-13 RX ORDER — OXYCODONE HYDROCHLORIDE 10 MG/1
10 TABLET ORAL EVERY 4 HOURS PRN
Qty: 42 TABLET | Refills: 0 | Status: SHIPPED | OUTPATIENT
Start: 2022-09-13

## 2022-09-13 RX ADMIN — FAMOTIDINE 20 MG: 20 TABLET ORAL at 08:09

## 2022-09-13 RX ADMIN — METOPROLOL TARTRATE 12.5 MG: 25 TABLET, FILM COATED ORAL at 05:09

## 2022-09-13 RX ADMIN — APIXABAN 5 MG: 5 TABLET, FILM COATED ORAL at 08:09

## 2022-09-13 RX ADMIN — RILUZOLE 50 MG: 50 TABLET ORAL at 08:09

## 2022-09-13 RX ADMIN — IPRATROPIUM BROMIDE AND ALBUTEROL SULFATE 3 ML: 2.5; .5 SOLUTION RESPIRATORY (INHALATION) at 07:09

## 2022-09-13 RX ADMIN — METOPROLOL TARTRATE 12.5 MG: 25 TABLET, FILM COATED ORAL at 12:09

## 2022-09-13 RX ADMIN — METHOCARBAMOL 1000 MG: 500 TABLET ORAL at 05:09

## 2022-09-13 RX ADMIN — MUPIROCIN: 20 OINTMENT TOPICAL at 08:09

## 2022-09-13 RX ADMIN — GABAPENTIN 300 MG: 300 CAPSULE ORAL at 05:09

## 2022-09-13 RX ADMIN — FENTANYL CITRATE 50 MCG: 0.05 INJECTION, SOLUTION INTRAMUSCULAR; INTRAVENOUS at 12:09

## 2022-09-13 RX ADMIN — VANCOMYCIN HYDROCHLORIDE 750 MG: 750 INJECTION, POWDER, LYOPHILIZED, FOR SOLUTION INTRAVENOUS at 09:09

## 2022-09-13 RX ADMIN — ACETAMINOPHEN 1000 MG: 500 TABLET ORAL at 02:09

## 2022-09-13 RX ADMIN — FENTANYL CITRATE 50 MCG: 0.05 INJECTION, SOLUTION INTRAMUSCULAR; INTRAVENOUS at 06:09

## 2022-09-13 RX ADMIN — GABAPENTIN 300 MG: 300 CAPSULE ORAL at 02:09

## 2022-09-13 RX ADMIN — ACETAMINOPHEN 1000 MG: 500 TABLET ORAL at 05:09

## 2022-09-13 RX ADMIN — MUPIROCIN: 20 OINTMENT TOPICAL at 02:09

## 2022-09-13 RX ADMIN — MIDODRINE HYDROCHLORIDE 15 MG: 5 TABLET ORAL at 02:09

## 2022-09-13 RX ADMIN — MIDODRINE HYDROCHLORIDE 15 MG: 5 TABLET ORAL at 05:09

## 2022-09-13 RX ADMIN — SODIUM CHLORIDE TAB 1 GM 1 G: 1 TAB at 02:09

## 2022-09-13 RX ADMIN — SODIUM CHLORIDE TAB 1 GM 1 G: 1 TAB at 08:09

## 2022-09-13 RX ADMIN — BUSPIRONE HYDROCHLORIDE 5 MG: 5 TABLET ORAL at 08:09

## 2022-09-13 RX ADMIN — IPRATROPIUM BROMIDE AND ALBUTEROL SULFATE 3 ML: 2.5; .5 SOLUTION RESPIRATORY (INHALATION) at 01:09

## 2022-09-13 RX ADMIN — METHOCARBAMOL 1000 MG: 500 TABLET ORAL at 12:09

## 2022-09-13 RX ADMIN — FENTANYL CITRATE 50 MCG: 0.05 INJECTION, SOLUTION INTRAMUSCULAR; INTRAVENOUS at 02:09

## 2022-09-13 NOTE — ASSESSMENT & PLAN NOTE
U/S showed RUE DVT.     - heparin gtt on admission    9/10 converted to [facility] for discharge planning

## 2022-09-13 NOTE — ASSESSMENT & PLAN NOTE
Vent Mode: A/C  Oxygen Concentration (%):  [30] 30  Resp Rate Total:  [14 br/min-21 br/min] 14 br/min  Vt Set:  [400 mL] 400 mL  PEEP/CPAP:  [5 cmH20] 5 cmH20  Mean Airway Pressure:  [10 ejV04-44 cmH20] 10 cmH20.     Chronic neuromuscular failure secondary to ALS and diaphragmatic + upper airway failure.  On chronic vent settings, no signs of decompensation.

## 2022-09-13 NOTE — SUBJECTIVE & OBJECTIVE
Review of Systems   Unable to perform ROS: Patient nonverbal     Objective:     Vitals:  Temp: 98.9 °F (37.2 °C)  Pulse: 99  Rhythm: normal sinus rhythm  BP: (!) 165/79  MAP (mmHg): 111  Resp: 14  SpO2: 96 %  Oxygen Concentration (%): 30  O2 Device (Oxygen Therapy): ventilator  Vent Mode: A/C  Set Rate: 14 BPM  Vt Set: 400 mL  PEEP/CPAP: 5 cmH20  Peak Airway Pressure: 20 cmH2O  Mean Airway Pressure: 10 cmH20  Plateau Pressure: 28 cmH20    Temp  Min: 98.4 °F (36.9 °C)  Max: 100 °F (37.8 °C)  Pulse  Min: 93  Max: 119  BP  Min: 109/53  Max: 165/79  MAP (mmHg)  Min: 76  Max: 111  Resp  Min: 14  Max: 19  SpO2  Min: 94 %  Max: 100 %  Oxygen Concentration (%)  Min: 30  Max: 30    09/12 0701 - 09/13 0700  In: 1854.4   Out: 1100 [Urine:1100]   Unmeasured Output  Urine Occurrence: 1  Stool Occurrence: 0  Pad Count: 1       Physical Exam  Vitals and nursing note reviewed.   Constitutional:       General: He is not in acute distress.     Appearance: He is not diaphoretic.   HENT:      Head: Normocephalic.      Right Ear: External ear normal.      Left Ear: External ear normal.      Nose: Nose normal.      Mouth/Throat:      Comments: Bite block in place. Hypertrophied tongue bulging through teeth at region of right lower mandible  Eyes:      General: No scleral icterus.        Right eye: No discharge.         Left eye: No discharge.      Conjunctiva/sclera: Conjunctivae normal.      Pupils: Pupils are equal, round, and reactive to light.   Neck:      Comments: Trach in place  Cardiovascular:      Rate and Rhythm: Normal rate.   Pulmonary:      Comments: On ventilator  Abdominal:      General: Abdomen is flat.      Palpations: Abdomen is soft.   Musculoskeletal:         General: No tenderness.   Skin:     General: Skin is warm.      Comments: Wound to anterior aspect of left lower extremity.    Neurological:      Mental Status: He is alert.      Motor: Weakness present.      Comments: Blinks, nods head    E3 V: not testable  M6.  nonverbal. Following commands intermittently  Conversational hearing intact.            Medications:  Continuous Scheduledacetaminophen, 1,000 mg, Q8H  albuterol-ipratropium, 3 mL, TID WAKE  apixaban, 5 mg, BID  busPIRone, 5 mg, BID  famotidine, 20 mg, BID  gabapentin, 300 mg, Q8H  methocarbamoL, 1,000 mg, Q6H  metoprolol tartrate, 12.5 mg, Q6H  midodrine, 15 mg, BID   And  midodrine, 20 mg, QHS  mupirocin, , TID  polyethylene glycol, 17 g, Daily  riluzole, 50 mg, Q12H  scopolamine, 1 patch, Q3 Days  senna-docusate 8.6-50 mg, 1 tablet, BID  sodium chloride, 1 g, TID  traZODone, 50 mg, QHS    PRNbisacodyL, 10 mg, Daily PRN  dextrose 10%, 12.5 g, PRN  dextrose 10%, 25 g, PRN  fentaNYL, 50 mcg, Q1H PRN  hydrALAZINE, 10 mg, Q4H PRN  labetalol, 10 mg, Q4H PRN  magnesium sulfate IVPB, 2 g, PRN  magnesium sulfate IVPB, 4 g, PRN  ondansetron, 4 mg, Q6H PRN  oxyCODONE, 5 mg, Q4H PRN  oxyCODONE, 10 mg, Q4H PRN  potassium bicarbonate, 35 mEq, PRN  potassium bicarbonate, 50 mEq, PRN  potassium bicarbonate, 60 mEq, PRN  potassium, sodium phosphates, 2 packet, PRN  potassium, sodium phosphates, 2 packet, PRN  sodium chloride 0.9%, 10 mL, PRN      Today I personally reviewed pertinent medications, lines/drains/airways, imaging, laboratory results, notably:    Diet  Diet NPO  Diet NPO

## 2022-09-13 NOTE — ASSESSMENT & PLAN NOTE
Vent Mode: A/C  Oxygen Concentration (%):  [30] 30  Resp Rate Total:  [14 br/min-21 br/min] 14 br/min  Vt Set:  [400 mL] 400 mL  PEEP/CPAP:  [5 cmH20] 5 cmH20  Mean Airway Pressure:  [10 waP13-78 cmH20] 10 cmH20.     Chronic neuromuscular failure secondary to ALS and diaphragmatic + upper airway failure.  On chronic vent settings, no signs of decompensation.

## 2022-09-13 NOTE — NURSING
18G in  Left AC . MD okay with keeping the IV in place as long as the dressing is changed. Due to potential discharge to [facility] today

## 2022-09-13 NOTE — ASSESSMENT & PLAN NOTE
Briefly 57 yo AAM with ALS. At OSH with prolonged course complicated by bacteremia, PNA, upper extremity DVTs, facial swelling and trismus. Received Abx, PICC was changed there prior to transfer, and received BOTOX injections to help with his Trismus. Wheelchair bound since 2020     -ENT consult for facial mass on CT & trismus. Low suspicion for abscess. Recommended that anesthesia be consulted for V3 nerve block. Spoke to pain anesthesia and was advised to repeat botox injection at a higher dosage than previously injected (50cc). Reached out to Dr. Terry and he was able to perform botox injection with no complications (9/9).  -ID following: Continue vancomycin with end date of 9/13.   -Echo unremarkable  -tube feeds  -9/7: overnight patient's blood pressure was low so started levo  -9/8: Increased midodrine from 5mg q8h to 15mg q8h for better blood pressure control. Dc levo  -9/9: Increased midodrine to 20mg nightly. He was restarted on levo overnight due to low blood pressures and MAP. Started pseudoephedrine.   9/10: Minimal asymptomatic lower pressures in sleep, remove A-line as positional and causing pain.  Now that procedure complete, plan for return closer to home ([place] [place])  9/11: Blood pressure more consistently stable. Atropine ordered for increased secretions. Will continue discharge planning.   9/12: Ordered valium for pain management. Will continue discharge planning to LTAC  9/13: Continue valium PRN for pain management. Today is last day of vancomycin for his bacteremia. Continues to be stable for discharge to LTAC

## 2022-09-13 NOTE — ASSESSMENT & PLAN NOTE
[id number] [facility]  [phone]  Amado Guaman. MERARI Oseguera.          Keep dressing clean and dry for 7 days. Change your operative dressing on post-op day #7. Use bandaids over incision site. Keep hand elevated as much as possible over the next 24-48 hours. Use ice to operative site about 20 min out of every hour. This helps reduce pain and swelling. You may shower with soap and water on post-op day #1 but keep your incision/dressing clean and dry. After 7 days when dressing is removed, may shower and let water run over incision site. Do not scrub over area. Non-weight bearing activities with affected hand until post-op appointment. No lifting over 5 pounds. May begin moving fingers immediately. No heavy lifting. No pushing or pulling. Take pain medications as prescribed. If you anticipate the need for a refill on your medication and the weekend is approaching, please call the office by noon on Friday. No refills will be called in over the weekend. DO NOT take Tylenol products while taking prescribed pain medicine. Resume regular diet and medications (unless otherwise directed by your doctor). You should have a responsible adult with you for 24 hours. If you have any questions or concerns, please call the office. If any problems occur or if you have any further questions, please call your doctor as soon as possible. If you find that you cannot reach your doctor but feel that your condition needs a doctors attention go to the emergency room. Infection After Surgery: Care Instructions  Overview  After surgery, an infection is always possible. It doesn't mean that thesurgery didn't go well. Because an infection can be serious, your doctor has taken steps to manage it. Your doctor checked the infection and cleaned it if necessary.  Your doctor may have made an opening in the area so that the pus can CBG's are high again.  Will keep monitoring.  On Glucerna tube feeds.  Unclear if he's a new diabetic.    POCT Glucose   Date Value Ref Range Status   06/11/2022 151 (H) 70 - 110 mg/dL Final   06/11/2022 160 (H) 70 - 110 mg/dL Final   06/11/2022 146 (H) 70 - 110 mg/dL Final   06/11/2022 145 (H) 70 - 110 mg/dL Final   06/11/2022 159 (H) 70 - 110 mg/dL Final   06/10/2022 138 (H) 70 - 110 mg/dL Final   06/10/2022 123 (H) 70 - 110 mg/dL Final   06/10/2022 146 (H) 70 - 110 mg/dL Final   06/10/2022 107 70 - 110 mg/dL Final   06/10/2022 120 (H) 70 - 110 mg/dL Final   06/10/2022 113 (H) 70 - 110 mg/dL Final   06/09/2022 106 70 - 110 mg/dL Final   06/09/2022 303 (H) 70 - 110 mg/dL Final          drain out. You may have gauze in the cut so that the area will stay open and keep draining. You mayneed antibiotics. You will need to follow up with your doctor to make sure the infection has goneaway. Follow-up care is a key part of your treatment and safety. Be sure to make and go to all appointments, and call your doctor if you are having problems. It's also a good idea to know your test results and keep alist of the medicines you take. How can you care for yourself at home? Make sure your surgeon knows about the infection, especially if you saw another doctor about your symptoms. If your doctor prescribed antibiotics, take them as directed. Do not stop taking them just because you feel better. You need to take the full course of antibiotics. Ask your doctor if you can take an over-the-counter pain medicine, such as acetaminophen (Tylenol), ibuprofen (Advil, Motrin), or naproxen (Aleve). Be safe with medicines. Read and follow all instructions on the label. Do not take two or more pain medicines at the same time unless the doctor told you to. Many pain medicines have acetaminophen, which is Tylenol. Too much acetaminophen (Tylenol) can be harmful. Prop up the area on a pillow anytime you sit or lie down during the next 3 days. Try to keep it above the level of your heart. This will help reduce swelling. Keep the skin clean and dry. You may have a bandage over the cut (incision). A bandage helps the incision heal and protects it. Your doctor will tell you how to take care of this. Keep it clean and dry. You may have drainage from the wound. If your doctor told you how to care for your incision, follow your doctor's instructions. If you did not get instructions, follow this general advice:  Wash around the incision with clean water 2 times a day. Don't use hydrogen peroxide or alcohol, which can slow healing. When should you call for help?    Call your doctor now or seek immediate medical care if:    You have signs that your infection is getting worse, such as: Increased pain, swelling, warmth, or redness in the area. Red streaks leading from the area. Pus draining from the wound. A new or higher fever. Watch closely for changes in your health, and be sure to contact your doctor ifyou have any problems. Where can you learn more? Go to https://[email or web address].[email or web address] org and sign in to your [employer] account. Enter C340 in the [employer] box to learn more about \"Infection After Surgery: Care Instructions. \"     If you do not have an account, please click on the \"Sign Up Now\" link. Current as of: January 20, 2022               Content Version: 13.3  © 2006-2022 Healthwise, [employer]. Care instructions adapted under license by [facility] ([facility]). If you have questions about a medical condition or this instruction, always ask your healthcare professional. [place] [address] any warranty or liability for your use of this information. Nausea and Vomiting After Surgery: Care Instructions  Your Care Instructions     After you've had surgery, you may feel sick to your stomach (nauseated) or you may vomit. Sometimes anesthesia can make you feel sick. It's a common side effect and often doesn't last long. Pain also can make you feel sick or vomit. After the anesthesia wears off, you may feel pain from the incision (cut). That pain could then upset your stomach. Taking pain medicine can also make you feelsick to your stomach. Whatever the cause, you may get medicine that can help. There are also somethings you can do at home to prevent nausea and feel better. The doctor has checked you carefully, but problems can develop later. If you notice any problems or new symptoms, get medical treatment right away. Follow-up care is a key part of your treatment and safety. Be sure to make and go to all appointments, and call your doctor if you are having problems.  It's also a good idea to know your test results and keep alist of the medicines you take. How can you care for yourself at home? Be safe with medicines. Read and follow all instructions on the label. If the doctor gave you a prescription medicine for pain, take it as prescribed. If you are not taking a prescription pain medicine, ask your doctor if you can take an over-the-counter medicine. Take your pain medicine as soon as you have pain. It works better if you take it before the pain gets bad. Call your doctor if you have any problems with your medicine. Rest in bed until you feel better. To prevent dehydration, drink plenty of fluids. Choose water and other clear liquids until you feel better. If you have kidney, heart, or liver disease and have to limit fluids, talk with your doctor before you increase the amount of fluids you drink. When you are able to eat, try clear soups, mild foods, and liquids until all symptoms are gone for 12 to 48 hours. Other good choices include dry toast, crackers, cooked cereal, and gelatin dessert, such as Jell-O. Do not smoke. Smoking and being around smoke can make nausea worse. If you need help quitting, talk to your doctor about stop-smoking programs and medicines. These can increase your chances of quitting for good. When should you call for help? Call 911  anytime you think you may need emergency care. For example, call if:    You passed out (lost consciousness). Call your doctor now or seek immediate medical care if:    You have new or worse nausea or vomiting. You are too sick to your stomach to drink any fluids. You cannot keep down fluids. You have symptoms of dehydration, such as:  Dry eyes and a dry mouth. Passing only a little urine. Feeling thirstier than usual.     Your pain medicine is not helping. You are dizzy or lightheaded, or you feel like you may faint.    Watch closely for changes in your health, and be sure to contact your doctor if:    You do not get better as expected. Where can you learn more? Go to [email or web address]://[email or web address] org and sign in to your [employer] account. Enter M536 in the [facility] box to learn more about \"Nausea and Vomiting After Surgery: Care Instructions. \"     If you do not have an account, please click on the \"Sign Up Now\" link. Current as of: March 9, 2022               Content Version: 13.3  © 2006-2022 Healthwise, Incorporated. Care instructions adapted under license by [facility] ([facility]). If you have questions about a medical condition or this instruction, always ask your healthcare professional. [address] any warranty or liability for your use of this information.

## 2022-09-13 NOTE — ASSESSMENT & PLAN NOTE
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 He is s/p trach and peg. Multiple discussions with patient and patient's wife about GOC. He would like continued treatment.     - Continue riluzole  - Consulted acute pain for better management of his refractory pain. Adjusted pain regimen per their recs  - Neurology consult service (Dr Terry) administered botulinum injections to bilateral masseters (9/9)  - PRN acetaminophen, fentanyl, oxycodone, valium

## 2022-09-13 NOTE — PLAN OF CARE
[facility] Care Plan    POC reviewed with Wei Dominique and family at 0300. Pt nods head yes or no appropriately to questions asked. No acute events overnight. Pt progressing toward goals. Will continue to monitor. See below and flowsheets for full assessment and VS info.           Is this a stroke patient? no    Neuro:  [place] Coma Scale  Best Eye Response: 4-->(E4) spontaneous  Best Motor Response: 6-->(M6) obeys commands  Best Verbal Response: 1-->(V1) none  S[place]umar Coma Scale Score: 11  Assessment Qualifiers: patient not sedated/intubated, no eye obstruction present  Pupil PERRLA: yes     24hr Temp:  [99.2 °F (37.3 °C)-100 °F (37.8 °C)]     CV:   Rhythm: normal sinus rhythm  BP goals:   SBP < 160  MAP >  60    Resp:   O2 Device (Oxygen Therapy): ventilator  Vent Mode: A/C  Set Rate: 14 BPM  Oxygen Concentration (%): 30  Vt Set: 400 mL  PEEP/CPAP: 5 cmH20  Pressure Support: 5 cmH20    Plan: N/A    GI/:     Diet/Nutrition Received: NPO, tube feeding  Last Bowel Movement: 09/12/22  Voiding Characteristics: external catheter    Intake/Output Summary (Last 24 hours) at 9/13/2022 0531  Last data filed at 9/13/2022 0505  Gross per 24 hour   Intake 1941.51 ml   Output 1175 ml   Net 766.51 ml     Unmeasured Output  Urine Occurrence: 1  Stool Occurrence: 0  Pad Count: 1    Labs/Accuchecks:  Recent Labs   Lab 09/13/22 0216   WBC 15.31*   RBC 2.59*   HGB 6.8*   HCT 22.1*         Recent Labs   Lab 09/13/22  0216      K 4.8   CO2 25      BUN 48*   CREATININE 0.5   ALKPHOS 66   ALT 5*   AST 16   BILITOT 0.3      Recent Labs   Lab 09/10/22  1050   APTT 47.0*    No results for input(s): CPK, CPKMB, TROPONINI, MB in the last 168 hours.    Electrolytes: N/A - electrolytes WDL  Accuchecks: none    Gtts:      LDA/Wounds:  Lines/Drains/Airways       Drain  Duration                  Gastrostomy/Enterostomy LUQ feeding -- days    Male External Urinary Catheter 09/04/22 1815 Large 8 days              Airway  Duration                   Surgical Airway Shiley Cuffed -- days         Surgical Airway 05/23/22 1145 Portex Cuffed 112 days              Peripheral Intravenous Line  Duration                  Midline Catheter Insertion/Assessment  - Single Lumen 09/02/22 1200 Left cephalic vein (lateral side of arm) 20g x 10cm 10 days         Peripheral IV - Single Lumen 09/02/22 1200 18 G;1 3/4 in Left Antecubital 10 days                  Wounds: Yes  Wound care consulted: Yes

## 2022-09-13 NOTE — PROGRESS NOTES
[facility]  Neurocritical Care  Progress Note    Admit Date: 8/31/2022  Service Date: 09/13/2022  Length of Stay: 13    Subjective:     Chief Complaint: Trismus    History of Present Illness: Wei Dominique is a 55 yo AAM with ALS. At OSH with prolonged course complicated by bacteremia, PNA, upper extremity DVTs, facial swelling and trismus. Received Abx, PICC was changed there prior to transfer, and received BOTOX injections to help with his Trismus.      Hospital Course: 09/05/2022: No acute events overnight. ENT recommended anesthesia consult for possible V3 nerve block. Started midodrine 5mg q8h. ID does not recommend HAYDEN at this time.   09/06/2022: No acute events overnight. Consulted acute pain team for better management of refractory pain. DC cefepime per ID recs and will continue vancomycin with end date of 9/13. Patient continues to be hyponatremic likely secondary to dehydration so ordered one time dose of albumin 5%.   09/07/2022: Overnight blood pressure was low so he was started on levo. Noticed new wound on anterior aspect of left lower extremity. Wound care team following. Adjusted pain regimen per anesthesia recs. Patient continues to improve neurologically. He is making purposeful movements and follows commands intermittently. Continue spontaneous breathing trials.  09/08/2022: New a-line placed overnight. Increased midodrine to 15mg q8h and dc levo. Spoke to pain anesthesia and was recommended that patient receive repeat botox injection for pain management at a higher dosage than previously injected. Continue with spontaneous breathing trials.   09/09/2022: Patient's blood pressure dropped overnight with MAP down to 55. He was restarted on levo. Plan to increase midodrine to 20mg at night and maintain 15mg dosage during the day. Also started pseudoephedrine. Patient received botox injections into his temporalis muscle. He tolerated the procedure well with no complications.    9/10/2022: BP more consistently within appropriate range, patient continues to have zero symptoms even when mild hypotension is noted on monitor. A-line positional and removed.  No signs/symptoms of adverse effects from Botox injections.  Remains afebrile, complete antibiotic course.  Heparin converted to Eliquis. Begin discharge planning.  09/11/2022: Blood pressure controlled on oral medications. Atropine ordered for increased secretions. Trach appeared secured with no signs of leak this morning. Continue eliquis and monitor for adverse effects from Botox injections.   09/12/2022: NAEON. No adverse effects noted from Botox injections. Patient is still complaining of jaw pain. Discussed with patient that it will take several weeks before therapeutic effects would be noticed from the injections. Added valium to his multimodal pain regimen. Continue discharge planning.  09/13/2022: LARRY. Today is his last day on vancomycin. Consulted ID about high CRP and they noted that they are comfortable with patient being discharged. Continues to be stable for LTAC.           Review of Systems   Unable to perform ROS: Patient nonverbal     Objective:     Vitals:  Temp: 98.9 °F (37.2 °C)  Pulse: 99  Rhythm: normal sinus rhythm  BP: (!) 165/79  MAP (mmHg): 111  Resp: 14  SpO2: 96 %  Oxygen Concentration (%): 30  O2 Device (Oxygen Therapy): ventilator  Vent Mode: A/C  Set Rate: 14 BPM  Vt Set: 400 mL  PEEP/CPAP: 5 cmH20  Peak Airway Pressure: 20 cmH2O  Mean Airway Pressure: 10 cmH20  Plateau Pressure: 28 cmH20    Temp  Min: 98.4 °F (36.9 °C)  Max: 100 °F (37.8 °C)  Pulse  Min: 93  Max: 119  BP  Min: 109/53  Max: 165/79  MAP (mmHg)  Min: 76  Max: 111  Resp  Min: 14  Max: 19  SpO2  Min: 94 %  Max: 100 %  Oxygen Concentration (%)  Min: 30  Max: 30    09/12 0701 - 09/13 0700  In: 1854.4   Out: 1100 [Urine:1100]   Unmeasured Output  Urine Occurrence: 1  Stool Occurrence: 0  Pad Count: 1       Physical Exam  Vitals and nursing note  reviewed.   Constitutional:       General: He is not in acute distress.     Appearance: He is not diaphoretic.   HENT:      Head: Normocephalic.      Right Ear: External ear normal.      Left Ear: External ear normal.      Nose: Nose normal.      Mouth/Throat:      Comments: Bite block in place. Hypertrophied tongue bulging through teeth at region of right lower mandible  Eyes:      General: No scleral icterus.        Right eye: No discharge.         Left eye: No discharge.      Conjunctiva/sclera: Conjunctivae normal.      Pupils: Pupils are equal, round, and reactive to light.   Neck:      Comments: Trach in place  Cardiovascular:      Rate and Rhythm: Normal rate.   Pulmonary:      Comments: On ventilator  Abdominal:      General: Abdomen is flat.      Palpations: Abdomen is soft.   Musculoskeletal:         General: No tenderness.   Skin:     General: Skin is warm.      Comments: Wound to anterior aspect of left lower extremity.    Neurological:      Mental Status: He is alert.      Motor: Weakness present.      Comments: Blinks, nods head    E3 V: not testable M6.  nonverbal. Following commands intermittently  Conversational hearing intact.            Medications:  Continuous Scheduledacetaminophen, 1,000 mg, Q8H  albuterol-ipratropium, 3 mL, TID WAKE  apixaban, 5 mg, BID  busPIRone, 5 mg, BID  famotidine, 20 mg, BID  gabapentin, 300 mg, Q8H  methocarbamoL, 1,000 mg, Q6H  metoprolol tartrate, 12.5 mg, Q6H  midodrine, 15 mg, BID   And  midodrine, 20 mg, QHS  mupirocin, , TID  polyethylene glycol, 17 g, Daily  riluzole, 50 mg, Q12H  scopolamine, 1 patch, Q3 Days  senna-docusate 8.6-50 mg, 1 tablet, BID  sodium chloride, 1 g, TID  traZODone, 50 mg, QHS    PRNbisacodyL, 10 mg, Daily PRN  dextrose 10%, 12.5 g, PRN  dextrose 10%, 25 g, PRN  fentaNYL, 50 mcg, Q1H PRN  hydrALAZINE, 10 mg, Q4H PRN  labetalol, 10 mg, Q4H PRN  magnesium sulfate IVPB, 2 g, PRN  magnesium sulfate IVPB, 4 g, PRN  ondansetron, 4 mg, Q6H  PRN  oxyCODONE, 5 mg, Q4H PRN  oxyCODONE, 10 mg, Q4H PRN  potassium bicarbonate, 35 mEq, PRN  potassium bicarbonate, 50 mEq, PRN  potassium bicarbonate, 60 mEq, PRN  potassium, sodium phosphates, 2 packet, PRN  potassium, sodium phosphates, 2 packet, PRN  sodium chloride 0.9%, 10 mL, PRN      Today I personally reviewed pertinent medications, lines/drains/airways, imaging, laboratory results, notably:    Diet  Diet NPO  Diet NPO          Assessment/Plan:     Neuro  ALS (amyotrophic lateral sclerosis)  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 He is s/p trach and peg. Multiple discussions with patient and patient's wife about GOC. He would like continued treatment.     - Continue riluzole  - Consulted acute pain for better management of his refractory pain. Adjusted pain regimen per their recs  - Neurology consult service (Dr Terry) administered botulinum injections to bilateral masseters (9/9)  - PRN acetaminophen, fentanyl, oxycodone, valium      Pulmonary  Chronic respiratory failure with hypoxia and hypercapnia   Vent Mode: A/C  Oxygen Concentration (%):  [30] 30  Resp Rate Total:  [14 br/min-21 br/min] 14 br/min  Vt Set:  [400 mL] 400 mL  PEEP/CPAP:  [5 cmH20] 5 cmH20  Mean Airway Pressure:  [10 xnN08-95 cmH20] 10 cmH20.     Chronic neuromuscular failure secondary to ALS and diaphragmatic + upper airway failure.  On chronic vent settings, no signs of decompensation.    Renal/  Hyponatremia  -9/6: Likely secondary to dehydration so gave one time dose of albumin 5%  -Continue to monitor with daily CMP  -Continue neuro checks. No current acute decline in mentation.    Hematology  Occlusive thrombus  U/S showed RUE DVT.     - heparin gtt on admission    9/10 converted to Eliquis for discharge planning    Other  * Trismus  Briefly 55 yo AAM with ALS. At OSH with prolonged course complicated by bacteremia, PNA, upper extremity DVTs, facial swelling and trismus. Received Abx, PICC was changed there prior to transfer, and received  BOTOX injections to help with his Trismus. Wheelchair bound since 2020     -ENT consult for facial mass on CT & trismus. Low suspicion for abscess. Recommended that anesthesia be consulted for V3 nerve block. Spoke to pain anesthesia and was advised to repeat botox injection at a higher dosage than previously injected (50cc). Reached out to Dr. Terry and he was able to perform botox injection with no complications (9/9).  -ID following: Continue vancomycin with end date of 9/13.   -Echo unremarkable  -tube feeds  -9/7: overnight patient's blood pressure was low so started levo  -9/8: Increased midodrine from 5mg q8h to 15mg q8h for better blood pressure control. Dc levo  -9/9: Increased midodrine to 20mg nightly. He was restarted on levo overnight due to low blood pressures and MAP. Started pseudoephedrine.   9/10: Minimal asymptomatic lower pressures in sleep, remove A-line as positional and causing pain.  Now that procedure complete, plan for return closer to home ([place]rip [place])  9/11: Blood pressure more consistently stable. Atropine ordered for increased secretions. Will continue discharge planning.   9/12: Ordered valium for pain management. Will continue discharge planning to LTAC  9/13: Continue valium PRN for pain management. Today is last day of vancomycin for his bacteremia. Continues to be stable for discharge to LTAC          The patient is being Prophylaxed for:  Venous Thromboembolism with: Mechanical or Chemical  Stress Ulcer with: H2B  Ventilator Pneumonia with: chlorhexidine oral care    Activity Orders          Turn patient starting at 08/31 1200    Elevate HOB starting at 08/31 1131    Diet NPO: NPO starting at 08/31 1131        Full Code    Alicia Tan,   Neurocritical Care  [facility]

## 2022-09-13 NOTE — PLAN OF CARE
[facility] Care Plan    POC reviewed with Wei Dominique and family at 1600. Pt unable to verbalize understanding. Questions and concerns addressed. No acute events today. Spoke with Wife over the phone. Pt progressing toward goals. Will continue to monitor. See below and flowsheets for full assessment and VS info.    - Pt awaiting transfer to LTACH             Is this a stroke patient? no    Neuro:  Sukumar Coma Scale  Best Eye Response: 4-->(E4) spontaneous  Best Motor Response: 6-->(M6) obeys commands  Best Verbal Response: 1-->(V1) none  [place] Coma Scale Score: 11  Assessment Qualifiers: no eye obstruction present (Trach Present)  Pupil PERRLA: yes     24 hr Temp:  [98.4 °F (36.9 °C)-100 °F (37.8 °C)]     CV:   Rhythm: normal sinus rhythm  BP goals:   SBP < 160  MAP > 65    Resp:   O2 Device (Oxygen Therapy): ventilator  Vent Mode: A/C  Set Rate: 14 BPM  Oxygen Concentration (%): 30  Vt Set: 400 mL  PEEP/CPAP: 5 cmH20  Pressure Support: 5 cmH20    Plan: N/A    GI/:     Diet/Nutrition Received: NPO, tube feeding  Last Bowel Movement: 09/12/22  Voiding Characteristics: external catheter    Intake/Output Summary (Last 24 hours) at 9/13/2022 1651  Last data filed at 9/13/2022 1605  Gross per 24 hour   Intake 1576.09 ml   Output 1090 ml   Net 486.09 ml     Unmeasured Output  Urine Occurrence: 1  Stool Occurrence: 0  Pad Count: 1    Labs/Accuchecks:  Recent Labs   Lab 09/13/22  0504   WBC 9.91   RBC 2.99*   HGB 7.8*   HCT 26.1*         Recent Labs   Lab 09/13/22  0216      K 4.8   CO2 25      BUN 48*   CREATININE 0.5   ALKPHOS 66   ALT 5*   AST 16   BILITOT 0.3      Recent Labs   Lab 09/10/22  1050   APTT 47.0*    No results for input(s): CPK, CPKMB, TROPONINI, MB in the last 168 hours.    Electrolytes: N/A - electrolytes WDL  Accuchecks: none    Gtts:      LDA/Wounds:  Lines/Drains/Airways       Drain  Duration                  Gastrostomy/Enterostomy LUQ feeding -- days    Male External Urinary  Catheter 09/04/22 1815 Large 8 days              Airway  Duration                  Surgical Airway Shiley Cuffed -- days         Surgical Airway 05/23/22 1145 Portex Cuffed 113 days              Peripheral Intravenous Line  Duration                  Midline Catheter Insertion/Assessment  - Single Lumen 09/02/22 1200 Left cephalic vein (lateral side of arm) 20g x 10cm 11 days         Peripheral IV - Single Lumen 09/02/22 1200 18 G;1 3/4 in Left Antecubital 11 days                  Wounds: Yes  Wound care consulted: Yes

## 2022-09-13 NOTE — PLAN OF CARE
"[facility] - Neuro Critical Care  Discharge Final Note    Primary Care Provider: [facility]    Expected Discharge Date: 9/13/2022    Final Discharge Note (most recent)       Final Note - 09/13/22 1347          Final Note    Assessment Type Final Discharge Note     Anticipated Discharge Disposition Intermediate Care Facility for skilled nursing or Supportive Care     What phone number can be called within the next 1-3 days to see how you are doing after discharge? [phone]     Hospital Resources/Appts/Education Provided Provided patient/caregiver with written discharge plan information        Post-Acute Status    Post-Acute Authorization Placement     Post-Acute Placement Status Set-up Complete/Auth obtained     Coverage Medicare     Discharge Delays Ambulance Transport/Facility Transport                     Pt is discharging back to his nursing home, [facility] by ambulance transport at 2:00 PM. Pt's RN notified of report [phone], Pt's RN at [facility] is "Krystian".     Pt is followed by his NH Medical Director.     SW notified Pt's wife, Eze, of his discharge and admission back to [facility].     Pt is cleared for discharge from a case management standpoint.     DEMETRA Gray LMSW  [facility]  [id number]        "

## 2022-09-13 NOTE — PROGRESS NOTES
EMS to transfer patient to [facility]. LDA's removed, discharge instructions reviewed. ALL belongings with patient INCLUDING his communication computer. Paperwork given to EMS.

## 2022-09-13 NOTE — ASSESSMENT & PLAN NOTE
U/S showed RUE DVT.     - heparin gtt on admission    9/10 converted to [facility] for discharge planning

## 2022-09-13 NOTE — PLAN OF CARE
SW uploaded NH orders written yesterday and Covid test to [facility] for [facility]'s review. SW will let [facility] know if Pt is discharging today after rounds.     1:35 PM  Pt discharging today, hard script sent. Waiting for report.     TATE will follow.    DEMETRA Gray, LMSW [facility]  [id number]

## 2022-09-13 NOTE — DISCHARGE SUMMARY
[facility] Critical Care  Neurocritical Care  Discharge Summary    Admit Date: 8/31/2022    Service Date: 09/13/2022    Discharge Date:     Length of Stay: 13    Final Active Diagnoses:    Diagnosis Date Noted POA    PRINCIPAL PROBLEM:  Trismus [R25.2] 08/20/2022 Yes    Pressure injury of sacral region, stage 3 [L89.153] 09/12/2022 Yes    Hyponatremia [E87.1] 09/06/2022 No    Oral lesion [K13.70] 09/04/2022 Yes    Occlusive thrombus [I82.90] 09/01/2022 Yes    Chronic respiratory failure with hypoxia and hypercapnia [J96.11, J96.12] 05/23/2022 Yes    ALS (amyotrophic lateral sclerosis) [G12.21] 12/09/2018 Yes     Chronic      Problems Resolved During this Admission:      History of Present Illness: Wei Dominique is a 57 yo AAM with ALS. At OSH with prolonged course complicated by bacteremia, PNA, upper extremity DVTs, facial swelling and trismus. Received Abx, PICC was changed there prior to transfer, and received BOTOX injections to help with his Trismus.      Hospital Course by Event: 09/05/2022: No acute events overnight. ENT recommended anesthesia consult for possible V3 nerve block. Started midodrine 5mg q8h. ID does not recommend HAYDEN at this time.   09/06/2022: No acute events overnight. Consulted acute pain team for better management of refractory pain. DC cefepime per ID recs and will continue vancomycin with end date of 9/13. Patient continues to be hyponatremic likely secondary to dehydration so ordered one time dose of albumin 5%.   09/07/2022: Overnight blood pressure was low so he was started on levo. Noticed new wound on anterior aspect of left lower extremity. Wound care team following. Adjusted pain regimen per anesthesia recs. Patient continues to improve neurologically. He is making purposeful movements and follows commands intermittently. Continue spontaneous breathing trials.  09/08/2022: New a-line placed overnight. Increased midodrine to 15mg q8h and dc levo. Spoke to pain  anesthesia and was recommended that patient receive repeat botox injection for pain management at a higher dosage than previously injected. Continue with spontaneous breathing trials.   09/09/2022: Patient's blood pressure dropped overnight with MAP down to 55. He was restarted on levo. Plan to increase midodrine to 20mg at night and maintain 15mg dosage during the day. Also started pseudoephedrine. Patient received botox injections into his temporalis muscle. He tolerated the procedure well with no complications.   9/10/2022: BP more consistently within appropriate range, patient continues to have zero symptoms even when mild hypotension is noted on monitor. A-line positional and removed.  No signs/symptoms of adverse effects from Botox injections.  Remains afebrile, complete antibiotic course.  Heparin converted to Eliquis. Begin discharge planning.  09/11/2022: Blood pressure controlled on oral medications. Atropine ordered for increased secretions. Trach appeared secured with no signs of leak this morning. Continue eliquis and monitor for adverse effects from Botox injections.   09/12/2022: MAGDALENAON. No adverse effects noted from Botox injections. Patient is still complaining of jaw pain. Discussed with patient that it will take several weeks before therapeutic effects would be noticed from the injections. Added valium to his multimodal pain regimen. Continue discharge planning.  09/13/2022: LARRY. Today is his last day on vancomycin. Consulted ID about high CRP and they noted that they are comfortable with patient being discharged. Continues to be stable for LTAC.         Review of Systems   Unable to perform ROS: Patient nonverbal       Physical Exam  Vitals and nursing note reviewed.   Constitutional:       General: He is not in acute distress.     Appearance: He is not diaphoretic.   HENT:      Head: Normocephalic.      Right Ear: External ear normal.      Left Ear: External ear normal.      Nose: Nose normal.       Mouth/Throat:      Comments: Bite block in place. Hypertrophied tongue bulging through teeth at region of right lower mandible  Eyes:      General: No scleral icterus.        Right eye: No discharge.         Left eye: No discharge.      Conjunctiva/sclera: Conjunctivae normal.      Pupils: Pupils are equal, round, and reactive to light.   Neck:      Comments: Trach in place  Cardiovascular:      Rate and Rhythm: Normal rate.   Pulmonary:      Comments: On ventilator  Abdominal:      General: Abdomen is flat.      Palpations: Abdomen is soft.   Musculoskeletal:         General: No tenderness.   Skin:     General: Skin is warm.      Comments: Wound to anterior aspect of left lower extremity.    Neurological:      Mental Status: He is alert.      Motor: Weakness present.      Comments: Blinks, nods head     E3 V: not testable M6.  nonverbal. Following commands intermittently  Conversational hearing intact.         Hospital Course by Problem:   * Trismus  Briefly 57 yo AAM with ALS. At OSH with prolonged course complicated by bacteremia, PNA, upper extremity DVTs, facial swelling and trismus. Received Abx, PICC was changed there prior to transfer, and received BOTOX injections to help with his Trismus. Wheelchair bound since 2020     -ENT consult for facial mass on CT & trismus. Low suspicion for abscess. Recommended that anesthesia be consulted for V3 nerve block. Spoke to pain anesthesia and was advised to repeat botox injection at a higher dosage than previously injected (50cc). Reached out to Dr. Terry and he was able to perform botox injection with no complications (9/9).  -ID following: Continue vancomycin with end date of 9/13.   -Echo unremarkable  -tube feeds  -9/7: overnight patient's blood pressure was low so started levo  -9/8: Increased midodrine from 5mg q8h to 15mg q8h for better blood pressure control. Dc levo  -9/9: Increased midodrine to 20mg nightly. He was restarted on levo overnight due to low  blood pressures and MAP. Started pseudoephedrine.   9/10: Minimal asymptomatic lower pressures in sleep, remove A-line as positional and causing pain.  Now that procedure complete, plan for return closer to home ([place]rip [place])  9/11: Blood pressure more consistently stable. Atropine ordered for increased secretions. Will continue discharge planning.   9/12: Ordered valium for pain management. Will continue discharge planning to LTAC  9/13: Continue valium PRN for pain management. Today is last day of vancomycin for his bacteremia. Continues to be stable for discharge to LTAC    Hyponatremia  -9/6: Likely secondary to dehydration so gave one time dose of albumin 5%  -Continue to monitor with daily CMP  -Continue neuro checks. No current acute decline in mentation.    Occlusive thrombus  U/S showed RUE DVT.     - heparin gtt on admission    9/10 converted to Eliquis for discharge planning    Chronic respiratory failure with hypoxia and hypercapnia   Vent Mode: A/C  Oxygen Concentration (%):  [30] 30  Resp Rate Total:  [14 br/min-21 br/min] 14 br/min  Vt Set:  [400 mL] 400 mL  PEEP/CPAP:  [5 cmH20] 5 cmH20  Mean Airway Pressure:  [10 lgT27-60 cmH20] 10 cmH20.     Chronic neuromuscular failure secondary to ALS and diaphragmatic + upper airway failure.  On chronic vent settings, no signs of decompensation.    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 He is s/p trach and peg. Multiple discussions with patient and patient's wife about GOC. He would like continued treatment.     - Continue riluzole  - Consulted acute pain for better management of his refractory pain. Adjusted pain regimen per their recs  - Neurology consult service (Dr Terry) administered botulinum injections to bilateral masseters (9/9)  - PRN acetaminophen, fentanyl, oxycodone, valium          Goals of Care Treatment Preferences:  Code Status: Full Code       LaPOST: Yes           Significant Results:    Laboratory:  Lab Results    Component Value Date    HGBA1C 4.3 06/10/2022           Consultations:  IP CONSULT TO REGISTERED DIETITIAN/NUTRITIONIST  WOUND CARE CONSULT  IP CONSULT TO PHYSICAL MEDICINE REHAB  IP CONSULT TO SOCIAL WORK/CASE MANAGEMENT  IP CONSULT TO INFECTIOUS DISEASES  IP CONSULT TO ENT  IP CONSULT TO MIDLINE TEAM  IP CONSULT TO ENT  IP CONSULT TO ANESTHESIOLOGY  WOUND CARE CONSULT  IP CONSULT TO PICC TEAM (NIAS)  IP CONSULT TO NEUROLOGY      Procedures:   Botox injections by Dr. Terry (9/9/22)    Medications:      Medication List      START taking these medications    acetaminophen 500 MG tablet  Commonly known as: TYLENOL  2 tablets (1,000 mg total) by Per G Tube route every 8 (eight) hours.     famotidine 20 MG tablet  Commonly known as: PEPCID  1 tablet (20 mg total) by Per G Tube route 2 (two) times daily.     gabapentin 300 MG capsule  Commonly known as: NEURONTIN  1 capsule (300 mg total) by Per G Tube route every 8 (eight) hours.     ondansetron 4 mg/2 mL Soln  Inject 4 mg into the vein every 6 (six) hours as needed.     oxyCODONE 10 mg Tab immediate release tablet  Commonly known as: ROXICODONE  1 tablet (10 mg total) by Per G Tube route every 4 (four) hours as needed for Pain.     scopolamine 1.3-1.5 mg (1 mg over 3 days)  Commonly known as: TRANSDERM-SCOP  Place 1 patch onto the skin Every 3 (three) days.     tiZANidine 2 MG tablet  Commonly known as: ZANAFLEX  Take 1 tablet (2 mg total) by mouth every 8 (eight) hours as needed (pain).        CHANGE how you take these medications    * apixaban 2.5 mg Tab  Commonly known as: ELIQUIS  1 tablet (2.5 mg total) by Per G Tube route 2 (two) times daily.  What changed: Another medication with the same name was added. Make sure you understand how and when to take each.     * apixaban 5 mg Tab  Commonly known as: ELIQUIS  1 tablet (5 mg total) by Per G Tube route 2 (two) times daily.  What changed: You were already taking a medication with the same name, and this  prescription was added. Make sure you understand how and when to take each.     baclofen 10 MG tablet  Commonly known as: LIORESAL  TAKE 1 TABLET THREE TIMES PER DAY  What changed: See the new instructions.     glycopyrrolate 1 mg Tab  Commonly known as: ROBINUL  START BY TAKING 1 TABLET DAILY. IF TOLERATING WELL, YOU CAN INCREASE IT UP TO 2 OR EVEN 3 TIMES DAILY.  What changed: See the new instructions.     metoprolol tartrate 25 MG tablet  Commonly known as: LOPRESSOR  0.5 tablets (12.5 mg total) by Per G Tube route every 6 (six) hours.  What changed:   · medication strength  · how much to take  · how to take this  · when to take this     riluzole 50 mg Tab tablet  TAKE 1 TABLET ON AN EMPTY STOMACH EVERY 12 HRS  What changed:   · how much to take  · how to take this         * This list has 2 medication(s) that are the same as other medications prescribed for you. Read the directions carefully, and ask your doctor or other care provider to review them with you.            CONTINUE taking these medications    albuterol-ipratropium 2.5 mg-0.5 mg/3 mL nebulizer solution  Commonly known as: DUO-NEB     busPIRone 5 MG Tab  Commonly known as: BUSPAR  1 tablet (5 mg total) by Per G Tube route 2 (two) times daily.     lansoprazole 30 MG capsule  Commonly known as: PREVACID     mupirocin 2 % ointment  Commonly known as: BACTROBAN     ondansetron 4 MG tablet  Commonly known as: ZOFRAN     * pantoprazole 40 mg injection  Commonly known as: PROTONIX  Inject 40 mg into the vein once daily.     * pantoprazole 40 mg suspension  Commonly known as: PROTONIX     polyethylene glycol 17 gram Pwpk  Commonly known as: GLYCOLAX  17 g by Per G Tube route 2 (two) times daily.     * sodium chloride 0.9% injection  Commonly known as: NORMAL SALINE FLUSH  Inject 10 mLs into the vein as needed.     * sodium chloride 0.9% injection  Commonly known as: NORMAL SALINE FLUSH  Inject 10 mLs into the vein every 6 (six) hours.     traZODone 50 MG  tablet  Commonly known as: DESYREL         * This list has 4 medication(s) that are the same as other medications prescribed for you. Read the directions carefully, and ask your doctor or other care provider to review them with you.            ASK your doctor about these medications    cefepime in dextrose 5 % 2 gram/50 mL Pgbk  Commonly known as: MAXIPIME  Inject 50 mLs (2 g total) into the vein every 8 (eight) hours.     nystatin 100,000 unit/mL suspension  Commonly known as: MYCOSTATIN  Take 5 mLs (500,000 Units total) by mouth 4 (four) times daily. for 10 days  Ask about: Should I take this medication?           Where to Get Your Medications      These medications were sent to [facility] [address] [address]  [address] [address]    Phone: [phone]   · acetaminophen 500 MG tablet  · apixaban 5 mg Tab  · famotidine 20 MG tablet  · gabapentin 300 MG capsule  · metoprolol tartrate 25 MG tablet  · ondansetron 4 mg/2 mL Soln  · scopolamine 1.3-1.5 mg (1 mg over 3 days)  · tiZANidine 2 MG tablet     You can get these medications from any pharmacy    Bring a paper prescription for each of these medications  · oxyCODONE 10 mg Tab immediate release tablet            Diet: Tube feeding    Activity: As tolerated.     Disposition: Discharged to LTAC in stable condition.    Follow Up Plan:  F/U with PCP in one week    This discharge took less than 30 minutes to complete.    Alicia Tan,   Neurocritical Care  [facility]

## 2022-09-13 NOTE — PROGRESS NOTES
Pharmacokinetic Assessment: IV Vancomycin    Vancomycin serum concentration assessment(s):    - Random level is therapeutic at 15.7 mcg/mL  - Goal trough 15-20 mcg/mL  - Renal function stable w/ good UOP   - Pt bed bound d/t ALS. Scr is not the best indicator of renal function d/t limited muscle mass  -  Pt didn't tolerate scheduled q24h regimen. Will complete remaining course by pulse dosing as needed  - Give 750 mg x1 now  - Today is the last day of vanc; no further levels indicated   - Pt has completed 2 week course of vanc for Staph capitis bacteremia as recommended by ID    Therapy with vancomycin complete.  Pharmacy will sign off, please re-consult as needed.    Drug levels (last 3 results):  Recent Labs   Lab Result Units 09/11/22  0423 09/12/22  0319 09/13/22  0216   Vancomycin, Random ug/mL 16.9 18.9 15.7       Please contact pharmacy at extension [phone] for questions regarding this assessment.    Thank you for the consult,     Cynthia Maza, PharmD, [facility]  Neurocritical Care Pharmacist  [id number]           Patient brief summary:  Wei Dominique is a 67 y.o. male initiated on antimicrobial therapy with IV Vancomycin for treatment of bacteremia      Drug Allergies:   Review of patient's allergies indicates:  No Known Allergies      Renal Function:   Estimated Creatinine Clearance: 140.6 mL/min (based on SCr of 0.5 mg/dL).,     Dialysis Method (if applicable):  N/A

## 2022-09-15 ENCOUNTER — HOSPITAL ENCOUNTER (EMERGENCY)
Facility: HOSPITAL | Age: 67
Discharge: HOME | End: 2022-09-16
Attending: EMERGENCY MEDICINE
Payer: MEDICARE

## 2022-09-15 DIAGNOSIS — D64.9 ANEMIA, UNSPECIFIED TYPE: Primary | ICD-10-CM

## 2022-09-15 LAB
ABO + RH BLD: NORMAL
ALBUMIN SERPL BCP-MCNC: 2.6 G/DL (ref 3.5–5.2)
ALP SERPL-CCNC: 57 U/L (ref 55–135)
ALT SERPL W/O P-5'-P-CCNC: 11 U/L (ref 10–44)
ANION GAP SERPL CALC-SCNC: 8 MMOL/L (ref 8–16)
AST SERPL-CCNC: 17 U/L (ref 10–40)
BASOPHILS # BLD AUTO: 0.05 K/UL (ref 0–0.2)
BASOPHILS NFR BLD: 0.4 % (ref 0–1.9)
BILIRUB SERPL-MCNC: 0.5 MG/DL (ref 0.1–1)
BLD GP AB SCN CELLS X3 SERPL QL: NORMAL
BLD PROD TYP BPU: NORMAL
BLOOD UNIT EXPIRATION DATE: NORMAL
BLOOD UNIT TYPE CODE: 5100
BLOOD UNIT TYPE: NORMAL
BUN SERPL-MCNC: 35 MG/DL (ref 8–23)
CALCIUM SERPL-MCNC: 10 MG/DL (ref 8.7–10.5)
CHLORIDE SERPL-SCNC: 100 MMOL/L (ref 95–110)
CO2 SERPL-SCNC: 29 MMOL/L (ref 23–29)
CODING SYSTEM: NORMAL
CREAT SERPL-MCNC: 0.4 MG/DL (ref 0.5–1.4)
DIFFERENTIAL METHOD: ABNORMAL
DISPENSE STATUS: NORMAL
EOSINOPHIL # BLD AUTO: 0.5 K/UL (ref 0–0.5)
EOSINOPHIL NFR BLD: 3.5 % (ref 0–8)
ERYTHROCYTE [DISTWIDTH] IN BLOOD BY AUTOMATED COUNT: 20.8 % (ref 11.5–14.5)
EST. GFR  (NO RACE VARIABLE): >60 ML/MIN/1.73 M^2
GLUCOSE SERPL-MCNC: 111 MG/DL (ref 70–110)
HCT VFR BLD AUTO: 26.3 % (ref 40–54)
HCT VFR BLD AUTO: 30.3 % (ref 40–54)
HGB BLD-MCNC: 7.5 G/DL (ref 14–18)
HGB BLD-MCNC: 9 G/DL (ref 14–18)
IMM GRANULOCYTES # BLD AUTO: 0.12 K/UL (ref 0–0.04)
IMM GRANULOCYTES NFR BLD AUTO: 0.9 % (ref 0–0.5)
LYMPHOCYTES # BLD AUTO: 1.5 K/UL (ref 1–4.8)
LYMPHOCYTES NFR BLD: 11.8 % (ref 18–48)
MCH RBC QN AUTO: 25.3 PG (ref 27–31)
MCHC RBC AUTO-ENTMCNC: 28.5 G/DL (ref 32–36)
MCV RBC AUTO: 89 FL (ref 82–98)
MONOCYTES # BLD AUTO: 0.8 K/UL (ref 0.3–1)
MONOCYTES NFR BLD: 5.9 % (ref 4–15)
NEUTROPHILS # BLD AUTO: 10 K/UL (ref 1.8–7.7)
NEUTROPHILS NFR BLD: 77.5 % (ref 38–73)
NRBC BLD-RTO: 0 /100 WBC
NUM UNITS TRANS PACKED RBC: NORMAL
PLATELET # BLD AUTO: 319 K/UL (ref 150–450)
PMV BLD AUTO: 9.8 FL (ref 9.2–12.9)
POTASSIUM SERPL-SCNC: 5 MMOL/L (ref 3.5–5.1)
PROT SERPL-MCNC: 8.8 G/DL (ref 6–8.4)
RBC # BLD AUTO: 2.96 M/UL (ref 4.6–6.2)
SARS-COV-2 RDRP RESP QL NAA+PROBE: NEGATIVE
SODIUM SERPL-SCNC: 137 MMOL/L (ref 136–145)
WBC # BLD AUTO: 12.93 K/UL (ref 3.9–12.7)

## 2022-09-15 PROCEDURE — 99900031 HC PATIENT EDUCATION (STAT)

## 2022-09-15 PROCEDURE — 99900035 HC TECH TIME PER 15 MIN (STAT)

## 2022-09-15 PROCEDURE — 86920 COMPATIBILITY TEST SPIN: CPT | Performed by: EMERGENCY MEDICINE

## 2022-09-15 PROCEDURE — 85018 HEMOGLOBIN: CPT | Mod: 91 | Performed by: EMERGENCY MEDICINE

## 2022-09-15 PROCEDURE — 85014 HEMATOCRIT: CPT | Mod: 91 | Performed by: EMERGENCY MEDICINE

## 2022-09-15 PROCEDURE — 36430 TRANSFUSION BLD/BLD COMPNT: CPT

## 2022-09-15 PROCEDURE — 94002 VENT MGMT INPAT INIT DAY: CPT | Mod: XB

## 2022-09-15 PROCEDURE — 94799 UNLISTED PULMONARY SVC/PX: CPT

## 2022-09-15 PROCEDURE — 94760 N-INVAS EAR/PLS OXIMETRY 1: CPT

## 2022-09-15 PROCEDURE — 85025 COMPLETE CBC W/AUTO DIFF WBC: CPT | Performed by: EMERGENCY MEDICINE

## 2022-09-15 PROCEDURE — U0002 COVID-19 LAB TEST NON-CDC: HCPCS | Performed by: EMERGENCY MEDICINE

## 2022-09-15 PROCEDURE — 86850 RBC ANTIBODY SCREEN: CPT | Performed by: EMERGENCY MEDICINE

## 2022-09-15 PROCEDURE — 99285 EMERGENCY DEPT VISIT HI MDM: CPT | Mod: 25

## 2022-09-15 PROCEDURE — 80053 COMPREHEN METABOLIC PANEL: CPT | Performed by: EMERGENCY MEDICINE

## 2022-09-15 PROCEDURE — 99900026 HC AIRWAY MAINTENANCE (STAT)

## 2022-09-15 PROCEDURE — 94761 N-INVAS EAR/PLS OXIMETRY MLT: CPT

## 2022-09-15 PROCEDURE — P9016 RBC LEUKOCYTES REDUCED: HCPCS | Performed by: EMERGENCY MEDICINE

## 2022-09-15 RX ORDER — HYDROCODONE BITARTRATE AND ACETAMINOPHEN 500; 5 MG/1; MG/1
TABLET ORAL
Status: DISCONTINUED | OUTPATIENT
Start: 2022-09-15 | End: 2022-09-16 | Stop reason: HOSPADM

## 2022-09-15 RX ORDER — PIPERACILLIN SODIUM, TAZOBACTAM SODIUM 4; .5 G/20ML; G/20ML
4.5 INJECTION, POWDER, LYOPHILIZED, FOR SOLUTION INTRAVENOUS
COMMUNITY
Start: 2022-06-28

## 2022-09-15 RX ORDER — LIDOCAINE HYDROCHLORIDE 20 MG/ML
2 SOLUTION ORAL; TOPICAL EVERY 6 HOURS
COMMUNITY
Start: 2022-08-13

## 2022-09-15 RX ORDER — SODIUM CHLORIDE 900 MG/100ML
10 INJECTION INTRAVENOUS
COMMUNITY
Start: 2022-07-05

## 2022-09-15 RX ORDER — WARFARIN 7.5 MG/1
7.5 TABLET ORAL DAILY
COMMUNITY
Start: 2022-05-19

## 2022-09-15 NOTE — CARE UPDATE
09/15/22 1651   Patient Assessment/Suction   Level of Consciousness (AVPU) alert   Respiratory Effort Unlabored   PRE-TX-O2   O2 Device (Oxygen Therapy) ventilator   Oxygen Concentration (%) 36   SpO2 100 %   Pulse Oximetry Type Continuous   $ Pulse Oximetry - Single Charge Pulse Oximetry - Single   Pulse 103   Resp (!) 21   Vent Select   Charged w/in last 24h YES   Preset Conventional Ventilator Settings   Ventilation Type VC   Vent Mode A/C   Set Rate 14 BPM   Vt Set 400 mL   PEEP/CPAP 5 cmH20   Pressure Support 0 cmH20   Waveform RAMP   Peak Flow 60 L/min   Plateau Set/Insp. Hold (sec) 0   Trigger Sensitivity Flow/I-Trigger 3 L/min   Patient Ventilator Parameters   Resp Rate Total 18 br/min   Peak Airway Pressure 31 cmH2O   Mean Airway Pressure 11 cmH20   Plateau Pressure 0 cmH20   Exhaled Vt 487 mL   Total Ve 8.52 mL   I:E Ratio Measured 1:2.40   Conventional Ventilator Alarms   Resp Rate High Alarm 0 br/min   Press High Alarm 40 cmH2O   Apnea Rate 14   Apnea Volume (mL) 0 mL   Apnea Oxygen Concentration  36   Apnea Flow Rate (L/min) 60   T Apnea 20 sec(s)   Ready to Wean/Extubation Screen   FIO2<=50 (chart decimal) 0.36   MV<16L (chart vol.) 8.52   PEEP <=8 (chart #) 5   Ready to Wean Parameters   F/VT Ratio<105 (RSBI) (!) 43.12   Education   $ Education 15 min;Ventilator Oxygen   Respiratory Evaluation   $ Care Plan Tech Time 15 min   $ Eval/Re-eval Charges Evaluation   Evaluation For   (vent chk)

## 2022-09-15 NOTE — ED PROVIDER NOTES
Encounter Date: 9/15/2022       History     Chief Complaint   Patient presents with    Abnormal labs     Low H&H. Recently discharged from [facility] for same issue.      Patient with history ALS.  He is vent dependent.  Patient recently discharged from other facility with hypotension.  Patient has baseline anemia with previous transfusions.  Patient sent here for anemia with possible blood transfusion.    Review of patient's allergies indicates:  No Known Allergies  Past Medical History:   Diagnosis Date    ALS (amyotrophic lateral sclerosis)     ALS (amyotrophic lateral sclerosis)     Dependent on ventilator 3/21/2022    Erectile dysfunction     Gait disturbance     Hyperlipidemia     Hypertension     Iron deficiency anemia     Motor neuron disease     Polycystic kidney disease     Quadriparesis 2/6/2018    Tremor      Past Surgical History:   Procedure Laterality Date    GASTROSTOMY      PEG    PORTACATH PLACEMENT      TRACHEOSTOMY       No family history on file.  Social History     Substance Use Topics    Drug use: Never     Review of Systems   Unable to perform ROS: Intubated     Physical Exam     Initial Vitals   BP Pulse Resp Temp SpO2   09/15/22 1500 09/15/22 1457 09/15/22 1457 09/15/22 1545 09/15/22 1457   121/77 (!) 115 (!) 0 98.8 °F (37.1 °C) 100 %      MAP       --                Physical Exam    Nursing note and vitals reviewed.  Constitutional: He is not diaphoretic. No distress.   HENT:   Patient with trach in place.  Patient has is tongue protruding with contracture of his mandibular area.   Eyes: Conjunctivae are normal.   Neck:   Normal range of motion.  Cardiovascular:  Regular rhythm.           Pulmonary/Chest:   Clear breath sounds with ventilation   Abdominal: Abdomen is soft.   Feeding tube in place.  Abdomen is soft with no distention   Musculoskeletal:      Cervical back: Normal range of motion.      Comments: All extremities are neurovascular intact     Neurological:   Patient does not  moving any extremity   Skin: No rash noted.   Stage III sacral decubitus.  There is skin avulsions with pressure sores to left anterior tibial area and bilateral heels.  No surrounding erythema.   Psychiatric:   Nonverbal       ED Course   Procedures  Labs Reviewed   CBC W/ AUTO DIFFERENTIAL - Abnormal; Notable for the following components:       Result Value    WBC 12.93 (*)     RBC 2.96 (*)     Hemoglobin 7.5 (*)     Hematocrit 26.3 (*)     MCH 25.3 (*)     MCHC 28.5 (*)     RDW 20.8 (*)     Immature Granulocytes 0.9 (*)     Gran # (ANC) 10.0 (*)     Immature Grans (Abs) 0.12 (*)     Gran % 77.5 (*)     Lymph % 11.8 (*)     All other components within normal limits   COMPREHENSIVE METABOLIC PANEL - Abnormal; Notable for the following components:    Glucose 111 (*)     BUN 35 (*)     Creatinine 0.4 (*)     Total Protein 8.8 (*)     Albumin 2.6 (*)     All other components within normal limits   CULTURE, RESPIRATORY   SARS-COV-2 RNA AMPLIFICATION, QUAL   TYPE & SCREEN   PREPARE RBC SOFT          Imaging Results    None          Medications   0.9%  NaCl infusion (for blood administration) (has no administration in time range)     Medical Decision Making:   History:   Old Medical Records: I decided to obtain old medical records.  Clinical Tests:   Lab Tests: Reviewed  ED Management:  Patient presents with worsening anemia.  Hemoglobin as an outpatient was 6.8.  Today is 7.5.  Patient does have history of hypotension.  No infectious etiology identified.  Currently vital signs are stable.  Feel patient could benefit from 1 unit of packed red blood cells.  Given patient only would need 1 pack of red blood cells given hemoglobin 7.5 will give 1 unit here and discharge if he remains stable.                        Clinical Impression:   Final diagnoses:  [D64.9] Anemia, unspecified type (Primary)      ED Disposition Condition    Discharge Stable          ED Prescriptions    None       Follow-up Information       Follow up  With Specialties Details Why Contact Info Additional Information    [facility] - Emergency Dept Emergency Medicine  [facility] symptoms worsen [address] [facility] [id number] [phone] 1st floor             John Guzman MD  09/15/22 0833

## 2022-09-15 NOTE — CARE UPDATE
09/15/22 1457   PRE-TX-O2   Oxygen Concentration (%) 36   Airway Safety   Ambu bag with the patient? Yes, Adult Ambu   Is mask with the patient? Yes, Adult Mask   O2  at bedside? No   Vent Select   Conventional Vent Y   Ventilator Initiated Yes   $ Ventilator Initial 1   Charged w/in last 24h YES   Preset Conventional Ventilator Settings   Vent ID [id number]   Vent Type    Ventilation Type VC   Vent Mode A/C   Humidity HME   Set Rate 14 BPM   Vt Set 400 mL   PEEP/CPAP 5 cmH20   Pressure Support 0 cmH20   Waveform RAMP   Peak Flow 60 L/min   Plateau Set/Insp. Hold (sec) 0   Trigger Sensitivity Flow/I-Trigger 3 L/min   Patient Ventilator Parameters   Resp Rate Total 20 br/min   Peak Airway Pressure 19 cmH2O   Mean Airway Pressure 8.5 cmH20   Plateau Pressure 0 cmH20   Exhaled Vt 445 mL   Total Ve 8.99 mL   I:E Ratio Measured 1:3.40   Conventional Ventilator Alarms   Resp Rate High Alarm 0 br/min   Press High Alarm 40 cmH2O   Apnea Rate 14   Apnea Volume (mL) 0 mL   Apnea Oxygen Concentration  36   Apnea Flow Rate (L/min) 60   T Apnea 20 sec(s)   Ready to Wean/Extubation Screen   FIO2<=50 (chart decimal) 0.36   MV<16L (chart vol.) 8.99   PEEP <=8 (chart #) 5   Education   $ Education 15 min;Ventilator Oxygen;Trach Care   Respiratory Evaluation   $ Care Plan Tech Time 15 min   $ Eval/Re-eval Charges Evaluation   Evaluation For New Orders

## 2022-09-16 VITALS
OXYGEN SATURATION: 100 % | TEMPERATURE: 98 F | WEIGHT: 170 LBS | BODY MASS INDEX: 25.85 KG/M2 | RESPIRATION RATE: 24 BRPM | SYSTOLIC BLOOD PRESSURE: 139 MMHG | HEART RATE: 110 BPM | DIASTOLIC BLOOD PRESSURE: 59 MMHG

## 2022-09-16 NOTE — RESPIRATORY THERAPY
09/15/22 2029   Patient Assessment/Suction   Level of Consciousness (AVPU) alert   Respiratory Effort Unlabored   Expansion/Accessory Muscles/Retractions no use of accessory muscles   All Lung Fields Breath Sounds equal bilaterally;coarse   Rhythm/Pattern, Respiratory assisted mechanically   Cough Frequency with stimulation   Cough Type assisted   Suction Method tracheal;oral   $ Suction Charges Inline Suction Procedure Stat Charge   Secretions Amount copious   Secretions Color white   Secretions Characteristics thick   PRE-TX-O2   O2 Device (Oxygen Therapy) ventilator   Oxygen Concentration (%) 36   SpO2 100 %   Pulse Oximetry Type Continuous   $ Pulse Oximetry - Multiple Charge Pulse Oximetry - Multiple   Vent Select   Conventional Vent Y   Charged w/in last 24h YES   Preset Conventional Ventilator Settings   Vent ID [id number]   Vent Type    Ventilation Type VC   Vent Mode A/C   Humidity HME   Set Rate 14 BPM   Vt Set 400 mL   PEEP/CPAP 5 cmH20   Pressure Support 0 cmH20   Waveform RAMP   Peak Flow 60 L/min   Plateau Set/Insp. Hold (sec) 0   Trigger Sensitivity Flow/I-Trigger 3 L/min   Patient Ventilator Parameters   Resp Rate Total 26 br/min   Peak Airway Pressure 28 cmH2O   Mean Airway Pressure 12 cmH20   Plateau Pressure 0 cmH20   Exhaled Vt 471 mL   Total Ve 11.8 mL   I:E Ratio Measured 1:2.40   Conventional Ventilator Alarms   Alarms On Y   Resp Rate High Alarm 0 br/min   Press High Alarm 60 cmH2O   Apnea Rate 14   Apnea Volume (mL) 0 mL   Apnea Oxygen Concentration  36   Apnea Flow Rate (L/min) 60   T Apnea 20 sec(s)   Ready to Wean/Extubation Screen   FIO2<=50 (chart decimal) 0.36   MV<16L (chart vol.) 11.8   PEEP <=8 (chart #) 5   Education   $ Education Bronchodilator;15 min   Respiratory Evaluation   $ Care Plan Tech Time 15 min   $ Eval/Re-eval Charges Evaluation   Evaluation For New Orders

## 2022-11-28 PROBLEM — N39.0 CATHETER-ASSOCIATED URINARY TRACT INFECTION: Status: RESOLVED | Noted: 2022-06-09 | Resolved: 2022-11-28

## 2022-11-28 PROBLEM — T83.511A CATHETER-ASSOCIATED URINARY TRACT INFECTION: Status: RESOLVED | Noted: 2022-06-09 | Resolved: 2022-11-28

## 2022-12-05 PROBLEM — J96.12 CHRONIC RESPIRATORY FAILURE WITH HYPOXIA AND HYPERCAPNIA: Status: RESOLVED | Noted: 2022-05-23 | Resolved: 2022-12-05

## 2022-12-05 PROBLEM — J96.11 CHRONIC RESPIRATORY FAILURE WITH HYPOXIA AND HYPERCAPNIA: Status: RESOLVED | Noted: 2022-05-23 | Resolved: 2022-12-05

## 2023-02-01 NOTE — PROGRESS NOTES
Pulmonary/Critical Care  Progress Note      Patient name: Wei Dominique  MRN: [id number]  Date: 08/24/2022    Admit Date: 8/18/2022  Consult Requested By: Rose Mary King MD    Reason for Consult: Respiratory failure, trach    HPI:    8/19/2022 - P with h/o ALS with trach and vent dependence sent to ER for increased swelling.  Now admitted for evaluation and I was asked to see pt for ventilator management.  He is not able to provide any history.  Chart has been reviewed.  He is febrile, tachycardic (though better).  Cultures have been ordered and are pending.  Maxillofacial CT noted.  CXR shows no new infiltrates.  No ABG done yet.    8/22/2022 - Events of the weekend noted, no response to me this AM.  Dr Jones's note from yesterday seen and pt does not want comfort measures.  Mulitple + cultures (ID following for antibiotics).    8/23/2022 - Stable overnight, no new issues reported.  He is more awake and responsive.  Waiting on neuro to see for masseter BOTOX injections.  Dr Jones's note seen.  Stable on ventilator.     8/24/2022 - Stable overnight, no new issues reported.  Neuro note seen but they do not do Botox injections and have recommended OMFS consult (I do not believe that they come here) - ? Do we have to consider transfer to [facility] for evaluation/treatment per OMFS.  Respiratory status is stable.    Review of Systems    Review of Systems   Reason unable to perform ROS: difficult to communicate with.   Constitutional: Negative for fever.   HENT:        + mouth pain  Bite blocks in place  Trach - OK   Respiratory:        Ventilated    Cardiovascular: Negative for chest pain.   Gastrointestinal:        + PEG   Neurological:        + ALS with chronic findings       Past Medical History    Past Medical History:   Diagnosis Date    ALS (amyotrophic lateral sclerosis)     ALS (amyotrophic lateral sclerosis)     Dependent on ventilator 3/21/2022    Erectile dysfunction     Gait disturbance      Hyperlipidemia     Hypertension     Iron deficiency anemia     Motor neuron disease     Polycystic kidney disease     Quadriparesis 2/6/2018    Tremor        Past Surgical History    Past Surgical History:   Procedure Laterality Date    GASTROSTOMY      PEG    PORTACATH PLACEMENT      TRACHEOSTOMY         Medications (scheduled):      apixaban  2.5 mg Oral BID    baclofen  10 mg Per G Tube TID    balsam peru-castor oiL   Topical (Top) Daily    busPIRone  5 mg Per G Tube BID    ceFEPime (MAXIPIME) IVPB  2 g Intravenous Q12H    chlorhexidine  15 mL Mouth/Throat BID    furosemide  40 mg Per G Tube Daily    metoprolol tartrate  50 mg Oral Daily    mupirocin   Nasal BID    onabotulinumtoxina  100 Units Subcutaneous Once    pantoprazole  40 mg Intravenous Daily    polyethylene glycol  17 g Oral Daily    riluzole  50 mg Oral Q12H    scopolamine  1 patch Transdermal Q3 Days    senna-docusate 8.6-50 mg  1 tablet Oral BID    sodium chloride 0.9%  10 mL Intravenous Q6H    traZODone  50 mg Per G Tube QHS       Medications (infusions):      sodium chloride 0.9% 100 mL/hr at 08/23/22 1115    propofoL Stopped (08/20/22 1048)       Medications (prn):     sodium chloride, acetaminophen, acetaminophen, albuterol-ipratropium, aluminum-magnesium hydroxide-simethicone, calcium chloride IVPB, calcium chloride IVPB, calcium chloride IVPB, dextrose 10%, dextrose 10%, glucagon (human recombinant), glucose, glucose, HYDROcodone-acetaminophen, HYDROmorphone, magnesium sulfate IVPB, magnesium sulfate IVPB, magnesium sulfate IVPB, magnesium sulfate IVPB, melatonin, morphine, naloxone, ondansetron, potassium chloride in water, potassium chloride in water, potassium chloride in water, potassium chloride in water, prochlorperazine, simethicone, sodium chloride 0.9%, Flushing PICC Protocol **AND** sodium chloride 0.9% **AND** sodium chloride 0.9%, sodium phosphate IVPB, sodium phosphate IVPB, sodium phosphate IVPB,  "sodium phosphate IVPB, sodium phosphate IVPB, white petrolatum    Family History: No family history on file.    Social History: Tobacco:   Social History     Tobacco Use   Smoking Status Not on file   Smokeless Tobacco Not on file                                EtOH:   Social History     Substance and Sexual Activity   Alcohol Use None                                Drugs:   Social History     Substance and Sexual Activity   Drug Use Never       Physical Exam    Vital signs:  Temp:  [98.5 °F (36.9 °C)-100.2 °F (37.9 °C)]   Pulse:  []   Resp:  [17-30]   BP: ()/(50-71)   SpO2:  [99 %-100 %]     Intake/Output:     Intake/Output Summary (Last 24 hours) at 8/24/2022 1007  Last data filed at 8/24/2022 0600  Gross per 24 hour   Intake 3850 ml   Output 2025 ml   Net 1825 ml        BMI: Estimated body mass index is 27.25 kg/m² as calculated from the following:    Height as of this encounter: 5' 8" (1.727 m).    Weight as of this encounter: 81.3 kg (179 lb 3.7 oz).    Physical Exam  Vitals and nursing note reviewed.   Constitutional:       General: He is not in acute distress.     Appearance: Normal appearance. He is ill-appearing (chronically). He is not toxic-appearing or diaphoretic.   HENT:      Head: Normocephalic and atraumatic.      Comments: Some swelling to right face     Right Ear: External ear normal.      Left Ear: External ear normal.      Nose: Nose normal.      Mouth/Throat:      Mouth: Mucous membranes are moist.      Pharynx: Oropharynx is clear. No oropharyngeal exudate.   Eyes:      General: No scleral icterus.        Right eye: No discharge.         Left eye: No discharge.      Extraocular Movements: Extraocular movements intact.      Conjunctiva/sclera: Conjunctivae normal.      Pupils: Pupils are equal, round, and reactive to light.   Neck:      Vascular: No carotid bruit.      Comments: trach  Cardiovascular:      Rate and Rhythm: Normal rate and regular rhythm.      Pulses: Normal pulses. "      Heart sounds: Normal heart sounds. No murmur heard.    No friction rub. No gallop.   Pulmonary:      Effort: Pulmonary effort is normal. No respiratory distress.      Breath sounds: Normal breath sounds. No stridor. No wheezing, rhonchi or rales.   Chest:      Chest wall: No tenderness.   Abdominal:      General: Bowel sounds are normal. There is no distension.      Tenderness: There is no abdominal tenderness. There is no guarding.   Musculoskeletal:         General: No swelling. Normal range of motion.      Cervical back: Normal range of motion and neck supple. No rigidity or tenderness.      Right lower leg: No edema.      Left lower leg: No edema.      Comments: + edema in UE bilaterally   Lymphadenopathy:      Cervical: No cervical adenopathy.   Skin:     General: Skin is warm and dry.      Capillary Refill: Capillary refill takes less than 2 seconds.      Coloration: Skin is not jaundiced.      Findings: No bruising.      Comments: Some redness at prior midline site  Some heel breakdown   Neurological:      Mental Status: He is alert.      Comments: Not responding or moving much   Psychiatric:      Comments: Not able to assess         Laboratory    Recent Labs   Lab 08/24/22  0315   WBC 19.08*   RBC 3.09*   HGB 7.7*   HCT 26.0*      MCV 84   MCH 24.9*   MCHC 29.6*       Recent Labs   Lab 08/24/22  0315   CALCIUM 7.6*      K 3.9   CO2 21*   *   BUN 22   CREATININE 0.4*       No results for input(s): PT, INR, APTT in the last 24 hours.    No results for input(s): CPK, CPKMB, TROPONINI, MB in the last 24 hours.    Additional labs: reviewe    Microbiology:       Microbiology Results (last 7 days)     Procedure Component Value Units Date/Time    Blood culture [[id number]] Collected: 08/18/22 2010    Order Status: Completed Specimen: Blood from Peripheral, Forearm, Left Updated: 08/23/22 2232     Blood Culture, Routine No growth after 5 days.    Blood culture [[id number]] Collected: 08/19/22  1234    Order Status: Completed Specimen: Blood from Peripheral, Upper Arm, Right Updated: 08/23/22 1432     Blood Culture, Routine No Growth to date      No Growth to date      No Growth to date      No Growth to date      No Growth to date    Urine culture [[id number]]  (Abnormal)  (Susceptibility) Collected: 08/18/22 1950    Order Status: Completed Specimen: Urine Updated: 08/22/22 0658     Urine Culture, Routine PSEUDOMONAS AERUGINOSA   >100,000 cfu/ml  Known  patient        PROVIDENCIA STUARTII  > 100,000 cfu/ml      Narrative:      Specimen Source->Urine    Culture, Respiratory with Gram Stain [[id number]]  (Abnormal)  (Susceptibility) Collected: 08/19/22 1728    Order Status: Completed Specimen: Respiratory from Tracheal Aspirate Updated: 08/22/22 0652     Respiratory Culture Normal respiratory marco      PSEUDOMONAS AERUGINOSA  Moderate       Gram Stain (Respiratory) Moderate WBC's     Gram Stain (Respiratory) Rare Gram positive cocci     Gram Stain (Respiratory) Rare Gram positive rods     Gram Stain (Respiratory) Rare Gram negative rods    IV catheter culture [[id number]]  (Abnormal)  (Susceptibility) Collected: 08/19/22 0119    Order Status: Completed Specimen: Catheter Tip, Peripheral Updated: 08/21/22 1018     Aerobic Culture - Cath tip METHICILLIN RESISTANT STAPHYLOCOCCUS AUREUS  Too numerous to count  Results called to and read back by:  08/21/2022  10:18 JBM        MORGANELLA MORGANII  Too numerous to count      Narrative:      Midline right arm    MRSA Screen by PCR [[id number]]  (Abnormal) Collected: 08/19/22 1728    Order Status: Completed Specimen: Nasopharyngeal Swab from Nasal Updated: 08/19/22 2031     MRSA SCREEN BY PCR Positive     Comment: MRSA  critical result(s) called and verbal readback obtained from   Tierra Reyes RN (M3) by SWSharyn 08/19/2022 20:31         Narrative:      MRSA  critical result(s) called and verbal readback obtained from   Tierra Reyes RN (M3) by [facility]  08/19/2022 20:31          Radiology    X-Ray Chest AP Portable  Reason: picc placement PICC LINE PLCMNT    FINDINGS:  Portable chest at 1638 compared with 8/19/2022 shows placement of right PICC with tip in SVC.    Cardiomediastinal silhouette, tracheostomy tube, and left subclavian port unchanged.    Right basilar airspace opacity is mild and unchanged perhaps reflecting atelectasis or consolidation. Left lung appears clear. Lung volumes remain low. Pulmonary vasculature is normal. No acute osseous abnormality.    IMPRESSION:  Placement of right PICC with tip in SVC.    Electronically signed by:  Robin Aerllano MD  8/20/2022 5:20 PM CDT Workstation: [id number]        Additional Studies    reviewed    Ventilator Information    Vent Mode: A/C  Oxygen Concentration (%):  [30-35] 30  Resp Rate Total:  [16 br/min-27 br/min] 17 br/min  Vt Set:  [500 mL] 500 mL  PEEP/CPAP:  [5 cmH20] 5 cmH20  Mean Airway Pressure:  [9.9 peD76-76 cmH20] 13 cmH20         No results for input(s): PH, PCO2, PO2, HCO3, POCSATURATED, BE in the last 72 hours.      Impression    Active Hospital Problems    Diagnosis  POA    Masseter muscle spasm [M62.838]  Yes    Open wound of tongue due to bite [S01.552A]  Yes    Trismus [R25.2]  Yes    Fever [R50.9]  Yes    Facial cellulitis [L03.211]  Yes    Catheter-associated urinary tract infection [T83.511A, N39.0]  Yes    Tracheostomy in place [Z93.0]  Not Applicable    Chronic respiratory failure with hypoxia and hypercapnia [J96.11, J96.12]  Yes    Dependent on ventilator [Z99.11]  Not Applicable    S/P percutaneous endoscopic gastrostomy (PEG) tube placement [Z93.1]  Not Applicable    ALS (amyotrophic lateral sclerosis) [G12.21]  Yes    Edema [R60.9]  Yes    Quadriparesis [G82.50]  Yes      Resolved Hospital Problems    Diagnosis Date Resolved POA    Facial abscess [L02.01] 08/22/2022 Yes       Plan    · Continue vent - no weaning  · Cultures reviewed  · Contact isolation  · Antibiotics  per ID  · Follow H/H - no acute bleeding reported  · Wound care  · PEG feedings  · Has been evaluated by ENT  · ?OMFS consult    Thank you for this consult.  I will follow with you while the patient is hospitalized.        Sy Kennedy MD  [facility] Pulmonary/Critical Care  08/24/2022           [Joint Stiffness] : joint stiffness [Skin Wound] : skin wound [Negative] : Neurological [Fever] : no fever [Chills] : no chills [Eye Pain] : no eye pain [Earache] : no earache [Shortness Of Breath] : no shortness of breath [Abdominal Pain] : no abdominal pain [Anxiety] : no anxiety [Easy Bleeding] : no tendency for easy bleeding [FreeTextEntry5] : HTN, HLD [de-identified] : Dorsal abrasion left hallux posterior of nail [de-identified] : NIDDM with neuropathy  [de-identified] : NIDDM

## 2023-05-04 NOTE — PROGRESS NOTES
[facility] Critical [facility]  Neurocritical Care  Progress Note    Admit Date: 8/31/2022  Service Date: 09/01/2022  Length of Stay: 1    Subjective:     Chief Complaint: Trismus    History of Present Illness: Wei Dominique is a 57 yo AAM with ALS. At OSH with prolonged course complicated by bacteremia, PNA, upper extremity DVTs, facial swelling and trismus. Received Abx, PICC was changed there prior to transfer, and received BOTOX injections to help with his Trismus.      Hospital Course: NAEON; ENT evaluation; appreciate ID recs    Past Medical History:   Diagnosis Date    ALS (amyotrophic lateral sclerosis)     ALS (amyotrophic lateral sclerosis)     Dependent on ventilator 3/21/2022    Erectile dysfunction     Gait disturbance     Hyperlipidemia     Hypertension     Iron deficiency anemia     Motor neuron disease     Polycystic kidney disease     Quadriparesis 2/6/2018    Tremor      Past Surgical History:   Procedure Laterality Date    GASTROSTOMY      PEG    PORTACATH PLACEMENT      TRACHEOSTOMY        No current facility-administered medications on file prior to encounter.     Current Outpatient Medications on File Prior to Encounter   Medication Sig Dispense Refill    albuterol-ipratropium (DUO-NEB) 2.5 mg-0.5 mg/3 mL nebulizer solution Take 3 mLs by nebulization every 4 (four) hours. Rescue      apixaban (ELIQUIS) 2.5 mg Tab 1 tablet (2.5 mg total) by Per G Tube route 2 (two) times daily. 60 tablet 11    baclofen (LIORESAL) 10 MG tablet TAKE 1 TABLET THREE TIMES PER DAY (Patient taking differently: 10 mg by Per G Tube route 3 (three) times daily.) 270 tablet 0    busPIRone (BUSPAR) 5 MG Tab 1 tablet (5 mg total) by Per G Tube route 2 (two) times daily. 60 tablet 11    cefepime in dextrose 5 % (MAXIPIME) 2 gram/50 mL PgBk Inject 50 mLs (2 g total) into the vein every 8 (eight) hours.      glycopyrrolate (ROBINUL) 1 mg Tab START BY TAKING 1 TABLET DAILY. IF TOLERATING WELL, YOU CAN  INCREASE IT UP TO 2 OR EVEN 3 TIMES DAILY. (Patient taking differently: 1 mg by Per G Tube route 3 (three) times daily.) 270 tablet 1    lansoprazole (PREVACID) 30 MG capsule Take 30 mg by mouth once daily.      metoprolol tartrate (LOPRESSOR) 50 MG tablet Take 50 mg by mouth once daily.      mupirocin (BACTROBAN) 2 % ointment SMARTSI Application Topical 2-3 Times Daily      nystatin (MYCOSTATIN) 100,000 unit/mL suspension Take 5 mLs (500,000 Units total) by mouth 4 (four) times daily. for 10 days 200 mL 0    ondansetron (ZOFRAN) 4 MG tablet 4 mg by Per G Tube route every 6 (six) hours as needed for Nausea.      pantoprazole (PROTONIX) 40 mg injection Inject 40 mg into the vein once daily. 30 each 11    pantoprazole (PROTONIX) 40 mg suspension 40 mg by FEEDING TUBE route 2 (two) times a day.      polyethylene glycol (GLYCOLAX) 17 gram PwPk 17 g by Per G Tube route 2 (two) times daily. 60 each 11    riluzole 50 mg Tab tablet TAKE 1 TABLET ON AN EMPTY STOMACH EVERY 12 HRS (Patient taking differently: 50 mg by Per G Tube route. TAKE 1 TABLET ON AN EMPTY STOMACH EVERY 12 HRS) 180 tablet 3    sodium chloride 0.9% (NORMAL SALINE FLUSH) injection Inject 10 mLs into the vein every 6 (six) hours.      sodium chloride 0.9% (NORMAL SALINE FLUSH) injection Inject 10 mLs into the vein as needed.      traZODone (DESYREL) 50 MG tablet 50 mg by Per G Tube route every evening.        Allergies: Patient has no known allergies.    History reviewed. No pertinent family history.  Social History     Substance Use Topics    Drug use: Never     Review of Systems  Unable to assess 2/2 unable to speak  Objective:     Vitals:    Temp: 98.9 °F (37.2 °C)  Pulse: 100  Rhythm: normal sinus rhythm  BP: 115/60  MAP (mmHg): 83  Resp: (!) 22  SpO2: 100 %  Oxygen Concentration (%): 30  O2 Device (Oxygen Therapy): ventilator  Vent Mode: A/C  Set Rate: 16 BPM  Vt Set: 500 mL  PEEP/CPAP: 5 cmH20  Peak Airway Pressure: 23 cmH2O  Mean  Airway Pressure: 10 cmH20  Plateau Pressure: 0 cmH20    Temp  Min: 98.2 °F (36.8 °C)  Max: 98.9 °F (37.2 °C)  Pulse  Min: 84  Max: 112  BP  Min: 90/55  Max: 128/67  MAP (mmHg)  Min: 66  Max: 95  Resp  Min: 16  Max: 26  SpO2  Min: 100 %  Max: 100 %  Oxygen Concentration (%)  Min: 30  Max: 30    08/31 0701 - 09/01 0700  In: 679 [I.V.:112.7]  Out: 1230 [Urine:1230]   Unmeasured Output  Stool Occurrence: 1       Physical Exam  --sedation: none  --GCS: [id number] Tracheostomy   --Mental Status: Alert, follows some commands   --Pupils 3-->2mm, PERRL.   --brainstem: intact  --Motor: no movement in extremities  --sensory: blinks acknowledging touch throughout   --Reflexes: not tested  --Gait: deferred      Today I personally reviewed pertinent medications, lines/drains/airways, imaging, cardiology results, laboratory results, microbiology results,      Assessment/Plan:     Neuro  ALS (amyotrophic lateral sclerosis)  See trismus     Hematology  Occlusive thrombus  See Trismus     Other  * Trismus  Briefly 55 yo AAM with ALS. At OSH with prolonged course complicated by bacteremia, PNA, upper extremity DVTs, facial swelling and trismus. Received Abx, PICC was changed there prior to transfer, and received BOTOX injections to help with his Trismus.     -ENT consult for facial mass on CT & trismus   -ID following: infectious workup & Abx  -Heparin gtt for DVTs and prior PE  -Echo  -tube feeds          The patient is being Prophylaxed for:  Venous Thromboembolism with: Chemical  Stress Ulcer with: Not Applicable   Ventilator Pneumonia with: chlorhexidine oral care    Activity Orders          Turn patient starting at 08/31 1200    Elevate HOB starting at 08/31 1131    Diet NPO: NPO starting at 08/31 1131        Full Code    Mack Mccartney MD  Neurocritical Care  [facility]     Hemigard Intro: Due to skin fragility and wound tension, it was decided to use HEMIGARD adhesive retention suture devices to permit a linear closure. The skin was cleaned and dried for a 6cm distance away from the wound. Excessive hair, if present, was removed to allow for adhesion.

## 2024-03-12 NOTE — PROGRESS NOTES
[facility] Medicine  Progress Note    Patient Name: Wei Dominique  MRN: [id number]  Patient Class: IP- Inpatient   Admission Date: 5/22/2022  Length of Stay: 5 days  Attending Physician: Lauren Rodney MD  Primary Care Provider: Mack Suero MD        Subjective:     Principal Problem:Anemia        HPI:  Wei Dominique is a 66 year old male with past medical history of ALS, s/p trach & peg, vent dependent, HTN, PE (on eliquis) presented to hospital from [facility], [place] for an evaluation of his tracheostomy. HPI limited due to patient's non verbal status, and information obtained from ED and nursing home notes. Per ED notes, the respiratory tech at [place] was having difficulty passing a suction catheter through the trach, leading to concerns for a blockage. The cannula was replaced several times, and he had minimal bleeding from the site. He did not have a large amount of bleeding at the time, and there was no bleeding upon arrival to the ED. He was sent for possible tracheostomy change.    Upon arrival to the ED, the patient was suctioned with very little difficulty and no evidence of obstruction was noted. Baseline labs were obtained, where the patient was found to be severely anemic, with a hemoglobin of 3.3 and a hematocrit of 10.9. His baseline H/H on review of the medical record appears to be around 8. He was given emergency release blood in the ED, where his blood pressure maintained and his H/H improved with subsequent lab draws. There was no obvious source of bleeding, as his urine did not show hematuria, there was no active bleeding from the trach site and there were no reports of hematemesis, hematochezia or dark stools. His abdomen is significantly distended with hypoactive bowel sounds.     A CT of the chest, abdomen and pelvis was obtained by [facility] medicine, which showed:   1. Ground-glass opacities in the lingula and aerated portion of the right  lower lobe.  An infectious process cannot be entirely excluded. Complete right and moderate left bibasilar atelectasis.  2. 2 mm right upper lobe pulmonary nodule.  For a solid nodule <6 mm, Fleischner Society 2017 guidelines recommend no routine follow up for a low risk patient, or follow-up with non-contrast chest CT at 12 months in a high risk patient.  3. Innumerable bilateral renal cysts.  Findings may suggest history of adult polycystic kidney disease. Hepatic cysts.  4. Cholelithiasis.  5. Gaseous distension of the colon.  Patulous rectum distended with fecal material.  Presacral infiltration or edema.  Findings may suggest fecal impaction and or stercoral colitis.  6. Edematous appearance of the arms.    Hospital Medicine consulted for admission and further management.        Overview/Hospital Course:  No notes on file    Interval History: KUB better. Resuming tube feeds and plan for back to [facility] tomorrow if tolerating tube feeds    Review of Systems   Unable to perform ROS: Patient nonverbal   Objective:     Vital Signs (Most Recent):  Temp: 98.2 °F (36.8 °C) (05/27/22 0915)  Pulse: 78 (05/27/22 1100)  Resp: 16 (05/27/22 1100)  BP: 131/62 (05/27/22 1030)  SpO2: 100 % (05/27/22 1100) Vital Signs (24h Range):  Temp:  [97.4 °F (36.3 °C)-98.2 °F (36.8 °C)] 98.2 °F (36.8 °C)  Pulse:  [] 78  Resp:  [15-29] 16  SpO2:  [92 %-100 %] 100 %  BP: ()/() 131/62  Arterial Line BP: ()/() 112/61     Weight: 72.2 kg (159 lb 2.8 oz)  Body mass index is 24.2 kg/m².    Intake/Output Summary (Last 24 hours) at 5/27/2022 1135  Last data filed at 5/27/2022 1055  Gross per 24 hour   Intake 2132.49 ml   Output 1635 ml   Net 497.49 ml        Physical Exam  Vitals and nursing note reviewed.   Constitutional:       General: He is awake. He is not in acute distress.     Appearance: He is normal weight. He is ill-appearing (chronically ill appearing, sickly).   HENT:      Mouth/Throat:      Lips: Pink.       Mouth: Mucous membranes are moist.      Comments: Drooling from mouth  Neck:      Trachea: Tracheostomy present.      Comments: Trach site appears intact  Cardiovascular:      Rate and Rhythm: Normal rate.      Heart sounds: Normal heart sounds, S1 normal and S2 normal.      Comments: +anasarca  Pulmonary:      Effort: Pulmonary effort is normal.      Breath sounds: Transmitted upper airway sounds present. Examination of the right-lower field reveals decreased breath sounds. Examination of the left-lower field reveals decreased breath sounds. Decreased breath sounds present.   Chest:       Abdominal:      General: Abdomen is protuberant. Bowel sounds are decreased. There is distension (improving).      Tenderness: There is no abdominal tenderness.   Genitourinary:     Comments: [place]  Musculoskeletal:      Cervical back: Full passive range of motion without pain and neck supple.      Right lower leg: Edema present.      Left lower leg: Edema present.   Skin:     General: Skin is warm and dry.      Capillary Refill: Capillary refill takes 2 to 3 seconds.   Neurological:      Mental Status: He is alert. Mental status is at baseline.      Comments: Quadraparesis  Non verbal but does communicate well via eye blinks, one blink yes, two blinks no         Significant Labs: All pertinent labs within the past 24 hours have been reviewed.    Significant Imaging: I have reviewed all pertinent imaging results/findings within the past 24 hours.        Assessment/Plan:      * Anemia  Patient's anemia is currently uncontrolled. Has recieved 2 units of PRBCs on 5/22/22. Etiology likely d/t unknown source  Current CBC reviewed-   Lab Results   Component Value Date    HGB 10.2 (L) 05/24/2022    HCT 31.9 (L) 05/24/2022     Monitor serial CBC and transfuse if patient becomes hemodynamically unstable, symptomatic or H/H drops below 7/21.     Baseline around 8, Hemoglobin on admit 3.3. no obvious bleeding sites          Aspiration  #1: syndrome        Abdominal distention  Significant distention noted, CT of abdomen shows: In the pelvis, there is gaseous distension of the colon.  The sigmoid colon is redundant.  The patulous rectum is distended with hyperdense fecal material.  There is presacral edema or infiltration.  There are no pelvic masses or adenopathy.  There is no free pelvic fluid.  GI consulted - thought to have stercoral colitis from fecal impaction  5/24 Enemas ordered  5/25 Disimpaction then golytely  Improving  5/27 resume tube feeds, miralax BID and daily glycerin suppository    Dependent on ventilator  Noted, pulmonary consult      Right lower lobe consolidation  Appears to be chronic when reviewing previous radiology studies  Follow respiratory culture, concern he colonizes  Afebrile, will hold abx until cultures result      S/P percutaneous endoscopic gastrostomy (PEG) tube placement  Noted, trach care      Edema    Anasarca noted all over body  Continue lasix    Quadriparesis  Noted  Assist with ADLs, turn Q2H and monitor skin  PT/OT consulted      ALS (amyotrophic lateral sclerosis)    Noted  Trach and Peg dependent  Assist with ADLs, turn Q2H and monitor skin  Continue baclofen as ordered  Riluzole ordered    Essential hypertension  Chronic, controlled.  Latest blood pressure and vitals reviewed-   Temp:  [97.8 °F (36.6 °C)-98.1 °F (36.7 °C)]   Pulse:  []   Resp:  [16-20]   BP: ()/()   SpO2:  [97 %-100 %] .   Home meds for hypertension were reviewed and noted below.   Hypertension Medications             furosemide (LASIX) 40 MG tablet 1 tablet (40 mg total) by Per G Tube route once daily.    metoprolol tartrate (LOPRESSOR) 50 MG tablet Take 50 mg by mouth once daily.          While in the hospital, will manage blood pressure as follows; Continue home antihypertensive regimen    Will utilize p.r.n. blood pressure medication only if patient's blood pressure greater than  180/110 and he develops symptoms such  as worsening chest pain or shortness of breath.          VTE Risk Mitigation (From admission, onward)         Ordered     enoxaparin injection 80 mg  Every 12 hours (non-standard times)         05/23/22 1700     Place sequential compression device  Until discontinued         05/22/22 2222     Reason for No Pharmacological VTE Prophylaxis  Once        Question:  Reasons:  Answer:  Risk of Bleeding    05/22/22 2222     IP VTE LOW RISK PATIENT  Once         05/22/22 2222                Discharge Planning   VENESSA: 5/27/2022     Code Status: Full Code   Is the patient medically ready for discharge?:     Reason for patient still in hospital (select all that apply): Patient trending condition and Treatment  Discharge Plan A: Return to nursing home            Critical care time spent on the evaluation and treatment of severe organ dysfunction, review of pertinent labs and imaging studies, discussions with consulting providers and discussions with patient/family: 35 minutes.      Lauren Rodney MD  Department of Hospital Medicine   [facility]

## 2025-01-03 NOTE — ASSESSMENT & PLAN NOTE
Will monitor blood pressure.  Use anti-hypertensives when needed.     clear to auscultation bilaterally/no rales/no rhonchi